# Patient Record
Sex: FEMALE | Race: BLACK OR AFRICAN AMERICAN | NOT HISPANIC OR LATINO | Employment: OTHER | URBAN - METROPOLITAN AREA
[De-identification: names, ages, dates, MRNs, and addresses within clinical notes are randomized per-mention and may not be internally consistent; named-entity substitution may affect disease eponyms.]

---

## 2023-03-27 ENCOUNTER — HOSPITAL ENCOUNTER (OUTPATIENT)
Dept: DATA CONVERSION | Facility: HOSPITAL | Age: 68
End: 2023-03-27
Attending: INTERNAL MEDICINE | Admitting: INTERNAL MEDICINE
Payer: MEDICARE

## 2023-03-27 DIAGNOSIS — K21.9 GASTRO-ESOPHAGEAL REFLUX DISEASE WITHOUT ESOPHAGITIS: ICD-10-CM

## 2023-03-27 DIAGNOSIS — F41.9 ANXIETY DISORDER, UNSPECIFIED: ICD-10-CM

## 2023-03-27 DIAGNOSIS — I35.0 NONRHEUMATIC AORTIC (VALVE) STENOSIS: ICD-10-CM

## 2023-03-27 DIAGNOSIS — K44.9 DIAPHRAGMATIC HERNIA WITHOUT OBSTRUCTION OR GANGRENE: ICD-10-CM

## 2023-03-27 DIAGNOSIS — E55.9 VITAMIN D DEFICIENCY, UNSPECIFIED: ICD-10-CM

## 2023-03-27 DIAGNOSIS — R06.00 DYSPNEA, UNSPECIFIED: ICD-10-CM

## 2023-03-27 DIAGNOSIS — I25.2 OLD MYOCARDIAL INFARCTION: ICD-10-CM

## 2023-03-27 DIAGNOSIS — E11.22 TYPE 2 DIABETES MELLITUS WITH DIABETIC CHRONIC KIDNEY DISEASE (MULTI): ICD-10-CM

## 2023-03-27 DIAGNOSIS — Z85.53 PERSONAL HISTORY OF MALIGNANT NEOPLASM OF RENAL PELVIS: ICD-10-CM

## 2023-03-27 DIAGNOSIS — E66.9 OBESITY, UNSPECIFIED: ICD-10-CM

## 2023-03-27 DIAGNOSIS — D50.9 IRON DEFICIENCY ANEMIA, UNSPECIFIED: ICD-10-CM

## 2023-03-27 DIAGNOSIS — G89.4 CHRONIC PAIN SYNDROME: ICD-10-CM

## 2023-03-27 DIAGNOSIS — Z86.718 PERSONAL HISTORY OF OTHER VENOUS THROMBOSIS AND EMBOLISM: ICD-10-CM

## 2023-03-27 DIAGNOSIS — G47.33 OBSTRUCTIVE SLEEP APNEA (ADULT) (PEDIATRIC): ICD-10-CM

## 2023-03-27 DIAGNOSIS — Z88.2 ALLERGY STATUS TO SULFONAMIDES: ICD-10-CM

## 2023-03-27 DIAGNOSIS — R32 UNSPECIFIED URINARY INCONTINENCE: ICD-10-CM

## 2023-03-27 DIAGNOSIS — E11.51 TYPE 2 DIABETES MELLITUS WITH DIABETIC PERIPHERAL ANGIOPATHY WITHOUT GANGRENE (MULTI): ICD-10-CM

## 2023-03-27 DIAGNOSIS — K31.7 POLYP OF STOMACH AND DUODENUM: ICD-10-CM

## 2023-03-27 DIAGNOSIS — N18.30 CHRONIC KIDNEY DISEASE, STAGE 3 UNSPECIFIED (MULTI): ICD-10-CM

## 2023-03-27 DIAGNOSIS — M06.9 RHEUMATOID ARTHRITIS, UNSPECIFIED (MULTI): ICD-10-CM

## 2023-03-27 DIAGNOSIS — J45.909 UNSPECIFIED ASTHMA, UNCOMPLICATED (HHS-HCC): ICD-10-CM

## 2023-03-27 DIAGNOSIS — D50.0 IRON DEFICIENCY ANEMIA SECONDARY TO BLOOD LOSS (CHRONIC): ICD-10-CM

## 2023-03-27 DIAGNOSIS — M10.9 GOUT, UNSPECIFIED: ICD-10-CM

## 2023-03-27 DIAGNOSIS — I12.9 HYPERTENSIVE CHRONIC KIDNEY DISEASE WITH STAGE 1 THROUGH STAGE 4 CHRONIC KIDNEY DISEASE, OR UNSPECIFIED CHRONIC KIDNEY DISEASE: ICD-10-CM

## 2023-03-27 DIAGNOSIS — E78.5 HYPERLIPIDEMIA, UNSPECIFIED: ICD-10-CM

## 2023-03-27 DIAGNOSIS — G43.909 MIGRAINE, UNSPECIFIED, NOT INTRACTABLE, WITHOUT STATUS MIGRAINOSUS: ICD-10-CM

## 2023-03-27 DIAGNOSIS — Z91.041 RADIOGRAPHIC DYE ALLERGY STATUS: ICD-10-CM

## 2023-03-27 DIAGNOSIS — Z87.891 PERSONAL HISTORY OF NICOTINE DEPENDENCE: ICD-10-CM

## 2023-03-27 DIAGNOSIS — R35.0 FREQUENCY OF MICTURITION: ICD-10-CM

## 2023-03-27 LAB — POCT GLUCOSE: 123 MG/DL (ref 74–99)

## 2023-04-05 LAB
COMPLETE PATHOLOGY REPORT: NORMAL
CONVERTED CLINICAL DIAGNOSIS-HISTORY: NORMAL
CONVERTED FINAL DIAGNOSIS: NORMAL
CONVERTED FINAL REPORT PDF LINK TO COPY AND PASTE: NORMAL
CONVERTED GROSS DESCRIPTION: NORMAL

## 2023-07-21 ENCOUNTER — OFFICE VISIT (OUTPATIENT)
Dept: PRIMARY CARE | Facility: CLINIC | Age: 68
End: 2023-07-21
Payer: MEDICARE

## 2023-07-21 ENCOUNTER — LAB (OUTPATIENT)
Dept: LAB | Facility: LAB | Age: 68
End: 2023-07-21
Payer: MEDICARE

## 2023-07-21 VITALS
HEART RATE: 84 BPM | HEIGHT: 59 IN | OXYGEN SATURATION: 98 % | WEIGHT: 196.8 LBS | BODY MASS INDEX: 39.68 KG/M2 | DIASTOLIC BLOOD PRESSURE: 103 MMHG | SYSTOLIC BLOOD PRESSURE: 177 MMHG

## 2023-07-21 DIAGNOSIS — R35.0 INCREASED FREQUENCY OF MICTURITION: ICD-10-CM

## 2023-07-21 DIAGNOSIS — E11.22 TYPE 2 DIABETES MELLITUS WITH STAGE 3A CHRONIC KIDNEY DISEASE, WITHOUT LONG-TERM CURRENT USE OF INSULIN (MULTI): ICD-10-CM

## 2023-07-21 DIAGNOSIS — N18.31 TYPE 2 DIABETES MELLITUS WITH STAGE 3A CHRONIC KIDNEY DISEASE, WITHOUT LONG-TERM CURRENT USE OF INSULIN (MULTI): ICD-10-CM

## 2023-07-21 DIAGNOSIS — Z12.31 VISIT FOR SCREENING MAMMOGRAM: ICD-10-CM

## 2023-07-21 DIAGNOSIS — N18.31 STAGE 3A CHRONIC KIDNEY DISEASE (MULTI): ICD-10-CM

## 2023-07-21 DIAGNOSIS — J30.2 SEASONAL ALLERGIES: ICD-10-CM

## 2023-07-21 DIAGNOSIS — D50.9 IRON DEFICIENCY ANEMIA, UNSPECIFIED IRON DEFICIENCY ANEMIA TYPE: ICD-10-CM

## 2023-07-21 DIAGNOSIS — Z00.00 MEDICARE ANNUAL WELLNESS VISIT, SUBSEQUENT: ICD-10-CM

## 2023-07-21 DIAGNOSIS — Z78.0 ASYMPTOMATIC MENOPAUSE: ICD-10-CM

## 2023-07-21 DIAGNOSIS — R20.0 RUE NUMBNESS: ICD-10-CM

## 2023-07-21 DIAGNOSIS — C64.9 RENAL CELL CARCINOMA, UNSPECIFIED LATERALITY (MULTI): Primary | ICD-10-CM

## 2023-07-21 PROBLEM — E78.2 MIXED HYPERLIPIDEMIA: Status: ACTIVE | Noted: 2023-07-21

## 2023-07-21 PROBLEM — N18.30 CHRONIC KIDNEY DISEASE (CKD), STAGE III (MODERATE) (MULTI): Status: ACTIVE | Noted: 2023-07-21

## 2023-07-21 PROBLEM — D64.9 ANEMIA: Status: ACTIVE | Noted: 2023-07-21

## 2023-07-21 PROBLEM — E11.9 DIABETES MELLITUS (MULTI): Status: ACTIVE | Noted: 2023-07-21

## 2023-07-21 PROBLEM — K21.9 ESOPHAGEAL REFLUX: Status: ACTIVE | Noted: 2023-07-21

## 2023-07-21 PROBLEM — M19.90 OSTEOARTHRITIS: Status: ACTIVE | Noted: 2023-07-21

## 2023-07-21 LAB
ALBUMIN (MG/L) IN URINE: <7 MG/L
ALBUMIN/CREATININE (UG/MG) IN URINE: NORMAL UG/MG CRT (ref 0–30)
ANION GAP IN SER/PLAS: 13 MMOL/L (ref 10–20)
APPEARANCE, URINE: CLEAR
BILIRUBIN, URINE: NEGATIVE
BLOOD, URINE: NEGATIVE
CALCIUM (MG/DL) IN SER/PLAS: 9.6 MG/DL (ref 8.6–10.6)
CARBON DIOXIDE, TOTAL (MMOL/L) IN SER/PLAS: 25 MMOL/L (ref 21–32)
CHLORIDE (MMOL/L) IN SER/PLAS: 108 MMOL/L (ref 98–107)
COLOR, URINE: YELLOW
CREATININE (MG/DL) IN SER/PLAS: 1.27 MG/DL (ref 0.5–1.05)
CREATININE (MG/DL) IN URINE: 109 MG/DL (ref 20–320)
ERYTHROCYTE DISTRIBUTION WIDTH (RATIO) BY AUTOMATED COUNT: 17.2 % (ref 11.5–14.5)
ERYTHROCYTE MEAN CORPUSCULAR HEMOGLOBIN CONCENTRATION (G/DL) BY AUTOMATED: 30.3 G/DL (ref 32–36)
ERYTHROCYTE MEAN CORPUSCULAR VOLUME (FL) BY AUTOMATED COUNT: 73 FL (ref 80–100)
ERYTHROCYTES (10*6/UL) IN BLOOD BY AUTOMATED COUNT: 4.17 X10E12/L (ref 4–5.2)
ESTIMATED AVERAGE GLUCOSE FOR HBA1C: 157 MG/DL
GFR FEMALE: 46 ML/MIN/1.73M2
GLUCOSE (MG/DL) IN SER/PLAS: 124 MG/DL (ref 74–99)
GLUCOSE, URINE: NEGATIVE MG/DL
HEMATOCRIT (%) IN BLOOD BY AUTOMATED COUNT: 30.4 % (ref 36–46)
HEMOGLOBIN (G/DL) IN BLOOD: 9.2 G/DL (ref 12–16)
HEMOGLOBIN A1C/HEMOGLOBIN TOTAL IN BLOOD: 7.1 %
IRON (UG/DL) IN SER/PLAS: 23 UG/DL (ref 35–150)
IRON BINDING CAPACITY (UG/DL) IN SER/PLAS: >473 UG/DL (ref 240–445)
IRON SATURATION (%) IN SER/PLAS: ABNORMAL % (ref 25–45)
KETONES, URINE: NEGATIVE MG/DL
LEUKOCYTE ESTERASE, URINE: NEGATIVE
LEUKOCYTES (10*3/UL) IN BLOOD BY AUTOMATED COUNT: 8.1 X10E9/L (ref 4.4–11.3)
NITRITE, URINE: NEGATIVE
NRBC (PER 100 WBCS) BY AUTOMATED COUNT: 0 /100 WBC (ref 0–0)
PH, URINE: 5 (ref 5–8)
PLATELETS (10*3/UL) IN BLOOD AUTOMATED COUNT: 418 X10E9/L (ref 150–450)
POTASSIUM (MMOL/L) IN SER/PLAS: 4.6 MMOL/L (ref 3.5–5.3)
PROTEIN, URINE: NEGATIVE MG/DL
SODIUM (MMOL/L) IN SER/PLAS: 141 MMOL/L (ref 136–145)
SPECIFIC GRAVITY, URINE: 1.01 (ref 1–1.03)
UREA NITROGEN (MG/DL) IN SER/PLAS: 14 MG/DL (ref 6–23)
UROBILINOGEN, URINE: <2 MG/DL (ref 0–1.9)

## 2023-07-21 PROCEDURE — 1125F AMNT PAIN NOTED PAIN PRSNT: CPT | Performed by: STUDENT IN AN ORGANIZED HEALTH CARE EDUCATION/TRAINING PROGRAM

## 2023-07-21 PROCEDURE — 83550 IRON BINDING TEST: CPT

## 2023-07-21 PROCEDURE — 1159F MED LIST DOCD IN RCRD: CPT | Performed by: STUDENT IN AN ORGANIZED HEALTH CARE EDUCATION/TRAINING PROGRAM

## 2023-07-21 PROCEDURE — 81003 URINALYSIS AUTO W/O SCOPE: CPT

## 2023-07-21 PROCEDURE — 3008F BODY MASS INDEX DOCD: CPT | Performed by: STUDENT IN AN ORGANIZED HEALTH CARE EDUCATION/TRAINING PROGRAM

## 2023-07-21 PROCEDURE — 36415 COLL VENOUS BLD VENIPUNCTURE: CPT

## 2023-07-21 PROCEDURE — 83540 ASSAY OF IRON: CPT

## 2023-07-21 PROCEDURE — 1036F TOBACCO NON-USER: CPT | Performed by: STUDENT IN AN ORGANIZED HEALTH CARE EDUCATION/TRAINING PROGRAM

## 2023-07-21 PROCEDURE — 85027 COMPLETE CBC AUTOMATED: CPT

## 2023-07-21 PROCEDURE — G0439 PPPS, SUBSEQ VISIT: HCPCS | Performed by: STUDENT IN AN ORGANIZED HEALTH CARE EDUCATION/TRAINING PROGRAM

## 2023-07-21 PROCEDURE — 99214 OFFICE O/P EST MOD 30 MIN: CPT | Performed by: STUDENT IN AN ORGANIZED HEALTH CARE EDUCATION/TRAINING PROGRAM

## 2023-07-21 PROCEDURE — 3077F SYST BP >= 140 MM HG: CPT | Performed by: STUDENT IN AN ORGANIZED HEALTH CARE EDUCATION/TRAINING PROGRAM

## 2023-07-21 PROCEDURE — 1160F RVW MEDS BY RX/DR IN RCRD: CPT | Performed by: STUDENT IN AN ORGANIZED HEALTH CARE EDUCATION/TRAINING PROGRAM

## 2023-07-21 PROCEDURE — 1170F FXNL STATUS ASSESSED: CPT | Performed by: STUDENT IN AN ORGANIZED HEALTH CARE EDUCATION/TRAINING PROGRAM

## 2023-07-21 PROCEDURE — 87086 URINE CULTURE/COLONY COUNT: CPT

## 2023-07-21 PROCEDURE — 3066F NEPHROPATHY DOC TX: CPT | Performed by: STUDENT IN AN ORGANIZED HEALTH CARE EDUCATION/TRAINING PROGRAM

## 2023-07-21 PROCEDURE — 83036 HEMOGLOBIN GLYCOSYLATED A1C: CPT

## 2023-07-21 PROCEDURE — 82570 ASSAY OF URINE CREATININE: CPT

## 2023-07-21 PROCEDURE — 80048 BASIC METABOLIC PNL TOTAL CA: CPT

## 2023-07-21 PROCEDURE — 3044F HG A1C LEVEL LT 7.0%: CPT | Performed by: STUDENT IN AN ORGANIZED HEALTH CARE EDUCATION/TRAINING PROGRAM

## 2023-07-21 PROCEDURE — 3080F DIAST BP >= 90 MM HG: CPT | Performed by: STUDENT IN AN ORGANIZED HEALTH CARE EDUCATION/TRAINING PROGRAM

## 2023-07-21 PROCEDURE — 82043 UR ALBUMIN QUANTITATIVE: CPT

## 2023-07-21 RX ORDER — FLUTICASONE PROPIONATE 50 MCG
2 SPRAY, SUSPENSION (ML) NASAL DAILY
Qty: 16 G | Refills: 3 | Status: SHIPPED | OUTPATIENT
Start: 2023-07-21

## 2023-07-21 RX ORDER — LEVOCETIRIZINE DIHYDROCHLORIDE 5 MG/1
1 TABLET, FILM COATED ORAL EVERY EVENING
COMMUNITY
Start: 2021-08-05 | End: 2023-07-21 | Stop reason: SDUPTHER

## 2023-07-21 RX ORDER — SEMAGLUTIDE 1.34 MG/ML
INJECTION, SOLUTION SUBCUTANEOUS
COMMUNITY
Start: 2021-11-16 | End: 2023-08-29 | Stop reason: SINTOL

## 2023-07-21 RX ORDER — FERROUS SULFATE 325(65) MG
1 TABLET ORAL DAILY
COMMUNITY
Start: 2023-02-08 | End: 2023-12-22 | Stop reason: SINTOL

## 2023-07-21 RX ORDER — NAPROXEN SODIUM 220 MG/1
81 TABLET, FILM COATED ORAL DAILY
COMMUNITY
Start: 2021-04-27 | End: 2024-02-16 | Stop reason: HOSPADM

## 2023-07-21 RX ORDER — BRIMONIDINE TARTRATE 1 MG/ML
SOLUTION/ DROPS OPHTHALMIC EVERY 12 HOURS
COMMUNITY
Start: 2020-02-03 | End: 2023-12-12 | Stop reason: ALTCHOICE

## 2023-07-21 RX ORDER — LEVALBUTEROL TARTRATE 45 UG/1
1 AEROSOL, METERED ORAL EVERY 4 HOURS PRN
COMMUNITY
Start: 2017-03-23 | End: 2024-01-26 | Stop reason: ENTERED-IN-ERROR

## 2023-07-21 RX ORDER — FLUTICASONE PROPIONATE 50 MCG
SPRAY, SUSPENSION (ML) NASAL
COMMUNITY
Start: 2019-06-07 | End: 2023-07-21 | Stop reason: SDUPTHER

## 2023-07-21 RX ORDER — METOPROLOL SUCCINATE 200 MG/1
200 TABLET, EXTENDED RELEASE ORAL DAILY
COMMUNITY
End: 2023-08-07 | Stop reason: SDUPTHER

## 2023-07-21 RX ORDER — BRIMONIDINE TARTRATE 2 MG/ML
SOLUTION/ DROPS OPHTHALMIC
COMMUNITY
End: 2023-12-12 | Stop reason: ALTCHOICE

## 2023-07-21 RX ORDER — AMLODIPINE BESYLATE 5 MG/1
TABLET ORAL
COMMUNITY
Start: 2021-07-13 | End: 2023-08-29 | Stop reason: SDUPTHER

## 2023-07-21 RX ORDER — OMEPRAZOLE 40 MG/1
1 CAPSULE, DELAYED RELEASE ORAL DAILY
COMMUNITY
Start: 2010-11-04 | End: 2023-08-11 | Stop reason: SDUPTHER

## 2023-07-21 RX ORDER — LEVOCETIRIZINE DIHYDROCHLORIDE 5 MG/1
5 TABLET, FILM COATED ORAL EVERY EVENING
Qty: 90 TABLET | Refills: 1 | Status: SHIPPED | OUTPATIENT
Start: 2023-07-21

## 2023-07-21 RX ORDER — ROSUVASTATIN CALCIUM 20 MG/1
1 TABLET, COATED ORAL DAILY
COMMUNITY
Start: 2021-04-23 | End: 2024-01-26 | Stop reason: ENTERED-IN-ERROR

## 2023-07-21 RX ORDER — INSULIN GLARGINE 100 [IU]/ML
INJECTION, SOLUTION SUBCUTANEOUS
COMMUNITY
Start: 2022-02-09 | End: 2023-08-29 | Stop reason: SDUPTHER

## 2023-07-21 RX ORDER — NITROGLYCERIN 0.4 MG/1
0.4 TABLET SUBLINGUAL EVERY 5 MIN PRN
COMMUNITY
Start: 2019-02-04 | End: 2024-01-26

## 2023-07-21 RX ORDER — FAMOTIDINE 20 MG/1
1 TABLET, FILM COATED ORAL NIGHTLY
COMMUNITY
Start: 2023-01-31

## 2023-07-21 ASSESSMENT — ACTIVITIES OF DAILY LIVING (ADL)
DRESSING: INDEPENDENT
BATHING: INDEPENDENT
TAKING_MEDICATION: INDEPENDENT
GROCERY_SHOPPING: INDEPENDENT
MANAGING_FINANCES: INDEPENDENT
DOING_HOUSEWORK: INDEPENDENT

## 2023-07-21 ASSESSMENT — PATIENT HEALTH QUESTIONNAIRE - PHQ9
2. FEELING DOWN, DEPRESSED OR HOPELESS: NOT AT ALL
1. LITTLE INTEREST OR PLEASURE IN DOING THINGS: NOT AT ALL
SUM OF ALL RESPONSES TO PHQ9 QUESTIONS 1 AND 2: 0

## 2023-07-21 NOTE — PROGRESS NOTES
Subjective   Reason for Visit: Vania Andrews is an 68 y.o. female here for a Medicare Wellness visit.     Past Medical, Surgical, and Family History reviewed and updated in chart.    Reviewed all medications by prescribing practitioner or clinical pharmacist (such as prescriptions, OTCs, herbal therapies and supplements) and documented in the medical record.    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 If left in the anatomical position , she does not experience the numbness     Patient Care Team:  Laura Mata MD as PCP - General  Laura Mata MD as PCP - [facility] Attributed Provider     Review of Systems    Constitutional: no chills, no fever and no night sweats.     Eyes: no blurred vision and no eyesight problems.     ENT: no hearing loss, no nasal congestion, no nasal discharge, no hoarseness and no sore throat.     Cardiovascular: no chest pain, no intermittent leg claudication, no lower extremity edema, no palpitations and no syncope.     Respiratory: no cough, no shortness of breath during exertion, no shortness of breath at rest and no wheezing.     Gastrointestinal: no abdominal pain, no blood in stools, no constipation, no diarrhea, no melena, no nausea, no rectal pain and no vomiting.     Genitourinary: no dysuria, no change in urinary frequency, no urinary hesitancy, no feelings of urinary urgency and no vaginal  "discharge.     Musculoskeletal: no arthralgias, no back pain and no myalgias.     Integumentary: no new skin lesions and no rashes.     Neurological: no difficulty walking, no headache, no limb weakness, no numbness and no tingling.     Psychiatric: no anxiety, no depression, no anhedonia and no substance use disorders.     Endocrine: no recent weight gain and no recent weight loss.     Hematologic/Lymphatic: no tendency for easy bruising and no swollen glands.          All other systems have been reviewed and are negative for complaint.    Objective   Vitals:  BP (!) 177/103   Pulse 84   Ht 1.499 m (4' 11\")   Wt 89.3 kg (196 lb 12.8 oz)   SpO2 98%   BMI 39.75 kg/m²       Physical Exam    Constitutional: Alert and in no acute distress. Well developed, well nourished.     Eyes: Normal external exam. Pupils were equal in size, round, reactive to light (PERRL) with normal accommodation and extraocular movements intact (EOMI).     Ears, Nose, Mouth, and Throat: External inspection of ears and nose: Normal.  Otoscopic examination: Normal.      Neck: No neck mass was observed. Supple.     Cardiovascular: Heart rate and rhythm were normal, normal S1 and S2, no gallops, no murmurs and no pericardial rub    Pulmonary: No respiratory distress. Clear bilateral breath sounds.     Abdomen: Soft nontender; no abdominal mass palpated. No organomegaly.     Musculoskeletal: No joint swelling seen, normal movements of all extremities. Range of motion: Normal.  Muscle strength/tone: Normal.          Neurologic: Deep tendon reflexes were 2+ and symmetric. Sensation: Normal.     Psychiatric: Judgment and insight: Intact. Mood and affect: Normal.      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 s/p L partial nephrectomy and L adrenalectomy (2007),          " Anemia    Relevant Orders    CBC    Iron and TIBC    Chronic kidney disease (CKD), stage III (moderate) (CMS/[facility])    Relevant Orders    Basic Metabolic Panel    Diabetes mellitus (CMS/[facility])    Relevant Orders    Hemoglobin A1C    Albumin , Urine Random    Referral to Podiatry     Other Visit Diagnoses       Medicare annual wellness visit, subsequent        Asymptomatic menopause        Relevant Orders    XR DEXA bone density    Visit for screening mammogram        Increased frequency of micturition        Relevant Orders    Urinalysis with Reflex Microscopic    Urine Culture    RUE numbness        Relevant Orders    Referral to Orthopaedic Surgery             68yo F with Hx of L RCC, papillary, 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   - HTN : Rpt elevated at as well, no med changes today   To RTO in 1m   - HPL   - CKD stage 3 : Rpt BMP ordered   Medications like Aleve ( Naproxen), advil ( Ibuprofen ) , meloxicam , aspirin etc., when used in excess on a regular basis can cause stomach bleed and kidney damage.   These medications are advised to be taken with food and sparingly.      - DM2 : est with endo . Pt continues to take Lantus , she is wary of Pioglitazone   - ASthma : stable   - H/O NSTEMI ; est with cardiology  - H/O anemia : rpt CBC  - RUE numbness : Ortho referfal   - Fu with ortho for hip issues        RTO in Dec or sooner if needed         **Patient Discussion/Summary     Risk Factors Identified During Visit: None.    Influenza: influenza vaccine was previously given.   Pneumovax 23/Prevnar 15: the patient declines the Ouwciqjct26/Prevnar 15 vaccination.   Prevnar 20: Prevnar 20 vaccine was previously given.   Shingles Vaccine: Shingles vaccine was recommended.   Breast cancer screening: screening ordered.   Cervical cancer screening: screening not indicated.   Osteoporosis screening: screening ordered.   Colorectal cancer screening: screening is current. Due 2024

## 2023-07-21 NOTE — ASSESSMENT & PLAN NOTE
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

## 2023-07-22 LAB
ESTIMATED AVERAGE GLUCOSE FOR HBA1C: 163 MG/DL
HEMOGLOBIN A1C/HEMOGLOBIN TOTAL IN BLOOD: 7.3 %

## 2023-07-23 LAB — URINE CULTURE: NORMAL

## 2023-07-24 ENCOUNTER — TELEPHONE (OUTPATIENT)
Dept: PRIMARY CARE | Facility: CLINIC | Age: 68
End: 2023-07-24
Payer: MEDICARE

## 2023-07-24 NOTE — TELEPHONE ENCOUNTER
PT called back for her results stated somehow her phone was not ringing and she missed the call please CB

## 2023-07-24 NOTE — TELEPHONE ENCOUNTER
----- Message from Laura Mata MD sent at 7/24/2023 12:56 PM EDT -----  Urine did not show infection   Diabetes  control is acceptable   Chronic kidney disease is stable ( no further decline in kidney function from previous levels ) .   Iron levels are low , if she can tolerate, advise her to take iron pills twice daily   Anemia improved from last time but needs to be higher , taking iron twice daily will help   Iron can cause constipation , advise lots of Fluids . Can use Miralax as needed for constipation . Take it with Orange juice .  Iron can also make stools black.  Iron is found in foods like salmon ,lean beef, turkey , egg yolks, black beans pumpkin seeds, spinach , broccoli etc.,

## 2023-08-07 DIAGNOSIS — I10 PRIMARY HYPERTENSION: Primary | ICD-10-CM

## 2023-08-08 RX ORDER — METOPROLOL SUCCINATE 200 MG/1
200 TABLET, EXTENDED RELEASE ORAL DAILY
Qty: 90 TABLET | Refills: 3 | Status: SHIPPED | OUTPATIENT
Start: 2023-08-08

## 2023-08-11 DIAGNOSIS — K21.9 GASTROESOPHAGEAL REFLUX DISEASE WITHOUT ESOPHAGITIS: Primary | ICD-10-CM

## 2023-08-11 RX ORDER — OMEPRAZOLE 40 MG/1
40 CAPSULE, DELAYED RELEASE ORAL DAILY
Qty: 30 CAPSULE | Refills: 0 | Status: SHIPPED | OUTPATIENT
Start: 2023-08-11 | End: 2023-11-02 | Stop reason: SDUPTHER

## 2023-08-12 NOTE — PROGRESS NOTES
Patient called answering service asking for omeprazole reiflls. [facility]

## 2023-08-29 ENCOUNTER — OFFICE VISIT (OUTPATIENT)
Dept: PRIMARY CARE | Facility: CLINIC | Age: 68
End: 2023-08-29
Payer: MEDICARE

## 2023-08-29 VITALS
HEART RATE: 86 BPM | OXYGEN SATURATION: 99 % | DIASTOLIC BLOOD PRESSURE: 93 MMHG | SYSTOLIC BLOOD PRESSURE: 154 MMHG | BODY MASS INDEX: 39.51 KG/M2 | WEIGHT: 196 LBS | HEIGHT: 59 IN

## 2023-08-29 DIAGNOSIS — N18.31 TYPE 2 DIABETES MELLITUS WITH STAGE 3A CHRONIC KIDNEY DISEASE, WITHOUT LONG-TERM CURRENT USE OF INSULIN (MULTI): ICD-10-CM

## 2023-08-29 DIAGNOSIS — I10 PRIMARY HYPERTENSION: Primary | ICD-10-CM

## 2023-08-29 DIAGNOSIS — E11.22 TYPE 2 DIABETES MELLITUS WITH STAGE 3A CHRONIC KIDNEY DISEASE, WITHOUT LONG-TERM CURRENT USE OF INSULIN (MULTI): ICD-10-CM

## 2023-08-29 DIAGNOSIS — R35.0 INCREASED FREQUENCY OF MICTURITION: ICD-10-CM

## 2023-08-29 PROBLEM — G89.29 CHRONIC PAIN OF RIGHT WRIST: Status: ACTIVE | Noted: 2023-08-29

## 2023-08-29 PROBLEM — R43.0 ANOSMIA: Status: ACTIVE | Noted: 2023-08-29

## 2023-08-29 PROBLEM — H52.203 ASTIGMATISM, BILATERAL: Status: ACTIVE | Noted: 2023-08-29

## 2023-08-29 PROBLEM — R30.9 PAIN WITH URINATION: Status: ACTIVE | Noted: 2023-08-29

## 2023-08-29 PROBLEM — H52.4 BILATERAL PRESBYOPIA: Status: ACTIVE | Noted: 2023-08-29

## 2023-08-29 PROBLEM — R00.0 TACHYCARDIA: Status: ACTIVE | Noted: 2023-08-29

## 2023-08-29 PROBLEM — M25.531 CHRONIC PAIN OF RIGHT WRIST: Status: ACTIVE | Noted: 2023-08-29

## 2023-08-29 PROBLEM — R41.3 MEMORY DYSFUNCTION: Status: ACTIVE | Noted: 2023-08-29

## 2023-08-29 PROBLEM — H92.03 OTALGIA OF BOTH EARS: Status: ACTIVE | Noted: 2023-08-29

## 2023-08-29 PROBLEM — R79.89 ELEVATED LFTS: Status: ACTIVE | Noted: 2023-08-29

## 2023-08-29 PROBLEM — S52.501A DISTAL RADIUS FRACTURE, RIGHT: Status: ACTIVE | Noted: 2023-08-29

## 2023-08-29 PROBLEM — I49.9 IRREGULAR HEART BEATS: Status: ACTIVE | Noted: 2023-08-29

## 2023-08-29 PROBLEM — R52 GENERALIZED PAIN: Status: ACTIVE | Noted: 2023-08-29

## 2023-08-29 PROBLEM — M06.9 RHEUMATOID ARTHRITIS INVOLVING BOTH HANDS (MULTI): Status: ACTIVE | Noted: 2023-08-29

## 2023-08-29 PROBLEM — H57.11 PAIN OF RIGHT EYE: Status: ACTIVE | Noted: 2023-08-29

## 2023-08-29 PROBLEM — N39.0 URINARY TRACT INFECTION: Status: ACTIVE | Noted: 2023-08-29

## 2023-08-29 PROBLEM — E66.01 MORBID OBESITY (MULTI): Status: ACTIVE | Noted: 2023-08-29

## 2023-08-29 PROBLEM — R59.9 ENLARGED LYMPH NODES: Status: ACTIVE | Noted: 2023-08-29

## 2023-08-29 PROBLEM — I82.409 DVT (DEEP VENOUS THROMBOSIS) (MULTI): Status: ACTIVE | Noted: 2023-08-29

## 2023-08-29 PROBLEM — S62.101A WRIST FRACTURE, RIGHT: Status: ACTIVE | Noted: 2023-08-29

## 2023-08-29 PROBLEM — Z90.5 STATUS POST NEPHRECTOMY: Status: ACTIVE | Noted: 2023-08-29

## 2023-08-29 PROBLEM — R94.31 ABNORMAL ELECTROCARDIOGRAM: Status: ACTIVE | Noted: 2023-08-29

## 2023-08-29 PROBLEM — K44.9 PARAESOPHAGEAL HIATAL HERNIA: Status: ACTIVE | Noted: 2023-08-29

## 2023-08-29 PROBLEM — G47.30 SLEEP APNEA: Status: ACTIVE | Noted: 2023-08-29

## 2023-08-29 PROBLEM — N28.1 BILATERAL RENAL CYSTS: Status: ACTIVE | Noted: 2023-08-29

## 2023-08-29 PROBLEM — J45.909 ASTHMA (HHS-HCC): Status: ACTIVE | Noted: 2023-08-29

## 2023-08-29 PROBLEM — R06.02 SHORTNESS OF BREATH ON EXERTION: Status: ACTIVE | Noted: 2023-08-29

## 2023-08-29 PROBLEM — I25.2 H/O NON-ST ELEVATION MYOCARDIAL INFARCTION (NSTEMI): Status: ACTIVE | Noted: 2023-08-29

## 2023-08-29 PROBLEM — H40.059 ELEVATED IOP: Status: ACTIVE | Noted: 2023-08-29

## 2023-08-29 PROBLEM — R42 DIZZINESS: Status: ACTIVE | Noted: 2023-08-29

## 2023-08-29 PROBLEM — M85.80 OSTEOPENIA: Status: ACTIVE | Noted: 2023-08-29

## 2023-08-29 PROBLEM — R00.2 PALPITATIONS: Status: ACTIVE | Noted: 2023-08-29

## 2023-08-29 PROBLEM — R74.01 TRANSAMINITIS: Status: ACTIVE | Noted: 2023-08-29

## 2023-08-29 PROBLEM — M10.9 GOUT, ARTHRITIS: Status: ACTIVE | Noted: 2023-08-29

## 2023-08-29 PROBLEM — T50.995A ALLERGY TO IVP DYE: Status: ACTIVE | Noted: 2023-08-29

## 2023-08-29 PROBLEM — H93.13 TINNITUS OF BOTH EARS: Status: ACTIVE | Noted: 2023-08-29

## 2023-08-29 PROBLEM — R53.83 FATIGUE: Status: ACTIVE | Noted: 2023-08-29

## 2023-08-29 PROBLEM — R92.8 ABNORMAL MAMMOGRAM: Status: ACTIVE | Noted: 2023-08-29

## 2023-08-29 PROBLEM — N39.0 RECURRENT UTI: Status: ACTIVE | Noted: 2023-08-29

## 2023-08-29 PROBLEM — D50.9 IRON DEFICIENCY ANEMIA, UNSPECIFIED: Status: ACTIVE | Noted: 2023-08-29

## 2023-08-29 PROBLEM — M76.62 ACHILLES TENDINITIS OF LEFT LOWER EXTREMITY: Status: ACTIVE | Noted: 2023-08-29

## 2023-08-29 PROBLEM — R09.82 POSTNASAL DRIP: Status: ACTIVE | Noted: 2023-08-29

## 2023-08-29 PROBLEM — R01.1 SYSTOLIC EJECTION MURMUR: Status: ACTIVE | Noted: 2023-08-29

## 2023-08-29 PROBLEM — R09.81 NASAL CONGESTION: Status: ACTIVE | Noted: 2023-08-29

## 2023-08-29 PROBLEM — K42.9 UMBILICAL HERNIA: Status: ACTIVE | Noted: 2023-08-29

## 2023-08-29 PROBLEM — H40.1133 PRIMARY OPEN ANGLE GLAUCOMA (POAG) OF BOTH EYES, SEVERE STAGE: Status: ACTIVE | Noted: 2023-08-29

## 2023-08-29 PROBLEM — K46.9 ABDOMINAL HERNIA: Status: ACTIVE | Noted: 2023-08-29

## 2023-08-29 PROBLEM — R35.89 POLYURIA: Status: ACTIVE | Noted: 2023-08-29

## 2023-08-29 PROBLEM — R07.9 CHEST PAIN: Status: ACTIVE | Noted: 2023-08-29

## 2023-08-29 PROBLEM — R51.9 HEADACHE: Status: ACTIVE | Noted: 2023-08-29

## 2023-08-29 PROBLEM — R11.2 NAUSEA, VOMITING, AND DIARRHEA: Status: ACTIVE | Noted: 2023-08-29

## 2023-08-29 PROBLEM — M25.551 RIGHT HIP PAIN: Status: ACTIVE | Noted: 2023-08-29

## 2023-08-29 PROBLEM — E55.9 VITAMIN D DEFICIENCY: Status: ACTIVE | Noted: 2023-08-29

## 2023-08-29 PROBLEM — R19.7 NAUSEA, VOMITING, AND DIARRHEA: Status: ACTIVE | Noted: 2023-08-29

## 2023-08-29 PROBLEM — D35.02 ADENOMA OF LEFT ADRENAL GLAND: Status: ACTIVE | Noted: 2023-08-29

## 2023-08-29 PROBLEM — N39.46 MIXED STRESS AND URGE URINARY INCONTINENCE: Status: ACTIVE | Noted: 2023-08-29

## 2023-08-29 PROBLEM — M65.30 ACQUIRED TRIGGER FINGER: Status: ACTIVE | Noted: 2023-08-29

## 2023-08-29 PROBLEM — G56.00 CARPAL TUNNEL SYNDROME: Status: ACTIVE | Noted: 2023-08-29

## 2023-08-29 PROBLEM — N64.4 BREAST PAIN, RIGHT: Status: ACTIVE | Noted: 2023-08-29

## 2023-08-29 PROBLEM — F43.21 GRIEF: Status: ACTIVE | Noted: 2023-08-29

## 2023-08-29 PROBLEM — I50.9 HEART FAILURE (MULTI): Status: ACTIVE | Noted: 2023-08-29

## 2023-08-29 PROBLEM — F41.9 ANXIETY: Status: ACTIVE | Noted: 2023-08-29

## 2023-08-29 PROBLEM — M79.89 LEG SWELLING: Status: ACTIVE | Noted: 2023-08-29

## 2023-08-29 PROBLEM — K59.00 CONSTIPATION: Status: ACTIVE | Noted: 2023-08-29

## 2023-08-29 PROBLEM — G43.009 COMMON MIGRAINE WITHOUT AURA: Status: ACTIVE | Noted: 2023-08-29

## 2023-08-29 PROBLEM — M25.569 KNEE PAIN: Status: ACTIVE | Noted: 2023-08-29

## 2023-08-29 LAB
APPEARANCE, URINE: CLEAR
BILIRUBIN, URINE: NEGATIVE
BLOOD, URINE: NEGATIVE
COLOR, URINE: YELLOW
GLUCOSE, URINE: NEGATIVE MG/DL
KETONES, URINE: NEGATIVE MG/DL
LEUKOCYTE ESTERASE, URINE: NEGATIVE
NITRITE, URINE: NEGATIVE
PH, URINE: 5 (ref 5–8)
PROTEIN, URINE: NEGATIVE MG/DL
SPECIFIC GRAVITY, URINE: 1.02 (ref 1–1.03)
UROBILINOGEN, URINE: <2 MG/DL (ref 0–1.9)

## 2023-08-29 PROCEDURE — 81003 URINALYSIS AUTO W/O SCOPE: CPT

## 2023-08-29 PROCEDURE — 3051F HG A1C>EQUAL 7.0%<8.0%: CPT | Performed by: STUDENT IN AN ORGANIZED HEALTH CARE EDUCATION/TRAINING PROGRAM

## 2023-08-29 PROCEDURE — 3008F BODY MASS INDEX DOCD: CPT | Performed by: STUDENT IN AN ORGANIZED HEALTH CARE EDUCATION/TRAINING PROGRAM

## 2023-08-29 PROCEDURE — 3066F NEPHROPATHY DOC TX: CPT | Performed by: STUDENT IN AN ORGANIZED HEALTH CARE EDUCATION/TRAINING PROGRAM

## 2023-08-29 PROCEDURE — 3077F SYST BP >= 140 MM HG: CPT | Performed by: STUDENT IN AN ORGANIZED HEALTH CARE EDUCATION/TRAINING PROGRAM

## 2023-08-29 PROCEDURE — 87086 URINE CULTURE/COLONY COUNT: CPT

## 2023-08-29 PROCEDURE — 1036F TOBACCO NON-USER: CPT | Performed by: STUDENT IN AN ORGANIZED HEALTH CARE EDUCATION/TRAINING PROGRAM

## 2023-08-29 PROCEDURE — 4010F ACE/ARB THERAPY RXD/TAKEN: CPT | Performed by: STUDENT IN AN ORGANIZED HEALTH CARE EDUCATION/TRAINING PROGRAM

## 2023-08-29 PROCEDURE — 99214 OFFICE O/P EST MOD 30 MIN: CPT | Performed by: STUDENT IN AN ORGANIZED HEALTH CARE EDUCATION/TRAINING PROGRAM

## 2023-08-29 PROCEDURE — 1125F AMNT PAIN NOTED PAIN PRSNT: CPT | Performed by: STUDENT IN AN ORGANIZED HEALTH CARE EDUCATION/TRAINING PROGRAM

## 2023-08-29 PROCEDURE — 3080F DIAST BP >= 90 MM HG: CPT | Performed by: STUDENT IN AN ORGANIZED HEALTH CARE EDUCATION/TRAINING PROGRAM

## 2023-08-29 PROCEDURE — 1159F MED LIST DOCD IN RCRD: CPT | Performed by: STUDENT IN AN ORGANIZED HEALTH CARE EDUCATION/TRAINING PROGRAM

## 2023-08-29 PROCEDURE — 1160F RVW MEDS BY RX/DR IN RCRD: CPT | Performed by: STUDENT IN AN ORGANIZED HEALTH CARE EDUCATION/TRAINING PROGRAM

## 2023-08-29 RX ORDER — DICYCLOMINE HYDROCHLORIDE 20 MG/1
20 TABLET ORAL
COMMUNITY
Start: 2019-07-31

## 2023-08-29 RX ORDER — BRIMONIDINE TARTRATE AND TIMOLOL MALEATE 2; 5 MG/ML; MG/ML
1 SOLUTION OPHTHALMIC DAILY
COMMUNITY
Start: 2023-06-21 | End: 2023-12-22 | Stop reason: SDUPTHER

## 2023-08-29 RX ORDER — AMLODIPINE BESYLATE 10 MG/1
10 TABLET ORAL DAILY
Qty: 90 TABLET | Refills: 1 | Status: SHIPPED | OUTPATIENT
Start: 2023-08-29 | End: 2024-02-18

## 2023-08-29 RX ORDER — DICLOFENAC EPOLAMINE 0.01 G/1
1 SYSTEM TOPICAL 2 TIMES DAILY
COMMUNITY
Start: 2020-09-28 | End: 2023-12-22 | Stop reason: SDUPTHER

## 2023-08-29 RX ORDER — SEMAGLUTIDE 1.34 MG/ML
0.5 INJECTION, SOLUTION SUBCUTANEOUS
COMMUNITY
Start: 2021-11-16 | End: 2023-10-31 | Stop reason: ALTCHOICE

## 2023-08-29 RX ORDER — INSULIN GLARGINE 100 [IU]/ML
8 INJECTION, SOLUTION SUBCUTANEOUS NIGHTLY
Qty: 3 ML | Refills: 3 | Status: SHIPPED | OUTPATIENT
Start: 2023-08-29 | End: 2023-12-11 | Stop reason: WASHOUT

## 2023-08-29 RX ORDER — MONTELUKAST SODIUM 10 MG/1
1 TABLET ORAL NIGHTLY
COMMUNITY
Start: 2016-12-09 | End: 2024-01-26 | Stop reason: ENTERED-IN-ERROR

## 2023-08-29 RX ORDER — BLOOD SUGAR DIAGNOSTIC
1 STRIP MISCELLANEOUS 2 TIMES DAILY
COMMUNITY
End: 2023-12-11 | Stop reason: SDUPTHER

## 2023-08-29 RX ORDER — PEN NEEDLE, DIABETIC 33 GX5/32"
NEEDLE, DISPOSABLE MISCELLANEOUS
COMMUNITY
End: 2023-12-11 | Stop reason: SDUPTHER

## 2023-08-29 RX ORDER — LANCETS
EACH MISCELLANEOUS
COMMUNITY
Start: 2023-08-08 | End: 2024-01-26 | Stop reason: ENTERED-IN-ERROR

## 2023-08-29 RX ORDER — LOSARTAN POTASSIUM 25 MG/1
25 TABLET ORAL DAILY
Qty: 90 TABLET | Refills: 1 | Status: SHIPPED | OUTPATIENT
Start: 2023-08-29 | End: 2024-02-18

## 2023-08-29 RX ORDER — LATANOPROST 50 UG/ML
1 SOLUTION/ DROPS OPHTHALMIC EVERY MORNING
COMMUNITY
End: 2023-12-22 | Stop reason: SDUPTHER

## 2023-08-29 RX ORDER — PIOGLITAZONEHYDROCHLORIDE 15 MG/1
0.5 TABLET ORAL DAILY
COMMUNITY
End: 2023-12-11 | Stop reason: ALTCHOICE

## 2023-08-29 RX ORDER — POLYETHYLENE GLYCOL 3350 17 G/17G
17 POWDER, FOR SOLUTION ORAL AS NEEDED
COMMUNITY
Start: 2018-04-01

## 2023-08-29 NOTE — PATIENT INSTRUCTIONS
For high BP , take Amlodipine 10mg  and start Losartan 25mg     For diabetes , resume Lantus 8 units since you did not tolerate Ozempic  and experienced side effects with other meds, insurance non coverage .     We are on a new system that is paperless.     Lab location for blood work :  1. Located across the office , just past the elevators .   [address]. [address].  If you are coming on a Saturday, go to [address] for the blood draw    Radiology : [address] for Xrays  You can also schedule non urgent imaging by calling .     For scheduling appts, call . You might be receiving call from central scheduling as well.    For scheduling colonoscopy, call .     For scheduling physical therapy, call [phone] ( [phone]).    For any other scheduling questions or locations, please ask the medical assistant or the .

## 2023-08-29 NOTE — PROGRESS NOTES
Subjective   Patient ID: Vania Andrews is a 68 y.o. female who presents for Hypertension (1 month follow-up BP. Urinary problems. ).        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 Lantus was dced when Ozempic was started   Can't get to  see Endo until Oct   She is not currently on any meds  for  blood sugar control      No LMP recorded.     Review of Systems    Constitutional : No feeling poorly / fevers/ chills / night sweats/ fatigue   Cardiovascular : No CP /Palpitations/ lower extremity edema / syncope   Respiratory : No Cough /ORTIZ/Dyspnea at rest   Gastrointestinal : No abd pain / N/V  No bloody stools/ melena / constipation  Endo :  As noted in HPI   CNS: No confusion / HA/ tingling/ numbness/ weakness of extremities  Psychiatric: No anxiety/ depression/ SI/HI    All other systems have been reviewed and are negative for complaint       Physical Exam    Constitutional : Vitals reviewed. Alert and in no distress  Cardiovascular : RRR, Normal S1, S2, No pericardial rub/ gallop, no peripheral edema   Pulmonary: No respiratory distress, CTAB   MSK : Normal gait and station , strength and tone     Neurologic : CNs 2-12 grossly intact , no obvious FNDs  Psych : A,Ox3, normal mood and affect      Assessment/Plan   Diagnoses and all orders for this visit:  Primary hypertension  -     losartan (Cozaar) 25 mg tablet;  Take 1 tablet (25 mg) by mouth once daily.  -     amLODIPine (Norvasc) 10 mg tablet; Take 1 tablet (10 mg) by mouth once daily.  Type 2 diabetes mellitus with stage 3a chronic kidney disease, without long-term current use of insulin (CMS/[facility])  -     insulin glargine (Lantus Solostar U-100 Insulin) 100 unit/mL (3 mL) pen; Inject 8 Units under the skin once daily at bedtime.  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 s/p L partial nephrectomy and L adrenalectomy (2007), CKD stage 3 d/t loss of nephron mass and HTN, asthma, HLD, T2DM (est with endo ), MARYLU not using CPAP, s/p hysterectomy and b/l oophorectomy, inferior NSTEMI (1/2019) complicated by chronic Fe deficiency anemia s/p iron infusions with resolution of anemia as of May 2021, partially obstructed Ross's hernia with sx ( Feb 2022 with repair in June 2022            DM2 : Resume Lantus 8 units   Nausea with Ozempic   A1c in July reviewed   Increased frequency of urination is likely from high Bgs due to not beng on meds for 1 month     HTN take Amlodipine 10mg  and start Losartan 25mg     RTO in Dec as previously advised

## 2023-08-30 LAB — URINE CULTURE: NORMAL

## 2023-09-01 ENCOUNTER — TELEPHONE (OUTPATIENT)
Dept: PRIMARY CARE | Facility: CLINIC | Age: 68
End: 2023-09-01

## 2023-09-01 DIAGNOSIS — E11.22 TYPE 2 DIABETES MELLITUS WITH STAGE 3A CHRONIC KIDNEY DISEASE, WITHOUT LONG-TERM CURRENT USE OF INSULIN (MULTI): Primary | ICD-10-CM

## 2023-09-01 DIAGNOSIS — N18.31 TYPE 2 DIABETES MELLITUS WITH STAGE 3A CHRONIC KIDNEY DISEASE, WITHOUT LONG-TERM CURRENT USE OF INSULIN (MULTI): Primary | ICD-10-CM

## 2023-09-01 RX ORDER — PEN NEEDLE, DIABETIC 30 GX3/16"
NEEDLE, DISPOSABLE MISCELLANEOUS
Qty: 100 EACH | Refills: 11 | Status: SHIPPED | OUTPATIENT
Start: 2023-09-01 | End: 2024-03-18 | Stop reason: SDUPTHER

## 2023-09-11 PROBLEM — E11.22 DIABETES MELLITUS WITH CHRONIC KIDNEY DISEASE (MULTI): Status: ACTIVE | Noted: 2023-09-11

## 2023-09-11 PROBLEM — E11.43: Status: ACTIVE | Noted: 2023-09-11

## 2023-10-24 ENCOUNTER — ANCILLARY PROCEDURE (OUTPATIENT)
Dept: RADIOLOGY | Facility: CLINIC | Age: 68
End: 2023-10-24
Payer: MEDICARE

## 2023-10-24 DIAGNOSIS — Z78.0 ASYMPTOMATIC MENOPAUSAL STATE: ICD-10-CM

## 2023-10-24 PROCEDURE — 77080 DXA BONE DENSITY AXIAL: CPT | Performed by: RADIOLOGY

## 2023-10-24 PROCEDURE — 77080 DXA BONE DENSITY AXIAL: CPT

## 2023-10-30 RX ORDER — MOMETASONE FUROATE 50 UG/1
SPRAY, METERED NASAL
COMMUNITY
End: 2023-10-31 | Stop reason: ALTCHOICE

## 2023-10-30 RX ORDER — MOMETASONE FUROATE AND FORMOTEROL FUMARATE DIHYDRATE 100; 5 UG/1; UG/1
AEROSOL RESPIRATORY (INHALATION)
COMMUNITY
End: 2023-10-31 | Stop reason: ALTCHOICE

## 2023-10-30 RX ORDER — METHOCARBAMOL 750 MG/1
TABLET, FILM COATED ORAL
COMMUNITY
End: 2023-12-12 | Stop reason: ALTCHOICE

## 2023-10-31 ENCOUNTER — OFFICE VISIT (OUTPATIENT)
Dept: CARDIOLOGY | Facility: HOSPITAL | Age: 68
End: 2023-10-31
Payer: MEDICARE

## 2023-10-31 ENCOUNTER — LAB (OUTPATIENT)
Dept: LAB | Facility: LAB | Age: 68
End: 2023-10-31
Payer: MEDICARE

## 2023-10-31 VITALS
DIASTOLIC BLOOD PRESSURE: 71 MMHG | HEART RATE: 73 BPM | RESPIRATION RATE: 18 BRPM | HEIGHT: 59 IN | SYSTOLIC BLOOD PRESSURE: 140 MMHG | BODY MASS INDEX: 40.6 KG/M2 | OXYGEN SATURATION: 96 % | WEIGHT: 201.4 LBS

## 2023-10-31 DIAGNOSIS — E78.2 MIXED HYPERLIPIDEMIA: ICD-10-CM

## 2023-10-31 DIAGNOSIS — M79.662 PAIN OF LEFT CALF: ICD-10-CM

## 2023-10-31 DIAGNOSIS — R06.02 SHORTNESS OF BREATH: ICD-10-CM

## 2023-10-31 DIAGNOSIS — M79.89 LEG SWELLING: ICD-10-CM

## 2023-10-31 DIAGNOSIS — R07.9 CHEST PAIN, UNSPECIFIED TYPE: ICD-10-CM

## 2023-10-31 DIAGNOSIS — R06.02 SHORTNESS OF BREATH ON EXERTION: ICD-10-CM

## 2023-10-31 DIAGNOSIS — E78.2 MIXED HYPERLIPIDEMIA: Primary | ICD-10-CM

## 2023-10-31 LAB
CHOLEST SERPL-MCNC: 171 MG/DL (ref 0–199)
CHOLESTEROL/HDL RATIO: 2.8
HDLC SERPL-MCNC: 61.2 MG/DL
LDLC SERPL CALC-MCNC: 96 MG/DL
NON HDL CHOLESTEROL: 110 MG/DL (ref 0–149)
TRIGL SERPL-MCNC: 71 MG/DL (ref 0–149)
VLDL: 14 MG/DL (ref 0–40)

## 2023-10-31 PROCEDURE — 3077F SYST BP >= 140 MM HG: CPT | Performed by: INTERNAL MEDICINE

## 2023-10-31 PROCEDURE — 99214 OFFICE O/P EST MOD 30 MIN: CPT | Performed by: INTERNAL MEDICINE

## 2023-10-31 PROCEDURE — 1125F AMNT PAIN NOTED PAIN PRSNT: CPT | Performed by: INTERNAL MEDICINE

## 2023-10-31 PROCEDURE — 3048F LDL-C <100 MG/DL: CPT | Performed by: INTERNAL MEDICINE

## 2023-10-31 PROCEDURE — 3008F BODY MASS INDEX DOCD: CPT | Performed by: INTERNAL MEDICINE

## 2023-10-31 PROCEDURE — 1159F MED LIST DOCD IN RCRD: CPT | Performed by: INTERNAL MEDICINE

## 2023-10-31 PROCEDURE — 4010F ACE/ARB THERAPY RXD/TAKEN: CPT | Performed by: INTERNAL MEDICINE

## 2023-10-31 PROCEDURE — 36415 COLL VENOUS BLD VENIPUNCTURE: CPT

## 2023-10-31 PROCEDURE — 3066F NEPHROPATHY DOC TX: CPT | Performed by: INTERNAL MEDICINE

## 2023-10-31 PROCEDURE — 1036F TOBACCO NON-USER: CPT | Performed by: INTERNAL MEDICINE

## 2023-10-31 PROCEDURE — 93005 ELECTROCARDIOGRAM TRACING: CPT | Performed by: INTERNAL MEDICINE

## 2023-10-31 PROCEDURE — 80061 LIPID PANEL: CPT

## 2023-10-31 PROCEDURE — 99214 OFFICE O/P EST MOD 30 MIN: CPT | Mod: 25 | Performed by: INTERNAL MEDICINE

## 2023-10-31 PROCEDURE — 3051F HG A1C>EQUAL 7.0%<8.0%: CPT | Performed by: INTERNAL MEDICINE

## 2023-10-31 PROCEDURE — 3078F DIAST BP <80 MM HG: CPT | Performed by: INTERNAL MEDICINE

## 2023-10-31 PROCEDURE — 1160F RVW MEDS BY RX/DR IN RCRD: CPT | Performed by: INTERNAL MEDICINE

## 2023-10-31 ASSESSMENT — COLUMBIA-SUICIDE SEVERITY RATING SCALE - C-SSRS
1. IN THE PAST MONTH, HAVE YOU WISHED YOU WERE DEAD OR WISHED YOU COULD GO TO SLEEP AND NOT WAKE UP?: NO
2. HAVE YOU ACTUALLY HAD ANY THOUGHTS OF KILLING YOURSELF?: NO
6. HAVE YOU EVER DONE ANYTHING, STARTED TO DO ANYTHING, OR PREPARED TO DO ANYTHING TO END YOUR LIFE?: NO

## 2023-10-31 ASSESSMENT — PATIENT HEALTH QUESTIONNAIRE - PHQ9
SUM OF ALL RESPONSES TO PHQ9 QUESTIONS 1 AND 2: 0
2. FEELING DOWN, DEPRESSED OR HOPELESS: NOT AT ALL
1. LITTLE INTEREST OR PLEASURE IN DOING THINGS: NOT AT ALL

## 2023-10-31 ASSESSMENT — PAIN SCALES - GENERAL: PAINLEVEL: 4

## 2023-10-31 NOTE — PATIENT INSTRUCTIONS
Your cardiovascular examination was satisfactory apart from leg swelling. Your EKG showed a normal heart rhythm.     Please have blood collected for lab work.     I have ordered an echocardiogram to assess your heart function. Please call [phone] to schedule this test.     I have ordered a nuclear stress test to assess your chest pain. Please call [phone] to schedule this test.    I have ordered an ultrasound to assess your leg pain. Please call [phone].     I will follow-up with you in clinic in 2-3 months.

## 2023-10-31 NOTE — PROGRESS NOTES
Subjective   Vania Andrews is a 68 y.o. female presenting for cardiology review on background of DVT, trigger finger, CKD III, DM2, HLD, GERD, gout, recurrent UTI, abdominal hernia, hiatal hernia. Previous patient of Dr Clarisa Perkins (last seen 3/2022).      Investigations:  CACS (2021) - 124.  EKG (10/2022) - NSR.  Nuclear stress test (2021) - attenuation artifact (fixed defect), no ischemia.  TTE () - LVEF 55-60%, distal apical hypokinesis.   CMR () - LVEF 70%, severe hypokinesis of the mid inferior wall with associated transmural enhancement consistent with a prior infarction. Dilated MPA 3.5 cm    Chief Complaint:  No chief complaint on file.    HPI  Feels increased shortness of breath and fatigue over the last 3-4 months.   Walking limited to 10-20 yards before stopping due to shortness of breath.   Also reports occasional chest and back pain 4-5x over the last 2 months.   No clear precipitants. No chest wall tenderness.  Reports worsening lower limb edema over the last few months.   No UTIs for the last year. Nocturnal polyuria. Drinks 6 glasses of water per day.   Also reports left calf pain, tender to touch.     CV risk:  Ex-smoker.   HTN - recently commenced on losartan due to BP elevation. Insurance would not cover a blood pressure machine.   DM2 - on Ozempic, HbA1c 7.1%.   Fam hx - father  heart attack 40s.   LDL 93 mg/dL,  mg/dL ()  Cr 1.27.     ROS  A 10-system review was performed and was unremarkable apart from what is presented in the HPI.    Objective   Physical Exam  Alert and orientated.   Appropriate responses, normal affect.  No respiratory distress at rest.   Skin warm and dry.   Normal radial pulse character and volume. Clinically SR.   Anicteric sclera, no conjunctival pallor.   No JVD or carotid bruits.  Heart sounds dual, no added heart sounds or audible murmurs.   Chest clear on auscultation.   Calves soft, non-tender.  No pedal edema. Left calf pain.    EKG -  NSR, small inferior q-waves.     Lab Review:   Lab Results   Component Value Date     07/21/2023    K 4.6 07/21/2023     (H) 07/21/2023    CO2 25 07/21/2023    BUN 14 07/21/2023    CREATININE 1.27 (H) 07/21/2023    GLUCOSE 124 (H) 07/21/2023    CALCIUM 9.6 07/21/2023     Lab Results   Component Value Date    WBC 8.1 07/21/2023    HGB 9.2 (L) 07/21/2023    HCT 30.4 (L) 07/21/2023    MCV 73 (L) 07/21/2023     07/21/2023     Lab Results   Component Value Date    CHOL 176 08/03/2022    TRIG 137 08/03/2022    HDL 55.5 08/03/2022       Assessment/Plan   In summary, Vania Andrews is a 68 y.o. female presenting for cardiology review on background of DVT, trigger finger, CKD III, DM2, HLD, GERD, gout, recurrent UTI, abdominal hernia, hiatal hernia. She reports increased fatigue and exertional dyspnea over the last 3-4 months in addition to 4-5 episodes of chest pain. Additionally she reports intermittent lower limb edema, which was apparent on examination today without definite JVD. Her EKG in clinic today showed normal sinus rhythm. In light of her symptoms, I have arranged for an echocardiogram and lab work including a BNP. I have also ordered a nuclear stress test to assess her chest pain. The cause of her lower limb edema and right calf pain are yet to be be elucidated and given her history of DVT I have ordered a venous duplex ultrasound. I will follow-up with Hanna after her investigations.

## 2023-11-02 ENCOUNTER — SPECIALTY PHARMACY (OUTPATIENT)
Dept: PHARMACY | Facility: CLINIC | Age: 68
End: 2023-11-02

## 2023-11-02 ENCOUNTER — PHARMACY VISIT (OUTPATIENT)
Dept: PHARMACY | Facility: CLINIC | Age: 68
End: 2023-11-02
Payer: MEDICARE

## 2023-11-02 DIAGNOSIS — K21.9 GASTROESOPHAGEAL REFLUX DISEASE WITHOUT ESOPHAGITIS: ICD-10-CM

## 2023-11-02 PROCEDURE — RXMED WILLOW AMBULATORY MEDICATION CHARGE

## 2023-11-03 RX ORDER — OMEPRAZOLE 40 MG/1
40 CAPSULE, DELAYED RELEASE ORAL DAILY
Qty: 30 CAPSULE | Refills: 1 | Status: SHIPPED | OUTPATIENT
Start: 2023-11-03 | End: 2023-12-22 | Stop reason: SDUPTHER

## 2023-11-06 ENCOUNTER — HOSPITAL ENCOUNTER (OUTPATIENT)
Dept: VASCULAR MEDICINE | Facility: HOSPITAL | Age: 68
Discharge: HOME | End: 2023-11-06
Payer: MEDICARE

## 2023-11-06 ENCOUNTER — HOSPITAL ENCOUNTER (OUTPATIENT)
Dept: CARDIOLOGY | Facility: HOSPITAL | Age: 68
Discharge: HOME | End: 2023-11-06
Payer: MEDICARE

## 2023-11-06 ENCOUNTER — HOSPITAL ENCOUNTER (OUTPATIENT)
Dept: RADIOLOGY | Facility: HOSPITAL | Age: 68
Discharge: HOME | End: 2023-11-06
Payer: MEDICARE

## 2023-11-06 DIAGNOSIS — R07.9 CHEST PAIN, UNSPECIFIED TYPE: ICD-10-CM

## 2023-11-06 DIAGNOSIS — R60.0 LOCALIZED EDEMA: ICD-10-CM

## 2023-11-06 DIAGNOSIS — M79.662 PAIN OF LEFT CALF: ICD-10-CM

## 2023-11-06 DIAGNOSIS — R06.02 SHORTNESS OF BREATH ON EXERTION: ICD-10-CM

## 2023-11-06 PROCEDURE — 3430000001 HC RX 343 DIAGNOSTIC RADIOPHARMACEUTICALS: Performed by: INTERNAL MEDICINE

## 2023-11-06 PROCEDURE — 93970 EXTREMITY STUDY: CPT | Performed by: STUDENT IN AN ORGANIZED HEALTH CARE EDUCATION/TRAINING PROGRAM

## 2023-11-06 PROCEDURE — A9502 TC99M TETROFOSMIN: HCPCS | Performed by: INTERNAL MEDICINE

## 2023-11-06 PROCEDURE — 93970 EXTREMITY STUDY: CPT

## 2023-11-06 PROCEDURE — 93017 CV STRESS TEST TRACING ONLY: CPT

## 2023-11-06 PROCEDURE — 93016 CV STRESS TEST SUPVJ ONLY: CPT | Performed by: INTERNAL MEDICINE

## 2023-11-06 PROCEDURE — 78452 HT MUSCLE IMAGE SPECT MULT: CPT | Performed by: NUCLEAR MEDICINE

## 2023-11-06 RX ADMIN — TETROFOSMIN 27.7 MILLICURIE: 0.23 INJECTION, POWDER, LYOPHILIZED, FOR SOLUTION INTRAVENOUS at 11:09

## 2023-11-06 RX ADMIN — TETROFOSMIN 10 MILLICURIE: 0.23 INJECTION, POWDER, LYOPHILIZED, FOR SOLUTION INTRAVENOUS at 09:45

## 2023-11-09 ENCOUNTER — HOSPITAL ENCOUNTER (OUTPATIENT)
Dept: CARDIOLOGY | Facility: HOSPITAL | Age: 68
Discharge: HOME | End: 2023-11-09
Payer: MEDICARE

## 2023-11-09 DIAGNOSIS — R07.9 CHEST PAIN, UNSPECIFIED TYPE: ICD-10-CM

## 2023-11-09 DIAGNOSIS — R06.02 SHORTNESS OF BREATH ON EXERTION: ICD-10-CM

## 2023-11-09 DIAGNOSIS — R07.89 OTHER CHEST PAIN: Primary | ICD-10-CM

## 2023-11-09 LAB
AORTIC VALVE PEAK VELOCITY: 1.34
AV PEAK GRADIENT: 7.1
AVA (PEAK VEL): 2.4
EJECTION FRACTION APICAL 4 CHAMBER: 55.5
EJECTION FRACTION: 59
LEFT ATRIUM VOLUME AREA LENGTH INDEX BSA: 37.4
LEFT VENTRICLE INTERNAL DIMENSION DIASTOLE: 4.18 (ref 3.5–6)
LEFT VENTRICULAR OUTFLOW TRACT DIAMETER: 2
MITRAL VALVE E/A RATIO: 0.71
RIGHT VENTRICLE FREE WALL PEAK S': 8
TRICUSPID ANNULAR PLANE SYSTOLIC EXCURSION: 1.7

## 2023-11-09 PROCEDURE — 93306 TTE W/DOPPLER COMPLETE: CPT

## 2023-11-09 PROCEDURE — 93306 TTE W/DOPPLER COMPLETE: CPT | Performed by: INTERNAL MEDICINE

## 2023-11-13 LAB
ATRIAL RATE: 73 BPM
P AXIS: 55 DEGREES
P OFFSET: 183 MS
P ONSET: 130 MS
PR INTERVAL: 182 MS
Q ONSET: 221 MS
QRS COUNT: 12 BEATS
QRS DURATION: 80 MS
QT INTERVAL: 390 MS
QTC CALCULATION(BAZETT): 429 MS
QTC FREDERICIA: 416 MS
R AXIS: 0 DEGREES
T AXIS: 26 DEGREES
T OFFSET: 416 MS
VENTRICULAR RATE: 73 BPM

## 2023-11-20 ENCOUNTER — ANCILLARY PROCEDURE (OUTPATIENT)
Dept: RADIOLOGY | Facility: CLINIC | Age: 68
End: 2023-11-20
Payer: MEDICARE

## 2023-11-20 DIAGNOSIS — M25.579 PAIN IN JOINT INVOLVING ANKLE AND FOOT, UNSPECIFIED LATERALITY: ICD-10-CM

## 2023-11-20 PROCEDURE — 73610 X-RAY EXAM OF ANKLE: CPT | Mod: LT,FY,FR

## 2023-11-20 PROCEDURE — 73610 X-RAY EXAM OF ANKLE: CPT | Mod: LEFT SIDE | Performed by: RADIOLOGY

## 2023-11-26 ENCOUNTER — APPOINTMENT (OUTPATIENT)
Dept: RADIOLOGY | Facility: HOSPITAL | Age: 68
End: 2023-11-26
Payer: MEDICARE

## 2023-11-26 ENCOUNTER — HOSPITAL ENCOUNTER (EMERGENCY)
Facility: HOSPITAL | Age: 68
Discharge: HOME | End: 2023-11-26
Attending: INTERNAL MEDICINE
Payer: MEDICARE

## 2023-11-26 VITALS
SYSTOLIC BLOOD PRESSURE: 172 MMHG | DIASTOLIC BLOOD PRESSURE: 90 MMHG | BODY MASS INDEX: 42.13 KG/M2 | TEMPERATURE: 97.9 F | HEART RATE: 74 BPM | WEIGHT: 209 LBS | RESPIRATION RATE: 18 BRPM | HEIGHT: 59 IN | OXYGEN SATURATION: 98 %

## 2023-11-26 DIAGNOSIS — I82.4Y9 DEEP VEIN THROMBOSIS (DVT) OF PROXIMAL LOWER EXTREMITY, UNSPECIFIED CHRONICITY, UNSPECIFIED LATERALITY (MULTI): Primary | ICD-10-CM

## 2023-11-26 LAB
ALBUMIN SERPL BCP-MCNC: 3.6 G/DL (ref 3.4–5)
ALP SERPL-CCNC: 144 U/L (ref 33–136)
ALT SERPL W P-5'-P-CCNC: 20 U/L (ref 7–45)
ANION GAP SERPL CALC-SCNC: 10 MMOL/L (ref 10–20)
APTT PPP: 23 SECONDS (ref 27–38)
AST SERPL W P-5'-P-CCNC: 16 U/L (ref 9–39)
BASOPHILS # BLD AUTO: 0.08 X10*3/UL (ref 0–0.1)
BASOPHILS NFR BLD AUTO: 0.7 %
BILIRUB DIRECT SERPL-MCNC: 0 MG/DL (ref 0–0.3)
BILIRUB SERPL-MCNC: 0.4 MG/DL (ref 0–1.2)
BUN SERPL-MCNC: 18 MG/DL (ref 6–23)
CALCIUM SERPL-MCNC: 8.9 MG/DL (ref 8.6–10.3)
CHLORIDE SERPL-SCNC: 105 MMOL/L (ref 98–107)
CO2 SERPL-SCNC: 27 MMOL/L (ref 21–32)
CREAT SERPL-MCNC: 1.13 MG/DL (ref 0.5–1.05)
CRP SERPL-MCNC: 1.03 MG/DL
EOSINOPHIL # BLD AUTO: 0.34 X10*3/UL (ref 0–0.7)
EOSINOPHIL NFR BLD AUTO: 2.8 %
ERYTHROCYTE [DISTWIDTH] IN BLOOD BY AUTOMATED COUNT: 18.5 % (ref 11.5–14.5)
ERYTHROCYTE [SEDIMENTATION RATE] IN BLOOD BY WESTERGREN METHOD: 37 MM/H (ref 0–30)
GFR SERPL CREATININE-BSD FRML MDRD: 53 ML/MIN/1.73M*2
GLUCOSE SERPL-MCNC: 85 MG/DL (ref 74–99)
HCT VFR BLD AUTO: 29.1 % (ref 36–46)
HGB BLD-MCNC: 8.2 G/DL (ref 12–16)
IMM GRANULOCYTES # BLD AUTO: 0.09 X10*3/UL (ref 0–0.7)
IMM GRANULOCYTES NFR BLD AUTO: 0.7 % (ref 0–0.9)
INR PPP: 1.1 (ref 0.9–1.1)
LYMPHOCYTES # BLD AUTO: 2.43 X10*3/UL (ref 1.2–4.8)
LYMPHOCYTES NFR BLD AUTO: 20 %
MCH RBC QN AUTO: 19.5 PG (ref 26–34)
MCHC RBC AUTO-ENTMCNC: 28.2 G/DL (ref 32–36)
MCV RBC AUTO: 69 FL (ref 80–100)
MONOCYTES # BLD AUTO: 0.78 X10*3/UL (ref 0.1–1)
MONOCYTES NFR BLD AUTO: 6.4 %
NEUTROPHILS # BLD AUTO: 8.4 X10*3/UL (ref 1.2–7.7)
NEUTROPHILS NFR BLD AUTO: 69.4 %
NRBC BLD-RTO: 0 /100 WBCS (ref 0–0)
PLATELET # BLD AUTO: 420 X10*3/UL (ref 150–450)
POTASSIUM SERPL-SCNC: 4.1 MMOL/L (ref 3.5–5.3)
PROT SERPL-MCNC: 6.7 G/DL (ref 6.4–8.2)
PROTHROMBIN TIME: 12.2 SECONDS (ref 9.8–12.8)
RBC # BLD AUTO: 4.2 X10*6/UL (ref 4–5.2)
SODIUM SERPL-SCNC: 138 MMOL/L (ref 136–145)
URATE SERPL-MCNC: 5.2 MG/DL (ref 2.3–6.7)
WBC # BLD AUTO: 12.1 X10*3/UL (ref 4.4–11.3)

## 2023-11-26 PROCEDURE — 85652 RBC SED RATE AUTOMATED: CPT | Performed by: INTERNAL MEDICINE

## 2023-11-26 PROCEDURE — 93971 EXTREMITY STUDY: CPT

## 2023-11-26 PROCEDURE — 99284 EMERGENCY DEPT VISIT MOD MDM: CPT | Performed by: INTERNAL MEDICINE

## 2023-11-26 PROCEDURE — 85025 COMPLETE CBC W/AUTO DIFF WBC: CPT | Performed by: INTERNAL MEDICINE

## 2023-11-26 PROCEDURE — 85730 THROMBOPLASTIN TIME PARTIAL: CPT | Performed by: INTERNAL MEDICINE

## 2023-11-26 PROCEDURE — 36415 COLL VENOUS BLD VENIPUNCTURE: CPT | Performed by: INTERNAL MEDICINE

## 2023-11-26 PROCEDURE — 84550 ASSAY OF BLOOD/URIC ACID: CPT | Performed by: INTERNAL MEDICINE

## 2023-11-26 PROCEDURE — 73610 X-RAY EXAM OF ANKLE: CPT | Mod: LEFT SIDE | Performed by: RADIOLOGY

## 2023-11-26 PROCEDURE — 93971 EXTREMITY STUDY: CPT | Performed by: RADIOLOGY

## 2023-11-26 PROCEDURE — 85610 PROTHROMBIN TIME: CPT | Performed by: INTERNAL MEDICINE

## 2023-11-26 PROCEDURE — 80076 HEPATIC FUNCTION PANEL: CPT | Performed by: INTERNAL MEDICINE

## 2023-11-26 PROCEDURE — 86140 C-REACTIVE PROTEIN: CPT | Performed by: INTERNAL MEDICINE

## 2023-11-26 PROCEDURE — 73610 X-RAY EXAM OF ANKLE: CPT | Mod: LT,FY

## 2023-11-26 PROCEDURE — 2500000001 HC RX 250 WO HCPCS SELF ADMINISTERED DRUGS (ALT 637 FOR MEDICARE OP): Performed by: INTERNAL MEDICINE

## 2023-11-26 RX ORDER — OXYCODONE AND ACETAMINOPHEN 5; 325 MG/1; MG/1
1 TABLET ORAL EVERY 6 HOURS PRN
Qty: 5 TABLET | Refills: 0 | Status: SHIPPED | OUTPATIENT
Start: 2023-11-26 | End: 2023-11-29

## 2023-11-26 RX ORDER — OXYCODONE AND ACETAMINOPHEN 5; 325 MG/1; MG/1
1 TABLET ORAL ONCE
Status: COMPLETED | OUTPATIENT
Start: 2023-11-26 | End: 2023-11-26

## 2023-11-26 RX ADMIN — OXYCODONE HYDROCHLORIDE AND ACETAMINOPHEN 1 TABLET: 5; 325 TABLET ORAL at 17:52

## 2023-11-26 RX ADMIN — APIXABAN 10 MG: 5 TABLET, FILM COATED ORAL at 19:24

## 2023-11-26 ASSESSMENT — PAIN SCALES - GENERAL
PAINLEVEL_OUTOF10: 10 - WORST POSSIBLE PAIN
PAINLEVEL_OUTOF10: 10 - WORST POSSIBLE PAIN

## 2023-11-26 ASSESSMENT — PAIN - FUNCTIONAL ASSESSMENT
PAIN_FUNCTIONAL_ASSESSMENT: 0-10
PAIN_FUNCTIONAL_ASSESSMENT: 0-10

## 2023-11-26 ASSESSMENT — COLUMBIA-SUICIDE SEVERITY RATING SCALE - C-SSRS
2. HAVE YOU ACTUALLY HAD ANY THOUGHTS OF KILLING YOURSELF?: NO
6. HAVE YOU EVER DONE ANYTHING, STARTED TO DO ANYTHING, OR PREPARED TO DO ANYTHING TO END YOUR LIFE?: NO
1. IN THE PAST MONTH, HAVE YOU WISHED YOU WERE DEAD OR WISHED YOU COULD GO TO SLEEP AND NOT WAKE UP?: NO

## 2023-11-26 NOTE — ED PROVIDER NOTES
HPI     CC: Leg Pain     HPI: Vania Andrews is a 68 y.o. female with a history of DVT, trigger finger, CKD III, DM2, HLD, GERD, gout, recurrent UTI, abdominal hernia, hiatal hernia presents with left lower extremity pain and swelling.  Symptoms have been longstanding but getting worse of late.  It starts at the left lateral medialis and radiates up the calf.  She went to [facility] urgent care on 11/20 and was diagnosed with Achilles tendinitis, prescribed a course of steroids which did not help.  She came back today to the urgent care and they referred her to the ED for an ultrasound to rule out a blood clot.  Of note, patient had a DVT ultrasound on 10/31 which showed chronic thrombus in the popliteal, peroneal, and PTV on the left.  It does not appear that anyone followed up on these findings.    ROS: 10-point review of systems was performed and is otherwise negative except as noted in HPI.    Limitations to history: N/A    Independent Historians: N/A    External Records Reviewed: Outpatient notes in EMR    Past Medical History: Noncontributory except per HPI     Past Surgical History: Noncontributory except per HPI     Family History: Reviewed and noncontributory     Social History:  Denies tobacco. Denies ETOH. Denies illicit drugs.    Social Determinants Affecting Care: N/A    Allergies   Allergen Reactions    Adhesive Itching    Amoxicillin Hives and Itching    Cephalexin Itching and Nausea Only    Iodinated Contrast Media Unknown    Iodine Unknown    Latex Other    Pioglitazone Swelling    Salicylates Other    Shellfish Derived Swelling    Sulfa (Sulfonamide Antibiotics) Unknown    Sulfamethoxazole-Trimethoprim Hives and Itching       Home Meds:   Current Outpatient Medications   Medication Instructions    Accu-Chek Guide test strips strip 1 strip, 2 times daily, se 1 TEST STRIP to TEST BLOOD SUGAR twice a day    Accu-Chek Softclix Lancets misc  use 1 LANCET to TEST BLOOD SUGAR twice a day    amLODIPine  "(NORVASC) 10 mg, oral, Daily    apixaban 5 mg (74 tabs) tablets,dose pack 10 mg po BID x 7 days, then 5 mg po bid after that    aspirin 81 mg chewable tablet oral    brimonidine (Alphagan P) 0.1 % ophthalmic solution ophthalmic (eye), Every 12 hours    brimonidine (AlphaGAN P) 0.2 % ophthalmic solution instill 1 drop into both eyes three times a day    brimonidine-timoloL (Combigan) 0.2-0.5 % ophthalmic solution 1 drop, Both Eyes, Daily    diclofenac epolamine 1.3 % patch 1 patch, transdermal, 2 times daily    diclofenac sodium 1 % kit  APPLY TO LOWER EXTREMITIES, 4 GM OF GEL TO AFFECTED AREA 4 TIMES DAILY. DO NOT APPLY MORE THAN 16 GM DAILY TO ANY ONE AFFECTED JOINT.    dicyclomine (BENTYL) 20 mg, oral, 3-4 times daily as needed.    famotidine (Pepcid) 20 mg tablet 1 tablet, oral, Nightly    ferrous sulfate 325 (65 Fe) MG tablet 1 tablet, oral, Daily    fluticasone (Flonase) 50 mcg/actuation nasal spray 2 sprays, Each Nostril, Daily    insulin glargine (LANTUS SOLOSTAR U-100 INSULIN) 8 Units, subcutaneous, Nightly    latanoprost (Xalatan) 0.005 % ophthalmic solution 1 drop, Both Eyes, Every morning    levalbuterol (Xopenex HFA) 45 mcg/actuation inhaler 1 puff, inhalation, Every 4 hours PRN    levocetirizine (XYZAL) 5 mg, oral, Every evening    losartan (COZAAR) 25 mg, oral, Daily    methocarbamol (Robaxin-750) 750 mg tablet     metoprolol succinate XL (TOPROL-XL) 200 mg, oral, Daily    montelukast (Singulair) 10 mg tablet 1 tablet, oral, Nightly    nitroglycerin (NITROSTAT) 0.4 mg, sublingual, Every 5 min PRN    omeprazole (PRILOSEC) 40 mg, oral, Daily    oxyCODONE-acetaminophen (Percocet) 5-325 mg tablet 1 tablet, oral, Every 6 hours PRN    pen needle, diabetic 31 gauge x 5/16\" needle Use to inject 1-4 times daily as directed.    pen needle, diabetic 33 gauge x 5/32\" needle miscellaneous, Use to inject insulin one a day     pioglitazone (Actos) 15 mg tablet 0.5 tablets, oral, Daily    polyethylene glycol " "(Glycolax, Miralax) 17 gram/dose powder 1 Capful, oral, Daily, IN 8 OUNCES OF WATER, JUICE, OR TEA AND DRINK    rosuvastatin (Crestor) 20 mg tablet 1 tablet, oral, Daily    semaglutide 0.25 mg or 0.5 mg (2 mg/3 mL) pen injector INJECT 0.25 MG UNDER THE SKIN ONCE WEEKLY FOR 4 WEEKS THEN INJECT 0.5 MG UNDER THE SKIN ONCE WEEKLY FOR 4 WEEKS        Physical Exam     ED Triage Vitals [11/26/23 1434]   Temp Heart Rate Resp BP   36.6 °C (97.9 °F) 76 18 161/87      SpO2 Temp Source Heart Rate Source Patient Position   99 % Temporal Monitor --      BP Location FiO2 (%)     -- --         Heart Rate:  [74-76]   Temp:  [36.6 °C (97.9 °F)]   Resp:  [18]   BP: (161-172)/(87-90)   Height:  [149.9 cm (4' 11\")]   Weight:  [94.8 kg (209 lb)]   SpO2:  [98 %-99 %]      Physical Exam  Vitals and nursing note reviewed.     CONSTITUTIONAL: Well appearing, well nourished, in no acute distress.   HENMT: Head atraumatic. Airway patent. Nasal mucosa clear. Mouth with normal mucosa, clear oropharynx. Uvula midline. Neck supple.    EYES: Clear bilaterally, pupils equally round and reactive to light.   CARDIOVASCULAR: Normal rate, regular rhythm.  Heart sounds S1, S2.  No murmurs, rubs or gallops. Normal pulses. Capillary refill < 2 sec.   RESPIRATORY: No increased work of breathing. Breath sounds clear and equal bilaterally.  GASTROINTESTINAL: Abdomen soft, non-distended, non-tender. No rebound, no guarding. Normal bowel sounds. No palpable masses.  GENITOURINARY:  No CVA tenderness.  MUSCULOSKELETAL: Left lower extremity diffusely swollen from the left ankle to the calf with tenderness in these areas.  No erythema or warmth.  Slightly reduced ankel ROM secondary to pain but able to bear weight. 2+ DP/PT pulses. No palpable cords.  NEUROLOGICAL: Alert and oriented, no asymmetry, moving all extremities equally.  SKIN: Warm, dry and intact. No rash or notable lesions.  PSYCHIATRIC: Normal mood and affect.  HEME/LYMPH: No adenopathy or " splenomegaly.    Diagnostic Results      ECG: ECGs read and interpreted by me. See ED Course, below, for interpretation.    Labs Reviewed   CBC WITH AUTO DIFFERENTIAL - Abnormal       Result Value    WBC 12.1 (*)     nRBC 0.0      RBC 4.20      Hemoglobin 8.2 (*)     Hematocrit 29.1 (*)     MCV 69 (*)     MCH 19.5 (*)     MCHC 28.2 (*)     RDW 18.5 (*)     Platelets 420      Neutrophils % 69.4      Immature Granulocytes %, Automated 0.7      Lymphocytes % 20.0      Monocytes % 6.4      Eosinophils % 2.8      Basophils % 0.7      Neutrophils Absolute 8.40 (*)     Immature Granulocytes Absolute, Automated 0.09      Lymphocytes Absolute 2.43      Monocytes Absolute 0.78      Eosinophils Absolute 0.34      Basophils Absolute 0.08     BASIC METABOLIC PANEL - Abnormal    Glucose 85      Sodium 138      Potassium 4.1      Chloride 105      Bicarbonate 27      Anion Gap 10      Urea Nitrogen 18      Creatinine 1.13 (*)     eGFR 53 (*)     Calcium 8.9     SEDIMENTATION RATE, AUTOMATED - Abnormal    Sedimentation Rate 37 (*)    C-REACTIVE PROTEIN - Abnormal    C-Reactive Protein 1.03 (*)    COAGULATION SCREEN - Abnormal    Protime 12.2      INR 1.1      aPTT 23 (*)     Narrative:     The APTT is no longer used for monitoring Unfractionated Heparin Therapy. For monitoring Heparin Therapy, use the Heparin Assay.   HEPATIC FUNCTION PANEL - Abnormal    Albumin 3.6      Bilirubin, Total 0.4      Bilirubin, Direct 0.0      Alkaline Phosphatase 144 (*)     ALT 20      AST 16      Total Protein 6.7     URIC ACID - Normal    Uric Acid 5.2           XR ankle left 3+ views   Final Result   No acute fracture or dislocation in the left ankle.        Diffuse osteopenia.        Polyarticular osteoarthritis.        Calcaneal plantar spur.        Diffuse soft tissue swelling.        A 0.9 cm radiopaque foreign body in the subcutaneous tissues of the   heel.             MACRO:   None        Signed by: Mateusz Luis 11/26/2023 6:11 PM    Dictation workstation:   [id number]      Lower extremity venous duplex left   Final Result   Chronic DVT involving the left popliteal and calf veins.        MACRO:   None        Signed by: Mateusz Luis 11/26/2023 6:10 PM   Dictation workstation:   [id number]                    Dewey Coma Scale Score: 15                  Procedure  Procedures    ED Course & MDM   Assessment/Plan:   Vania Andrews is a 68 y.o. female with a history of DVT, trigger finger, CKD III, DM2, HLD, GERD, gout, recurrent UTI, abdominal hernia, hiatal hernia presents with atraumatic left lower extremity pain and swelling.  She has an obvious joint effusion to the left ankle that is slightly tender to touch but is able to range it and bear weight pointing away from an acute septic arthritis.  If this were a gout flare I would expect some improvement with steroids. She had chronic clots in multiple veins of the left lower extremity on ultrasound performed almost a month ago and has not been started on anticoagulation, which could be the etiology of her symptoms. She does have remote history of GI bleed but states she has tolerated anticoagulation in the past.  Work-up was initiated with labs, DVT ultrasound, and ankle x-ray. See below for details of ED course and ultimate disposition.    Medications   oxyCODONE-acetaminophen (Percocet) 5-325 mg per tablet 1 tablet (1 tablet oral Given 11/26/23 1752)   apixaban (Eliquis) tablet 10 mg (10 mg oral Given 11/26/23 1924)        ED Course as of 11/26/23 2120   Sun Nov 26, 2023   1800 Labs notable for CBC with mild leukocytosis 12.1, stable anemia 8.2, normal platelets 420, BMP with stably elevated creatinine 1.13, LFTs with stably elevated alk phos 144, normal uric acid 5.2, mildly elevated CRP 1.03 and ESR 37, normal coags. [CG]   1817 XR heel IMPRESSION:  No acute fracture or dislocation in the left ankle.  Diffuse osteopenia.  Polyarticular osteoarthritis.  Calcaneal plantar spur.  Diffuse  soft tissue swelling.  A 0.9 cm radiopaque foreign body in the subcutaneous tissues of the  heel. [CG]   1817 DVT US IMPRESSION:  Chronic DVT involving the left popliteal and calf veins.   [CG]   2116 Patient was given Eliquis 10 mg and Percocet in the ED with improvement in pain.  Low suspicion septic arthritis as patient is able to bear weight, no obvious effusion on x-ray, and only mildly elevated inflammatory markers.  Suspect elevated WBC related to recent steroid course.  Suspect pain and swelling in her LLE is related to her chronic DVT.  She has tolerated Eliquis in the past and denies any signs of bleeding.  She does have a history of GI bleed and we discussed anticipatory guidance/strict return precautions at great length.  She has no signs/symptoms of PE on history or exam.  She is neither tachycardic nor hypoxic.  She is to follow-up with her primary care doctor this week for further management.  She was advised of her incidental finding on x-ray of a possible foreign body.  She states that her orthopedist told her something about this in the past, that it may be a piece of bone.  She has no skin changes or tenderness over the area of foreign body.  Patient was given the number for podiatry to call for this.  I do not suspect it is related to her presentation today.  She was given a short course of Percocet for pain.   [CG]      ED Course User Index  [CG] Aura Vizcaino MD         Diagnoses as of 11/26/23 2120   Deep vein thrombosis (DVT) of proximal lower extremity, unspecified chronicity, unspecified laterality (CMS/HCC)       Disposition:   DISCHARGE.  The patient was discharged with both verbal and written anticipatory guidance and strict return precautions. Discharge diagnosis, instructions and plan were discussed and understood. I emphasized the importance of following up with their primary care provider within 24-48 hours and with any referrals in the timeframe recommended. At the time of  discharge the patient was comfortable and was in no apparent distress. Patient is aware of diagnostic uncertainty and was notified though testing is negative here, there is a very small chance that pathology may be missed.  The patient understands these risks and the patient/family understood to call 911 or return immediately to the emergency department if the symptoms worsen or if they have any additional concerns.      FOLLOW UP  Primary care provider in 1-2 days.      ED Prescriptions       Medication Sig Dispense Start Date End Date Auth. Provider    apixaban 5 mg (74 tabs) tablets,dose pack 10 mg po BID x 7 days, then 5 mg po bid after that 1 tablet 11/26/2023 -- Aura Vizcaino MD    oxyCODONE-acetaminophen (Percocet) 5-325 mg tablet Take 1 tablet by mouth every 6 hours if needed for severe pain (7 - 10) for up to 3 days. 5 tablet 11/26/2023 11/29/2023 MD Aura Krishnamurthy MD  EM/IM/Peds    This note was dictated by speech recognition. Minor errors in transcription may be present.     Aura Vizcaino MD  11/26/23 2121

## 2023-11-26 NOTE — ED TRIAGE NOTES
Pt arrives to ED for left leg pain and swelling. Pt state pain for the last 2 weeks. Pt reports she had xrays done and was told it was either tendonitis or gout and was put on steroids. Pt reports no relieve from pain. Pt saw another provider today and was told by them to come to ED for US to rule out DVT.

## 2023-11-27 ENCOUNTER — PHARMACY VISIT (OUTPATIENT)
Dept: PHARMACY | Facility: CLINIC | Age: 68
End: 2023-11-27
Payer: COMMERCIAL

## 2023-11-27 ENCOUNTER — SPECIALTY PHARMACY (OUTPATIENT)
Dept: PHARMACY | Facility: CLINIC | Age: 68
End: 2023-11-27

## 2023-11-27 PROCEDURE — RXMED WILLOW AMBULATORY MEDICATION CHARGE

## 2023-11-27 NOTE — DISCHARGE INSTRUCTIONS
You were seen today for leg pain and swelling in the left leg.  You have a blood clot.  He also have a possible foreign body in the heel of your foot.  We started you on a blood thinner.  Please follow-up with podiatry for the foreign body.  Please follow-up with your primary care doctor this week for further guidance on your blood clot.  We gave you a prescription for pain medication.  Your evaluation was not concerning for an emergency at this time. Please see the attached information sheet for information about your condition, how to care for your condition at home, and reasons to return to the emergency department. Take any prescriptions written today as prescribed. You should call your primary care provider within 24 hours to tell them about today's visit, including any new medications or medication changes, as he or she may want to see you in the office for further evaluation. If you do not have a primary care provider, call  [phone] for an appointment. We offer in-person office visits as well as virtual options. Please do not hesitate to call  [phone] or return to the emergency department with any new or unresolved concerns or symptoms. Thank you for choosing [facility] for your care.

## 2023-11-28 ENCOUNTER — OFFICE VISIT (OUTPATIENT)
Dept: SPORTS MEDICINE | Facility: HOSPITAL | Age: 68
End: 2023-11-28
Payer: MEDICARE

## 2023-11-28 VITALS — BODY MASS INDEX: 42.13 KG/M2 | WEIGHT: 209 LBS | HEIGHT: 59 IN

## 2023-11-28 DIAGNOSIS — M65.28 CALCIFIC ACHILLES TENDONITIS: ICD-10-CM

## 2023-11-28 DIAGNOSIS — I82.90 BLOOD CLOT IN VEIN: Primary | ICD-10-CM

## 2023-11-28 DIAGNOSIS — M25.572 ACUTE LEFT ANKLE PAIN: ICD-10-CM

## 2023-11-28 PROCEDURE — 1036F TOBACCO NON-USER: CPT | Performed by: PEDIATRICS

## 2023-11-28 PROCEDURE — 99204 OFFICE O/P NEW MOD 45 MIN: CPT | Performed by: PEDIATRICS

## 2023-11-28 PROCEDURE — 3048F LDL-C <100 MG/DL: CPT | Performed by: PEDIATRICS

## 2023-11-28 PROCEDURE — 3066F NEPHROPATHY DOC TX: CPT | Performed by: PEDIATRICS

## 2023-11-28 PROCEDURE — 3051F HG A1C>EQUAL 7.0%<8.0%: CPT | Performed by: PEDIATRICS

## 2023-11-28 PROCEDURE — 1125F AMNT PAIN NOTED PAIN PRSNT: CPT | Performed by: PEDIATRICS

## 2023-11-28 PROCEDURE — 1159F MED LIST DOCD IN RCRD: CPT | Performed by: PEDIATRICS

## 2023-11-28 PROCEDURE — 99214 OFFICE O/P EST MOD 30 MIN: CPT | Performed by: PEDIATRICS

## 2023-11-28 PROCEDURE — 1160F RVW MEDS BY RX/DR IN RCRD: CPT | Performed by: PEDIATRICS

## 2023-11-28 PROCEDURE — 3008F BODY MASS INDEX DOCD: CPT | Performed by: PEDIATRICS

## 2023-11-28 PROCEDURE — 4010F ACE/ARB THERAPY RXD/TAKEN: CPT | Performed by: PEDIATRICS

## 2023-11-28 NOTE — PROGRESS NOTES
Chief Complaint   Patient presents with    Left Ankle - Pain     Also has blood clots      INJURY CLINIC PATIENT     Consulting physician: Laura Mata MD / Cardiologist: Vianney Marrufo     A report with my findings and recommendations will be sent to the primary and referring physician via written or electronic means when information is available    History of Present Illness:  Vania Andrews is a 68 y.o. female w/ hx of multiple medical problems including prior L leg DVT in 2020, DM2, CKD III, GERT, HLD who presented on 11/28/2023 with L ankle pain     11/20 woke up and had pain out of the blue - L ankle - went to , couldn't put weight on it. Did xrays and gave her a walking shoe.     11/26 went back to  and given medrol dose pack for L achilles and concerned for blood clot d/t prior US being done (hadn't been contacted w/ results). Was sent to the ER for a new doppler and clot identified and started on medication. No vascular follow up planned.    Today - still has ankle pain. Concerned b/c she had labs for gout and never got results for that either. Sees [facility], but no appt until Dec. Is wrapping ankle in ace wrap, pressure on achilles causes pain. Concern for FB in bottom of foot on xray at .   No change in baseline shortness of breath or chest pain - has cardiologist she sees and had big workup recently.       Past [facility] HX:  Specialty Problems          Orthopaedic Problems    Osteoarthritis        Achilles tendinitis of left lower extremity        Acquired trigger finger        Chronic pain of right wrist        Chronic pain of right wrist        Distal radius fracture, right        DVT (deep venous thrombosis) (CMS/HCC)        Gout, arthritis        Knee pain        Osteopenia        Paraesophageal hiatal hernia        Rheumatoid arthritis involving both hands (CMS/HCC)        Right hip pain        Wrist fracture, right            ROS  12 point ROS reviewed and is negative except for items  listed   Achilles / leg pain     Social Hx:  Home:  lives w/    Occupation: nurse     Medications:   Current Outpatient Medications on File Prior to Visit   Medication Sig Dispense Refill    Accu-Chek Guide test strips strip 1 strip 2 times a day. se 1 TEST STRIP to TEST BLOOD SUGAR twice a day      Accu-Chek Softclix Lancets misc use 1 LANCET to TEST BLOOD SUGAR twice a day      amLODIPine (Norvasc) 10 mg tablet Take 1 tablet (10 mg) by mouth once daily. 90 tablet 1    apixaban 5 mg (74 tabs) tablets,dose pack Take 2 tablets (10 mg) by mouth twice daily for 7 days, then 1 tablet (5 mg) by mouth twice daily after that 74 tablet 0    aspirin 81 mg chewable tablet Chew.      brimonidine (Alphagan P) 0.1 % ophthalmic solution Administer into affected eye(s) every 12 hours.      brimonidine (AlphaGAN P) 0.2 % ophthalmic solution instill 1 drop into both eyes three times a day      brimonidine-timoloL (Combigan) 0.2-0.5 % ophthalmic solution Administer 1 drop into both eyes once daily.      diclofenac epolamine 1.3 % patch Place 1 patch on the skin twice a day.      diclofenac sodium 1 % kit APPLY TO LOWER EXTREMITIES, 4 GM OF GEL TO AFFECTED AREA 4 TIMES DAILY. DO NOT APPLY MORE THAN 16 GM DAILY TO ANY ONE AFFECTED JOINT.      dicyclomine (Bentyl) 20 mg tablet Take 1 tablet (20 mg) by mouth. 3-4 times daily as needed.      famotidine (Pepcid) 20 mg tablet Take 1 tablet (20 mg) by mouth once daily at bedtime.      ferrous sulfate 325 (65 Fe) MG tablet Take 1 tablet (325 mg) by mouth once daily.      fluticasone (Flonase) 50 mcg/actuation nasal spray Administer 2 sprays into each nostril once daily. 16 g 3    insulin glargine (Lantus Solostar U-100 Insulin) 100 unit/mL (3 mL) pen Inject 8 Units under the skin once daily at bedtime. (Patient not taking: Reported on 10/31/2023) 3 mL 3    latanoprost (Xalatan) 0.005 % ophthalmic solution Administer 1 drop into both eyes once daily in the morning.      levalbuterol  "(Xopenex HFA) 45 mcg/actuation inhaler Inhale 1 puff every 4 hours if needed.      levocetirizine (Xyzal) 5 mg tablet Take 1 tablet (5 mg) by mouth once daily in the evening. (Patient not taking: Reported on 10/31/2023) 90 tablet 1    losartan (Cozaar) 25 mg tablet Take 1 tablet (25 mg) by mouth once daily. 90 tablet 1    methocarbamol (Robaxin-750) 750 mg tablet       metoprolol succinate XL (Toprol-XL) 200 mg 24 hr tablet Take 1 tablet (200 mg) by mouth once daily. 90 tablet 3    montelukast (Singulair) 10 mg tablet Take 1 tablet (10 mg) by mouth once daily at bedtime.      nitroglycerin (Nitrostat) 0.4 mg SL tablet Place 1 tablet (0.4 mg) under the tongue every 5 minutes if needed.      omeprazole (PriLOSEC) 40 mg DR capsule Take 1 capsule (40 mg) by mouth once daily. 30 capsule 1    oxyCODONE-acetaminophen (Percocet) 5-325 mg tablet Take 1 tablet by mouth every 6 hours if needed for severe pain (7 - 10) for up to 3 days. 5 tablet 0    pen needle, diabetic 31 gauge x 5/16\" needle Use to inject 1-4 times daily as directed. 100 each 11    pen needle, diabetic 33 gauge x 5/32\" needle Use to inject insulin one a day      pioglitazone (Actos) 15 mg tablet Take 0.5 tablets (7.5 mg) by mouth once daily.      polyethylene glycol (Glycolax, Miralax) 17 gram/dose powder Take 1 Capful by mouth once daily. IN 8 OUNCES OF WATER, JUICE, OR TEA AND DRINK      rosuvastatin (Crestor) 20 mg tablet Take 1 tablet (20 mg) by mouth once daily.      semaglutide 0.25 mg or 0.5 mg (2 mg/3 mL) pen injector INJECT 0.25 MG UNDER THE SKIN ONCE WEEKLY FOR 4 WEEKS THEN INJECT 0.5 MG UNDER THE SKIN ONCE WEEKLY FOR 4 WEEKS 3 mL 5    [DISCONTINUED] apixaban 5 mg (74 tabs) tablets,dose pack 10 mg po BID x 7 days, then 5 mg po bid after that 1 tablet 0     Current Facility-Administered Medications on File Prior to Visit   Medication Dose Route Frequency Provider Last Rate Last Admin    perflutren lipid microspheres (Definity) injection 0.5-10 mL of " "dilution  0.5-10 mL of dilution intravenous Once Vianney Marrufo MD             Allergies:    Allergies   Allergen Reactions    Adhesive Itching    Amoxicillin Hives and Itching    Cephalexin Itching and Nausea Only    Iodinated Contrast Media Unknown    Iodine Unknown    Latex Other    Pioglitazone Swelling    Salicylates Other    Shellfish Derived Swelling    Sulfa (Sulfonamide Antibiotics) Unknown    Sulfamethoxazole-Trimethoprim Hives and Itching        Physical Exam:    Visit Vitals  Ht 1.499 m (4' 11\")   Wt 94.8 kg (209 lb)   BMI 42.21 kg/m²   Smoking Status Former   BSA 1.99 m²        General appearance: Well-appearing well-nourished  Psych: Normal mood and affect    Neuro: Normal sensation to light touch throughout the involved extremities  Vascular: No extremity edema or discoloration.  Skin: negative.  Lymphatic: no regional lymphadenopathy present.  Eyes: no conjunctival injection.    LE exam:   Inspection:   Left ankle with mild soft tissue swelling lateral > medial and over anterior ankle joint, proximal dorsal foot   No erythema or bruising   No breakdown in skin   Sole of feet normal in appearance on L     Palpation:   +TTP L calf diffusely  +TTP L achilles to light tough  +TTP anterior L ankle joint line   +TTP L ATFL  Mild TTP over dorsal foot     ROM  Active flexion / extension of L ankle produces pain in L achilles and is limited d/t pain   Intact motion in all 4 directions    No WB attempted    Imaging/ Labwork:  Doppler US from 2020, 2023 reviewed. Has chronic thrombus in popliteal, peroneal and post tib vein currently.   Left ankle xrays from 2016 and 2 series from 2023 reviewed. Patient with calcification of achilles tendon, calcification (possible foreign body) in heel, and spur off calcaneus. No significant changes in imaging over time.     Imaging was personally interpreted and reviewed with the patient and/or family    Recent lab work was reviewed and uric acid was normal.  Patient was not " on acetaminophen at the time    Impression and Plan:  Vania Andrews is a 68 y.o. female w/ hx of multiple medical problems including prior L leg DVT in 2020, DM2, CKD III, GERT, HLD who presented on 11/28/2023 with L ankle pain.  Patient with recent diagnosis of recurrent left popliteal, peroneal, posterior tib DVT.  Started on Eliquis yesterday.  She has been seen twice in recent weeks for left ankle pain with diagnosis of Achilles tendinitis and was placed into a postop shoe.  She presented with ongoing left ankle pain.  She has pain that isolates to the anterior joint, and is very tender throughout the Achilles with increased pain with active dorsiflexion and plantarflexion of the left ankle consistent with a chronic calcific tendinitis.  Because of her DVT, immobilization would not be indicated.      We placed her in a long-leg compression stockinet and arranged for vascular surgery follow-up with Dr. Garcia in 2 weeks.    We also arranged for her to see Dr. Carlos in foot and ankle orthopedics to discuss the chronic calcific tendinitis which seems to have flared.  She can ambulate in the postop shoe if tolerated.  Ice can be used for pain relief.  We advised against NSAIDs since she is on Eliquis and has a history of GERD.     She has an appointment with her primary care doctor in December already scheduled.     We discussed reasons for return to the emergency room such as increasing shortness of breath or chest pain      ** Please excuse any errors in grammar or translation related to this dictation. Voice recognition software was utilized to prepare this document. **

## 2023-11-28 NOTE — LETTER
November 28, 2023     Laura Mata MD  [address]  [address]  [facility] [address]    Patient: Vania Andrews   YOB: 1955   Date of Visit: 11/28/2023       Dear Dr. Laura Mata MD:    Thank you for referring Vania Andrews to me for evaluation. Below are my notes for this consultation.  If you have questions, please do not hesitate to call me. I look forward to following your patient along with you.       Sincerely,     Anisha Vargas MD      CC: MD Remedios Little MD Shana N Miskovsky, MD  ______________________________________________________________________________________    Chief Complaint   Patient presents with   • Left Ankle - Pain     Also has blood clots      INJURY CLINIC PATIENT     Consulting physician: Laura Mata MD / Cardiologist: Vianney Marrufo     A report with my findings and recommendations will be sent to the primary and referring physician via written or electronic means when information is available    History of Present Illness:  Vania Andrews is a 68 y.o. female w/ hx of multiple medical problems including prior L leg DVT in 2020, DM2, CKD III, GERT, HLD who presented on 11/28/2023 with L ankle pain     11/20 woke up and had pain out of the blue - L ankle - went to , couldn't put weight on it. Did xrays and gave her a walking shoe.     11/26 went back to  and given medrol dose pack for L achilles and concerned for blood clot d/t prior US being done (hadn't been contacted w/ results). Was sent to the ER for a new doppler and clot identified and started on medication. No vascular follow up planned.    Today - still has ankle pain. Concerned b/c she had labs for gout and never got results for that either. Sees [facility], but no appt until Dec. Is wrapping ankle in ace wrap, pressure on achilles causes pain. Concern for FB in bottom of foot on xray at .   No change in baseline shortness of breath or chest pain - has  cardiologist she sees and had big workup recently.       Past [facility] HX:  Specialty Problems          Orthopaedic Problems    Osteoarthritis        Achilles tendinitis of left lower extremity        Acquired trigger finger        Chronic pain of right wrist        Chronic pain of right wrist        Distal radius fracture, right        DVT (deep venous thrombosis) (CMS/HCC)        Gout, arthritis        Knee pain        Osteopenia        Paraesophageal hiatal hernia        Rheumatoid arthritis involving both hands (CMS/HCC)        Right hip pain        Wrist fracture, right            ROS  12 point ROS reviewed and is negative except for items listed   Achilles / leg pain     Social Hx:  Home:  lives w/    Occupation: nurse     Medications:   Current Outpatient Medications on File Prior to Visit   Medication Sig Dispense Refill   • Accu-Chek Guide test strips strip 1 strip 2 times a day. se 1 TEST STRIP to TEST BLOOD SUGAR twice a day     • Accu-Chek Softclix Lancets misc use 1 LANCET to TEST BLOOD SUGAR twice a day     • amLODIPine (Norvasc) 10 mg tablet Take 1 tablet (10 mg) by mouth once daily. 90 tablet 1   • apixaban 5 mg (74 tabs) tablets,dose pack Take 2 tablets (10 mg) by mouth twice daily for 7 days, then 1 tablet (5 mg) by mouth twice daily after that 74 tablet 0   • aspirin 81 mg chewable tablet Chew.     • brimonidine (Alphagan P) 0.1 % ophthalmic solution Administer into affected eye(s) every 12 hours.     • brimonidine (AlphaGAN P) 0.2 % ophthalmic solution instill 1 drop into both eyes three times a day     • brimonidine-timoloL (Combigan) 0.2-0.5 % ophthalmic solution Administer 1 drop into both eyes once daily.     • diclofenac epolamine 1.3 % patch Place 1 patch on the skin twice a day.     • diclofenac sodium 1 % kit APPLY TO LOWER EXTREMITIES, 4 GM OF GEL TO AFFECTED AREA 4 TIMES DAILY. DO NOT APPLY MORE THAN 16 GM DAILY TO ANY ONE AFFECTED JOINT.     • dicyclomine (Bentyl) 20 mg tablet Take 1  "tablet (20 mg) by mouth. 3-4 times daily as needed.     • famotidine (Pepcid) 20 mg tablet Take 1 tablet (20 mg) by mouth once daily at bedtime.     • ferrous sulfate 325 (65 Fe) MG tablet Take 1 tablet (325 mg) by mouth once daily.     • fluticasone (Flonase) 50 mcg/actuation nasal spray Administer 2 sprays into each nostril once daily. 16 g 3   • insulin glargine (Lantus Solostar U-100 Insulin) 100 unit/mL (3 mL) pen Inject 8 Units under the skin once daily at bedtime. (Patient not taking: Reported on 10/31/2023) 3 mL 3   • latanoprost (Xalatan) 0.005 % ophthalmic solution Administer 1 drop into both eyes once daily in the morning.     • levalbuterol (Xopenex HFA) 45 mcg/actuation inhaler Inhale 1 puff every 4 hours if needed.     • levocetirizine (Xyzal) 5 mg tablet Take 1 tablet (5 mg) by mouth once daily in the evening. (Patient not taking: Reported on 10/31/2023) 90 tablet 1   • losartan (Cozaar) 25 mg tablet Take 1 tablet (25 mg) by mouth once daily. 90 tablet 1   • methocarbamol (Robaxin-750) 750 mg tablet      • metoprolol succinate XL (Toprol-XL) 200 mg 24 hr tablet Take 1 tablet (200 mg) by mouth once daily. 90 tablet 3   • montelukast (Singulair) 10 mg tablet Take 1 tablet (10 mg) by mouth once daily at bedtime.     • nitroglycerin (Nitrostat) 0.4 mg SL tablet Place 1 tablet (0.4 mg) under the tongue every 5 minutes if needed.     • omeprazole (PriLOSEC) 40 mg DR capsule Take 1 capsule (40 mg) by mouth once daily. 30 capsule 1   • oxyCODONE-acetaminophen (Percocet) 5-325 mg tablet Take 1 tablet by mouth every 6 hours if needed for severe pain (7 - 10) for up to 3 days. 5 tablet 0   • pen needle, diabetic 31 gauge x 5/16\" needle Use to inject 1-4 times daily as directed. 100 each 11   • pen needle, diabetic 33 gauge x 5/32\" needle Use to inject insulin one a day     • pioglitazone (Actos) 15 mg tablet Take 0.5 tablets (7.5 mg) by mouth once daily.     • polyethylene glycol (Glycolax, Miralax) 17 gram/dose " "powder Take 1 Capful by mouth once daily. IN 8 OUNCES OF WATER, JUICE, OR TEA AND DRINK     • rosuvastatin (Crestor) 20 mg tablet Take 1 tablet (20 mg) by mouth once daily.     • semaglutide 0.25 mg or 0.5 mg (2 mg/3 mL) pen injector INJECT 0.25 MG UNDER THE SKIN ONCE WEEKLY FOR 4 WEEKS THEN INJECT 0.5 MG UNDER THE SKIN ONCE WEEKLY FOR 4 WEEKS 3 mL 5   • [DISCONTINUED] apixaban 5 mg (74 tabs) tablets,dose pack 10 mg po BID x 7 days, then 5 mg po bid after that 1 tablet 0     Current Facility-Administered Medications on File Prior to Visit   Medication Dose Route Frequency Provider Last Rate Last Admin   • perflutren lipid microspheres (Definity) injection 0.5-10 mL of dilution  0.5-10 mL of dilution intravenous Once Vianney Marrufo MD             Allergies:    Allergies   Allergen Reactions   • Adhesive Itching   • Amoxicillin Hives and Itching   • Cephalexin Itching and Nausea Only   • Iodinated Contrast Media Unknown   • Iodine Unknown   • Latex Other   • Pioglitazone Swelling   • Salicylates Other   • Shellfish Derived Swelling   • Sulfa (Sulfonamide Antibiotics) Unknown   • Sulfamethoxazole-Trimethoprim Hives and Itching        Physical Exam:    Visit Vitals  Ht 1.499 m (4' 11\")   Wt 94.8 kg (209 lb)   BMI 42.21 kg/m²   Smoking Status Former   BSA 1.99 m²        General appearance: Well-appearing well-nourished  Psych: Normal mood and affect    Neuro: Normal sensation to light touch throughout the involved extremities  Vascular: No extremity edema or discoloration.  Skin: negative.  Lymphatic: no regional lymphadenopathy present.  Eyes: no conjunctival injection.    LE exam:   Inspection:   Left ankle with mild soft tissue swelling lateral > medial and over anterior ankle joint, proximal dorsal foot   No erythema or bruising   No breakdown in skin   Sole of feet normal in appearance on L     Palpation:   +TTP L calf diffusely  +TTP L achilles to light tough  +TTP anterior L ankle joint line   +TTP L ATFL  Mild TTP " over dorsal foot     ROM  Active flexion / extension of L ankle produces pain in L achilles and is limited d/t pain   Intact motion in all 4 directions    No WB attempted    Imaging/ Labwork:  Doppler US from 2020, 2023 reviewed. Has chronic thrombus in popliteal, peroneal and post tib vein currently.   Left ankle xrays from 2016 and 2 series from 2023 reviewed. Patient with calcification of achilles tendon, calcification (possible foreign body) in heel, and spur off calcaneus. No significant changes in imaging over time.     Imaging was personally interpreted and reviewed with the patient and/or family    Recent lab work was reviewed and uric acid was normal.  Patient was not on acetaminophen at the time    Impression and Plan:  Vania Andrews is a 68 y.o. female w/ hx of multiple medical problems including prior L leg DVT in 2020, DM2, CKD III, GERT, HLD who presented on 11/28/2023 with L ankle pain.  Patient with recent diagnosis of recurrent left popliteal, peroneal, posterior tib DVT.  Started on Eliquis yesterday.  She has been seen twice in recent weeks for left ankle pain with diagnosis of Achilles tendinitis and was placed into a postop shoe.  She presented with ongoing left ankle pain.  She has pain that isolates to the anterior joint, and is very tender throughout the Achilles with increased pain with active dorsiflexion and plantarflexion of the left ankle consistent with a chronic calcific tendinitis.  Because of her DVT, immobilization would not be indicated.      We placed her in a long-leg compression stockinet and arranged for vascular surgery follow-up with Dr. Garcia in 2 weeks.    We also arranged for her to see Dr. Carlos in foot and ankle orthopedics to discuss the chronic calcific tendinitis which seems to have flared.  She can ambulate in the postop shoe if tolerated.  Ice can be used for pain relief.  We advised against NSAIDs since she is on Eliquis and has a history of GERD.     She has  an appointment with her primary care doctor in December already scheduled.     We discussed reasons for return to the emergency room such as increasing shortness of breath or chest pain      ** Please excuse any errors in grammar or translation related to this dictation. Voice recognition software was utilized to prepare this document. **

## 2023-12-11 ENCOUNTER — OFFICE VISIT (OUTPATIENT)
Dept: ENDOCRINOLOGY | Facility: CLINIC | Age: 68
End: 2023-12-11
Payer: MEDICARE

## 2023-12-11 VITALS
DIASTOLIC BLOOD PRESSURE: 57 MMHG | HEIGHT: 59 IN | HEART RATE: 84 BPM | SYSTOLIC BLOOD PRESSURE: 116 MMHG | WEIGHT: 209 LBS | BODY MASS INDEX: 42.13 KG/M2

## 2023-12-11 DIAGNOSIS — N18.31 TYPE 2 DIABETES MELLITUS WITH STAGE 3A CHRONIC KIDNEY DISEASE, WITHOUT LONG-TERM CURRENT USE OF INSULIN (MULTI): ICD-10-CM

## 2023-12-11 DIAGNOSIS — E11.22 TYPE 2 DIABETES MELLITUS WITH STAGE 3A CHRONIC KIDNEY DISEASE, WITHOUT LONG-TERM CURRENT USE OF INSULIN (MULTI): ICD-10-CM

## 2023-12-11 DIAGNOSIS — E11.9 TYPE 2 DIABETES MELLITUS WITHOUT COMPLICATION, UNSPECIFIED WHETHER LONG TERM INSULIN USE (MULTI): ICD-10-CM

## 2023-12-11 LAB
POC FINGERSTICK BLOOD GLUCOSE: 140 MG/DL (ref 70–100)
POC HEMOGLOBIN A1C: 7.3 % (ref 4.2–6.5)

## 2023-12-11 PROCEDURE — 1036F TOBACCO NON-USER: CPT | Performed by: STUDENT IN AN ORGANIZED HEALTH CARE EDUCATION/TRAINING PROGRAM

## 2023-12-11 PROCEDURE — 1126F AMNT PAIN NOTED NONE PRSNT: CPT | Performed by: STUDENT IN AN ORGANIZED HEALTH CARE EDUCATION/TRAINING PROGRAM

## 2023-12-11 PROCEDURE — 1159F MED LIST DOCD IN RCRD: CPT | Performed by: STUDENT IN AN ORGANIZED HEALTH CARE EDUCATION/TRAINING PROGRAM

## 2023-12-11 PROCEDURE — 3074F SYST BP LT 130 MM HG: CPT | Performed by: STUDENT IN AN ORGANIZED HEALTH CARE EDUCATION/TRAINING PROGRAM

## 2023-12-11 PROCEDURE — 99215 OFFICE O/P EST HI 40 MIN: CPT | Performed by: STUDENT IN AN ORGANIZED HEALTH CARE EDUCATION/TRAINING PROGRAM

## 2023-12-11 PROCEDURE — 3078F DIAST BP <80 MM HG: CPT | Performed by: STUDENT IN AN ORGANIZED HEALTH CARE EDUCATION/TRAINING PROGRAM

## 2023-12-11 PROCEDURE — 3066F NEPHROPATHY DOC TX: CPT | Performed by: STUDENT IN AN ORGANIZED HEALTH CARE EDUCATION/TRAINING PROGRAM

## 2023-12-11 PROCEDURE — 1160F RVW MEDS BY RX/DR IN RCRD: CPT | Performed by: STUDENT IN AN ORGANIZED HEALTH CARE EDUCATION/TRAINING PROGRAM

## 2023-12-11 PROCEDURE — 4010F ACE/ARB THERAPY RXD/TAKEN: CPT | Performed by: STUDENT IN AN ORGANIZED HEALTH CARE EDUCATION/TRAINING PROGRAM

## 2023-12-11 PROCEDURE — 83036 HEMOGLOBIN GLYCOSYLATED A1C: CPT | Performed by: STUDENT IN AN ORGANIZED HEALTH CARE EDUCATION/TRAINING PROGRAM

## 2023-12-11 PROCEDURE — 82962 GLUCOSE BLOOD TEST: CPT | Performed by: STUDENT IN AN ORGANIZED HEALTH CARE EDUCATION/TRAINING PROGRAM

## 2023-12-11 PROCEDURE — 3008F BODY MASS INDEX DOCD: CPT | Performed by: STUDENT IN AN ORGANIZED HEALTH CARE EDUCATION/TRAINING PROGRAM

## 2023-12-11 PROCEDURE — 3051F HG A1C>EQUAL 7.0%<8.0%: CPT | Performed by: STUDENT IN AN ORGANIZED HEALTH CARE EDUCATION/TRAINING PROGRAM

## 2023-12-11 PROCEDURE — 3048F LDL-C <100 MG/DL: CPT | Performed by: STUDENT IN AN ORGANIZED HEALTH CARE EDUCATION/TRAINING PROGRAM

## 2023-12-11 RX ORDER — PEN NEEDLE, DIABETIC 33 GX5/32"
NEEDLE, DISPOSABLE MISCELLANEOUS
Qty: 100 EACH | Refills: 2 | Status: SHIPPED | OUTPATIENT
Start: 2023-12-11

## 2023-12-11 RX ORDER — BLOOD SUGAR DIAGNOSTIC
STRIP MISCELLANEOUS
Qty: 100 STRIP | Refills: 2 | Status: SHIPPED | OUTPATIENT
Start: 2023-12-11 | End: 2024-03-18 | Stop reason: SDUPTHER

## 2023-12-11 RX ORDER — BENZONATATE 200 MG/1
200 CAPSULE ORAL 3 TIMES DAILY PRN
COMMUNITY
Start: 2023-11-26 | End: 2024-02-23 | Stop reason: ALTCHOICE

## 2023-12-11 RX ORDER — INSULIN LISPRO 100 [IU]/ML
INJECTION, SOLUTION INTRAVENOUS; SUBCUTANEOUS
Qty: 15 ML | Refills: 2 | Status: SHIPPED | OUTPATIENT
Start: 2023-12-11 | End: 2024-03-18 | Stop reason: SDUPTHER

## 2023-12-11 ASSESSMENT — PAIN SCALES - GENERAL: PAINLEVEL: 0-NO PAIN

## 2023-12-11 NOTE — PROGRESS NOTES
"68 F PMH: HTN, HLD< NSTEMI, DVT, Obesity, CKD, RA     Following up for DM2, previously seen Gin Monet     Interval-having GI issues around the dose of ozempic, recently on prednisone for leg sweeling     Diabetes History     DM diagnosed > 10 years ago,     Complications Micro and Macro-CKD no albuminuria   A1c:   Lab Results   Component Value Date    HGBA1C 7.3 (A) 12/11/2023       Regimen   Basaglar -been off for months   Ozempic 0.5mg weekly -having GI upset so self discontinued   No SGLT2-due to recurrent UTI   Pioglitazone -dced due to leg swelling   Tradjenta-previously      SMBG   Fingerstick in the 140s     Hypoglycemia none known    Comorbidities and Screening  Eye Exam: sees ophtha, glaucoma and cataract  Foot exam: needs    Lipid  Lab Results   Component Value Date    CHOL 171 10/31/2023    CHOL 176 08/03/2022    CHOL 142 06/23/2021     Lab Results   Component Value Date    HDL 61.2 10/31/2023    HDL 55.5 08/03/2022    HDL 58.6 06/23/2021     Lab Results   Component Value Date    LDLCALC 96 10/31/2023     Lab Results   Component Value Date    TRIG 71 10/31/2023    TRIG 137 08/03/2022    TRIG 88 06/23/2021     No components found for: \"CHOLHDL\"      Statin- rosuvastatin 20mg   Cr and albuminuria- losartan    Lab Results   Component Value Date    CREATININE 1.13 (H) 11/26/2023    EGFR 53 (L) 11/26/2023      ACE/ARB- losartan 25mg     Past Medical History:   Diagnosis Date    Abdominal distension (gaseous) 07/31/2019    Abdominal bloating    Diverticulosis of intestine, part unspecified, without perforation or abscess without bleeding 06/09/2013    Diverticulosis    Elevation of levels of liver transaminase levels 11/13/2020    Transaminitis    Encounter for follow-up examination after completed treatment for conditions other than malignant neoplasm 01/26/2019    Hospital discharge follow-up    Long term (current) use of antibiotics 10/07/2016    Need for prophylactic antibiotic    Other amnesia " 10/28/2014    Memory loss    Other conditions influencing health status 02/06/2019    History of cough    Other specified health status 02/07/2019    Hepatitis C antibody test negative    Other specified soft tissue disorders 06/14/2017    Leg swelling    Pain in left toe(s) 05/15/2017    Pain of toe of left foot    Pain in right foot 10/12/2016    Pain of right heel    Pain in right shoulder 06/22/2016    Acute pain of right shoulder    Personal history of diseases of the blood and blood-forming organs and certain disorders involving the immune mechanism 04/09/2018    History of anemia    Personal history of other diseases of the respiratory system 04/02/2018    History of sinusitis    Personal history of other diseases of the respiratory system 03/19/2014    History of upper respiratory infection    Personal history of other malignant neoplasm of kidney 01/14/2021    Personal history of malignant neoplasm of kidney    Personal history of other medical treatment 08/08/2017    History of screening mammography    Personal history of other specified conditions 11/17/2014    History of abdominal pain    Personal history of other specified conditions 06/14/2017    History of urinary frequency    Personal history of other specified conditions 06/09/2021    History of dysuria    Personal history of other specified conditions 11/28/2014    History of breast lump    Unspecified abdominal hernia without obstruction or gangrene 04/21/2014    Hernia     Family History   Problem Relation Name Age of Onset    Aneurysm Mother      Hypertension Mother      Pancreatic cancer Mother      Diabetes Mother      Other (laryngeal Cancer) Mother      Heart disease Father      Pulmonary embolism Brother      Breast cancer Father's Sister      Breast cancer Maternal Cousin        Social History     Socioeconomic History    Marital status:      Spouse name: Not on file    Number of children: Not on file    Years of education: Not on  "file    Highest education level: Not on file   Occupational History    Not on file   Tobacco Use    Smoking status: Former     Types: Cigarettes    Smokeless tobacco: Never   Substance and Sexual Activity    Alcohol use: Yes     Comment: Occasionally    Drug use: Not Currently    Sexual activity: Not on file   Other Topics Concern    Not on file   Social History Narrative    Not on file     Social Determinants of Health     Financial Resource Strain: Not on file   Food Insecurity: Not on file   Transportation Needs: Not on file   Physical Activity: Not on file   Stress: Not on file   Social Connections: Not on file   Intimate Partner Violence: Not on file   Housing Stability: Not on file    Social: retired visiting nurse     ROS:  Leg swelling, leg pain   Negative except those noted in current and interim history    Physical Exam  Constitutional:       Comments: In a wheelchair, frail appearing   Cardiovascular:      Rate and Rhythm: Normal rate and regular rhythm.   Abdominal:      Comments: Abdominal obesity    Musculoskeletal:         General: Swelling present.      Comments: Bilateral lower extremity swelling   Skin:     General: Skin is warm.      Coloration: Skin is not jaundiced.   Neurological:      General: No focal deficit present.      Mental Status: She is oriented to person, place, and time.   Psychiatric:         Mood and Affect: Mood normal.         Behavior: Behavior normal.          labs and imaging reviewed, pertinent findings listed on HPI and Impression      Problem List Items Addressed This Visit       Diabetes mellitus (CMS/[facility])    Relevant Medications    pen needle, diabetic 33 gauge x 5/32\" needle    Accu-Chek Guide test strips strip    dulaglutide (Trulicity) 0.75 mg/0.5 mL pen injector    insulin lispro (HumaLOG KwikPen Insulin) 100 unit/mL injection    Other Relevant Orders    POCT glycosylated hemoglobin (Hb A1C) manually resulted (Completed)    POCT fingerstick glucose manually resulted " (Completed)     DM2 with vascular complications including CAD, CKD     IO A1c done today and was 7.3%    Switch from ozempic to trulicity, if still having abominal symptoms, then consider doing gastric emptying study, but will hold off on this since she is still having active issues regarding her shortness of breath and DVT/leg swelling   Insulin sliding scale as needed when on prednisone     Rechallenge with SGLT2 in the future due to CKD?    Follow up in about 3-4 months     Previous records reviewed

## 2023-12-12 ENCOUNTER — OFFICE VISIT (OUTPATIENT)
Dept: CARDIOLOGY | Facility: CLINIC | Age: 68
End: 2023-12-12
Payer: MEDICARE

## 2023-12-12 VITALS
HEART RATE: 82 BPM | WEIGHT: 209 LBS | HEIGHT: 59 IN | BODY MASS INDEX: 42.13 KG/M2 | DIASTOLIC BLOOD PRESSURE: 76 MMHG | SYSTOLIC BLOOD PRESSURE: 125 MMHG

## 2023-12-12 DIAGNOSIS — I82.512: Primary | ICD-10-CM

## 2023-12-12 PROCEDURE — 1036F TOBACCO NON-USER: CPT | Performed by: INTERNAL MEDICINE

## 2023-12-12 PROCEDURE — 4010F ACE/ARB THERAPY RXD/TAKEN: CPT | Performed by: INTERNAL MEDICINE

## 2023-12-12 PROCEDURE — 1125F AMNT PAIN NOTED PAIN PRSNT: CPT | Performed by: INTERNAL MEDICINE

## 2023-12-12 PROCEDURE — 3074F SYST BP LT 130 MM HG: CPT | Performed by: INTERNAL MEDICINE

## 2023-12-12 PROCEDURE — 1160F RVW MEDS BY RX/DR IN RCRD: CPT | Performed by: INTERNAL MEDICINE

## 2023-12-12 PROCEDURE — 3008F BODY MASS INDEX DOCD: CPT | Performed by: INTERNAL MEDICINE

## 2023-12-12 PROCEDURE — 3078F DIAST BP <80 MM HG: CPT | Performed by: INTERNAL MEDICINE

## 2023-12-12 PROCEDURE — 3051F HG A1C>EQUAL 7.0%<8.0%: CPT | Performed by: INTERNAL MEDICINE

## 2023-12-12 PROCEDURE — 99214 OFFICE O/P EST MOD 30 MIN: CPT | Performed by: INTERNAL MEDICINE

## 2023-12-12 PROCEDURE — 1159F MED LIST DOCD IN RCRD: CPT | Performed by: INTERNAL MEDICINE

## 2023-12-12 PROCEDURE — 3066F NEPHROPATHY DOC TX: CPT | Performed by: INTERNAL MEDICINE

## 2023-12-12 PROCEDURE — 3048F LDL-C <100 MG/DL: CPT | Performed by: INTERNAL MEDICINE

## 2023-12-12 ASSESSMENT — PAIN SCALES - GENERAL: PAINLEVEL: 8

## 2023-12-12 NOTE — PROGRESS NOTES
Chief complaint: ED follow-up for DVT    HPI:  Vania Andrews is a 68 y.o. female with PMH DVT 2020 (left femoral + popliteal + peroneal + gastrocnemius veins s/p Eliquis treatment), CKD stage 3, asthma, HLD, T2DM, GERD, MARYLU ,inferior NSTEMI (01/2019) presenting today for evaluation of DVT noted on Duplex US 10/31/2023 and 11/26/23. Patient initially presented to urgent care for LLE pain and swelling, was diagnosed with Achilles tendinitis vs gout. Was prescribed a course of steroids which did not help, so was referred to ED to r/o blood clot. 11/26/23 Duplex US showing no acute DVT. Did note chronic DVT of left popliteal, PT, and peroneal veins evident on prior Duplex US 10/31/23. Patient was started on Eliquis.     Saw sports medicine in the interim, was placed in long-leg compression stocking and post-op shoe, recommended ice for pain relief and arranged to see foot/ankle orthopedics for chronic calcific tendinitis.     Today patient reports continued LLE pain, describes it as sharp and shooting up from ankle to groin. Could not tolerate compression stocking or post-op shoe 2/2 pain. Also reports LLE edema largely contained to the ankle. Denies recent provoking factors such as surgery, immobilization, trauma, viral infections, or medical illnesses. Is up to date on breast, colon, and cervical cancer screening. Denies any FH of VTE. Does have a history of GI bleed requiring transfusion back in 2019 when she had an MI - reports negative EGD and colonoscopy at that time.      She does report an episode of burning chest pain yesterday which was quite concerning to her, lasted for a few minutes and eventually resolved with Tums. Patient otherwise denies f/c, palpitations, SOB, abd pain, n/v, diarrhea, melena/hematochezia, dysuria.     Medications:  Medication Documentation Review Audit       Reviewed by Amy Carvalho RN (Registered Nurse) on 12/12/23 at 0851      Medication Order Taking? Sig Documenting Provider  Last Dose Status     Discontinued 12/11/23 1327   Accu-Chek Guide test strips strip [id number] Yes se 1 TEST STRIP to TEST BLOOD SUGAR twice a day Monique Gross MD Taking Active   Accu-Chek Softclix Lancets misc [id number] Yes use 1 LANCET to TEST BLOOD SUGAR twice a day Emmy Santana MD Taking Active   amLODIPine (Norvasc) 10 mg tablet [id number] Yes Take 1 tablet (10 mg) by mouth once daily. Laura Mata MD Taking Active   apixaban 5 mg (74 tabs) tablets,dose pack [id number] Yes Take 2 tablets (10 mg) by mouth twice daily for 7 days, then 1 tablet (5 mg) by mouth twice daily after that Joseph Vallejo MD Taking Active   aspirin 81 mg chewable tablet [id number] No Chew. Emmy Santana MD Not Taking Active   benzonatate (Tessalon) 200 mg capsule [id number] Yes Take 1 capsule (200 mg) by mouth 3 times a day as needed. Emmy Santana MD Taking Active     Discontinued 12/12/23 0849     Discontinued 12/12/23 0849   brimonidine-timoloL (Combigan) 0.2-0.5 % ophthalmic solution [id number] Yes Administer 1 drop into both eyes once daily. Emmy Santana MD Taking Active   diclofenac epolamine 1.3 % patch [id number] Yes Place 1 patch on the skin twice a day. Emmy Santana MD Taking Active   diclofenac sodium 1 % kit [id number] Yes APPLY TO LOWER EXTREMITIES, 4 GM OF GEL TO AFFECTED AREA 4 TIMES DAILY. DO NOT APPLY MORE THAN 16 GM DAILY TO ANY ONE AFFECTED JOINT. Emmy Santana MD Taking Active   dicyclomine (Bentyl) 20 mg tablet [id number] Yes Take 1 tablet (20 mg) by mouth. 3-4 times daily as needed. Emmy Santana MD Taking Active   dulaglutide (Trulicity) 0.75 mg/0.5 mL pen injector [id number] Yes Inject 0.75 mg under the skin 1 (one) time per week. Monique Gross MD Taking Active   famotidine (Pepcid) 20 mg tablet [id number] Yes Take 1 tablet (20 mg) by mouth once daily at bedtime. Emmy Santana MD Taking Active   ferrous sulfate 325 (65 Fe) MG tablet [id number] Yes  "Take 1 tablet by mouth once daily. Emmy Santana MD Taking Active   fluticasone (Flonase) 50 mcg/actuation nasal spray [id number] Yes Administer 2 sprays into each nostril once daily. Laura Mata MD Taking Active    Patient not taking:   Discontinued 12/11/23 1304   insulin lispro (HumaLOG KwikPen Insulin) 100 unit/mL injection [id number] Yes Use with sliding scale 3 times a day up to 30 units daily Monique Gross MD Taking Active   latanoprost (Xalatan) 0.005 % ophthalmic solution [id number] Yes Administer 1 drop into both eyes once daily in the morning. Emmy Santana MD Taking Active   levalbuterol (Xopenex HFA) 45 mcg/actuation inhaler [id number] Yes Inhale 1 puff every 4 hours if needed. Emmy Santana MD Taking Active   levocetirizine (Xyzal) 5 mg tablet [id number] Yes Take 1 tablet (5 mg) by mouth once daily in the evening. Laura Mata MD Taking Active   losartan (Cozaar) 25 mg tablet [id number] Yes Take 1 tablet (25 mg) by mouth once daily. Laura Mata MD Taking Active      Discontinued 12/12/23 0850   metoprolol succinate XL (Toprol-XL) 200 mg 24 hr tablet [id number] Yes Take 1 tablet (200 mg) by mouth once daily. Laura Mata MD Taking Active   montelukast (Singulair) 10 mg tablet [id number] Yes Take 1 tablet (10 mg) by mouth once daily at bedtime. Emmy Santana MD Taking Active   nitroglycerin (Nitrostat) 0.4 mg SL tablet [id number] Yes Place 1 tablet (0.4 mg) under the tongue every 5 minutes if needed. Emmy Provider, MD Taking Active   omeprazole (PriLOSEC) 40 mg DR capsule [id number] Yes Take 1 capsule (40 mg) by mouth once daily. Laura Mata MD Taking Active   pen needle, diabetic 31 gauge x 5/16\" needle [id number] Yes Use to inject 1-4 times daily as directed. Laura Mata MD Taking Active     Discontinued 12/11/23 1327   pen needle, diabetic 33 gauge x 5/32\" needle [id number] Yes Use for sliding scale up to 3 times a " day Monique Gross MD Taking Active   perflutren lipid microspheres (Definity) injection 0.5-10 mL of dilution [id number]   Vianney Marrufo MD  Active     Discontinued 12/11/23 1304   polyethylene glycol (Glycolax, Miralax) 17 gram/dose powder [id number] Yes Take 1 Capful by mouth if needed. IN 8 OUNCES OF WATER, JUICE, OR TEA AND DRINK Historical Provider, MD Taking Active   rosuvastatin (Crestor) 20 mg tablet [id number] Yes Take 1 tablet (20 mg) by mouth once daily. Historical Provider, MD Taking Active     Discontinued 12/11/23 1328                    Allergies:  Allergies   Allergen Reactions    Adhesive Itching    Amoxicillin Hives and Itching    Cephalexin Itching and Nausea Only    Iodinated Contrast Media Unknown    Iodine Unknown    Latex Other    Pioglitazone Swelling    Salicylates Other    Shellfish Derived Swelling    Sulfa (Sulfonamide Antibiotics) Unknown    Sulfamethoxazole-Trimethoprim Hives and Itching       Past medical history:  Past Medical History:   Diagnosis Date    Abdominal distension (gaseous) 07/31/2019    Abdominal bloating    Diverticulosis of intestine, part unspecified, without perforation or abscess without bleeding 06/09/2013    Diverticulosis    Elevation of levels of liver transaminase levels 11/13/2020    Transaminitis    Encounter for follow-up examination after completed treatment for conditions other than malignant neoplasm 01/26/2019    Hospital discharge follow-up    Long term (current) use of antibiotics 10/07/2016    Need for prophylactic antibiotic    Other amnesia 10/28/2014    Memory loss    Other conditions influencing health status 02/06/2019    History of cough    Other specified health status 02/07/2019    Hepatitis C antibody test negative    Other specified soft tissue disorders 06/14/2017    Leg swelling    Pain in left toe(s) 05/15/2017    Pain of toe of left foot    Pain in right foot 10/12/2016    Pain of right heel    Pain in right shoulder 06/22/2016    Acute  pain of right shoulder    Personal history of diseases of the blood and blood-forming organs and certain disorders involving the immune mechanism 2018    History of anemia    Personal history of other diseases of the respiratory system 2018    History of sinusitis    Personal history of other diseases of the respiratory system 2014    History of upper respiratory infection    Personal history of other malignant neoplasm of kidney 2021    Personal history of malignant neoplasm of kidney    Personal history of other medical treatment 2017    History of screening mammography    Personal history of other specified conditions 2014    History of abdominal pain    Personal history of other specified conditions 2017    History of urinary frequency    Personal history of other specified conditions 2021    History of dysuria    Personal history of other specified conditions 2014    History of breast lump    Unspecified abdominal hernia without obstruction or gangrene 2014    Hernia       Surgical history:  Past Surgical History:   Procedure Laterality Date    BREAST BIOPSY  2016    Biopsy Breast Percutaneous Needle Core     SECTION, CLASSIC  2014     Section    CHOLECYSTECTOMY  2017    Cholecystectomy Laparoscopic    HAND SURGERY  2020    Hand Surgery                                                                                                                                                          HERNIA REPAIR  2014    Hernia Repair    MR HEAD ANGIO WO IV CONTRAST  2014    MR HEAD ANGIO WO IV CONTRAST 2014 [facility]CI[facility]    OTHER SURGICAL HISTORY  2021    Nephrectomy    TOTAL ABDOMINAL HYSTERECTOMY W/ BILATERAL SALPINGOOPHORECTOMY  2014    Total Abdominal Hysterectomy With Removal Of Both Ovaries       Family history:  Family History   Problem Relation Name Age of Onset    Aneurysm  Mother      Hypertension Mother      Pancreatic cancer Mother      Diabetes Mother      Other (laryngeal Cancer) Mother      Heart disease Father      Pulmonary embolism Brother      Breast cancer Father's Sister      Breast cancer Maternal Cousin         Social history:   reports that she has quit smoking. Her smoking use included cigarettes. She has never been exposed to tobacco smoke. She has never used smokeless tobacco. She reports current alcohol use. She reports that she does not currently use drugs.  Living situation: Lives at home with    Functional status: Independent  Tobacco: prior use, quit > 30 years ago  Alcohol: occasional  Drugs: Denies     12-point review of systems reviewed and negative except as mentioned in HPI.     Vitals:  Vitals:    12/12/23 0852   BP: 125/76   Pulse: 82       Physical exam:  General: awake, alert, conversant, in no acute distress, appears stated age  HEENT: NCAT, EOMI, no scleral icterus or conjunctivitis  Chest: ctab, normal respiratory effort, not on supplemental oxygen  Cardiac: regular rate and rhythm, normal s1, s2, no M/R/G, no JVD  Abdomen: soft, ND, NT, no involuntary guarding  : no CVA tenderness  EXT: LLE 1+ pitting edema to ankle, radial pulses 2+ symmetric  MSK: left ankle wrap and brace noted  Skin: no suspect lesions or rashes noted on visible skin  Neuro: AOx4, moving all limbs spontaneously, follows commands  Psych: coherent thought process, appropriate mood and affect    Labs:  Results for orders placed or performed in visit on 12/11/23 (from the past 24 hour(s))   POCT fingerstick glucose manually resulted   Result Value Ref Range    POC Fingerstick Blood Glucose 140 (A) 70 - 100 mg/dl   POCT glycosylated hemoglobin (Hb A1C) manually resulted   Result Value Ref Range    POC HEMOGLOBIN A1c 7.3 (A) 4.2 - 6.5 %       Imaging:  XR ankle left 3+ views    Result Date: 11/26/2023  Interpreted By:  Mateusz Luis, STUDY: XR ANKLE LEFT 3+ VIEWS; ;   11/26/2023 5:29 pm   INDICATION: Signs/Symptoms:L ankle pain.   COMPARISON: None.   ACCESSION NUMBER(S): [id number]   ORDERING CLINICIAN: MATTI WARE   FINDINGS: Please see the impression.       No acute fracture or dislocation in the left ankle.   Diffuse osteopenia.   Polyarticular osteoarthritis.   Calcaneal plantar spur.   Diffuse soft tissue swelling.   A 0.9 cm radiopaque foreign body in the subcutaneous tissues of the heel.     MACRO: None   Signed by: Mateusz Luis 11/26/2023 6:11 PM Dictation workstation:   [id number]    Lower extremity venous duplex left    Result Date: 11/26/2023  Interpreted By:  Mateusz Luis, STUDY: [facility] US LOWER EXTREMITY VENOUS DUPLEX LEFT  11/26/2023 3:54 pm   INDICATION: 67 y/o   F with  Signs/Symptoms:r/o DVT. LMP:  Unknown.   COMPARISON: 11/06/2023   ACCESSION NUMBER(S): [id number]   ORDERING CLINICIAN: MATTI WARE   TECHNIQUE: Routine ultrasound of the  left lower extremity was performed with duplex Doppler (color and spectral) evaluation.   Static images were obtained for remote interpretation.   FINDINGS: Chronic thrombosis in the left popliteal vein is again noted. All other visualized deep veins in the left lower extremity are patent with no thrombosis. These veins are compressible and demonstrate normal augmentation   CALF VEINS: The left posterior tibial and peroneal veins are suboptimally visualized and redemonstrate thrombosis.       Chronic DVT involving the left popliteal and calf veins.   MACRO: None   Signed by: Mateusz Luis 11/26/2023 6:10 PM Dictation workstation:   [id number]    XR ankle left 3+ views    Result Date: 11/20/2023  STUDY: Ankle Radiographs;  11/20/2023, 11:52AM. INDICATION: Left ankle pain. COMPARISON: None. ACCESSION NUMBER(S): [id number] ORDERING CLINICIAN: CYNDY BISHOP TECHNIQUE:  Three view(s) of the left ankle. FINDINGS:  There is no displaced fracture.  The alignment is anatomic.  No soft tissue abnormality is seen.   There is ossification of the distal aspect of the Achilles tendon. There is a density within soft tissues of the heel.    1. No evidence for acute injury. 2. There is ossification of the distal aspect of the Achilles tendon which may be due to enthesopathy. 3. There is a density within the soft tissues of the heel which may represent a foreign body. Signed by Gavin Haywood MD      Assessment and plan:  Vania Andrews is a 68 y.o. female with PMH DVT 2020 (left femoral + popliteal + peroneal + gastrocnemius veins s/p Eliquis treatment), CKD stage 3, asthma, HLD, T2DM, GERD, MARYLU ,inferior NSTEMI (01/2019) presenting today for evaluation of chronic DVT noted on Duplex US 10/31/2023 and 11/26/23. Will stop AC as there is no evidence of acute DVT and no need to anticoagulate for chronic thrombus. Findings on ankle XR and physical exam suggest LLE pain/swelling 2/2 chronic calcific tendinitis as opposed to vascular pathology. Appreciate foot/ankle orthopedic recommendations.     # Chronic LLE DVT  :: Present since 2020 in popliteal, PT, peroneal veins  :: Recent Duplex US x2 reviewed, no evidence of acute thrombus  :: Patient denies symptoms specifically associated with chronic thrombus (heaviness in limb, swelling better in the morning and worse throughout the day)   -Given no evidence of acute DVT, would recommend stopping Eliquis and resuming baby ASA. No need to anticoagulate for chronic thrombotic changes.   -Patient is high risk for bleed on Eliquis given history of GIB requiring transfusion  -Suspect LLE pain/swelling is 2/2 chronic calcific tendinitis, appreciate foot/ankle orthopedic recommendations     Return to vascular medicine clinic in 3 months.    Patient was seen and discussed with Dr. Garcia who agrees with the plan as outlined above.    Melissa Forrester MD  PGY-1 Internal Medicine

## 2023-12-12 NOTE — PATIENT INSTRUCTIONS
I want you to stop the Eliquis and resume your baby aspirin.    I want you to see Dr. Carlos as scheduled.    Please make an appointment with me in 3 months at [place].    Should you have questions, please do not hesitate to call my office at [phone].

## 2023-12-14 ENCOUNTER — OFFICE VISIT (OUTPATIENT)
Dept: OPHTHALMOLOGY | Facility: CLINIC | Age: 68
End: 2023-12-14
Payer: MEDICARE

## 2023-12-14 DIAGNOSIS — H25.013 CORTICAL AGE-RELATED CATARACT OF BOTH EYES: Primary | ICD-10-CM

## 2023-12-14 PROCEDURE — 92136 OPHTHALMIC BIOMETRY: CPT | Mod: BILATERAL PROCEDURE | Performed by: OPHTHALMOLOGY

## 2023-12-14 PROCEDURE — 99213 OFFICE O/P EST LOW 20 MIN: CPT | Performed by: OPHTHALMOLOGY

## 2023-12-14 ASSESSMENT — ENCOUNTER SYMPTOMS
GASTROINTESTINAL NEGATIVE: 0
ALLERGIC/IMMUNOLOGIC NEGATIVE: 0
EYES NEGATIVE: 1
CARDIOVASCULAR NEGATIVE: 0
NEUROLOGICAL NEGATIVE: 0
ENDOCRINE NEGATIVE: 0
PSYCHIATRIC NEGATIVE: 0
HEMATOLOGIC/LYMPHATIC NEGATIVE: 0
MUSCULOSKELETAL NEGATIVE: 0
CONSTITUTIONAL NEGATIVE: 0
RESPIRATORY NEGATIVE: 0

## 2023-12-14 ASSESSMENT — CONF VISUAL FIELD
OD_SUPERIOR_TEMPORAL_RESTRICTION: 0
OD_INFERIOR_NASAL_RESTRICTION: 0
OS_SUPERIOR_TEMPORAL_RESTRICTION: 0
OS_NORMAL: 1
OD_NORMAL: 1
OD_SUPERIOR_NASAL_RESTRICTION: 0
OS_SUPERIOR_NASAL_RESTRICTION: 0
OS_INFERIOR_NASAL_RESTRICTION: 0
OS_INFERIOR_TEMPORAL_RESTRICTION: 0
OD_INFERIOR_TEMPORAL_RESTRICTION: 0

## 2023-12-14 ASSESSMENT — PACHYMETRY
OD_CT(UM): 564
OS_CT(UM): 573

## 2023-12-14 ASSESSMENT — VISUAL ACUITY
OS_SC: 20/30
OD_PH_SC+: -2
METHOD: SNELLEN - LINEAR
OS_SC+: -1
OD_SC+: +2
OD_PH_SC: 20/2
OD_SC: 20/40

## 2023-12-14 ASSESSMENT — EXTERNAL EXAM - LEFT EYE: OS_EXAM: NORMAL

## 2023-12-14 ASSESSMENT — REFRACTION_MANIFEST
OD_AXIS: 100
OD_CYLINDER: -1.00
OS_CYLINDER: -1.25
OD_SPHERE: +0.50
OD_ADD: +2.50
OS_ADD: +2.50
OS_SPHERE: -0.50

## 2023-12-14 ASSESSMENT — SLIT LAMP EXAM - LIDS
COMMENTS: NORMAL
COMMENTS: NORMAL

## 2023-12-14 ASSESSMENT — TONOMETRY
IOP_METHOD: TONOPEN
OD_IOP_MMHG: 20
OS_IOP_MMHG: 15

## 2023-12-14 ASSESSMENT — CUP TO DISC RATIO
OD_RATIO: 0.75
OS_RATIO: 0.7

## 2023-12-14 ASSESSMENT — EXTERNAL EXAM - RIGHT EYE: OD_EXAM: NORMAL

## 2023-12-14 NOTE — PROGRESS NOTES
1-oag od>os start rhopressa ou hs continue combigan bid ou and latanoprost ou am    Iop check    To see optom for glasses has appt.      Iop check and stet up surgery 2-3 weeks

## 2023-12-15 ENCOUNTER — PHARMACY VISIT (OUTPATIENT)
Dept: PHARMACY | Facility: CLINIC | Age: 68
End: 2023-12-15
Payer: MEDICARE

## 2023-12-15 PROCEDURE — RXMED WILLOW AMBULATORY MEDICATION CHARGE

## 2023-12-18 ENCOUNTER — HOSPITAL ENCOUNTER (OUTPATIENT)
Dept: RADIOLOGY | Facility: HOSPITAL | Age: 68
Discharge: HOME | End: 2023-12-18
Payer: MEDICARE

## 2023-12-18 ENCOUNTER — PHARMACY VISIT (OUTPATIENT)
Dept: PHARMACY | Facility: CLINIC | Age: 68
End: 2023-12-18
Payer: MEDICARE

## 2023-12-18 ENCOUNTER — OFFICE VISIT (OUTPATIENT)
Dept: ORTHOPEDIC SURGERY | Facility: HOSPITAL | Age: 68
End: 2023-12-18
Payer: MEDICARE

## 2023-12-18 DIAGNOSIS — M76.822 POSTERIOR TIBIAL TENDON DYSFUNCTION, LEFT: ICD-10-CM

## 2023-12-18 DIAGNOSIS — M79.672 LEFT FOOT PAIN: ICD-10-CM

## 2023-12-18 DIAGNOSIS — Z12.31 ENCOUNTER FOR SCREENING MAMMOGRAM FOR MALIGNANT NEOPLASM OF BREAST: ICD-10-CM

## 2023-12-18 DIAGNOSIS — M76.821 POSTERIOR TIBIAL TENDON DYSFUNCTION, RIGHT: ICD-10-CM

## 2023-12-18 DIAGNOSIS — M67.874 ACHILLES TENDINOSIS OF LEFT ANKLE: ICD-10-CM

## 2023-12-18 DIAGNOSIS — M76.62 ACHILLES TENDINITIS OF LEFT LOWER EXTREMITY: Primary | ICD-10-CM

## 2023-12-18 DIAGNOSIS — E08.22 DIABETES MELLITUS DUE TO UNDERLYING CONDITION WITH CHRONIC KIDNEY DISEASE, WITHOUT LONG-TERM CURRENT USE OF INSULIN, UNSPECIFIED CKD STAGE (MULTI): ICD-10-CM

## 2023-12-18 PROCEDURE — 1125F AMNT PAIN NOTED PAIN PRSNT: CPT | Performed by: ORTHOPAEDIC SURGERY

## 2023-12-18 PROCEDURE — 77067 SCR MAMMO BI INCL CAD: CPT | Mod: BILATERAL PROCEDURE | Performed by: RADIOLOGY

## 2023-12-18 PROCEDURE — 1159F MED LIST DOCD IN RCRD: CPT | Performed by: ORTHOPAEDIC SURGERY

## 2023-12-18 PROCEDURE — 3051F HG A1C>EQUAL 7.0%<8.0%: CPT | Performed by: ORTHOPAEDIC SURGERY

## 2023-12-18 PROCEDURE — 3048F LDL-C <100 MG/DL: CPT | Performed by: ORTHOPAEDIC SURGERY

## 2023-12-18 PROCEDURE — 99214 OFFICE O/P EST MOD 30 MIN: CPT | Performed by: ORTHOPAEDIC SURGERY

## 2023-12-18 PROCEDURE — 77067 SCR MAMMO BI INCL CAD: CPT

## 2023-12-18 PROCEDURE — 3066F NEPHROPATHY DOC TX: CPT | Performed by: ORTHOPAEDIC SURGERY

## 2023-12-18 PROCEDURE — 4010F ACE/ARB THERAPY RXD/TAKEN: CPT | Performed by: ORTHOPAEDIC SURGERY

## 2023-12-18 PROCEDURE — L4361 PNEUMA/VAC WALK BOOT PRE OTS: HCPCS | Performed by: ORTHOPAEDIC SURGERY

## 2023-12-18 PROCEDURE — 73630 X-RAY EXAM OF FOOT: CPT | Mod: LT

## 2023-12-18 PROCEDURE — 77063 BREAST TOMOSYNTHESIS BI: CPT | Mod: BILATERAL PROCEDURE | Performed by: RADIOLOGY

## 2023-12-18 PROCEDURE — 99204 OFFICE O/P NEW MOD 45 MIN: CPT | Performed by: ORTHOPAEDIC SURGERY

## 2023-12-18 PROCEDURE — 73630 X-RAY EXAM OF FOOT: CPT | Mod: LEFT SIDE | Performed by: RADIOLOGY

## 2023-12-18 PROCEDURE — 1036F TOBACCO NON-USER: CPT | Performed by: ORTHOPAEDIC SURGERY

## 2023-12-18 PROCEDURE — 3008F BODY MASS INDEX DOCD: CPT | Performed by: ORTHOPAEDIC SURGERY

## 2023-12-18 PROCEDURE — 1160F RVW MEDS BY RX/DR IN RCRD: CPT | Performed by: ORTHOPAEDIC SURGERY

## 2023-12-18 NOTE — PROGRESS NOTES
"HISTORY OF PRESENT ILLNESS  This is a pleasant 68 y.o. year old female  who presents on 12/18/2023 at the request of Laura Mata MD for evaluation of {ANATOMY; ANKLE/FOOT/RIGHT/LEFT/BOTH:60254::\"***\"} pain that has been present over/since {1-6 weeks:20341::\"***\"}.    How the condition occurred or started: ***  Location of pain (patient points to): ***  Quality of pain: {SEVERITY:62122}  Modifying Factors: ***  Associated Signs and symptoms: ***  Previous Treatment: ***    PHYSICAL EXAMINATION  Constitutional Exam: patient's height and weight reviewed, well-kempt  Psychiatric Exam: alert and oriented x 3, appropriate mood and behavior  Eye Exam: MARKUS, EOMI  Pulmonary Exam: breathing non-labored, no apparent distress  Lymphatic exam: no appreciable lymphadenopathy in the lower extremities  Cardiovascular exam: DP pulses 2+ bilaterally, PT 2+ bilaterally, toes are pink with good capillary refill, no pitting edema  Skin exam: no open lesions, rashes, abrasions or ulcerations  Neurological exam: sensation to light touch intact in both lower extremities in peripheral and dermatomal distributions (except for any abnormalities noted in musculoskeletal exam)    Musculoskeletal exam: ***    DATA/RESULTS REVIEW: I personally reviewed the patient's x-ray images and reports of the {ANATOMY; ANKLE/FOOT/RIGHT/LEFT/BOTH:19166::\"***\"}.     ASSESSMENT: ***  PLAN: I discussed with the patient the differential diagnosis, complex *** nature of the condition(s) and available treatment option(s).  ***.  The patient's questions were answered in detail.      Note dictated with [employer] software, completed without full type editing to avoid delay.      Dear Referring Physician,  It was my pleasure to see your patient, in the office today.  Please refer to the detailed notes above for my findings and recommendations.  Thank you once again for your consultation request.  If you have any further questions or " "concerns, please feel free to contact me via email or at the phone number and address below.  Sincerely,    Jacquelin Carlos MD   of Orthopedic Surgery, [employer]  Dual Fellowship-trained in Orthopedic Sports Medicine (Knee and Shoulder), and Foot and Ankle Surgery  [facility] [place]   [employer] Subspecialty Certificate in Orthopedic Sports Medicine  Head Team Physician Case \"[place]\" and [facility] \"Storm\"  Team [employer] Physician [phone] [employer]ym[employer] Games  Team [place] Figure Skating Medical Practitioner, including International Event Coverage  Director, Foot and Ankle Division, Department of Orthopedics    [address], [place] [address]  [email or web address]  Office [phone]    "

## 2023-12-18 NOTE — PROGRESS NOTES
HISTORY OF PRESENT ILLNESS  This is a pleasant 68 y.o. year old female  with PMH CKD III, DM2, HLD, GERD, who presents on 12/18/2023 at the request of Laura Mata MD  for evaluation of the left lower leg and diagnosed by urgent care with calcific achillis tendonitis  pain that has been present over/since 3 weeks.  Woke up in the morning with significant left leg swelling and pain with ambulation.  How the condition occurred or started: without inciting event  Location of pain (patient points to): posterior ankle  Quality of pain:  Now 6/10 improving  Modifying Factors: walking makes it worse better with rest  Associated Signs and symptoms: pain on inner surface of left thigh. Reports previous toe tingling  Previous Treatment: compression stockings. Ace wrap    Of note, she was found to have chronic DVT of left popliteal, PT, and peroneal veins evident on prior Duplex US 10/31/23. On baby aspirin.    Prior to this her functional level was independent in ADLs ambulating without assistive device. She had an MI in 2019, ever since had limited ambulation 2/2 shortness of breath.    PHYSICAL EXAMINATION  Constitutional Exam: patient's height and weight reviewed, well-kempt  Psychiatric Exam: alert and oriented x 3, appropriate mood and behavior  Eye Exam: MARKUS, EOMI  Pulmonary Exam: breathing non-labored, no apparent distress  Lymphatic exam: no appreciable lymphadenopathy in the lower extremities  Cardiovascular exam: DP pulses 2+ bilaterally, PT 2+ bilaterally, toes are pink with good capillary refill, no pitting edema  Skin exam: no open lesions, rashes, abrasions or ulcerations  Neurological exam: sensation to light touch intact in both lower extremities in peripheral and dermatomal distributions (except for any abnormalities noted in musculoskeletal exam)    Musculoskeletal exam: left foot and ankle: tender over distal achilles on palpation, negative marcus, pes planovalgus with posterior tibial tendon  dysfunction mild, no bony tenderness to palpation of the foot and ankle, no charcot changes, mildly tender medial thigh  Swelling b/l Le R>L    DATA/RESULTS REVIEW: I personally reviewed the patient's x-ray images and reports of the  left foot and ankle region showing posterior exostosis calcaneus related to insertional achilles tendinopathy .     ASSESSMENT: posterior exostosis related to insertional achilles tendinopathy with tendinitis flare, hx of DM, hx of DVT, hx of bilateral lower extremity edema  PLAN: I discussed with the patient the differential diagnosis, complex degenerative and diabetes and weight related and hereditary related nature of the condition(s) and available treatment option(s).  She has chronic degeneration of the achilles tendon distally resulting in smooth,well corticated posterior exostosis that is classic for insertional achilles tendinopathy (not calcific tendinitis that is more acute placement of calcium).  Tendinitis flare present on top of chronic tendon degeneration (tendinopathy) causing posterior heel pain.  Other issues are related to her medical conditions and chronic lower leg edema.  Recommend boot WBAT x 3 weeks to let tendinitis resolve.  Recommend PT and PT rx with protocol given.  Stretching program given.  Discussed proper shoewear and diabetic foot care precautions.  Fortunately, no surgical intervention is needed for her condition.  FUV in 6 months or as needed.  The patient was given a prescription for custom made orthotic inserts and diabetic shoes, which are medically necessary due to the patient's posterior tibial tendon dysfunction, diabetes, and pre-ulcerative calluses.  The patient is ambulatory.  The patient's questions were answered in detail.      Note dictated with [employer] software, completed without full type editing to avoid delay.

## 2023-12-19 ENCOUNTER — PHARMACY VISIT (OUTPATIENT)
Dept: PHARMACY | Facility: CLINIC | Age: 68
End: 2023-12-19

## 2023-12-22 ENCOUNTER — OFFICE VISIT (OUTPATIENT)
Dept: PRIMARY CARE | Facility: CLINIC | Age: 68
End: 2023-12-22
Payer: MEDICARE

## 2023-12-22 VITALS
SYSTOLIC BLOOD PRESSURE: 120 MMHG | OXYGEN SATURATION: 98 % | HEIGHT: 59 IN | HEART RATE: 79 BPM | BODY MASS INDEX: 41 KG/M2 | DIASTOLIC BLOOD PRESSURE: 76 MMHG | WEIGHT: 203.4 LBS

## 2023-12-22 DIAGNOSIS — L29.9 GENERALIZED PRURITUS: ICD-10-CM

## 2023-12-22 DIAGNOSIS — I50.32 CHRONIC HEART FAILURE WITH PRESERVED EJECTION FRACTION (MULTI): Primary | ICD-10-CM

## 2023-12-22 DIAGNOSIS — K21.9 GASTROESOPHAGEAL REFLUX DISEASE WITHOUT ESOPHAGITIS: ICD-10-CM

## 2023-12-22 DIAGNOSIS — H40.1133 PRIMARY OPEN ANGLE GLAUCOMA (POAG) OF BOTH EYES, SEVERE STAGE: Primary | ICD-10-CM

## 2023-12-22 DIAGNOSIS — N18.31 TYPE 2 DIABETES MELLITUS WITH STAGE 3A CHRONIC KIDNEY DISEASE, WITHOUT LONG-TERM CURRENT USE OF INSULIN (MULTI): ICD-10-CM

## 2023-12-22 DIAGNOSIS — D50.9 IRON DEFICIENCY ANEMIA, UNSPECIFIED IRON DEFICIENCY ANEMIA TYPE: ICD-10-CM

## 2023-12-22 DIAGNOSIS — E11.22 TYPE 2 DIABETES MELLITUS WITH STAGE 3A CHRONIC KIDNEY DISEASE, WITHOUT LONG-TERM CURRENT USE OF INSULIN (MULTI): ICD-10-CM

## 2023-12-22 PROBLEM — I50.9 HEART FAILURE (MULTI): Status: RESOLVED | Noted: 2023-08-29 | Resolved: 2023-12-22

## 2023-12-22 PROCEDURE — 3008F BODY MASS INDEX DOCD: CPT | Performed by: STUDENT IN AN ORGANIZED HEALTH CARE EDUCATION/TRAINING PROGRAM

## 2023-12-22 PROCEDURE — 1160F RVW MEDS BY RX/DR IN RCRD: CPT | Performed by: STUDENT IN AN ORGANIZED HEALTH CARE EDUCATION/TRAINING PROGRAM

## 2023-12-22 PROCEDURE — 3078F DIAST BP <80 MM HG: CPT | Performed by: STUDENT IN AN ORGANIZED HEALTH CARE EDUCATION/TRAINING PROGRAM

## 2023-12-22 PROCEDURE — 1159F MED LIST DOCD IN RCRD: CPT | Performed by: STUDENT IN AN ORGANIZED HEALTH CARE EDUCATION/TRAINING PROGRAM

## 2023-12-22 PROCEDURE — 3048F LDL-C <100 MG/DL: CPT | Performed by: STUDENT IN AN ORGANIZED HEALTH CARE EDUCATION/TRAINING PROGRAM

## 2023-12-22 PROCEDURE — 4010F ACE/ARB THERAPY RXD/TAKEN: CPT | Performed by: STUDENT IN AN ORGANIZED HEALTH CARE EDUCATION/TRAINING PROGRAM

## 2023-12-22 PROCEDURE — 3051F HG A1C>EQUAL 7.0%<8.0%: CPT | Performed by: STUDENT IN AN ORGANIZED HEALTH CARE EDUCATION/TRAINING PROGRAM

## 2023-12-22 PROCEDURE — 1125F AMNT PAIN NOTED PAIN PRSNT: CPT | Performed by: STUDENT IN AN ORGANIZED HEALTH CARE EDUCATION/TRAINING PROGRAM

## 2023-12-22 PROCEDURE — 3074F SYST BP LT 130 MM HG: CPT | Performed by: STUDENT IN AN ORGANIZED HEALTH CARE EDUCATION/TRAINING PROGRAM

## 2023-12-22 PROCEDURE — 3066F NEPHROPATHY DOC TX: CPT | Performed by: STUDENT IN AN ORGANIZED HEALTH CARE EDUCATION/TRAINING PROGRAM

## 2023-12-22 PROCEDURE — 1036F TOBACCO NON-USER: CPT | Performed by: STUDENT IN AN ORGANIZED HEALTH CARE EDUCATION/TRAINING PROGRAM

## 2023-12-22 PROCEDURE — 99214 OFFICE O/P EST MOD 30 MIN: CPT | Performed by: STUDENT IN AN ORGANIZED HEALTH CARE EDUCATION/TRAINING PROGRAM

## 2023-12-22 RX ORDER — FERROUS GLUCONATE 325 MG
38 TABLET ORAL
Qty: 30 TABLET | Refills: 11 | Status: SHIPPED | OUTPATIENT
Start: 2023-12-22 | End: 2024-01-28 | Stop reason: HOSPADM

## 2023-12-22 RX ORDER — OMEPRAZOLE 40 MG/1
40 CAPSULE, DELAYED RELEASE ORAL DAILY
Qty: 90 CAPSULE | Refills: 3 | Status: ON HOLD | OUTPATIENT
Start: 2023-12-22 | End: 2024-01-28 | Stop reason: SDUPTHER

## 2023-12-22 RX ORDER — NETARSUDIL 0.2 MG/ML
1 SOLUTION/ DROPS OPHTHALMIC; TOPICAL EVERY EVENING
COMMUNITY
End: 2023-12-22 | Stop reason: SDUPTHER

## 2023-12-22 NOTE — PROGRESS NOTES
"Subjective   Patient ID: Vania Andrews is a 68 y.o. female who presents for Follow-up (5 month follow-up. ).        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           Visit Vitals  /76   Pulse 79   Ht 1.499 m (4' 11\")   Wt 92.3 kg (203 lb 6.4 oz)   LMP  (LMP Unknown)   SpO2 98%   BMI 41.08 kg/m²   OB Status Postmenopausal   Smoking Status Former   BSA 1.96 m²      No LMP recorded (lmp unknown). Patient is postmenopausal.     Review of Systems    Constitutional : No feeling poorly / fevers/ chills / night sweats/ fatigue   Cardiovascular : No CP /Palpitations/ lower extremity edema / syncope   Respiratory : No Cough /ORTIZ/Dyspnea at rest   Gastrointestinal : No abd pain / N/V  No bloody stools/ melena / constipation  Endo : No polyuria/polydipsia/ muscle weakness / sluggishness   CNS: No confusion / HA/ tingling/ numbness/ weakness of extremities  Psychiatric: No anxiety/ depression/ SI/HI    All other systems have been reviewed and are negative for complaint       Physical Exam    Constitutional : Vitals reviewed. Alert and in no distress  Cardiovascular : RRR, Normal S1, S2, No pericardial rub/ gallop, no peripheral edema   Pulmonary: No respiratory distress, CTAB   MSK : Normal gait and station , strength and tone   Skin: Normal skin color and pigmentation, normal skin turgor and no rash   Neurologic : CNs 2-12 grossly intact , no obvious FNDs  Psych : A,Ox3, normal mood and affect      Assessment/Plan   Diagnoses and all orders for this " visit:  Chronic heart failure with preserved ejection fraction (CMS/HCC)  Type 2 diabetes mellitus with stage 3a chronic kidney disease, without long-term current use of insulin (CMS/[facility])  -     Hemoglobin A1C; Future  Iron deficiency anemia, unspecified iron deficiency anemia type  -     ferrous gluconate (Fergon) 324 (38 Fe) mg tablet; Take 1 tablet (38 mg of iron) by mouth once daily with breakfast.    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    Pt desires to continue AC with eliquis   Anemia noted on labs : taking ferrous sulfate every day , once daily,  experiences constipation   Change to ferrous gluconate     Osteopenia  ( weaker bones ) noted on bone density scan .  Calcium + vitamin D + weight bearing exercise = strong bones .  1200 mg of Calcium per day ( This could be from diet as well as supplements , if you do not get enough from diet ) + 2000 U of Vit D3 everyday ( OTC ) is recommended .    There is loss of bone in women as a results of menopause. Some medical conditions can also results in loss of bone density sooner than expected . Smoking during one's life time , prolonged use of certain medications to treat other medical conditions also increases the risk of osteopenia.       Conditions addressed and mgmt as noted above.  Pertinent labs, images/ imaging reports , chart review was done .   Age appropriate labs / labs for mgmt of chronic medical conditions ordered, further mgmt pending the results.

## 2023-12-22 NOTE — PATIENT INSTRUCTIONS
Osteopenia  ( weaker bones ) noted on bone density scan .  Calcium + vitamin D + weight bearing exercise = strong bones .  1200 mg of Calcium per day ( This could be from diet as well as supplements , if you do not get enough from diet ) + 2000 U of Vit D3 everyday ( OTC ) is recommended .    We are on a new system that is paperless.     Lab location for blood work :  1. Located across the office , just past the elevators .   2. [address].  If you are coming on a Saturday, go to [address] for the blood draw    Radiology : [address] for Xrays  You can also schedule non urgent imaging by calling .     For scheduling appts, call . You might be receiving call from central scheduling as well.    For scheduling colonoscopy, call .     For scheduling physical therapy, call [phone] ( [phone]).    For any other scheduling questions or locations, please ask the medical assistant or the .

## 2023-12-26 RX ORDER — NETARSUDIL 0.2 MG/ML
1 SOLUTION/ DROPS OPHTHALMIC; TOPICAL EVERY EVENING
Qty: 2.5 ML | Refills: 6 | Status: SHIPPED | OUTPATIENT
Start: 2023-12-26 | End: 2024-03-28 | Stop reason: SDUPTHER

## 2023-12-26 RX ORDER — LATANOPROST 50 UG/ML
1 SOLUTION/ DROPS OPHTHALMIC EVERY MORNING
Qty: 2.5 ML | Refills: 6 | Status: SHIPPED | OUTPATIENT
Start: 2023-12-26 | End: 2024-03-28 | Stop reason: SDUPTHER

## 2023-12-26 RX ORDER — BRIMONIDINE TARTRATE AND TIMOLOL MALEATE 2; 5 MG/ML; MG/ML
1 SOLUTION OPHTHALMIC 2 TIMES DAILY
Qty: 10 ML | Refills: 6 | Status: SHIPPED | OUTPATIENT
Start: 2023-12-26 | End: 2024-03-21 | Stop reason: SDUPTHER

## 2024-01-11 ENCOUNTER — OFFICE VISIT (OUTPATIENT)
Dept: CARDIOLOGY | Facility: HOSPITAL | Age: 69
End: 2024-01-11
Payer: MEDICARE

## 2024-01-11 ENCOUNTER — LAB (OUTPATIENT)
Dept: LAB | Facility: LAB | Age: 69
End: 2024-01-11
Payer: MEDICARE

## 2024-01-11 VITALS
HEIGHT: 59 IN | OXYGEN SATURATION: 99 % | SYSTOLIC BLOOD PRESSURE: 127 MMHG | BODY MASS INDEX: 40.12 KG/M2 | WEIGHT: 199 LBS | DIASTOLIC BLOOD PRESSURE: 66 MMHG | HEART RATE: 85 BPM

## 2024-01-11 DIAGNOSIS — R07.9 CHEST PAIN, UNSPECIFIED TYPE: Primary | ICD-10-CM

## 2024-01-11 DIAGNOSIS — R06.02 SHORTNESS OF BREATH ON EXERTION: ICD-10-CM

## 2024-01-11 DIAGNOSIS — M79.89 LEG SWELLING: ICD-10-CM

## 2024-01-11 DIAGNOSIS — Z91.041 CONTRAST MEDIA ALLERGY: ICD-10-CM

## 2024-01-11 DIAGNOSIS — R06.02 SHORTNESS OF BREATH: ICD-10-CM

## 2024-01-11 DIAGNOSIS — R07.9 CHEST PAIN, UNSPECIFIED TYPE: ICD-10-CM

## 2024-01-11 LAB
ALBUMIN SERPL BCP-MCNC: 3.7 G/DL (ref 3.4–5)
ANION GAP SERPL CALC-SCNC: 14 MMOL/L (ref 10–20)
BNP SERPL-MCNC: 83 PG/ML (ref 0–99)
BUN SERPL-MCNC: 14 MG/DL (ref 6–23)
CALCIUM SERPL-MCNC: 9.2 MG/DL (ref 8.6–10.6)
CHLORIDE SERPL-SCNC: 110 MMOL/L (ref 98–107)
CO2 SERPL-SCNC: 23 MMOL/L (ref 21–32)
CREAT SERPL-MCNC: 1.23 MG/DL (ref 0.5–1.05)
EGFRCR SERPLBLD CKD-EPI 2021: 48 ML/MIN/1.73M*2
GLUCOSE SERPL-MCNC: 127 MG/DL (ref 74–99)
PHOSPHATE SERPL-MCNC: 3.2 MG/DL (ref 2.5–4.9)
POTASSIUM SERPL-SCNC: 4.8 MMOL/L (ref 3.5–5.3)
SODIUM SERPL-SCNC: 142 MMOL/L (ref 136–145)

## 2024-01-11 PROCEDURE — 1160F RVW MEDS BY RX/DR IN RCRD: CPT | Performed by: INTERNAL MEDICINE

## 2024-01-11 PROCEDURE — 3062F POS MACROALBUMINURIA REV: CPT | Performed by: INTERNAL MEDICINE

## 2024-01-11 PROCEDURE — 1159F MED LIST DOCD IN RCRD: CPT | Performed by: INTERNAL MEDICINE

## 2024-01-11 PROCEDURE — 3008F BODY MASS INDEX DOCD: CPT | Performed by: INTERNAL MEDICINE

## 2024-01-11 PROCEDURE — 1036F TOBACCO NON-USER: CPT | Performed by: INTERNAL MEDICINE

## 2024-01-11 PROCEDURE — 99215 OFFICE O/P EST HI 40 MIN: CPT | Performed by: INTERNAL MEDICINE

## 2024-01-11 PROCEDURE — 1125F AMNT PAIN NOTED PAIN PRSNT: CPT | Performed by: INTERNAL MEDICINE

## 2024-01-11 PROCEDURE — 3066F NEPHROPATHY DOC TX: CPT | Performed by: INTERNAL MEDICINE

## 2024-01-11 PROCEDURE — 3074F SYST BP LT 130 MM HG: CPT | Performed by: INTERNAL MEDICINE

## 2024-01-11 PROCEDURE — 3078F DIAST BP <80 MM HG: CPT | Performed by: INTERNAL MEDICINE

## 2024-01-11 PROCEDURE — 83880 ASSAY OF NATRIURETIC PEPTIDE: CPT

## 2024-01-11 PROCEDURE — 4010F ACE/ARB THERAPY RXD/TAKEN: CPT | Performed by: INTERNAL MEDICINE

## 2024-01-11 PROCEDURE — 36415 COLL VENOUS BLD VENIPUNCTURE: CPT

## 2024-01-11 PROCEDURE — 80069 RENAL FUNCTION PANEL: CPT

## 2024-01-11 RX ORDER — NITROGLYCERIN 0.4 MG/1
0.4 TABLET SUBLINGUAL ONCE
Status: SHIPPED | OUTPATIENT
Start: 2024-01-11

## 2024-01-11 RX ORDER — FUROSEMIDE 20 MG/1
20 TABLET ORAL DAILY PRN
Qty: 30 TABLET | Refills: 11 | Status: SHIPPED | OUTPATIENT
Start: 2024-01-11 | End: 2024-05-28 | Stop reason: WASHOUT

## 2024-01-11 ASSESSMENT — PAIN SCALES - GENERAL: PAINLEVEL: 4

## 2024-01-11 NOTE — PROGRESS NOTES
Subjective   Vania Andrews is a 68 y.o. female presenting for cardiology follow-up on background of DVT, trigger finger, CKD III, DM2, HLD, GERD, gout, recurrent UTI, abdominal hernia, hiatal hernia. Previous patient of Dr Clarisa Perkins (last seen 3/2022).      Investigations:  Lower extremity duplex (11/2023) - chronic DVT of left popliteal and calf veins.   Nuclear stress test (11/2023) - small perfusion defect of the apical inferolateral wall (prior infarct with some umm-infarct ischemia).   TTE (11/2023) - LVEF 55-60%, distal septal/apical hypokinesis.   CACS (12/2021) - 124.  EKG (10/2022) - NSR.  Nuclear stress test (1/2021) - attenuation artifact (fixed defect), no ischemia.  TTE (2020) - LVEF 55-60%, distal apical hypokinesis.   CMR (2019) - LVEF 70%, severe hypokinesis of the mid inferior wall with associated transmural enhancement consistent with a prior infarction. Dilated MPA 3.5 cm      HPI  Pain in the legs has improved.   Persistent shortness of breath with exertion.   Has had bilateral leg swelling - increased over the last month.   Sleeps on 3 pillows.  Still needs BNP.  Reports daily regular palpitations. Still taking metoprolol 200mg daily.   Cr 1.13 (11/26/2023).   Has had intermittent episodes of chest pain radiating to the arm/back. For anatomical coronary evaluation.  Per vascular, LLE DVT is chronic and treatment with ASA is fine.    ROS  A 10-system review was performed and was unremarkable apart from what is presented in the HPI.     Objective   Physical Exam  Alert and orientated.   Appropriate responses, normal affect.  No respiratory distress at rest.   Skin warm and dry.   Normal radial pulse character and volume. Clinically SR.   Anicteric sclera, no conjunctival pallor.   No JVD or carotid bruits.  Heart sounds dual, no added heart sounds or audible murmurs.   Chest clear on auscultation.   Calves soft, non-tender.  Bilateral pitting pedal edema to the mid shins.    Lab Review:   Lab  Results   Component Value Date     11/26/2023    K 4.1 11/26/2023     11/26/2023    CO2 27 11/26/2023    BUN 18 11/26/2023    CREATININE 1.13 (H) 11/26/2023    GLUCOSE 85 11/26/2023    CALCIUM 8.9 11/26/2023     Lab Results   Component Value Date    CKTOTAL 112 12/23/2020    TROPONINI <0.02 02/05/2022     Lab Results   Component Value Date    WBC 12.1 (H) 11/26/2023    HGB 8.2 (L) 11/26/2023    HCT 29.1 (L) 11/26/2023    MCV 69 (L) 11/26/2023     11/26/2023       Assessment/Plan   In summary, Vania Andrews is a 68 y.o. female presenting for cardiology follow-up on background of DVT, trigger finger, CKD III, DM2, HLD, GERD, gout, recurrent UTI, abdominal hernia, hiatal hernia. Her main issue has been progressive exertional dyspnea with occasional chest tightness in addition to some lower limb pain and swelling.  She was recently investigated with a  lower limb Doppler ultrasound that demonstrated chronic LLE DVT, which has been reviewed by the vascular team and does not require anticoagulation [aspirin satisfactory].  On examination today she had bilateral pitting pedal edema to the mid shins without definite JVD.  The chest was clear on auscultation and she was normotensive.  I have arranged for labs to further investigate her lower limb swelling including an RFP/BNP.  It would be reasonable to trial Lasix 20 mg for 5 days to ascertain if this helps with her shortness of breath or swelling. In light of her chest tightness with evidence of an inferolateral perfusion defect on nuclear stress testing I will arrange for a cardiac catheter (LHC/RHC) for definitive assessment.  She has a known iodine allergy and will require standard prep.  We will contact Mrs. Andrews to arrange this investigation.  In the interim she should continue her existing medications including aspirin and metoprolol with as needed nitroglycerin.  I have informed her that should she have any extended periods of chest discomfort  refractory to nitroglycerin that she should present to the ER for medical review.  I will follow-up with Mrs. Andrews after her investigations.

## 2024-01-11 NOTE — PATIENT INSTRUCTIONS
Your recent echocardiogram showed overall normal heart muscle contraction with weakening of a small region. Your stress test showed reduced blood flow to this area, which appears scarred.     Please have blood collected for lab work today.     Please start furosemide (lasix) 20mg daily for management of your leg swelling. Take this medication for 5 days and then on an as needed basis. Notify my office of any symptomatic changes.    Continue your other medications including aspirin 81mg daily and metoprolol 200mg daily.     We will call you to arrange a CT scan of your heart arteries.     I will follow-up with you in February after your tests.

## 2024-01-22 ENCOUNTER — OFFICE VISIT (OUTPATIENT)
Dept: OPHTHALMOLOGY | Facility: CLINIC | Age: 69
End: 2024-01-22
Payer: MEDICARE

## 2024-01-22 DIAGNOSIS — H52.4 ASTIGMATISM OF BOTH EYES WITH PRESBYOPIA: ICD-10-CM

## 2024-01-22 DIAGNOSIS — H52.203 ASTIGMATISM OF BOTH EYES WITH PRESBYOPIA: ICD-10-CM

## 2024-01-22 DIAGNOSIS — H25.013 CORTICAL AGE-RELATED CATARACT OF BOTH EYES: Primary | ICD-10-CM

## 2024-01-22 DIAGNOSIS — H40.1133 PRIMARY OPEN ANGLE GLAUCOMA (POAG) OF BOTH EYES, SEVERE STAGE: Primary | ICD-10-CM

## 2024-01-22 PROCEDURE — 92015 DETERMINE REFRACTIVE STATE: CPT | Performed by: OPTOMETRIST

## 2024-01-22 PROCEDURE — 92012 INTRM OPH EXAM EST PATIENT: CPT | Performed by: OPTOMETRIST

## 2024-01-22 ASSESSMENT — VISUAL ACUITY
OS_SC+: -2
OS_SC: 20/40
METHOD: SNELLEN - LINEAR
OD_SC: 20/40
OD_BAT_HIGH: 20/40-2
OD_SC+: +2

## 2024-01-22 ASSESSMENT — REFRACTION_MANIFEST
OD_SPHERE: +0.25
OD_ADD: +2.50
OS_ADD: +2.50
OS_CYLINDER: -0.75
OS_SPHERE: -0.50
OS_AXIS: 010
OD_AXIS: 085
OD_CYLINDER: -0.75

## 2024-01-22 ASSESSMENT — ENCOUNTER SYMPTOMS
RESPIRATORY NEGATIVE: 1
EYES NEGATIVE: 1

## 2024-01-22 ASSESSMENT — TONOMETRY
OS_IOP_MMHG: 18
IOP_METHOD: GOLDMANN APPLANATION
OD_IOP_MMHG: 22

## 2024-01-22 ASSESSMENT — SLIT LAMP EXAM - LIDS
COMMENTS: NORMAL
COMMENTS: NORMAL

## 2024-01-22 ASSESSMENT — PACHYMETRY
OD_CT(UM): 564
OS_CT(UM): 573

## 2024-01-22 ASSESSMENT — EXTERNAL EXAM - LEFT EYE: OS_EXAM: NORMAL

## 2024-01-22 ASSESSMENT — CUP TO DISC RATIO
OD_RATIO: 0.75
OS_RATIO: 0.7

## 2024-01-22 ASSESSMENT — EXTERNAL EXAM - RIGHT EYE: OD_EXAM: NORMAL

## 2024-01-22 NOTE — PROGRESS NOTES
Refraction provides BCVA 20/25 OD and OS and BAT VA 20/40-2 OD 20/40+1 OS.   Patient missed cataract pre-op testing visit and patient advised that while cataracts are present they are not ready for removal as BAT VA is not at 20/50. A spectacle prescription was dispensed to be used as needed. IOP 22/18. Follow up for glaucoma care with Dr. Leigh recommend within the next month.

## 2024-01-25 ENCOUNTER — APPOINTMENT (OUTPATIENT)
Dept: RADIOLOGY | Facility: HOSPITAL | Age: 69
DRG: 812 | End: 2024-01-25
Payer: MEDICARE

## 2024-01-25 ENCOUNTER — HOSPITAL ENCOUNTER (INPATIENT)
Facility: HOSPITAL | Age: 69
LOS: 3 days | Discharge: HOME | DRG: 812 | End: 2024-01-28
Attending: STUDENT IN AN ORGANIZED HEALTH CARE EDUCATION/TRAINING PROGRAM | Admitting: INTERNAL MEDICINE
Payer: MEDICARE

## 2024-01-25 ENCOUNTER — CLINICAL SUPPORT (OUTPATIENT)
Dept: EMERGENCY MEDICINE | Facility: HOSPITAL | Age: 69
DRG: 812 | End: 2024-01-25
Payer: MEDICARE

## 2024-01-25 ENCOUNTER — OFFICE VISIT (OUTPATIENT)
Dept: PULMONOLOGY | Facility: HOSPITAL | Age: 69
DRG: 812 | End: 2024-01-25
Payer: MEDICARE

## 2024-01-25 DIAGNOSIS — R05.3 CHRONIC COUGH: ICD-10-CM

## 2024-01-25 DIAGNOSIS — I25.10 CORONARY ARTERY CALCIFICATION: Primary | ICD-10-CM

## 2024-01-25 DIAGNOSIS — F17.201 TOBACCO DEPENDENCE IN REMISSION: ICD-10-CM

## 2024-01-25 DIAGNOSIS — D64.9 ANEMIA, UNSPECIFIED TYPE: ICD-10-CM

## 2024-01-25 DIAGNOSIS — D50.8 OTHER IRON DEFICIENCY ANEMIA: ICD-10-CM

## 2024-01-25 DIAGNOSIS — I25.84 CORONARY ARTERY CALCIFICATION: Primary | ICD-10-CM

## 2024-01-25 DIAGNOSIS — D64.9 SYMPTOMATIC ANEMIA: ICD-10-CM

## 2024-01-25 DIAGNOSIS — K21.9 GASTROESOPHAGEAL REFLUX DISEASE WITHOUT ESOPHAGITIS: ICD-10-CM

## 2024-01-25 DIAGNOSIS — R06.02 SHORTNESS OF BREATH ON EXERTION: Primary | ICD-10-CM

## 2024-01-25 DIAGNOSIS — J45.991 COUGH VARIANT ASTHMA (HHS-HCC): ICD-10-CM

## 2024-01-25 LAB
ABO GROUP (TYPE) IN BLOOD: NORMAL
ALBUMIN SERPL BCP-MCNC: 3.9 G/DL (ref 3.4–5)
ALP SERPL-CCNC: 139 U/L (ref 33–136)
ALT SERPL W P-5'-P-CCNC: 13 U/L (ref 7–45)
ANION GAP SERPL CALC-SCNC: 12 MMOL/L (ref 10–20)
ANTIBODY SCREEN: NORMAL
APTT PPP: 18 SECONDS (ref 27–38)
AST SERPL W P-5'-P-CCNC: 19 U/L (ref 9–39)
BASOPHILS # BLD MANUAL: 0 X10*3/UL (ref 0–0.1)
BASOPHILS # BLD MANUAL: 0.13 X10*3/UL (ref 0–0.1)
BASOPHILS NFR BLD MANUAL: 0 %
BASOPHILS NFR BLD MANUAL: 0.9 %
BILIRUB SERPL-MCNC: 0.6 MG/DL (ref 0–1.2)
BNP SERPL-MCNC: 96 PG/ML (ref 0–99)
BUN SERPL-MCNC: 13 MG/DL (ref 6–23)
CALCIUM SERPL-MCNC: 9.1 MG/DL (ref 8.6–10.6)
CARDIAC TROPONIN I PNL SERPL HS: 4 NG/L (ref 0–34)
CHLORIDE SERPL-SCNC: 108 MMOL/L (ref 98–107)
CO2 SERPL-SCNC: 24 MMOL/L (ref 21–32)
CREAT SERPL-MCNC: 1.41 MG/DL (ref 0.5–1.05)
EGFRCR SERPLBLD CKD-EPI 2021: 41 ML/MIN/1.73M*2
EOSINOPHIL # BLD MANUAL: 0 X10*3/UL (ref 0–0.7)
EOSINOPHIL # BLD MANUAL: 0.32 X10*3/UL (ref 0–0.7)
EOSINOPHIL NFR BLD MANUAL: 0 %
EOSINOPHIL NFR BLD MANUAL: 2.6 %
ERYTHROCYTE [DISTWIDTH] IN BLOOD BY AUTOMATED COUNT: 27 % (ref 11.5–14.5)
ERYTHROCYTE [DISTWIDTH] IN BLOOD BY AUTOMATED COUNT: 27.5 % (ref 11.5–14.5)
FIBRINOGEN PPP-MCNC: 423 MG/DL (ref 200–400)
FLUAV RNA RESP QL NAA+PROBE: NOT DETECTED
FLUBV RNA RESP QL NAA+PROBE: NOT DETECTED
GLUCOSE SERPL-MCNC: 137 MG/DL (ref 74–99)
HCT VFR BLD AUTO: 20 % (ref 36–46)
HCT VFR BLD AUTO: 21.8 % (ref 36–46)
HGB BLD-MCNC: 5.4 G/DL (ref 12–16)
HGB BLD-MCNC: 5.9 G/DL (ref 12–16)
HGB RETIC QN: 16 PG (ref 28–38)
HYPOCHROMIA BLD QL SMEAR: ABNORMAL
HYPOCHROMIA BLD QL SMEAR: ABNORMAL
IMM GRANULOCYTES # BLD AUTO: 0.07 X10*3/UL (ref 0–0.7)
IMM GRANULOCYTES # BLD AUTO: 0.08 X10*3/UL (ref 0–0.7)
IMM GRANULOCYTES NFR BLD AUTO: 0.5 % (ref 0–0.9)
IMM GRANULOCYTES NFR BLD AUTO: 0.6 % (ref 0–0.9)
IMMATURE RETIC FRACTION: 37.9 %
INR PPP: 1.2 (ref 0.9–1.1)
LYMPHOCYTES # BLD MANUAL: 1.71 X10*3/UL (ref 1.2–4.8)
LYMPHOCYTES # BLD MANUAL: 1.99 X10*3/UL (ref 1.2–4.8)
LYMPHOCYTES NFR BLD MANUAL: 13.9 %
LYMPHOCYTES NFR BLD MANUAL: 13.9 %
MCH RBC QN AUTO: 16.7 PG (ref 26–34)
MCH RBC QN AUTO: 17 PG (ref 26–34)
MCHC RBC AUTO-ENTMCNC: 27 G/DL (ref 32–36)
MCHC RBC AUTO-ENTMCNC: 27.1 G/DL (ref 32–36)
MCV RBC AUTO: 62 FL (ref 80–100)
MCV RBC AUTO: 63 FL (ref 80–100)
MONOCYTES # BLD MANUAL: 0.11 X10*3/UL (ref 0.1–1)
MONOCYTES # BLD MANUAL: 0.37 X10*3/UL (ref 0.1–1)
MONOCYTES NFR BLD MANUAL: 0.9 %
MONOCYTES NFR BLD MANUAL: 2.6 %
NEUTS SEG # BLD MANUAL: 10.05 X10*3/UL (ref 1.2–7)
NEUTS SEG # BLD MANUAL: 11.81 X10*3/UL (ref 1.2–7)
NEUTS SEG NFR BLD MANUAL: 81.7 %
NEUTS SEG NFR BLD MANUAL: 82.6 %
NRBC BLD MANUAL-RTO: 1.7 % (ref 0–0)
NRBC BLD-RTO: 0.6 /100 WBCS (ref 0–0)
NRBC BLD-RTO: 0.8 /100 WBCS (ref 0–0)
OVALOCYTES BLD QL SMEAR: ABNORMAL
OVALOCYTES BLD QL SMEAR: ABNORMAL
PLASMA CELLS # BLD MANUAL: 0.11 X10*3/UL
PLASMA CELLS NFR BLD MANUAL: 0.9 %
PLATELET # BLD AUTO: 291 X10*3/UL (ref 150–450)
PLATELET # BLD AUTO: 304 X10*3/UL (ref 150–450)
POTASSIUM SERPL-SCNC: 4.2 MMOL/L (ref 3.5–5.3)
PROT SERPL-MCNC: 7.1 G/DL (ref 6.4–8.2)
PROTHROMBIN TIME: 13.3 SECONDS (ref 9.8–12.8)
RBC # BLD AUTO: 3.23 X10*6/UL (ref 4–5.2)
RBC # BLD AUTO: 3.48 X10*6/UL (ref 4–5.2)
RBC MORPH BLD: ABNORMAL
RBC MORPH BLD: ABNORMAL
RETICS #: 0.14 X10*6/UL (ref 0.02–0.11)
RETICS/RBC NFR AUTO: 3.9 % (ref 0.5–2)
RH FACTOR (ANTIGEN D): NORMAL
RSV RNA RESP QL NAA+PROBE: NOT DETECTED
SARS-COV-2 RNA RESP QL NAA+PROBE: NOT DETECTED
SCHISTOCYTES BLD QL SMEAR: ABNORMAL
SODIUM SERPL-SCNC: 140 MMOL/L (ref 136–145)
TOTAL CELLS COUNTED BLD: 115
TOTAL CELLS COUNTED BLD: 115
WBC # BLD AUTO: 12.3 X10*3/UL (ref 4.4–11.3)
WBC # BLD AUTO: 14.3 X10*3/UL (ref 4.4–11.3)

## 2024-01-25 PROCEDURE — 2500000004 HC RX 250 GENERAL PHARMACY W/ HCPCS (ALT 636 FOR OP/ED)

## 2024-01-25 PROCEDURE — 1160F RVW MEDS BY RX/DR IN RCRD: CPT | Performed by: HOSPITALIST

## 2024-01-25 PROCEDURE — 4010F ACE/ARB THERAPY RXD/TAKEN: CPT | Performed by: HOSPITALIST

## 2024-01-25 PROCEDURE — 93005 ELECTROCARDIOGRAM TRACING: CPT

## 2024-01-25 PROCEDURE — 99285 EMERGENCY DEPT VISIT HI MDM: CPT | Performed by: STUDENT IN AN ORGANIZED HEALTH CARE EDUCATION/TRAINING PROGRAM

## 2024-01-25 PROCEDURE — 99212 OFFICE O/P EST SF 10 MIN: CPT | Mod: GC | Performed by: HOSPITALIST

## 2024-01-25 PROCEDURE — 2500000002 HC RX 250 W HCPCS SELF ADMINISTERED DRUGS (ALT 637 FOR MEDICARE OP, ALT 636 FOR OP/ED)

## 2024-01-25 PROCEDURE — 71046 X-RAY EXAM CHEST 2 VIEWS: CPT

## 2024-01-25 PROCEDURE — 93010 ELECTROCARDIOGRAM REPORT: CPT | Performed by: STUDENT IN AN ORGANIZED HEALTH CARE EDUCATION/TRAINING PROGRAM

## 2024-01-25 PROCEDURE — 36430 TRANSFUSION BLD/BLD COMPNT: CPT

## 2024-01-25 PROCEDURE — 85027 COMPLETE CBC AUTOMATED: CPT | Performed by: STUDENT IN AN ORGANIZED HEALTH CARE EDUCATION/TRAINING PROGRAM

## 2024-01-25 PROCEDURE — C9113 INJ PANTOPRAZOLE SODIUM, VIA: HCPCS

## 2024-01-25 PROCEDURE — 83880 ASSAY OF NATRIURETIC PEPTIDE: CPT

## 2024-01-25 PROCEDURE — 87634 RSV DNA/RNA AMP PROBE: CPT

## 2024-01-25 PROCEDURE — 85007 BL SMEAR W/DIFF WBC COUNT: CPT | Performed by: STUDENT IN AN ORGANIZED HEALTH CARE EDUCATION/TRAINING PROGRAM

## 2024-01-25 PROCEDURE — 1125F AMNT PAIN NOTED PAIN PRSNT: CPT | Performed by: HOSPITALIST

## 2024-01-25 PROCEDURE — 85045 AUTOMATED RETICULOCYTE COUNT: CPT

## 2024-01-25 PROCEDURE — 96374 THER/PROPH/DIAG INJ IV PUSH: CPT

## 2024-01-25 PROCEDURE — 2500000004 HC RX 250 GENERAL PHARMACY W/ HCPCS (ALT 636 FOR OP/ED): Performed by: STUDENT IN AN ORGANIZED HEALTH CARE EDUCATION/TRAINING PROGRAM

## 2024-01-25 PROCEDURE — 85027 COMPLETE CBC AUTOMATED: CPT | Performed by: EMERGENCY MEDICINE

## 2024-01-25 PROCEDURE — 3008F BODY MASS INDEX DOCD: CPT | Performed by: HOSPITALIST

## 2024-01-25 PROCEDURE — 36415 COLL VENOUS BLD VENIPUNCTURE: CPT | Performed by: EMERGENCY MEDICINE

## 2024-01-25 PROCEDURE — 84484 ASSAY OF TROPONIN QUANT: CPT | Performed by: STUDENT IN AN ORGANIZED HEALTH CARE EDUCATION/TRAINING PROGRAM

## 2024-01-25 PROCEDURE — 96375 TX/PRO/DX INJ NEW DRUG ADDON: CPT

## 2024-01-25 PROCEDURE — 85007 BL SMEAR W/DIFF WBC COUNT: CPT | Performed by: EMERGENCY MEDICINE

## 2024-01-25 PROCEDURE — 99202 OFFICE O/P NEW SF 15 MIN: CPT | Performed by: HOSPITALIST

## 2024-01-25 PROCEDURE — 85384 FIBRINOGEN ACTIVITY: CPT

## 2024-01-25 PROCEDURE — C9113 INJ PANTOPRAZOLE SODIUM, VIA: HCPCS | Performed by: STUDENT IN AN ORGANIZED HEALTH CARE EDUCATION/TRAINING PROGRAM

## 2024-01-25 PROCEDURE — 86901 BLOOD TYPING SEROLOGIC RH(D): CPT | Performed by: EMERGENCY MEDICINE

## 2024-01-25 PROCEDURE — 80053 COMPREHEN METABOLIC PANEL: CPT | Performed by: EMERGENCY MEDICINE

## 2024-01-25 PROCEDURE — P9016 RBC LEUKOCYTES REDUCED: HCPCS

## 2024-01-25 PROCEDURE — 2500000001 HC RX 250 WO HCPCS SELF ADMINISTERED DRUGS (ALT 637 FOR MEDICARE OP)

## 2024-01-25 PROCEDURE — 80053 COMPREHEN METABOLIC PANEL: CPT | Performed by: STUDENT IN AN ORGANIZED HEALTH CARE EDUCATION/TRAINING PROGRAM

## 2024-01-25 PROCEDURE — 1036F TOBACCO NON-USER: CPT | Performed by: HOSPITALIST

## 2024-01-25 PROCEDURE — 87636 SARSCOV2 & INF A&B AMP PRB: CPT | Performed by: EMERGENCY MEDICINE

## 2024-01-25 PROCEDURE — 86920 COMPATIBILITY TEST SPIN: CPT

## 2024-01-25 PROCEDURE — 1210000001 HC SEMI-PRIVATE ROOM DAILY

## 2024-01-25 PROCEDURE — 1159F MED LIST DOCD IN RCRD: CPT | Performed by: HOSPITALIST

## 2024-01-25 PROCEDURE — 84484 ASSAY OF TROPONIN QUANT: CPT | Performed by: EMERGENCY MEDICINE

## 2024-01-25 PROCEDURE — 85610 PROTHROMBIN TIME: CPT

## 2024-01-25 PROCEDURE — 3066F NEPHROPATHY DOC TX: CPT | Performed by: HOSPITALIST

## 2024-01-25 PROCEDURE — 83615 LACTATE (LD) (LDH) ENZYME: CPT

## 2024-01-25 RX ORDER — NAPROXEN SODIUM 220 MG/1
81 TABLET, FILM COATED ORAL ONCE
Status: DISCONTINUED | OUTPATIENT
Start: 2024-01-25 | End: 2024-01-25

## 2024-01-25 RX ORDER — PANTOPRAZOLE SODIUM 40 MG/10ML
80 INJECTION, POWDER, LYOPHILIZED, FOR SOLUTION INTRAVENOUS ONCE
Status: COMPLETED | OUTPATIENT
Start: 2024-01-25 | End: 2024-01-25

## 2024-01-25 RX ORDER — FUROSEMIDE 10 MG/ML
40 INJECTION INTRAMUSCULAR; INTRAVENOUS ONCE
Status: COMPLETED | OUTPATIENT
Start: 2024-01-25 | End: 2024-01-25

## 2024-01-25 RX ORDER — INSULIN LISPRO 100 [IU]/ML
0-5 INJECTION, SOLUTION INTRAVENOUS; SUBCUTANEOUS
Status: DISCONTINUED | OUTPATIENT
Start: 2024-01-26 | End: 2024-01-28 | Stop reason: HOSPADM

## 2024-01-25 RX ORDER — DEXTROSE 50 % IN WATER (D50W) INTRAVENOUS SYRINGE
25
Status: DISCONTINUED | OUTPATIENT
Start: 2024-01-25 | End: 2024-01-28 | Stop reason: HOSPADM

## 2024-01-25 RX ORDER — FLUTICASONE PROPIONATE 50 MCG
2 SPRAY, SUSPENSION (ML) NASAL DAILY
Status: DISCONTINUED | OUTPATIENT
Start: 2024-01-26 | End: 2024-01-28 | Stop reason: HOSPADM

## 2024-01-25 RX ORDER — IPRATROPIUM BROMIDE AND ALBUTEROL SULFATE 2.5; .5 MG/3ML; MG/3ML
3 SOLUTION RESPIRATORY (INHALATION)
Status: DISCONTINUED | OUTPATIENT
Start: 2024-01-25 | End: 2024-01-28 | Stop reason: HOSPADM

## 2024-01-25 RX ORDER — AMLODIPINE BESYLATE 10 MG/1
10 TABLET ORAL DAILY
Status: DISCONTINUED | OUTPATIENT
Start: 2024-01-26 | End: 2024-01-28 | Stop reason: HOSPADM

## 2024-01-25 RX ORDER — FUROSEMIDE 10 MG/ML
20 INJECTION INTRAMUSCULAR; INTRAVENOUS ONCE
Status: COMPLETED | OUTPATIENT
Start: 2024-01-25 | End: 2024-01-25

## 2024-01-25 RX ORDER — PANTOPRAZOLE SODIUM 40 MG/10ML
40 INJECTION, POWDER, LYOPHILIZED, FOR SOLUTION INTRAVENOUS 2 TIMES DAILY
Status: DISCONTINUED | OUTPATIENT
Start: 2024-01-25 | End: 2024-01-28

## 2024-01-25 RX ORDER — FERROUS GLUCONATE 325 MG
38 TABLET ORAL
Status: DISCONTINUED | OUTPATIENT
Start: 2024-01-26 | End: 2024-01-28 | Stop reason: HOSPADM

## 2024-01-25 RX ORDER — BENZONATATE 100 MG/1
200 CAPSULE ORAL 3 TIMES DAILY PRN
Status: DISCONTINUED | OUTPATIENT
Start: 2024-01-25 | End: 2024-01-26

## 2024-01-25 RX ORDER — LATANOPROST 50 UG/ML
1 SOLUTION/ DROPS OPHTHALMIC EVERY MORNING
Status: DISCONTINUED | OUTPATIENT
Start: 2024-01-26 | End: 2024-01-28 | Stop reason: HOSPADM

## 2024-01-25 RX ORDER — DEXTROSE MONOHYDRATE 100 MG/ML
200 INJECTION, SOLUTION INTRAVENOUS ONCE AS NEEDED
Status: DISCONTINUED | OUTPATIENT
Start: 2024-01-25 | End: 2024-01-28 | Stop reason: HOSPADM

## 2024-01-25 RX ORDER — ROSUVASTATIN CALCIUM 20 MG/1
20 TABLET, COATED ORAL DAILY
Status: DISCONTINUED | OUTPATIENT
Start: 2024-01-26 | End: 2024-01-26

## 2024-01-25 RX ORDER — METOPROLOL SUCCINATE 50 MG/1
200 TABLET, EXTENDED RELEASE ORAL DAILY
Status: DISCONTINUED | OUTPATIENT
Start: 2024-01-26 | End: 2024-01-28 | Stop reason: HOSPADM

## 2024-01-25 RX ORDER — BRIMONIDINE TARTRATE AND TIMOLOL MALEATE 2; 5 MG/ML; MG/ML
1 SOLUTION OPHTHALMIC 2 TIMES DAILY
Status: DISCONTINUED | OUTPATIENT
Start: 2024-01-25 | End: 2024-01-26

## 2024-01-25 RX ORDER — LOSARTAN POTASSIUM 25 MG/1
25 TABLET ORAL DAILY
Status: DISCONTINUED | OUTPATIENT
Start: 2024-01-26 | End: 2024-01-28 | Stop reason: HOSPADM

## 2024-01-25 RX ORDER — MONTELUKAST SODIUM 10 MG/1
10 TABLET ORAL NIGHTLY
Status: DISCONTINUED | OUTPATIENT
Start: 2024-01-25 | End: 2024-01-26

## 2024-01-25 RX ADMIN — FUROSEMIDE 40 MG: 10 INJECTION, SOLUTION INTRAVENOUS at 22:30

## 2024-01-25 RX ADMIN — BENZONATATE 200 MG: 100 CAPSULE ORAL at 22:31

## 2024-01-25 RX ADMIN — FUROSEMIDE 20 MG: 10 INJECTION, SOLUTION INTRAVENOUS at 16:00

## 2024-01-25 RX ADMIN — PANTOPRAZOLE SODIUM 40 MG: 40 INJECTION, POWDER, FOR SOLUTION INTRAVENOUS at 22:31

## 2024-01-25 RX ADMIN — IPRATROPIUM BROMIDE AND ALBUTEROL SULFATE 3 ML: .5; 3 SOLUTION RESPIRATORY (INHALATION) at 22:30

## 2024-01-25 RX ADMIN — PANTOPRAZOLE SODIUM 80 MG: 40 INJECTION, POWDER, FOR SOLUTION INTRAVENOUS at 16:35

## 2024-01-25 RX ADMIN — MONTELUKAST 10 MG: 10 TABLET, FILM COATED ORAL at 21:40

## 2024-01-25 NOTE — ED TRIAGE NOTES
Pt says cough and SOB for a few days, made new pt appt with pulmonologist today and was sent to ED to get a chest xray.

## 2024-01-25 NOTE — ED PROVIDER NOTES
HPI   Chief Complaint   Patient presents with    Cough    Shortness of Breath       68-year-old female presents to the emergency department for shortness of breath.  She has past medical history pertinent for diabetes iron deficiency anemia requiring iron transfusions GI bleed requiring transfusion as well as peptic ulcer disease and heart failure with reduced ejection fraction in the setting of acute MI at an undetermined time in the past.  Also pertinent for DVT of left lower extremity in November previously on Eliquis but no longer taking.  She presents to the emergency department short of breath from her pulmonology appointment where she was being seen for ongoing shortness of breath over the past several weeks.  Describes no chest pain but does have a cough.  He was briefly a smoker greater than 30 years ago when she was in school for 4 years has a 2-pack-year smoking history.  She describes no abdominal pain but does state that she has been having dark stool per rectum intermittently denies any bright red stool.  Denies any nausea vomit diarrhea.  Denies any infectious symptoms other than a cough.  Denies hemoptysis.  States bilateral lower extremity swelling without claudication.                          No data recorded                Patient History   Past Medical History:   Diagnosis Date    Abdominal distension (gaseous) 07/31/2019    Abdominal bloating    Cataract     Diabetes mellitus (CMS/HCC)     Diverticulosis of intestine, part unspecified, without perforation or abscess without bleeding 06/09/2013    Diverticulosis    Elevation of levels of liver transaminase levels 11/13/2020    Transaminitis    Encounter for follow-up examination after completed treatment for conditions other than malignant neoplasm 01/26/2019    Hospital discharge follow-up    Glaucoma     Long term (current) use of antibiotics 10/07/2016    Need for prophylactic antibiotic    Other amnesia 10/28/2014    Memory loss    Other  conditions influencing health status 2019    History of cough    Other specified health status 2019    Hepatitis C antibody test negative    Other specified soft tissue disorders 2017    Leg swelling    Pain in left toe(s) 05/15/2017    Pain of toe of left foot    Pain in right foot 10/12/2016    Pain of right heel    Pain in right shoulder 2016    Acute pain of right shoulder    Personal history of diseases of the blood and blood-forming organs and certain disorders involving the immune mechanism 2018    History of anemia    Personal history of other diseases of the respiratory system 2018    History of sinusitis    Personal history of other diseases of the respiratory system 2014    History of upper respiratory infection    Personal history of other malignant neoplasm of kidney 2021    Personal history of malignant neoplasm of kidney    Personal history of other medical treatment 2017    History of screening mammography    Personal history of other specified conditions 2014    History of abdominal pain    Personal history of other specified conditions 2017    History of urinary frequency    Personal history of other specified conditions 2021    History of dysuria    Personal history of other specified conditions 2014    History of breast lump    Unspecified abdominal hernia without obstruction or gangrene 2014    Hernia     Past Surgical History:   Procedure Laterality Date    BREAST BIOPSY  2016    Biopsy Breast Percutaneous Needle Core     SECTION, CLASSIC  2014     Section    CHOLECYSTECTOMY  2017    Cholecystectomy Laparoscopic    HAND SURGERY  2020    Hand Surgery                                                                                                                                                          HERNIA REPAIR  2014    Hernia Repair    MR HEAD ANGIO WO IV CONTRAST   11/17/2014    MR HEAD ANGIO WO IV CONTRAST 11/17/2014 [facility]    OTHER SURGICAL HISTORY  01/14/2021    Nephrectomy    TOTAL ABDOMINAL HYSTERECTOMY W/ BILATERAL SALPINGOOPHORECTOMY  04/21/2014    Total Abdominal Hysterectomy With Removal Of Both Ovaries     Family History   Problem Relation Name Age of Onset    Aneurysm Mother      Hypertension Mother      Pancreatic cancer Mother      Diabetes Mother      Other (laryngeal Cancer) Mother      Heart disease Father      Pulmonary embolism Brother      Breast cancer Father's Sister      Breast cancer Maternal Cousin       Social History     Tobacco Use    Smoking status: Former     Types: Cigarettes     Passive exposure: Never    Smokeless tobacco: Never   Substance Use Topics    Alcohol use: Yes     Comment: Occasionally    Drug use: Not Currently       Physical Exam   ED Triage Vitals [01/25/24 1118]   Temperature Heart Rate Respirations BP   37 °C (98.6 °F) 93 16 (!) 196/84      Pulse Ox Temp Source Heart Rate Source Patient Position   97 % Temporal -- --      BP Location FiO2 (%)     -- --       Physical Exam  Constitutional:       General: She is not in acute distress.     Appearance: She is ill-appearing.   HENT:      Head: Normocephalic and atraumatic.   Eyes:      Extraocular Movements: Extraocular movements intact.      Pupils: Pupils are equal, round, and reactive to light.   Cardiovascular:      Rate and Rhythm: Normal rate and regular rhythm.   Pulmonary:      Effort: Pulmonary effort is normal.      Comments: Rales at b/l bases  Chest:      Chest wall: No tenderness.   Abdominal:      General: Bowel sounds are normal.      Palpations: Abdomen is soft. There is no mass.      Tenderness: There is no abdominal tenderness. There is no guarding.   Genitourinary:     Rectum: Guaiac result negative.   Musculoskeletal:      Cervical back: Neck supple.      Right lower leg: Edema present.      Left lower leg: Edema present.   Skin:     General: Skin is  warm and dry.      Capillary Refill: Capillary refill takes less than 2 seconds.      Coloration: Skin is pale.   Neurological:      General: No focal deficit present.      Mental Status: She is alert and oriented to person, place, and time.      Motor: No weakness.   Psychiatric:         Mood and Affect: Mood normal.         Behavior: Behavior normal.         ED Course & MDM   ED Course as of 01/25/24 1807   Thu Jan 25, 2024   1452 Comprehensive Metabolic Panel(!) [GA]      ED Course User Index  [GA] Ajit Lowery MD         Diagnoses as of 01/25/24 1807   Symptomatic anemia       Medical Decision Making  Patient hemodynamically stable on room air presents to the emergency department for shortness of breath.  Found to have a hemoglobin of 5.8 of undetermined etiology although she does have a history of iron deficiency anemia requiring infusion of iron in the past as well as a GI bleed in the past requiring transfusion according to her.  She was consented for 2 units of blood but will need to be careful given her heart failure history and will concomitantly diurese her with 20 of Lasix and go slow on her transfusion.  Was also given 80 of Protonix given her peptic ulcer disease history.  Patient was admitted to the medicine service for further evaluation and management of her symptomatic anemia.  She did have an EKG which showed normal sinus rhythm normal ST and T wave segments no abnormalities concerning for acute ischemia or electrolyte abnormalities.        Procedure  Procedures    Attestation  Patient evaluated with [facility] resident. Vania Andrews is a 68 y.o. year old female patient with shortness of breath.  Patient was referred in from her pulmonology clinic due to shortness of breath after she was found to be anemic with a hemoglobin of 5.8.  Hemodynamically stable otherwise.  Reports that she has had a prior episode of this that was determined to be secondary to iron deficiency.  She did however also  state that she had a peptic ulcer 1 point, however did not have intervention necessary.  Was provided with Protonix 80 mg IV given her ulceration history.  Patient was consented for 2 units of blood, however given her CHF will additionally provide with a dose of diuretics she does appear to be clinically volume overloaded.  Will admit in the setting of symptomatic anemia as well as tube evaluate for any complications secondary to transfusion.    Patient seen and discussed with attending Dr. Olya Chambers MD  Emergency Medicine PGY-3       Ajit Lowery MD  Resident  01/25/24 5928

## 2024-01-26 LAB
ALBUMIN SERPL BCP-MCNC: 4.1 G/DL (ref 3.4–5)
ALP SERPL-CCNC: 148 U/L (ref 33–136)
ALT SERPL W P-5'-P-CCNC: 13 U/L (ref 7–45)
ANION GAP SERPL CALC-SCNC: 19 MMOL/L (ref 10–20)
APPEARANCE UR: CLEAR
APTT PPP: 23 SECONDS (ref 27–38)
AST SERPL W P-5'-P-CCNC: 24 U/L (ref 9–39)
ATRIAL RATE: 90 BPM
BASOPHILS # BLD AUTO: 0.05 X10*3/UL (ref 0–0.1)
BASOPHILS NFR BLD AUTO: 0.4 %
BILIRUB DIRECT SERPL-MCNC: 0.2 MG/DL (ref 0–0.3)
BILIRUB SERPL-MCNC: 1.5 MG/DL (ref 0–1.2)
BILIRUB UR STRIP.AUTO-MCNC: NEGATIVE MG/DL
BLOOD EXPIRATION DATE: NORMAL
BLOOD EXPIRATION DATE: NORMAL
BUN SERPL-MCNC: 14 MG/DL (ref 6–23)
CALCIUM SERPL-MCNC: 9.5 MG/DL (ref 8.6–10.6)
CHLORIDE SERPL-SCNC: 105 MMOL/L (ref 98–107)
CO2 SERPL-SCNC: 21 MMOL/L (ref 21–32)
COLOR UR: COLORLESS
CREAT SERPL-MCNC: 1.43 MG/DL (ref 0.5–1.05)
DISPENSE STATUS: NORMAL
DISPENSE STATUS: NORMAL
EGFRCR SERPLBLD CKD-EPI 2021: 40 ML/MIN/1.73M*2
EOSINOPHIL # BLD AUTO: 0.26 X10*3/UL (ref 0–0.7)
EOSINOPHIL NFR BLD AUTO: 2 %
ERYTHROCYTE [DISTWIDTH] IN BLOOD BY AUTOMATED COUNT: 30 % (ref 11.5–14.5)
FERRITIN SERPL-MCNC: 21 NG/ML (ref 8–150)
GLUCOSE BLD MANUAL STRIP-MCNC: 142 MG/DL (ref 74–99)
GLUCOSE BLD MANUAL STRIP-MCNC: 158 MG/DL (ref 74–99)
GLUCOSE BLD MANUAL STRIP-MCNC: 180 MG/DL (ref 74–99)
GLUCOSE BLD MANUAL STRIP-MCNC: 196 MG/DL (ref 74–99)
GLUCOSE SERPL-MCNC: 187 MG/DL (ref 74–99)
GLUCOSE UR STRIP.AUTO-MCNC: NEGATIVE MG/DL
HAPTOGLOB SERPL-MCNC: 196 MG/DL (ref 37–246)
HCT VFR BLD AUTO: 30.1 % (ref 36–46)
HGB BLD-MCNC: 9 G/DL (ref 12–16)
HOLD SPECIMEN: NORMAL
HYPOCHROMIA BLD QL SMEAR: NORMAL
IMM GRANULOCYTES # BLD AUTO: 0.07 X10*3/UL (ref 0–0.7)
IMM GRANULOCYTES NFR BLD AUTO: 0.6 % (ref 0–0.9)
INR PPP: 1.2 (ref 0.9–1.1)
IRON SATN MFR SERPL: ABNORMAL %
IRON SERPL-MCNC: 51 UG/DL (ref 35–150)
KETONES UR STRIP.AUTO-MCNC: NEGATIVE MG/DL
LDH SERPL L TO P-CCNC: 352 U/L (ref 84–246)
LEUKOCYTE ESTERASE UR QL STRIP.AUTO: NEGATIVE
LYMPHOCYTES # BLD AUTO: 1.64 X10*3/UL (ref 1.2–4.8)
LYMPHOCYTES NFR BLD AUTO: 12.9 %
MAGNESIUM SERPL-MCNC: 1.98 MG/DL (ref 1.6–2.4)
MCH RBC QN AUTO: 20.5 PG (ref 26–34)
MCHC RBC AUTO-ENTMCNC: 29.9 G/DL (ref 32–36)
MCV RBC AUTO: 69 FL (ref 80–100)
MONOCYTES # BLD AUTO: 0.71 X10*3/UL (ref 0.1–1)
MONOCYTES NFR BLD AUTO: 5.6 %
NEUTROPHILS # BLD AUTO: 9.96 X10*3/UL (ref 1.2–7.7)
NEUTROPHILS NFR BLD AUTO: 78.5 %
NITRITE UR QL STRIP.AUTO: NEGATIVE
NRBC BLD-RTO: 0.5 /100 WBCS (ref 0–0)
OVALOCYTES BLD QL SMEAR: NORMAL
P AXIS: 68 DEGREES
P OFFSET: 198 MS
P ONSET: 144 MS
PH UR STRIP.AUTO: 5 [PH]
PHOSPHATE SERPL-MCNC: 2.8 MG/DL (ref 2.5–4.9)
PLATELET # BLD AUTO: 253 X10*3/UL (ref 150–450)
POLYCHROMASIA BLD QL SMEAR: NORMAL
POTASSIUM SERPL-SCNC: 3.7 MMOL/L (ref 3.5–5.3)
PR INTERVAL: 164 MS
PROCALCITONIN SERPL-MCNC: 0.05 NG/ML
PRODUCT BLOOD TYPE: 7300
PRODUCT BLOOD TYPE: 7300
PRODUCT CODE: NORMAL
PRODUCT CODE: NORMAL
PROT SERPL-MCNC: 7.6 G/DL (ref 6.4–8.2)
PROT UR STRIP.AUTO-MCNC: NEGATIVE MG/DL
PROTHROMBIN TIME: 13.6 SECONDS (ref 9.8–12.8)
Q ONSET: 226 MS
QRS COUNT: 15 BEATS
QRS DURATION: 80 MS
QT INTERVAL: 354 MS
QTC CALCULATION(BAZETT): 433 MS
QTC FREDERICIA: 405 MS
R AXIS: -22 DEGREES
RBC # BLD AUTO: 4.38 X10*6/UL (ref 4–5.2)
RBC # UR STRIP.AUTO: NEGATIVE /UL
RBC MORPH BLD: NORMAL
SCHISTOCYTES BLD QL SMEAR: NORMAL
SODIUM SERPL-SCNC: 141 MMOL/L (ref 136–145)
SP GR UR STRIP.AUTO: 1
T AXIS: 46 DEGREES
T OFFSET: 403 MS
TIBC SERPL-MCNC: ABNORMAL UG/DL
UIBC SERPL-MCNC: >450 UG/DL (ref 110–370)
UNIT ABO: NORMAL
UNIT ABO: NORMAL
UNIT NUMBER: NORMAL
UNIT NUMBER: NORMAL
UNIT RH: NORMAL
UNIT RH: NORMAL
UNIT VOLUME: 350
UNIT VOLUME: 350
UROBILINOGEN UR STRIP.AUTO-MCNC: <2 MG/DL
VENTRICULAR RATE: 90 BPM
WBC # BLD AUTO: 12.7 X10*3/UL (ref 4.4–11.3)
XM INTEP: NORMAL
XM INTEP: NORMAL

## 2024-01-26 PROCEDURE — 82728 ASSAY OF FERRITIN: CPT

## 2024-01-26 PROCEDURE — 2500000004 HC RX 250 GENERAL PHARMACY W/ HCPCS (ALT 636 FOR OP/ED)

## 2024-01-26 PROCEDURE — 83735 ASSAY OF MAGNESIUM: CPT

## 2024-01-26 PROCEDURE — 85610 PROTHROMBIN TIME: CPT

## 2024-01-26 PROCEDURE — 82947 ASSAY GLUCOSE BLOOD QUANT: CPT

## 2024-01-26 PROCEDURE — 84145 PROCALCITONIN (PCT): CPT

## 2024-01-26 PROCEDURE — 99222 1ST HOSP IP/OBS MODERATE 55: CPT | Performed by: INTERNAL MEDICINE

## 2024-01-26 PROCEDURE — 83540 ASSAY OF IRON: CPT

## 2024-01-26 PROCEDURE — 85025 COMPLETE CBC W/AUTO DIFF WBC: CPT

## 2024-01-26 PROCEDURE — 86003 ALLG SPEC IGE CRUDE XTRC EA: CPT | Mod: WESLAB | Performed by: HOSPITALIST

## 2024-01-26 PROCEDURE — 84100 ASSAY OF PHOSPHORUS: CPT

## 2024-01-26 PROCEDURE — 2500000001 HC RX 250 WO HCPCS SELF ADMINISTERED DRUGS (ALT 637 FOR MEDICARE OP)

## 2024-01-26 PROCEDURE — 99223 1ST HOSP IP/OBS HIGH 75: CPT

## 2024-01-26 PROCEDURE — 80053 COMPREHEN METABOLIC PANEL: CPT

## 2024-01-26 PROCEDURE — 1210000001 HC SEMI-PRIVATE ROOM DAILY

## 2024-01-26 PROCEDURE — 81003 URINALYSIS AUTO W/O SCOPE: CPT

## 2024-01-26 PROCEDURE — 82248 BILIRUBIN DIRECT: CPT

## 2024-01-26 PROCEDURE — 36415 COLL VENOUS BLD VENIPUNCTURE: CPT

## 2024-01-26 PROCEDURE — 2500000002 HC RX 250 W HCPCS SELF ADMINISTERED DRUGS (ALT 637 FOR MEDICARE OP, ALT 636 FOR OP/ED)

## 2024-01-26 PROCEDURE — 83010 ASSAY OF HAPTOGLOBIN QUANT: CPT

## 2024-01-26 PROCEDURE — 94640 AIRWAY INHALATION TREATMENT: CPT

## 2024-01-26 PROCEDURE — C9113 INJ PANTOPRAZOLE SODIUM, VIA: HCPCS

## 2024-01-26 RX ORDER — DEXTROMETHORPHAN POLISTIREX 30 MG/5ML
30 SUSPENSION ORAL EVERY 12 HOURS SCHEDULED
Status: DISCONTINUED | OUTPATIENT
Start: 2024-01-26 | End: 2024-01-26

## 2024-01-26 RX ORDER — ROSUVASTATIN CALCIUM 20 MG/1
20 TABLET, COATED ORAL DAILY
COMMUNITY
End: 2024-01-26

## 2024-01-26 RX ORDER — ACETAMINOPHEN 500 MG
2000 TABLET ORAL DAILY
Status: ON HOLD | COMMUNITY
End: 2024-05-06 | Stop reason: WASHOUT

## 2024-01-26 RX ORDER — TIMOLOL MALEATE 5 MG/ML
1 SOLUTION/ DROPS OPHTHALMIC 2 TIMES DAILY
Status: DISCONTINUED | OUTPATIENT
Start: 2024-01-26 | End: 2024-01-28 | Stop reason: HOSPADM

## 2024-01-26 RX ORDER — GUAIFENESIN 100 MG/5ML
200 SOLUTION ORAL EVERY 4 HOURS PRN
Status: DISCONTINUED | OUTPATIENT
Start: 2024-01-26 | End: 2024-01-26

## 2024-01-26 RX ORDER — BRIMONIDINE TARTRATE 2 MG/ML
1 SOLUTION/ DROPS OPHTHALMIC 2 TIMES DAILY
Status: DISCONTINUED | OUTPATIENT
Start: 2024-01-26 | End: 2024-01-28 | Stop reason: HOSPADM

## 2024-01-26 RX ORDER — GUAIFENESIN/DEXTROMETHORPHAN 100-10MG/5
10 SYRUP ORAL EVERY 4 HOURS PRN
Status: DISCONTINUED | OUTPATIENT
Start: 2024-01-26 | End: 2024-01-28 | Stop reason: HOSPADM

## 2024-01-26 RX ADMIN — IPRATROPIUM BROMIDE AND ALBUTEROL SULFATE 3 ML: .5; 3 SOLUTION RESPIRATORY (INHALATION) at 14:31

## 2024-01-26 RX ADMIN — IPRATROPIUM BROMIDE AND ALBUTEROL SULFATE 3 ML: .5; 3 SOLUTION RESPIRATORY (INHALATION) at 00:17

## 2024-01-26 RX ADMIN — GUAIFENESIN AND DEXTROMETHORPHAN 10 ML: 100; 10 SYRUP ORAL at 22:12

## 2024-01-26 RX ADMIN — TIMOLOL MALEATE 1 DROP: 5 SOLUTION/ DROPS OPHTHALMIC at 22:12

## 2024-01-26 RX ADMIN — METOPROLOL SUCCINATE 200 MG: 50 TABLET, EXTENDED RELEASE ORAL at 09:54

## 2024-01-26 RX ADMIN — LOSARTAN POTASSIUM 25 MG: 25 TABLET, FILM COATED ORAL at 09:54

## 2024-01-26 RX ADMIN — PANTOPRAZOLE SODIUM 40 MG: 40 INJECTION, POWDER, FOR SOLUTION INTRAVENOUS at 21:04

## 2024-01-26 RX ADMIN — ROSUVASTATIN CALCIUM 20 MG: 20 TABLET, COATED ORAL at 09:54

## 2024-01-26 RX ADMIN — LATANOPROST 1 DROP: 50 SOLUTION/ DROPS OPHTHALMIC at 09:54

## 2024-01-26 RX ADMIN — BRIMONIDINE TARTRATE 1 DROP: 2 SOLUTION/ DROPS OPHTHALMIC at 22:12

## 2024-01-26 RX ADMIN — AMLODIPINE BESYLATE 10 MG: 10 TABLET ORAL at 09:54

## 2024-01-26 RX ADMIN — IPRATROPIUM BROMIDE AND ALBUTEROL SULFATE 3 ML: .5; 3 SOLUTION RESPIRATORY (INHALATION) at 21:05

## 2024-01-26 RX ADMIN — PANTOPRAZOLE SODIUM 40 MG: 40 INJECTION, POWDER, FOR SOLUTION INTRAVENOUS at 09:54

## 2024-01-26 SDOH — SOCIAL STABILITY: SOCIAL INSECURITY: HAS ANYONE EVER THREATENED TO HURT YOUR FAMILY OR YOUR PETS?: NO

## 2024-01-26 SDOH — SOCIAL STABILITY: SOCIAL INSECURITY: ARE THERE ANY APPARENT SIGNS OF INJURIES/BEHAVIORS THAT COULD BE RELATED TO ABUSE/NEGLECT?: NO

## 2024-01-26 SDOH — SOCIAL STABILITY: SOCIAL INSECURITY: WERE YOU ABLE TO COMPLETE ALL THE BEHAVIORAL HEALTH SCREENINGS?: YES

## 2024-01-26 SDOH — SOCIAL STABILITY: SOCIAL INSECURITY: DOES ANYONE TRY TO KEEP YOU FROM HAVING/CONTACTING OTHER FRIENDS OR DOING THINGS OUTSIDE YOUR HOME?: NO

## 2024-01-26 SDOH — SOCIAL STABILITY: SOCIAL INSECURITY: DO YOU FEEL ANYONE HAS EXPLOITED OR TAKEN ADVANTAGE OF YOU FINANCIALLY OR OF YOUR PERSONAL PROPERTY?: NO

## 2024-01-26 SDOH — SOCIAL STABILITY: SOCIAL INSECURITY: ARE YOU OR HAVE YOU BEEN THREATENED OR ABUSED PHYSICALLY, EMOTIONALLY, OR SEXUALLY BY ANYONE?: NO

## 2024-01-26 SDOH — SOCIAL STABILITY: SOCIAL INSECURITY: ABUSE: ADULT

## 2024-01-26 SDOH — SOCIAL STABILITY: SOCIAL INSECURITY: DO YOU FEEL UNSAFE GOING BACK TO THE PLACE WHERE YOU ARE LIVING?: NO

## 2024-01-26 SDOH — SOCIAL STABILITY: SOCIAL INSECURITY: HAVE YOU HAD THOUGHTS OF HARMING ANYONE ELSE?: NO

## 2024-01-26 ASSESSMENT — LIFESTYLE VARIABLES
SKIP TO QUESTIONS 9-10: 1
AUDIT-C TOTAL SCORE: 0
HOW MANY STANDARD DRINKS CONTAINING ALCOHOL DO YOU HAVE ON A TYPICAL DAY: PATIENT DOES NOT DRINK
AUDIT-C TOTAL SCORE: 0
HOW OFTEN DO YOU HAVE 6 OR MORE DRINKS ON ONE OCCASION: NEVER
HOW OFTEN DO YOU HAVE A DRINK CONTAINING ALCOHOL: NEVER

## 2024-01-26 ASSESSMENT — COGNITIVE AND FUNCTIONAL STATUS - GENERAL
CLIMB 3 TO 5 STEPS WITH RAILING: A LITTLE
MOBILITY SCORE: 22
HELP NEEDED FOR BATHING: A LITTLE
DAILY ACTIVITIY SCORE: 22
CLIMB 3 TO 5 STEPS WITH RAILING: A LITTLE
WALKING IN HOSPITAL ROOM: A LITTLE
HELP NEEDED FOR BATHING: A LITTLE
PATIENT BASELINE BEDBOUND: NO
DAILY ACTIVITIY SCORE: 22
MOBILITY SCORE: 22
WALKING IN HOSPITAL ROOM: A LITTLE
DRESSING REGULAR LOWER BODY CLOTHING: A LITTLE
DRESSING REGULAR LOWER BODY CLOTHING: A LITTLE

## 2024-01-26 ASSESSMENT — ACTIVITIES OF DAILY LIVING (ADL)
BATHING: INDEPENDENT
GROOMING: INDEPENDENT
ADEQUATE_TO_COMPLETE_ADL: YES
WALKS IN HOME: NEEDS ASSISTANCE
HEARING - RIGHT EAR: FUNCTIONAL
DRESSING YOURSELF: INDEPENDENT
LACK_OF_TRANSPORTATION: NO
JUDGMENT_ADEQUATE_SAFELY_COMPLETE_DAILY_ACTIVITIES: YES
FEEDING YOURSELF: INDEPENDENT
PATIENT'S MEMORY ADEQUATE TO SAFELY COMPLETE DAILY ACTIVITIES?: YES
TOILETING: INDEPENDENT
HEARING - LEFT EAR: FUNCTIONAL

## 2024-01-26 ASSESSMENT — PATIENT HEALTH QUESTIONNAIRE - PHQ9
1. LITTLE INTEREST OR PLEASURE IN DOING THINGS: NOT AT ALL
SUM OF ALL RESPONSES TO PHQ9 QUESTIONS 1 & 2: 0
2. FEELING DOWN, DEPRESSED OR HOPELESS: NOT AT ALL

## 2024-01-26 NOTE — H&P
"History Of Present Illness  Vania Andrews is a 68 year old female with past medical history significant for type 2 diabetes mellitus, asthma, stage IIIb CKD, CHF (currently with preserved ejection fraction), peptic ulcer disease, hypertension, hyperlipidemia, and kidney cancer with left partial nephrectomy/adrenalectomy (2007) presenting to the [facility] with one day of worsening shortness of breath.  Ms. Andrews notes that she was in her usual state of health until November, when she began to experience some shortness of breath and a productive cough.  Notably, she was seen in the ED in November and was started on Eliquis for a LLE PE; this was stopped in December.  Ms. Andrews's cough began to worsen three days ago, with Ms. Andrews noting an association with thick, clear mucus that can feel like it is \"strangulating\" her.  Ms. Nashs shortness of breath worsened significantly today, with her endorsing that she can currently only take four of five steps before becoming extremely short of breath.  Ms. Andrews saw her pulmonologist today due to her cough and shortness of breath and was noted to be \"barely able to walk to the \" before becoming short of breath.  She was referred to ED for further evaluation of her dyspnea.    While in the emergency room, Ms. Andrews was found to have severe microcytic anemia (initial CBC demonstrated 5.4 g/dL hemoglobin with 63 fL MCV).  Patient was type and screened and was identified with a B positive.  She received a transfusion of 2 units of packed red blood cells.  ED workup was also notable for negative COVID, flu, and RSV tests, a non-elevated BNP of 96 pg/mL, and coagulation screen with slightly elevated protime and INR and reduced aPTT.      Past Medical History    -Left lower extremity DVT (11/2023)  -Type 2 diabetes mellitus  -Asthma  -Stage IIIb chronic kidney disease  -Congestive heart failure with preserved ejection fraction  -Essential hypertension  -Peptic ulcer " disease  -Hyperlipidemia  -Kidney cancer   -Gout  -Arthritis  -Obesity  -Carpal tunnel    Surgical History    -Hernia repair  -Total abdominal hysterectomy with bilateral salpingoophorectomy  -Laparoscopic cholecystectomy   -Left partial nephrectomy/adrenalectomy (2007)     Social History    Ms. Andrews endorses that she smoked distantly- about a half pack a day for four years- but has since stopped.  She does not drink alcohol or use any illicit drugs.    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    Family History    Aneurysm, hypertension, pancreatic cancer, laryngeal cancer, diabetes in mother  Heart disease in father  Pulmonary embolism in brother  Breast cancer in paternal aunt and in maternal cousin    Allergies    Adhesive, Amoxicillin, Cephalexin, Gadolinium-containing contrast media, iodinated contrast media, iodine, latex, pioglitazone, rubella virus live vaccine, salicylates, shellfish, sulfa drugs, and sulfamethoxazole-trimethoprim    Review of Systems    In addition to that described in HPI, endorses:  -Long-standing cycle of constipation and diarrhea  -Orthopnea  -Worsening lower leg edema    -Denies all other symptoms, including melena, hematochezia, vomiting       Physical Exam    Constitutional:     General: In no acute distress except for during cough spell during exam.  Patient went from supine to sitting up during spell, with intense coughing resolving in about two minutes.     Appearance: Ill-appearing.  HENT:      Head: Normocephalic, atraumatic.     Neck: Neck supple, trachea midline.  Eyes:      Extraocular movements in tact.  Pupils equal, round, and reactive to light.  Cardiovascular:      Regular rate and rhythm.  No murmurs, rubs, or gallops noted.  Pulmonary:      Mild rales noted bilaterally at lower lobes.  Abdominal:       Soft, non-distended, non-tender to palpation.    Integumentary:      Skin was warm and dry.  Slight pallor noted.  Extremity:       2+, bilateral lower leg edema noted.  Neurological:      Patient was alert and oriented x 3.  Cranial nerves I-XII grossly intact.    Psychiatric:         Mood and affect both normal.        Last Recorded Vitals  Temperature - 36.6  Heart Rate - 79  Respiratory Rate - 16  Blood Pressure - 141/77  SPO2 - 100%    Current Home Medications, per primary care visit in 04/2023:    Accu-Chek Jaimie Plus In Vitro Strip -  #60 Strip, TEST 2 TIMES DAILY., 6 refills, Evaluate: 07-Sep-2022      Accu-Chek Jaimie Plus w/Device Kit -  #1 Kit, USE AS DIRECTED. Diagnosis Code E11.9 . Patient is testing twice a day., NO REFILLS, Evaluate: 09-Feb-2023      Accu-Chek Softclix Lancets -  #60 Each, TEST BLOOD GLUCOSE TWICE A DAY, 4 refills, Evaluate      Advocate Insulin Pen Needles 33G X 4 MM -  #100 Each, USE TO INJECT INSULIN ONCE A DAY, 3 refills, Evaluate: 11-Sep-2023      Alcohol Swabs Pad -  #3 120 Pad Box, Pad, USE AS DIRECTED., 3 refills, Evaluate      Alphagan P 0.1 % Ophthalmic Solution -  #1 15 ML Bottle, Solution, INSTILL 1 DROP IN BOTH EYES EVERY 12 HOURS DAILY., 3 refills, Evaluate      amLODIPine Besylate 5 MG Oral Tablet -  #180 Tablet, TAKE 1 TABLET  IN THE MORNING AND 1 TABLET IN THE EVENING, 3 refills, Evaluate: 15-Oct-2023      Aspirin 81 81 MG Oral Tablet Chewable -   Tablet, Tablet Chewable, USE AS DIRECTED.[Reported] , Evaluate      Blood Pressure Kit Device -  #1 Each(Device), Device, USE AS DIRECTED, NO REFILLS, Evaluate      Diclofenac Sodium 1 % External Gel -  #1 100 GM Tube, Gel, APPLY TO LOWER EXTREMITIES, 4 GM OF GEL TO AFFECTED AREA 4 TIMES DAILY.  DO NOT APPLY MORE THAN 16 GM DAILY TO ANY ONE AFFECTED JOINT., 8 refills, Evaluate: 20-Mar-2023      Dicyclomine HCl - 20 MG Oral Tablet -  #60 Tablet, Take 1 3-4 times daily as needed., 2 refills, Evaluate: 29-Oct-2019      Disability Placard -  #1 Each, Expires in 2 years from date signed., NO REFILLS, Evaluate      Famotidine 20 MG Oral Tablet -  #90 Tablet,  TAKE 1 TABLET BY MOUTH EVERYDAY AT BEDTIME, 1 refill, Evaluate: 30-Jul-2023      Flector 1.3 % PTCH (No longer available) -  #14 Patch, APPLY PATCH TO AFFECTED AREA TWICE DAILY., 2 refills, Evaluate      Fluticasone Propionate 50 MCG/ACT Nasal Suspension -  #1 16 GM Bottle, Suspension, SHAKE LIQUID AND USE 1 SPRAY IN EACH NOSTRIL AT BEDTIME, 5 refills, Evaluate: 09-Jan-2022      Iron 325 (65 Fe) MG Oral Tablet -  #30 Tablet, TAKE 1 TABLET DAILY WITH FOOD., 3 refills, Evaluate: 08-Jun-2023      Lancets -  #1 100 Unit Box 100, Any lancets covered by insurance. Use once daily and PRN., 11 refills, Evaluate: 23-May-2021      Lantus SoloStar 100 UNIT/ML Subcutaneous Solution Pen-injector -  #15 Milliliter, Solution Pen-injector, inject 8 units under the skin once every morning, NO REFILLS, Evaluate: 13-Dec-2022      Latanoprost 0.005 % Ophthalmic Solution -  #30 7.5 ML Bottle, Solution, INSTILL 1 DROP Once ou in am, 6 refills, Evaluate: 09-Mar-2023      Levocetirizine Dihydrochloride 5 MG Oral Tablet -  #30 Tablet, TAKE 1 TABLET BY MOUTH EVERY DAY IN THE EVENING, 2 refills, Evaluate      Metoprolol Succinate  MG Oral Tablet Extended Release 24 Hour -  #90 Tablet, Tablet Extended Release 24 Hour, TAKE 1 TABLET ONCE DAILY., 3 refills, Evaluate: 15-Oct-2023      Montelukast Sodium 10 MG Oral Tablet -  #90 Tablet, TAKE 1 TABLET AT BEDTIME., 3 refills, Evaluate: 20-Oct-2022      Nitroglycerin 0.4 MG Sublingual Tablet Sublingual -  #1 25 Tablet Bottle, Tablet Sublingual, PLACE 1 TABLET UNDER THE TONGUE EVERY 5 MINUTES FOR UP TO 3 DOSES AS NEEDED FOR CHEST PAIN.CALL 911 IF PAIN PERSISTS., 3 refills, Evaluate: 28-Jan-2023      Omeprazole 40 MG Oral Capsule Delayed Release -  #90 Capsule, Capsule Delayed Release, TAKE ONE CAPSULE BY MOUTH DAILY, 3 refills, Evaluate: 15-Jul-2023      Ozempic (1 MG/DOSE) 4 MG/3ML Subcutaneous Solution Pen-injector -  #3 3 ML Pen, Solution Pen-injector, INJECT 1 MG Weekly, 3 refills, Evaluate:  28-Oct-2023      Pill Splitter -  #1 Each, USE AS DIRECTED., NO REFILLS, Evaluate: 02-Nov-2023      Pioglitazone HCl - 15 MG Oral Tablet -  #45 Tablet, TAKE 1/2 TABLET once DAILY., 3 refills, Evaluate: 28-Oct-2023      Polyethylene Glycol 3350 17 GM/SCOOP Oral Powder -  #1 510 GM Bottle, Powder, MIX 1 CAPFUL (17GM) IN 8 OUNCES OF WATER, JUICE, OR TEA AND DRINK DAILY., 5 refills, Evaluate: 27-Jan-2020      Rosuvastatin Calcium 20 MG Oral Tablet -  #90 Tablet, TAKE 1 TABLET DAILY., 3 refills, Evaluate: 15-Oct-2023      Xopenex HFA 45 MCG/ACT Inhalation Aerosol -  #1 15 GM Inhaler, Aerosol, INHALE 1 PUFF EVERY 4 HOURS AS NEEDED., 11 refills, Evaluate: 20-Oct-2022         Results for orders placed or performed during the hospital encounter of 01/25/24 (from the past 24 hour(s))   CBC with Differential   Result Value Ref Range    WBC 12.3 (H) 4.4 - 11.3 x10*3/uL    nRBC 0.8 (H) 0.0 - 0.0 /100 WBCs    RBC 3.23 (L) 4.00 - 5.20 x10*6/uL    Hemoglobin 5.4 (LL) 12.0 - 16.0 g/dL    Hematocrit 20.0 (L) 36.0 - 46.0 %    MCV 62 (L) 80 - 100 fL    MCH 16.7 (L) 26.0 - 34.0 pg    MCHC 27.0 (L) 32.0 - 36.0 g/dL    RDW 27.0 (H) 11.5 - 14.5 %    Platelets 291 150 - 450 x10*3/uL    Immature Granulocytes %, Automated 0.6 0.0 - 0.9 %    Immature Granulocytes Absolute, Automated 0.08 0.00 - 0.70 x10*3/uL   Comprehensive Metabolic Panel   Result Value Ref Range    Glucose 137 (H) 74 - 99 mg/dL    Sodium 140 136 - 145 mmol/L    Potassium 4.2 3.5 - 5.3 mmol/L    Chloride 108 (H) 98 - 107 mmol/L    Bicarbonate 24 21 - 32 mmol/L    Anion Gap 12 10 - 20 mmol/L    Urea Nitrogen 13 6 - 23 mg/dL    Creatinine 1.41 (H) 0.50 - 1.05 mg/dL    eGFR 41 (L) >60 mL/min/1.73m*2    Calcium 9.1 8.6 - 10.6 mg/dL    Albumin 3.9 3.4 - 5.0 g/dL    Alkaline Phosphatase 139 (H) 33 - 136 U/L    Total Protein 7.1 6.4 - 8.2 g/dL    AST 19 9 - 39 U/L    Bilirubin, Total 0.6 0.0 - 1.2 mg/dL    ALT 13 7 - 45 U/L   Troponin I, High Sensitivity   Result Value Ref Range     Troponin I, High Sensitivity 4 0 - 34 ng/L   Manual Differential   Result Value Ref Range    Neutrophils %, Manual 81.7 40.0 - 80.0 %    Lymphocytes %, Manual 13.9 13.0 - 44.0 %    Monocytes %, Manual 0.9 2.0 - 10.0 %    Eosinophils %, Manual 2.6 0.0 - 6.0 %    Basophils %, Manual 0.0 0.0 - 2.0 %    Plasma Cells %, Manual 0.9 0.00 - 0.00 %    Seg Neutrophils Absolute, Manual 10.05 (H) 1.20 - 7.00 x10*3/uL    Lymphocytes Absolute, Manual 1.71 1.20 - 4.80 x10*3/uL    Monocytes Absolute, Manual 0.11 0.10 - 1.00 x10*3/uL    Eosinophils Absolute, Manual 0.32 0.00 - 0.70 x10*3/uL    Basophils Absolute, Manual 0.00 0.00 - 0.10 x10*3/uL    Plasma Cells Absolute, Manual 0.11 0.00 - 0.00 x10*3/uL    Total Cells Counted 115     RBC Morphology See Below     Hypochromia Mild     Ovalocytes Few    B-type natriuretic peptide   Result Value Ref Range    BNP 96 0 - 99 pg/mL   Sars-CoV-2 PCR, Symptomatic   Result Value Ref Range    Coronavirus 2019, PCR Not Detected Not Detected   Influenza A, and B PCR   Result Value Ref Range    Flu A Result Not Detected Not Detected    Flu B Result Not Detected Not Detected   CBC and Auto Differential   Result Value Ref Range    WBC 14.3 (H) 4.4 - 11.3 x10*3/uL    nRBC 0.6 (H) 0.0 - 0.0 /100 WBCs    RBC 3.48 (L) 4.00 - 5.20 x10*6/uL    Hemoglobin 5.9 (LL) 12.0 - 16.0 g/dL    Hematocrit 21.8 (L) 36.0 - 46.0 %    MCV 63 (L) 80 - 100 fL    MCH 17.0 (L) 26.0 - 34.0 pg    MCHC 27.1 (L) 32.0 - 36.0 g/dL    RDW 27.5 (H) 11.5 - 14.5 %    Platelets 304 150 - 450 x10*3/uL    Immature Granulocytes %, Automated 0.5 0.0 - 0.9 %    Immature Granulocytes Absolute, Automated 0.07 0.00 - 0.70 x10*3/uL   Type and Screen   Result Value Ref Range    ABO TYPE B     Rh TYPE POS     ANTIBODY SCREEN NEG    RSV PCR   Result Value Ref Range    RSV PCR Not Detected Not Detected   Manual Differential   Result Value Ref Range    Neutrophils %, Manual 82.6 40.0 - 80.0 %    Lymphocytes %, Manual 13.9 13.0 - 44.0 %    Monocytes  %, Manual 2.6 2.0 - 10.0 %    Eosinophils %, Manual 0.0 0.0 - 6.0 %    Basophils %, Manual 0.9 0.0 - 2.0 %    Seg Neutrophils Absolute, Manual 11.81 (H) 1.20 - 7.00 x10*3/uL    Lymphocytes Absolute, Manual 1.99 1.20 - 4.80 x10*3/uL    Monocytes Absolute, Manual 0.37 0.10 - 1.00 x10*3/uL    Eosinophils Absolute, Manual 0.00 0.00 - 0.70 x10*3/uL    Basophils Absolute, Manual 0.13 (H) 0.00 - 0.10 x10*3/uL    Total Cells Counted 115     Manual nRBC per 100 Cells 1.7 (H) 0.0 - 0.0 %    RBC Morphology See Below     Hypochromia Mild     RBC Fragments Few     Ovalocytes Few    Reticulocytes   Result Value Ref Range    Retic % 3.9 (H) 0.5 - 2.0 %    Retic Absolute 0.138 (H) 0.017 - 0.110 x10*6/uL    Reticulocyte Hemoglobin 16 (L) 28 - 38 pg    Immature Retic fraction 37.9 (H) <=16.0 %   Prepare RBC: 2 Units   Result Value Ref Range    PRODUCT CODE L7895L11     Unit Number [id number]     Unit ABO B     Unit RH POS     XM INTEP COMP     Dispense Status TR     Blood Expiration Date February 14, 2024 23:59 EST     PRODUCT BLOOD TYPE 7300     UNIT VOLUME 350     PRODUCT CODE M5087X73     Unit Number [id number]     Unit ABO B     Unit RH POS     XM INTEP COMP     Dispense Status IS     Blood Expiration Date February 14, 2024 23:59 EST     PRODUCT BLOOD TYPE 7300     UNIT VOLUME 350    Coagulation Screen   Result Value Ref Range    Protime 13.3 (H) 9.8 - 12.8 seconds    INR 1.2 (H) 0.9 - 1.1    aPTT 18 (L) 27 - 38 seconds     XR chest 2 views 01/25/2024    Narrative  Interpreted By:  Frank Kearney,  STUDY:  Chest dated  1/25/2024.    INDICATION:  Signs/Symptoms:sob    COMPARISON:  None.    ACCESSION NUMBER(S):  [id number]    ORDERING CLINICIAN:  GUILLERMINA SARMIENTO    TECHNIQUE:  One view of the chest.    FINDINGS:  There is fullness of the toni. Lungs are otherwise clear. No  pneumothorax or effusion is evident. The cardiomediastinal silhouette  is  enlarged but similar to the prior exam.There are findings of an  underlying  moderate to large hiatal hernia.Degenerative change is  seen of the spine and shoulders.    Impression  1. Prominence of the toni which may be due to prominence of the  central vascular tree. Knowledge of any symptoms volume overload may  be helpful. If there is concern for greater process, CT can be  considered.  2. Moderate to large hiatal hernia.    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   She presented with SOB.  Found to have acute on chronic microcytic anemia (Hb 5.4) concerning for acute bleeding. Plan to give 2 units of packed RBCs, PPI BID, and consult GI in am for scoping. Also with worsening cough and SOB concerning for acute on chronic HFpEF with plan to diurese    #Acute anemia  #History of VEDA   - Patient with chronic VEDA and previously requiring iron infusions but anemia has normalized since 2021 but then slowly down-trended  - Per chart, history of GI bleeding back in 2019 but pt does not recall  - Hb on 2/2023 was 8.6 (Was normal back in 2022 and 2021)  - Hb on 11/26/2023 was 8.2  - Hb on admission (1/25/2024) was 5.4   - MCV 62, low MCHC, and high RDW  - Hb electrophoresis back in 2/10/2023 was normal  - EGD on 3/2023: 6 cm hiatal hernia. Multiple fundic gland polyps  - Colonoscopy in 2019: One 2 mm polyp in the rectum, Non-bleeding moderate diverticulosis in sigmoid   - Patient on Eliquis, discontinued December  2023   Plan:  - Check CBC after second unit of Packed RBCs  - GI consult for repeat colonoscopy/EGD  - NPO at [facility]  - Continue Iron replacement (Would not check levels as pt getting blood)  - Hemolysis labs ordered    #Acute on chronic HFpEF  - Latest TTE on 11/9/2023: EF 55-60%, Distal septal and apical hypokinesis, and impaired diastolic filling  - Stress test on 11/6/2023: Normal  - 20 mg IV furosemide administered in ED.  - On Lasix as needed at home and Metoprolol succinate 200  - Patient with lower extremity edema, worsening SOB, and cough  Plan:  - Continue home Metoprolol  - 40 IV Lasix now  - Diurese as tolerated daily    #Cough  #Leukocytosis  #History of asthma  - CXR in ED -  Prominence of the toni, possibly due to prominence of the central vascular tree, and moderate to large hiatal hernia noted.  - WBC count 12.3-14.3 * 10^3/uL with 82% neutrophil in ED.  - Influenza A and B, RSV, and Sars-CoV-2 PCR results all negative.  - Patient on RA  Plan:  - DuoNebs  - Benzonatate  - Continue home Montelukast    #Chronic LLE DVT  - Present since 2020 in popliteal, PT, peroneal veins  - Recent Duplex US x2 with no evidence of acute thrombus  - Patient denies symptoms specifically associated with chronic (heaviness in limb, swelling better in the morning and worse throughout the day)   - Seen by Vascular medicine who recommended that the pt stop Eliquis and continue only ASA, but pt was reluctant and remained on Eliquis until December 2023  Plan:  Hold ASA and Eliquis    #Hyperlipidemia  C/w patient's home rosuvastatin, 20 mg daily    #Essential hypertension  C/w patient's home amlodipine,10 mg daily and Losartan 25 mg daily     #Type 2 DM  #CKD stage 3a  - SSI and hold home meds  - Cr at baseline, continue to monitor    Ho Tom, MS3

## 2024-01-26 NOTE — PROGRESS NOTES
"Vania Andrews is a 68 y.o. female on day 1 of admission presenting with Symptomatic anemia.    Subjective     Overnight events:  Pt was seen and examined at bedside. No acute overnight events. Endorses feeling better after pRBC infusions.    Vital signs:  Blood pressure 140/74, pulse 74, temperature 36.4 °C (97.5 °F), resp. rate 15, height 1.499 m (4' 11\"), weight 94.8 kg (209 lb), SpO2 96 %.    I/O last 3 completed shifts:  In: 700 (7.4 mL/kg) [Blood:700]  Out: - (0 mL/kg)   Weight: 94.8 kg     Physical Exam  Constitutional:       Appearance: Normal appearance.   HENT:      Mouth/Throat:      Pharynx: Oropharynx is clear.   Cardiovascular:      Rate and Rhythm: Normal rate and regular rhythm.      Heart sounds: Normal heart sounds.   Pulmonary:      Effort: Pulmonary effort is normal.      Breath sounds: Rales present.   Abdominal:      General: There is no distension.      Palpations: Abdomen is soft.   Musculoskeletal:         General: Normal range of motion.   Skin:     General: Skin is warm and dry.   Neurological:      General: No focal deficit present.      Mental Status: She is alert and oriented to person, place, and time.   Psychiatric:         Mood and Affect: Mood normal.         Relevant Results  Scheduled medications  amLODIPine, 10 mg, oral, Daily  brimonidine, 1 drop, Both Eyes, BID  ferrous gluconate, 38 mg of iron, oral, Daily with breakfast  fluticasone, 2 spray, Each Nostril, Daily  insulin lispro, 0-5 Units, subcutaneous, TID with meals  ipratropium-albuteroL, 3 mL, nebulization, q6h  latanoprost, 1 drop, Both Eyes, q AM  losartan, 25 mg, oral, Daily  metoprolol succinate XL, 200 mg, oral, Daily  montelukast, 10 mg, oral, Nightly  nitroglycerin, 0.4 mg, sublingual, Once  pantoprazole, 40 mg, intravenous, BID  perflutren lipid microspheres, 0.5-10 mL of dilution, intravenous, Once  rosuvastatin, 20 mg, oral, Daily  timolol, 1 drop, Both Eyes, BID      Continuous medications     PRN " medications  PRN medications: dextromethorphan-guaifenesin, dextrose 10 % in water (D10W), dextrose, glucagon    Labs:  Last CBC:  Lab Results   Component Value Date    WBC 12.7 (H) 01/26/2024    HGB 9.0 (L) 01/26/2024    HCT 30.1 (L) 01/26/2024    MCV 69 (L) 01/26/2024     01/26/2024       Last RFP:  Lab Results   Component Value Date    GLUCOSE 187 (H) 01/26/2024    CALCIUM 9.5 01/26/2024     01/26/2024    K 3.7 01/26/2024    CO2 21 01/26/2024     01/26/2024    BUN 14 01/26/2024    CREATININE 1.43 (H) 01/26/2024       Last LFTs:  Lab Results   Component Value Date    ALT 13 01/26/2024    AST 24 01/26/2024    GGT 33 12/23/2020    ALKPHOS 148 (H) 01/26/2024    BILITOT 1.5 (H) 01/26/2024       Last coags:  Lab Results   Component Value Date    INR 1.2 (H) 01/26/2024    APTT 23 (L) 01/26/2024       Micro/culture data:  No results found for the last 90 days.      Imaging:  ECG 12 lead  Normal sinus rhythm  Cannot rule out Anterior infarct , age undetermined  Abnormal ECG  When compared with ECG of 31-OCT-2023 08:36,  Borderline criteria for Inferior infarct are no longer Present  See ED provider note for full interpretation and clinical correlation  Confirmed by Dez Dalton ([id number]) on 1/26/2024 5:54:43 AM         Assessment/Plan   Vania Andrews is a 68 y.o. 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  Hx of GI bleed requiring transfusion in 2019, EGD 3/2023, colonoscopy 2019 and video capsule endoscope 2019 with no signs of  active bleed. Presented with 3 months of dyspnea and worsening cough.  Found to have acute on chronic microcytic anemia (Hb 5.4) concerning for acute bleeding but HDS and without complaints of hematochezia, melena, hematuria or vaginal bleeding. Dyspnea with no supplemental oxygen requirements and not fluid overloaded on exam. EF 55-60% 11/2023 and BNP not elevated.  S/p  2 units of pRBCs and 60 IV lasix in the ED. GI consulted but No indication for emergent scope. Symptomatic treatment of cough.     #Acute anemia  #History of VEDA   - Patient with chronic VEDA and previously requiring iron infusions but anemia has normalized since 2021 but then slowly down-trended  - Per chart, history of GI bleeding back in 2019 but pt does not recall  - Hb on 2/2023 was 8.6 (Was normal back in 2022 and 2021)  - Hb on admission (1/25/2024) was 5.4   - MCV 62, low MCHC, and high RDW  - Hb electrophoresis back in 2/10/2023 was normal  - EGD on 3/2023: 6 cm hiatal hernia. Multiple fundic gland polyps  - Colonoscopy in 2019: One 2 mm polyp in the rectum, Non-bleeding moderate diverticulosis in sigmoid   -video capsule endoscope 2019: no signs of active bleed.  - Patient Eliquis discontinued December 2023 per vascular note  -S/p  2 units of pRBCs    Plan:  - F/up GI consult for repeat colonoscopy/EGD  - Continue Iron replacement (Would not check levels as pt getting blood)    #Acute on chronic HFpEF  - Latest TTE on 11/9/2023: EF 55-60%, Distal septal and apical hypokinesis, and impaired diastolic filling  - Stress test on 11/6/2023: Normal  - 60 mg IV furosemide administered in ED.  - On Lasix as needed at home and Metoprolol succinate 200  - Patient with no pitting lower extremity edema    Plan:  - Continue home Metoprolol     #Cough  #Leukocytosis  #History of asthma  - CXR in ED -  Prominence of the toni, possibly due to prominence of the central vascular tree, and moderate to large hiatal hernia noted.  - WBC count 12.3-14.3 *  10^3/uL with 82% neutrophil in ED.  - Influenza A and B, RSV, and Sars-CoV-2 PCR results all negative.  - Patient on RA    Plan:  - DuoNebs  - Benzonatate  - Continue home Montelukast  -Cough suppressant      #Chronic LLE DVT  - Present since 2020 in popliteal, PT, peroneal veins  - Recent Duplex US x2 with no evidence of acute thrombus  - Patient denies symptoms specifically associated with chronic (heaviness in limb, swelling better in the morning and worse throughout the day)   - Seen by Vascular medicine who recommended that the pt stop Eliquis and continue only ASA, but pt was reluctant and remained on Eliquis until December 2023    Plan:  -Hold ASA I/s/o possible bleed     #Hyperlipidemia  C/w patient's home rosuvastatin, 20 mg daily     #Essential hypertension  C/w patient's home amlodipine,10 mg daily and Losartan 25 mg daily      #Type 2 DM  #CKD stage 3a  - SSI and hold home meds  - Cr at baseline, continue to monitor    Code status: Full Code  Emergency contact: Bryce Corea (Spouse)  [phone]     Principal Problem:    Symptomatic anemia         Glenna Angeles MD

## 2024-01-26 NOTE — HOSPITAL COURSE
Vania Andrews is a 68 y.o. 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  Hx of GI bleed requiring transfusion in 2019, EGD 3/2023, colonoscopy 2019 and video capsule endoscope 2019 with no signs of active bleed. Presented with 3 months of dyspnea and worsening cough.  Found to have acute on chronic microcytic anemia (Hb 5.4) concerning for acute bleeding but HDS and without complaints of hematochezia, melena, hematuria or vaginal bleeding. Dyspnea with no supplemental oxygen requirements and not fluid overloaded on exam. EF 55-60% 11/2023 and BNP not elevated.  Received 2 units of pRBCs and 60 IV lasix in the ED. GI consulted but No indication for emergent scope as patient has remained hemodynamically stable. Symptomatic treatment of cough. Of note, patient experiences dyspnea on exertion without hypoxia walking short distances. Outpatient cardiologist to arrange for LHC/RHC to evaluate cardiac causes of dyspnea. Patient received IV iron in patient and will need  PCP to arrange iron infusions outpatient. Oral Iron held on discharge until GI follow up. GI to arrange capsule endoscopy outpatient not earlier than 2/2/2024.

## 2024-01-26 NOTE — CONSULTS
[facility]   [facility]  INITIAL CONSULT NOTE     Source of Information: The source of the history was patient     Consult requested by: Service: Medicine     Reason for Consult: Hb drop     Admission Chief Complaint: SOB      SUBJECTIVE     HPI: Vania Andrews is a 68 y.o. female w/PMH of CKD Stage 3, kidney cancer s/p partial nephrectomy/adrenaletctomy 2007, T2DM, CHF presented with acute on chronic SOB. Pt was found to have Hb 5.4 (received 2U pRBC improved to 9.0) for which GI consulted.     Pt reports having SOB since the end of November and got worsen over the past three days.  Reports ocassionally seeing dark stool but was intermittent and not consistent. Pt could not tell how frequent or last episodes but states BM has been brown for the past week. Denies hematemesis or hematochezia. Of note pt started on eliquis for PE  in November which was discontinued in a month.     Pt has a history of VEDA. Hb was as low as 8 and ferritin 18 in 2020 which normalized in 2021. Noted to decrease to 11 in 8/2022 which further down trended to 8 in 2/2023.     EGD 1/2019 showed 9 cm hiatal hernia and gastritis, small gastric lipoma. Underwent capsule endoscopy done inpatient in 2019 and was negative (no official report available).   EGD 3/2023 with 6 cm hiatal hernia, multiple gastric fundic polyps. No celiac diease.   Colonoscopy 12/2011 with 5mm TA/internal hemorhoids, 1/2019 with 2mm polyp (pathology report non available).              ROS: Complete review of systems obtained, negative unless otherwise indicated above.     Allergies   Allergen Reactions    Adhesive Itching    Amoxicillin Hives and Itching    Cephalexin Itching and Nausea Only    Gadolinium-Containing Contrast Media Hives    Iodinated Contrast Media Unknown    Iodine Unknown    Latex Other    Pioglitazone Swelling    Rubella Virus Live Vaccine Unknown    Salicylates Other    Shellfish Derived Swelling     Sulfa (Sulfonamide Antibiotics) Unknown    Sulfamethoxazole-Trimethoprim Hives and Itching     Past Medical History:   Diagnosis Date    Abdominal distension (gaseous) 07/31/2019    Abdominal bloating    Cataract     Diabetes mellitus (CMS/HCC)     Diverticulosis of intestine, part unspecified, without perforation or abscess without bleeding 06/09/2013    Diverticulosis    Elevation of levels of liver transaminase levels 11/13/2020    Transaminitis    Encounter for follow-up examination after completed treatment for conditions other than malignant neoplasm 01/26/2019    Hospital discharge follow-up    Glaucoma     Long term (current) use of antibiotics 10/07/2016    Need for prophylactic antibiotic    Other amnesia 10/28/2014    Memory loss    Other conditions influencing health status 02/06/2019    History of cough    Other specified health status 02/07/2019    Hepatitis C antibody test negative    Other specified soft tissue disorders 06/14/2017    Leg swelling    Pain in left toe(s) 05/15/2017    Pain of toe of left foot    Pain in right foot 10/12/2016    Pain of right heel    Pain in right shoulder 06/22/2016    Acute pain of right shoulder    Personal history of diseases of the blood and blood-forming organs and certain disorders involving the immune mechanism 04/09/2018    History of anemia    Personal history of other diseases of the respiratory system 04/02/2018    History of sinusitis    Personal history of other diseases of the respiratory system 03/19/2014    History of upper respiratory infection    Personal history of other malignant neoplasm of kidney 01/14/2021    Personal history of malignant neoplasm of kidney    Personal history of other medical treatment 08/08/2017    History of screening mammography    Personal history of other specified conditions 11/17/2014    History of abdominal pain    Personal history of other specified conditions 06/14/2017    History of urinary frequency    Personal  history of other specified conditions 2021    History of dysuria    Personal history of other specified conditions 2014    History of breast lump    Unspecified abdominal hernia without obstruction or gangrene 2014    Hernia     Past Surgical History:   Procedure Laterality Date    BREAST BIOPSY  2016    Biopsy Breast Percutaneous Needle Core     SECTION, CLASSIC  2014     Section    CHOLECYSTECTOMY  2017    Cholecystectomy Laparoscopic    HAND SURGERY  2020    Hand Surgery                                                                                                                                                          HERNIA REPAIR  2014    Hernia Repair    MR HEAD ANGIO WO IV CONTRAST  2014    MR HEAD ANGIO WO IV CONTRAST 2014 [facility] ANCILLARY LEGAC[facility]    OTHER SURGICAL HISTORY  2021    Nephrectomy    TOTAL ABDOMINAL HYSTERECTOMY W/ BILATERAL SALPINGOOPHORECTOMY  2014    Total Abdominal Hysterectomy With Removal Of Both Ovaries     Family History   Problem Relation Name Age of Onset    Aneurysm Mother      Hypertension Mother      Pancreatic cancer Mother      Diabetes Mother      Other (laryngeal Cancer) Mother      Heart disease Father      Pulmonary embolism Brother      Breast cancer Father's Sister      Breast cancer Maternal Cousin       Social History     Social History Narrative    Not on file         Medications:  amLODIPine, 10 mg, oral, Daily  brimonidine, 1 drop, Both Eyes, BID  ferrous gluconate, 38 mg of iron, oral, Daily with breakfast  fluticasone, 2 spray, Each Nostril, Daily  insulin lispro, 0-5 Units, subcutaneous, TID with meals  ipratropium-albuteroL, 3 mL, nebulization, q6h  latanoprost, 1 drop, Both Eyes, q AM  losartan, 25 mg, oral, Daily  metoprolol succinate XL, 200 mg, oral, Daily  montelukast, 10 mg, oral, Nightly  nitroglycerin, 0.4 mg, sublingual, Once  pantoprazole, 40 mg, intravenous,  BID  perflutren lipid microspheres, 0.5-10 mL of dilution, intravenous, Once  rosuvastatin, 20 mg, oral, Daily  timolol, 1 drop, Both Eyes, BID      PRN medications: dextromethorphan-guaifenesin, dextrose 10 % in water (D10W), dextrose, glucagon      EXAM     Vital signs:      1/25/2024    11:00 PM 1/25/2024    11:15 PM 1/26/2024    12:00 AM 1/26/2024     1:00 AM 1/26/2024     4:15 AM 1/26/2024     6:51 AM 1/26/2024    11:03 AM   Vitals   Systolic 154 165 168 159 138 140 188   Diastolic 79 89 88 78 75 74 75   Heart Rate 84 82 84 82 75 74 87   Temp 37.6 °C (99.7 °F) 37.6 °C (99.7 °F) 36.7 °C (98.1 °F) 36.6 °C (97.9 °F) 36.5 °C (97.7 °F) 36.4 °C (97.5 °F)    Resp 17 18 18 17 16 15 15   Visit Report Report Report              Physical Exam   General: NAD, AA&O x 3  Eyes: EOMI, PERRLA  ENT: MMM  Heart: RRR  Lungs: No respiratory distress  Abdomen: Soft, non tender, non distended  Neuro: Appropriately responds to questions/commands       DATA                                                                            Labs     Results for orders placed or performed during the hospital encounter of 01/25/24 (from the past 24 hour(s))   CBC with Differential   Result Value Ref Range    WBC 12.3 (H) 4.4 - 11.3 x10*3/uL    nRBC 0.8 (H) 0.0 - 0.0 /100 WBCs    RBC 3.23 (L) 4.00 - 5.20 x10*6/uL    Hemoglobin 5.4 (LL) 12.0 - 16.0 g/dL    Hematocrit 20.0 (L) 36.0 - 46.0 %    MCV 62 (L) 80 - 100 fL    MCH 16.7 (L) 26.0 - 34.0 pg    MCHC 27.0 (L) 32.0 - 36.0 g/dL    RDW 27.0 (H) 11.5 - 14.5 %    Platelets 291 150 - 450 x10*3/uL    Immature Granulocytes %, Automated 0.6 0.0 - 0.9 %    Immature Granulocytes Absolute, Automated 0.08 0.00 - 0.70 x10*3/uL   Comprehensive Metabolic Panel   Result Value Ref Range    Glucose 137 (H) 74 - 99 mg/dL    Sodium 140 136 - 145 mmol/L    Potassium 4.2 3.5 - 5.3 mmol/L    Chloride 108 (H) 98 - 107 mmol/L    Bicarbonate 24 21 - 32 mmol/L    Anion Gap 12 10 - 20 mmol/L    Urea Nitrogen 13 6 - 23 mg/dL     Creatinine 1.41 (H) 0.50 - 1.05 mg/dL    eGFR 41 (L) >60 mL/min/1.73m*2    Calcium 9.1 8.6 - 10.6 mg/dL    Albumin 3.9 3.4 - 5.0 g/dL    Alkaline Phosphatase 139 (H) 33 - 136 U/L    Total Protein 7.1 6.4 - 8.2 g/dL    AST 19 9 - 39 U/L    Bilirubin, Total 0.6 0.0 - 1.2 mg/dL    ALT 13 7 - 45 U/L   Troponin I, High Sensitivity   Result Value Ref Range    Troponin I, High Sensitivity 4 0 - 34 ng/L   Manual Differential   Result Value Ref Range    Neutrophils %, Manual 81.7 40.0 - 80.0 %    Lymphocytes %, Manual 13.9 13.0 - 44.0 %    Monocytes %, Manual 0.9 2.0 - 10.0 %    Eosinophils %, Manual 2.6 0.0 - 6.0 %    Basophils %, Manual 0.0 0.0 - 2.0 %    Plasma Cells %, Manual 0.9 0.00 - 0.00 %    Seg Neutrophils Absolute, Manual 10.05 (H) 1.20 - 7.00 x10*3/uL    Lymphocytes Absolute, Manual 1.71 1.20 - 4.80 x10*3/uL    Monocytes Absolute, Manual 0.11 0.10 - 1.00 x10*3/uL    Eosinophils Absolute, Manual 0.32 0.00 - 0.70 x10*3/uL    Basophils Absolute, Manual 0.00 0.00 - 0.10 x10*3/uL    Plasma Cells Absolute, Manual 0.11 0.00 - 0.00 x10*3/uL    Total Cells Counted 115     RBC Morphology See Below     Hypochromia Mild     Ovalocytes Few    B-type natriuretic peptide   Result Value Ref Range    BNP 96 0 - 99 pg/mL   Lactate dehydrogenase   Result Value Ref Range     (H) 84 - 246 U/L   Sars-CoV-2 PCR, Symptomatic   Result Value Ref Range    Coronavirus 2019, PCR Not Detected Not Detected   Influenza A, and B PCR   Result Value Ref Range    Flu A Result Not Detected Not Detected    Flu B Result Not Detected Not Detected   CBC and Auto Differential   Result Value Ref Range    WBC 14.3 (H) 4.4 - 11.3 x10*3/uL    nRBC 0.6 (H) 0.0 - 0.0 /100 WBCs    RBC 3.48 (L) 4.00 - 5.20 x10*6/uL    Hemoglobin 5.9 (LL) 12.0 - 16.0 g/dL    Hematocrit 21.8 (L) 36.0 - 46.0 %    MCV 63 (L) 80 - 100 fL    MCH 17.0 (L) 26.0 - 34.0 pg    MCHC 27.1 (L) 32.0 - 36.0 g/dL    RDW 27.5 (H) 11.5 - 14.5 %    Platelets 304 150 - 450 x10*3/uL     Immature Granulocytes %, Automated 0.5 0.0 - 0.9 %    Immature Granulocytes Absolute, Automated 0.07 0.00 - 0.70 x10*3/uL   Type and Screen   Result Value Ref Range    ABO TYPE B     Rh TYPE POS     ANTIBODY SCREEN NEG    RSV PCR   Result Value Ref Range    RSV PCR Not Detected Not Detected   Manual Differential   Result Value Ref Range    Neutrophils %, Manual 82.6 40.0 - 80.0 %    Lymphocytes %, Manual 13.9 13.0 - 44.0 %    Monocytes %, Manual 2.6 2.0 - 10.0 %    Eosinophils %, Manual 0.0 0.0 - 6.0 %    Basophils %, Manual 0.9 0.0 - 2.0 %    Seg Neutrophils Absolute, Manual 11.81 (H) 1.20 - 7.00 x10*3/uL    Lymphocytes Absolute, Manual 1.99 1.20 - 4.80 x10*3/uL    Monocytes Absolute, Manual 0.37 0.10 - 1.00 x10*3/uL    Eosinophils Absolute, Manual 0.00 0.00 - 0.70 x10*3/uL    Basophils Absolute, Manual 0.13 (H) 0.00 - 0.10 x10*3/uL    Total Cells Counted 115     Manual nRBC per 100 Cells 1.7 (H) 0.0 - 0.0 %    RBC Morphology See Below     Hypochromia Mild     RBC Fragments Few     Ovalocytes Few    Reticulocytes   Result Value Ref Range    Retic % 3.9 (H) 0.5 - 2.0 %    Retic Absolute 0.138 (H) 0.017 - 0.110 x10*6/uL    Reticulocyte Hemoglobin 16 (L) 28 - 38 pg    Immature Retic fraction 37.9 (H) <=16.0 %   Prepare RBC: 2 Units   Result Value Ref Range    PRODUCT CODE J3833X32     Unit Number [id number]     Unit ABO B     Unit RH POS     XM INTEP COMP     Dispense Status TR     Blood Expiration Date February 14, 2024 23:59 EST     PRODUCT BLOOD TYPE 7300     UNIT VOLUME 350     PRODUCT CODE T3970G29     Unit Number [id number]     Unit ABO B     Unit RH POS     XM INTEP COMP     Dispense Status TR     Blood Expiration Date February 14, 2024 23:59 EST     PRODUCT BLOOD TYPE 7300     UNIT VOLUME 350    Coagulation Screen   Result Value Ref Range    Protime 13.3 (H) 9.8 - 12.8 seconds    INR 1.2 (H) 0.9 - 1.1    aPTT 18 (L) 27 - 38 seconds   ECG 12 lead   Result Value Ref Range    Ventricular Rate 90 BPM     Atrial Rate 90 BPM    IA Interval 164 ms    QRS Duration 80 ms    QT Interval 354 ms    QTC Calculation(Bazett) 433 ms    P Axis 68 degrees    R Axis -22 degrees    T Axis 46 degrees    QRS Count 15 beats    Q Onset 226 ms    P Onset 144 ms    P Offset 198 ms    T Offset 403 ms    QTC Fredericia 405 ms   Fibrinogen   Result Value Ref Range    Fibrinogen 423 (H) 200 - 400 mg/dL   Procalcitonin   Result Value Ref Range    Procalcitonin 0.05 <=0.07 ng/mL   Urinalysis with Reflex Microscopic   Result Value Ref Range    Color, Urine Colorless (N) Straw, Yellow    Appearance, Urine Clear Clear    Specific Gravity, Urine 1.005 1.005 - 1.035    pH, Urine 5.0 5.0, 5.5, 6.0, 6.5, 7.0, 7.5, 8.0    Protein, Urine NEGATIVE NEGATIVE mg/dL    Glucose, Urine NEGATIVE NEGATIVE mg/dL    Blood, Urine NEGATIVE NEGATIVE    Ketones, Urine NEGATIVE NEGATIVE mg/dL    Bilirubin, Urine NEGATIVE NEGATIVE    Urobilinogen, Urine <2.0 <2.0 mg/dL    Nitrite, Urine NEGATIVE NEGATIVE    Leukocyte Esterase, Urine NEGATIVE NEGATIVE   CBC and Auto Differential   Result Value Ref Range    WBC 12.7 (H) 4.4 - 11.3 x10*3/uL    nRBC 0.5 (H) 0.0 - 0.0 /100 WBCs    RBC 4.38 4.00 - 5.20 x10*6/uL    Hemoglobin 9.0 (L) 12.0 - 16.0 g/dL    Hematocrit 30.1 (L) 36.0 - 46.0 %    MCV 69 (L) 80 - 100 fL    MCH 20.5 (L) 26.0 - 34.0 pg    MCHC 29.9 (L) 32.0 - 36.0 g/dL    RDW 30.0 (H) 11.5 - 14.5 %    Platelets 253 150 - 450 x10*3/uL    Neutrophils % 78.5 40.0 - 80.0 %    Immature Granulocytes %, Automated 0.6 0.0 - 0.9 %    Lymphocytes % 12.9 13.0 - 44.0 %    Monocytes % 5.6 2.0 - 10.0 %    Eosinophils % 2.0 0.0 - 6.0 %    Basophils % 0.4 0.0 - 2.0 %    Neutrophils Absolute 9.96 (H) 1.20 - 7.70 x10*3/uL    Immature Granulocytes Absolute, Automated 0.07 0.00 - 0.70 x10*3/uL    Lymphocytes Absolute 1.64 1.20 - 4.80 x10*3/uL    Monocytes Absolute 0.71 0.10 - 1.00 x10*3/uL    Eosinophils Absolute 0.26 0.00 - 0.70 x10*3/uL    Basophils Absolute 0.05 0.00 - 0.10 x10*3/uL    Coagulation Screen   Result Value Ref Range    Protime 13.6 (H) 9.8 - 12.8 seconds    INR 1.2 (H) 0.9 - 1.1    aPTT 23 (L) 27 - 38 seconds   Hepatic Function Panel   Result Value Ref Range    Albumin 4.1 3.4 - 5.0 g/dL    Bilirubin, Total 1.5 (H) 0.0 - 1.2 mg/dL    Bilirubin, Direct 0.2 0.0 - 0.3 mg/dL    Alkaline Phosphatase 148 (H) 33 - 136 U/L    ALT 13 7 - 45 U/L    AST 24 9 - 39 U/L    Total Protein 7.6 6.4 - 8.2 g/dL   Magnesium   Result Value Ref Range    Magnesium 1.98 1.60 - 2.40 mg/dL   Phosphorus   Result Value Ref Range    Phosphorus 2.8 2.5 - 4.9 mg/dL   Basic Metabolic Panel   Result Value Ref Range    Glucose 187 (H) 74 - 99 mg/dL    Sodium 141 136 - 145 mmol/L    Potassium 3.7 3.5 - 5.3 mmol/L    Chloride 105 98 - 107 mmol/L    Bicarbonate 21 21 - 32 mmol/L    Anion Gap 19 10 - 20 mmol/L    Urea Nitrogen 14 6 - 23 mg/dL    Creatinine 1.43 (H) 0.50 - 1.05 mg/dL    eGFR 40 (L) >60 mL/min/1.73m*2    Calcium 9.5 8.6 - 10.6 mg/dL   Morphology   Result Value Ref Range    RBC Morphology See Below     Polychromasia Mild     Hypochromia Marked     RBC Fragments Few     Ovalocytes Few    Ferritin   Result Value Ref Range    Ferritin 21 8 - 150 ng/mL   Iron and TIBC   Result Value Ref Range    Iron 51 35 - 150 ug/dL    UIBC >450 (H) 110 - 370 ug/dL    TIBC      % Saturation     POCT GLUCOSE   Result Value Ref Range    POCT Glucose 142 (H) 74 - 99 mg/dL   Urine Gray Tube   Result Value Ref Range    Extra Tube Hold for add-ons.                                                                                 Imaging           ECG 12 lead    Result Date: 1/26/2024  Normal sinus rhythm Cannot rule out Anterior infarct , age undetermined Abnormal ECG When compared with ECG of 31-OCT-2023 08:36, Borderline criteria for Inferior infarct are no longer Present See ED provider note for full interpretation and clinical correlation Confirmed by Dez Dalton ([id number]) on 1/26/2024 5:54:43 AM    XR chest 2 views    Result  Date: 1/25/2024  Interpreted By:  Frank Kearney, STUDY: Chest dated  1/25/2024.   INDICATION: Signs/Symptoms:sob   COMPARISON: None.   ACCESSION NUMBER(S): [id number]   ORDERING CLINICIAN: GUILLERMINA SARMIENTO   TECHNIQUE: One view of the chest.   FINDINGS: There is fullness of the toni. Lungs are otherwise clear. No pneumothorax or effusion is evident. The cardiomediastinal silhouette is  enlarged but similar to the prior exam.There are findings of an underlying moderate to large hiatal hernia.Degenerative change is seen of the spine and shoulders.       1. Prominence of the toni which may be due to prominence of the central vascular tree. Knowledge of any symptoms volume overload may be helpful. If there is concern for greater process, CT can be considered. 2. Moderate to large hiatal hernia.   MACRO: None   Signed by: Frank Kearney 1/25/2024 12:58 PM Dictation workstation:   [id number]                                                                              GI Procedures         3/27/2023 EGD    Impression:            - 6 cm hiatal hernia.                         - Multiple fundic gland polyps. Biopsied.                         - A medium amount of food (residue) in the stomach.                         - The examination was otherwise normal. Biopsies were                          taken with a cold forceps from stomach and duodenum.    FINAL DIAGNOSIS   A.  SECOND PORTION DUODENUM COLD BIOPSY:   -- UNREMARKABLE DUODENAL MUCOSA, NO FEATURES OF CELIAC DISEASE.     B.  GASTRIC COLD BIOPSY:   -- OXYNTIC GASTRIC MUCOSA WITHOUT ABNORMALITY, HELICOBACTER PYLORI STAIN NOT   INDICATED.     C. GASTRIC POLYP COLD BIOPSY:   -- FUNDIC GLAND POLYPS.     1/14/2019 Colonoscopy  Impression:            - The examined portion of the ileum was normal.                         - One 2 mm polyp in the rectum, removed with a cold                          biopsy forceps. Resected and retrieved.                         - Non-bleeding  moderate diverticulosis in the sigmoid                          colon.                         - The colon was otherwise unremarkable. No                          ulcerations, AVMs, colitis or other obvious bleeding                          lesions.                         - The distal rectum and anal verge are normal on                          retroflexion view.    12/28/2011  Impression:            - The examined portion of the ileum was normal.                         - The entire examined colon is normal.                         - Diverticulosis sigmoid colon and descending colon.                         - One 5 mm polyp in the rectum (benign appearing).                          Resected and retrieved.                         - Non-bleeding internal hemorrhoids.                         - The examination was otherwise normal.      ASSESSMENT / PLAN                  Assessment and Recommendations:     Vania Andrews is a 68 y.o. female w/PMH of CKD Stage 3, kidney cancer s/p partial nephrectomy/adrenaletctomy 2007, T2DM, CHF presented with acute on chronic SOB. Pt was found to have Hb 5.4 (received 2U pRBC improved to 9.0) for which GI consulted.     #Hb drop anemia   #Hx VEDA   ::Reports dark stool occasionally  ::Hb 5.5-->2U-->9.0  :: Hb was as low as 8 and ferritin 18 in 2020 which normalized in 2021. Noted to decrease to 11 in 8/2022 which further down trended to 8 in 2/2023.   ::Not on AC at the moment (Was on eliquis 11/2023~12/2023)  ::EGD 1/2019 showed 9 cm hiatal hernia and gastritis, small gastric lipoma. Underwent capsule endoscopy done inpatient in 2019 and was negative (no official report available). EGD 3/2023 with 6 cm hiatal hernia, multiple gastric fundic polyps. No celiac diease.   ::Colonoscopy 12/2011 with 5mm TA/internal hemorhoids, 1/2019 with 2mm polyp (pathology report non available).    -Pt with Hb dropping anemia and had appropriate to response and no overt signs of bleeding or  hemodynamical instability therefore inpatient scopes are not warranted.   -Per chart review, pt's Hb start to down trend from 8/2022. EGD 3/2023 for VEDA was unremarkable. Pt is UTD to colon cancer screening, due 2029.      Plan  -No indications for inpatient EGD/Colonoscopy  -Capsule endoscopy earliest on 2/2/2024 giving PO iron tablet intake which will give false positive result for bleeding.  PO iron should be on hold.  -Can start IV iron infusion while admitted    NICOLE per primary team.   ------------------------------------------------------------------------  Tatiana Diaz MD   Gastroenterology Fellow    After 5PM and on Weekends, please page on-call fellow.    Case discussed with service attending Dr. Herndon  Final recommendations pending attending attestation.

## 2024-01-26 NOTE — PROGRESS NOTES
Pharmacy Medication History Review    Vania Andrews is a 68 y.o. female who is being seen in the Emergency Department for Symptomatic anemia. Pharmacy reviewed the patient's [id number] medications and allergies for accuracy.    The list below reflects the updated PTA list. Comments regarding how patient may be taking medications differently can be found in the Admit Orders Activity  Prior to Admission Medications   Prescriptions  Informant Patient Reported? Taking?   amLODIPine (Norvasc) 10 mg tablet  Self Yes Yes   Sig: Take 1 tablet (10 mg) by mouth once daily.   aspirin 81 mg chewable tablet  Self Yes Yes   Sig: Chew 1 tablet (81 mg) by mouth once daily.   benzonatate (Tessalon) 200 mg capsule  Self Yes Yes   Sig: Take 1 capsule (200 mg) by mouth 3 times a day as needed.   brimonidine-timoloL (Combigan) 0.2-0.5 % ophthalmic solution  Self Yes Yes   Sig: Administer 1 drop into both eyes 2 times a day.   cholecalciferol (D3-2000) 50 mcg (2,000 unit) capsule  Self Yes Yes   Sig: Take 1 capsule (50 mcg) by mouth once daily.   diclofenac sodium 1 % kit  Self Yes Yes   Sig: Apply 1 Application topically if needed. APPLY TO LOWER EXTREMITIES,   4 GM OF GEL TO AFFECTED AREA 4 TIMES DAILY.   DO NOT APPLY MORE THAN 16 GM DAILY TO ANY ONE AFFECTED JOINT.   dicyclomine (Bentyl) 20 mg tablet  Self Yes Yes   Sig: Take 1 tablet (20 mg) by mouth. 3-4 times daily as needed.   dulaglutide (Trulicity) 0.75 mg/0.5 mL pen injector  Self Yes Yes   Sig: Inject 0.75 mg (1 pen) under the skin 1 (one) time per week. (Fridays)   famotidine (Pepcid) 20 mg tablet  Self Yes Yes   Sig: Take 1 tablet (20 mg) by mouth once daily at bedtime.   ferrous gluconate (Fergon) 324 (38 Fe) mg tablet  Self Yes Yes   Sig: Take 1 tablet (38 mg of iron) by mouth once daily with breakfast.   fluticasone (Flonase) 50 mcg/actuation nasal spray  Self Yes Yes   Sig: Administer 2 sprays into each nostril once daily if needed   furosemide (Lasix) 20 mg  tablet  Self Yes Yes   Sig: Take 1 tablet (20 mg) by mouth once daily as needed for leg swelling   insulin lispro (HumaLOG KwikPen Insulin) 100 unit/mL injection  Self Yes Yes   Sig: Use with sliding scale 3 times a day, up to 30 units daily   **Patient states she takes only when taking prednisone   latanoprost (Xalatan) 0.005 % ophthalmic solution  Self Yes Yes   Sig: Administer 1 drop into both eyes once daily in the morning.   rosuvastatin (Crestor) 20 mg tablet                                                                  Self                    Yes                 No  Sig: Take 1 tablet (20 mg) by mouth daily                                                      losartan (Cozaar) 25 mg tablet  Self Yes Yes   Sig: Take 1 tablet (25 mg) by mouth once daily.   metoprolol succinate XL (Toprol-XL) 200 mg 24 hr tablet  Self Yes Yes   Sig: Take 1 tablet (200 mg) by mouth once daily.   netarsudiL (Rhopressa) 0.02 % drops opthalmic solution  Self Yes Yes   Sig: Administer 1 drop into both eyes once daily in the evening.   nitroglycerin (Nitrostat) 0.4 mg SL tablet  Self Yes Yes   Sig: Place 1 tablet (0.4 mg) under the tongue every 5 minutes if needed.   omeprazole (PriLOSEC) 40 mg DR capsule  Self Yes Yes   Sig: Take 1 capsule (40 mg) by mouth once daily.   polyethylene glycol (Glycolax, Miralax) 17 gram/dose powder  Self Yes Yes   Sig: Take 17 g by mouth if needed. IN 8 OUNCES OF WATER, JUICE, OR TEA       Facility-Administered Medications Last Administration Doses Remaining   nitroglycerin (Nitrostat) SL tablet 0.4 mg None recorded 1   perflutren lipid microspheres (Definity) injection 0.5-10 mL of dilution None recorded 1           The list below reflects the updated allergy list. Please review each documented allergy for additional clarification and justification.  Allergies  Reviewed by Balaji Bauer CPhT on 1/26/2024        Severity Reactions Comments    Adhesive Not Specified Itching     Amoxicillin Not  Specified Hives, Itching     Cephalexin Not Specified Itching, Nausea Only     Gadolinium-containing Contrast Media Not Specified Hives     Iodinated Contrast Media Not Specified Unknown     Iodine Not Specified Unknown     Latex Not Specified Other     Pioglitazone Not Specified Swelling     Rubella Virus Live Vaccine Not Specified Unknown     Salicylates Not Specified Other     Shellfish Derived Not Specified Swelling     Sulfa (sulfonamide Antibiotics) Not Specified Unknown     Sulfamethoxazole-trimethoprim Not Specified Hives, Itching             Patient accepts M2B at discharge. Pharmacy has been updated to [facility].    Sources used to complete the med history include:  Patient interviewed about medications taking  [facility] fill history reviewed  [facility] medication list reviewed  1/22/24  Office Visit medication list    Below are additional concerns with the patient's PTA list.  None    ---------------------------------  Balaji Bauer CPhT  Transitions of Care Technician  Medication reconciliation complete  Please reach out via [employer] Secure Chat for questions,   or if no response call [employer] or [employer]INTOIT.  [facility]

## 2024-01-26 NOTE — DISCHARGE INSTRUCTIONS
Dear Mrs. Andrews,    I trust this message finds you well. Here's a summary of your recent hospital stay and the plan for your ongoing care:    Summary of Your Hospital Stay: You were admitted due to three months of shortness of breath and a worsening cough. Tests revealed acute on chronic microcytic anemia (Hb 5.4), suggesting possible bleeding. While your history includes a GI bleed requiring transfusion in 2019, the current presentation did not show signs of active bleeding. You received two units of red blood cells and Lasix in the ED for symptomatic relief.    Medical Recommendations:  Ongoing Symptomatic Treatment: The focus during your hospital stay was on managing symptoms, especially your blood counts and your cough. You are not currently requiring supplemental oxygen, and your heart function (EF 55-60%) appears stable.    Follow-Up Plan:  Outpatient GI Workup: It is essential to follow up with Gastroenterology (GI) for an outpatient investigation into the possible source of your anemia. This step   will help further evaluate any underlying causes and guide appropriate management.    You will also be called about a test called Capsule Endoscopy that is recommended to be done no earlier than 2/2/2024.    You will receive a phone call to schedule an appointment with GI specialist. If you do not receive a call in 1-2 weeks, please call the scheduling line [phone]    Your PCP will also be notified about setting up IV iron infusions for you on the outpatient settings.    Please stop taking your iron pills until you get capsule endoscopy test done.    Your health is our top priority. If you have any concerns or questions, please do not hesitate to reach out. We wish you a smooth recovery, and we look forward to supporting you in your ongoing care.    Warm regards,    Medical Team

## 2024-01-27 DIAGNOSIS — D50.0 IRON DEFICIENCY ANEMIA DUE TO CHRONIC BLOOD LOSS: Primary | ICD-10-CM

## 2024-01-27 LAB
ALBUMIN SERPL BCP-MCNC: 3.7 G/DL (ref 3.4–5)
ANION GAP SERPL CALC-SCNC: 14 MMOL/L (ref 10–20)
BUN SERPL-MCNC: 16 MG/DL (ref 6–23)
CALCIUM SERPL-MCNC: 9.2 MG/DL (ref 8.6–10.6)
CHLORIDE SERPL-SCNC: 108 MMOL/L (ref 98–107)
CO2 SERPL-SCNC: 24 MMOL/L (ref 21–32)
CREAT SERPL-MCNC: 1.45 MG/DL (ref 0.5–1.05)
EGFRCR SERPLBLD CKD-EPI 2021: 40 ML/MIN/1.73M*2
ERYTHROCYTE [DISTWIDTH] IN BLOOD BY AUTOMATED COUNT: 30.2 % (ref 11.5–14.5)
GLUCOSE BLD MANUAL STRIP-MCNC: 161 MG/DL (ref 74–99)
GLUCOSE BLD MANUAL STRIP-MCNC: 196 MG/DL (ref 74–99)
GLUCOSE BLD MANUAL STRIP-MCNC: 198 MG/DL (ref 74–99)
GLUCOSE BLD MANUAL STRIP-MCNC: 234 MG/DL (ref 74–99)
GLUCOSE SERPL-MCNC: 161 MG/DL (ref 74–99)
HCT VFR BLD AUTO: 30.4 % (ref 36–46)
HGB BLD-MCNC: 8.5 G/DL (ref 12–16)
MCH RBC QN AUTO: 19.8 PG (ref 26–34)
MCHC RBC AUTO-ENTMCNC: 28 G/DL (ref 32–36)
MCV RBC AUTO: 71 FL (ref 80–100)
NRBC BLD-RTO: 0 /100 WBCS (ref 0–0)
PHOSPHATE SERPL-MCNC: 2.7 MG/DL (ref 2.5–4.9)
PLATELET # BLD AUTO: 257 X10*3/UL (ref 150–450)
POTASSIUM SERPL-SCNC: 3.8 MMOL/L (ref 3.5–5.3)
RBC # BLD AUTO: 4.29 X10*6/UL (ref 4–5.2)
SODIUM SERPL-SCNC: 142 MMOL/L (ref 136–145)
WBC # BLD AUTO: 13 X10*3/UL (ref 4.4–11.3)

## 2024-01-27 PROCEDURE — 94640 AIRWAY INHALATION TREATMENT: CPT

## 2024-01-27 PROCEDURE — 99233 SBSQ HOSP IP/OBS HIGH 50: CPT

## 2024-01-27 PROCEDURE — 2500000004 HC RX 250 GENERAL PHARMACY W/ HCPCS (ALT 636 FOR OP/ED)

## 2024-01-27 PROCEDURE — 1210000001 HC SEMI-PRIVATE ROOM DAILY

## 2024-01-27 PROCEDURE — 2500000002 HC RX 250 W HCPCS SELF ADMINISTERED DRUGS (ALT 637 FOR MEDICARE OP, ALT 636 FOR OP/ED)

## 2024-01-27 PROCEDURE — 36415 COLL VENOUS BLD VENIPUNCTURE: CPT

## 2024-01-27 PROCEDURE — 85027 COMPLETE CBC AUTOMATED: CPT

## 2024-01-27 PROCEDURE — 80069 RENAL FUNCTION PANEL: CPT

## 2024-01-27 PROCEDURE — 82947 ASSAY GLUCOSE BLOOD QUANT: CPT

## 2024-01-27 PROCEDURE — C9113 INJ PANTOPRAZOLE SODIUM, VIA: HCPCS

## 2024-01-27 PROCEDURE — 2500000001 HC RX 250 WO HCPCS SELF ADMINISTERED DRUGS (ALT 637 FOR MEDICARE OP)

## 2024-01-27 RX ADMIN — IRON SUCROSE 300 MG: 20 INJECTION, SOLUTION INTRAVENOUS at 15:07

## 2024-01-27 RX ADMIN — IPRATROPIUM BROMIDE AND ALBUTEROL SULFATE 3 ML: .5; 3 SOLUTION RESPIRATORY (INHALATION) at 12:23

## 2024-01-27 RX ADMIN — GUAIFENESIN AND DEXTROMETHORPHAN 10 ML: 100; 10 SYRUP ORAL at 16:22

## 2024-01-27 RX ADMIN — PANTOPRAZOLE SODIUM 40 MG: 40 INJECTION, POWDER, FOR SOLUTION INTRAVENOUS at 08:35

## 2024-01-27 RX ADMIN — GUAIFENESIN AND DEXTROMETHORPHAN 10 ML: 100; 10 SYRUP ORAL at 20:40

## 2024-01-27 RX ADMIN — GUAIFENESIN AND DEXTROMETHORPHAN 10 ML: 100; 10 SYRUP ORAL at 12:23

## 2024-01-27 RX ADMIN — BRIMONIDINE TARTRATE 1 DROP: 2 SOLUTION/ DROPS OPHTHALMIC at 08:35

## 2024-01-27 RX ADMIN — LATANOPROST 1 DROP: 50 SOLUTION/ DROPS OPHTHALMIC at 10:00

## 2024-01-27 RX ADMIN — BRIMONIDINE TARTRATE 1 DROP: 2 SOLUTION/ DROPS OPHTHALMIC at 20:40

## 2024-01-27 RX ADMIN — TIMOLOL MALEATE 1 DROP: 5 SOLUTION/ DROPS OPHTHALMIC at 08:35

## 2024-01-27 RX ADMIN — IPRATROPIUM BROMIDE AND ALBUTEROL SULFATE 3 ML: .5; 3 SOLUTION RESPIRATORY (INHALATION) at 08:00

## 2024-01-27 RX ADMIN — IPRATROPIUM BROMIDE AND ALBUTEROL SULFATE 3 ML: .5; 3 SOLUTION RESPIRATORY (INHALATION) at 20:51

## 2024-01-27 RX ADMIN — GUAIFENESIN AND DEXTROMETHORPHAN 10 ML: 100; 10 SYRUP ORAL at 08:35

## 2024-01-27 RX ADMIN — METOPROLOL SUCCINATE 200 MG: 50 TABLET, EXTENDED RELEASE ORAL at 08:34

## 2024-01-27 RX ADMIN — TIMOLOL MALEATE 1 DROP: 5 SOLUTION/ DROPS OPHTHALMIC at 20:41

## 2024-01-27 RX ADMIN — PANTOPRAZOLE SODIUM 40 MG: 40 INJECTION, POWDER, FOR SOLUTION INTRAVENOUS at 20:40

## 2024-01-27 RX ADMIN — AMLODIPINE BESYLATE 10 MG: 10 TABLET ORAL at 08:34

## 2024-01-27 ASSESSMENT — PAIN - FUNCTIONAL ASSESSMENT: PAIN_FUNCTIONAL_ASSESSMENT: 0-10

## 2024-01-27 ASSESSMENT — PAIN SCALES - GENERAL
PAINLEVEL_OUTOF10: 3
PAINLEVEL_OUTOF10: 0 - NO PAIN

## 2024-01-27 NOTE — PROGRESS NOTES
"Vania Andrews is a 68 y.o. female on day 2 of admission presenting with Symptomatic anemia.    Subjective     Overnight events:  Pt was seen and examined at bedside. No acute overnight events.Endorses feeling better after pRBC infusions.    Vital signs:  Blood pressure 121/73, pulse 82, temperature 36.5 °C (97.7 °F), resp. rate 18, height 1.499 m (4' 11.02\"), weight 94.8 kg (209 lb), SpO2 96 %.    I/O last 3 completed shifts:  In: 1180 (12.4 mL/kg) [P.O.:480; Blood:700]  Out: - (0 mL/kg)   Weight: 94.8 kg     Physical Exam  Constitutional:       Appearance: Normal appearance.   HENT:      Mouth/Throat:      Pharynx: Oropharynx is clear.   Cardiovascular:      Rate and Rhythm: Normal rate and regular rhythm.      Heart sounds: Normal heart sounds.   Pulmonary:      Effort: Pulmonary effort is normal.      Breath sounds: Rales present.   Abdominal:      General: There is no distension.      Palpations: Abdomen is soft.   Musculoskeletal:         General: Normal range of motion.   Skin:     General: Skin is warm and dry.   Neurological:      General: No focal deficit present.      Mental Status: She is alert and oriented to person, place, and time.   Psychiatric:         Mood and Affect: Mood normal.         Relevant Results  Scheduled medications  amLODIPine, 10 mg, oral, Daily  brimonidine, 1 drop, Both Eyes, BID  [Held by provider] ferrous gluconate, 38 mg of iron, oral, Daily with breakfast  fluticasone, 2 spray, Each Nostril, Daily  insulin lispro, 0-5 Units, subcutaneous, TID with meals  ipratropium-albuteroL, 3 mL, nebulization, q6h  iron sucrose, 300 mg, intravenous, Once  latanoprost, 1 drop, Both Eyes, q AM  losartan, 25 mg, oral, Daily  metoprolol succinate XL, 200 mg, oral, Daily  pantoprazole, 40 mg, intravenous, BID  timolol, 1 drop, Both Eyes, BID      Continuous medications     PRN medications  PRN medications: dextromethorphan-guaifenesin, dextrose 10 % in water (D10W), dextrose, " glucagon    Labs:  Last CBC:  Lab Results   Component Value Date    WBC 13.0 (H) 01/27/2024    HGB 8.5 (L) 01/27/2024    HCT 30.4 (L) 01/27/2024    MCV 71 (L) 01/27/2024     01/27/2024       Last RFP:  Lab Results   Component Value Date    GLUCOSE 161 (H) 01/27/2024    CALCIUM 9.2 01/27/2024     01/27/2024    K 3.8 01/27/2024    CO2 24 01/27/2024     (H) 01/27/2024    BUN 16 01/27/2024    CREATININE 1.45 (H) 01/27/2024       Last LFTs:  Lab Results   Component Value Date    ALT 13 01/26/2024    AST 24 01/26/2024    GGT 33 12/23/2020    ALKPHOS 148 (H) 01/26/2024    BILITOT 1.5 (H) 01/26/2024       Last coags:  Lab Results   Component Value Date    INR 1.2 (H) 01/26/2024    APTT 23 (L) 01/26/2024       Micro/culture data:  No results found for the last 90 days.      Imaging:  ECG 12 lead  Normal sinus rhythm  Cannot rule out Anterior infarct , age undetermined  Abnormal ECG  When compared with ECG of 31-OCT-2023 08:36,  Borderline criteria for Inferior infarct are no longer Present  See ED provider note for full interpretation and clinical correlation  Confirmed by Dez Dalton ([id number]) on 1/26/2024 5:54:43 AM         Assessment/Plan   Vania Andrews is a 68 y.o. 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  Hx of GI bleed requiring transfusion in 2019, EGD 3/2023, colonoscopy 2019 and video capsule endoscope 2019 with no signs of active bleed. Presented with 3 months of dyspnea and worsening cough.  Found to have acute on  chronic microcytic anemia (Hb 5.4) concerning for acute bleeding but HDS and without complaints of hematochezia, melena, hematuria or vaginal bleeding. Dyspnea with no supplemental oxygen requirements and not fluid overloaded on exam. EF 55-60% 11/2023 and BNP not elevated.  S/p  2 units of pRBCs and 60 IV lasix in the ED. GI consulted but No indication for emergent scope. Symptomatic treatment of cough.    Updates 1/27:  -No acute events ovn  -Hemoglobin stable  -No EGD/Coloscnopy indicated per GI  -GI to arrange capsule endoscopy outpatient not earlier than 2/2/2024  -IV iron today  -PCP to arrange iron infusions outpatient  -Likely needs rehab outpatient due to deconditioning   -However, saturating well on room air and when walking  -GI follow up outpatient set up by GI  -Held oral iron on discharge     #Acute anemia  #History of VEDA   - Patient with chronic VEDA and previously requiring iron infusions but anemia has normalized since 2021 but then slowly down-trended  - Per chart, history of GI bleeding back in 2019 but pt does not recall  - Hb on 2/2023 was 8.6 (Was normal back in 2022 and 2021)  - Hb on admission (1/25/2024) was 5.4   - MCV 62, low MCHC, and high RDW  - Hb electrophoresis back in 2/10/2023 was normal  - EGD on 3/2023: 6 cm hiatal hernia. Multiple fundic gland polyps  - Colonoscopy in 2019: One 2 mm polyp in the rectum, Non-bleeding moderate diverticulosis in sigmoid   -video capsule endoscope 2019: no signs of active bleed.  - Patient Eliquis discontinued December 2023 per vascular note  -S/p  2 units of pRBCs    Plan:  - F/up GI consult for repeat colonoscopy/EGD  - Continue Iron replacement (Would not check levels as pt getting blood)    #Acute on chronic HFpEF  - Latest TTE on 11/9/2023: EF 55-60%, Distal septal and apical hypokinesis, and impaired diastolic filling  - Stress test on 11/6/2023: Normal  - 60 mg IV furosemide administered in ED.  - On Lasix as needed at home and Metoprolol  succinate 200  - Patient with no pitting lower extremity edema    Plan:  - Continue home Metoprolol     #Cough  #Leukocytosis  #History of asthma  - CXR in ED -  Prominence of the toni, possibly due to prominence of the central vascular tree, and moderate to large hiatal hernia noted.  - WBC count 12.3-14.3 * 10^3/uL with 82% neutrophil in ED.  - Influenza A and B, RSV, and Sars-CoV-2 PCR results all negative.  - Patient on RA    Plan:  - DuoNebs  - Benzonatate  - Continue home Montelukast  -Cough suppressant      #Chronic LLE DVT  - Present since 2020 in popliteal, PT, peroneal veins  - Recent Duplex US x2 with no evidence of acute thrombus  - Patient denies symptoms specifically associated with chronic (heaviness in limb, swelling better in the morning and worse throughout the day)   - Seen by Vascular medicine who recommended that the pt stop Eliquis and continue only ASA, but pt was reluctant and remained on Eliquis until December 2023    Plan:  -Hold ASA I/s/o possible bleed     #Hyperlipidemia  C/w patient's home rosuvastatin, 20 mg daily     #Essential hypertension  C/w patient's home amlodipine,10 mg daily and Losartan 25 mg daily      #Type 2 DM  #CKD stage 3a  - SSI and hold home meds  - Cr at baseline, continue to monitor    Code status: Full Code  Emergency contact: Bryce Corea (Spouse)  [phone]     Principal Problem:    Symptomatic anemia         Audie Perez MD

## 2024-01-27 NOTE — PROGRESS NOTES
Outpatient capsule endoscopy to evaluate VEDA with negative EGD 2023 and up to dated colon cancer screening. Pt is on iron tablet (last dose 1/25) therefore earliest appointment will be 2/2/2024. Orders are placed.

## 2024-01-28 ENCOUNTER — PHARMACY VISIT (OUTPATIENT)
Dept: PHARMACY | Facility: CLINIC | Age: 69
End: 2024-01-28
Payer: MEDICARE

## 2024-01-28 VITALS
RESPIRATION RATE: 16 BRPM | WEIGHT: 209 LBS | SYSTOLIC BLOOD PRESSURE: 161 MMHG | DIASTOLIC BLOOD PRESSURE: 93 MMHG | BODY MASS INDEX: 42.13 KG/M2 | HEIGHT: 59 IN | OXYGEN SATURATION: 97 % | HEART RATE: 70 BPM | TEMPERATURE: 97.2 F

## 2024-01-28 LAB
GLUCOSE BLD MANUAL STRIP-MCNC: 132 MG/DL (ref 74–99)
GLUCOSE BLD MANUAL STRIP-MCNC: 200 MG/DL (ref 74–99)

## 2024-01-28 PROCEDURE — 82947 ASSAY GLUCOSE BLOOD QUANT: CPT

## 2024-01-28 PROCEDURE — 2500000002 HC RX 250 W HCPCS SELF ADMINISTERED DRUGS (ALT 637 FOR MEDICARE OP, ALT 636 FOR OP/ED)

## 2024-01-28 PROCEDURE — 2500000001 HC RX 250 WO HCPCS SELF ADMINISTERED DRUGS (ALT 637 FOR MEDICARE OP)

## 2024-01-28 PROCEDURE — 94640 AIRWAY INHALATION TREATMENT: CPT

## 2024-01-28 PROCEDURE — 2500000004 HC RX 250 GENERAL PHARMACY W/ HCPCS (ALT 636 FOR OP/ED)

## 2024-01-28 PROCEDURE — 99239 HOSP IP/OBS DSCHRG MGMT >30: CPT | Performed by: INTERNAL MEDICINE

## 2024-01-28 PROCEDURE — RXMED WILLOW AMBULATORY MEDICATION CHARGE

## 2024-01-28 RX ORDER — ROSUVASTATIN CALCIUM 20 MG/1
20 TABLET, COATED ORAL DAILY
Qty: 30 TABLET | Refills: 2 | Status: SHIPPED | OUTPATIENT
Start: 2024-01-28

## 2024-01-28 RX ORDER — OMEPRAZOLE 40 MG/1
40 CAPSULE, DELAYED RELEASE ORAL
Qty: 30 CAPSULE | Refills: 2 | Status: SHIPPED | OUTPATIENT
Start: 2024-01-28 | End: 2024-01-28 | Stop reason: SDUPTHER

## 2024-01-28 RX ORDER — OMEPRAZOLE 40 MG/1
40 CAPSULE, DELAYED RELEASE ORAL
Qty: 60 CAPSULE | Refills: 1 | Status: SHIPPED | OUTPATIENT
Start: 2024-01-28 | End: 2024-03-28

## 2024-01-28 RX ORDER — PANTOPRAZOLE SODIUM 40 MG/1
40 TABLET, DELAYED RELEASE ORAL
Status: DISCONTINUED | OUTPATIENT
Start: 2024-01-28 | End: 2024-01-28 | Stop reason: HOSPADM

## 2024-01-28 RX ADMIN — GUAIFENESIN AND DEXTROMETHORPHAN 10 ML: 100; 10 SYRUP ORAL at 09:21

## 2024-01-28 RX ADMIN — BENZOCAINE AND MENTHOL 1 LOZENGE: 15; 3.6 LOZENGE ORAL at 09:00

## 2024-01-28 RX ADMIN — PANTOPRAZOLE SODIUM 40 MG: 40 TABLET, DELAYED RELEASE ORAL at 08:51

## 2024-01-28 RX ADMIN — IPRATROPIUM BROMIDE AND ALBUTEROL SULFATE 3 ML: .5; 3 SOLUTION RESPIRATORY (INHALATION) at 08:21

## 2024-01-28 RX ADMIN — IPRATROPIUM BROMIDE AND ALBUTEROL SULFATE 3 ML: .5; 3 SOLUTION RESPIRATORY (INHALATION) at 02:04

## 2024-01-28 RX ADMIN — BRIMONIDINE TARTRATE 1 DROP: 2 SOLUTION/ DROPS OPHTHALMIC at 08:49

## 2024-01-28 RX ADMIN — TIMOLOL MALEATE 1 DROP: 5 SOLUTION/ DROPS OPHTHALMIC at 08:49

## 2024-01-28 RX ADMIN — METOPROLOL SUCCINATE 200 MG: 50 TABLET, EXTENDED RELEASE ORAL at 08:49

## 2024-01-28 RX ADMIN — AMLODIPINE BESYLATE 10 MG: 10 TABLET ORAL at 08:49

## 2024-01-28 ASSESSMENT — COGNITIVE AND FUNCTIONAL STATUS - GENERAL
MOBILITY SCORE: 24
MOBILITY SCORE: 24
DAILY ACTIVITIY SCORE: 24
DAILY ACTIVITIY SCORE: 24

## 2024-01-28 ASSESSMENT — PAIN SCALES - GENERAL: PAINLEVEL_OUTOF10: 0 - NO PAIN

## 2024-01-28 ASSESSMENT — PAIN - FUNCTIONAL ASSESSMENT: PAIN_FUNCTIONAL_ASSESSMENT: 0-10

## 2024-01-28 NOTE — PROGRESS NOTES
Back/arms  -itching started in November  -no belly or legs  -hasn't tried anything  -tried benadryl gel - stopped for a short period of time and then would re-occur  -hasn't started any new medications  -uses tide laundry  -no change in lotions or soaps  -uses dove soap  -feels bumps in the areas on back that itch  -sometimes itches and hurting  -mildly affects sleep        Subjective   HPI: Vania Andrews is a 68 y.o. female is a new patient here for evaluation and treatment of generalized pruritus.     Back/arms  -itching started in November  -no belly or legs  -hasn't tried anything  -tried benadryl gel - stopped for a short period of time and then would re-occur  -hasn't started any new medications  -uses tide laundry  -no change in lotions or soaps  -uses dove soap  -feels bumps in the areas on back that itch  -sometimes itches and hurting  -mildly affects sleep  ROS: No other skin or systemic complaints other than what is documented elsewhere in the note.    ALLERGIES: Adhesive, Amoxicillin, Cephalexin, Gadolinium-containing contrast media, Iodinated contrast media, Iodine, Latex, Pioglitazone, Rubella virus live vaccine, Salicylates, Shellfish derived, Sulfa (sulfonamide antibiotics), and Sulfamethoxazole-trimethoprim    SOCIAL:  reports that she has quit smoking. Her smoking use included cigarettes. She has never been exposed to tobacco smoke. She has never used smokeless tobacco. She reports current alcohol use. She reports that she does not currently use drugs.    Specialty Problems    None      Objective   Excoriations present on upper back. No acute skin findings found.    Significant xerosis of the back and arms        Assessment/Plan   1. Generalized pruritus    Since the patient has been experiencing pruritus for so long I recommend IM Kenalog injection as well as augmented betamethasone to be used during intense pruritic times.      Related Medications  betamethasone, augmented, (Diprolene AF) 0.05 %  cream  Apply topically once daily. Apply a thin layer to arms and back once daily. DO NOT USE ON FACE OR GROIN.    triamcinolone acetonide (Kenalog-40) injection 60 mg      2. Xerosis cutis    I recommend using CeraVe a cream or Aveeno eczema therapy ad jessee. the body should be lotion within 3 minutes of exiting shower to damp skin.         FOLLOW UP: 4 to 6 weeks    The patient was encouraged to contact me with any further questions or concerns.  Dawna Sandoval PA-C  1/29/2024

## 2024-01-28 NOTE — DISCHARGE SUMMARY
Discharge Diagnosis  Acute on Chronic microcytic anemia, Symptomatic  Dyspnea on exertion with chronic cough     Issues Requiring Follow-Up  -LHC/RHC per outpatient cardiologist   -GI for outpatient workup of possible bleed with capsule endoscopy outpatient not earlier than 2/2/2024.  -PCP to arrange IV iron infusions outpatient    Test Results Pending At Discharge  Pending Labs       No current pending labs.            Hospital Course  Vania Andrews is a 68 y.o. 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  Hx of GI bleed requiring transfusion in 2019, EGD 3/2023, colonoscopy 2019 and video capsule endoscope 2019 with no signs of active bleed. Presented with 3 months of dyspnea and worsening cough.  Found to have acute on chronic microcytic anemia (Hb 5.4) concerning for acute bleeding but HDS and without complaints of hematochezia, melena, hematuria or vaginal bleeding. Dyspnea with no supplemental oxygen requirements and not fluid overloaded on exam. EF 55-60% 11/2023 and BNP not elevated.  Received 2 units of pRBCs and 60 IV lasix in the ED. GI consulted but No indication for emergent scope as patient has remained hemodynamically stable. Symptomatic treatment of cough. Of note, patient experiences dyspnea on exertion without hypoxia walking short distances. Outpatient cardiologist to arrange for LHC/RHC to evaluate cardiac causes of dyspnea. Patient received IV iron in patient and will need  PCP to arrange iron infusions outpatient. Oral  Iron held on discharge until GI follow up. GI to arrange capsule endoscopy outpatient not earlier than 2/2/2024.      Pertinent Physical Exam At Time of Discharge  Physical Exam  Constitutional:       Appearance: Normal appearance.   HENT:      Mouth/Throat:      Pharynx: Oropharynx is clear.   Cardiovascular:      Rate and Rhythm: Normal rate and regular rhythm.      Heart sounds: Normal heart sounds.   Pulmonary:      Effort: Pulmonary effort is normal.      Breath sounds: Rales present.   Abdominal:      General: There is no distension.      Palpations: Abdomen is soft.   Musculoskeletal:         General: Normal range of motion.   Skin:     General: Skin is warm and dry.   Neurological:      General: No focal deficit present.      Mental Status: She is alert and oriented to person, place, and time.   Psychiatric:         Mood and Affect: Mood normal.     Home Medications     Medication List      CHANGE how you take these medications     insulin lispro 100 unit/mL injection; Commonly known as: HumaLOG KwikPen   Insulin; Use with sliding scale 3 times a day, up to 30 units daily; What   changed: additional instructions   omeprazole 40 mg DR capsule; Commonly known as: PriLOSEC; Take 1 capsule   (40 mg) by mouth 2 times a day before meals.; What changed: when to take   this     CONTINUE taking these medications     Accu-Chek Guide test strips strip; Generic drug: blood sugar diagnostic;   Use 1 test strip to test blood glucose twice daily.   amLODIPine 10 mg tablet; Commonly known as: Norvasc; Take 1 tablet (10   mg) by mouth once daily.   aspirin 81 mg chewable tablet   benzonatate 200 mg capsule; Commonly known as: Tessalon   brimonidine-timoloL 0.2-0.5 % ophthalmic solution; Commonly known as:   Combigan; Administer 1 drop into both eyes 2 times a day.   D3-2000 50 mcg (2,000 unit) capsule; Generic drug: cholecalciferol   diclofenac sodium 1 % kit   dicyclomine 20 mg tablet; Commonly known as: Bentyl   famotidine  "20 mg tablet; Commonly known as: Pepcid   fluticasone 50 mcg/actuation nasal spray; Commonly known as: Flonase;   Administer 2 sprays into each nostril once daily.   furosemide 20 mg tablet; Commonly known as: Lasix; Take 1 tablet (20 mg)   by mouth once daily as needed (As needed for leg swelling).   latanoprost 0.005 % ophthalmic solution; Commonly known as: Xalatan;   Administer 1 drop into both eyes once daily in the morning.   levocetirizine 5 mg tablet; Commonly known as: Xyzal; Take 1 tablet (5   mg) by mouth once daily in the evening.   losartan 25 mg tablet; Commonly known as: Cozaar; Take 1 tablet (25 mg)   by mouth once daily.   metoprolol succinate  mg 24 hr tablet; Commonly known as:   Toprol-XL; Take 1 tablet (200 mg) by mouth once daily.   * pen needle, diabetic 31 gauge x 5/16\" needle; Use to inject 1-4 times   daily as directed.   * Advocate Pen Needle 33 gauge x 5/32\" needle; Generic drug: pen needle,   diabetic; Use for sliding scale up to 3 times a day   polyethylene glycol 17 gram/dose powder; Commonly known as: Glycolax,   Miralax   Rhopressa 0.02 % drops opthalmic solution; Generic drug: netarsudiL;   Administer 1 drop into both eyes once daily in the evening.   rosuvastatin 20 mg tablet; Commonly known as: Crestor; Take 1 tablet (20   mg) by mouth once daily.   Trulicity 0.75 mg/0.5 mL pen injector; Generic drug: dulaglutide; Inject   0.75 mg (1 pen) under the skin 1 (one) time per week.  * This list has 2 medication(s) that are the same as other medications   prescribed for you. Read the directions carefully, and ask your doctor or   other care provider to review them with you.     STOP taking these medications     ferrous gluconate 324 (38 Fe) mg tablet; Commonly known as: Fergon   nitroglycerin 0.4 mg SL tablet; Commonly known as: Nitrostat       Outpatient Follow-Up  Future Appointments   Date Time Provider Department Center   1/29/2024  2:30 PM Dawna Sandoval PA-C [id number] " [facility]   2/8/2024 10:30 AM [facility] CAIC CT 1 CMCCAICCT [facility] Rad [facility]   2/13/2024 11:40 AM Vianney Marrufo MD [id number] [facility]   3/14/2024  9:30 AM Remedios Garcia MD [id number] [facility]   3/18/2024 12:00 PM Monique Gross MD [id number] [facility]   4/23/2024 10:00 AM Laura Mata MD [id number] [facility]       Glenna Angeles MD    Attending:  Patient doing well today  Denies any new complaints  Breathing is comfortable.  Still with intermittent dry cough.  Denies any melena, hematochezia, hematuria or vaginal bleeding.    PE:  No acute distress  No elevated JVP  Lungs - clear  CV - S1, S2 - nl  Ext -trace shin edema bilaterally.    Medical Decision Making:  Patient tolerated her IV iron infusion yesterday very well.  Overall she is feeling better today.  Has more energy and breathing is comfortable.  Patient is medically stable for discharge.  Outpatient workup for her dyspnea will continue.  She has appointments scheduled with cardiology and gastroenterology has requested an appointment for her for a capsule endoscopy the first week of February.  Pulmonary was going to reschedule her appointment that had gotten canceled on the day she presented to the ED.      35 minutes were spent in discharge management that included evaluation of the patient during rounds, review of labs, radiology, discharge medications and follow-up advice.

## 2024-01-29 ENCOUNTER — PATIENT OUTREACH (OUTPATIENT)
Dept: PRIMARY CARE | Facility: CLINIC | Age: 69
End: 2024-01-29

## 2024-01-29 ENCOUNTER — OFFICE VISIT (OUTPATIENT)
Dept: DERMATOLOGY | Facility: CLINIC | Age: 69
End: 2024-01-29
Payer: MEDICARE

## 2024-01-29 DIAGNOSIS — L85.3 XEROSIS CUTIS: ICD-10-CM

## 2024-01-29 DIAGNOSIS — L29.9 GENERALIZED PRURITUS: Primary | ICD-10-CM

## 2024-01-29 PROCEDURE — 1160F RVW MEDS BY RX/DR IN RCRD: CPT

## 2024-01-29 PROCEDURE — 4010F ACE/ARB THERAPY RXD/TAKEN: CPT

## 2024-01-29 PROCEDURE — 96372 THER/PROPH/DIAG INJ SC/IM: CPT

## 2024-01-29 PROCEDURE — 1111F DSCHRG MED/CURRENT MED MERGE: CPT

## 2024-01-29 PROCEDURE — 3008F BODY MASS INDEX DOCD: CPT

## 2024-01-29 PROCEDURE — 1125F AMNT PAIN NOTED PAIN PRSNT: CPT

## 2024-01-29 PROCEDURE — 99203 OFFICE O/P NEW LOW 30 MIN: CPT

## 2024-01-29 PROCEDURE — 3066F NEPHROPATHY DOC TX: CPT

## 2024-01-29 PROCEDURE — 1036F TOBACCO NON-USER: CPT

## 2024-01-29 PROCEDURE — 1159F MED LIST DOCD IN RCRD: CPT

## 2024-01-29 RX ORDER — BETAMETHASONE DIPROPIONATE 0.5 MG/G
CREAM TOPICAL DAILY
Qty: 50 G | Refills: 0 | Status: SHIPPED | OUTPATIENT
Start: 2024-01-29 | End: 2024-02-16 | Stop reason: HOSPADM

## 2024-01-29 RX ORDER — TRIAMCINOLONE ACETONIDE 40 MG/ML
60 INJECTION, SUSPENSION INTRA-ARTICULAR; INTRAMUSCULAR ONCE
Status: COMPLETED | OUTPATIENT
Start: 2024-01-29 | End: 2024-01-29

## 2024-01-29 RX ADMIN — TRIAMCINOLONE ACETONIDE 60 MG: 40 INJECTION, SUSPENSION INTRA-ARTICULAR; INTRAMUSCULAR at 16:32

## 2024-01-29 NOTE — PROGRESS NOTES
Discharge Facility:[facility]  Discharge Diagnosis:Acute on Chronic microcytic anemia, Symptomatic  Dyspnea on exertion with chronic cough    Admission Date:1/26/24  Discharge Date: 1/28/24    PCP Appointment Date:Tasked to office  Specialist Appointment Date: Pulmonology  Hospital Encounter and Summary: Linked  See discharge assessment below for further details  Engagement  Call Start Time: 1354 (1/29/2024  1:37 PM)    Medications  Medications reviewed with patient/caregiver?: Yes (1/29/2024  1:37 PM)  Is the patient having any side effects they believe may be caused by any medication additions or changes?: No (1/29/2024  1:37 PM)  Does the patient have all medications ordered at discharge?: Yes (1/29/2024  1:37 PM)  Prescription Comments: see med list, patient did have questions about furosemide should this be discontinued, nursing will follow up (1/29/2024  1:37 PM)  Is the patient taking all medications as directed (includes completed medication regime)?: Yes (1/29/2024  1:37 PM)  Medication Comments: see med list (1/29/2024  1:37 PM)    Appointments  Does the patient have a primary care provider?: Yes (1/29/2024  1:37 PM)  Care Management Interventions: Advised patient to make appointment (1/29/2024  1:37 PM)  Has the patient kept scheduled appointments due by today?: Yes (1/29/2024  1:37 PM)  Care Management Interventions: Advised to schedule with specialist (Pulmonology) (1/29/2024  1:37 PM)    Patient Teaching  Does the patient have access to their discharge instructions?: Yes (1/29/2024  1:37 PM)  Care Management Interventions: Reviewed instructions with patient (1/29/2024  1:37 PM)  What is the patient's perception of their health status since discharge?: Improving (1/29/2024  1:37 PM)  Is the patient/caregiver able to teach back the hierarchy of who to call/visit for symptoms/problems? PCP, Specialist, Home Health nurse, Urgent Care, ED, 911: Yes (1/29/2024  1:37 PM)    Issues Requiring  Follow-Up  -LHC/RHC per outpatient cardiologist   -GI for outpatient workup of possible bleed with capsule endoscopy outpatient not earlier than 2/2/2024.  -PCP to arrange IV iron infusions outpatient  Carolynn Yates LPN

## 2024-02-01 ENCOUNTER — OFFICE VISIT (OUTPATIENT)
Dept: PULMONOLOGY | Facility: HOSPITAL | Age: 69
End: 2024-02-01
Payer: MEDICARE

## 2024-02-01 VITALS
OXYGEN SATURATION: 98 % | SYSTOLIC BLOOD PRESSURE: 135 MMHG | HEART RATE: 98 BPM | HEIGHT: 59 IN | BODY MASS INDEX: 40.12 KG/M2 | TEMPERATURE: 97.3 F | RESPIRATION RATE: 18 BRPM | DIASTOLIC BLOOD PRESSURE: 81 MMHG | WEIGHT: 199 LBS

## 2024-02-01 DIAGNOSIS — D50.8 OTHER IRON DEFICIENCY ANEMIA: ICD-10-CM

## 2024-02-01 DIAGNOSIS — J45.991 COUGH VARIANT ASTHMA (HHS-HCC): ICD-10-CM

## 2024-02-01 DIAGNOSIS — F17.201 TOBACCO DEPENDENCE IN REMISSION: ICD-10-CM

## 2024-02-01 DIAGNOSIS — G47.30 SLEEP APNEA, UNSPECIFIED TYPE: ICD-10-CM

## 2024-02-01 DIAGNOSIS — R05.3 CHRONIC COUGH: Primary | ICD-10-CM

## 2024-02-01 PROCEDURE — 3066F NEPHROPATHY DOC TX: CPT | Performed by: HOSPITALIST

## 2024-02-01 PROCEDURE — 99214 OFFICE O/P EST MOD 30 MIN: CPT | Mod: GC | Performed by: HOSPITALIST

## 2024-02-01 PROCEDURE — 99214 OFFICE O/P EST MOD 30 MIN: CPT | Performed by: HOSPITALIST

## 2024-02-01 PROCEDURE — 1111F DSCHRG MED/CURRENT MED MERGE: CPT | Performed by: HOSPITALIST

## 2024-02-01 PROCEDURE — 1159F MED LIST DOCD IN RCRD: CPT | Performed by: HOSPITALIST

## 2024-02-01 PROCEDURE — 1036F TOBACCO NON-USER: CPT | Performed by: HOSPITALIST

## 2024-02-01 PROCEDURE — 4010F ACE/ARB THERAPY RXD/TAKEN: CPT | Performed by: HOSPITALIST

## 2024-02-01 PROCEDURE — 3079F DIAST BP 80-89 MM HG: CPT | Performed by: HOSPITALIST

## 2024-02-01 PROCEDURE — 3075F SYST BP GE 130 - 139MM HG: CPT | Performed by: HOSPITALIST

## 2024-02-01 PROCEDURE — 1125F AMNT PAIN NOTED PAIN PRSNT: CPT | Performed by: HOSPITALIST

## 2024-02-01 PROCEDURE — 1160F RVW MEDS BY RX/DR IN RCRD: CPT | Performed by: HOSPITALIST

## 2024-02-01 PROCEDURE — 3008F BODY MASS INDEX DOCD: CPT | Performed by: HOSPITALIST

## 2024-02-01 RX ORDER — TIOTROPIUM BROMIDE INHALATION SPRAY 3.12 UG/1
2 SPRAY, METERED RESPIRATORY (INHALATION) DAILY
Qty: 1 EACH | Refills: 3 | Status: SHIPPED | OUTPATIENT
Start: 2024-02-01

## 2024-02-01 RX ORDER — ALBUTEROL SULFATE 90 UG/1
2 AEROSOL, METERED RESPIRATORY (INHALATION) EVERY 4 HOURS PRN
Qty: 18 G | Refills: 5 | Status: SHIPPED | OUTPATIENT
Start: 2024-02-01

## 2024-02-01 ASSESSMENT — PAIN SCALES - GENERAL: PAINLEVEL: 7

## 2024-02-01 ASSESSMENT — ENCOUNTER SYMPTOMS
CHILLS: 0
DEPRESSION: 0
OCCASIONAL FEELINGS OF UNSTEADINESS: 0
LOSS OF SENSATION IN FEET: 0
COUGH: 1
WHEEZING: 0
FEVER: 0
SHORTNESS OF BREATH: 1

## 2024-02-01 ASSESSMENT — COLUMBIA-SUICIDE SEVERITY RATING SCALE - C-SSRS
2. HAVE YOU ACTUALLY HAD ANY THOUGHTS OF KILLING YOURSELF?: NO
1. IN THE PAST MONTH, HAVE YOU WISHED YOU WERE DEAD OR WISHED YOU COULD GO TO SLEEP AND NOT WAKE UP?: NO
6. HAVE YOU EVER DONE ANYTHING, STARTED TO DO ANYTHING, OR PREPARED TO DO ANYTHING TO END YOUR LIFE?: NO

## 2024-02-01 NOTE — ASSESSMENT & PLAN NOTE
Previous PSG with moderate-severe MARYLU, not on PAP, will refer back to sleep medicine for evaluation.

## 2024-02-01 NOTE — ASSESSMENT & PLAN NOTE
Multiple possible etiologies: undiagnosed COPD, obstructive CAD, volume overload. Her first priority is LHC/RHC with cardiology. After this, will get full PFTs, 6MWT. Will trial daily LAMA and RANDI PRN. Checking resp allergen IgE profile.

## 2024-02-01 NOTE — PROGRESS NOTES
Department of Medicine I Division of Pulmonary, Critical Care, and Sleep Medicine   [id number] [place] [address]  Phone: [phone]  Fax: [phone]    History of Present Illness   Background:  Vania Andrews is a 68 y.o. female presenting with dyspnea and cough.    Today's (2/1/2024) HPI:   Patient presents for evaluation of cough and dyspnea.  Patient reports worsening cough with clear sputum production and dyspnea on exertion for the last 2 to 3 months.  She has had multiple other medical issues recently including iron deficiency anemia due to GI blood loss (hospitalized last week requiring blood transfusion) as well as concern for obstructive CAD (abnormal NMST) being followed by cardiology.  She denies any fever/chills, chest pain.  She has tried bronchodilators during her hospitalization and does not feel like they were significantly improving her symptoms.    Of note, patient also has history of large hiatal hernia, is on PPI chronically.    Review of Systems  Review of Systems   Constitutional:  Negative for chills and fever.   Respiratory:  Positive for cough and shortness of breath. Negative for wheezing.    Cardiovascular:  Positive for leg swelling. Negative for chest pain.   All other systems reviewed and are negative.        Past Medical History   She has a past medical history of Abdominal distension (gaseous) (07/31/2019), Cataract, Diabetes mellitus (CMS/HCC), Diverticulosis of intestine, part unspecified, without perforation or abscess without bleeding (06/09/2013), Elevation of levels of liver transaminase levels (11/13/2020), Encounter for follow-up examination after completed treatment for conditions other than malignant neoplasm (01/26/2019), Glaucoma, Long term (current) use of antibiotics (10/07/2016), Other amnesia (10/28/2014), Other conditions influencing health status (02/06/2019), Other specified health status (02/07/2019), Other specified soft tissue  disorders (06/14/2017), Pain in left toe(s) (05/15/2017), Pain in right foot (10/12/2016), Pain in right shoulder (06/22/2016), Personal history of diseases of the blood and blood-forming organs and certain disorders involving the immune mechanism (04/09/2018), Personal history of other diseases of the respiratory system (04/02/2018), Personal history of other diseases of the respiratory system (03/19/2014), Personal history of other malignant neoplasm of kidney (01/14/2021), Personal history of other medical treatment (08/08/2017), Personal history of other specified conditions (11/17/2014), Personal history of other specified conditions (06/14/2017), Personal history of other specified conditions (06/09/2021), Personal history of other specified conditions (11/28/2014), and Unspecified abdominal hernia without obstruction or gangrene (04/21/2014).    Immunizations     Immunization History   Administered Date(s) Administered    Moderna SARS-CoV-2 Vaccination 02/24/2022, 03/24/2022    Pneumococcal conjugate vaccine, 13-valent (PREVNAR 13) 07/13/2021    Tdap vaccine, age 7 year and older (BOOSTRIX, ADACEL) 09/09/2014       Medications and Allergies     Current Outpatient Medications   Medication Instructions    Accu-Chek Guide test strips strip Use 1 test strip to test blood glucose twice daily.    albuterol 90 mcg/actuation inhaler 2 puffs, inhalation, Every 4 hours PRN    amLODIPine (NORVASC) 10 mg, oral, Daily    aspirin 81 mg, oral, Daily    benzonatate (TESSALON) 200 mg, oral, 3 times daily PRN    betamethasone, augmented, (Diprolene AF) 0.05 % cream Topical, Daily, Apply a thin layer to arms and back once daily. DO NOT USE ON FACE OR GROIN.    brimonidine-timoloL (Combigan) 0.2-0.5 % ophthalmic solution 1 drop, Both Eyes, 2 times daily    cholecalciferol (D3-2000) 2,000 Units, oral, Daily    diclofenac sodium 1 % kit 1 Application, Topical, As needed,  APPLY TO LOWER EXTREMITIES, 4 GM OF GEL TO AFFECTED AREA 4  "TIMES DAILY. DO NOT APPLY MORE THAN 16 GM DAILY TO ANY ONE AFFECTED JOINT.    dicyclomine (BENTYL) 20 mg, oral, 3-4 times daily as needed.    dulaglutide (Trulicity) 0.75 mg/0.5 mL pen injector Inject 0.75 mg (1 pen) under the skin 1 (one) time per week.    famotidine (Pepcid) 20 mg tablet 1 tablet, oral, Nightly    fluticasone (Flonase) 50 mcg/actuation nasal spray 2 sprays, Each Nostril, Daily    furosemide (LASIX) 20 mg, oral, Daily PRN    insulin lispro (HumaLOG KwikPen Insulin) 100 unit/mL injection Use with sliding scale 3 times a day, up to 30 units daily    latanoprost (Xalatan) 0.005 % ophthalmic solution 1 drop, Both Eyes, Every morning    levocetirizine (XYZAL) 5 mg, oral, Every evening    losartan (COZAAR) 25 mg, oral, Daily    metoprolol succinate XL (TOPROL-XL) 200 mg, oral, Daily    netarsudiL (Rhopressa) 0.02 % drops opthalmic solution 1 drop, Both Eyes, Every evening    omeprazole (PRILOSEC) 40 mg, oral, 2 times daily before meals    pen needle, diabetic 31 gauge x 5/16\" needle Use to inject 1-4 times daily as directed.    pen needle, diabetic 33 gauge x 5/32\" needle Use for sliding scale up to 3 times a day    polyethylene glycol (GLYCOLAX, MIRALAX) 17 g, oral, As needed, IN 8 OUNCES OF WATER, JUICE, OR TEA AND DRINK    rosuvastatin (CRESTOR) 20 mg, oral, Daily    tiotropium (Spiriva Respimat) 2.5 mcg/actuation inhaler 2 puffs, inhalation, Daily        Allergies:  Adhesive, Amoxicillin, Cephalexin, Gadolinium-containing contrast media, Iodinated contrast media, Iodine, Latex, Pioglitazone, Rubella virus live vaccine, Salicylates, Shellfish derived, Sulfa (sulfonamide antibiotics), and Sulfamethoxazole-trimethoprim    Social History   She reports that she has quit smoking. Her smoking use included cigarettes. She has never been exposed to tobacco smoke. She has never used smokeless tobacco. She reports current alcohol use. She reports that she does not currently use drugs.    Smoking History: former " "20 pack-year history, quit 25-30 years ago  Exposure/Job History: unremarkable    Family History     Family History   Problem Relation Name Age of Onset    Aneurysm Mother      Hypertension Mother      Pancreatic cancer Mother      Diabetes Mother      Other (laryngeal Cancer) Mother      Heart disease Father      Pulmonary embolism Brother      Breast cancer Father's Sister      Breast cancer Maternal Cousin           Surgical History   She has a past surgical history that includes Breast biopsy (2016); Cholecystectomy (2017); Hernia repair (2014); Other surgical history (2021);  section, classic (2014); Total abdominal hysterectomy w/ bilateral salpingoophorectomy (2014); Hand surgery (2020); and MR angio head wo IV contrast (2014).    Physical Exam   /81   Pulse 98   Temp 36.3 °C (97.3 °F)   Resp 18   Ht 1.499 m (4' 11\")   Wt 90.3 kg (199 lb)   LMP  (LMP Unknown)   SpO2 98%   BMI 40.19 kg/m²      Physical Exam  Vitals and nursing note reviewed.   Constitutional:       General: She is not in acute distress.     Appearance: She is obese. She is not ill-appearing.   HENT:      Head: Normocephalic and atraumatic.      Mouth/Throat:      Mouth: Mucous membranes are moist.      Pharynx: Oropharynx is clear.   Eyes:      Extraocular Movements: Extraocular movements intact.      Conjunctiva/sclera: Conjunctivae normal.      Pupils: Pupils are equal, round, and reactive to light.   Cardiovascular:      Rate and Rhythm: Normal rate and regular rhythm.      Heart sounds: Normal heart sounds.   Pulmonary:      Effort: Pulmonary effort is normal. No respiratory distress.      Breath sounds: Normal breath sounds.   Abdominal:      General: Abdomen is flat. Bowel sounds are normal.      Palpations: Abdomen is soft.   Musculoskeletal:         General: Normal range of motion.      Cervical back: Normal range of motion and neck supple.      Right lower leg: " "Edema present.      Left lower leg: Edema present.   Skin:     General: Skin is warm and dry.      Findings: No rash.   Neurological:      General: No focal deficit present.      Mental Status: She is alert. Mental status is at baseline.   Psychiatric:         Behavior: Behavior normal.         Thought Content: Thought content normal.         Results   Pulmonary Function Tests:  Failed to redirect to the Timeline version of the [facility] SmartLink.    No results found for: \"FEV1\", \"[id number]\"    Chest Radiograph:  XR chest 2 views 01/25/2024    Narrative  Interpreted By:  Frank Kearney,  STUDY:  Chest dated  1/25/2024.    INDICATION:  Signs/Symptoms:sob    COMPARISON:  None.    ACCESSION NUMBER(S):  [id number]    ORDERING CLINICIAN:  GUILLERMINA SARMIENTO    TECHNIQUE:  One view of the chest.    FINDINGS:  There is fullness of the toni. Lungs are otherwise clear. No  pneumothorax or effusion is evident. The cardiomediastinal silhouette  is  enlarged but similar to the prior exam.There are findings of an  underlying moderate to large hiatal hernia.Degenerative change is  seen of the spine and shoulders.    Impression  1. Prominence of the toni which may be due to prominence of the  central vascular tree. Knowledge of any symptoms volume overload may  be helpful. If there is concern for greater process, CT can be  considered.  2. Moderate to large hiatal hernia.    MACRO:  None    Signed by: Frank Kearney 1/25/2024 12:58 PM  Dictation workstation:   [id number]      Chest CT Scan:  No results found for this or any previous visit from the past 365 days.       ECHO:  No results found for this or any previous visit from the past 365 days.       Labs:  Lab Results   Component Value Date    WBC 13.0 (H) 01/27/2024    HGB 8.5 (L) 01/27/2024    HCT 30.4 (L) 01/27/2024    MCV 71 (L) 01/27/2024     01/27/2024       Lab Results   Component Value Date    CREATININE 1.45 (H) 01/27/2024    BUN 16 01/27/2024     01/27/2024    K " "3.8 01/27/2024     (H) 01/27/2024    CO2 24 01/27/2024        Lab Results   Component Value Date    ERIKA NEGATIVE 12/23/2020    SEDRATE 37 (H) 11/26/2023        IgE: No results found for: \"IGE\"   Respiratory Allergy Panel:  AEC:   Eosinophils Absolute (x10*3/uL)   Date Value   01/26/2024 0.26     Eosinophils Absolute, Manual (x10*3/uL)   Date Value   01/25/2024 0.00     Eosinophils %, Manual (%)   Date Value   01/25/2024 0.0     Eosinophils % (%)   Date Value   01/26/2024 2.0       Cultures:  No results found for: \"AFBCX\"   No results found for: \"RESPCULTCYFI\"    No results found for the last 90 days.       Assessment and Plan     Problem List Items Addressed This Visit          Hematology and Neoplasia    Iron deficiency anemia, unspecified       Pulmonary and Pneumonias    Chronic cough - Primary    Current Assessment & Plan     Multiple possible etiologies: undiagnosed COPD, obstructive CAD, volume overload. Her first priority is LHC/RHC with cardiology. After this, will get full PFTs, 6MWT. Will trial daily LAMA and RANDI PRN. Checking resp allergen IgE profile.         Relevant Medications    tiotropium (Spiriva Respimat) 2.5 mcg/actuation inhaler    albuterol 90 mcg/actuation inhaler    Other Relevant Orders    Complete Pulmonary Function Test Pre/Post Bronchodialator (Spirometry Pre/Post/DLCO/Lung Volumes)    Pulmonary Stress Test (6 Min. Walk)    Respiratory Allergy Profile IgE       Sleep    Sleep apnea    Current Assessment & Plan     Previous PSG with moderate-severe MARYLU, not on PAP, will refer back to sleep medicine for evaluation.         Relevant Orders    Referral to Adult Sleep Medicine       Tobacco    Tobacco dependence in remission    Relevant Medications    tiotropium (Spiriva Respimat) 2.5 mcg/actuation inhaler    albuterol 90 mcg/actuation inhaler    Other Relevant Orders    Complete Pulmonary Function Test Pre/Post Bronchodialator (Spirometry Pre/Post/DLCO/Lung Volumes)    Pulmonary Stress " Test (6 Min. Walk)    Respiratory Allergy Profile IgE     Other Visit Diagnoses       Cough variant asthma        Relevant Orders    Respiratory Allergy Profile IgE             Follow-up:  1 month    Josesito Magana MD  Division of Pulmonary, Critical Care and Sleep Medicine  [facility]

## 2024-02-01 NOTE — PATIENT INSTRUCTIONS
Department of Medicine  Division of Pulmonary, Critical Care, and Sleep Medicine  [address] [facility]    It was a pleasure seeing you in clinic today. We hope all of your questions were answered.    We discussed the followin.) Your cough and breathing issues may be due to multiple causes. We will try to sort out which is which.  2.) AFTER your cardiology tests (heart catheterization), we can schedule pulmonary function tests (breathing tests) to see if you have something like COPD.  3.) We can try inhalers in the meantime to see if they help your symptoms.    Tests that need to be scheduled:  1.) Pulmonary function tests (breathing tests) AFTER your heart tests    Medications/inhalers prescribed:  1.) Spiriva (daily inhaler)  2.) Albuterol (as-needed inhaler)    For questions about medications, inhalers or if you are having a change in symptoms, please call the nurse, Jossy Graff RN, at [phone]. Jossy has a secure voice mail account if you want to leave a message.    If you have questions or concerns about what we discussed in clinic, please call the Pulmonary Department at [phone].     Call [phone] to schedule a breathing or a walking test, call [phone] to schedule EKG's, Echocardiograms and Cardiopulmonary Stress Tests. Call [phone] to schedule Radiology tests such as Nuclear Medicine Stress Tests, CT Scans, and MRI's. For clinic appointments, please call [phone].     Josesito Magana MD  Division of Pulmonary, Critical Care and Sleep Medicine  [facility]

## 2024-02-02 LAB
A ALTERNATA IGE QN: <0.1 KU/L
A FUMIGATUS IGE QN: <0.1 KU/L
BERMUDA GRASS IGE QN: <0.1 KU/L
BOXELDER IGE QN: <0.1 KU/L
C HERBARUM IGE QN: <0.1 KU/L
CALIF WALNUT POLN IGE QN: <0.1 KU/L
CAT DANDER IGE QN: <0.1 KU/L
CMN PIGWEED IGE QN: <0.1 KU/L
COMMON RAGWEED IGE QN: <0.1 KU/L
COTTONWOOD IGE QN: <0.1 KU/L
D FARINAE IGE QN: <0.1 KU/L
D PTERONYSS IGE QN: <0.1 KU/L
DOG DANDER IGE QN: <0.1 KU/L
ENGL PLANTAIN IGE QN: <0.1 KU/L
GOOSEFOOT IGE QN: <0.1 KU/L
JOHNSON GRASS IGE QN: <0.1 KU/L
KENT BLUE GRASS IGE QN: <0.1 KU/L
LONDON PLANE IGE QN: <0.1 KU/L
MT JUNIPER IGE QN: <0.1 KU/L
P NOTATUM IGE QN: <0.1 KU/L
PECAN/HICK TREE IGE QN: <0.1 KU/L
ROACH IGE QN: <0.1 KU/L
SALTWORT IGE QN: <0.1 KU/L
SHEEP SORREL IGE QN: <0.1 KU/L
SILVER BIRCH IGE QN: <0.1 KU/L
TIMOTHY IGE QN: <0.1 KU/L
TOTAL IGE SMQN RAST: 18.5 KU/L
WHITE ASH IGE QN: <0.1 KU/L
WHITE ELM IGE QN: <0.1 KU/L
WHITE MULBERRY IGE QN: <0.1 KU/L
WHITE OAK IGE QN: <0.1 KU/L

## 2024-02-02 NOTE — DOCUMENTATION CLARIFICATION NOTE
"    PATIENT:               LYNDSAY CASTANEDA  ACCT #:                  [id number]  MRN:                       [id number]  :                       1955  ADMIT DATE:       2024 2:31 PM  DISCH DATE:        2024 12:56 PM  RESPONDING PROVIDER #:        [id number]          PROVIDER RESPONSE TEXT:    CKD 3a    CDI QUERY TEXT:    [facility]_Conflicting Documentation        Instruction:    Based on your assessment of the patient and the clinical information, please provide the requested documentation by clicking on the appropriate radio button and enter any additional information if prompted.    Question: Based on your medical judgment, can you please clarify which of these conditions is the most clinically supported    When answering this query, please exercise your independent professional judgment. The fact that a question is being asked, does not imply that any particular answer is desired or expected.    The patient's clinical indicators include:  Clinical Information: There is conflicting documentation in the medical record which requires clarification.    -The diagnosis of \"Stage IIIb chronic kidney disease\" was documented under Past Medical History on  in the H&P per Dr. Bazan    -The diagnosis of \"CKD stage 3a\" was documented under Assessment/Plan on  in the H&P by Dr. Bazan, and in subsequent IM PNs.    Clinical Indicators:  -  Creatinine/GFR: 1.41/41  -  Creatinine/GFR: 1.43/40  -  Creatinine/GFR: 1.45/40  - From previous encounters: 23 Creatinine/GFR: 1.13/53  24 Creatinine/GFR: 1.23/48  - 23 Primary Care Office Visit documents, \"Type 2 diabetes mellitus with stage 3a chronic kidney disease, without long-term use of insulin\".    Treatment:  - Monitoring of chemistry    Risk Factors:  - DM II  - CHF  - s/p partial nephrectomy   Options provided:  -- CKD 3a  -- CKD 3b  -- Other - I will add my own diagnosis  -- Refer to Clinical Documentation Reviewer    Query " created by: Laxmi Glez on 2/2/2024 6:28 AM      Electronically signed by:  MILADIS AYALA MD 2/2/2024 6:45 AM

## 2024-02-05 ENCOUNTER — APPOINTMENT (OUTPATIENT)
Dept: RADIOLOGY | Facility: HOSPITAL | Age: 69
DRG: 176 | End: 2024-02-05
Payer: MEDICARE

## 2024-02-05 ENCOUNTER — LAB (OUTPATIENT)
Dept: LAB | Facility: HOSPITAL | Age: 69
DRG: 176 | End: 2024-02-05
Payer: MEDICARE

## 2024-02-05 ENCOUNTER — OFFICE VISIT (OUTPATIENT)
Dept: HEMATOLOGY/ONCOLOGY | Facility: HOSPITAL | Age: 69
DRG: 176 | End: 2024-02-05
Payer: MEDICARE

## 2024-02-05 ENCOUNTER — HOSPITAL ENCOUNTER (INPATIENT)
Facility: HOSPITAL | Age: 69
LOS: 10 days | Discharge: HOME | DRG: 176 | End: 2024-02-16
Attending: EMERGENCY MEDICINE | Admitting: INTERNAL MEDICINE
Payer: MEDICARE

## 2024-02-05 ENCOUNTER — CLINICAL SUPPORT (OUTPATIENT)
Dept: EMERGENCY MEDICINE | Facility: HOSPITAL | Age: 69
DRG: 176 | End: 2024-02-05
Payer: MEDICARE

## 2024-02-05 VITALS
RESPIRATION RATE: 20 BRPM | HEART RATE: 80 BPM | TEMPERATURE: 97.2 F | DIASTOLIC BLOOD PRESSURE: 84 MMHG | OXYGEN SATURATION: 100 % | SYSTOLIC BLOOD PRESSURE: 148 MMHG

## 2024-02-05 DIAGNOSIS — M79.89 RIGHT LEG SWELLING: ICD-10-CM

## 2024-02-05 DIAGNOSIS — K92.1 MELENA: ICD-10-CM

## 2024-02-05 DIAGNOSIS — D64.9 ANEMIA, UNSPECIFIED TYPE: ICD-10-CM

## 2024-02-05 DIAGNOSIS — I82.562 CHRONIC DEEP VEIN THROMBOSIS (DVT) OF CALF MUSCLE VEIN OF LEFT LOWER EXTREMITY (MULTI): ICD-10-CM

## 2024-02-05 DIAGNOSIS — D50.0 IRON DEFICIENCY ANEMIA DUE TO CHRONIC BLOOD LOSS: ICD-10-CM

## 2024-02-05 DIAGNOSIS — D50.0 IRON DEFICIENCY ANEMIA DUE TO CHRONIC BLOOD LOSS: Primary | ICD-10-CM

## 2024-02-05 DIAGNOSIS — D64.9 SYMPTOMATIC ANEMIA: ICD-10-CM

## 2024-02-05 DIAGNOSIS — I26.92 ACUTE SADDLE PULMONARY EMBOLISM WITHOUT ACUTE COR PULMONALE (MULTI): Primary | ICD-10-CM

## 2024-02-05 LAB
ABO GROUP (TYPE) IN BLOOD: NORMAL
ALBUMIN SERPL BCP-MCNC: 3.7 G/DL (ref 3.4–5)
ALBUMIN SERPL BCP-MCNC: 4 G/DL (ref 3.4–5)
ALP SERPL-CCNC: 140 U/L (ref 33–136)
ALP SERPL-CCNC: 157 U/L (ref 33–136)
ALT SERPL W P-5'-P-CCNC: 26 U/L (ref 7–45)
ALT SERPL W P-5'-P-CCNC: 29 U/L (ref 7–45)
ANION GAP BLDV CALCULATED.4IONS-SCNC: 8 MMOL/L (ref 10–25)
ANION GAP SERPL CALC-SCNC: 13 MMOL/L (ref 10–20)
ANION GAP SERPL CALC-SCNC: 17 MMOL/L (ref 10–20)
ANTIBODY SCREEN: NORMAL
APTT PPP: 30 SECONDS (ref 27–38)
AST SERPL W P-5'-P-CCNC: 21 U/L (ref 9–39)
AST SERPL W P-5'-P-CCNC: 22 U/L (ref 9–39)
ATRIAL RATE: 81 BPM
BASE EXCESS BLDV CALC-SCNC: 0.9 MMOL/L (ref -2–3)
BASOPHILS # BLD AUTO: 0.08 X10*3/UL (ref 0–0.1)
BASOPHILS NFR BLD AUTO: 0.8 %
BILIRUB SERPL-MCNC: 0.4 MG/DL (ref 0–1.2)
BILIRUB SERPL-MCNC: 0.5 MG/DL (ref 0–1.2)
BNP SERPL-MCNC: 44 PG/ML (ref 0–99)
BODY TEMPERATURE: 37 DEGREES CELSIUS
BUN SERPL-MCNC: 10 MG/DL (ref 6–23)
BUN SERPL-MCNC: 15 MG/DL (ref 6–23)
BURR CELLS BLD QL SMEAR: NORMAL
CA-I BLDV-SCNC: 1.25 MMOL/L (ref 1.1–1.33)
CALCIUM SERPL-MCNC: 9.6 MG/DL (ref 8.6–10.6)
CALCIUM SERPL-MCNC: 9.9 MG/DL (ref 8.6–10.3)
CARDIAC TROPONIN I PNL SERPL HS: 3 NG/L (ref 0–34)
CHLORIDE BLDV-SCNC: 109 MMOL/L (ref 98–107)
CHLORIDE SERPL-SCNC: 104 MMOL/L (ref 98–107)
CHLORIDE SERPL-SCNC: 106 MMOL/L (ref 98–107)
CO2 SERPL-SCNC: 22 MMOL/L (ref 21–32)
CO2 SERPL-SCNC: 27 MMOL/L (ref 21–32)
CREAT SERPL-MCNC: 1.04 MG/DL (ref 0.5–1.05)
CREAT SERPL-MCNC: 1.36 MG/DL (ref 0.5–1.05)
D DIMER PPP FEU-MCNC: ABNORMAL NG/ML FEU
DACRYOCYTES BLD QL SMEAR: NORMAL
EGFRCR SERPLBLD CKD-EPI 2021: 43 ML/MIN/1.73M*2
EGFRCR SERPLBLD CKD-EPI 2021: 59 ML/MIN/1.73M*2
EOSINOPHIL # BLD AUTO: 0.2 X10*3/UL (ref 0–0.7)
EOSINOPHIL NFR BLD AUTO: 1.9 %
ERYTHROCYTE [DISTWIDTH] IN BLOOD BY AUTOMATED COUNT: 30.3 % (ref 11.5–14.5)
ERYTHROCYTE [DISTWIDTH] IN BLOOD BY AUTOMATED COUNT: ABNORMAL %
FOLATE SERPL-MCNC: 10 NG/ML
GIANT PLATELETS BLD QL SMEAR: NORMAL
GLUCOSE BLDV-MCNC: 210 MG/DL (ref 74–99)
GLUCOSE SERPL-MCNC: 113 MG/DL (ref 74–99)
GLUCOSE SERPL-MCNC: 183 MG/DL (ref 74–99)
HCO3 BLDV-SCNC: 25.2 MMOL/L (ref 22–26)
HCT VFR BLD AUTO: 32.4 % (ref 36–46)
HCT VFR BLD AUTO: 37.4 % (ref 36–46)
HCT VFR BLD EST: 31 % (ref 36–46)
HGB BLD-MCNC: 10 G/DL (ref 12–16)
HGB BLD-MCNC: 10.6 G/DL (ref 12–16)
HGB BLDV-MCNC: 10.4 G/DL (ref 12–16)
HGB RETIC QN: 27 PG (ref 28–38)
IMM GRANULOCYTES # BLD AUTO: 0.04 X10*3/UL (ref 0–0.7)
IMM GRANULOCYTES NFR BLD AUTO: 0.4 % (ref 0–0.9)
IMMATURE RETIC FRACTION: 19.5 %
INR PPP: 1.1 (ref 0.9–1.1)
LACTATE BLDV-SCNC: 1.5 MMOL/L (ref 0.4–2)
LYMPHOCYTES # BLD AUTO: 1.48 X10*3/UL (ref 1.2–4.8)
LYMPHOCYTES NFR BLD AUTO: 13.9 %
MCH RBC QN AUTO: 21.1 PG (ref 26–34)
MCH RBC QN AUTO: 21.2 PG (ref 26–34)
MCHC RBC AUTO-ENTMCNC: 28.3 G/DL (ref 32–36)
MCHC RBC AUTO-ENTMCNC: 30.9 G/DL (ref 32–36)
MCV RBC AUTO: 69 FL (ref 80–100)
MCV RBC AUTO: 74 FL (ref 80–100)
MONOCYTES # BLD AUTO: 0.47 X10*3/UL (ref 0.1–1)
MONOCYTES NFR BLD AUTO: 4.4 %
NEUTROPHILS # BLD AUTO: 8.39 X10*3/UL (ref 1.2–7.7)
NEUTROPHILS NFR BLD AUTO: 78.6 %
NRBC BLD-RTO: 0 /100 WBCS (ref 0–0)
NRBC BLD-RTO: 0 /100 WBCS (ref 0–0)
OVALOCYTES BLD QL SMEAR: NORMAL
OXYHGB MFR BLDV: 87.1 % (ref 45–75)
P AXIS: 70 DEGREES
P OFFSET: 192 MS
P ONSET: 140 MS
PCO2 BLDV: 38 MM HG (ref 41–51)
PH BLDV: 7.43 PH (ref 7.33–7.43)
PLATELET # BLD AUTO: 373 X10*3/UL (ref 150–450)
PLATELET # BLD AUTO: 473 X10*3/UL (ref 150–450)
PO2 BLDV: 59 MM HG (ref 35–45)
POLYCHROMASIA BLD QL SMEAR: NORMAL
POTASSIUM BLDV-SCNC: 4.2 MMOL/L (ref 3.5–5.3)
POTASSIUM SERPL-SCNC: 4.3 MMOL/L (ref 3.5–5.3)
POTASSIUM SERPL-SCNC: 4.6 MMOL/L (ref 3.5–5.3)
PR INTERVAL: 164 MS
PROT SERPL-MCNC: 6.8 G/DL (ref 6.4–8.2)
PROT SERPL-MCNC: 7.6 G/DL (ref 6.4–8.2)
PROT SERPL-MCNC: 7.8 G/DL (ref 6.4–8.2)
PROTHROMBIN TIME: 12.4 SECONDS (ref 9.8–12.8)
Q ONSET: 222 MS
QRS COUNT: 13 BEATS
QRS DURATION: 78 MS
QT INTERVAL: 368 MS
QTC CALCULATION(BAZETT): 427 MS
QTC FREDERICIA: 406 MS
R AXIS: 41 DEGREES
RBC # BLD AUTO: 4.71 X10*6/UL (ref 4–5.2)
RBC # BLD AUTO: 5.03 X10*6/UL (ref 4–5.2)
RBC MORPH BLD: NORMAL
RETICS #: 0.07 X10*6/UL (ref 0.02–0.11)
RETICS/RBC NFR AUTO: 1.3 % (ref 0.5–2)
RH FACTOR (ANTIGEN D): NORMAL
SAO2 % BLDV: 89 % (ref 45–75)
SCHISTOCYTES BLD QL SMEAR: NORMAL
SODIUM BLDV-SCNC: 138 MMOL/L (ref 136–145)
SODIUM SERPL-SCNC: 140 MMOL/L (ref 136–145)
SODIUM SERPL-SCNC: 140 MMOL/L (ref 136–145)
T AXIS: 50 DEGREES
T OFFSET: 406 MS
VENTRICULAR RATE: 81 BPM
WBC # BLD AUTO: 10.7 X10*3/UL (ref 4.4–11.3)
WBC # BLD AUTO: 12.8 X10*3/UL (ref 4.4–11.3)

## 2024-02-05 PROCEDURE — 36415 COLL VENOUS BLD VENIPUNCTURE: CPT

## 2024-02-05 PROCEDURE — 1125F AMNT PAIN NOTED PAIN PRSNT: CPT | Performed by: STUDENT IN AN ORGANIZED HEALTH CARE EDUCATION/TRAINING PROGRAM

## 2024-02-05 PROCEDURE — 80053 COMPREHEN METABOLIC PANEL: CPT | Mod: MUE | Performed by: STUDENT IN AN ORGANIZED HEALTH CARE EDUCATION/TRAINING PROGRAM

## 2024-02-05 PROCEDURE — 93971 EXTREMITY STUDY: CPT

## 2024-02-05 PROCEDURE — 1159F MED LIST DOCD IN RCRD: CPT | Performed by: STUDENT IN AN ORGANIZED HEALTH CARE EDUCATION/TRAINING PROGRAM

## 2024-02-05 PROCEDURE — 85025 COMPLETE CBC W/AUTO DIFF WBC: CPT

## 2024-02-05 PROCEDURE — 84484 ASSAY OF TROPONIN QUANT: CPT | Performed by: EMERGENCY MEDICINE

## 2024-02-05 PROCEDURE — 85379 FIBRIN DEGRADATION QUANT: CPT | Performed by: STUDENT IN AN ORGANIZED HEALTH CARE EDUCATION/TRAINING PROGRAM

## 2024-02-05 PROCEDURE — 96375 TX/PRO/DX INJ NEW DRUG ADDON: CPT

## 2024-02-05 PROCEDURE — 93010 ELECTROCARDIOGRAM REPORT: CPT | Performed by: EMERGENCY MEDICINE

## 2024-02-05 PROCEDURE — 36415 COLL VENOUS BLD VENIPUNCTURE: CPT | Performed by: EMERGENCY MEDICINE

## 2024-02-05 PROCEDURE — 71046 X-RAY EXAM CHEST 2 VIEWS: CPT

## 2024-02-05 PROCEDURE — 3077F SYST BP >= 140 MM HG: CPT | Performed by: STUDENT IN AN ORGANIZED HEALTH CARE EDUCATION/TRAINING PROGRAM

## 2024-02-05 PROCEDURE — 96374 THER/PROPH/DIAG INJ IV PUSH: CPT

## 2024-02-05 PROCEDURE — 99215 OFFICE O/P EST HI 40 MIN: CPT | Performed by: STUDENT IN AN ORGANIZED HEALTH CARE EDUCATION/TRAINING PROGRAM

## 2024-02-05 PROCEDURE — 83880 ASSAY OF NATRIURETIC PEPTIDE: CPT | Performed by: STUDENT IN AN ORGANIZED HEALTH CARE EDUCATION/TRAINING PROGRAM

## 2024-02-05 PROCEDURE — 1160F RVW MEDS BY RX/DR IN RCRD: CPT | Performed by: STUDENT IN AN ORGANIZED HEALTH CARE EDUCATION/TRAINING PROGRAM

## 2024-02-05 PROCEDURE — 3008F BODY MASS INDEX DOCD: CPT | Performed by: STUDENT IN AN ORGANIZED HEALTH CARE EDUCATION/TRAINING PROGRAM

## 2024-02-05 PROCEDURE — 86320 SERUM IMMUNOELECTROPHORESIS: CPT | Performed by: STUDENT IN AN ORGANIZED HEALTH CARE EDUCATION/TRAINING PROGRAM

## 2024-02-05 PROCEDURE — 84132 ASSAY OF SERUM POTASSIUM: CPT

## 2024-02-05 PROCEDURE — 86334 IMMUNOFIX E-PHORESIS SERUM: CPT | Performed by: STUDENT IN AN ORGANIZED HEALTH CARE EDUCATION/TRAINING PROGRAM

## 2024-02-05 PROCEDURE — 82746 ASSAY OF FOLIC ACID SERUM: CPT | Performed by: STUDENT IN AN ORGANIZED HEALTH CARE EDUCATION/TRAINING PROGRAM

## 2024-02-05 PROCEDURE — 93971 EXTREMITY STUDY: CPT | Performed by: RADIOLOGY

## 2024-02-05 PROCEDURE — 85027 COMPLETE CBC AUTOMATED: CPT | Performed by: STUDENT IN AN ORGANIZED HEALTH CARE EDUCATION/TRAINING PROGRAM

## 2024-02-05 PROCEDURE — 83521 IG LIGHT CHAINS FREE EACH: CPT | Performed by: STUDENT IN AN ORGANIZED HEALTH CARE EDUCATION/TRAINING PROGRAM

## 2024-02-05 PROCEDURE — 1036F TOBACCO NON-USER: CPT | Performed by: STUDENT IN AN ORGANIZED HEALTH CARE EDUCATION/TRAINING PROGRAM

## 2024-02-05 PROCEDURE — 93005 ELECTROCARDIOGRAM TRACING: CPT

## 2024-02-05 PROCEDURE — 85045 AUTOMATED RETICULOCYTE COUNT: CPT

## 2024-02-05 PROCEDURE — 3079F DIAST BP 80-89 MM HG: CPT | Performed by: STUDENT IN AN ORGANIZED HEALTH CARE EDUCATION/TRAINING PROGRAM

## 2024-02-05 PROCEDURE — 1111F DSCHRG MED/CURRENT MED MERGE: CPT | Performed by: STUDENT IN AN ORGANIZED HEALTH CARE EDUCATION/TRAINING PROGRAM

## 2024-02-05 PROCEDURE — 84132 ASSAY OF SERUM POTASSIUM: CPT | Performed by: EMERGENCY MEDICINE

## 2024-02-05 PROCEDURE — 71046 X-RAY EXAM CHEST 2 VIEWS: CPT | Performed by: RADIOLOGY

## 2024-02-05 PROCEDURE — 99215 OFFICE O/P EST HI 40 MIN: CPT | Mod: 25 | Performed by: STUDENT IN AN ORGANIZED HEALTH CARE EDUCATION/TRAINING PROGRAM

## 2024-02-05 PROCEDURE — 99285 EMERGENCY DEPT VISIT HI MDM: CPT | Mod: 25 | Performed by: EMERGENCY MEDICINE

## 2024-02-05 PROCEDURE — 4010F ACE/ARB THERAPY RXD/TAKEN: CPT | Performed by: STUDENT IN AN ORGANIZED HEALTH CARE EDUCATION/TRAINING PROGRAM

## 2024-02-05 PROCEDURE — 85610 PROTHROMBIN TIME: CPT | Performed by: EMERGENCY MEDICINE

## 2024-02-05 PROCEDURE — 80053 COMPREHEN METABOLIC PANEL: CPT

## 2024-02-05 PROCEDURE — 2500000004 HC RX 250 GENERAL PHARMACY W/ HCPCS (ALT 636 FOR OP/ED): Performed by: STUDENT IN AN ORGANIZED HEALTH CARE EDUCATION/TRAINING PROGRAM

## 2024-02-05 PROCEDURE — 86901 BLOOD TYPING SEROLOGIC RH(D): CPT | Performed by: STUDENT IN AN ORGANIZED HEALTH CARE EDUCATION/TRAINING PROGRAM

## 2024-02-05 PROCEDURE — 99285 EMERGENCY DEPT VISIT HI MDM: CPT | Performed by: EMERGENCY MEDICINE

## 2024-02-05 PROCEDURE — 85730 THROMBOPLASTIN TIME PARTIAL: CPT | Performed by: EMERGENCY MEDICINE

## 2024-02-05 PROCEDURE — 84165 PROTEIN E-PHORESIS SERUM: CPT | Performed by: STUDENT IN AN ORGANIZED HEALTH CARE EDUCATION/TRAINING PROGRAM

## 2024-02-05 RX ORDER — FERROUS SULFATE 325(65) MG
325 TABLET ORAL
Qty: 90 TABLET | Refills: 1 | Status: SHIPPED | OUTPATIENT
Start: 2024-02-05 | End: 2024-03-14 | Stop reason: WASHOUT

## 2024-02-05 RX ORDER — DIPHENHYDRAMINE HYDROCHLORIDE 50 MG/ML
50 INJECTION INTRAMUSCULAR; INTRAVENOUS ONCE
Status: COMPLETED | OUTPATIENT
Start: 2024-02-05 | End: 2024-02-06

## 2024-02-05 RX ADMIN — HYDROCORTISONE SODIUM SUCCINATE 200 MG: 250 INJECTION, POWDER, FOR SOLUTION INTRAMUSCULAR; INTRAVENOUS at 20:46

## 2024-02-05 ASSESSMENT — COLUMBIA-SUICIDE SEVERITY RATING SCALE - C-SSRS
1. IN THE PAST MONTH, HAVE YOU WISHED YOU WERE DEAD OR WISHED YOU COULD GO TO SLEEP AND NOT WAKE UP?: NO
6. HAVE YOU EVER DONE ANYTHING, STARTED TO DO ANYTHING, OR PREPARED TO DO ANYTHING TO END YOUR LIFE?: NO
2. HAVE YOU ACTUALLY HAD ANY THOUGHTS OF KILLING YOURSELF?: NO

## 2024-02-05 ASSESSMENT — PAIN SCALES - GENERAL: PAINLEVEL: 5

## 2024-02-05 NOTE — PROGRESS NOTES
Patient ID: Vania Andrews is a 68 y.o. female.  Referring Physician: No referring provider defined for this encounter.  Primary Care Provider: Laura Mata MD  Visit Type: Initial Visit  Diagnosis: Anemia       Subjective    HPI  68 y.o. 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   Has previously seen  at  back in 2021 for similar concerns.     Recently admitted at  for VEDA with Hb ~5, ferritin 21, TIBC >450, hypoproliferative retic, relatively normal coags, admission notes allude to GI bleeding, but pt does not account for this. Folate B12 not checked. Hapto WNL, LDH mildly elevated, bili indirectly elevated also during admission. Was treated with 1 dose of iron sucrose during admission 300mg x1 and transfusions.   Had GIB in 2019, needing transfusions. Were not able to identify where the bleed was from. Was seen at [facility] for VEDA source of bleeding unclear, imaging noted below. Received (IV femuroxytol) on 3/23 and 3/30/2021. Since then at recent admission has had IV iron sucrose.   After DVT was given apixaban for 1yr, but asked to stop after 3 months by her cardiologist  for presumably a provoked VTE event with concern for GI bleeding.   Currently on ASA 81mg daily. Not on PO iron, has tried it in the past but has issues with constipation despite colace.   Age appropriate cancer screening: last colo in '19, last mammo oct '23 has axillary adenopathy   FMH: mother had throat and pancreatic  cancer   Social history: former smoker, quit >20yrs, social drinker, no RDA. No CT/RT, previously worked at [employer] as a nurse  but has stopped working since her right wrist fracture in 2020     Review of Systems - Oncology  10 point review of systems negative except as stated in HPI    Objective   Past Surgical History:   Procedure Laterality Date    BREAST BIOPSY  2016    Biopsy Breast Percutaneous Needle Core     SECTION, CLASSIC  2014     Section    CHOLECYSTECTOMY  2017    Cholecystectomy Laparoscopic    HAND SURGERY  2020    Hand Surgery                                                                                                                                                          HERNIA REPAIR  2014    Hernia Repair    MR HEAD ANGIO WO IV CONTRAST  2014    MR HEAD ANGIO WO IV CONTRAST 2014 [facility]    OTHER SURGICAL HISTORY  2021    Nephrectomy    TOTAL ABDOMINAL HYSTERECTOMY W/ BILATERAL SALPINGOOPHORECTOMY  2014    Total Abdominal Hysterectomy With Removal Of Both Ovaries     Family History   Problem Relation Name Age of Onset    Aneurysm Mother      Hypertension Mother      Pancreatic cancer Mother      Diabetes Mother      Other (laryngeal Cancer) Mother      Heart disease Father      Pulmonary embolism Brother      Breast cancer Father's Sister      Breast cancer Maternal Cousin       Oncology History    No history exists.       Physical Exam  Vitals reviewed.   Constitutional:       General: She is not in acute distress.     Appearance: She is obese. She is not toxic-appearing.   HENT:      Head: Normocephalic and atraumatic.   Pulmonary:      Effort: Pulmonary effort is normal.   Abdominal:      General: There is distension.      Palpations: Abdomen is soft.      Tenderness: There is abdominal tenderness.      Comments: No palpable splenomegaly, abdominal tenderness diffuse    Musculoskeletal:       "Comments: Significant RLE swelling up to mid thigh, LLE not significant swollen.    Neurological:      General: No focal deficit present.      Mental Status: She is oriented to person, place, and time.   Psychiatric:         Mood and Affect: Mood normal.       BSA: There is no height or weight on file to calculate BSA.  /84   Pulse 80   Temp 36.2 °C (97.2 °F) (Core)   Resp 20   LMP  (LMP Unknown)   SpO2 100%     Labs:  Lab Results   Component Value Date    WBC 13.0 (H) 01/27/2024    NEUTROABS 9.96 (H) 01/26/2024    IGABSOL 0.07 01/26/2024    LYMPHSABS 1.64 01/26/2024    MONOSABS 0.71 01/26/2024    EOSABS 0.26 01/26/2024    BASOSABS 0.05 01/26/2024    RBC 4.29 01/27/2024    MCV 71 (L) 01/27/2024    MCHC 28.0 (L) 01/27/2024    HGB 8.5 (L) 01/27/2024    HCT 30.4 (L) 01/27/2024     01/27/2024     Lab Results   Component Value Date    RETICCTPCT 3.9 (H) 01/25/2024      Lab Results   Component Value Date    CREATININE 1.45 (H) 01/27/2024    BUN 16 01/27/2024    EGFR 40 (L) 01/27/2024     01/27/2024    K 3.8 01/27/2024     (H) 01/27/2024    CO2 24 01/27/2024      Lab Results   Component Value Date    ALT 13 01/26/2024    AST 24 01/26/2024    GGT 33 12/23/2020    ALKPHOS 148 (H) 01/26/2024    BILITOT 1.5 (H) 01/26/2024      Lab Results   Component Value Date    TSH 2.41 02/10/2023     Lab Results   Component Value Date    TSH 2.41 02/10/2023     Lab Results   Component Value Date    IRON 51 01/26/2024    TIBC  01/26/2024      Comment:      One or more of the analytes used in this calculation is outside of the analytical measurement range.      FERRITIN 21 01/26/2024      Lab Results   Component Value Date    [id number] 641 02/10/2023      Lab Results   Component Value Date    FOLATE 12.2 03/01/2021     Lab Results   Component Value Date    ERIKA NEGATIVE 12/23/2020    SEDRATE 37 (H) 11/26/2023      Lab Results   Component Value Date    CRP 1.03 (H) 11/26/2023      No results found for: \"MAIKEL\"  Lab " "Results   Component Value Date     (H) 01/25/2024     Lab Results   Component Value Date    HAPTOGLOBIN 196 01/26/2024     Lab Results   Component Value Date    SPEP NORMAL 03/01/2021     No results found for: \"IGG\", \"IGM\", \"IGA\"  No components found for: \"PT\"  aPTT   Date/Time Value Ref Range Status   01/26/2024 01:29 AM 23 (L) 27 - 38 seconds Final   01/25/2024 03:59 PM 18 (L) 27 - 38 seconds Final   11/26/2023 04:18 PM 23 (L) 27 - 38 seconds Final       Assessment/Plan    68 y.o. 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   Has previously seen  at  back in 2021 for similar concerns.      # Anemia: iron deficiency, has chronic microcytosis, but has been normal as recently as 2022. Inpatient work up did not include myeloma labs, nor folate/B12, hemolysis labs were sent hapto was WNL.   Plan:  - folate, B12 and myeloma labs added, she also has CKD which could contribute to the anemia.   - her Hb today is much improved, >10.0, will therefore advise pt to start PO iron script sent to [facility].   - follow up next: 1 month   - labs prior to follow up: CBC/diff, retic, iron panel including ferritin     # RLE swelling: apparently since before recent admission, reports no one commented on the swelling which was assumed to be part of bilateral edema (but the RLE is significantly larger on exam, tender as welll). Concern for DVT expressed to pt, advised ER but pt preferred not to go there, therefore proceeded with " work up and she was given a vascular study apt the following morning. She reported feeling more out of breath this weekend, despite her Hb significantly improved. D-dimer was sent is >13k. Making VTE likely.   Plan:  - called and spoke to pt advising DVT scan and CTA. She is heading [place] [facility]. Spoke with ER attending as well.   - follow up next: pending work up     # LLE DVT: has evidence of chronic clot in the LLE as imaged as recently as Nov '23. But remains off therapy as advised by her cardiologist. Presumably 2/2 GI bleeding risk. Remains on ASA 81mg     Garland Jenkins MD

## 2024-02-05 NOTE — ED TRIAGE NOTES
Pt went to hematology follow up appt today, recently admitted for IV iron, says she has swelling in R leg, was told to come to ED with concern for DVT r/t elevated D dimer, hematology suggests CTA and DVT r/o

## 2024-02-06 ENCOUNTER — APPOINTMENT (OUTPATIENT)
Dept: VASCULAR MEDICINE | Facility: HOSPITAL | Age: 69
End: 2024-02-06
Payer: MEDICARE

## 2024-02-06 ENCOUNTER — TELEPHONE (OUTPATIENT)
Dept: ADMISSION | Facility: HOSPITAL | Age: 69
End: 2024-02-06
Payer: MEDICARE

## 2024-02-06 ENCOUNTER — APPOINTMENT (OUTPATIENT)
Dept: RADIOLOGY | Facility: HOSPITAL | Age: 69
DRG: 176 | End: 2024-02-06
Payer: MEDICARE

## 2024-02-06 DIAGNOSIS — E11.22 TYPE 2 DIABETES MELLITUS WITH STAGE 3A CHRONIC KIDNEY DISEASE, WITHOUT LONG-TERM CURRENT USE OF INSULIN (MULTI): Primary | ICD-10-CM

## 2024-02-06 DIAGNOSIS — N18.31 TYPE 2 DIABETES MELLITUS WITH STAGE 3A CHRONIC KIDNEY DISEASE, WITHOUT LONG-TERM CURRENT USE OF INSULIN (MULTI): Primary | ICD-10-CM

## 2024-02-06 PROBLEM — I26.92 ACUTE SADDLE PULMONARY EMBOLISM WITHOUT ACUTE COR PULMONALE (MULTI): Status: ACTIVE | Noted: 2024-02-06

## 2024-02-06 LAB
ALBUMIN SERPL BCP-MCNC: 3.7 G/DL (ref 3.4–5)
ANION GAP SERPL CALC-SCNC: 14 MMOL/L (ref 10–20)
APTT PPP: 32 SECONDS (ref 27–38)
BASOPHILS # BLD AUTO: 0.03 X10*3/UL (ref 0–0.1)
BASOPHILS NFR BLD AUTO: 0.2 %
BUN SERPL-MCNC: 16 MG/DL (ref 6–23)
BURR CELLS BLD QL SMEAR: NORMAL
CALCIUM SERPL-MCNC: 9.5 MG/DL (ref 8.6–10.6)
CHLORIDE SERPL-SCNC: 106 MMOL/L (ref 98–107)
CO2 SERPL-SCNC: 23 MMOL/L (ref 21–32)
CREAT SERPL-MCNC: 1.26 MG/DL (ref 0.5–1.05)
EGFRCR SERPLBLD CKD-EPI 2021: 47 ML/MIN/1.73M*2
EOSINOPHIL # BLD AUTO: 0 X10*3/UL (ref 0–0.7)
EOSINOPHIL NFR BLD AUTO: 0 %
ERYTHROCYTE [DISTWIDTH] IN BLOOD BY AUTOMATED COUNT: ABNORMAL %
GLUCOSE BLD MANUAL STRIP-MCNC: 225 MG/DL (ref 74–99)
GLUCOSE BLD MANUAL STRIP-MCNC: 311 MG/DL (ref 74–99)
GLUCOSE SERPL-MCNC: 275 MG/DL (ref 74–99)
HCT VFR BLD AUTO: 34.3 % (ref 36–46)
HGB BLD-MCNC: 9.9 G/DL (ref 12–16)
HYPOCHROMIA BLD QL SMEAR: NORMAL
IMM GRANULOCYTES # BLD AUTO: 0.07 X10*3/UL (ref 0–0.7)
IMM GRANULOCYTES NFR BLD AUTO: 0.6 % (ref 0–0.9)
KAPPA LC SERPL-MCNC: 4.77 MG/DL (ref 0.33–1.94)
KAPPA LC/LAMBDA SER: 1.64 {RATIO} (ref 0.26–1.65)
LAMBDA LC SERPL-MCNC: 2.91 MG/DL (ref 0.57–2.63)
LYMPHOCYTES # BLD AUTO: 0.59 X10*3/UL (ref 1.2–4.8)
LYMPHOCYTES NFR BLD AUTO: 4.8 %
MAGNESIUM SERPL-MCNC: 2.19 MG/DL (ref 1.6–2.4)
MCH RBC QN AUTO: 21.6 PG (ref 26–34)
MCHC RBC AUTO-ENTMCNC: 28.9 G/DL (ref 32–36)
MCV RBC AUTO: 75 FL (ref 80–100)
MONOCYTES # BLD AUTO: 0.05 X10*3/UL (ref 0.1–1)
MONOCYTES NFR BLD AUTO: 0.4 %
NEUTROPHILS # BLD AUTO: 11.5 X10*3/UL (ref 1.2–7.7)
NEUTROPHILS NFR BLD AUTO: 94 %
NRBC BLD-RTO: 0 /100 WBCS (ref 0–0)
OVALOCYTES BLD QL SMEAR: NORMAL
PHOSPHATE SERPL-MCNC: 3.1 MG/DL (ref 2.5–4.9)
PLATELET # BLD AUTO: 458 X10*3/UL (ref 150–450)
POLYCHROMASIA BLD QL SMEAR: NORMAL
POTASSIUM SERPL-SCNC: 4.9 MMOL/L (ref 3.5–5.3)
RBC # BLD AUTO: 4.59 X10*6/UL (ref 4–5.2)
RBC MORPH BLD: NORMAL
SODIUM SERPL-SCNC: 138 MMOL/L (ref 136–145)
UFH PPP CHRO-ACNC: 0.1 IU/ML
UFH PPP CHRO-ACNC: 0.6 IU/ML
UFH PPP CHRO-ACNC: 1 IU/ML
UFH PPP CHRO-ACNC: 1.7 IU/ML
WBC # BLD AUTO: 12.2 X10*3/UL (ref 4.4–11.3)

## 2024-02-06 PROCEDURE — 2500000004 HC RX 250 GENERAL PHARMACY W/ HCPCS (ALT 636 FOR OP/ED)

## 2024-02-06 PROCEDURE — 2500000001 HC RX 250 WO HCPCS SELF ADMINISTERED DRUGS (ALT 637 FOR MEDICARE OP): Performed by: NURSE PRACTITIONER

## 2024-02-06 PROCEDURE — 85520 HEPARIN ASSAY: CPT | Mod: 91

## 2024-02-06 PROCEDURE — 71275 CT ANGIOGRAPHY CHEST: CPT | Performed by: RADIOLOGY

## 2024-02-06 PROCEDURE — 2500000004 HC RX 250 GENERAL PHARMACY W/ HCPCS (ALT 636 FOR OP/ED): Performed by: STUDENT IN AN ORGANIZED HEALTH CARE EDUCATION/TRAINING PROGRAM

## 2024-02-06 PROCEDURE — 36415 COLL VENOUS BLD VENIPUNCTURE: CPT

## 2024-02-06 PROCEDURE — 2500000001 HC RX 250 WO HCPCS SELF ADMINISTERED DRUGS (ALT 637 FOR MEDICARE OP): Performed by: PSYCHOLOGIST

## 2024-02-06 PROCEDURE — 83735 ASSAY OF MAGNESIUM: CPT | Performed by: NURSE PRACTITIONER

## 2024-02-06 PROCEDURE — 96375 TX/PRO/DX INJ NEW DRUG ADDON: CPT

## 2024-02-06 PROCEDURE — 71275 CT ANGIOGRAPHY CHEST: CPT

## 2024-02-06 PROCEDURE — 82947 ASSAY GLUCOSE BLOOD QUANT: CPT

## 2024-02-06 PROCEDURE — 36415 COLL VENOUS BLD VENIPUNCTURE: CPT | Performed by: NURSE PRACTITIONER

## 2024-02-06 PROCEDURE — 2500000004 HC RX 250 GENERAL PHARMACY W/ HCPCS (ALT 636 FOR OP/ED): Performed by: NURSE PRACTITIONER

## 2024-02-06 PROCEDURE — 96374 THER/PROPH/DIAG INJ IV PUSH: CPT

## 2024-02-06 PROCEDURE — 85025 COMPLETE CBC W/AUTO DIFF WBC: CPT | Performed by: NURSE PRACTITIONER

## 2024-02-06 PROCEDURE — 85730 THROMBOPLASTIN TIME PARTIAL: CPT

## 2024-02-06 PROCEDURE — 2060000001 HC INTERMEDIATE ICU ROOM DAILY

## 2024-02-06 PROCEDURE — 2500000002 HC RX 250 W HCPCS SELF ADMINISTERED DRUGS (ALT 637 FOR MEDICARE OP, ALT 636 FOR OP/ED): Performed by: NURSE PRACTITIONER

## 2024-02-06 PROCEDURE — 80069 RENAL FUNCTION PANEL: CPT | Performed by: NURSE PRACTITIONER

## 2024-02-06 PROCEDURE — 2550000001 HC RX 255 CONTRASTS: Performed by: EMERGENCY MEDICINE

## 2024-02-06 RX ORDER — BETAMETHASONE DIPROPIONATE 0.5 MG/G
1 CREAM TOPICAL DAILY
Status: DISCONTINUED | OUTPATIENT
Start: 2024-02-06 | End: 2024-02-17 | Stop reason: HOSPADM

## 2024-02-06 RX ORDER — ACETAMINOPHEN 325 MG/1
650 TABLET ORAL EVERY 4 HOURS PRN
Status: DISCONTINUED | OUTPATIENT
Start: 2024-02-06 | End: 2024-02-17 | Stop reason: HOSPADM

## 2024-02-06 RX ORDER — INSULIN LISPRO 100 [IU]/ML
0-5 INJECTION, SOLUTION INTRAVENOUS; SUBCUTANEOUS
Status: DISCONTINUED | OUTPATIENT
Start: 2024-02-06 | End: 2024-02-11

## 2024-02-06 RX ORDER — PANTOPRAZOLE SODIUM 40 MG/1
40 TABLET, DELAYED RELEASE ORAL 2 TIMES DAILY
Status: DISCONTINUED | OUTPATIENT
Start: 2024-02-07 | End: 2024-02-09

## 2024-02-06 RX ORDER — FAMOTIDINE 20 MG/1
20 TABLET, FILM COATED ORAL NIGHTLY
Status: DISCONTINUED | OUTPATIENT
Start: 2024-02-06 | End: 2024-02-17 | Stop reason: HOSPADM

## 2024-02-06 RX ORDER — HEPARIN SODIUM 5000 [USP'U]/ML
3000-6000 INJECTION, SOLUTION INTRAVENOUS; SUBCUTANEOUS EVERY 4 HOURS PRN
Status: DISCONTINUED | OUTPATIENT
Start: 2024-02-06 | End: 2024-02-16

## 2024-02-06 RX ORDER — DEXTROSE MONOHYDRATE 100 MG/ML
0.3 INJECTION, SOLUTION INTRAVENOUS ONCE AS NEEDED
Status: DISCONTINUED | OUTPATIENT
Start: 2024-02-06 | End: 2024-02-17 | Stop reason: HOSPADM

## 2024-02-06 RX ORDER — PANTOPRAZOLE SODIUM 40 MG/1
40 TABLET, DELAYED RELEASE ORAL
Status: DISCONTINUED | OUTPATIENT
Start: 2024-02-06 | End: 2024-02-06

## 2024-02-06 RX ORDER — HEPARIN SODIUM 5000 [USP'U]/ML
80 INJECTION, SOLUTION INTRAVENOUS; SUBCUTANEOUS ONCE
Status: COMPLETED | OUTPATIENT
Start: 2024-02-06 | End: 2024-02-06

## 2024-02-06 RX ORDER — DEXTROSE 50 % IN WATER (D50W) INTRAVENOUS SYRINGE
25
Status: DISCONTINUED | OUTPATIENT
Start: 2024-02-06 | End: 2024-02-17 | Stop reason: HOSPADM

## 2024-02-06 RX ORDER — LOSARTAN POTASSIUM 25 MG/1
25 TABLET ORAL DAILY
Status: DISCONTINUED | OUTPATIENT
Start: 2024-02-06 | End: 2024-02-08

## 2024-02-06 RX ORDER — BENZONATATE 100 MG/1
200 CAPSULE ORAL 3 TIMES DAILY PRN
Status: DISCONTINUED | OUTPATIENT
Start: 2024-02-06 | End: 2024-02-17 | Stop reason: HOSPADM

## 2024-02-06 RX ORDER — ROSUVASTATIN CALCIUM 20 MG/1
20 TABLET, COATED ORAL DAILY
Status: DISCONTINUED | OUTPATIENT
Start: 2024-02-06 | End: 2024-02-08

## 2024-02-06 RX ORDER — METOPROLOL SUCCINATE 50 MG/1
200 TABLET, EXTENDED RELEASE ORAL DAILY
Status: DISCONTINUED | OUTPATIENT
Start: 2024-02-06 | End: 2024-02-17 | Stop reason: HOSPADM

## 2024-02-06 RX ORDER — POLYETHYLENE GLYCOL 3350 17 G/17G
17 POWDER, FOR SOLUTION ORAL DAILY PRN
Status: DISCONTINUED | OUTPATIENT
Start: 2024-02-06 | End: 2024-02-07

## 2024-02-06 RX ORDER — BRIMONIDINE TARTRATE AND TIMOLOL MALEATE 2; 5 MG/ML; MG/ML
1 SOLUTION OPHTHALMIC 2 TIMES DAILY
Status: DISCONTINUED | OUTPATIENT
Start: 2024-02-06 | End: 2024-02-06

## 2024-02-06 RX ORDER — TIMOLOL MALEATE 5 MG/ML
1 SOLUTION/ DROPS OPHTHALMIC 2 TIMES DAILY
Status: DISCONTINUED | OUTPATIENT
Start: 2024-02-06 | End: 2024-02-17 | Stop reason: HOSPADM

## 2024-02-06 RX ORDER — HEPARIN SODIUM 10000 [USP'U]/100ML
0-4500 INJECTION, SOLUTION INTRAVENOUS CONTINUOUS
Status: DISCONTINUED | OUTPATIENT
Start: 2024-02-06 | End: 2024-02-16

## 2024-02-06 RX ORDER — ALBUTEROL SULFATE 90 UG/1
2 AEROSOL, METERED RESPIRATORY (INHALATION) EVERY 4 HOURS PRN
Status: DISCONTINUED | OUTPATIENT
Start: 2024-02-06 | End: 2024-02-17 | Stop reason: HOSPADM

## 2024-02-06 RX ORDER — LATANOPROST 50 UG/ML
1 SOLUTION/ DROPS OPHTHALMIC EVERY MORNING
Status: DISCONTINUED | OUTPATIENT
Start: 2024-02-06 | End: 2024-02-17 | Stop reason: HOSPADM

## 2024-02-06 RX ORDER — AMLODIPINE BESYLATE 10 MG/1
10 TABLET ORAL DAILY
Status: DISCONTINUED | OUTPATIENT
Start: 2024-02-06 | End: 2024-02-17 | Stop reason: HOSPADM

## 2024-02-06 RX ORDER — LORATADINE 10 MG/1
10 TABLET ORAL DAILY
Status: DISCONTINUED | OUTPATIENT
Start: 2024-02-06 | End: 2024-02-17 | Stop reason: HOSPADM

## 2024-02-06 RX ORDER — BRIMONIDINE TARTRATE 2 MG/ML
1 SOLUTION/ DROPS OPHTHALMIC 2 TIMES DAILY
Status: DISCONTINUED | OUTPATIENT
Start: 2024-02-06 | End: 2024-02-17 | Stop reason: HOSPADM

## 2024-02-06 RX ORDER — DICYCLOMINE HYDROCHLORIDE 10 MG/1
20 CAPSULE ORAL 3 TIMES DAILY
Status: DISCONTINUED | OUTPATIENT
Start: 2024-02-06 | End: 2024-02-17 | Stop reason: HOSPADM

## 2024-02-06 RX ADMIN — HEPARIN SODIUM 1600 UNITS/HR: 10000 INJECTION, SOLUTION INTRAVENOUS at 03:30

## 2024-02-06 RX ADMIN — BETAMETHASONE DIPROPIONATE 1 APPLICATION: 0.5 CREAM TOPICAL at 23:23

## 2024-02-06 RX ADMIN — INSULIN LISPRO 4 UNITS: 100 INJECTION, SOLUTION INTRAVENOUS; SUBCUTANEOUS at 18:45

## 2024-02-06 RX ADMIN — ROSUVASTATIN CALCIUM 20 MG: 20 TABLET, FILM COATED ORAL at 08:58

## 2024-02-06 RX ADMIN — ACETAMINOPHEN 650 MG: 325 TABLET ORAL at 13:42

## 2024-02-06 RX ADMIN — AMLODIPINE BESYLATE 10 MG: 10 TABLET ORAL at 08:45

## 2024-02-06 RX ADMIN — DIPHENHYDRAMINE HYDROCHLORIDE 50 MG: 50 INJECTION INTRAMUSCULAR; INTRAVENOUS at 00:39

## 2024-02-06 RX ADMIN — INSULIN LISPRO 2 UNITS: 100 INJECTION, SOLUTION INTRAVENOUS; SUBCUTANEOUS at 10:37

## 2024-02-06 RX ADMIN — DICYCLOMINE HYDROCHLORIDE 20 MG: 10 CAPSULE ORAL at 08:43

## 2024-02-06 RX ADMIN — METOPROLOL SUCCINATE 200 MG: 100 TABLET, EXTENDED RELEASE ORAL at 08:43

## 2024-02-06 RX ADMIN — BRIMONIDINE TARTRATE 1 DROP: 2 SOLUTION/ DROPS OPHTHALMIC at 20:42

## 2024-02-06 RX ADMIN — BRIMONIDINE TARTRATE 1 DROP: 2 SOLUTION/ DROPS OPHTHALMIC at 10:34

## 2024-02-06 RX ADMIN — TIMOLOL MALEATE 1 DROP: 5 SOLUTION/ DROPS OPHTHALMIC at 20:43

## 2024-02-06 RX ADMIN — HYDROCORTISONE SODIUM SUCCINATE 200 MG: 250 INJECTION, POWDER, FOR SOLUTION INTRAMUSCULAR; INTRAVENOUS at 00:39

## 2024-02-06 RX ADMIN — LOSARTAN POTASSIUM 25 MG: 25 TABLET, FILM COATED ORAL at 08:50

## 2024-02-06 RX ADMIN — IOHEXOL 64 ML: 350 INJECTION, SOLUTION INTRAVENOUS at 01:52

## 2024-02-06 RX ADMIN — TIMOLOL MALEATE 1 DROP: 5 SOLUTION/ DROPS OPHTHALMIC at 10:34

## 2024-02-06 RX ADMIN — FAMOTIDINE 20 MG: 20 TABLET, FILM COATED ORAL at 23:04

## 2024-02-06 RX ADMIN — LORATADINE 10 MG: 10 TABLET ORAL at 08:45

## 2024-02-06 RX ADMIN — BETAMETHASONE DIPROPIONATE 1 APPLICATION: 0.5 CREAM TOPICAL at 08:58

## 2024-02-06 RX ADMIN — ACETAMINOPHEN 650 MG: 325 TABLET ORAL at 23:04

## 2024-02-06 RX ADMIN — HEPARIN SODIUM 7250 UNITS: 5000 INJECTION INTRAVENOUS; SUBCUTANEOUS at 03:30

## 2024-02-06 SDOH — SOCIAL STABILITY: SOCIAL INSECURITY: ABUSE: ADULT

## 2024-02-06 SDOH — SOCIAL STABILITY: SOCIAL INSECURITY: ARE YOU OR HAVE YOU BEEN THREATENED OR ABUSED PHYSICALLY, EMOTIONALLY, OR SEXUALLY BY ANYONE?: NO

## 2024-02-06 SDOH — SOCIAL STABILITY: SOCIAL INSECURITY: DO YOU FEEL ANYONE HAS EXPLOITED OR TAKEN ADVANTAGE OF YOU FINANCIALLY OR OF YOUR PERSONAL PROPERTY?: NO

## 2024-02-06 SDOH — SOCIAL STABILITY: SOCIAL INSECURITY: HAS ANYONE EVER THREATENED TO HURT YOUR FAMILY OR YOUR PETS?: NO

## 2024-02-06 SDOH — SOCIAL STABILITY: SOCIAL INSECURITY: DO YOU FEEL UNSAFE GOING BACK TO THE PLACE WHERE YOU ARE LIVING?: NO

## 2024-02-06 SDOH — SOCIAL STABILITY: SOCIAL INSECURITY: DOES ANYONE TRY TO KEEP YOU FROM HAVING/CONTACTING OTHER FRIENDS OR DOING THINGS OUTSIDE YOUR HOME?: NO

## 2024-02-06 SDOH — SOCIAL STABILITY: SOCIAL INSECURITY: WERE YOU ABLE TO COMPLETE ALL THE BEHAVIORAL HEALTH SCREENINGS?: YES

## 2024-02-06 SDOH — SOCIAL STABILITY: SOCIAL INSECURITY: ARE THERE ANY APPARENT SIGNS OF INJURIES/BEHAVIORS THAT COULD BE RELATED TO ABUSE/NEGLECT?: NO

## 2024-02-06 ASSESSMENT — ENCOUNTER SYMPTOMS
ALLERGIC/IMMUNOLOGIC NEGATIVE: 1
ARTHRALGIAS: 1
GASTROINTESTINAL NEGATIVE: 1
PSYCHIATRIC NEGATIVE: 1
ACTIVITY CHANGE: 1
HEMATOLOGIC/LYMPHATIC NEGATIVE: 1
WEAKNESS: 1
SHORTNESS OF BREATH: 1
EYES NEGATIVE: 1
ENDOCRINE NEGATIVE: 1

## 2024-02-06 ASSESSMENT — PAIN SCALES - GENERAL
PAINLEVEL_OUTOF10: 0 - NO PAIN
PAINLEVEL_OUTOF10: 4
PAINLEVEL_OUTOF10: 0 - NO PAIN
PAINLEVEL_OUTOF10: 3

## 2024-02-06 ASSESSMENT — PAIN - FUNCTIONAL ASSESSMENT
PAIN_FUNCTIONAL_ASSESSMENT: 0-10

## 2024-02-06 ASSESSMENT — ACTIVITIES OF DAILY LIVING (ADL)
WALKS IN HOME: INDEPENDENT
TOILETING: INDEPENDENT
GROOMING: NEEDS ASSISTANCE
HEARING - RIGHT EAR: FUNCTIONAL
HEARING - LEFT EAR: FUNCTIONAL
JUDGMENT_ADEQUATE_SAFELY_COMPLETE_DAILY_ACTIVITIES: NO
FEEDING YOURSELF: INDEPENDENT
PATIENT'S MEMORY ADEQUATE TO SAFELY COMPLETE DAILY ACTIVITIES?: NO
BATHING: NEEDS ASSISTANCE
ADEQUATE_TO_COMPLETE_ADL: NO
DRESSING YOURSELF: NEEDS ASSISTANCE

## 2024-02-06 ASSESSMENT — COGNITIVE AND FUNCTIONAL STATUS - GENERAL
MOVING FROM LYING ON BACK TO SITTING ON SIDE OF FLAT BED WITH BEDRAILS: A LITTLE
MOVING TO AND FROM BED TO CHAIR: A LITTLE
PERSONAL GROOMING: A LITTLE
CLIMB 3 TO 5 STEPS WITH RAILING: A LITTLE
DRESSING REGULAR LOWER BODY CLOTHING: A LITTLE
DRESSING REGULAR LOWER BODY CLOTHING: A LITTLE
CLIMB 3 TO 5 STEPS WITH RAILING: A LITTLE
MOVING TO AND FROM BED TO CHAIR: A LITTLE
MOBILITY SCORE: 18
HELP NEEDED FOR BATHING: A LITTLE
DRESSING REGULAR UPPER BODY CLOTHING: A LITTLE
DAILY ACTIVITIY SCORE: 18
STANDING UP FROM CHAIR USING ARMS: A LITTLE
MOVING FROM LYING ON BACK TO SITTING ON SIDE OF FLAT BED WITH BEDRAILS: A LITTLE
WALKING IN HOSPITAL ROOM: A LITTLE
HELP NEEDED FOR BATHING: A LITTLE
PATIENT BASELINE BEDBOUND: NO
EATING MEALS: A LITTLE
EATING MEALS: A LITTLE
STANDING UP FROM CHAIR USING ARMS: A LITTLE
DAILY ACTIVITIY SCORE: 18
MOBILITY SCORE: 18
CLIMB 3 TO 5 STEPS WITH RAILING: A LITTLE
DRESSING REGULAR UPPER BODY CLOTHING: A LITTLE
WALKING IN HOSPITAL ROOM: A LITTLE
TOILETING: A LITTLE
TOILETING: A LITTLE
PERSONAL GROOMING: A LITTLE
TURNING FROM BACK TO SIDE WHILE IN FLAT BAD: A LITTLE
TURNING FROM BACK TO SIDE WHILE IN FLAT BAD: A LITTLE
MOBILITY SCORE: 23

## 2024-02-06 ASSESSMENT — PAIN DESCRIPTION - DESCRIPTORS: DESCRIPTORS: ACHING

## 2024-02-06 NOTE — ED PROVIDER NOTES
HPI:  68-year-old female with a past medical history of DVT previously on anticoagulation, no longer on Eliquis, anemia, CKD, HLD, asthma, diabetes, CAD, MI, left-sided nephrectomy in the setting of concern for adenoma, presenting to the emergency department for elevated D-dimer outpatient.  Patient states that she was recently admitted to the hospital in the setting of acute on chronic anemia, dyspnea with chronic cough.  Recommended to follow-up outpatient with GI, cardiology and PCP.  Had outpatient lab workup showing elevated D-dimer, PCP recommended to come to the ER for further workup for PE and right lower extremity blood clot as she has been having swelling and pain of the right lower extremity.,    Limitations to History: no limitation     External Records Reviewed: I reviewed recent and relevant outside records including: outpt charts     ------------------------------------------------------------------------------------------------------------------------------------------    Physical Exam:    ED Triage Vitals    Temperature Heart Rate Respirations BP   36.6 °C (97.9 °F) 89 16 149/79      Pulse Ox Temp Source Heart Rate Source Patient Position   98 % Temporal -- --      BP Location FiO2 (%)     -- --         Gen: Alert, NAD.  Sitting comfortably in bed, no acute distress.  Vital signs stable.  Head/Neck: NCAT, neck w/ FROM  Eyes: EOMI, PERRL, anicteric sclerae, noninjected conjunctivae  Nose: Nares patent w/o rhinorrhea  Mouth:  MMM, no OP lesions noted  Heart: RRR, well perfused  Lungs: CTA b/l no RRW, no increased work of breathing  Abdomen: soft, NT, ND, no rebound guarding or rigidity  Extremities: Warm, well perfused. Compartments soft, patient does have minimal right lower extremity tenderness, edema compared to left.  Distal pulses intact.  No overlying skin changes.  Neurologic: Alert, symmetrical facies, phonates clearly, moves all extremities equally, responsive to touch  Skin: warm, dry    Psychological: calm, no SI/HI    ------------------------------------------------------------------------------------------------------------------------------------------    Medical Decision Making  68-year-old female the past medical history of DVT no longer on anticoagulation, anemia, CKD, hyperlipidemia, asthma, diabetes, CAD, prior MI, nephrectomy status post adenoma, presenting to the emergency department with concern for outpatient elevated D-dimer.  Does endorse that she has been feeling increasingly short of breath since this weekend.  Denies any chest pain.  Endorses right lower extremity edema and pain.  States that she does have a chronic left lower extremity clot, not currently on anticoagulation, states she is unsure why however believes it may have to do with a prior GI bleed in November of this past year.  Denies any black or bloody stool recently, hematemesis or hemoptysis.  On arrival, patient endorses mild shortness of breath, vital signs are stable, patient is nontoxic.  Labs reviewed, does show elevated dimer.  Right lower extremity duplex ultrasound ordered.  She does have a allergic reaction to IV contrast, has been able to tolerate contrast after prep with Benadryl and steroids in the past.  Will plan on 4-hour accelerated prep, and CTA.  Right lower extremity does show chronic DVT, CT PE is still pending upon signout to incoming team.      ED Course as of 02/05/24 2328 Mon Feb 05, 2024 2114 EKG, interpreted by me, shows normal sinus rhythm at 81 bpm, normal axis, no ST elevations or depressions, intervals are within normal limits. [SB]      ED Course User Index  [SB] Aidan Whitney DO        Procedures       Clinical Impression: DVT, sob     Dispo: pending upon signout to incoming team     Discussed with ED Attending, Dr. Ranjana Whitney DO   Emergency Medicine, PGY3     Aidan Whitney DO  Resident  02/05/24 2328  ----------------------------    This patient was seen by the  resident physician. I have seen and examined the patient, agree with the workup, evaluation, management and diagnosis. The care plan has been discussed and I concur.    My assessment reveals the following:    HPI: Patient is a 67 y/o female presenting as referral by her hematologist Dr. Spears for RLE duplex and CT chest PE study due to elevated D-dimer performed as outpatient this AM. Mild SOB, but no CP. No N/V/abd pain. No F/C. Has chronic LLE DVT, not on blood thinners due to a GIB.     PE:  Vital signs reviewed in nursing triage note, EMR flowsheets, and at patient's bedside  GEN: Patient is awake, alert, calm, cooperative, and in mild painful distress.  HEAD: Normocephalic and atraumatic.  EYES: Anicteric sclera.  MOUTH: Mucous membranes moist.  CV: Regular rate and rhythm. (+) s1/s2. No murmurs/rubs/gallops.  PULM: CTAB. No wheezes, rales, or crackles.  GI: Soft, non-tender, non-distended without rebound or guarding.  EXT: No deformities noted. Full range of motion at all major joints. Mild RLE swelling and pain on right calf.   NEURO: Moves all extremities. No gross focal deficits. Sensation grossly intact throughout.  SKIN: Warm, dry. No erythema or ecchymosis.    Labs Reviewed   COMPREHENSIVE METABOLIC PANEL - Abnormal       Result Value    Glucose 183 (*)     Sodium 140      Potassium 4.6      Chloride 106      Bicarbonate 22      Anion Gap 17      Urea Nitrogen 15      Creatinine 1.36 (*)     eGFR 43 (*)     Calcium 9.6      Albumin 3.7      Alkaline Phosphatase 140 (*)     Total Protein 6.8      AST 22      Bilirubin, Total 0.4      ALT 26     CBC - Abnormal    WBC 12.8 (*)     nRBC 0.0      RBC 4.71      Hemoglobin 10.0 (*)     Hematocrit 32.4 (*)     MCV 69 (*)     MCH 21.2 (*)     MCHC 30.9 (*)     RDW 30.3 (*)     Platelets 373     BLOOD GAS VENOUS FULL PANEL UNSOLICITED - Abnormal    POCT pH, Venous 7.43      POCT pCO2, Venous 38 (*)     POCT pO2, Venous 59 (*)     POCT SO2, Venous 89 (*)     POCT  Oxy Hemoglobin, Venous 87.1 (*)     POCT Hematocrit Calculated, Venous 31.0 (*)     POCT Sodium, Venous 138      POCT Potassium, Venous 4.2      POCT Chloride, Venous 109 (*)     POCT Ionized Calicum, Venous 1.25      POCT Glucose, Venous 210 (*)     POCT Lactate, Venous 1.5      POCT Base Excess, Venous 0.9      POCT HCO3 Calculated, Venous 25.2      POCT Hemoglobin, Venous 10.4 (*)     POCT Anion Gap, Venous 8.0 (*)     Patient Temperature 37.0     PROTIME-INR - Normal    Protime 12.4      INR 1.1     APTT - Normal    aPTT 30      Narrative:     The APTT is no longer used for monitoring Unfractionated Heparin Therapy. For monitoring Heparin Therapy, use the Heparin Assay.   TROPONIN I, HIGH SENSITIVITY - Normal    Troponin I, High Sensitivity 3      Narrative:     Less than 99th percentile of normal range cutoff-  Female and children under 18 years old <35 ng/L; Male <54 ng/L: Negative  Repeat testing should be performed if clinically indicated.     Female and children under 18 years old  ng/L; Male  ng/L:  Consistent with possible cardiac damage and possible increased clinical   risk. Serial measurements may help to assess extent of myocardial damage.     >120 ng/L: Consistent with cardiac damage, increased clinical risk and  myocardial infarction. Serial measurements may help assess extent of   myocardial damage.      NOTE: Children less than 1 year old may have higher baseline troponin   levels and results should be interpreted in conjunction with the overall   clinical context.    NOTE: Troponin I testing is performed using a different   testing methodology at [facility] than at other   [facility]. Direct result comparisons should only   be made within the same method.     B-TYPE NATRIURETIC PEPTIDE - Normal    BNP 44      Narrative:        <100 pg/mL - Heart failure unlikely  100-299 pg/mL - Intermediate probability of acute heart                  failure exacerbation.  Correlate with clinical                  context and patient history.    >=300 pg/mL - Heart Failure likely. Correlate with clinical                  context and patient history.     Biotin interference may cause falsely decreased results. Patients taking a Biotin dose of up to 5 mg/day should refrain from taking Biotin for 24 hours before sample  collection. Providers may contact their local laboratory for further information.   TYPE AND SCREEN    ABO TYPE B      Rh TYPE POS      ANTIBODY SCREEN NEG       Lower extremity venous duplex right   Final Result   There is occlusive thrombus in the right distal femoral and popliteal   veins.        Poor visualization of the calf veins.        I personally reviewed the images/study and I agree with radiology   resident Dr. Minerva Camargo's findings as stated. This study was   interpreted at [place], [place].        MACRO:   None        Signed by: Elle Ceballos 2/5/2024 8:27 PM   Dictation workstation:   [id number]      XR chest 2 views   Final Result   Redemonstration of large hiatal hernia with left basilar airspace   opacity likely relate to compressive atelectasis. Superimposed   infection not excluded. Correlate clinically.        MACRO:   None        Signed by: Elle Ceballos 2/5/2024 7:15 PM   Dictation workstation:   [id number]      CT angio chest for pulmonary embolism    (Results Pending)       Medical Decision Making:  - Monitor  - IV  - Labs  - RLE duplex  - Pretreatment for CT chest PE study    Differential Diagnoses Considered: ACS, PE, DVT    Chronic Medical Conditions Significantly Affecting Care: Chronic anemia, LLE DVT, Not on blood thinners    Discussion of Management with Other Providers:   I discussed the patient/results with: Dr. Spears from heme/onc prior to ED arrival.     MD Torres Moffett MD  02/05/24 [id number]

## 2024-02-06 NOTE — PROGRESS NOTES
"MEDICAL STEPDOWN UNIT DAILY PROGRESS NOTE    Vania Andrews is a 68 y.o. female on day 0 of admission presenting with Acute saddle pulmonary embolism without acute cor pulmonale (CMS/HCC).    Subjective   Admitted overnight with saddle PE, acute RLE DVT  Denies pain currently, but has had R chest pain with movement prior to admission     Objective     Constitutional: pt in NAD, alert and cooperative  Eyes: PERRL, EOMI, no icterus   ENMT: mucous membranes moist, no apparent injury, no lesions seen  Head/Neck: Neck supple, no apparent injury  Respiratory/Thorax: Lungs CTA bilaterally, non-labored breathing, no cough, on RA  Cardiovascular: Regular, rate and rhythm, no murmurs, normal S1 and S2  Gastrointestinal: Nondistended, soft, non-tender, BS present x 4  Musculoskeletal: ROM intact, no joint swelling, normal strength  Extremities: normal extremities, no edema, contusions or wounds  Neurological: alert and oriented x 3, speech clear, follows commands appropriately, cr. n. II-XII intact, sensation grossly intact, motor 5/5 throughout  Skin: Warm and dry, no lesions, no rashes    Vital Signs  Blood pressure 129/77, pulse 83, temperature 36.1 °C (97 °F), temperature source Temporal, resp. rate 15, height 1.5 m (4' 11.06\"), weight 90 kg (198 lb 6.6 oz), SpO2 99 %.  Oxygen Therapy  SpO2: 99 %  Medical Gas Therapy: None (Room air)          Intake/Output last 3 Shifts:  No intake/output data recorded.    Scheduled medications  amLODIPine, 10 mg, oral, Daily  betamethasone (augmented), 1 Application, Topical, Daily  brimonidine, 1 drop, Both Eyes, BID  dicyclomine, 20 mg, oral, TID  famotidine, 20 mg, oral, Nightly  insulin lispro, 0-5 Units, subcutaneous, TID with meals  latanoprost, 1 drop, Both Eyes, q AM  loratadine, 10 mg, oral, Daily  losartan, 25 mg, oral, Daily  metoprolol succinate XL, 200 mg, oral, Daily  pantoprazole, 40 mg, oral, Daily before breakfast  rosuvastatin, 20 mg, oral, Daily  timolol, 1 drop, Both " Eyes, BID      Continuous medications  heparin, 0-4,500 Units/hr, Last Rate: 1,300 Units/hr (02/06/24 1623)      PRN medications  PRN medications: acetaminophen, albuterol, benzonatate, dextrose 10 % in water (D10W), dextrose, glucagon, heparin, polyethylene glycol    Lines and Tubes:  Peripheral IV 02/06/24 20 G Left;Upper;Anterior Arm (Active)   Placement Date/Time: 02/06/24 0031   Size (Gauge): 20 G  Orientation: Left;Upper;Anterior  Location: Arm  Site Prep: Chlorhexidine    Number of days: 0       Peripheral IV 02/06/24 20 G Left;Anterior Forearm (Active)   Placement Date/Time: 02/06/24 0500   Size (Gauge): 20 G  Orientation: Left;Anterior  Location: Forearm   Number of days: 0       External Urinary Catheter Female (Active)   Placement Date/Time: 02/06/24 0500   External Catheter Type: Female   Number of days: 0         Relevant Results  Results from last 7 days   Lab Units 02/06/24  0525 02/05/24  2242 02/05/24  1234   WBC AUTO x10*3/uL 12.2* 12.8* 10.7   HEMOGLOBIN g/dL 9.9* 10.0* 10.6*   HEMATOCRIT % 34.3* 32.4* 37.4   PLATELETS AUTO x10*3/uL 458* 373 473*     Results from last 7 days   Lab Units 02/06/24  0525 02/05/24  1855 02/05/24  1234   SODIUM mmol/L 138 140 140   POTASSIUM mmol/L 4.9 4.6 4.3   CHLORIDE mmol/L 106 106 104   CO2 mmol/L 23 22 27   BUN mg/dL 16 15 10   CREATININE mg/dL 1.26* 1.36* 1.04   CALCIUM mg/dL 9.5 9.6 9.9   PROTEIN TOTAL g/dL  --  6.8 7.6  7.8   BILIRUBIN TOTAL mg/dL  --  0.4 0.5   ALK PHOS U/L  --  140* 157*   ALT U/L  --  26 29   AST U/L  --  22 21   GLUCOSE mg/dL 275* 183* 113*     Results from last 7 days   Lab Units 02/05/24  1234   TROPHS ng/L 3              CT angio chest for pulmonary embolism 02/06/2024    Narrative  Interpreted By:  Elle Ceballos,  STUDY:  CT ANGIO CHEST FOR PULMONARY EMBOLISM;  2/6/2024 2:00 am    INDICATION:  Signs/Symptoms:elevated d-dimer, sob.    COMPARISON:  None.    ACCESSION NUMBER(S):  [id number]    ORDERING CLINICIAN:  SHERRY  BERRY    TECHNIQUE:  Helical data acquisition of the chest was obtained after intravenous  administration of  64 mL of Omnipaque 350. Images were reformatted in  axial, coronal, and sagittal planes. Axial and coronal MIPS were  reconstructed and reviewed.    FINDINGS:  HEART AND VESSELS:  There is a saddle type filling defect which extends to bilateral  upper, right lower lobar and segmental branches consistent with acute  pulmonary embolus. Calculated RV to LV ratio of 1.0.    Main pulmonary artery is dilated up to 3.5 cm which can be seen with  pulmonary arterial hypertension.    The thoracic aorta is of normal course and caliber  without vascular  calcifications.    Mild coronary artery calcifications are seen.The study is not  optimized for evaluation of coronary arteries.    The cardiac chambers are not enlarged.    No evidence of pericardial effusion.    MEDIASTINUM AND JENNIFER, LOWER NECK AND AXILLA:  Slight heterogeneous thyroid gland.    No evidence of thoracic lymphadenopathy by CT criteria.    Large hiatal hernia with significant portion of the stomach  intrathoracic in location.    LUNGS AND AIRWAYS:  The trachea and central airways are patent. No endobronchial lesion.    Bibasilar atelectasis. No consolidation, effusion or pneumothorax.    UPPER ABDOMEN:  Status post cholecystectomy. Status post left adrenalectomy and left  partial nephrectomy. Partial visualization of a 3.8 cm hypodense  lesion in the left upper quadrant likely arising from the left  kidney. This measures higher than simple fluid attenuation,  incompletely imaged. Subcentimeter hypodense focus in the right  hepatic lobe is too small to characterize but stable..    CHEST WALL AND OSSEOUS STRUCTURES:  Multilevel degenerative changes are present.    Impression  1. There is an acute saddle type pulmonary embolus extending to  bilateral upper and right lower lobar and segmental branches .  Calculated RV to LV ratio of 1.0.  2. Main pulmonary  artery is dilated up to 3.5 cm which can be seen  with pulmonary arterial hypertension.  3. Large hiatal hernia with significant portion of the stomach  intrathoracic in location. There is adjacent compressive atelectasis.  4. 3.8 cm hypodense lesion in the left upper quadrant is incompletely  imaged, likely arising from the left kidney. This measures higher  than simple fluid attenuation. This can be further evaluated with  nonemergent renal ultrasound.  5. Additional findings as described above.         Assessment/Plan   Principal Problem:    Acute saddle pulmonary embolism without acute cor pulmonale (CMS/HCC)  Active Problems:    Renal cell carcinoma, unspecified laterality (CMS/HCC)    Anemia    Chronic kidney disease (CKD), stage III (moderate) (CMS/HCC)    Diabetes mellitus (CMS/HCC)    Esophageal reflux    Mixed hyperlipidemia    Abdominal hernia    Vania Andrews is a 68 y.o. 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  She had recent admission for acute on chronic anemia, dyspnea with chronic cough.   Admitted with acute saddle PE and RLE DVT.  HDS on RA    Neuro: hx arthritis, A&Ox3  -PT/OT eval     Optho: hx Glaucoma  -restart home eye drops     CV: hx HTN, HLD, MI 2019  -restart home meds: Amlodipine, Losartan and Metoprolol  -restart home Crestor  - Limited Echo to assess RV  -tele monitor     Pulm: Acute saddle PE, hx Asthma, MARYLU not on CPAP  -stable on RA  -continue Heparin gtt  -in the event of decompensation will administer half dose fibrinolytic if  no contraindications as per PERC team  -on home Spiriva and albuterol MDI  -restart home Claritin    FEN/GI: hx GERD  - restart home PPI      Renal: hx RCC, s/p partial nephrectomy, hx CKD (baseline ~1.2)  -creat 1.36 on admission   -daily RFP  -caution with nephrotoxins and contrast dye     Heme:  acute PE, RLE acute DVT, distal fem & popliteal.  hx VEDA.  Previous hx of LLE DVT, stopped Eliquis in Dec per her outpt Vasc Med team  -continue Heparin gtt  -H/H 10/32.4 on admission   - per Dr Spears, hold on IV iron infusions.  If Hgb <9, can consider PO iron supp & bowel regimen.  - Dr Spears will also follow her outpt for her acute PE/DVT in addition to her anemia  - anticipate transitioning to DOAC    Endo: DMT2  -on home Trulicity (weekly)  -start SSI and accu checks  -A1C     ID: no acute issues  - 1/25: flu A/B, Covid and RSV neg.  - previous Covid hx (2023) per patient    Prophy:  -Heparin infusion   -PPI     Lines: PIV.  May need Midline vs PICC if continuing on IV heparin as IV access questionable.    Code Status: FULL CODE (discussed on admission)     Dispo: will continue Step down care         MOR Forde-CNP  Pulmonary/Critical Care/Internal Medicine  I spent 45 minutes in the professional and overall care of this patient.

## 2024-02-06 NOTE — PROGRESS NOTES
Patient reports a prior history of hives in response to CT contrast, has tolerated CT contrast after receiving cocktail of steroids and Benadryl.  Will plan for accelerated prep and fluid rehydration prior to CT scan as there is high clinical concern for PE in the setting of shortness of breath, known DVTs not on anticoagulation.  Discussed with the patient to is agreeable to plan.    Discussed with attending, Dr. Vivian Whitney,    Emergency Medicine, PGY3

## 2024-02-06 NOTE — PROGRESS NOTES
Occupational Therapy                 Therapy Communication Note    Patient Name: Vania Andrews  MRN: [id number]  Today's Date: 2/6/2024     Discipline: Occupational Therapy    Missed Visit Reason: Missed Visit Reason:  (Orders received, chart reviewed. Per EMR, pt currnetly on bedrest. Will f/u with pt for OT Eval as medically appropriate.)    Missed Time: Attempt 08:45      02/06/24 at 8:46 AM - Yuliya Olivas OT

## 2024-02-06 NOTE — PROGRESS NOTES
Physical Therapy    Therapy Communication Note    Patient Name: Vaina Andrews  MRN: [id number]  Today's Date: 2/6/2024     Discipline: Physical Therapy      Missed Visit: Yes  Missed Visit Reason:  (Per EMR, pt is on bedrest; will follow-up when medically appropriate)      02/06/24 at 8:04 AM   Kylee Lopez, PT

## 2024-02-06 NOTE — PROGRESS NOTES
Vania Andrews is a 68 y.o. female on day 0 of admission presenting with Acute saddle pulmonary embolism without acute cor pulmonale (CMS/HCC).    TCC Note  2/6/24    Plan per Medical/Surgical Team: Acute saddle PE, DVT (r) distal femoral and popliteal veins. Patient is on heparin gtt.  Status: Inpatient  Payor Source: Medicare  Discharge disposition: TBD pending PT/OT recs  Expected date of discharge: TBD  Barriers: Medical  Preferred home care agency:  Will continue to follow patient for any discharge needs.  Katie Velázquez RN, TCC

## 2024-02-06 NOTE — PROGRESS NOTES
Emergency Medicine Transition of Care Note.    I received Vania Andrews in signout from Dr. Whitney.  Please see the previous ED provider note for all HPI, PE and MDM up to the time of signout. This is in addition to the primary record.    In brief Vania Andrews is an 68 y.o. female presenting for   Chief Complaint   Patient presents with    concern for DVT     At the time of signout we were awaiting: CT angio chest to rule out pulmonary embolism.    ED Course as of 02/10/24 0304   Mon Feb 05, 2024   2114 EKG, interpreted by me, shows normal sinus rhythm at 81 bpm, normal axis, no ST elevations or depressions, intervals are within normal limits. [SB]      ED Course User Index  [SB] Aidan Whitney DO         Diagnoses as of 02/10/24 0304   Acute saddle pulmonary embolism without acute cor pulmonale (CMS/HCC)       Medical Decision Making  CT imaging of her chest was positive for saddle pulmonary embolism.  However, no signs of right heart strain on CT imaging, with an LV to RV ratio of approximately 1:1.  A bedside POCUS was reviewed by attending physician and was also negative for right heart strain.  Patient remains on room air, no tachycardia or hypotension.  She does not appear to be in acute distress.  We discussed this with the PERT team.  We reviewed the patient's prior charts regarding her history of GI bleed.  She did have a GI bleed in 2019, with a suspected GI bleed in 2023.  Given that this most recent episode was not confirmed, PERT team recommended that we start the patient on a heparin drip including the heparin bolus.  She is not a thrombectomy candidate at this time.  However, she will be admitted to the MICU stepdown unit.  Per recommendations per team, if she were to decompensate, they recommend a half dose of pulmonary embolism tPA dose.  Patient is agreeable with the plan to start heparin as well as admission.  Patient is admitted to the MICU service in the stepdown unit.    Colt Zamora DO, PGY  3  Emergency Medicine Resident        Final diagnoses:   None           Procedure  Procedures    Colt Zamora DO

## 2024-02-06 NOTE — HOSPITAL COURSE
Vania Andrews is a 68 y.o. 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  She had recent admission (1/24- 1/28/24) for acute on chronic microcytic anemia c/f GIB, dyspnea with chronic cough.  Plan was to follow up with GI o/p for capsule study as c/f GIB Was at Heme outpt visit on 2/5 and noted to have RLE swelling, advised to go to ED for evaluation.     She presented to  ED for elevated D-dimer (13,543).  Imaging significant for acute saddle PE extended to bilateral upper and right lower lobar segmental branches.  LE vascular US significant for occlusive thrombus in the right distal femoral and popliteal veins. PERC team was consulted in the ED.  Bedside Pocus showed normal RV size and function.  She was started on Heparin infusion and admitted to Step down    2/7 Patient remains HDS on RA and heparin gtt.  Transfer orders placed for RNF with tele.  Ongoing abdominal cramping overnight into 2/8.  Given aggress bowel regimen at patients request.  Started on Simethicone Q6 hrs with slight improvement in symptoms. KUB ordered    2/8 large dark tarry stool w/o balbina blood.  H/H remains stable.  Plan to consult GI in morning.  Scheduled for Capsule study on 2/16, want to see if they can perform this while inpatient.     2/9 GI consulted.  Patient taken for EGD around 1530 in the afternoon.  Remains HDS on heparin gtt without further episodes of dark stool.     EGD Findings: Tortuous esophagus  Regular Z-line 29 cm from the incisors  Large 11 cm hiatal hernia  Multiple sessile  polyps measuring smaller than 5 mm in the hiatal hernia; no bleeding was identified. Appearances consistent with fundic gland polyps as noted on recent EGD.  Linear erosion in the body of the stomach; no bleeding was identified;  Scattered patchy erythema seen in body of stomach. No evidence of bleeding  The duodenal bulb and 2nd part of the duodenum appeared normal.    Recommendations:

## 2024-02-06 NOTE — H&P
History Of Present Illness  Vania Andrews is a 68 y.o. 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  She had recent admission for acute on chronic anemia, dyspnea with chronic cough. Recommended to follow-up outpatient with GI, cardiology and PCP    She presented to  ED for elevated D-dimer (13,543).  Imaging significant for acute saddle PE extended to bilateral upper and right lower lobar segmental branches.  LE vascular US significant for occlusive thrombus in the right distal femoral and popliteal veins. [facility]C team was consulted in the ED.  Bedside Pocus showed normal RV size and function.  She was started on Heparin infusion and admitted to Step down with plan to consult vascular medicine in am.     Past Medical History  Past Medical History:   Diagnosis Date    Abdominal distension (gaseous) 07/31/2019    Abdominal bloating    Cancer (CMS/HCC)     Cataract     Coronary artery disease     Diabetes mellitus (CMS/HCC)     Diverticulosis of intestine, part unspecified, without perforation or abscess without bleeding 06/09/2013    Diverticulosis    Elevation of levels of liver transaminase levels 11/13/2020    Transaminitis    Encounter for follow-up examination after completed treatment for conditions other than malignant neoplasm 01/26/2019    Hospital discharge follow-up    Glaucoma     Hypertension     Long term (current) use of antibiotics 10/07/2016    Need for prophylactic antibiotic    Other amnesia 10/28/2014    Memory loss    Other conditions influencing  health status 2019    History of cough    Other specified health status 2019    Hepatitis C antibody test negative    Other specified soft tissue disorders 2017    Leg swelling    Pain in left toe(s) 05/15/2017    Pain of toe of left foot    Pain in right foot 10/12/2016    Pain of right heel    Pain in right shoulder 2016    Acute pain of right shoulder    Personal history of diseases of the blood and blood-forming organs and certain disorders involving the immune mechanism 2018    History of anemia    Personal history of other diseases of the respiratory system 2018    History of sinusitis    Personal history of other diseases of the respiratory system 2014    History of upper respiratory infection    Personal history of other malignant neoplasm of kidney 2021    Personal history of malignant neoplasm of kidney    Personal history of other medical treatment 2017    History of screening mammography    Personal history of other specified conditions 2014    History of abdominal pain    Personal history of other specified conditions 2017    History of urinary frequency    Personal history of other specified conditions 2021    History of dysuria    Personal history of other specified conditions 2014    History of breast lump    Unspecified abdominal hernia without obstruction or gangrene 2014    Hernia       Surgical History  Past Surgical History:   Procedure Laterality Date    BREAST BIOPSY  2016    Biopsy Breast Percutaneous Needle Core     SECTION, CLASSIC  2014     Section    CHOLECYSTECTOMY  2017    Cholecystectomy Laparoscopic    HAND SURGERY  2020    Hand Surgery                                                                                                                                                          HERNIA REPAIR  2014    Hernia Repair    MR HEAD ANGIO WO IV CONTRAST   11/17/2014    MR HEAD ANGIO WO IV CONTRAST 11/17/2014 [facility]    OTHER SURGICAL HISTORY  01/14/2021    Nephrectomy    TOTAL ABDOMINAL HYSTERECTOMY W/ BILATERAL SALPINGOOPHORECTOMY  04/21/2014    Total Abdominal Hysterectomy With Removal Of Both Ovaries        Social History  She reports that she has quit smoking. Her smoking use included cigarettes. She has never been exposed to tobacco smoke. She has never used smokeless tobacco. She reports current alcohol use. She reports that she does not currently use drugs.    Family History  Family History   Problem Relation Name Age of Onset    Aneurysm Mother      Hypertension Mother      Pancreatic cancer Mother      Diabetes Mother      Other (laryngeal Cancer) Mother      Heart disease Father      Pulmonary embolism Brother      Breast cancer Father's Sister      Breast cancer Maternal Cousin          Allergies  Adhesive, Amoxicillin, Cephalexin, Gadolinium-containing contrast media, Iodine, Latex, Pioglitazone, Rubella virus live vaccine, Salicylates, Shellfish derived, Sulfa (sulfonamide antibiotics), Sulfamethoxazole-trimethoprim, and Iodinated contrast media    Review of Systems   Constitutional:  Positive for activity change.   HENT: Negative.     Eyes: Negative.    Respiratory:  Positive for shortness of breath.    Gastrointestinal: Negative.    Endocrine: Negative.    Genitourinary: Negative.    Musculoskeletal:  Positive for arthralgias.   Skin: Negative.    Allergic/Immunologic: Negative.    Neurological:  Positive for weakness.   Hematological: Negative.    Psychiatric/Behavioral: Negative.          Physical Exam  Constitutional:       General: She is not in acute distress.     Appearance: She is obese.   HENT:      Head: Normocephalic.   Eyes:      Pupils: Pupils are equal, round, and reactive to light.   Cardiovascular:      Rate and Rhythm: Normal rate and regular rhythm.   Pulmonary:      Effort: No respiratory distress.      Breath sounds:  Normal breath sounds.   Abdominal:      General: There is no distension.      Palpations: Abdomen is soft.      Tenderness: There is no abdominal tenderness.      Hernia: A hernia is present.   Musculoskeletal:      Cervical back: Neck supple.      Right lower leg: Edema present.      Left lower leg: Edema present.   Neurological:      General: No focal deficit present.      Mental Status: She is alert and oriented to person, place, and time.   Psychiatric:         Mood and Affect: Mood normal.         Last Recorded Vitals  Blood pressure 159/87, pulse 84, temperature 36.6 °C (97.9 °F), temperature source Temporal, resp. rate 19, SpO2 98 %.    Relevant Results  Results for orders placed or performed during the hospital encounter of 02/05/24 (from the past 24 hour(s))   APTT   Result Value Ref Range    aPTT 30 27 - 38 seconds   Troponin I, High Sensitivity   Result Value Ref Range    Troponin I, High Sensitivity 3 0 - 34 ng/L   Blood Gas Venous Full Panel Unsolicited   Result Value Ref Range    POCT pH, Venous 7.43 7.33 - 7.43 pH    POCT pCO2, Venous 38 (L) 41 - 51 mm Hg    POCT pO2, Venous 59 (H) 35 - 45 mm Hg    POCT SO2, Venous 89 (H) 45 - 75 %    POCT Oxy Hemoglobin, Venous 87.1 (H) 45.0 - 75.0 %    POCT Hematocrit Calculated, Venous 31.0 (L) 36.0 - 46.0 %    POCT Sodium, Venous 138 136 - 145 mmol/L    POCT Potassium, Venous 4.2 3.5 - 5.3 mmol/L    POCT Chloride, Venous 109 (H) 98 - 107 mmol/L    POCT Ionized Calicum, Venous 1.25 1.10 - 1.33 mmol/L    POCT Glucose, Venous 210 (H) 74 - 99 mg/dL    POCT Lactate, Venous 1.5 0.4 - 2.0 mmol/L    POCT Base Excess, Venous 0.9 -2.0 - 3.0 mmol/L    POCT HCO3 Calculated, Venous 25.2 22.0 - 26.0 mmol/L    POCT Hemoglobin, Venous 10.4 (L) 12.0 - 16.0 g/dL    POCT Anion Gap, Venous 8.0 (L) 10.0 - 25.0 mmol/L    Patient Temperature 37.0 degrees Celsius   Comprehensive Metabolic Panel   Result Value Ref Range    Glucose 183 (H) 74 - 99 mg/dL    Sodium 140 136 - 145 mmol/L     Potassium 4.6 3.5 - 5.3 mmol/L    Chloride 106 98 - 107 mmol/L    Bicarbonate 22 21 - 32 mmol/L    Anion Gap 17 10 - 20 mmol/L    Urea Nitrogen 15 6 - 23 mg/dL    Creatinine 1.36 (H) 0.50 - 1.05 mg/dL    eGFR 43 (L) >60 mL/min/1.73m*2    Calcium 9.6 8.6 - 10.6 mg/dL    Albumin 3.7 3.4 - 5.0 g/dL    Alkaline Phosphatase 140 (H) 33 - 136 U/L    Total Protein 6.8 6.4 - 8.2 g/dL    AST 22 9 - 39 U/L    Bilirubin, Total 0.4 0.0 - 1.2 mg/dL    ALT 26 7 - 45 U/L   Protime-INR   Result Value Ref Range    Protime 12.4 9.8 - 12.8 seconds    INR 1.1 0.9 - 1.1   Type and Screen   Result Value Ref Range    ABO TYPE B     Rh TYPE POS     ANTIBODY SCREEN NEG    B-type natriuretic peptide   Result Value Ref Range    BNP 44 0 - 99 pg/mL   ECG 12 lead   Result Value Ref Range    Ventricular Rate 81 BPM    Atrial Rate 81 BPM    NE Interval 164 ms    QRS Duration 78 ms    QT Interval 368 ms    QTC Calculation(Bazett) 427 ms    P Axis 70 degrees    R Axis 41 degrees    T Axis 50 degrees    QRS Count 13 beats    Q Onset 222 ms    P Onset 140 ms    P Offset 192 ms    T Offset 406 ms    QTC Fredericia 406 ms   CBC   Result Value Ref Range    WBC 12.8 (H) 4.4 - 11.3 x10*3/uL    nRBC 0.0 0.0 - 0.0 /100 WBCs    RBC 4.71 4.00 - 5.20 x10*6/uL    Hemoglobin 10.0 (L) 12.0 - 16.0 g/dL    Hematocrit 32.4 (L) 36.0 - 46.0 %    MCV 69 (L) 80 - 100 fL    MCH 21.2 (L) 26.0 - 34.0 pg    MCHC 30.9 (L) 32.0 - 36.0 g/dL    RDW 30.3 (H) 11.5 - 14.5 %    Platelets 373 150 - 450 x10*3/uL   aPTT - baseline   Result Value Ref Range    aPTT 32 27 - 38 seconds      Imaging:    CT angio chest for pulmonary embolism  Result Date: 2/6/2024     1. There is an acute saddle type pulmonary embolus extending to bilateral upper and right lower lobar and segmental branches . Calculated RV to LV ratio of 1.0.   2. Main pulmonary artery is dilated up to 3.5 cm which can be seen with pulmonary arterial hypertension.   3. Large hiatal hernia with significant portion of the  stomach intrathoracic in location. There is adjacent compressive atelectasis.   4. 3.8 cm hypodense lesion in the left upper quadrant is incompletely imaged, likely arising from the left kidney. This measures higher than simple fluid attenuation. This can be further evaluated with nonemergent renal ultrasound.   5. Additional findings as described above.        Lower extremity venous duplex right  Result Date: 2/5/2024  There is occlusive thrombus in the right distal femoral and popliteal veins.   Poor visualization of the calf veins.       XR chest 2 views  Result Date: 2/5/2024  Redemonstration of large hiatal hernia with left basilar airspace opacity likely relate to compressive atelectasis. Superimposed infection not excluded. Correlate clinically.        Assessment/Plan   Principal Problem:    Acute saddle pulmonary embolism without acute cor pulmonale (CMS/HCC)  Active Problems:    Renal cell carcinoma, unspecified laterality (CMS/HCC)    Anemia    Chronic kidney disease (CKD), stage III (moderate) (CMS/HCC)    Diabetes mellitus (CMS/HCC)    Esophageal reflux    Mixed hyperlipidemia    Abdominal hernia    Neuro: hx arthritis, A&Ox3  -PT/OT eval    Optho: hx Glaucoma  -restart home eye drops    CV: hx HTN, HLD, MI 2019  -restart home meds: Amlodipine, Losartan and Metoprolol  -restart home Crestor  -Bedside Pocus showed normal RV size and function.    -consider echo  -tele monitor    Pulm: Acute saddle PE, hx Asthma, MARYLU not on CPAP  -stable on RA, goal Pox >92%  -continue Heparin gtt  -bedrest per [facility]C team for now  -in the event of decompensation will administer half dose fibrinolytic if no contraindications as per PERC team  -on home Spiriva and albuterol MDI  -restart home Claritin  -will start nebs PRN    FEN/GI: hx GERD  - restart home PPI     Renal: hx RCC, s/p partial nephrectomy, hx CKD  -creat 1.36 on admission   -daily RFP  -caution with nephrotoxins and contrast dye    Heme:  acute PE, hx  VEDA  -continue Heparin gtt  -H/H 10/32.4 on admission   -monitor for s/sx bleeding    Endo: DMT2  -on home Trulicity   -start SSI and accu checks  -A1C     Prophy:  -Heparin infusion   -PPI    Code Status: FULL CODE (discussed on admission)    Dispo: will continue Step down care     D/w Dr Rosenda NGUYEN spent >90 minutes in the professional and overall care of this patient.      April Balbuena, APRSHERRILL-CNP

## 2024-02-06 NOTE — SIGNIFICANT EVENT
PULMONARY EMBOLISM RESPONSE TEAM (PERT) NOTE    This note represents a summary of a virtual evaluation by the pulmonary embolism response team requested.  Suggestions and impression are summarized from the initial virtual encounter and discussion with the referring medical team. These suggestions supplement but should not be a substitute for bedside evaluation and management by the attending of record.     PERT Members on the Call:  Critical Care Attending  PERT Interventional Cardiology Attending  Vascular Medicine Attending  CICU Attending  Critical Care Fellow  CICU Fellow  Cardiology Interventional Fellow    Brief Clinical Summary:  Vania Andrews is a 68 y.o. female presenting with PE.    Vital Signs  /88   Pulse 93   Temp 36.6 °C (97.9 °F) (Temporal)   Resp 18   LMP  (LMP Unknown)   SpO2 99%  on RA    Laboratory  Recent Labs     02/05/24  1855 02/05/24  1234 01/25/24  1154 01/11/24  1030   TROPHS  --  3 4  --    BNP 44  --  96 83         PESI Score Lemuel HANNAH Et al. Arch Intern Med. 2010;[id number].           PESI Score:  97, consistent with JLPESIRISK: Class III, or Intermediate risk (3.2-7.1% 30-day mortality risk) PE.    CT Scan reviewed:  acute saddle type pulmonary embolus extending to  bilateral upper and right lower lobar and segmental branches   RV/LV ratio is 1 by CT scan    TTE reviewed (if available):  RV size and function: normal by bedside PoCUS    Venous duplex (if available):  There is occlusive thrombus in the right distal femoral and popliteal  veins.    Poor visualization of the calf veins.    Contraindications to anticoagulation: recent GI bleeding   Contraindications to thrombolytics: recent GI bleeding     Initial Impressions:  ERS/ESC Pulmonary Embolism Risk Category: JLPECLASS: Intermediate-low risk.      Initial Suggestions/Plans:  Admit to SDU unit at [facility] 6th floor under .  Initial therapy suggested: Heparin drip.  Additional recommendations: bed rest for  now.  Consults suggested: Vascular medicine consult in AM.  Additional suggestions for re-evaluation/reconference or retesting: with any decompensation. In case of decompensation, we recommend half dose fibrinolytic if no contra indications     To re conference the PERT team please call the  Transfer Center at [phone].

## 2024-02-06 NOTE — ED NOTES
Patient transported to inpatient unit via transport. Patient AxOx4 at this time and denies needs.      Kim Miles RN  02/06/24 0423

## 2024-02-06 NOTE — PROGRESS NOTES
Pharmacy Medication History Review    Vania Andrews is a 68 y.o. female admitted for Acute saddle pulmonary embolism without acute cor pulmonale (CMS/[facility]). Pharmacy reviewed the patient's [id number] medications and allergies for accuracy.    The list below reflects the updated PTA list. Comments regarding how patient may be taking medications differently can be found in the Admit Orders Activity  Prior to Admission Medications   Prescriptions Last Dose Informant   Accu-Chek Guide test strips strip  Self, Other   Sig: Use 1 test strip to test blood glucose twice daily.   albuterol 90 mcg/actuation inhaler Unknown Other, Self   Sig: Inhale 2 puffs every 4 hours if needed for wheezing or shortness of breath (You may also use this 10-15 minutes prior to exertional activity as needed).   amLODIPine (Norvasc) 10 mg tablet Past Week Self, Other   Sig: Take 1 tablet (10 mg) by mouth once daily.   aspirin 81 mg chewable tablet Past Week Self, Other   Sig: Chew 1 tablet (81 mg) once daily.   benzonatate (Tessalon) 200 mg capsule Unknown Self, Other   Sig: Take 1 capsule (200 mg) by mouth 3 times a day as needed.   betamethasone, augmented, (Diprolene AF) 0.05 % cream  Other, Self   Sig: Apply topically once daily. Apply a thin layer to arms and back once daily. DO NOT USE ON FACE OR GROIN.   Patient not taking: Reported on 2/5/2024   brimonidine-timoloL (Combigan) 0.2-0.5 % ophthalmic solution Past Week Self, Other   Sig: Administer 1 drop into both eyes 2 times a day.   cholecalciferol (D3-2000) 50 mcg (2,000 unit) capsule Past Week Self, Other   Sig: Take 1 capsule (50 mcg) by mouth once daily.   diclofenac sodium 1 % kit Unknown Self, Other   Sig: Apply 1 Application topically if needed.  APPLY TO LOWER EXTREMITIES, 4 GM OF GEL TO AFFECTED AREA 4 TIMES DAILY. DO NOT APPLY MORE THAN 16 GM DAILY TO ANY ONE AFFECTED JOINT.   dicyclomine (Bentyl) 20 mg tablet Unknown Self, Other   Sig: Take 1 tablet (20 mg) by mouth.  "3-4 times daily as needed.   dulaglutide (Trulicity) 0.75 mg/0.5 mL pen injector  Self, Other   Sig: Inject 0.75 mg (1 pen) under the skin 1 (one) time per week.   famotidine (Pepcid) 20 mg tablet Past Week Self, Other   Sig: Take 1 tablet (20 mg) by mouth once daily at bedtime.   ferrous sulfate, 325 mg ferrous sulfate, tablet Past Week Other, Self   Sig: Take 1 tablet by mouth once daily with breakfast.   fluticasone (Flonase) 50 mcg/actuation nasal spray Past Week Self, Other   Sig: Administer 2 sprays into each nostril once daily.   furosemide (Lasix) 20 mg tablet Unknown Self, Other   Sig: Take 1 tablet (20 mg) by mouth once daily as needed (As needed for leg swelling).   insulin lispro (HumaLOG KwikPen Insulin) 100 unit/mL injection  Self, Other   Sig: Use with sliding scale 3 times a day, up to 30 units daily   Patient taking differently: Use with sliding scale 3 times a day, up to 30 units daily. Patient states she takes only when taking prednisone   latanoprost (Xalatan) 0.005 % ophthalmic solution Unknown Self, Other   Sig: Administer 1 drop into both eyes once daily in the morning.   levocetirizine (Xyzal) 5 mg tablet Past Week Self, Other   Sig: Take 1 tablet (5 mg) by mouth once daily in the evening.   losartan (Cozaar) 25 mg tablet Past Week Self, Other   Sig: Take 1 tablet (25 mg) by mouth once daily.   metoprolol succinate XL (Toprol-XL) 200 mg 24 hr tablet Past Week Self, Other   Sig: Take 1 tablet (200 mg) by mouth once daily.   netarsudiL (Rhopressa) 0.02 % drops opthalmic solution Past Week Self, Other   Sig: Administer 1 drop into both eyes once daily in the evening.   omeprazole (PriLOSEC) 40 mg DR capsule Past Week Other, Self   Sig: Take 1 capsule (40 mg) by mouth 2 times a day before meals.   pen needle, diabetic 31 gauge x 5/16\" needle  Self, Other   Sig: Use to inject 1-4 times daily as directed.   pen needle, diabetic 33 gauge x 5/32\" needle  Self, Other   Sig: Use for sliding scale up to " 3 times a day   polyethylene glycol (Glycolax, Miralax) 17 gram/dose powder Unknown Self, Other   Sig: Take 17 g by mouth if needed. IN 8 OUNCES OF WATER, JUICE, OR TEA AND DRINK   rosuvastatin (Crestor) 20 mg tablet Past Week Other, Self   Sig: Take 1 tablet (20 mg) by mouth once daily.   tiotropium (Spiriva Respimat) 2.5 mcg/actuation inhaler Unknown Other, Self   Sig: Inhale 2 puffs once daily.      Facility-Administered Medications Last Administration Doses Remaining   nitroglycerin (Nitrostat) SL tablet 0.4 mg None recorded 1           The list below reflects the updated allergy list. Please review each documented allergy for additional clarification and justification.  Allergies  Reviewed by Rose Marie Lemus RPh on 2/6/2024        Severity Reactions Comments    Adhesive Not Specified Itching     Amoxicillin Not Specified Hives, Itching     Cephalexin Not Specified Itching, Nausea Only     Gadolinium-containing Contrast Media Not Specified Hives     Iodine Not Specified Unknown     Latex Not Specified Other     Pioglitazone Not Specified Swelling     Rubella Virus Live Vaccine Not Specified Unknown     Salicylates Not Specified Other     Shellfish Derived Not Specified Swelling     Sulfa (sulfonamide Antibiotics) Not Specified Unknown     Sulfamethoxazole-trimethoprim Not Specified Hives, Itching     Iodinated Contrast Media Low Itching, Rash             Patient accepts M2B at discharge. Pharmacy has been updated to [facility].    Sources used to complete the med history include   - out patient fill history, OARRS, and patient interview  - Patient provided list of current home medications    Below are additional concerns with the patient's PTA list.  - Patient reported she does not know how to use her Spiriva inhaler (has not stared taking inhaler yet)  - Patient reported last taking medication on Sunday (2/4/2024)    Rose Marie Lemus PharmD  PGY-1 Pharmacy Resident   Meds Ambulatory and  Retail Services  Please reach out via [employer] Secure Chat for questions, or if no response call [employer] or [employer] “MedRec”

## 2024-02-06 NOTE — TELEPHONE ENCOUNTER
Pt called to update that she went to ED as recommended yesterday and is admitted to [facility] with PE and RLE DVT.  She was very appreciative of Dr Jenkins's advice to come in.  She asked about whether she would receive an IV iron infusion while inpatient as she got first dose during most recent hospitalization last week.  Pt advised to inquire with inpatient team.

## 2024-02-07 ENCOUNTER — APPOINTMENT (OUTPATIENT)
Dept: CARDIOLOGY | Facility: HOSPITAL | Age: 69
DRG: 176 | End: 2024-02-07
Payer: MEDICARE

## 2024-02-07 LAB
ALBUMIN SERPL BCP-MCNC: 3.2 G/DL (ref 3.4–5)
ANION GAP SERPL CALC-SCNC: 14 MMOL/L (ref 10–20)
BUN SERPL-MCNC: 37 MG/DL (ref 6–23)
CALCIUM SERPL-MCNC: 9.3 MG/DL (ref 8.6–10.6)
CHLORIDE SERPL-SCNC: 105 MMOL/L (ref 98–107)
CO2 SERPL-SCNC: 24 MMOL/L (ref 21–32)
CREAT SERPL-MCNC: 1.37 MG/DL (ref 0.5–1.05)
EGFRCR SERPLBLD CKD-EPI 2021: 42 ML/MIN/1.73M*2
EJECTION FRACTION APICAL 4 CHAMBER: 72.6
EJECTION FRACTION: 71 %
ERYTHROCYTE [DISTWIDTH] IN BLOOD BY AUTOMATED COUNT: ABNORMAL %
GLUCOSE BLD MANUAL STRIP-MCNC: 152 MG/DL (ref 74–99)
GLUCOSE BLD MANUAL STRIP-MCNC: 179 MG/DL (ref 74–99)
GLUCOSE BLD MANUAL STRIP-MCNC: 182 MG/DL (ref 74–99)
GLUCOSE BLD MANUAL STRIP-MCNC: 201 MG/DL (ref 74–99)
GLUCOSE SERPL-MCNC: 212 MG/DL (ref 74–99)
HCT VFR BLD AUTO: 30.2 % (ref 36–46)
HGB BLD-MCNC: 8.3 G/DL (ref 12–16)
IRON SATN MFR SERPL: 21 % (ref 25–45)
IRON SERPL-MCNC: 74 UG/DL (ref 35–150)
LEFT ATRIUM VOLUME AREA LENGTH INDEX BSA: 28.1 ML/M2
MAGNESIUM SERPL-MCNC: 2.1 MG/DL (ref 1.6–2.4)
MCH RBC QN AUTO: 20.9 PG (ref 26–34)
MCHC RBC AUTO-ENTMCNC: 27.5 G/DL (ref 32–36)
MCV RBC AUTO: 76 FL (ref 80–100)
NRBC BLD-RTO: 0 /100 WBCS (ref 0–0)
PHOSPHATE SERPL-MCNC: 3.6 MG/DL (ref 2.5–4.9)
PLATELET # BLD AUTO: 439 X10*3/UL (ref 150–450)
POTASSIUM SERPL-SCNC: 4.5 MMOL/L (ref 3.5–5.3)
RBC # BLD AUTO: 3.97 X10*6/UL (ref 4–5.2)
SODIUM SERPL-SCNC: 138 MMOL/L (ref 136–145)
TIBC SERPL-MCNC: 352 UG/DL (ref 240–445)
UFH PPP CHRO-ACNC: 0.8 IU/ML
UFH PPP CHRO-ACNC: 0.8 IU/ML
UFH PPP CHRO-ACNC: 1 IU/ML
UFH PPP CHRO-ACNC: <0.1 IU/ML
UIBC SERPL-MCNC: 278 UG/DL (ref 110–370)
WBC # BLD AUTO: 16.9 X10*3/UL (ref 4.4–11.3)

## 2024-02-07 PROCEDURE — 83540 ASSAY OF IRON: CPT | Performed by: NURSE PRACTITIONER

## 2024-02-07 PROCEDURE — 2500000001 HC RX 250 WO HCPCS SELF ADMINISTERED DRUGS (ALT 637 FOR MEDICARE OP)

## 2024-02-07 PROCEDURE — 93325 DOPPLER ECHO COLOR FLOW MAPG: CPT | Performed by: INTERNAL MEDICINE

## 2024-02-07 PROCEDURE — 2500000004 HC RX 250 GENERAL PHARMACY W/ HCPCS (ALT 636 FOR OP/ED)

## 2024-02-07 PROCEDURE — 93325 DOPPLER ECHO COLOR FLOW MAPG: CPT

## 2024-02-07 PROCEDURE — 2500000004 HC RX 250 GENERAL PHARMACY W/ HCPCS (ALT 636 FOR OP/ED): Performed by: NURSE PRACTITIONER

## 2024-02-07 PROCEDURE — 85520 HEPARIN ASSAY: CPT | Performed by: INTERNAL MEDICINE

## 2024-02-07 PROCEDURE — 94640 AIRWAY INHALATION TREATMENT: CPT

## 2024-02-07 PROCEDURE — 1200000002 HC GENERAL ROOM WITH TELEMETRY DAILY

## 2024-02-07 PROCEDURE — 93321 DOPPLER ECHO F-UP/LMTD STD: CPT | Performed by: INTERNAL MEDICINE

## 2024-02-07 PROCEDURE — 85520 HEPARIN ASSAY: CPT

## 2024-02-07 PROCEDURE — 83735 ASSAY OF MAGNESIUM: CPT | Performed by: NURSE PRACTITIONER

## 2024-02-07 PROCEDURE — 85520 HEPARIN ASSAY: CPT | Mod: MUE

## 2024-02-07 PROCEDURE — 36415 COLL VENOUS BLD VENIPUNCTURE: CPT | Performed by: NURSE PRACTITIONER

## 2024-02-07 PROCEDURE — 93308 TTE F-UP OR LMTD: CPT | Performed by: INTERNAL MEDICINE

## 2024-02-07 PROCEDURE — 2500000001 HC RX 250 WO HCPCS SELF ADMINISTERED DRUGS (ALT 637 FOR MEDICARE OP): Performed by: NURSE PRACTITIONER

## 2024-02-07 PROCEDURE — 82947 ASSAY GLUCOSE BLOOD QUANT: CPT

## 2024-02-07 PROCEDURE — 36415 COLL VENOUS BLD VENIPUNCTURE: CPT | Performed by: INTERNAL MEDICINE

## 2024-02-07 PROCEDURE — 2500000002 HC RX 250 W HCPCS SELF ADMINISTERED DRUGS (ALT 637 FOR MEDICARE OP, ALT 636 FOR OP/ED): Mod: MUE | Performed by: NURSE PRACTITIONER

## 2024-02-07 PROCEDURE — 80069 RENAL FUNCTION PANEL: CPT | Performed by: NURSE PRACTITIONER

## 2024-02-07 PROCEDURE — 85027 COMPLETE CBC AUTOMATED: CPT | Performed by: NURSE PRACTITIONER

## 2024-02-07 RX ORDER — POLYETHYLENE GLYCOL 3350 17 G/17G
17 POWDER, FOR SOLUTION ORAL DAILY
Status: DISCONTINUED | OUTPATIENT
Start: 2024-02-08 | End: 2024-02-17 | Stop reason: HOSPADM

## 2024-02-07 RX ORDER — ONDANSETRON HYDROCHLORIDE 2 MG/ML
4 INJECTION, SOLUTION INTRAVENOUS EVERY 6 HOURS PRN
Status: DISCONTINUED | OUTPATIENT
Start: 2024-02-07 | End: 2024-02-17 | Stop reason: HOSPADM

## 2024-02-07 RX ORDER — POLYETHYLENE GLYCOL 3350 17 G/17G
17 POWDER, FOR SOLUTION ORAL DAILY
Status: DISCONTINUED | OUTPATIENT
Start: 2024-02-07 | End: 2024-02-07

## 2024-02-07 RX ORDER — BISACODYL 10 MG/1
10 SUPPOSITORY RECTAL ONCE
Status: COMPLETED | OUTPATIENT
Start: 2024-02-07 | End: 2024-02-07

## 2024-02-07 RX ORDER — DEXTROMETHORPHAN/PSEUDOEPHED 2.5-7.5/.8
80 DROPS ORAL EVERY 6 HOURS
Status: DISPENSED | OUTPATIENT
Start: 2024-02-08 | End: 2024-02-13

## 2024-02-07 RX ORDER — POLYETHYLENE GLYCOL 3350 17 G/17G
17 POWDER, FOR SOLUTION ORAL ONCE
Status: COMPLETED | OUTPATIENT
Start: 2024-02-07 | End: 2024-02-07

## 2024-02-07 RX ADMIN — FAMOTIDINE 20 MG: 20 TABLET, FILM COATED ORAL at 20:18

## 2024-02-07 RX ADMIN — LOSARTAN POTASSIUM 25 MG: 25 TABLET, FILM COATED ORAL at 09:00

## 2024-02-07 RX ADMIN — METOPROLOL SUCCINATE 200 MG: 100 TABLET, EXTENDED RELEASE ORAL at 09:00

## 2024-02-07 RX ADMIN — POLYETHYLENE GLYCOL 3350 17 G: 17 POWDER, FOR SOLUTION ORAL at 18:02

## 2024-02-07 RX ADMIN — INSULIN LISPRO 1 UNITS: 100 INJECTION, SOLUTION INTRAVENOUS; SUBCUTANEOUS at 12:13

## 2024-02-07 RX ADMIN — TIMOLOL MALEATE 1 DROP: 5 SOLUTION/ DROPS OPHTHALMIC at 20:18

## 2024-02-07 RX ADMIN — BISACODYL 10 MG: 10 SUPPOSITORY RECTAL at 17:00

## 2024-02-07 RX ADMIN — TIMOLOL MALEATE 1 DROP: 5 SOLUTION/ DROPS OPHTHALMIC at 09:01

## 2024-02-07 RX ADMIN — INSULIN LISPRO 2 UNITS: 100 INJECTION, SOLUTION INTRAVENOUS; SUBCUTANEOUS at 18:03

## 2024-02-07 RX ADMIN — TIOTROPIUM BROMIDE INHALATION SPRAY 2 PUFF: 3.12 SPRAY, METERED RESPIRATORY (INHALATION) at 09:25

## 2024-02-07 RX ADMIN — DICYCLOMINE HYDROCHLORIDE 20 MG: 10 CAPSULE ORAL at 20:18

## 2024-02-07 RX ADMIN — PERFLUTREN 2 ML OF DILUTION: 6.52 INJECTION, SUSPENSION INTRAVENOUS at 09:13

## 2024-02-07 RX ADMIN — PANTOPRAZOLE SODIUM 40 MG: 40 TABLET, DELAYED RELEASE ORAL at 20:18

## 2024-02-07 RX ADMIN — ONDANSETRON 4 MG: 2 INJECTION INTRAMUSCULAR; INTRAVENOUS at 20:18

## 2024-02-07 RX ADMIN — DICYCLOMINE HYDROCHLORIDE 20 MG: 10 CAPSULE ORAL at 09:00

## 2024-02-07 RX ADMIN — ONDANSETRON 4 MG: 2 INJECTION INTRAMUSCULAR; INTRAVENOUS at 03:56

## 2024-02-07 RX ADMIN — LATANOPROST 1 DROP: 50 SOLUTION/ DROPS OPHTHALMIC at 09:01

## 2024-02-07 RX ADMIN — ACETAMINOPHEN 650 MG: 325 TABLET ORAL at 12:13

## 2024-02-07 RX ADMIN — AMLODIPINE BESYLATE 10 MG: 10 TABLET ORAL at 09:00

## 2024-02-07 RX ADMIN — BRIMONIDINE TARTRATE 1 DROP: 2 SOLUTION/ DROPS OPHTHALMIC at 20:18

## 2024-02-07 RX ADMIN — INSULIN LISPRO 1 UNITS: 100 INJECTION, SOLUTION INTRAVENOUS; SUBCUTANEOUS at 09:00

## 2024-02-07 RX ADMIN — PANTOPRAZOLE SODIUM 40 MG: 40 TABLET, DELAYED RELEASE ORAL at 09:00

## 2024-02-07 RX ADMIN — LORATADINE 10 MG: 10 TABLET ORAL at 09:00

## 2024-02-07 RX ADMIN — ROSUVASTATIN CALCIUM 20 MG: 20 TABLET, FILM COATED ORAL at 09:00

## 2024-02-07 RX ADMIN — BRIMONIDINE TARTRATE 1 DROP: 2 SOLUTION/ DROPS OPHTHALMIC at 09:01

## 2024-02-07 RX ADMIN — DICYCLOMINE HYDROCHLORIDE 20 MG: 10 CAPSULE ORAL at 15:15

## 2024-02-07 SDOH — SOCIAL STABILITY: SOCIAL INSECURITY: ARE YOU OR HAVE YOU BEEN THREATENED OR ABUSED PHYSICALLY, EMOTIONALLY, OR SEXUALLY BY ANYONE?: NO

## 2024-02-07 SDOH — SOCIAL STABILITY: SOCIAL INSECURITY: DO YOU FEEL UNSAFE GOING BACK TO THE PLACE WHERE YOU ARE LIVING?: NO

## 2024-02-07 SDOH — SOCIAL STABILITY: SOCIAL INSECURITY: DOES ANYONE TRY TO KEEP YOU FROM HAVING/CONTACTING OTHER FRIENDS OR DOING THINGS OUTSIDE YOUR HOME?: NO

## 2024-02-07 SDOH — SOCIAL STABILITY: SOCIAL INSECURITY: HAS ANYONE EVER THREATENED TO HURT YOUR FAMILY OR YOUR PETS?: NO

## 2024-02-07 SDOH — SOCIAL STABILITY: SOCIAL INSECURITY: WERE YOU ABLE TO COMPLETE ALL THE BEHAVIORAL HEALTH SCREENINGS?: YES

## 2024-02-07 SDOH — SOCIAL STABILITY: SOCIAL INSECURITY: HAVE YOU HAD THOUGHTS OF HARMING ANYONE ELSE?: NO

## 2024-02-07 SDOH — SOCIAL STABILITY: SOCIAL INSECURITY: DO YOU FEEL ANYONE HAS EXPLOITED OR TAKEN ADVANTAGE OF YOU FINANCIALLY OR OF YOUR PERSONAL PROPERTY?: NO

## 2024-02-07 SDOH — SOCIAL STABILITY: SOCIAL INSECURITY: ABUSE: ADULT

## 2024-02-07 SDOH — SOCIAL STABILITY: SOCIAL INSECURITY: ARE THERE ANY APPARENT SIGNS OF INJURIES/BEHAVIORS THAT COULD BE RELATED TO ABUSE/NEGLECT?: NO

## 2024-02-07 ASSESSMENT — ACTIVITIES OF DAILY LIVING (ADL): LACK_OF_TRANSPORTATION: NO

## 2024-02-07 ASSESSMENT — COGNITIVE AND FUNCTIONAL STATUS - GENERAL
WALKING IN HOSPITAL ROOM: A LITTLE
TURNING FROM BACK TO SIDE WHILE IN FLAT BAD: A LITTLE
CLIMB 3 TO 5 STEPS WITH RAILING: A LITTLE
MOVING TO AND FROM BED TO CHAIR: A LITTLE
TOILETING: A LITTLE
MOBILITY SCORE: 18
DAILY ACTIVITIY SCORE: 18
DRESSING REGULAR LOWER BODY CLOTHING: A LITTLE
MOVING FROM LYING ON BACK TO SITTING ON SIDE OF FLAT BED WITH BEDRAILS: A LITTLE
EATING MEALS: A LITTLE
PERSONAL GROOMING: A LITTLE
STANDING UP FROM CHAIR USING ARMS: A LITTLE
DRESSING REGULAR UPPER BODY CLOTHING: A LITTLE
HELP NEEDED FOR BATHING: A LITTLE

## 2024-02-07 ASSESSMENT — PAIN SCALES - GENERAL
PAINLEVEL_OUTOF10: 0 - NO PAIN
PAINLEVEL_OUTOF10: 3

## 2024-02-07 ASSESSMENT — LIFESTYLE VARIABLES
HOW MANY STANDARD DRINKS CONTAINING ALCOHOL DO YOU HAVE ON A TYPICAL DAY: PATIENT DOES NOT DRINK
HOW OFTEN DO YOU HAVE A DRINK CONTAINING ALCOHOL: NEVER
AUDIT-C TOTAL SCORE: 0
HOW OFTEN DO YOU HAVE 6 OR MORE DRINKS ON ONE OCCASION: NEVER
AUDIT-C TOTAL SCORE: 0
SKIP TO QUESTIONS 9-10: 1

## 2024-02-07 ASSESSMENT — PAIN DESCRIPTION - LOCATION: LOCATION: HEAD

## 2024-02-07 ASSESSMENT — PATIENT HEALTH QUESTIONNAIRE - PHQ9
SUM OF ALL RESPONSES TO PHQ9 QUESTIONS 1 & 2: 0
1. LITTLE INTEREST OR PLEASURE IN DOING THINGS: NOT AT ALL
2. FEELING DOWN, DEPRESSED OR HOPELESS: NOT AT ALL

## 2024-02-07 ASSESSMENT — PAIN - FUNCTIONAL ASSESSMENT
PAIN_FUNCTIONAL_ASSESSMENT: 0-10

## 2024-02-07 NOTE — CARE PLAN
The patient's goals for the shift include  more information about her diagnosis and plan of care.    The clinical goals for the shift include successful decoagulation therapy.        Problem: Pain  Goal: My pain/discomfort is manageable  Outcome: Progressing     Problem: Safety  Goal: Patient will be injury free during hospitalization  Outcome: Progressing  Goal: I will remain free of falls  Outcome: Progressing     Problem: Daily Care  Goal: Daily care needs are met  Outcome: Progressing     Problem: Psychosocial Needs  Goal: Demonstrates ability to cope with hospitalization/illness  Outcome: Progressing  Goal: Collaborate with me, my family, and caregiver to identify my specific goals  Outcome: Progressing     Problem: Discharge Barriers  Goal: My discharge needs are met  Outcome: Progressing     Problem: Skin  Goal: Decreased wound size/increased tissue granulation at next dressing change  Outcome: Progressing  Flowsheets (Taken 2/7/2024 0034)  Decreased wound size/increased tissue granulation at next dressing change:   Promote sleep for wound healing   Utilize specialty bed per algorithm   Protective dressings over bony prominences  Goal: Participates in plan/prevention/treatment measures  Outcome: Progressing  Flowsheets (Taken 2/7/2024 0034)  Participates in plan/prevention/treatment measures:   Discuss with provider PT/OT consult   Increase activity/out of bed for meals   Elevate heels  Goal: Prevent/manage excess moisture  2/7/2024 0035 by Laxmi Thomas RN  Flowsheets (Taken 2/7/2024 0035)  Prevent/manage excess moisture:   Cleanse incontinence/protect with barrier cream   Moisturize dry skin   Use wicking fabric (obtain order)   Follow provider orders for dressing changes   Monitor for/manage infection if present  2/7/2024 0034 by Laxmi Thomas RN  Outcome: Progressing  Goal: Prevent/minimize sheer/friction injuries  2/7/2024 0035 by Laxmi Thomas RN  Flowsheets (Taken 2/7/2024 0035)  Prevent/minimize  sheer/friction injuries:   Complete micro-shifts as needed if patient unable. Adjust patient position to relieve pressure points, not a full turn   HOB 30 degrees or less   Increase activity/out of bed for meals   Turn/reposition every 2 hours/use positioning/transfer devices   Use pull sheet   Utilize specialty bed per algorithm  2/7/2024 0034 by Laxmi Thomas RN  Outcome: Progressing  Goal: Promote/optimize nutrition  2/7/2024 0035 by Laxmi Thomas RN  Flowsheets (Taken 2/7/2024 0035)  Promote/optimize nutrition:   Assist with feeding   Discuss with provider if NPO > 2 days   Offer water/supplements/favorite foods   Consume > 50% meals/supplements   Monitor/record intake including meals   Reassess MST if dietician not consulted  2/7/2024 0034 by Laxmi Thomas RN  Outcome: Progressing  Goal: Promote skin healing  2/7/2024 0035 by Laxmi Thomas RN  Flowsheets (Taken 2/7/2024 0035)  Promote skin healing:   Assess skin/pad under line(s)/device(s)   Ensure correct size (line/device) and apply per  instructions   Protective dressings over bony prominences   Rotate device position/do not position patient on device   Turn/reposition every 2 hours/use positioning/transfer devices  2/7/2024 0034 by Laxmi Thomas RN  Outcome: Progressing     Problem: Fall/Injury  Goal: Not fall by end of shift  Outcome: Progressing  Goal: Be free from injury by end of the shift  Outcome: Progressing  Goal: Verbalize understanding of personal risk factors for fall in the hospital  Outcome: Progressing  Goal: Verbalize understanding of risk factor reduction measures to prevent injury from fall in the home  Outcome: Progressing  Goal: Use assistive devices by end of the shift  Outcome: Progressing  Goal: Pace activities to prevent fatigue by end of the shift  Outcome: Progressing

## 2024-02-07 NOTE — PROGRESS NOTES
Physical Therapy    Therapy Communication Note    Patient Name: Vania Andrews  MRN: [id number]  Today's Date: 2/7/2024     Discipline: Physical Therapy      Missed Visit: Yes  Missed Visit Reason: Patient placed on medical hold (pt with PE/DVT and not therapeutic on heparin; will follow-up medically when appropriate)      02/07/24 at 11:18 AM   Kylee Lopez, PT

## 2024-02-07 NOTE — PROGRESS NOTES
Occupational Therapy                 Therapy Communication Note    Patient Name: Vania Andrews  MRN: [id number]  Today's Date: 2/7/2024     Discipline: Occupational Therapy    Missed Visit Reason: Missed Visit Reason: Patient placed on medical hold (Pt noted to have DVT/PE, not yet therapeutic on heparin, will defer OT at this time and will f/u with pt as medically appropraite.)    Missed Time: Attempt 11:20      02/07/24 at 12:52 PM - Yuliya Olivas OT

## 2024-02-07 NOTE — PROGRESS NOTES
"Vania Andrews is a 68 y.o. female on day 1 of admission presenting with Acute saddle pulmonary embolism without acute cor pulmonale (CMS/HCC).    Subjective     No acute events overnight.      Chief Complaint: no concerns or questions expressed on exam this morning    ROS: Endorses intermittent non productive cough which she states has been ongoing over the past weeks.  Denies SOB, CP, palpitations, abdominal pain, N/V, fevers, and chills.     Objective   Physical Exam:  Constitutional: pt in NAD, alert and cooperative  Eyes: PERRL, EOMI, no icterus   ENMT: mucous membranes moist  Head/Neck: Neck supple, no apparent injury  Respiratory/Thorax: Lungs CTA bilaterally, non-labored breathing, no cough, on RA  Cardiovascular: Regular, rate and rhythm, no murmurs, normal S1 and S2  Gastrointestinal: Nondistended, soft, non-tender, BS present x 4  : External catheter in place draining clear yellow urine  Musculoskeletal: ROM intact, no joint swelling, normal strength  Extremities: normal extremities, no edema, contusions or wounds  Neurological: alert and oriented x 3, speech clear, follows commands appropriately, without focal deficits, sensation grossly intact  Skin: Warm and dry    Vital Signs  Blood pressure 105/69, pulse 73, temperature 35.5 °C (95.9 °F), temperature source Temporal, resp. rate 18, height 1.5 m (4' 11.06\"), weight 90 kg (198 lb 6.6 oz), SpO2 97 %.  Oxygen Therapy  SpO2: 97 %  Medical Gas Therapy: None (Room air)          Intake/Output last 3 Shifts:  I/O last 3 completed shifts:  In: 464.3 (5.2 mL/kg) [P.O.:360; I.V.:104.3 (1.2 mL/kg)]  Out: - (0 mL/kg)   Weight: 90 kg     Lines and Tubes:  Peripheral IV 02/06/24 20 G Left;Upper;Anterior Arm (Active)   Placement Date/Time: 02/06/24 0031   Size (Gauge): 20 G  Orientation: Left;Upper;Anterior  Location: Arm  Site Prep: Chlorhexidine    Number of days: 1       Peripheral IV 02/07/24 20 G Left;Dorsal Forearm (Active)   Placement Date/Time: 02/07/24 " 0330   Hand Hygiene Completed: Yes  Size (Gauge): 20 G  Orientation: Left;Dorsal  Location: Forearm  Site Prep: Chlorhexidine   Local Anesthetic: None  Technique: Ultrasound guidance  Placed by: MICU RN  Insertion attempt...   Number of days: 0       External Urinary Catheter Female (Active)   Placement Date/Time: 02/06/24 0500   External Catheter Type: Female   Number of days: 1         Relevant Results  Scheduled medications  amLODIPine, 10 mg, oral, Daily  betamethasone (augmented), 1 Application, Topical, Daily  brimonidine, 1 drop, Both Eyes, BID  dicyclomine, 20 mg, oral, TID  famotidine, 20 mg, oral, Nightly  insulin lispro, 0-5 Units, subcutaneous, TID with meals  latanoprost, 1 drop, Both Eyes, q AM  loratadine, 10 mg, oral, Daily  losartan, 25 mg, oral, Daily  metoprolol succinate XL, 200 mg, oral, Daily  pantoprazole, 40 mg, oral, BID  rosuvastatin, 20 mg, oral, Daily  timolol, 1 drop, Both Eyes, BID  tiotropium, 2 Inhalation, inhalation, Daily      Continuous medications  heparin, 0-4,500 Units/hr, Last Rate: 1,100 Units/hr (02/07/24 0532)      PRN medications  PRN medications: acetaminophen, albuterol, benzonatate, dextrose 10 % in water (D10W), dextrose, glucagon, heparin, ondansetron, polyethylene glycol    Results for orders placed or performed during the hospital encounter of 02/05/24 (from the past 24 hour(s))   Heparin Assay, [facility]   Result Value Ref Range    Heparin Unfractionated 1.7 (HH) See Comment Below for Therapeutic Ranges IU/mL   POCT GLUCOSE   Result Value Ref Range    POCT Glucose 225 (H) 74 - 99 mg/dL   Heparin Assay, [facility]   Result Value Ref Range    Heparin Unfractionated 1.0 See Comment Below for Therapeutic Ranges IU/mL   Heparin Assay, [facility]   Result Value Ref Range    Heparin Unfractionated 0.1 See Comment Below for Therapeutic Ranges IU/mL   POCT GLUCOSE   Result Value Ref Range    POCT Glucose 311 (H) 74 - 99 mg/dL   Heparin Assay, [facility]   Result Value Ref Range    Heparin  Unfractionated 0.6 See Comment Below for Therapeutic Ranges IU/mL   Heparin Assay, [facility]   Result Value Ref Range    Heparin Unfractionated 0.8 See Comment Below for Therapeutic Ranges IU/mL   CBC   Result Value Ref Range    WBC 16.9 (H) 4.4 - 11.3 x10*3/uL    nRBC 0.0 0.0 - 0.0 /100 WBCs    RBC 3.97 (L) 4.00 - 5.20 x10*6/uL    Hemoglobin 8.3 (L) 12.0 - 16.0 g/dL    Hematocrit 30.2 (L) 36.0 - 46.0 %    MCV 76 (L) 80 - 100 fL    MCH 20.9 (L) 26.0 - 34.0 pg    MCHC 27.5 (L) 32.0 - 36.0 g/dL    RDW      Platelets 439 150 - 450 x10*3/uL   Renal Function Panel   Result Value Ref Range    Glucose 212 (H) 74 - 99 mg/dL    Sodium 138 136 - 145 mmol/L    Potassium 4.5 3.5 - 5.3 mmol/L    Chloride 105 98 - 107 mmol/L    Bicarbonate 24 21 - 32 mmol/L    Anion Gap 14 10 - 20 mmol/L    Urea Nitrogen 37 (H) 6 - 23 mg/dL    Creatinine 1.37 (H) 0.50 - 1.05 mg/dL    eGFR 42 (L) >60 mL/min/1.73m*2    Calcium 9.3 8.6 - 10.6 mg/dL    Phosphorus 3.6 2.5 - 4.9 mg/dL    Albumin 3.2 (L) 3.4 - 5.0 g/dL   Magnesium   Result Value Ref Range    Magnesium 2.10 1.60 - 2.40 mg/dL   Iron and TIBC   Result Value Ref Range    Iron 74 35 - 150 ug/dL    UIBC 278 110 - 370 ug/dL    TIBC 352 240 - 445 ug/dL    % Saturation 21 (L) 25 - 45 %     CT angio chest for pulmonary embolism    Result Date: 2/6/2024  Interpreted By:  Elel Ceballos, STUDY: CT ANGIO CHEST FOR PULMONARY EMBOLISM;  2/6/2024 2:00 am   INDICATION: Signs/Symptoms:elevated d-dimer, sob.   COMPARISON: None.   ACCESSION NUMBER(S): [id number]   ORDERING CLINICIAN: SHERRY DIANA   TECHNIQUE: Helical data acquisition of the chest was obtained after intravenous administration of  64 mL of Omnipaque 350. Images were reformatted in axial, coronal, and sagittal planes. Axial and coronal MIPS were reconstructed and reviewed.   FINDINGS: HEART AND VESSELS: There is a saddle type filling defect which extends to bilateral upper, right lower lobar and segmental branches consistent with acute pulmonary  embolus. Calculated RV to LV ratio of 1.0.   Main pulmonary artery is dilated up to 3.5 cm which can be seen with pulmonary arterial hypertension.   The thoracic aorta is of normal course and caliber  without vascular calcifications.   Mild coronary artery calcifications are seen.The study is not optimized for evaluation of coronary arteries.   The cardiac chambers are not enlarged.   No evidence of pericardial effusion.   MEDIASTINUM AND JENNIFER, LOWER NECK AND AXILLA: Slight heterogeneous thyroid gland.   No evidence of thoracic lymphadenopathy by CT criteria.   Large hiatal hernia with significant portion of the stomach intrathoracic in location.   LUNGS AND AIRWAYS: The trachea and central airways are patent. No endobronchial lesion.   Bibasilar atelectasis. No consolidation, effusion or pneumothorax.   UPPER ABDOMEN: Status post cholecystectomy. Status post left adrenalectomy and left partial nephrectomy. Partial visualization of a 3.8 cm hypodense lesion in the left upper quadrant likely arising from the left kidney. This measures higher than simple fluid attenuation, incompletely imaged. Subcentimeter hypodense focus in the right hepatic lobe is too small to characterize but stable..   CHEST WALL AND OSSEOUS STRUCTURES: Multilevel degenerative changes are present.       1. There is an acute saddle type pulmonary embolus extending to bilateral upper and right lower lobar and segmental branches . Calculated RV to LV ratio of 1.0. 2. Main pulmonary artery is dilated up to 3.5 cm which can be seen with pulmonary arterial hypertension. 3. Large hiatal hernia with significant portion of the stomach intrathoracic in location. There is adjacent compressive atelectasis. 4. 3.8 cm hypodense lesion in the left upper quadrant is incompletely imaged, likely arising from the left kidney. This measures higher than simple fluid attenuation. This can be further evaluated with nonemergent renal ultrasound. 5. Additional findings  as described above.     MACRO: Elle Ceballos discussed the significance and urgency of this critical finding by telephone with  Dr. Zamora On 2/6/2024 at 2:22 am. (**-RCF-**) Findings:  See findings.   Signed by: Elle Ceballos 2/6/2024 2:26 AM Dictation workstation:   [id number]    ECG 12 lead    Result Date: 2/5/2024  Normal sinus rhythm Possible Anterior infarct (cited on or before 31-OCT-2023) Abnormal ECG When compared with ECG of 25-JAN-2024 11:27, Questionable change in QRS axis See ED provider note for full interpretation and clinical correlation Confirmed by Nusrat Lord ([id number]) on 2/5/2024 9:26:47 PM    Lower extremity venous duplex right    Result Date: 2/5/2024  Interpreted By:  Elle Ceballos and Bamfo Rose STUDY: [facility] US LOWER EXTREMITY VENOUS DUPLEX RIGHT; 2/5/2024 8:01 pm   INDICATION: Signs/Symptoms:RLE edema, elevated dimer.   ACCESSION NUMBER(S): [id number]   ORDERING CLINICIAN: SHERRY DIANA   TECHNIQUE: Vascular ultrasound of the right lower extremity was performed. Real time compression views as well as Gray scale, color Doppler and spectral Doppler waveform analysis was performed.  This examination was interpreted at [facility].   FINDINGS: Evaluation of the visualized portions of the right common femoral vein, proximal, mid, and distal femoral vein, and popliteal vein were performed.  Evaluation of the visualized portions of the posterior tibial and peroneal veins were also performed.  In addition, evaluation of the contralateral common femoral vein was performed.   Limitations: None   The right distal femoral and popliteal vein are noncompressible with occlusive echogenic thrombus. No blood flow on Doppler evaluation.   Suboptimal visualization of the calf veins. The remaining evaluated veins demonstrate normal compressibility. There is intact venous flow demonstrating normal respiratory variability and normal augmentation of flow with calf compression.   No evidence of thrombus is seen within the contralateral common femoral vein.       There is occlusive thrombus in the right distal femoral and popliteal veins.   Poor visualization of the calf veins.   I personally reviewed the images/study and I agree with radiology resident Dr. Minerva Camargo's findings as stated. This study was interpreted at [place], [place].   MACRO: None   Signed by: Elle Ceballos 2/5/2024 8:27 PM Dictation workstation:   [id number]    XR chest 2 views    Result Date: 2/5/2024  Interpreted By:  Elle Ceballos, STUDY: XR CHEST 2 VIEWS;  2/5/2024 7:07 pm   INDICATION: Signs/Symptoms:sob.   COMPARISON: Chest x-ray 01/25/2024   ACCESSION NUMBER(S): [id number]   ORDERING CLINICIAN: SHERRY DIANA   FINDINGS:     CARDIOMEDIASTINAL SILHOUETTE: Cardiomediastinal silhouette is stable in size and configuration.   LUNGS: Retrocardiac airspace opacities redemonstrated likely related to large hiatal hernia with adjacent compressive atelectasis. Superimposed infection not excluded. Right lung is clear. No effusion or pneumothorax.   ABDOMEN: Large hiatal hernia. Postsurgical changes with multiple clips noted in the upper abdomen.   BONES: Multilevel degenerative changes of the spine.       Redemonstration of large hiatal hernia with left basilar airspace opacity likely relate to compressive atelectasis. Superimposed infection not excluded. Correlate clinically.   MACRO: None   Signed by: Elle Ceballos 2/5/2024 7:15 PM Dictation workstation:   [id number]     CT angio chest for pulmonary embolism 02/06/2024    Narrative  Interpreted By:  Elle Ceballos,  STUDY:  CT ANGIO CHEST FOR PULMONARY EMBOLISM;  2/6/2024 2:00 am    INDICATION:  Signs/Symptoms:elevated d-dimer, sob.    COMPARISON:  None.    ACCESSION NUMBER(S):  [id number]    ORDERING CLINICIAN:  SHERRY DIANA    TECHNIQUE:  Helical data acquisition of the chest was obtained after  intravenous  administration of  64 mL of Omnipaque 350. Images were reformatted in  axial, coronal, and sagittal planes. Axial and coronal MIPS were  reconstructed and reviewed.    FINDINGS:  HEART AND VESSELS:  There is a saddle type filling defect which extends to bilateral  upper, right lower lobar and segmental branches consistent with acute  pulmonary embolus. Calculated RV to LV ratio of 1.0.    Main pulmonary artery is dilated up to 3.5 cm which can be seen with  pulmonary arterial hypertension.    The thoracic aorta is of normal course and caliber  without vascular  calcifications.    Mild coronary artery calcifications are seen.The study is not  optimized for evaluation of coronary arteries.    The cardiac chambers are not enlarged.    No evidence of pericardial effusion.    MEDIASTINUM AND JENNIFER, LOWER NECK AND AXILLA:  Slight heterogeneous thyroid gland.    No evidence of thoracic lymphadenopathy by CT criteria.    Large hiatal hernia with significant portion of the stomach  intrathoracic in location.    LUNGS AND AIRWAYS:  The trachea and central airways are patent. No endobronchial lesion.    Bibasilar atelectasis. No consolidation, effusion or pneumothorax.    UPPER ABDOMEN:  Status post cholecystectomy. Status post left adrenalectomy and left  partial nephrectomy. Partial visualization of a 3.8 cm hypodense  lesion in the left upper quadrant likely arising from the left  kidney. This measures higher than simple fluid attenuation,  incompletely imaged. Subcentimeter hypodense focus in the right  hepatic lobe is too small to characterize but stable..    CHEST WALL AND OSSEOUS STRUCTURES:  Multilevel degenerative changes are present.    Impression  1. There is an acute saddle type pulmonary embolus extending to  bilateral upper and right lower lobar and segmental branches .  Calculated RV to LV ratio of 1.0.  2. Main pulmonary artery is dilated up to 3.5 cm which can be seen  with pulmonary arterial  hypertension.  3. Large hiatal hernia with significant portion of the stomach  intrathoracic in location. There is adjacent compressive atelectasis.  4. 3.8 cm hypodense lesion in the left upper quadrant is incompletely  imaged, likely arising from the left kidney. This measures higher  than simple fluid attenuation. This can be further evaluated with  nonemergent renal ultrasound.  5. Additional findings as described above.      MACRO:  Elle Ceballos discussed the significance and urgency of this critical  finding by telephone with  Dr. Zamora On 2/6/2024 at 2:22 am.  (**-RCF-**) Findings:  See findings.    Signed by: Elle Ceballos 2/6/2024 2:26 AM  Dictation workstation:   [id number]      Assessment/Plan   Principal Problem:    Acute saddle pulmonary embolism without acute cor pulmonale (CMS/HCC)  Active Problems:    Renal cell carcinoma, unspecified laterality (CMS/HCC)    Anemia    Chronic kidney disease (CKD), stage III (moderate) (CMS/HCC)    Diabetes mellitus (CMS/HCC)    Esophageal reflux    Mixed hyperlipidemia    Abdominal hernia    Vania Andrews is a 68 y.o. 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  She had recent admission for acute on chronic anemia, dyspnea with chronic cough.   Admitted with acute saddle PE and RLE DVT.  HDS on RA     Neuro:   #Hx arthritis  - A&Ox3  - Acetaminophen 650mg tab q4 hrs PRN mild pain  - PT/OT eval     Optho:   #Hx Glaucoma  - Continue home eye drops     CV:   #Hx HTN, HLD, MI 2019  - Remains HDS   - Continue home:  Amlodipine, Losartan, Metoprolol, and Rosuvastatin   - 02/07 Limited TTE: Poorly visualized anatomical structures due to suboptimal image quality. Left ventricular systolic function is normal with a 60% estimated ejection fraction. The right ventricle is normal in size. There is normal right ventricular global systolic function.   - Continue to monitor on tele     Pulm:   #Hx Asthma, MARYLU not on CPAP  #Acute saddle PE  - Remains stable on RA  - Continue Heparin gtt  - Continue home Spiriva, albuterol MDI, and Claritin   - Maintain SpO2 >92%    FEN/GI:   #Hx GERD  - Continue home PPI   - Diet: Regular Diet   - Bowel Reg: PRN Miralax daily   - Daily RFP/Mag     Renal:   #Hx RCC, s/p partial nephrectomy and CKD (baseline ~1.2)  - sCr 1.37 today, 1.36 on admission   - Strict I's & O's and daily weight  - Caution with nephrotoxins and contrast dye     Heme:    #Hx VEDA and LLE DVT, stopped Eliquis in Dec per her outpt Vasc Med team  #Acute PE  #RLE acute DVT, distal fem & popliteal.  - Continue Heparin gtt  - Hbg 8.3 this morning, down from 9.9, no s/s of bleeding  - Continue to monitor for s/s of bleeding   - 2/07 Serum Iron 74/TIBC 352  - 2/05 Duplex RLE: Occlusive thrombus in the right distal femoral and popliteal  veins.  - Follows with vascular medicine OP, Dr Spears.            > Hold on IV iron infusions.  If Hgb <9, can consider PO iron supp & bowel regimen.           > Dr Spears will also follow her outpt for her acute PE/DVT in addition to her anemia  - Anticipate transitioning to DOAC  - Daily CBC     Endo:   #Hx DMII  - Holding home Trulicity (weekly)  - Continue SSI TID with meals and accu checks  - HgbA1C 7.1 July of last year     ID: no acute issues  - Remains Afebrile, however with increased leukocytosis this morning.  WBC 16.9 from 12.2  - Continue to monitor off Atx   - 1/25: flu A/B, Covid and RSV neg.  - Previous Covid hx (2023) per patient     PPx:  DVT: Heparin infusion   GI: PPI     Access: PIV  xx     Code Status: FULL CODE   NOK: Bryce Corea (Spouse) [phone]     Dispo: Patient stable on heparin gtt and RA.  Appropriate for transfer to [facility] with tele    Pt discussed with Dr. Dugan, seen and examined. All labs, VS and previous plan of care reviewed.      Jasper Giraldo, APRN-CNP

## 2024-02-08 ENCOUNTER — APPOINTMENT (OUTPATIENT)
Dept: RADIOLOGY | Facility: HOSPITAL | Age: 69
DRG: 176 | End: 2024-02-08
Payer: MEDICARE

## 2024-02-08 ENCOUNTER — HOSPITAL ENCOUNTER (OUTPATIENT)
Dept: RADIOLOGY | Facility: HOSPITAL | Age: 69
Discharge: HOME | DRG: 176 | End: 2024-02-08
Payer: MEDICARE

## 2024-02-08 LAB
ABO GROUP (TYPE) IN BLOOD: NORMAL
ALBUMIN SERPL BCP-MCNC: 3.3 G/DL (ref 3.4–5)
ALBUMIN: 3.6 G/DL (ref 3.4–5)
ALPHA 1 GLOBULIN: 0.5 G/DL (ref 0.2–0.6)
ALPHA 2 GLOBULIN: 0.9 G/DL (ref 0.4–1.1)
ANION GAP SERPL CALC-SCNC: 11 MMOL/L (ref 10–20)
ANTIBODY SCREEN: NORMAL
APTT PPP: 35 SECONDS (ref 27–38)
APTT PPP: 69 SECONDS (ref 27–38)
BASOPHILS # BLD AUTO: 0.06 X10*3/UL (ref 0–0.1)
BASOPHILS NFR BLD AUTO: 0.4 %
BETA GLOBULIN: 1.4 G/DL (ref 0.5–1.2)
BUN SERPL-MCNC: 42 MG/DL (ref 6–23)
CALCIUM SERPL-MCNC: 9.4 MG/DL (ref 8.6–10.6)
CHLORIDE SERPL-SCNC: 108 MMOL/L (ref 98–107)
CO2 SERPL-SCNC: 27 MMOL/L (ref 21–32)
CREAT SERPL-MCNC: 1.47 MG/DL (ref 0.5–1.05)
DACRYOCYTES BLD QL SMEAR: NORMAL
EGFRCR SERPLBLD CKD-EPI 2021: 39 ML/MIN/1.73M*2
EOSINOPHIL # BLD AUTO: 0.38 X10*3/UL (ref 0–0.7)
EOSINOPHIL NFR BLD AUTO: 2.8 %
ERYTHROCYTE [DISTWIDTH] IN BLOOD BY AUTOMATED COUNT: 30.8 % (ref 11.5–14.5)
ERYTHROCYTE [DISTWIDTH] IN BLOOD BY AUTOMATED COUNT: 30.8 % (ref 11.5–14.5)
ERYTHROCYTE [DISTWIDTH] IN BLOOD BY AUTOMATED COUNT: 31.2 % (ref 11.5–14.5)
GAMMA GLOBULIN: 1.3 G/DL (ref 0.5–1.4)
GLUCOSE BLD MANUAL STRIP-MCNC: 141 MG/DL (ref 74–99)
GLUCOSE BLD MANUAL STRIP-MCNC: 141 MG/DL (ref 74–99)
GLUCOSE BLD MANUAL STRIP-MCNC: 178 MG/DL (ref 74–99)
GLUCOSE SERPL-MCNC: 218 MG/DL (ref 74–99)
HCT VFR BLD AUTO: 27.5 % (ref 36–46)
HCT VFR BLD AUTO: 29.2 % (ref 36–46)
HCT VFR BLD AUTO: 33.8 % (ref 36–46)
HGB BLD-MCNC: 7.8 G/DL (ref 12–16)
HGB BLD-MCNC: 8.2 G/DL (ref 12–16)
HGB BLD-MCNC: 9.2 G/DL (ref 12–16)
HYPOCHROMIA BLD QL SMEAR: NORMAL
IMM GRANULOCYTES # BLD AUTO: 0.07 X10*3/UL (ref 0–0.7)
IMM GRANULOCYTES NFR BLD AUTO: 0.5 % (ref 0–0.9)
IMMUNOFIXATION COMMENT: ABNORMAL
INR PPP: 1 (ref 0.9–1.1)
INR PPP: 1.2 (ref 0.9–1.1)
LYMPHOCYTES # BLD AUTO: 2.24 X10*3/UL (ref 1.2–4.8)
LYMPHOCYTES NFR BLD AUTO: 16.7 %
MAGNESIUM SERPL-MCNC: 2.25 MG/DL (ref 1.6–2.4)
MCH RBC QN AUTO: 21.1 PG (ref 26–34)
MCH RBC QN AUTO: 21.4 PG (ref 26–34)
MCH RBC QN AUTO: 21.8 PG (ref 26–34)
MCHC RBC AUTO-ENTMCNC: 27.2 G/DL (ref 32–36)
MCHC RBC AUTO-ENTMCNC: 28.1 G/DL (ref 32–36)
MCHC RBC AUTO-ENTMCNC: 28.4 G/DL (ref 32–36)
MCV RBC AUTO: 75 FL (ref 80–100)
MCV RBC AUTO: 78 FL (ref 80–100)
MCV RBC AUTO: 79 FL (ref 80–100)
MONOCYTES # BLD AUTO: 0.86 X10*3/UL (ref 0.1–1)
MONOCYTES NFR BLD AUTO: 6.4 %
NEUTROPHILS # BLD AUTO: 9.78 X10*3/UL (ref 1.2–7.7)
NEUTROPHILS NFR BLD AUTO: 73.2 %
NRBC BLD-RTO: 0 /100 WBCS (ref 0–0)
PATH REVIEW - SERUM IMMUNOFIXATION: ABNORMAL
PATH REVIEW-SERUM PROTEIN ELECTROPHORESIS: ABNORMAL
PHOSPHATE SERPL-MCNC: 3.9 MG/DL (ref 2.5–4.9)
PLATELET # BLD AUTO: 365 X10*3/UL (ref 150–450)
PLATELET # BLD AUTO: 381 X10*3/UL (ref 150–450)
PLATELET # BLD AUTO: 441 X10*3/UL (ref 150–450)
POLYCHROMASIA BLD QL SMEAR: NORMAL
POTASSIUM SERPL-SCNC: 4.8 MMOL/L (ref 3.5–5.3)
PROTEIN ELECTROPHORESIS COMMENT: ABNORMAL
PROTHROMBIN TIME: 11.5 SECONDS (ref 9.8–12.8)
PROTHROMBIN TIME: 13.2 SECONDS (ref 9.8–12.8)
RBC # BLD AUTO: 3.69 X10*6/UL (ref 4–5.2)
RBC # BLD AUTO: 3.77 X10*6/UL (ref 4–5.2)
RBC # BLD AUTO: 4.3 X10*6/UL (ref 4–5.2)
RBC MORPH BLD: NORMAL
RH FACTOR (ANTIGEN D): NORMAL
SCHISTOCYTES BLD QL SMEAR: NORMAL
SODIUM SERPL-SCNC: 141 MMOL/L (ref 136–145)
UFH PPP CHRO-ACNC: 0.2 IU/ML
UFH PPP CHRO-ACNC: 0.3 IU/ML
UFH PPP CHRO-ACNC: 0.9 IU/ML
WBC # BLD AUTO: 13 X10*3/UL (ref 4.4–11.3)
WBC # BLD AUTO: 13.3 X10*3/UL (ref 4.4–11.3)
WBC # BLD AUTO: 13.4 X10*3/UL (ref 4.4–11.3)

## 2024-02-08 PROCEDURE — 97165 OT EVAL LOW COMPLEX 30 MIN: CPT | Mod: GO

## 2024-02-08 PROCEDURE — 2500000001 HC RX 250 WO HCPCS SELF ADMINISTERED DRUGS (ALT 637 FOR MEDICARE OP): Performed by: NURSE PRACTITIONER

## 2024-02-08 PROCEDURE — 82947 ASSAY GLUCOSE BLOOD QUANT: CPT

## 2024-02-08 PROCEDURE — 2500000004 HC RX 250 GENERAL PHARMACY W/ HCPCS (ALT 636 FOR OP/ED)

## 2024-02-08 PROCEDURE — 74018 RADEX ABDOMEN 1 VIEW: CPT | Performed by: RADIOLOGY

## 2024-02-08 PROCEDURE — 83735 ASSAY OF MAGNESIUM: CPT | Performed by: NURSE PRACTITIONER

## 2024-02-08 PROCEDURE — C1751 CATH, INF, PER/CENT/MIDLINE: HCPCS

## 2024-02-08 PROCEDURE — 85610 PROTHROMBIN TIME: CPT

## 2024-02-08 PROCEDURE — 74018 RADEX ABDOMEN 1 VIEW: CPT

## 2024-02-08 PROCEDURE — 2500000005 HC RX 250 GENERAL PHARMACY W/O HCPCS

## 2024-02-08 PROCEDURE — 1200000002 HC GENERAL ROOM WITH TELEMETRY DAILY

## 2024-02-08 PROCEDURE — 36415 COLL VENOUS BLD VENIPUNCTURE: CPT

## 2024-02-08 PROCEDURE — 82270 OCCULT BLOOD FECES: CPT

## 2024-02-08 PROCEDURE — 2500000004 HC RX 250 GENERAL PHARMACY W/ HCPCS (ALT 636 FOR OP/ED): Performed by: NURSE PRACTITIONER

## 2024-02-08 PROCEDURE — 86901 BLOOD TYPING SEROLOGIC RH(D): CPT

## 2024-02-08 PROCEDURE — 36410 VNPNXR 3YR/> PHY/QHP DX/THER: CPT

## 2024-02-08 PROCEDURE — 80069 RENAL FUNCTION PANEL: CPT | Performed by: NURSE PRACTITIONER

## 2024-02-08 PROCEDURE — 2500000001 HC RX 250 WO HCPCS SELF ADMINISTERED DRUGS (ALT 637 FOR MEDICARE OP): Performed by: PSYCHOLOGIST

## 2024-02-08 PROCEDURE — 05HB33Z INSERTION OF INFUSION DEVICE INTO RIGHT BASILIC VEIN, PERCUTANEOUS APPROACH: ICD-10-PCS | Performed by: INTERNAL MEDICINE

## 2024-02-08 PROCEDURE — 85730 THROMBOPLASTIN TIME PARTIAL: CPT

## 2024-02-08 PROCEDURE — 2500000001 HC RX 250 WO HCPCS SELF ADMINISTERED DRUGS (ALT 637 FOR MEDICARE OP)

## 2024-02-08 PROCEDURE — 85027 COMPLETE CBC AUTOMATED: CPT | Performed by: NURSE PRACTITIONER

## 2024-02-08 PROCEDURE — 2780000003 HC OR 278 NO HCPCS

## 2024-02-08 PROCEDURE — 85025 COMPLETE CBC W/AUTO DIFF WBC: CPT

## 2024-02-08 PROCEDURE — 2500000001 HC RX 250 WO HCPCS SELF ADMINISTERED DRUGS (ALT 637 FOR MEDICARE OP): Performed by: INTERNAL MEDICINE

## 2024-02-08 PROCEDURE — 97161 PT EVAL LOW COMPLEX 20 MIN: CPT | Mod: GP

## 2024-02-08 PROCEDURE — 97530 THERAPEUTIC ACTIVITIES: CPT | Mod: GP

## 2024-02-08 PROCEDURE — 85027 COMPLETE CBC AUTOMATED: CPT

## 2024-02-08 PROCEDURE — 85520 HEPARIN ASSAY: CPT

## 2024-02-08 RX ORDER — LOSARTAN POTASSIUM 25 MG/1
25 TABLET ORAL NIGHTLY
Status: DISCONTINUED | OUTPATIENT
Start: 2024-02-08 | End: 2024-02-17 | Stop reason: HOSPADM

## 2024-02-08 RX ORDER — LIDOCAINE HYDROCHLORIDE 10 MG/ML
5 INJECTION INFILTRATION; PERINEURAL ONCE
Status: COMPLETED | OUTPATIENT
Start: 2024-02-08 | End: 2024-02-08

## 2024-02-08 RX ORDER — ROSUVASTATIN CALCIUM 20 MG/1
20 TABLET, COATED ORAL NIGHTLY
Status: DISCONTINUED | OUTPATIENT
Start: 2024-02-08 | End: 2024-02-17 | Stop reason: HOSPADM

## 2024-02-08 RX ADMIN — LIDOCAINE HYDROCHLORIDE ANHYDROUS 50 MG: 10 INJECTION, SOLUTION INFILTRATION at 18:11

## 2024-02-08 RX ADMIN — BRIMONIDINE TARTRATE 1 DROP: 2 SOLUTION/ DROPS OPHTHALMIC at 15:35

## 2024-02-08 RX ADMIN — SIMETHICONE 80 MG: 20 SUSPENSION/ DROPS ORAL at 21:05

## 2024-02-08 RX ADMIN — SODIUM CHLORIDE, POTASSIUM CHLORIDE, SODIUM LACTATE AND CALCIUM CHLORIDE 250 ML: 600; 310; 30; 20 INJECTION, SOLUTION INTRAVENOUS at 15:30

## 2024-02-08 RX ADMIN — METOPROLOL SUCCINATE 200 MG: 100 TABLET, EXTENDED RELEASE ORAL at 10:07

## 2024-02-08 RX ADMIN — BETAMETHASONE DIPROPIONATE 1 APPLICATION: 0.5 CREAM TOPICAL at 10:23

## 2024-02-08 RX ADMIN — ROSUVASTATIN CALCIUM 20 MG: 20 TABLET, FILM COATED ORAL at 21:00

## 2024-02-08 RX ADMIN — HEPARIN SODIUM 3000 UNITS: 5000 INJECTION INTRAVENOUS; SUBCUTANEOUS at 11:48

## 2024-02-08 RX ADMIN — PANTOPRAZOLE SODIUM 40 MG: 40 TABLET, DELAYED RELEASE ORAL at 10:07

## 2024-02-08 RX ADMIN — SIMETHICONE 80 MG: 20 SUSPENSION/ DROPS ORAL at 15:31

## 2024-02-08 RX ADMIN — TIMOLOL MALEATE 1 DROP: 5 SOLUTION/ DROPS OPHTHALMIC at 15:35

## 2024-02-08 RX ADMIN — INSULIN LISPRO 1 UNITS: 100 INJECTION, SOLUTION INTRAVENOUS; SUBCUTANEOUS at 18:35

## 2024-02-08 RX ADMIN — LOSARTAN POTASSIUM 25 MG: 25 TABLET, FILM COATED ORAL at 20:59

## 2024-02-08 RX ADMIN — DICYCLOMINE HYDROCHLORIDE 20 MG: 10 CAPSULE ORAL at 10:07

## 2024-02-08 RX ADMIN — BRIMONIDINE TARTRATE 1 DROP: 2 SOLUTION/ DROPS OPHTHALMIC at 20:59

## 2024-02-08 RX ADMIN — FAMOTIDINE 20 MG: 20 TABLET, FILM COATED ORAL at 20:59

## 2024-02-08 RX ADMIN — PANTOPRAZOLE SODIUM 40 MG: 40 TABLET, DELAYED RELEASE ORAL at 20:59

## 2024-02-08 RX ADMIN — SIMETHICONE 80 MG: 20 SUSPENSION/ DROPS ORAL at 02:50

## 2024-02-08 RX ADMIN — TIMOLOL MALEATE 1 DROP: 5 SOLUTION/ DROPS OPHTHALMIC at 20:59

## 2024-02-08 RX ADMIN — SIMETHICONE 80 MG: 20 SUSPENSION/ DROPS ORAL at 10:23

## 2024-02-08 RX ADMIN — HEPARIN SODIUM 900 UNITS/HR: 10000 INJECTION, SOLUTION INTRAVENOUS at 11:49

## 2024-02-08 RX ADMIN — AMLODIPINE BESYLATE 10 MG: 10 TABLET ORAL at 10:07

## 2024-02-08 RX ADMIN — DICYCLOMINE HYDROCHLORIDE 20 MG: 10 CAPSULE ORAL at 15:30

## 2024-02-08 RX ADMIN — DICYCLOMINE HYDROCHLORIDE 20 MG: 10 CAPSULE ORAL at 20:59

## 2024-02-08 RX ADMIN — TIOTROPIUM BROMIDE INHALATION SPRAY 2 PUFF: 3.12 SPRAY, METERED RESPIRATORY (INHALATION) at 10:26

## 2024-02-08 RX ADMIN — LORATADINE 10 MG: 10 TABLET ORAL at 10:07

## 2024-02-08 ASSESSMENT — COGNITIVE AND FUNCTIONAL STATUS - GENERAL
TURNING FROM BACK TO SIDE WHILE IN FLAT BAD: A LITTLE
STANDING UP FROM CHAIR USING ARMS: A LITTLE
PERSONAL GROOMING: A LITTLE
HELP NEEDED FOR BATHING: A LOT
MOVING TO AND FROM BED TO CHAIR: A LITTLE
CLIMB 3 TO 5 STEPS WITH RAILING: TOTAL
MOBILITY SCORE: 16
DRESSING REGULAR UPPER BODY CLOTHING: A LOT
WALKING IN HOSPITAL ROOM: A LITTLE
MOVING FROM LYING ON BACK TO SITTING ON SIDE OF FLAT BED WITH BEDRAILS: A LITTLE
DAILY ACTIVITIY SCORE: 14
EATING MEALS: A LITTLE
DRESSING REGULAR LOWER BODY CLOTHING: A LOT
TOILETING: A LOT

## 2024-02-08 ASSESSMENT — ACTIVITIES OF DAILY LIVING (ADL)
ADL_ASSISTANCE: NEEDS ASSISTANCE
BATHING_ASSISTANCE: MAXIMAL
ADL_ASSISTANCE: NEEDS ASSISTANCE

## 2024-02-08 ASSESSMENT — PAIN - FUNCTIONAL ASSESSMENT
PAIN_FUNCTIONAL_ASSESSMENT: 0-10

## 2024-02-08 ASSESSMENT — PAIN SCALES - GENERAL
PAINLEVEL_OUTOF10: 0 - NO PAIN
PAINLEVEL_OUTOF10: 4
PAINLEVEL_OUTOF10: 5 - MODERATE PAIN
PAINLEVEL_OUTOF10: 0 - NO PAIN
PAINLEVEL_OUTOF10: 0 - NO PAIN

## 2024-02-08 NOTE — PROGRESS NOTES
Physical Therapy    Physical Therapy Evaluation    Patient Name: Vania Andrews  MRN: [id number]  Today's Date: 2/8/2024   Time Calculation  Start Time: 1054  Stop Time: 1136  Time Calculation (min): 42 min    Assessment/Plan   PT Assessment  PT Assessment Results: Decreased strength, Decreased endurance, Impaired balance, Decreased mobility  Rehab Prognosis: Good  End of Session Communication: Bedside nurse  End of Session Patient Position: Up in chair, Alarm off, not on at start of session  IP OR SWING BED PT PLAN  Inpatient or Swing Bed: Inpatient  PT Plan  Treatment/Interventions: Bed mobility, Transfer training, Gait training, Stair training, Balance training, Strengthening, Endurance training, Therapeutic exercise  PT Plan: Skilled PT  PT Frequency: 3 times per week  PT Discharge Recommendations: Moderate intensity level of continued care  PT Recommended Transfer Status: Assist x1  PT - OK to Discharge: Yes      Subjective   General Visit Information:  Reason for Referral: acute saddle PE and RLE DVT  Past Medical History Relevant to Rehab: HTN, HLD, DMT2, CAD, MI (2019),  DVT (2020) previously on AC, no longer on Eliquis,  CKD stage 3 d/t loss of nephron mass, L RCC, papillary, 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  Prior to Session Communication: Bedside nurse  Patient Position Received: Up in chair, Alarm off, not on at start of session   Home Living:  Home Living  Type of Home: House  Lives With: Spouse  Home Adaptive Equipment: Walker rolling or standard  Home Layout: Two level  Home Access: Stairs to enter with rails  Entrance Stairs-Number of Steps: 5  Prior Level of Function:  Prior Function Per Pt/Caregiver Report  ADL  Assistance: Needs assistance (reports needing assist recently from spouse)  Homemaking Assistance: Needs assistance  Ambulatory Assistance: Independent  Precautions:  Precautions  Medical Precautions: Fall precautions  Vital Signs:  Vital Signs  Heart Rate: 61  SpO2: 100 %  BP: 138/88  Patient Position: Sitting    Objective     Pain:  Pain Assessment  Pain Assessment: 0-10  Pain Score: 5 - Moderate pain  Pain Location:  (bilateral ankles (reports history of Achilles tendonitis))  Cognition:  Cognition  Overall Cognitive Status: Within Functional Limits      Extremity/Trunk Assessments:  Strength:     RLE   RLE : Exceptions to WFL  AROM RLE (degrees)  RLE AROM Comment: WFL  Strength RLE  RLE Overall Strength: Greater than or equal to 3/5 as evidenced by functional mobility  LLE   LLE : Exceptions to WFL  AROM LLE (degrees)  LLE AROM Comment: WFL  Strength LLE  LLE Overall Strength: Greater than or equal to 3/5 as evidenced by functional mobility    General Assessments:     Sensation  Light Touch:  (numbness/tingling bilateral hands (chronic per pt due to carpal tunnel))        Static Standing Balance  Static Standing-Level of Assistance: Contact guard  Static Standing-Comment/Number of Minutes: with walker  Dynamic Standing Balance  Dynamic Standing-Comments: CGA/min assist with walker    Functional Assessments:  Bed Mobility  Bed Mobility: No (pt up in chair pre/post session)  Transfers  Transfer: Yes  Transfer 1  Technique 1: Sit to stand, Stand to sit  Transfer Device 1: Walker  Transfer Level of Assistance 1: Minimum assistance  Trials/Comments 1: stood 2x, verbal cues for hand placement  Ambulation/Gait Training  Ambulation/Gait Training Performed: Yes  Ambulation/Gait Training 1  Device 1: Rolling walker  Assistance 1: Contact guard  Quality of Gait 1: Decreased step length, Diminished heel strike (decreased alan, verbal cues for  upright posture and safe use of walker)  Comments/Distance (ft) 1: 75 feet  (increased time to perform (~10 minutes))       Outcome Measures:  [facility] Basic Mobility  Turning from your back to your side while in a flat bed without using bedrails: A little  Moving from lying on your back to sitting on the side of a flat bed without using bedrails: A little  Moving to and from bed to chair (including a wheelchair): A little  Standing up from a chair using your arms (e.g. wheelchair or bedside chair): A little  To walk in hospital room: A little  Climbing 3-5 steps with railing: Total  Basic Mobility - Total Score: 16       Encounter Problems       Encounter Problems (Active)       Balance       No LOB with transfers/ambulation        Start:  02/08/24    Expected End:  02/29/24               Mobility       STG - Patient will ambulate 75 fee with walker Modified independent        Start:  02/08/24    Expected End:  02/29/24            STG - Patient will ascend and descend a flight of stairs with rail with SBA       Start:  02/08/24    Expected End:  02/29/24               Transfers       STG - Patient will perform bed mobility independent       Start:  02/08/24    Expected End:  02/29/24            STG - Patient will transfer sit to and from stand with walker Modified independent        Start:  02/08/24    Expected End:  02/29/24                   Education Documentation  Precautions, taught by Kylee Lopez, PT at 2/8/2024 12:25 PM.  Learner: Patient  Readiness: Acceptance  Method: Explanation  Response: Verbalizes Understanding    Body Mechanics, taught by Kylee Lopez, PT at 2/8/2024 12:25 PM.  Learner: Patient  Readiness: Acceptance  Method: Explanation  Response: Verbalizes Understanding    Mobility Training, taught by Kylee Lopez PT at 2/8/2024 12:25 PM.  Learner: Patient  Readiness: Acceptance  Method: Explanation  Response: Verbalizes Understanding    Education Comments  No comments found.          02/08/24 at 12:26 PM   Kylee Lopez, PT

## 2024-02-08 NOTE — PROGRESS NOTES
"Vania Andrews is a 68 y.o. female on day 2 of admission presenting with Acute saddle pulmonary embolism without acute cor pulmonale (CMS/HCC).    Subjective   Overnight patient with ongoing abdominal cramping.  Given miralax and suppository at patient's request yesterday evening with mild improvement in abdominal cramping.  Overnight patient started on Simethicone q6hrs with provided mild relief.  Due to ongoing discomfort KUB ordered this morning.      Large dark stool reported this morning w/o signs of balbina blood.  Of note patient with hx of GI in 2019 and recent presentation to ED on 1/25/24 with anemia and c/f GI bleed.  Seen by GI at that time.  Plan at discharge was for outpatient Capsule study which is scheduled for 2/16      Objective   Physical Exam:  Constitutional: pt in NAD, alert and cooperative  Eyes: PERRL, EOMI, no icterus   ENMT: mucous membranes moist  Head/Neck: Neck supple, no apparent injury  Respiratory/Thorax: Lungs CTA bilaterally, non-labored breathing, no cough, on RA  Cardiovascular: Regular, rate and rhythm, no murmurs, normal S1 and S2  Gastrointestinal: Nondistended, soft, non-tender, BS present x 4  : External catheter in place draining clear yellow urine  Musculoskeletal: ROM intact, no joint swelling, normal strength  Extremities: normal extremities, no edema, contusions or wounds  Neurological: alert and oriented x 3, speech clear, follows commands appropriately, without focal deficits, sensation grossly intact  Skin: Warm and dry    Vital Signs  Blood pressure 101/76, pulse 73, temperature 36.5 °C (97.7 °F), resp. rate 20, height 1.5 m (4' 11.06\"), weight 90 kg (198 lb 6.6 oz), SpO2 100 %.  Oxygen Therapy  SpO2: 100 %  Medical Gas Therapy: None (Room air)          Intake/Output last 3 Shifts:  I/O last 3 completed shifts:  In: 633.8 (7 mL/kg) [P.O.:360; I.V.:273.8 (3 mL/kg)]  Out: 650 (7.2 mL/kg) [Urine:650 (0.2 mL/kg/hr)]  Weight: 90 kg     Lines and Tubes:  Peripheral IV " 02/07/24 20 G Left;Dorsal Forearm (Active)   Placement Date/Time: 02/07/24 0330   Hand Hygiene Completed: Yes  Size (Gauge): 20 G  Orientation: Left;Dorsal  Location: Forearm  Site Prep: Chlorhexidine   Local Anesthetic: None  Technique: Ultrasound guidance  Placed by: MICU RN  Insertion attempt...   Number of days: 1       Peripheral IV 02/07/24 22 G Distal;Left;Posterior Forearm (Active)   Placement Date/Time: 02/07/24 2243   Hand Hygiene Completed: Yes  Size (Gauge): (c) 22 G  Orientation: Distal;Left;Posterior  Location: Forearm  Site Prep: Chlorhexidine ;Usual sterile procedure followed  Local Anesthetic: None  Technique: Ultrasound ...   Number of days: 0       External Urinary Catheter Female (Active)   Placement Date/Time: 02/06/24 0500   External Catheter Type: Female   Number of days: 2         Relevant Results  Scheduled medications  amLODIPine, 10 mg, oral, Daily  betamethasone (augmented), 1 Application, Topical, Daily  brimonidine, 1 drop, Both Eyes, BID  dicyclomine, 20 mg, oral, TID  famotidine, 20 mg, oral, Nightly  insulin lispro, 0-5 Units, subcutaneous, TID with meals  latanoprost, 1 drop, Both Eyes, q AM  loratadine, 10 mg, oral, Daily  losartan, 25 mg, oral, Daily  metoprolol succinate XL, 200 mg, oral, Daily  pantoprazole, 40 mg, oral, BID  polyethylene glycol, 17 g, oral, Daily  rosuvastatin, 20 mg, oral, Daily  simethicone, 80 mg, oral, q6h  timolol, 1 drop, Both Eyes, BID  tiotropium, 2 Inhalation, inhalation, Daily      Continuous medications  heparin, 0-4,500 Units/hr, Last Rate: 700 Units/hr (02/07/24 1606)      PRN medications  PRN medications: acetaminophen, albuterol, benzonatate, dextrose 10 % in water (D10W), dextrose, glucagon, heparin, ondansetron    Results for orders placed or performed during the hospital encounter of 02/05/24 (from the past 24 hour(s))   POCT GLUCOSE   Result Value Ref Range    POCT Glucose 182 (H) 74 - 99 mg/dL   Transthoracic Echo (TTE) Limited   Result  Value Ref Range    LV biplane EF 71 %    LA vol index A/L 28.1 ml/m2    LV A4C EF 72.6    Heparin Assay   Result Value Ref Range    Heparin Unfractionated 1.0 See Comment Below for Therapeutic Ranges IU/mL   POCT GLUCOSE   Result Value Ref Range    POCT Glucose 152 (H) 74 - 99 mg/dL   Heparin Assay, UFH   Result Value Ref Range    Heparin Unfractionated 0.8 See Comment Below for Therapeutic Ranges IU/mL   POCT GLUCOSE   Result Value Ref Range    POCT Glucose 201 (H) 74 - 99 mg/dL   POCT GLUCOSE   Result Value Ref Range    POCT Glucose 179 (H) 74 - 99 mg/dL   Heparin Assay, UFH   Result Value Ref Range    Heparin Unfractionated <0.1 See Comment Below for Therapeutic Ranges IU/mL   CBC   Result Value Ref Range    WBC 13.0 (H) 4.4 - 11.3 x10*3/uL    nRBC 0.0 0.0 - 0.0 /100 WBCs    RBC 3.77 (L) 4.00 - 5.20 x10*6/uL    Hemoglobin 8.2 (L) 12.0 - 16.0 g/dL    Hematocrit 29.2 (L) 36.0 - 46.0 %    MCV 78 (L) 80 - 100 fL    MCH 21.8 (L) 26.0 - 34.0 pg    MCHC 28.1 (L) 32.0 - 36.0 g/dL    RDW 30.8 (H) 11.5 - 14.5 %    Platelets 381 150 - 450 x10*3/uL   Renal Function Panel   Result Value Ref Range    Glucose 218 (H) 74 - 99 mg/dL    Sodium 141 136 - 145 mmol/L    Potassium 4.8 3.5 - 5.3 mmol/L    Chloride 108 (H) 98 - 107 mmol/L    Bicarbonate 27 21 - 32 mmol/L    Anion Gap 11 10 - 20 mmol/L    Urea Nitrogen 42 (H) 6 - 23 mg/dL    Creatinine 1.47 (H) 0.50 - 1.05 mg/dL    eGFR 39 (L) >60 mL/min/1.73m*2    Calcium 9.4 8.6 - 10.6 mg/dL    Phosphorus 3.9 2.5 - 4.9 mg/dL    Albumin 3.3 (L) 3.4 - 5.0 g/dL   Magnesium   Result Value Ref Range    Magnesium 2.25 1.60 - 2.40 mg/dL   Heparin Assay, UFH   Result Value Ref Range    Heparin Unfractionated 0.3 See Comment Below for Therapeutic Ranges IU/mL     Transthoracic Echo (TTE) Limited    Result Date: 2/7/2024   [facility], [place], [address] [place] [address]                Tel [phone] and Fax [phone] TRANSTHORACIC ECHOCARDIOGRAM REPORT  Patient Name:      LYNDSAY Choi Physician:  [id number] Say Holt MD Study Date:       2/7/2024            Ordering Provider:  [id number] MATTI ORELLANA MRN/PID:          [id number]            Fellow: Accession#:       [id number]        Nurse:              Laxmi Grande RN Date of           1955 / 68      Sonographer:        Tyson Huffman RDCS Birth/Age:        years Gender:           F                   Additional Staff: Height:           149.86 cm           Admit Date:         2/5/2024 Weight:           89.81 kg            Admission Status:   Inpatient - Routine BSA:              1.84 m2             Encounter#:         [id number]                                       Department          [place] [address]                                       Location: Blood Pressure: 129 /80 mmHg Study Type:    TRANSTHORACIC ECHO (TTE) LIMITED Diagnosis/ICD: Saddle embolus of pulmonary artery without acute cor                pulmonale-I26.92 Indication:    saddle PE CPT Code:      Echo Limited-30487; Doppler Limited-71657; Color Doppler-96649  Study Detail: The following Echo studies were performed: 2D, M-Mode, Doppler and               color flow. Technically challenging study due to poor acoustic               windows and body habitus. Definity used as a contrast agent for               endocardial border definition. Total contrast used for this               procedure was 2.0 mL via IV push.  PHYSICIAN INTERPRETATION: Left Ventricle: The left ventricular systolic function is normal, with an estimated ejection fraction of 60%. There are no regional wall motion abnormalities. The left ventricular cavity size is normal. Abnormal (paradoxical) septal motion, consistent with an intraventricular conduction delay. Left ventricular diastolic filling was not assessed. Left Atrium: The left atrium is normal in size. Right Ventricle: The right ventricle is normal in size. There is normal  right ventricular global systolic function. Right Atrium: The right atrium is normal in size. Aortic Valve: The aortic valve was not well visualized. There is trivial aortic valve regurgitation. Mitral Valve: The mitral valve is normal in structure. There is trace mitral valve regurgitation. Tricuspid Valve: The tricuspid valve is structurally normal. There is trace tricuspid regurgitation. Pulmonic Valve: The pulmonic valve is not well visualized. There is trace pulmonic valve regurgitation. Pericardium: There is a trivial pericardial effusion. Aorta: The aortic root is normal. Systemic Veins: The inferior vena cava appears to be of normal size.  CONCLUSIONS:  1. Poorly visualized anatomical structures due to suboptimal image quality.  2. Left ventricular systolic function is normal with a 60% estimated ejection fraction. QUANTITATIVE DATA SUMMARY: LA VOLUME:                               Normal Ranges: LA Vol A4C:        63.0 ml    (22+/-6mL/m2) LA Vol A2C:        41.8 ml LA Vol BP:         51.7 ml LA Vol Index A4C:  34.3ml/m2 LA Vol Index A2C:  22.7 ml/m2 LA Vol Index BP:   28.1 ml/m2 LA Area A4C:       19.8 cm2 LA Area A2C:       16.0 cm2 LA Major Axis A4C: 5.3 cm LA Major Axis A2C: 5.2 cm LA Volume Index:   27.6 ml/m2 RA VOLUME BY A/L METHOD:                      Normal Ranges: RA Area A4C: 9.4 cm2 AORTA MEASUREMENTS:                      Normal Ranges: Ao Sinus, d: 3.20 cm (2.1-3.5cm) LV SYSTOLIC FUNCTION BY 2D PLANIMETRY (MOD):                     Normal Ranges: EF-A4C View: 72.6 % (>=55%) EF-A2C View: 71.1 % EF-Biplane:  70.7 %  RIGHT VENTRICLE: RV Basal 3.28 cm RV Mid   2.10 cm RV Major 7.1 cm TRICUSPID VALVE/RVSP:                   Normal Ranges: IVC Diam: 1.30 cm  [id number] Say Holt MD Electronically signed on 2/7/2024 at 10:12:31 AM  ** Final **      CT angio chest for pulmonary embolism 02/06/2024    Narrative  Interpreted By:  Elle Ceballos,  STUDY:  CT ANGIO CHEST FOR PULMONARY EMBOLISM;  2/6/2024  2:00 am    INDICATION:  Signs/Symptoms:elevated d-dimer, sob.    COMPARISON:  None.    ACCESSION NUMBER(S):  [id number]    ORDERING CLINICIAN:  SHERRY DIANA    TECHNIQUE:  Helical data acquisition of the chest was obtained after intravenous  administration of  64 mL of Omnipaque 350. Images were reformatted in  axial, coronal, and sagittal planes. Axial and coronal MIPS were  reconstructed and reviewed.    FINDINGS:  HEART AND VESSELS:  There is a saddle type filling defect which extends to bilateral  upper, right lower lobar and segmental branches consistent with acute  pulmonary embolus. Calculated RV to LV ratio of 1.0.    Main pulmonary artery is dilated up to 3.5 cm which can be seen with  pulmonary arterial hypertension.    The thoracic aorta is of normal course and caliber  without vascular  calcifications.    Mild coronary artery calcifications are seen.The study is not  optimized for evaluation of coronary arteries.    The cardiac chambers are not enlarged.    No evidence of pericardial effusion.    MEDIASTINUM AND JENNIFER, LOWER NECK AND AXILLA:  Slight heterogeneous thyroid gland.    No evidence of thoracic lymphadenopathy by CT criteria.    Large hiatal hernia with significant portion of the stomach  intrathoracic in location.    LUNGS AND AIRWAYS:  The trachea and central airways are patent. No endobronchial lesion.    Bibasilar atelectasis. No consolidation, effusion or pneumothorax.    UPPER ABDOMEN:  Status post cholecystectomy. Status post left adrenalectomy and left  partial nephrectomy. Partial visualization of a 3.8 cm hypodense  lesion in the left upper quadrant likely arising from the left  kidney. This measures higher than simple fluid attenuation,  incompletely imaged. Subcentimeter hypodense focus in the right  hepatic lobe is too small to characterize but stable..    CHEST WALL AND OSSEOUS STRUCTURES:  Multilevel degenerative changes are present.    Impression  1. There is an acute saddle  type pulmonary embolus extending to  bilateral upper and right lower lobar and segmental branches .  Calculated RV to LV ratio of 1.0.  2. Main pulmonary artery is dilated up to 3.5 cm which can be seen  with pulmonary arterial hypertension.  3. Large hiatal hernia with significant portion of the stomach  intrathoracic in location. There is adjacent compressive atelectasis.  4. 3.8 cm hypodense lesion in the left upper quadrant is incompletely  imaged, likely arising from the left kidney. This measures higher  than simple fluid attenuation. This can be further evaluated with  nonemergent renal ultrasound.  5. Additional findings as described above.      MACRO:  Elle Ceballos discussed the significance and urgency of this critical  finding by telephone with  Dr. Zamora On 2/6/2024 at 2:22 am.  (**-RCF-**) Findings:  See findings.    Signed by: Elle Ceballos 2/6/2024 2:26 AM  Dictation workstation:   [id number]      Assessment/Plan   Principal Problem:    Acute saddle pulmonary embolism without acute cor pulmonale (CMS/HCC)  Active Problems:    Renal cell carcinoma, unspecified laterality (CMS/HCC)    Anemia    Chronic kidney disease (CKD), stage III (moderate) (CMS/HCC)    Diabetes mellitus (CMS/HCC)    Esophageal reflux    Mixed hyperlipidemia    Abdominal hernia    Vania Andrews is a 68 y.o. 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  She had recent admission for acute on chronic anemia, dyspnea with chronic cough.   Admitted with acute saddle PE and RLE DVT.  HDS  on RA     Update 2/8  - Large dark stool reported this morning w/o signs of balbina blood.  H/H remains stable  - Plan to consult GI tomorrow morning, patient scheduled for capsule study 2/16, will see if they want to start while inpatient  - Hold on transitioning to DOAC d/t concern for GIB    Neuro:   #Hx arthritis  - A&Ox3  - Acetaminophen 650mg tab q4 hrs PRN mild pain  - PT/OT eval     Optho:   #Hx Glaucoma  - Continue home eye drops     CV:   #Hx HTN, HLD, MI 2019  - Remains HDS   - Continue home: Amlodipine, Losartan, Metoprolol, and Rosuvastatin   - 02/07 Limited TTE: Poorly visualized anatomical structures due to suboptimal image quality. Left ventricular systolic function is normal with a 60% estimated ejection fraction. The right ventricle is normal in size. There is normal right ventricular global systolic function.   - Continue to monitor on tele     Pulm:   #Hx Asthma, MARYLU not on CPAP  #Acute saddle PE  - Remains stable on RA  - Continue Heparin gtt  - Continue home Spiriva, albuterol MDI, and Claritin   - Maintain SpO2 >92%    FEN/GI:   #Hx GERD and GI bleed 2019  - Continue home PPI daily and Bentyl TID  - Diet: Regular Diet   - Bowel Reg: Miralax daily   - Start Simethicone Q6 hours for ongoing abdominal cramping  - KUB ordered   - Large dark stool reported this morning w/o signs of balbina blood.  Of note patient with hx of GI in 2019 and recent presentation to ED on 1/25/24 with anemia and c/f GI bleed.  Seen by GI at that time, not scoped as patient was HDS and hgb improved with 2 units PRBC.  Plan at discharge was for outpatient Capsule study which is scheduled for 2/16.   - Consult GI in AM to see if capsule study can be conducted while in inpatient   - Up to date on Colon CA screening, next due 2029  - Daily RFP/Mag     Renal:   #Hx RCC, s/p partial nephrectomy and CKD (baseline ~1.2)  - sCr 1.47 today, 1.36 on admission   - Strict I's & O's and daily weight  - Caution with nephrotoxins and  contrast dye     Heme:    #Hx VEDA and LLE DVT, stopped Eliquis in Dec per her outpt Vasc Med team  #Acute PE  #RLE acute DVT, distal fem & popliteal.  - Continue Heparin gtt  - Hbg 8.2 this morning, from 8.3, no s/s of bleeding  - Continue to monitor for s/s of bleeding   - 2/07 Serum Iron 74/TIBC 352  - 2/05 Duplex RLE: Occlusive thrombus in the right distal femoral and popliteal  veins.  - Follows with vascular medicine OP, Dr Spears.            > Hold on IV iron infusions.  If Hgb <9, can consider PO iron supp & bowel regimen.           > Dr Spears will also follow her outpt for her acute PE/DVT in addition to her anemia  - Anticipate transitioning to DOAC  - Daily CBC     Endo:   #Hx DMII  - Holding home Trulicity (weekly)  - Continue SSI TID with meals and accu checks  - HgbA1C 7.1 July of last year     ID: no acute issues  - Remains Afebrile, with improving leukocytosis WBC 13 from 16.9   - Continue to monitor off Atx   - 1/25: flu A/B, Covid and RSV neg.  - Previous Covid hx (2023) per patient     PPx:  DVT: Heparin infusion   GI: PPI     Access: PIV xx     Code Status: FULL CODE   NOK: Bryce Corea (Spouse) [phone]     Dispo: Patient stable on heparin gtt and RA.  Appropriate for transfer to [facility] with tele     Pt discussed with Dr. Dugan, seen and examined. All labs, VS and previous plan of care reviewed.       Jasper Giraldo, APRN-CNP

## 2024-02-08 NOTE — PROGRESS NOTES
"Vania Andrwes is a 68 y.o. female on day 2 of admission presenting with Acute saddle pulmonary embolism without acute cor pulmonale (CMS/HCC).    Subjective   Overnight patient with ongoing abdominal cramping.  Given miralax and suppository at patient's request yesterday evening with mild improvement in abdominal cramping.  Overnight patient started on Simethicone q6hrs with provided mild relief.  Due to ongoing discomfort KUB ordered this morning.      Large dark stool reported this morning w/o signs of balbina blood.  Of note patient with hx of GI in 2019 and recent presentation to ED on 1/25/24 with anemia and c/f GI bleed.  Seen by GI at that time.  Plan at discharge was for outpatient Capsule study which is scheduled for 2/16    Upon transfer:  Patient doing well upon transfer to general medicine team. Had Midline placed this evening given access issues. Procedure successful. Explained to patient plan of care moving forwards. No new complaints this evening.    Objective   Physical Exam:  Constitutional: pt in NAD, alert and cooperative  Eyes: PERRL, EOMI, no icterus   ENMT: mucous membranes moist  Head/Neck: Neck supple, no apparent injury  Respiratory/Thorax: Lungs CTA bilaterally, non-labored breathing, no cough, on RA  Cardiovascular: Regular, rate and rhythm, no murmurs, normal S1 and S2  Gastrointestinal: Nondistended, soft, non-tender, BS present x 4  : External catheter in place draining clear yellow urine  Musculoskeletal: ROM intact, no joint swelling, normal strength  Extremities: normal extremities, no edema, contusions or wounds  Neurological: alert and oriented x 3, speech clear, follows commands appropriately, without focal deficits, sensation grossly intact  Skin: Warm and dry    Vital Signs  Blood pressure 124/81, pulse 63, temperature 36.1 °C (97 °F), resp. rate 18, height 1.5 m (4' 11.06\"), weight 90 kg (198 lb 6.6 oz), SpO2 96 %.  Oxygen Therapy  SpO2: 96 %  Medical Gas Therapy: None (Room " air)          Intake/Output last 3 Shifts:  I/O last 3 completed shifts:  In: 369.5 (4.1 mL/kg) [P.O.:200; I.V.:169.5 (1.9 mL/kg)]  Out: 650 (7.2 mL/kg) [Urine:650 (0.2 mL/kg/hr)]  Weight: 90 kg     Lines and Tubes:  Peripheral IV 02/07/24 20 G Left;Dorsal Forearm (Active)   Placement Date/Time: 02/07/24 0330   Hand Hygiene Completed: Yes  Size (Gauge): 20 G  Orientation: Left;Dorsal  Location: Forearm  Site Prep: Chlorhexidine   Local Anesthetic: None  Technique: Ultrasound guidance  Placed by: MICU RN  Insertion attempt...   Number of days: 1       Peripheral IV 02/07/24 22 G Distal;Left;Posterior Forearm (Active)   Placement Date/Time: 02/07/24 2243   Hand Hygiene Completed: Yes  Size (Gauge): (c) 22 G  Orientation: Distal;Left;Posterior  Location: Forearm  Site Prep: Chlorhexidine ;Usual sterile procedure followed  Local Anesthetic: None  Technique: Ultrasound ...   Number of days: 0       External Urinary Catheter Female (Active)   Placement Date/Time: 02/06/24 0500   External Catheter Type: Female   Number of days: 2         Relevant Results  Scheduled medications  amLODIPine, 10 mg, oral, Daily  betamethasone (augmented), 1 Application, Topical, Daily  brimonidine, 1 drop, Both Eyes, BID  dicyclomine, 20 mg, oral, TID  famotidine, 20 mg, oral, Nightly  insulin lispro, 0-5 Units, subcutaneous, TID with meals  latanoprost, 1 drop, Both Eyes, q AM  loratadine, 10 mg, oral, Daily  losartan, 25 mg, oral, Nightly  metoprolol succinate XL, 200 mg, oral, Daily  pantoprazole, 40 mg, oral, BID  polyethylene glycol, 17 g, oral, Daily  rosuvastatin, 20 mg, oral, Nightly  simethicone, 80 mg, oral, q6h  timolol, 1 drop, Both Eyes, BID  tiotropium, 2 Inhalation, inhalation, Daily      Continuous medications  heparin, 0-4,500 Units/hr, Last Rate: 700 Units/hr (02/08/24 1835)      PRN medications  PRN medications: acetaminophen, albuterol, benzonatate, dextrose 10 % in water (D10W), dextrose, glucagon, heparin,  ondansetron    Results for orders placed or performed during the hospital encounter of 02/05/24 (from the past 24 hour(s))   POCT GLUCOSE   Result Value Ref Range    POCT Glucose 179 (H) 74 - 99 mg/dL   Heparin Assay, UFH   Result Value Ref Range    Heparin Unfractionated <0.1 See Comment Below for Therapeutic Ranges IU/mL   CBC   Result Value Ref Range    WBC 13.0 (H) 4.4 - 11.3 x10*3/uL    nRBC 0.0 0.0 - 0.0 /100 WBCs    RBC 3.77 (L) 4.00 - 5.20 x10*6/uL    Hemoglobin 8.2 (L) 12.0 - 16.0 g/dL    Hematocrit 29.2 (L) 36.0 - 46.0 %    MCV 78 (L) 80 - 100 fL    MCH 21.8 (L) 26.0 - 34.0 pg    MCHC 28.1 (L) 32.0 - 36.0 g/dL    RDW 30.8 (H) 11.5 - 14.5 %    Platelets 381 150 - 450 x10*3/uL   Renal Function Panel   Result Value Ref Range    Glucose 218 (H) 74 - 99 mg/dL    Sodium 141 136 - 145 mmol/L    Potassium 4.8 3.5 - 5.3 mmol/L    Chloride 108 (H) 98 - 107 mmol/L    Bicarbonate 27 21 - 32 mmol/L    Anion Gap 11 10 - 20 mmol/L    Urea Nitrogen 42 (H) 6 - 23 mg/dL    Creatinine 1.47 (H) 0.50 - 1.05 mg/dL    eGFR 39 (L) >60 mL/min/1.73m*2    Calcium 9.4 8.6 - 10.6 mg/dL    Phosphorus 3.9 2.5 - 4.9 mg/dL    Albumin 3.3 (L) 3.4 - 5.0 g/dL   Magnesium   Result Value Ref Range    Magnesium 2.25 1.60 - 2.40 mg/dL   Heparin Assay, UFH   Result Value Ref Range    Heparin Unfractionated 0.3 See Comment Below for Therapeutic Ranges IU/mL   POCT GLUCOSE   Result Value Ref Range    POCT Glucose 141 (H) 74 - 99 mg/dL   Heparin Assay, UFH   Result Value Ref Range    Heparin Unfractionated 0.2 See Comment Below for Therapeutic Ranges IU/mL   Protime-INR   Result Value Ref Range    Protime 11.5 9.8 - 12.8 seconds    INR 1.0 0.9 - 1.1   aPTT - baseline   Result Value Ref Range    aPTT 35 27 - 38 seconds   Type and screen   Result Value Ref Range    ABO TYPE B     Rh TYPE POS     ANTIBODY SCREEN NEG    CBC   Result Value Ref Range    WBC 13.3 (H) 4.4 - 11.3 x10*3/uL    nRBC 0.0 0.0 - 0.0 /100 WBCs    RBC 4.30 4.00 - 5.20 x10*6/uL     Hemoglobin 9.2 (L) 12.0 - 16.0 g/dL    Hematocrit 33.8 (L) 36.0 - 46.0 %    MCV 79 (L) 80 - 100 fL    MCH 21.4 (L) 26.0 - 34.0 pg    MCHC 27.2 (L) 32.0 - 36.0 g/dL    RDW 31.2 (H) 11.5 - 14.5 %    Platelets 441 150 - 450 x10*3/uL   POCT GLUCOSE   Result Value Ref Range    POCT Glucose 141 (H) 74 - 99 mg/dL   Heparin Assay, UFH   Result Value Ref Range    Heparin Unfractionated 0.9 See Comment Below for Therapeutic Ranges IU/mL   POCT GLUCOSE   Result Value Ref Range    POCT Glucose 178 (H) 74 - 99 mg/dL     Transthoracic Echo (TTE) Limited    Result Date: 2/7/2024   [facility], [address] [place] [address]                Tel [phone] and Fax [phone] TRANSTHORACIC ECHOCARDIOGRAM REPORT  Patient Name:     LYNDSAY Choi Physician:  [id number] Say Holt MD Study Date:       2/7/2024            Ordering Provider:  [id number] MATTI ORELLANA MRN/PID:          [id number]            Fellow: Accession#:       [id number]        Nurse:              Laxmi Grande RN Date of           1955 / 68      Sonographer:        Tyson Huffman [facility] Birth/Age:        years Gender:           F                   Additional Staff: Height:           149.86 cm           Admit Date:         2/5/2024 Weight:           89.81 kg            Admission Status:   Inpatient - Routine BSA:              1.84 m2             Encounter#:         [id number]                                       [place] [address]                                       Location: Blood Pressure: 129 /80 mmHg Study Type:    TRANSTHORACIC ECHO (TTE) LIMITED Diagnosis/ICD: Saddle embolus of pulmonary artery without acute cor                pulmonale-I26.92 Indication:    saddle PE CPT Code:      Echo Limited-83791; Doppler Limited-65632; Color Doppler-66027  Study Detail: The following Echo studies were performed: 2D, M-Mode, Doppler and               color flow.  Technically challenging study due to poor acoustic               windows and body habitus. Definity used as a contrast agent for               endocardial border definition. Total contrast used for this               procedure was 2.0 mL via IV push.  PHYSICIAN INTERPRETATION: Left Ventricle: The left ventricular systolic function is normal, with an estimated ejection fraction of 60%. There are no regional wall motion abnormalities. The left ventricular cavity size is normal. Abnormal (paradoxical) septal motion, consistent with an intraventricular conduction delay. Left ventricular diastolic filling was not assessed. Left Atrium: The left atrium is normal in size. Right Ventricle: The right ventricle is normal in size. There is normal right ventricular global systolic function. Right Atrium: The right atrium is normal in size. Aortic Valve: The aortic valve was not well visualized. There is trivial aortic valve regurgitation. Mitral Valve: The mitral valve is normal in structure. There is trace mitral valve regurgitation. Tricuspid Valve: The tricuspid valve is structurally normal. There is trace tricuspid regurgitation. Pulmonic Valve: The pulmonic valve is not well visualized. There is trace pulmonic valve regurgitation. Pericardium: There is a trivial pericardial effusion. Aorta: The aortic root is normal. Systemic Veins: The inferior vena cava appears to be of normal size.  CONCLUSIONS:  1. Poorly visualized anatomical structures due to suboptimal image quality.  2. Left ventricular systolic function is normal with a 60% estimated ejection fraction. QUANTITATIVE DATA SUMMARY: LA VOLUME:                               Normal Ranges: LA Vol A4C:        63.0 ml    (22+/-6mL/m2) LA Vol A2C:        41.8 ml LA Vol BP:         51.7 ml LA Vol Index A4C:  34.3ml/m2 LA Vol Index A2C:  22.7 ml/m2 LA Vol Index BP:   28.1 ml/m2 LA Area A4C:       19.8 cm2 LA Area A2C:       16.0 cm2 LA Major Axis A4C: 5.3 cm LA Major Axis  A2C: 5.2 cm LA Volume Index:   27.6 ml/m2 RA VOLUME BY A/L METHOD:                      Normal Ranges: RA Area A4C: 9.4 cm2 AORTA MEASUREMENTS:                      Normal Ranges: Ao Sinus, d: 3.20 cm (2.1-3.5cm) LV SYSTOLIC FUNCTION BY 2D PLANIMETRY (MOD):                     Normal Ranges: EF-A4C View: 72.6 % (>=55%) EF-A2C View: 71.1 % EF-Biplane:  70.7 %  RIGHT VENTRICLE: RV Basal 3.28 cm RV Mid   2.10 cm RV Major 7.1 cm TRICUSPID VALVE/RVSP:                   Normal Ranges: IVC Diam: 1.30 cm  [id number] Say Holt MD Electronically signed on 2/7/2024 at 10:12:31 AM  ** Final **      CT angio chest for pulmonary embolism 02/06/2024    Narrative  Interpreted By:  Elle Ceballos,  STUDY:  CT ANGIO CHEST FOR PULMONARY EMBOLISM;  2/6/2024 2:00 am    INDICATION:  Signs/Symptoms:elevated d-dimer, sob.    COMPARISON:  None.    ACCESSION NUMBER(S):  [id number]    ORDERING CLINICIAN:  SHERRY DIANA    TECHNIQUE:  Helical data acquisition of the chest was obtained after intravenous  administration of  64 mL of Omnipaque 350. Images were reformatted in  axial, coronal, and sagittal planes. Axial and coronal MIPS were  reconstructed and reviewed.    FINDINGS:  HEART AND VESSELS:  There is a saddle type filling defect which extends to bilateral  upper, right lower lobar and segmental branches consistent with acute  pulmonary embolus. Calculated RV to LV ratio of 1.0.    Main pulmonary artery is dilated up to 3.5 cm which can be seen with  pulmonary arterial hypertension.    The thoracic aorta is of normal course and caliber  without vascular  calcifications.    Mild coronary artery calcifications are seen.The study is not  optimized for evaluation of coronary arteries.    The cardiac chambers are not enlarged.    No evidence of pericardial effusion.    MEDIASTINUM AND JENNIFER, LOWER NECK AND AXILLA:  Slight heterogeneous thyroid gland.    No evidence of thoracic lymphadenopathy by CT criteria.    Large hiatal hernia with  significant portion of the stomach  intrathoracic in location.    LUNGS AND AIRWAYS:  The trachea and central airways are patent. No endobronchial lesion.    Bibasilar atelectasis. No consolidation, effusion or pneumothorax.    UPPER ABDOMEN:  Status post cholecystectomy. Status post left adrenalectomy and left  partial nephrectomy. Partial visualization of a 3.8 cm hypodense  lesion in the left upper quadrant likely arising from the left  kidney. This measures higher than simple fluid attenuation,  incompletely imaged. Subcentimeter hypodense focus in the right  hepatic lobe is too small to characterize but stable..    CHEST WALL AND OSSEOUS STRUCTURES:  Multilevel degenerative changes are present.    Impression  1. There is an acute saddle type pulmonary embolus extending to  bilateral upper and right lower lobar and segmental branches .  Calculated RV to LV ratio of 1.0.  2. Main pulmonary artery is dilated up to 3.5 cm which can be seen  with pulmonary arterial hypertension.  3. Large hiatal hernia with significant portion of the stomach  intrathoracic in location. There is adjacent compressive atelectasis.  4. 3.8 cm hypodense lesion in the left upper quadrant is incompletely  imaged, likely arising from the left kidney. This measures higher  than simple fluid attenuation. This can be further evaluated with  nonemergent renal ultrasound.  5. Additional findings as described above.      MACRO:  Elle Ceballos discussed the significance and urgency of this critical  finding by telephone with  Dr. Zamora On 2/6/2024 at 2:22 am.  (**-RCF-**) Findings:  See findings.    Signed by: Elle Ceballos 2/6/2024 2:26 AM  Dictation workstation:   [id number]      Assessment/Plan   Principal Problem:    Acute saddle pulmonary embolism without acute cor pulmonale (CMS/HCC)  Active Problems:    Renal cell carcinoma, unspecified laterality (CMS/HCC)    Anemia    Chronic kidney disease (CKD), stage III (moderate) (CMS/HCC)     Diabetes mellitus (CMS/HCC)    Esophageal reflux    Mixed hyperlipidemia    Abdominal hernia    Vania Andrews is a 68 y.o. 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  She had recent admission for acute on chronic anemia, dyspnea with chronic cough.   Admitted with acute saddle PE and RLE DVT.  HDS on RA     Update 2/8/24  - Large dark stool reported this morning w/o signs of balbina blood.  H/H remains stable  - Will obtain repeat evening CBC and coags, T&S done this AM  - Plan to consult GI tomorrow morning, patient scheduled for capsule study 2/16, will see if they want to start while inpatient. Will make NPO at midnight  - Hold on transitioning to DOAC d/t concern for GIB    #Hx Asthma, MARYLU not on CPAP  #Acute saddle PE  - Remains stable on RA  - Continue Heparin gtt  - Continue home Spiriva, albuterol MDI, and Claritin   - Maintain SpO2 >92%  - Plan to transition to DOAC before discharge pending plan from GI    #Hx GERD and GI bleed 2019  - Continue home PPI daily and Bentyl TID  - Diet: Regular Diet   - Bowel Reg: Miralax daily   - Start Simethicone Q6 hours for ongoing abdominal cramping  - KUB ordered   - Large dark stool reported this morning w/o signs of balbina blood.  Of note patient with hx of GI in 2019 and recent presentation to ED on 1/25/24 with anemia and c/f GI bleed.  Seen by GI at that time, not scoped as patient was HDS and hgb improved with 2 units PRBC.  Plan at discharge was for outpatient Capsule study which is scheduled for 2/16.   - Consult  GI in AM to see if capsule study can be conducted while in inpatient   - Up to date on Colon CA screening, next due 2029  - Daily RFP/Mag    #Hx arthritis  - Acetaminophen 650mg tab q4 hrs PRN mild pain  - PT/OT eval     #Hx Glaucoma  - Continue home eye drops     #Hx HTN, HLD, MI 2019  - Remains HDS   - Continue home: Amlodipine, Losartan, Metoprolol, and Rosuvastatin   - 02/07 Limited TTE: Poorly visualized anatomical structures due to suboptimal image quality. Left ventricular systolic function is normal with a 60% estimated ejection fraction. The right ventricle is normal in size. There is normal right ventricular global systolic function.   - Continue to monitor on tele     #Hx RCC, s/p partial nephrectomy and CKD (baseline ~1.2)  - sCr 1.47 today, 1.36 on admission   - Strict I's & O's and daily weight  - Caution with nephrotoxins and contrast dye      #Hx VEDA and LLE DVT, stopped Eliquis in Dec per her outpt Vasc Med team  #Acute PE  #RLE acute DVT, distal fem & popliteal.  - Continue Heparin gtt  - Hbg 8.2 this morning, from 8.3, no s/s of bleeding  - Continue to monitor for s/s of bleeding   - 2/07 Serum Iron 74/TIBC 352  - 2/05 Duplex RLE: Occlusive thrombus in the right distal femoral and popliteal  veins.  - Follows with vascular medicine OP, Dr Spears.            > Hold on IV iron infusions.  If Hgb <9, can consider PO iron supp & bowel regimen.           > Dr Spears will also follow her outpt for her acute PE/DVT in addition to her anemia  - Anticipate transitioning to DOAC  - Daily CBC     #Hx DMII  - Holding home Trulicity (weekly)  - Continue SSI TID with meals and accu checks  - HgbA1C 7.1 July of last year       DVT: Heparin infusion   GI: PPI  Access: PIV and Midline  Code Status: FULL CODE   NOK: Bryce Corea (Spouse) [phone]     Dispo: Patient stable on heparin gtt and RA.  App[facility]ate [facility] transfer to [facility] with tele     Pt to be staffed with Dr. Longo in AM.       Tatum Landaverde MD

## 2024-02-08 NOTE — PROGRESS NOTES
02/08/24 1100   Discharge Planning   Living Arrangements Spouse/significant other   Support Systems Spouse/significant other;Children;Family members;Friends/neighbors   Type of Residence Private residence   Home or Post Acute Services In home services   Patient expects to be discharged to:   (Home)   Housing Stability   In the last 12 months, was there a time when you were not able to pay the mortgage or rent on time? N     SW met with pt to complete assessment.  Pt is alert and oriented x3.  She was living with her spouse prior to admission.  Pt lives in a 2 story home.  Pt reports she was getting weaker at home, needing to take breaks when going up and down the stairs.  She feels safe at home.  Pt denies any recent falls.  Pt's spouse is emergency contact/primary support.  Spouse is independent in ADLs & IADLs.  Pt's PCP is Dr. Mata.  Pt wishes to find a new PCP.  Pt uses [facility] on [address] for pharmacy needs.  Pt states she gets her diabetic medication [facility]  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      STACY Meyers

## 2024-02-08 NOTE — PROGRESS NOTES
Occupational Therapy    Evaluation    Patient Name: Vania Andrews  MRN: [id number]  Today's Date: 2/8/2024  Time Calculation  Start Time: 1238  Stop Time: 1253  Time Calculation (min): 15 min        Assessment:  OT Assessment: Pt is 68 year old female admitted for acute saddle PE and RLE DVT. Pt requiring MIN A for mobility and MIN-MAX A for ADLs. Pt would benefit from skilled OT services during hospital stay to address needs.  Prognosis: Good  Evaluation/Treatment Tolerance: Patient tolerated treatment well  End of Session Communication: Bedside nurse  End of Session Patient Position: Up in chair, Alarm off, not on at start of session  OT Assessment Results: Decreased ADL status, Decreased upper extremity strength, Decreased safe judgment during ADL, Decreased endurance, Decreased functional mobility  Prognosis: Good  Evaluation/Treatment Tolerance: Patient tolerated treatment well  Plan:  Treatment Interventions: ADL retraining, Functional transfer training, UE strengthening/ROM, Endurance training, Compensatory technique education  OT Frequency: 3 times per week  OT Discharge Recommendations: Moderate intensity level of continued care  Equipment Recommended upon Discharge:  (shower chair)  OT Recommended Transfer Status: Assist of 1  Treatment Interventions: ADL retraining, Functional transfer training, UE strengthening/ROM, Endurance training, Compensatory technique education    Subjective   Current Problem:  1. Acute saddle pulmonary embolism without acute cor pulmonale (CMS/HCC)  Transthoracic Echo (TTE) Limited    Transthoracic Echo (TTE) Limited        General:  General  Reason for Referral: acute saddle PE and RLE DVT  Referred By: Sree  Past Medical History Relevant to Rehab: HTN, HLD, DMT2, CAD, MI (2019),  DVT (2020) previously on AC, no longer on Eliquis,  CKD stage 3 d/t loss of nephron mass, L RCC, papillary, type 1 and Lt adrenal cortical adenoma s/p L partial nephrectomy and L adrenalectomy  (2007), asthma, MARYLU (not using CPAP), s/p hysterectomy and b/l oophorectomy, chronic VEDA s/p iron infusions with resolution of anemia as of May 2021, partially obstructed Ross's hernia with sx ( Feb 2022 with plan for surgical correction).  Family/Caregiver Present: Yes  Caregiver Feedback: Spouse at bedside during session  Patient Position Received: Up in chair, Alarm off, not on at start of session  General Comment: Pt seated in chair upon arrival. Cleared for work with therapy. Pt pleasant and cooperative with therapy.  Precautions:  Medical Precautions: Fall precautions  Vital Signs:  Heart Rate: 67  SpO2: 99 %  BP: (!) 126/95  Patient Position: Sitting  Pain:  Pain Assessment  Pain Assessment: 0-10  Pain Score: 4  Pain Location: Foot  Pain Orientation: Right, Left    Objective   Cognition:  Overall Cognitive Status: Within Functional Limits  Orientation Level: Oriented X4  Cognition Comments: Mild forgetfulness           Home Living:  Type of Home: House  Lives With: Spouse  Home Adaptive Equipment: Walker rolling or standard  Home Layout: Two level, Bed/bath upstairs  Home Access: Stairs to enter with rails  Entrance Stairs-Rails: Both  Entrance Stairs-Number of Steps: 5  Bathroom Shower/Tub: Tub/shower unit  Bathroom Toilet: Standard  Bathroom Equipment: None  Home Living Comments: Spends a majority of time on 2nd level due to low endurance and decreased strength over last few months and unable to do steps well  Prior Function:  Level of [place]: Needs assistance with ADLs, Needs assistance with homemaking  ADL Assistance: Needs assistance (IND 3 months ago, since weakness over last 2-3 months requiring increased assistance for ADLs)  Homemaking Assistance: Needs assistance  Ambulatory Assistance: Independent (No device)  Hand Dominance: Right  Prior Function Comments: (-) falls  IADL History:  IADL Comments: (+) drive  ADL:  Eating Assistance: Stand by (Anticipated)  Grooming Assistance: Stand by  (Anticipated)  Bathing Assistance: Maximal (Anticipated)  UE Dressing Assistance: Moderate (Anticipated)  LE Dressing Assistance: Maximal (Anticipated)  Toileting Assistance with Device: Moderate (Anticipated)  Activity Tolerance:  Endurance: Tolerates less than 10 min exercise, no significant change in vital signs  Bed Mobility/Transfers: Bed Mobility  Bed Mobility: No (Seated in chair at beginning and end of session)    Transfers  Transfer: Yes  Transfer 1  Transfer From 1: Sit to, Stand to  Transfer to 1: Sit, Stand  Technique 1: Sit to stand, Stand to sit  Transfer Level of Assistance 1: Minimum assistance  Trials/Comments 1: Cuing for hand placement    Sitting Balance:     Standing Balance:  Static Standing Balance  Static Standing-Balance Support: Bilateral upper extremity supported  Static Standing-Level of Assistance: Minimum assistance   Modalities:     Vision:Vision - Basic Assessment  Current Vision: Wears glasses only for reading  Visual History: Glaucoma, Cataracts (RAUL eyes)  Sensation:  Light Touch: No apparent deficits  Strength:  Strength Comments: BUE grossly 3/5; R shoulder flexion ~ 90 degrees, L shoulder flexion ~ 70 degrees  Perception:     Coordination:      Hand Function:  Gross Grasp: Functional  Coordination: Functional    Outcome Measures:[facility] Daily Activity  Putting on and taking off regular lower body clothing: A lot  Bathing (including washing, rinsing, drying): A lot  Putting on and taking off regular upper body clothing: A lot  Toileting, which includes using toilet, bedpan or urinal: A lot  Taking care of personal grooming such as brushing teeth: A little  Eating Meals: A little  Daily Activity - Total Score: 14         and OT Adult Other Outcome Measures  4AT: 4AT-    Education Documentation  ADL Training, taught by Felisha Kim OT at 2/8/2024  2:01 PM.  Learner: Patient  Readiness: Acceptance  Method: Explanation  Response: Verbalizes Understanding    Education Comments  No  comments found.        OP EDUCATION:       Goals:  Encounter Problems       Encounter Problems (Active)       ADLs       Patient will perform UB and LB bathing with set-up and stand by assist level of assistance. (Progressing)       Start:  02/08/24    Expected End:  02/22/24            Patient with complete lower body dressing with set-up and stand by assist level of assistance  with PRN adaptive equipment (Progressing)       Start:  02/08/24    Expected End:  02/22/24            Patient will complete daily grooming tasks  with modified independent level of assistance and PRN adaptive equipment. (Progressing)       Start:  02/08/24    Expected End:  02/22/24            Patient will complete toileting including hygiene clothing management/hygiene with modified independent level of assistance. (Progressing)       Start:  02/08/24    Expected End:  02/22/24                 TRANSFERS       Patient will complete functional transfers with least restrictive device with modified independent level of assistance. (Progressing)       Start:  02/08/24    Expected End:  02/22/24

## 2024-02-08 NOTE — PROCEDURES
Vascular Access Team Procedure Note     Visit Date: 2/8/2024      Patient Name: Vania Andrews         MRN: [id number]             Procedure:midline placement            Midline 02/08/24 Single lumen Right Basilic vein (Active)   02/08/24 1806 Basilic vein   Earliest Known Present: 02/08/24   Placed by External Staff?:    Hand Hygiene Completed: Yes   Catheter Time Out Checklist Completed: Yes   Size (Fr): 3   Lumen Type: Single lumen   Description (optional): Time out done with Ronnie Jones RN   Catheter to Vein Ratio Less Than 50%: Yes   Total Length (cm): 12 cm   External Length (cm): 0 cm   Orientation: Right   Site Prep: Chlorhexidine ;Usual sterile procedure followed   Local Anesthetic: Injectable   Indication: Parenteral medications   Insertion Team Members In The Room: Nurse   Initial Extremity Circumference (cm): 41 cm   Placed by: Sunshine Harrison RN   Insertion attempts: 2   Patient Tolerance: Fair   Comfort Measures: Subcutaneous anesthetic   Procedure Location: Bedside   Safety Measures: Patient specific safety measures addressed with RN   Estimated Blood Loss (mL): 1 mL   Vessel Fully Compressible Proximally and Distally to Insertion Site: Yes   Brisk Blood Return Obtained and Line Draws Easily: Yes   Catheter Tip Location: Peripheral/midline (right axilla)   Line Confirmation: Non-pulsatile blood flow;Ultrasound   Lot #: [id number]   : bard   Expiration Date: 11/30/24   Securement Method: Stat lock   Number of Sutures Placed: 0   Post Procedure Checklist: Handoff with RN;Bed at lowest level and wheels locked;Line discharge information at bedside;Obtain all new IV tubing prior to use   Earliest Known Removed:    Removal Reason :    Removal Catheter Length (cm):    Tip Intact:    Site Prep Agent has Completely Dried Before Insertion:    All 5 Sterile Barriers Used (Gloves, Gown, Cap, Mask, Large Sterile Drape):    Placement Verification:    Catheter Tip Cultured:    Securement Method:    Site  Assessment Clean;Dry;Intact 02/08/24 1820   Proximal Lumen Status Flushed 02/08/24 1820   Dressing Type Antimicrobial patch;Transparent 02/08/24 1820   Dressing Status Clean;Dry;Occlusive 02/08/24 1820                 Cassie Harrison RN  2/8/2024  6:20 PM

## 2024-02-09 ENCOUNTER — APPOINTMENT (OUTPATIENT)
Dept: GASTROENTEROLOGY | Facility: HOSPITAL | Age: 69
DRG: 176 | End: 2024-02-09
Payer: MEDICARE

## 2024-02-09 LAB
ALBUMIN SERPL BCP-MCNC: 3.1 G/DL (ref 3.4–5)
ANION GAP SERPL CALC-SCNC: 10 MMOL/L (ref 10–20)
BASOPHILS # BLD MANUAL: 0 X10*3/UL (ref 0–0.1)
BASOPHILS NFR BLD MANUAL: 0 %
BUN SERPL-MCNC: 24 MG/DL (ref 6–23)
CALCIUM SERPL-MCNC: 9.1 MG/DL (ref 8.6–10.6)
CHLORIDE SERPL-SCNC: 110 MMOL/L (ref 98–107)
CO2 SERPL-SCNC: 25 MMOL/L (ref 21–32)
CREAT SERPL-MCNC: 1.41 MG/DL (ref 0.5–1.05)
EGFRCR SERPLBLD CKD-EPI 2021: 41 ML/MIN/1.73M*2
EOSINOPHIL # BLD MANUAL: 0.1 X10*3/UL (ref 0–0.7)
EOSINOPHIL NFR BLD MANUAL: 0.9 %
ERYTHROCYTE [DISTWIDTH] IN BLOOD BY AUTOMATED COUNT: 31.2 % (ref 11.5–14.5)
ERYTHROCYTE [DISTWIDTH] IN BLOOD BY AUTOMATED COUNT: 31.3 % (ref 11.5–14.5)
ERYTHROCYTE [DISTWIDTH] IN BLOOD BY AUTOMATED COUNT: ABNORMAL %
GLUCOSE BLD MANUAL STRIP-MCNC: 147 MG/DL (ref 74–99)
GLUCOSE BLD MANUAL STRIP-MCNC: 164 MG/DL (ref 74–99)
GLUCOSE BLD MANUAL STRIP-MCNC: 256 MG/DL (ref 74–99)
GLUCOSE BLD MANUAL STRIP-MCNC: 91 MG/DL (ref 74–99)
GLUCOSE SERPL-MCNC: 270 MG/DL (ref 74–99)
HCT VFR BLD AUTO: 25.8 % (ref 36–46)
HCT VFR BLD AUTO: 26.5 % (ref 36–46)
HCT VFR BLD AUTO: 28.3 % (ref 36–46)
HEMOCCULT SP1 STL QL: POSITIVE
HGB BLD-MCNC: 7.2 G/DL (ref 12–16)
HGB BLD-MCNC: 7.5 G/DL (ref 12–16)
HGB BLD-MCNC: 8.1 G/DL (ref 12–16)
HYPOCHROMIA BLD QL SMEAR: ABNORMAL
IMM GRANULOCYTES # BLD AUTO: 0.04 X10*3/UL (ref 0–0.7)
IMM GRANULOCYTES NFR BLD AUTO: 0.4 % (ref 0–0.9)
LYMPHOCYTES # BLD MANUAL: 0.82 X10*3/UL (ref 1.2–4.8)
LYMPHOCYTES NFR BLD MANUAL: 7.7 %
MAGNESIUM SERPL-MCNC: 2.16 MG/DL (ref 1.6–2.4)
MCH RBC QN AUTO: 21.5 PG (ref 26–34)
MCH RBC QN AUTO: 21.7 PG (ref 26–34)
MCH RBC QN AUTO: 22.3 PG (ref 26–34)
MCHC RBC AUTO-ENTMCNC: 27.9 G/DL (ref 32–36)
MCHC RBC AUTO-ENTMCNC: 28.3 G/DL (ref 32–36)
MCHC RBC AUTO-ENTMCNC: 28.6 G/DL (ref 32–36)
MCV RBC AUTO: 77 FL (ref 80–100)
MCV RBC AUTO: 77 FL (ref 80–100)
MCV RBC AUTO: 78 FL (ref 80–100)
MONOCYTES # BLD MANUAL: 0.28 X10*3/UL (ref 0.1–1)
MONOCYTES NFR BLD MANUAL: 2.6 %
NEUTS SEG # BLD MANUAL: 9.41 X10*3/UL (ref 1.2–7)
NEUTS SEG NFR BLD MANUAL: 87.9 %
NRBC BLD-RTO: 0 /100 WBCS (ref 0–0)
PHOSPHATE SERPL-MCNC: 3.3 MG/DL (ref 2.5–4.9)
PLATELET # BLD AUTO: 321 X10*3/UL (ref 150–450)
PLATELET # BLD AUTO: 324 X10*3/UL (ref 150–450)
PLATELET # BLD AUTO: 331 X10*3/UL (ref 150–450)
POTASSIUM SERPL-SCNC: 4.7 MMOL/L (ref 3.5–5.3)
PROMYELOCYTES # BLD MANUAL: 0.1 X10*3/UL
PROMYELOCYTES NFR BLD MANUAL: 0.9 %
RBC # BLD AUTO: 3.35 X10*6/UL (ref 4–5.2)
RBC # BLD AUTO: 3.45 X10*6/UL (ref 4–5.2)
RBC # BLD AUTO: 3.64 X10*6/UL (ref 4–5.2)
RBC MORPH BLD: ABNORMAL
SODIUM SERPL-SCNC: 140 MMOL/L (ref 136–145)
TOTAL CELLS COUNTED BLD: 116
UFH PPP CHRO-ACNC: 0.5 IU/ML
UFH PPP CHRO-ACNC: 0.5 IU/ML
WBC # BLD AUTO: 10.3 X10*3/UL (ref 4.4–11.3)
WBC # BLD AUTO: 10.7 X10*3/UL (ref 4.4–11.3)
WBC # BLD AUTO: 11.3 X10*3/UL (ref 4.4–11.3)

## 2024-02-09 PROCEDURE — 85027 COMPLETE CBC AUTOMATED: CPT

## 2024-02-09 PROCEDURE — 1100000001 HC PRIVATE ROOM DAILY

## 2024-02-09 PROCEDURE — 85520 HEPARIN ASSAY: CPT

## 2024-02-09 PROCEDURE — 82947 ASSAY GLUCOSE BLOOD QUANT: CPT

## 2024-02-09 PROCEDURE — 85007 BL SMEAR W/DIFF WBC COUNT: CPT | Performed by: NURSE PRACTITIONER

## 2024-02-09 PROCEDURE — 36415 COLL VENOUS BLD VENIPUNCTURE: CPT

## 2024-02-09 PROCEDURE — 80069 RENAL FUNCTION PANEL: CPT | Performed by: NURSE PRACTITIONER

## 2024-02-09 PROCEDURE — 2500000001 HC RX 250 WO HCPCS SELF ADMINISTERED DRUGS (ALT 637 FOR MEDICARE OP): Performed by: NURSE PRACTITIONER

## 2024-02-09 PROCEDURE — 2500000004 HC RX 250 GENERAL PHARMACY W/ HCPCS (ALT 636 FOR OP/ED): Performed by: NURSE PRACTITIONER

## 2024-02-09 PROCEDURE — 2500000004 HC RX 250 GENERAL PHARMACY W/ HCPCS (ALT 636 FOR OP/ED): Performed by: INTERNAL MEDICINE

## 2024-02-09 PROCEDURE — 85027 COMPLETE CBC AUTOMATED: CPT | Performed by: NURSE PRACTITIONER

## 2024-02-09 PROCEDURE — 7100000009 HC PHASE TWO TIME - INITIAL BASE CHARGE: Performed by: INTERNAL MEDICINE

## 2024-02-09 PROCEDURE — 83735 ASSAY OF MAGNESIUM: CPT | Performed by: NURSE PRACTITIONER

## 2024-02-09 PROCEDURE — C9113 INJ PANTOPRAZOLE SODIUM, VIA: HCPCS

## 2024-02-09 PROCEDURE — 7100000010 HC PHASE TWO TIME - EACH INCREMENTAL 1 MINUTE: Performed by: INTERNAL MEDICINE

## 2024-02-09 PROCEDURE — 0DJ08ZZ INSPECTION OF UPPER INTESTINAL TRACT, VIA NATURAL OR ARTIFICIAL OPENING ENDOSCOPIC: ICD-10-PCS | Performed by: INTERNAL MEDICINE

## 2024-02-09 PROCEDURE — 2500000004 HC RX 250 GENERAL PHARMACY W/ HCPCS (ALT 636 FOR OP/ED)

## 2024-02-09 PROCEDURE — 43235 EGD DIAGNOSTIC BRUSH WASH: CPT | Performed by: INTERNAL MEDICINE

## 2024-02-09 PROCEDURE — 2500000002 HC RX 250 W HCPCS SELF ADMINISTERED DRUGS (ALT 637 FOR MEDICARE OP, ALT 636 FOR OP/ED): Mod: MUE

## 2024-02-09 PROCEDURE — 94640 AIRWAY INHALATION TREATMENT: CPT

## 2024-02-09 PROCEDURE — 99254 IP/OBS CNSLTJ NEW/EST MOD 60: CPT | Performed by: INTERNAL MEDICINE

## 2024-02-09 RX ORDER — PANTOPRAZOLE SODIUM 40 MG/10ML
40 INJECTION, POWDER, LYOPHILIZED, FOR SOLUTION INTRAVENOUS 2 TIMES DAILY
Status: DISCONTINUED | OUTPATIENT
Start: 2024-02-09 | End: 2024-02-11

## 2024-02-09 RX ORDER — MIDAZOLAM HYDROCHLORIDE 1 MG/ML
INJECTION, SOLUTION INTRAMUSCULAR; INTRAVENOUS AS NEEDED
Status: COMPLETED | OUTPATIENT
Start: 2024-02-09 | End: 2024-02-09

## 2024-02-09 RX ORDER — FENTANYL CITRATE 50 UG/ML
INJECTION, SOLUTION INTRAMUSCULAR; INTRAVENOUS AS NEEDED
Status: COMPLETED | OUTPATIENT
Start: 2024-02-09 | End: 2024-02-09

## 2024-02-09 RX ADMIN — SIMETHICONE 80 MG: 20 SUSPENSION/ DROPS ORAL at 01:50

## 2024-02-09 RX ADMIN — MIDAZOLAM 2 MG: 1 INJECTION INTRAMUSCULAR; INTRAVENOUS at 16:26

## 2024-02-09 RX ADMIN — FENTANYL CITRATE 50 MCG: 50 INJECTION, SOLUTION INTRAMUSCULAR; INTRAVENOUS at 16:26

## 2024-02-09 RX ADMIN — MIDAZOLAM 1 MG: 1 INJECTION INTRAMUSCULAR; INTRAVENOUS at 16:28

## 2024-02-09 RX ADMIN — ROSUVASTATIN CALCIUM 20 MG: 20 TABLET, FILM COATED ORAL at 22:38

## 2024-02-09 RX ADMIN — TIOTROPIUM BROMIDE INHALATION SPRAY 2 PUFF: 3.12 SPRAY, METERED RESPIRATORY (INHALATION) at 08:00

## 2024-02-09 RX ADMIN — LATANOPROST 1 DROP: 50 SOLUTION/ DROPS OPHTHALMIC at 08:25

## 2024-02-09 RX ADMIN — INSULIN LISPRO 1 UNITS: 100 INJECTION, SOLUTION INTRAVENOUS; SUBCUTANEOUS at 08:45

## 2024-02-09 RX ADMIN — FENTANYL CITRATE 25 MCG: 50 INJECTION, SOLUTION INTRAMUSCULAR; INTRAVENOUS at 16:28

## 2024-02-09 RX ADMIN — DICYCLOMINE HYDROCHLORIDE 20 MG: 10 CAPSULE ORAL at 19:37

## 2024-02-09 RX ADMIN — TIMOLOL MALEATE 1 DROP: 5 SOLUTION/ DROPS OPHTHALMIC at 21:06

## 2024-02-09 RX ADMIN — PANTOPRAZOLE SODIUM 40 MG: 40 INJECTION, POWDER, FOR SOLUTION INTRAVENOUS at 21:05

## 2024-02-09 RX ADMIN — BETAMETHASONE DIPROPIONATE 1 APPLICATION: 0.5 CREAM TOPICAL at 08:51

## 2024-02-09 RX ADMIN — BENZONATATE 200 MG: 100 CAPSULE ORAL at 19:36

## 2024-02-09 RX ADMIN — BRIMONIDINE TARTRATE 1 DROP: 2 SOLUTION/ DROPS OPHTHALMIC at 08:25

## 2024-02-09 RX ADMIN — DICYCLOMINE HYDROCHLORIDE 20 MG: 10 CAPSULE ORAL at 08:24

## 2024-02-09 RX ADMIN — BRIMONIDINE TARTRATE 1 DROP: 2 SOLUTION/ DROPS OPHTHALMIC at 21:06

## 2024-02-09 RX ADMIN — METOPROLOL SUCCINATE 200 MG: 100 TABLET, EXTENDED RELEASE ORAL at 08:25

## 2024-02-09 RX ADMIN — POLYETHYLENE GLYCOL 3350 17 G: 17 POWDER, FOR SOLUTION ORAL at 08:25

## 2024-02-09 RX ADMIN — TIMOLOL MALEATE 1 DROP: 5 SOLUTION/ DROPS OPHTHALMIC at 08:25

## 2024-02-09 RX ADMIN — PANTOPRAZOLE SODIUM 40 MG: 40 INJECTION, POWDER, FOR SOLUTION INTRAVENOUS at 08:24

## 2024-02-09 RX ADMIN — LORATADINE 10 MG: 10 TABLET ORAL at 08:24

## 2024-02-09 RX ADMIN — FAMOTIDINE 20 MG: 20 TABLET, FILM COATED ORAL at 21:04

## 2024-02-09 RX ADMIN — SIMETHICONE 80 MG: 20 SUSPENSION/ DROPS ORAL at 08:25

## 2024-02-09 RX ADMIN — SIMETHICONE 80 MG: 20 SUSPENSION/ DROPS ORAL at 21:02

## 2024-02-09 ASSESSMENT — PAIN - FUNCTIONAL ASSESSMENT
PAIN_FUNCTIONAL_ASSESSMENT: 0-10

## 2024-02-09 ASSESSMENT — COLUMBIA-SUICIDE SEVERITY RATING SCALE - C-SSRS
6. HAVE YOU EVER DONE ANYTHING, STARTED TO DO ANYTHING, OR PREPARED TO DO ANYTHING TO END YOUR LIFE?: NO
1. IN THE PAST MONTH, HAVE YOU WISHED YOU WERE DEAD OR WISHED YOU COULD GO TO SLEEP AND NOT WAKE UP?: NO
2. HAVE YOU ACTUALLY HAD ANY THOUGHTS OF KILLING YOURSELF?: NO

## 2024-02-09 ASSESSMENT — PAIN SCALES - GENERAL
PAINLEVEL_OUTOF10: 0 - NO PAIN
PAINLEVEL_OUTOF10: 3
PAINLEVEL_OUTOF10: 0 - NO PAIN
PAINLEVEL_OUTOF10: 4
PAINLEVEL_OUTOF10: 3
PAINLEVEL_OUTOF10: 0 - NO PAIN
PAINLEVEL_OUTOF10: 0 - NO PAIN

## 2024-02-09 ASSESSMENT — PAIN DESCRIPTION - DESCRIPTORS: DESCRIPTORS: CRAMPING

## 2024-02-09 NOTE — PRE-SEDATION DOCUMENTATION
Patient: Vania Andrews  MRN: [id number]    Pre-sedation Evaluation:  Sedation necessary for: Analgesia  Requesting service: Pulm SDU    History of Present Illness:      Vania Andrews is a 68 y.o. 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 She is getting evaluated with EGD for suspected dark stools and acute on chronic anemia/     Past Medical History:   Diagnosis Date    Abdominal distension (gaseous) 07/31/2019    Abdominal bloating    Cancer (CMS/HCC)     Cataract     Coronary artery disease     Diabetes mellitus (CMS/HCC)     Diverticulosis of intestine, part unspecified, without perforation or abscess without bleeding 06/09/2013    Diverticulosis    Elevation of levels of liver transaminase levels 11/13/2020    Transaminitis    Encounter for follow-up examination after completed treatment for conditions other than malignant neoplasm 01/26/2019    Hospital discharge follow-up    Glaucoma     Hypertension     Long term (current) use of antibiotics 10/07/2016    Need for prophylactic antibiotic    Other amnesia 10/28/2014    Memory loss    Other conditions influencing health status 02/06/2019    History of cough    Other specified health status 02/07/2019    Hepatitis C antibody test negative    Other specified soft tissue disorders 06/14/2017    Leg swelling    Pain in left toe(s) 05/15/2017    Pain of toe of left foot    Pain in right foot 10/12/2016    Pain of right heel    Pain in right shoulder 06/22/2016    Acute pain of  right shoulder    Personal history of diseases of the blood and blood-forming organs and certain disorders involving the immune mechanism 04/09/2018    History of anemia    Personal history of other diseases of the respiratory system 04/02/2018    History of sinusitis    Personal history of other diseases of the respiratory system 03/19/2014    History of upper respiratory infection    Personal history of other malignant neoplasm of kidney 01/14/2021    Personal history of malignant neoplasm of kidney    Personal history of other medical treatment 08/08/2017    History of screening mammography    Personal history of other specified conditions 11/17/2014    History of abdominal pain    Personal history of other specified conditions 06/14/2017    History of urinary frequency    Personal history of other specified conditions 06/09/2021    History of dysuria    Personal history of other specified conditions 11/28/2014    History of breast lump    Unspecified abdominal hernia without obstruction or gangrene 04/21/2014    Hernia       Principle problems:  Patient Active Problem List    Diagnosis Date Noted    Acute saddle pulmonary embolism without acute cor pulmonale (CMS/[facility]) 02/06/2024    Chronic cough 02/01/2024    Tobacco dependence in remission 02/01/2024    Symptomatic anemia 01/25/2024    Cortical age-related cataract of both eyes 01/22/2024    Diabetes mellitus with chronic kidney disease (CMS/HCC) 09/11/2023    Diabetes mellitus with gastroparesis (CMS/[facility]) 09/11/2023    Constipation 08/29/2023    Abdominal hernia 08/29/2023    Abnormal electrocardiogram 08/29/2023    Abnormal mammogram 08/29/2023    Achilles tendinitis of left lower extremity 08/29/2023    Acquired trigger finger 08/29/2023    Adenoma of left adrenal gland 08/29/2023    Allergy to IVP dye 08/29/2023    Anosmia 08/29/2023    Anxiety 08/29/2023    Asthma 08/29/2023    Sleep apnea 08/29/2023    Astigmatism of both eyes with presbyopia 08/29/2023     Bilateral presbyopia 08/29/2023    Bilateral renal cysts 08/29/2023    Breast pain, right 08/29/2023    Carpal tunnel syndrome 08/29/2023    Chest pain 08/29/2023    Chronic pain of right wrist 08/29/2023    Chronic pain of right wrist 08/29/2023    Common migraine without aura 08/29/2023    Distal radius fracture, right 08/29/2023    Dizziness 08/29/2023    DVT (deep venous thrombosis) (CMS/[facility]) 08/29/2023    Elevated IOP 08/29/2023    Elevated LFTs 08/29/2023    Enlarged lymph nodes 08/29/2023    Fatigue 08/29/2023    Generalized pain 08/29/2023    Gout, arthritis 08/29/2023    Grief 08/29/2023    H/O non-ST elevation myocardial infarction (NSTEMI) 08/29/2023    Headache 08/29/2023    Pain of right eye 08/29/2023    Hypertension 08/29/2023    Increased frequency of urination 08/29/2023    Iron deficiency anemia, unspecified 08/29/2023    Irregular heart beats 08/29/2023    Knee pain 08/29/2023    Leg swelling 08/29/2023    Memory dysfunction 08/29/2023    Morbid obesity (CMS/[facility]) 08/29/2023    Mixed stress and urge urinary incontinence 08/29/2023    Nasal congestion 08/29/2023    Nausea, vomiting, and diarrhea 08/29/2023    Osteopenia 08/29/2023    Otalgia of both ears 08/29/2023    Pain with urination 08/29/2023    Palpitations 08/29/2023    Paraesophageal hiatal hernia 08/29/2023    Polyuria 08/29/2023    Postnasal drip 08/29/2023    Primary open angle glaucoma (POAG) of both eyes, severe stage 08/29/2023    Recurrent UTI 08/29/2023    Rheumatoid arthritis involving both hands (CMS/[facility]) 08/29/2023    Right hip pain 08/29/2023    Shortness of breath on exertion 08/29/2023    Status post nephrectomy 08/29/2023    Systolic ejection murmur 08/29/2023    Tachycardia 08/29/2023    Tinnitus of both ears 08/29/2023    Transaminitis 08/29/2023    Umbilical hernia 08/29/2023    Urinary tract infection 08/29/2023    Vitamin D deficiency 08/29/2023    Wrist fracture, right 08/29/2023    Renal cell carcinoma, unspecified  laterality (CMS/[facility]) 07/21/2023    Anemia 07/21/2023    Chronic kidney disease (CKD), stage III (moderate) (CMS/HCC) 07/21/2023    Diabetes mellitus (CMS/HCC) 07/21/2023    Esophageal reflux 07/21/2023    Mixed hyperlipidemia 07/21/2023    Osteoarthritis 07/21/2023     Allergies:  Allergies   Allergen Reactions    Adhesive Itching    Amoxicillin Hives and Itching    Bee Sting Kit Swelling    Cephalexin Itching and Nausea Only    Gadolinium-Containing Contrast Media Hives    Iodine Unknown    Latex Other    Pioglitazone Swelling    Rubella Virus Live Vaccine Unknown    Salicylates Other    Shellfish Derived Swelling    Sulfa (Sulfonamide Antibiotics) Unknown    Sulfamethoxazole-Trimethoprim Hives and Itching    Iodinated Contrast Media Itching and Rash     PTA/Current Medications:  Facility-Administered Medications Prior to Admission   Medication Dose Route Frequency Provider Last Rate Last Admin    nitroglycerin (Nitrostat) SL tablet 0.4 mg  0.4 mg sublingual Once Vianney Marrufo MD         Medications Prior to Admission   Medication Sig Dispense Refill Last Dose    Accu-Chek Guide test strips strip Use 1 test strip to test blood glucose twice daily. 100 strip 2     albuterol 90 mcg/actuation inhaler Inhale 2 puffs every 4 hours if needed for wheezing or shortness of breath (You may also use this 10-15 minutes prior to exertional activity as needed). 18 g 5 Unknown    amLODIPine (Norvasc) 10 mg tablet Take 1 tablet (10 mg) by mouth once daily. 90 tablet 1 Past Week    aspirin 81 mg chewable tablet Chew 1 tablet (81 mg) once daily.   Past Week    benzonatate (Tessalon) 200 mg capsule Take 1 capsule (200 mg) by mouth 3 times a day as needed.   Unknown    betamethasone, augmented, (Diprolene AF) 0.05 % cream Apply topically once daily. Apply a thin layer to arms and back once daily. DO NOT USE ON FACE OR GROIN. (Patient not taking: Reported on 2/5/2024) 50 g 0     brimonidine-timoloL (Combigan) 0.2-0.5 % ophthalmic  solution Administer 1 drop into both eyes 2 times a day. 10 mL 6 Past Week    cholecalciferol (D3-2000) 50 mcg (2,000 unit) capsule Take 1 capsule (50 mcg) by mouth once daily.   Past Week    diclofenac sodium 1 % kit Apply 1 Application topically if needed.  APPLY TO LOWER EXTREMITIES, 4 GM OF GEL TO AFFECTED AREA 4 TIMES DAILY. DO NOT APPLY MORE THAN 16 GM DAILY TO ANY ONE AFFECTED JOINT.   Unknown    dicyclomine (Bentyl) 20 mg tablet Take 1 tablet (20 mg) by mouth. 3-4 times daily as needed.   Unknown    dulaglutide (Trulicity) 0.75 mg/0.5 mL pen injector Inject 0.75 mg (1 pen) under the skin 1 (one) time per week. 12 each 3     famotidine (Pepcid) 20 mg tablet Take 1 tablet (20 mg) by mouth once daily at bedtime.   Past Week    ferrous sulfate, 325 mg ferrous sulfate, tablet Take 1 tablet by mouth once daily with breakfast. 90 tablet 1 Past Week    fluticasone (Flonase) 50 mcg/actuation nasal spray Administer 2 sprays into each nostril once daily. 16 g 3 Past Week    furosemide (Lasix) 20 mg tablet Take 1 tablet (20 mg) by mouth once daily as needed (As needed for leg swelling). 30 tablet 11 Unknown    insulin lispro (HumaLOG KwikPen Insulin) 100 unit/mL injection Use with sliding scale 3 times a day, up to 30 units daily (Patient taking differently: Use with sliding scale 3 times a day, up to 30 units daily. Patient states she takes only when taking prednisone) 15 mL 2     latanoprost (Xalatan) 0.005 % ophthalmic solution Administer 1 drop into both eyes once daily in the morning. 2.5 mL 6 Unknown    levocetirizine (Xyzal) 5 mg tablet Take 1 tablet (5 mg) by mouth once daily in the evening. 90 tablet 1 Past Week    losartan (Cozaar) 25 mg tablet Take 1 tablet (25 mg) by mouth once daily. 90 tablet 1 Past Week    metoprolol succinate XL (Toprol-XL) 200 mg 24 hr tablet Take 1 tablet (200 mg) by mouth once daily. 90 tablet 3 Past Week    netarsudiL (Rhopressa) 0.02 % drops opthalmic solution Administer 1 drop into  "both eyes once daily in the evening. 2.5 mL 6 Past Week    omeprazole (PriLOSEC) 40 mg DR capsule Take 1 capsule (40 mg) by mouth 2 times a day before meals. 60 capsule 1 Past Week    pen needle, diabetic 31 gauge x 5/16\" needle Use to inject 1-4 times daily as directed. 100 each 11     pen needle, diabetic 33 gauge x 5/32\" needle Use for sliding scale up to 3 times a day 100 each 2     polyethylene glycol (Glycolax, Miralax) 17 gram/dose powder Take 17 g by mouth if needed. IN 8 OUNCES OF WATER, JUICE, OR TEA AND DRINK   Unknown    rosuvastatin (Crestor) 20 mg tablet Take 1 tablet (20 mg) by mouth once daily. 30 tablet 2 Past Week    tiotropium (Spiriva Respimat) 2.5 mcg/actuation inhaler Inhale 2 puffs once daily. 1 each 3 Unknown     Current Facility-Administered Medications   Medication Dose Route Frequency Provider Last Rate Last Admin    acetaminophen (Tylenol) tablet 650 mg  650 mg oral q4h PRN Aura Garcia APRN-CNP   650 mg at 02/07/24 1213    albuterol 90 mcg/actuation inhaler 2 puff  2 puff inhalation q4h PRN April Tjram, APRN-CNP        [Held by provider] amLODIPine (Norvasc) tablet 10 mg  10 mg oral Daily April Bartram, APRN-CNP   10 mg at 02/08/24 1007    benzonatate (Tessalon) capsule 200 mg  200 mg oral TID PRN April Bartram, APRN-CNP        betamethasone (augmented) (Diprolene AF) 0.05 % cream 1 Application  1 Application Topical Daily April Bartram, APRN-CNP   1 Application at 02/09/24 0851    brimonidine (AlphaGAN) 0.2 % ophthalmic solution 1 drop  1 drop Both Eyes BID Taylor Hall, PharmD   1 drop at 02/09/24 0825    dextrose 10 % in water (D10W) infusion  0.3 g/kg/hr intravenous Once PRN April Bartram, APRN-CNP        dextrose 50 % injection 25 g  25 g intravenous q15 min PRN April Bartram, APRN-CNP        dicyclomine (Bentyl) capsule 20 mg  20 mg oral TID April Bartram, APRN-CNP   20 mg at 02/09/24 0824    famotidine (Pepcid) tablet 20 mg  20 mg oral Nightly JESSICA Durham  "  20 mg at 02/08/24 2059    glucagon (Glucagen) injection 1 mg  1 mg intramuscular q15 min PRN April Spencerram, APRN-CNP        heparin (porcine) injection 3,000-6,000 Units  3,000-6,000 Units intravenous q4h PRN Colt Sera, DO   3,000 Units at 02/08/24 1148    heparin 25,000 Units in dextrose 5% 250 mL (100 Units/mL) infusion (premix)  0-4,500 Units/hr intravenous Continuous Colt Sera, DO 7 mL/hr at 02/09/24 0600 700 Units/hr at 02/09/24 0600    insulin lispro (HumaLOG) injection 0-5 Units  0-5 Units subcutaneous TID with meals April Balbuena, APRN-CNP   1 Units at 02/09/24 0845    latanoprost (Xalatan) 0.005 % ophthalmic solution 1 drop  1 drop Both Eyes q AM April Balbuena, APRN-CNP   1 drop at 02/09/24 0825    loratadine (Claritin) tablet 10 mg  10 mg oral Daily April Balbuena, APRN-CNP   10 mg at 02/09/24 0824    [Held by provider] losartan (Cozaar) tablet 25 mg  25 mg oral Nightly MOR Lou-CNP   25 mg at 02/08/24 2059    metoprolol succinate XL (Toprol-XL) 24 hr tablet 200 mg  200 mg oral Daily April Tjram, APRN-CNP   200 mg at 02/09/24 0825    ondansetron (Zofran) injection 4 mg  4 mg intravenous q6h PRN April Balbuena, APRN-CNP   4 mg at 02/07/24 2018    pantoprazole (ProtoNix) injection 40 mg  40 mg intravenous BID Jasper Giraldo APRN-CNP   40 mg at 02/09/24 0824    polyethylene glycol (Glycolax, Miralax) packet 17 g  17 g oral Daily MOR Lou-CNP   17 g at 02/09/24 0825    rosuvastatin (Crestor) tablet 20 mg  20 mg oral Nightly Jasper Giraldo APRN-CNP   20 mg at 02/08/24 2100    simethicone (Mylicon) drops 80 mg  80 mg oral q6h Maribel Mejia MD   80 mg at 02/09/24 0825    timolol (Timoptic) 0.5 % ophthalmic solution 1 drop  1 drop Both Eyes BID Taylor Hall PharmD   1 drop at 02/09/24 0825    tiotropium (Spiriva Respimat) 2.5 mcg/actuation inhaler 2 puff  2 Inhalation inhalation Daily JESSICA Durham   2 puff at 02/09/24 0800     Past Surgical History:    has a past surgical history that includes Breast biopsy (2016); Cholecystectomy (2017); Hernia repair (2014); Other surgical history (2021);  section, classic (2014); Total abdominal hysterectomy w/ bilateral salpingoophorectomy (2014); Hand surgery (2020); and MR angio head wo IV contrast (2014).    Recent sedation/surgery (24 hours) No    Review of Systems:  Please check all that apply: Cardiac Disease, Obstructive Sleep Apnea, and Obesity    Pregnancy test completed prior to procedure on any menstruating female: none        NPO guidelines met: Yes    Physical Exam    Airway  Mallampati: III     Cardiovascular   Rhythm: regular  Rate: normal     Dental    Pulmonary   Breath sounds clear to auscultation         Plan        EGD today

## 2024-02-09 NOTE — CARE PLAN
Problem: Safety  Goal: Patient will be injury free during hospitalization  Outcome: Progressing  Goal: I will remain free of falls  Outcome: Progressing     Problem: Discharge Barriers  Goal: My discharge needs are met  Outcome: Progressing     Problem: Skin  Goal: Decreased wound size/increased tissue granulation at next dressing change  Outcome: Progressing  Goal: Participates in plan/prevention/treatment measures  Outcome: Progressing  Goal: Prevent/manage excess moisture  Outcome: Progressing  Goal: Prevent/minimize sheer/friction injuries  Outcome: Progressing  Goal: Promote/optimize nutrition  Outcome: Progressing  Goal: Promote skin healing  Outcome: Progressing   The patient's goals for the shift include Be able to get out of bed    The clinical goals for the shift include Patient will remain safe and hemidynamically stable until EOS    Over the shift, the patient did not make progress toward the following goals. Barriers to progression include requiring heparin gtt.

## 2024-02-09 NOTE — NURSING NOTE
Pt was directly transferred to [place] [id number] after EGD without notice. Pt belongings were still in [place] [id number]. I transferred the patient's belongs to her once I found out she was transferred. The patient had two bags of clothes to including one coat, phone , and stack of credit cards. The patient's items were directly given to her.

## 2024-02-09 NOTE — H&P
Interval H and P     History Of Present Illness  Vania Andrews is a 68 y.o. 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 She is getting evaluated with EGD for suspected dark stools and acute on chronic anemia/      Past Medical History  Past Medical History:   Diagnosis Date    Abdominal distension (gaseous) 07/31/2019    Abdominal bloating    Cancer (CMS/HCC)     Cataract     Coronary artery disease     Diabetes mellitus (CMS/HCC)     Diverticulosis of intestine, part unspecified, without perforation or abscess without bleeding 06/09/2013    Diverticulosis    Elevation of levels of liver transaminase levels 11/13/2020    Transaminitis    Encounter for follow-up examination after completed treatment for conditions other than malignant neoplasm 01/26/2019    Hospital discharge follow-up    Glaucoma     Hypertension     Long term (current) use of antibiotics 10/07/2016    Need for prophylactic antibiotic    Other amnesia 10/28/2014    Memory loss    Other conditions influencing health status 02/06/2019    History of cough    Other specified health status 02/07/2019    Hepatitis C antibody test negative    Other specified soft tissue disorders 06/14/2017    Leg swelling    Pain in left toe(s) 05/15/2017    Pain of toe of left foot    Pain in right foot 10/12/2016    Pain of right heel    Pain in right shoulder 06/22/2016    Acute pain of right shoulder    Personal history of diseases of the blood and blood-forming organs and certain  disorders involving the immune mechanism 2018    History of anemia    Personal history of other diseases of the respiratory system 2018    History of sinusitis    Personal history of other diseases of the respiratory system 2014    History of upper respiratory infection    Personal history of other malignant neoplasm of kidney 2021    Personal history of malignant neoplasm of kidney    Personal history of other medical treatment 2017    History of screening mammography    Personal history of other specified conditions 2014    History of abdominal pain    Personal history of other specified conditions 2017    History of urinary frequency    Personal history of other specified conditions 2021    History of dysuria    Personal history of other specified conditions 2014    History of breast lump    Unspecified abdominal hernia without obstruction or gangrene 2014    Hernia       Surgical History  Past Surgical History:   Procedure Laterality Date    BREAST BIOPSY  2016    Biopsy Breast Percutaneous Needle Core     SECTION, CLASSIC  2014     Section    CHOLECYSTECTOMY  2017    Cholecystectomy Laparoscopic    HAND SURGERY  2020    Hand Surgery                                                                                                                                                          HERNIA REPAIR  2014    Hernia Repair    MR HEAD ANGIO WO IV CONTRAST  2014    MR HEAD ANGIO WO IV CONTRAST 2014 [facility] [place] [facility]    OTHER SURGICAL HISTORY  2021    Nephrectomy    TOTAL ABDOMINAL HYSTERECTOMY W/ BILATERAL SALPINGOOPHORECTOMY  2014    Total Abdominal Hysterectomy With Removal Of Both Ovaries        Social History  She reports that she has quit smoking. Her smoking use included cigarettes. She has never been exposed to tobacco smoke. She has never used smokeless tobacco. She reports  "current alcohol use. She reports that she does not currently use drugs.    Family History  Family History   Problem Relation Name Age of Onset    Aneurysm Mother      Hypertension Mother      Pancreatic cancer Mother      Diabetes Mother      Other (laryngeal Cancer) Mother      Heart disease Father      Pulmonary embolism Brother      Breast cancer Father's Sister      Breast cancer Maternal Cousin          Allergies  Adhesive, Amoxicillin, Bee sting kit, Cephalexin, Gadolinium-containing contrast media, Iodine, Latex, Pioglitazone, Rubella virus live vaccine, Salicylates, Shellfish derived, Sulfa (sulfonamide antibiotics), Sulfamethoxazole-trimethoprim, and Iodinated contrast media    Review of Systems   All other systems reviewed and are negative.       Physical Exam  Vitals and nursing note reviewed.   Constitutional:       Appearance: Normal appearance.   HENT:      Head: Normocephalic and atraumatic.      Mouth/Throat:      Mouth: Mucous membranes are moist.   Cardiovascular:      Rate and Rhythm: Normal rate and regular rhythm.   Pulmonary:      Effort: Pulmonary effort is normal.      Breath sounds: Normal breath sounds.   Abdominal:      General: Bowel sounds are normal.      Palpations: Abdomen is soft.   Musculoskeletal:      Cervical back: Neck supple.   Skin:     General: Skin is warm.      Capillary Refill: Capillary refill takes less than 2 seconds.   Neurological:      Mental Status: She is alert and oriented to person, place, and time.          Last Recorded Vitals  Blood pressure 114/70, pulse 62, temperature 36.4 °C (97.5 °F), temperature source Temporal, resp. rate 12, height 1.499 m (4' 11\"), weight 86.2 kg (190 lb), SpO2 97 %.       Assessment/Plan   Principal Problem:    Acute saddle pulmonary embolism without acute cor pulmonale (CMS/HCC)  Active Problems:    Renal cell carcinoma, unspecified laterality (CMS/HCC)    Anemia    Chronic kidney disease (CKD), stage III (moderate) (CMS/HCC)    " Diabetes mellitus (CMS/HCC)    Esophageal reflux    Mixed hyperlipidemia    Abdominal hernia      Vania Andrews is a 68 y.o. female w/ dark stools and acute on chronic VEDA, currently admitted for acute saddle PE and RLE DVT, on heparin. She is getting evaluated with EGD       Tammie Boyce MD

## 2024-02-09 NOTE — PROGRESS NOTES
"Vania Andrews is a 68 y.o. female on day 3 of admission presenting with Acute saddle pulmonary embolism without acute cor pulmonale (CMS/HCC).    Subjective   No acute events overnight.  HDS on RA and heparin gtt. No further episodes of dark tarry stool since yesterday morning    ROS: Endorses abdominal cramping, improved from yesterday.  Denies fever, chills, SOB, cough, CP, palpations and n/v      Objective   Physical Exam:  Constitutional: pt in NAD, alert and cooperative  Eyes: PERRL, EOMI, no icterus   ENMT: mucous membranes moist  Head/Neck: Neck supple, no apparent injury  Respiratory/Thorax: Lungs CTA bilaterally, non-labored breathing, no cough, on RA  Cardiovascular: Regular, rate and rhythm, no murmurs, normal S1 and S2  Gastrointestinal: Nondistended, soft, non-tender, BS present x 4  : External catheter in place draining clear yellow urine  Musculoskeletal: ROM intact, no joint swelling, normal strength  Extremities: normal extremities, no edema, contusions or wounds  Neurological: alert and oriented x 3, speech clear, follows commands appropriately, without focal deficits, sensation grossly intact  Skin: Warm and dry  Vital Signs  Blood pressure 125/68, pulse 80, temperature 36.3 °C (97.3 °F), resp. rate 17, height 1.5 m (4' 11.06\"), weight 90 kg (198 lb 6.6 oz), SpO2 99 %.  Oxygen Therapy  SpO2: 99 %  Medical Gas Therapy: None (Room air)          Intake/Output last 3 Shifts:  I/O last 3 completed shifts:  In: 496.2 (5.5 mL/kg) [P.O.:200; I.V.:296.2 (3.3 mL/kg)]  Out: 450 (5 mL/kg) [Urine:450 (0.1 mL/kg/hr)]  Weight: 90 kg     Lines and Tubes:  Peripheral IV 02/07/24 22 G Distal;Left;Posterior Forearm (Active)   Placement Date/Time: 02/07/24 7263   Hand Hygiene Completed: Yes  Size (Gauge): (c) 22 G  Orientation: Distal;Left;Posterior  Location: Forearm  Site Prep: Chlorhexidine ;Usual sterile procedure followed  Local Anesthetic: None  Technique: Ultrasound ...   Number of days: 1       Midline " 02/08/24 Single lumen Right Basilic vein (Active)   Placement Date/Time: 02/08/24 1806   Earliest Known Present: 02/08/24  Hand Hygiene Completed: Yes  Catheter Time Out Checklist Completed: Yes  Size (Fr): 3  Lumen Type: Single lumen  Description (optional): Time out done with Ronnie Jones RN  Catheter...   Number of days: 0       External Urinary Catheter Female (Active)   Placement Date/Time: 02/06/24 0500   External Catheter Type: Female   Number of days: 3         Relevant Results  Scheduled medications  [Held by provider] amLODIPine, 10 mg, oral, Daily  betamethasone (augmented), 1 Application, Topical, Daily  brimonidine, 1 drop, Both Eyes, BID  dicyclomine, 20 mg, oral, TID  famotidine, 20 mg, oral, Nightly  insulin lispro, 0-5 Units, subcutaneous, TID with meals  latanoprost, 1 drop, Both Eyes, q AM  loratadine, 10 mg, oral, Daily  [Held by provider] losartan, 25 mg, oral, Nightly  metoprolol succinate XL, 200 mg, oral, Daily  pantoprazole, 40 mg, intravenous, BID  polyethylene glycol, 17 g, oral, Daily  rosuvastatin, 20 mg, oral, Nightly  simethicone, 80 mg, oral, q6h  timolol, 1 drop, Both Eyes, BID  tiotropium, 2 Inhalation, inhalation, Daily      Continuous medications  heparin, 0-4,500 Units/hr, Last Rate: 700 Units/hr (02/09/24 0600)      PRN medications  PRN medications: acetaminophen, albuterol, benzonatate, dextrose 10 % in water (D10W), dextrose, glucagon, heparin, ondansetron    Results for orders placed or performed during the hospital encounter of 02/05/24 (from the past 24 hour(s))   POCT GLUCOSE   Result Value Ref Range    POCT Glucose 141 (H) 74 - 99 mg/dL   Heparin Assay, UFH   Result Value Ref Range    Heparin Unfractionated 0.2 See Comment Below for Therapeutic Ranges IU/mL   Protime-INR   Result Value Ref Range    Protime 11.5 9.8 - 12.8 seconds    INR 1.0 0.9 - 1.1   aPTT - baseline   Result Value Ref Range    aPTT 35 27 - 38 seconds   Type and screen   Result Value Ref Range    ABO TYPE  B     Rh TYPE POS     ANTIBODY SCREEN NEG    CBC   Result Value Ref Range    WBC 13.3 (H) 4.4 - 11.3 x10*3/uL    nRBC 0.0 0.0 - 0.0 /100 WBCs    RBC 4.30 4.00 - 5.20 x10*6/uL    Hemoglobin 9.2 (L) 12.0 - 16.0 g/dL    Hematocrit 33.8 (L) 36.0 - 46.0 %    MCV 79 (L) 80 - 100 fL    MCH 21.4 (L) 26.0 - 34.0 pg    MCHC 27.2 (L) 32.0 - 36.0 g/dL    RDW 31.2 (H) 11.5 - 14.5 %    Platelets 441 150 - 450 x10*3/uL   POCT GLUCOSE   Result Value Ref Range    POCT Glucose 141 (H) 74 - 99 mg/dL   Heparin Assay, UFH   Result Value Ref Range    Heparin Unfractionated 0.9 See Comment Below for Therapeutic Ranges IU/mL   POCT GLUCOSE   Result Value Ref Range    POCT Glucose 178 (H) 74 - 99 mg/dL   CBC and Auto Differential   Result Value Ref Range    WBC 13.4 (H) 4.4 - 11.3 x10*3/uL    nRBC 0.0 0.0 - 0.0 /100 WBCs    RBC 3.69 (L) 4.00 - 5.20 x10*6/uL    Hemoglobin 7.8 (L) 12.0 - 16.0 g/dL    Hematocrit 27.5 (L) 36.0 - 46.0 %    MCV 75 (L) 80 - 100 fL    MCH 21.1 (L) 26.0 - 34.0 pg    MCHC 28.4 (L) 32.0 - 36.0 g/dL    RDW 30.8 (H) 11.5 - 14.5 %    Platelets 365 150 - 450 x10*3/uL    Neutrophils % 73.2 40.0 - 80.0 %    Immature Granulocytes %, Automated 0.5 0.0 - 0.9 %    Lymphocytes % 16.7 13.0 - 44.0 %    Monocytes % 6.4 2.0 - 10.0 %    Eosinophils % 2.8 0.0 - 6.0 %    Basophils % 0.4 0.0 - 2.0 %    Neutrophils Absolute 9.78 (H) 1.20 - 7.70 x10*3/uL    Immature Granulocytes Absolute, Automated 0.07 0.00 - 0.70 x10*3/uL    Lymphocytes Absolute 2.24 1.20 - 4.80 x10*3/uL    Monocytes Absolute 0.86 0.10 - 1.00 x10*3/uL    Eosinophils Absolute 0.38 0.00 - 0.70 x10*3/uL    Basophils Absolute 0.06 0.00 - 0.10 x10*3/uL   Coagulation Screen   Result Value Ref Range    Protime 13.2 (H) 9.8 - 12.8 seconds    INR 1.2 (H) 0.9 - 1.1    aPTT 69 (H) 27 - 38 seconds   Morphology   Result Value Ref Range    RBC Morphology See Below     Polychromasia Mild     Hypochromia Mild     RBC Fragments Few     Teardrop Cells Few    Heparin Assay, UFH   Result  Value Ref Range    Heparin Unfractionated 0.5 See Comment Below for Therapeutic Ranges IU/mL   Renal Function Panel   Result Value Ref Range    Glucose 270 (H) 74 - 99 mg/dL    Sodium 140 136 - 145 mmol/L    Potassium 4.7 3.5 - 5.3 mmol/L    Chloride 110 (H) 98 - 107 mmol/L    Bicarbonate 25 21 - 32 mmol/L    Anion Gap 10 10 - 20 mmol/L    Urea Nitrogen 24 (H) 6 - 23 mg/dL    Creatinine 1.41 (H) 0.50 - 1.05 mg/dL    eGFR 41 (L) >60 mL/min/1.73m*2    Calcium 9.1 8.6 - 10.6 mg/dL    Phosphorus 3.3 2.5 - 4.9 mg/dL    Albumin 3.1 (L) 3.4 - 5.0 g/dL   Magnesium   Result Value Ref Range    Magnesium 2.16 1.60 - 2.40 mg/dL   Heparin Assay, UFH   Result Value Ref Range    Heparin Unfractionated 0.5 See Comment Below for Therapeutic Ranges IU/mL   CBC and Auto Differential   Result Value Ref Range    WBC 10.7 4.4 - 11.3 x10*3/uL    nRBC 0.0 0.0 - 0.0 /100 WBCs    RBC 3.35 (L) 4.00 - 5.20 x10*6/uL    Hemoglobin 7.2 (L) 12.0 - 16.0 g/dL    Hematocrit 25.8 (L) 36.0 - 46.0 %    MCV 77 (L) 80 - 100 fL    MCH 21.5 (L) 26.0 - 34.0 pg    MCHC 27.9 (L) 32.0 - 36.0 g/dL    RDW      Platelets 321 150 - 450 x10*3/uL    Immature Granulocytes %, Automated 0.4 0.0 - 0.9 %    Immature Granulocytes Absolute, Automated 0.04 0.00 - 0.70 x10*3/uL   Manual Differential   Result Value Ref Range    Neutrophils %, Manual 87.9 40.0 - 80.0 %    Lymphocytes %, Manual 7.7 13.0 - 44.0 %    Monocytes %, Manual 2.6 2.0 - 10.0 %    Eosinophils %, Manual 0.9 0.0 - 6.0 %    Basophils %, Manual 0.0 0.0 - 2.0 %    Promyelocytes %, Manual 0.9 0.0 - 0.0 %    Seg Neutrophils Absolute, Manual 9.41 (H) 1.20 - 7.00 x10*3/uL    Lymphocytes Absolute, Manual 0.82 (L) 1.20 - 4.80 x10*3/uL    Monocytes Absolute, Manual 0.28 0.10 - 1.00 x10*3/uL    Eosinophils Absolute, Manual 0.10 0.00 - 0.70 x10*3/uL    Basophils Absolute, Manual 0.00 0.00 - 0.10 x10*3/uL    Promyelocytes Absolute, Manual 0.10 0.00 - 0.00 x10*3/uL    Total Cells Counted 116     RBC Morphology See Below      Hypochromia Mild      Bedside Midline Imaging    Result Date: 2/8/2024  These images are not reportable by radiology and will not be interpreted by  Radiologists.    Transthoracic Echo (TTE) Limited    Result Date: 2/7/2024   [facility], [address] [place] [address]                Tel [phone] and Fax [phone] TRANSTHORACIC ECHOCARDIOGRAM REPORT  Patient Name:     LYNDSAY CASTANEDA      Reading Physician:  [id number] Say Holt MD Study Date:       2/7/2024            Ordering Provider:  [id number] MATTI ORELLANA MRN/PID:          [id number]            Fellow: Accession#:       [id number]        Nurse:              Laxmi Grande RN Date of           1955 / 68      Sonographer:        Tyson Huffman RDCS Birth/Age:        years Gender:           F                   Additional Staff: Height:           149.86 cm           Admit Date:         2/5/2024 Weight:           89.81 kg            Admission Status:   Inpatient - Routine BSA:              1.84 m2             Encounter#:         [id number]                                       Department          [place] [address]                                       Location: Blood Pressure: 129 /80 mmHg Study Type:    TRANSTHORACIC ECHO (TTE) LIMITED Diagnosis/ICD: Saddle embolus of pulmonary artery without acute cor                pulmonale-I26.92 Indication:    saddle PE CPT Code:      Echo Limited-60077; Doppler Limited-88394; Color Doppler-04618  Study Detail: The following Echo studies were performed: 2D, M-Mode, Doppler and               color flow. Technically challenging study due to poor acoustic               windows and body habitus. Definity used as a contrast agent for               endocardial border definition. Total contrast used for this               procedure was 2.0 mL via IV push.  PHYSICIAN INTERPRETATION: Left Ventricle: The left ventricular systolic function is  normal, with an estimated ejection fraction of 60%. There are no regional wall motion abnormalities. The left ventricular cavity size is normal. Abnormal (paradoxical) septal motion, consistent with an intraventricular conduction delay. Left ventricular diastolic filling was not assessed. Left Atrium: The left atrium is normal in size. Right Ventricle: The right ventricle is normal in size. There is normal right ventricular global systolic function. Right Atrium: The right atrium is normal in size. Aortic Valve: The aortic valve was not well visualized. There is trivial aortic valve regurgitation. Mitral Valve: The mitral valve is normal in structure. There is trace mitral valve regurgitation. Tricuspid Valve: The tricuspid valve is structurally normal. There is trace tricuspid regurgitation. Pulmonic Valve: The pulmonic valve is not well visualized. There is trace pulmonic valve regurgitation. Pericardium: There is a trivial pericardial effusion. Aorta: The aortic root is normal. Systemic Veins: The inferior vena cava appears to be of normal size.  CONCLUSIONS:  1. Poorly visualized anatomical structures due to suboptimal image quality.  2. Left ventricular systolic function is normal with a 60% estimated ejection fraction. QUANTITATIVE DATA SUMMARY: LA VOLUME:                               Normal Ranges: LA Vol A4C:        63.0 ml    (22+/-6mL/m2) LA Vol A2C:        41.8 ml LA Vol BP:         51.7 ml LA Vol Index A4C:  34.3ml/m2 LA Vol Index A2C:  22.7 ml/m2 LA Vol Index BP:   28.1 ml/m2 LA Area A4C:       19.8 cm2 LA Area A2C:       16.0 cm2 LA Major Axis A4C: 5.3 cm LA Major Axis A2C: 5.2 cm LA Volume Index:   27.6 ml/m2 RA VOLUME BY A/L METHOD:                      Normal Ranges: RA Area A4C: 9.4 cm2 AORTA MEASUREMENTS:                      Normal Ranges: Ao Sinus, d: 3.20 cm (2.1-3.5cm) LV SYSTOLIC FUNCTION BY 2D PLANIMETRY (MOD):                     Normal Ranges: EF-A4C View: 72.6 % (>=55%) EF-A2C View: 71.1  % EF-Biplane:  70.7 %  RIGHT VENTRICLE: RV Basal 3.28 cm RV Mid   2.10 cm RV Major 7.1 cm TRICUSPID VALVE/RVSP:                   Normal Ranges: IVC Diam: 1.30 cm  [id number] Say Holt MD Electronically signed on 2/7/2024 at 10:12:31 AM  ** Final **      CT angio chest for pulmonary embolism 02/06/2024    Narrative  Interpreted By:  Elle Ceballos,  STUDY:  CT ANGIO CHEST FOR PULMONARY EMBOLISM;  2/6/2024 2:00 am    INDICATION:  Signs/Symptoms:elevated d-dimer, sob.    COMPARISON:  None.    ACCESSION NUMBER(S):  [id number]    ORDERING CLINICIAN:  SHERRY IDANA    TECHNIQUE:  Helical data acquisition of the chest was obtained after intravenous  administration of  64 mL of Omnipaque 350. Images were reformatted in  axial, coronal, and sagittal planes. Axial and coronal MIPS were  reconstructed and reviewed.    FINDINGS:  HEART AND VESSELS:  There is a saddle type filling defect which extends to bilateral  upper, right lower lobar and segmental branches consistent with acute  pulmonary embolus. Calculated RV to LV ratio of 1.0.    Main pulmonary artery is dilated up to 3.5 cm which can be seen with  pulmonary arterial hypertension.    The thoracic aorta is of normal course and caliber  without vascular  calcifications.    Mild coronary artery calcifications are seen.The study is not  optimized for evaluation of coronary arteries.    The cardiac chambers are not enlarged.    No evidence of pericardial effusion.    MEDIASTINUM AND JENNIFER, LOWER NECK AND AXILLA:  Slight heterogeneous thyroid gland.    No evidence of thoracic lymphadenopathy by CT criteria.    Large hiatal hernia with significant portion of the stomach  intrathoracic in location.    LUNGS AND AIRWAYS:  The trachea and central airways are patent. No endobronchial lesion.    Bibasilar atelectasis. No consolidation, effusion or pneumothorax.    UPPER ABDOMEN:  Status post cholecystectomy. Status post left adrenalectomy and left  partial nephrectomy. Partial  visualization of a 3.8 cm hypodense  lesion in the left upper quadrant likely arising from the left  kidney. This measures higher than simple fluid attenuation,  incompletely imaged. Subcentimeter hypodense focus in the right  hepatic lobe is too small to characterize but stable..    CHEST WALL AND OSSEOUS STRUCTURES:  Multilevel degenerative changes are present.    Impression  1. There is an acute saddle type pulmonary embolus extending to  bilateral upper and right lower lobar and segmental branches .  Calculated RV to LV ratio of 1.0.  2. Main pulmonary artery is dilated up to 3.5 cm which can be seen  with pulmonary arterial hypertension.  3. Large hiatal hernia with significant portion of the stomach  intrathoracic in location. There is adjacent compressive atelectasis.  4. 3.8 cm hypodense lesion in the left upper quadrant is incompletely  imaged, likely arising from the left kidney. This measures higher  than simple fluid attenuation. This can be further evaluated with  nonemergent renal ultrasound.  5. Additional findings as described above.      MACRO:  Elle Ceballos discussed the significance and urgency of this critical  finding by telephone with  Dr. Zamora On 2/6/2024 at 2:22 am.  (**-RCF-**) Findings:  See findings.    Signed by: Elle Ceballos 2/6/2024 2:26 AM  Dictation workstation:   [id number]      Assessment/Plan   Principal Problem:    Acute saddle pulmonary embolism without acute cor pulmonale (CMS/HCC)  Active Problems:    Renal cell carcinoma, unspecified laterality (CMS/HCC)    Anemia    Chronic kidney disease (CKD), stage III (moderate) (CMS/HCC)    Diabetes mellitus (CMS/HCC)    Esophageal reflux    Mixed hyperlipidemia    Abdominal hernia    Vania Andrews is a 68 y.o. 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  She had recent admission for acute on chronic anemia, dyspnea with chronic cough.   Admitted with acute saddle PE and RLE DVT.  HDS on RA     Update 2/9  - Large dark stool 2/8 w/o signs of balbina blood.  No further dark stools noted. H/H continues to downtrend  - GI consulted this morning d/t c/f GIB patient scheduled for capsule study 2/16,  - Hold on transitioning to DOAC d/t concern for GIB  - Q6 CBC     Neuro:   #Hx arthritis  - A&Ox3  - Acetaminophen 650mg tab q4 hrs PRN mild pain  - PT/OT eval     Optho:   #Hx Glaucoma  - Continue home eye drops     CV:   #Hx HTN, HLD, MI 2019  - Remains HDS   - Continue home: Amlodipine, Losartan, Metoprolol, and Rosuvastatin   - 02/07 Limited TTE: Poorly visualized anatomical structures due to suboptimal image quality. Left ventricular systolic function is normal with a 60% estimated ejection fraction. The right ventricle is normal in size. There is normal right ventricular global systolic function.   - Continue to monitor on tele     Pulm:   #Hx Asthma, MARYLU not on CPAP  #Acute saddle PE  - Remains stable on RA  - Continue Heparin gtt  - Continue home Spiriva, albuterol MDI, and Claritin   - Maintain SpO2 >92%    FEN/GI:   #Hx GERD and GI bleed 2019  - Continue PPI daily and Bentyl TID  - Diet: Regular Diet --> NPO pending GI recommendations   - Bowel Reg: Miralax daily   - Start Simethicone Q6 hours for ongoing abdominal cramping  - Large dark stool reported yesterday w/o signs of balbina blood.  Of note patient with hx of GI in 2019 and recent presentation to ED on 1/25/24 with anemia and c/f GI bleed.  Seen by GI at that time, not scoped as patient was HDS and hgb improved with 2 units PRBC.  Plan at  discharge was for outpatient Capsule study which is scheduled for 2/16.   - GI consulted, recs appreciated  - Up to date on Colon CA screening, next due 2029  - Daily RFP/Mag     Renal:   #Hx RCC, s/p partial nephrectomy and CKD (baseline ~1.2)  - sCr 1.41 today, 1.36 on admission   - Strict I's & O's and daily weight  - Caution with nephrotoxins and contrast dye     Heme:    #Hx VEDA and LLE DVT, stopped Eliquis in Dec per her outpt Vasc Med team  #Acute PE  #RLE acute DVT, distal fem & popliteal.  - Continue Heparin gtt  - Hbg 7.2 this morning, from 8.2  - Continue to monitor for s/s of bleeding   - 2/07 Serum Iron 74/TIBC 352  - 2/05 Duplex RLE: Occlusive thrombus in the right distal femoral and popliteal  veins.  - Follows with vascular medicine OP, Dr Spears.            > Hold on IV iron infusions.  If Hgb <9, can consider PO iron supp & bowel regimen.           > Dr Spears will also follow her outpt for her acute PE/DVT in addition to her anemia  - Anticipate transitioning to DOAC prior to discharge  - Maintain active T&S  - Q6 CBC     Endo:   #Hx DMII  - Holding home Trulicity (weekly)  - Continue SSI TID with meals and accu checks  - HgbA1C 7.1 July of last year     ID: no acute issues  - Remains Afebrile, with improving leukocytosis WBC 13 from 16.9   - Continue to monitor off Atx   - 1/25: flu A/B, Covid and RSV neg.  - Previous Covid hx (2023) per patient     PPx:  DVT: Heparin infusion   GI: PPI BID     Access: PIV xx, midline (2/8)     Code Status: FULL CODE   NOK: Bryce Corea (Spouse) [phone]     Dispo: Patient stable on heparin gtt and RA.  Appropriate for transfer to [facility] with tele.  Patient refusing transfer unless to private room, bed assignment aware.      Pt discussed with Dr. Dugan, seen and examined. All labs, VS and previous plan of care reviewed.    Jasper Giraldo, APRN-CNP

## 2024-02-09 NOTE — CARE PLAN
Problem: Pain  Goal: My pain/discomfort is manageable  Outcome: Progressing     Problem: Safety  Goal: Patient will be injury free during hospitalization  Outcome: Progressing  Goal: I will remain free of falls  Outcome: Progressing     Problem: Daily Care  Goal: Daily care needs are met  Outcome: Progressing     Problem: Psychosocial Needs  Goal: Demonstrates ability to cope with hospitalization/illness  Outcome: Progressing  Goal: Collaborate with me, my family, and caregiver to identify my specific goals  Outcome: Progressing     Problem: Discharge Barriers  Goal: My discharge needs are met  Outcome: Progressing     Problem: Skin  Goal: Decreased wound size/increased tissue granulation at next dressing change  Outcome: Progressing  Flowsheets (Taken 2/9/2024 0228)  Decreased wound size/increased tissue granulation at next dressing change:   Promote sleep for wound healing   Protective dressings over bony prominences   Utilize specialty bed per algorithm  Goal: Participates in plan/prevention/treatment measures  2/9/2024 0228 by Mirta Ulrich RN  Flowsheets (Taken 2/9/2024 0228)  Participates in plan/prevention/treatment measures:   Discuss with provider PT/OT consult   Elevate heels   Increase activity/out of bed for meals  2/9/2024 0228 by Mirta Ulrich RN  Outcome: Progressing  Flowsheets (Taken 2/9/2024 0228)  Participates in plan/prevention/treatment measures:   Discuss with provider PT/OT consult   Elevate heels   Increase activity/out of bed for meals  Goal: Prevent/manage excess moisture  2/9/2024 0228 by Mirta Ulrich RN  Flowsheets (Taken 2/9/2024 0228)  Prevent/manage excess moisture:   Cleanse incontinence/protect with barrier cream   Monitor for/manage infection if present   Follow provider orders for dressing changes   Use wicking fabric (obtain order)   Moisturize dry skin  2/9/2024 0228 by Mirta Ulrich RN  Outcome: Progressing  Flowsheets (Taken 2/9/2024 0228)  Prevent/manage excess  moisture:   Cleanse incontinence/protect with barrier cream   Monitor for/manage infection if present   Follow provider orders for dressing changes   Use wicking fabric (obtain order)   Moisturize dry skin  Goal: Prevent/minimize sheer/friction injuries  2/9/2024 0228 by Mirta Ulrich RN  Flowsheets (Taken 2/9/2024 0228)  Prevent/minimize sheer/friction injuries:   Complete micro-shifts as needed if patient unable. Adjust patient position to relieve pressure points, not a full turn   Increase activity/out of bed for meals   Use pull sheet   HOB 30 degrees or less   Turn/reposition every 2 hours/use positioning/transfer devices   Utilize specialty bed per algorithm  2/9/2024 0228 by Mirta Ulrich RN  Outcome: Progressing  Flowsheets (Taken 2/9/2024 0228)  Prevent/minimize sheer/friction injuries:   Complete micro-shifts as needed if patient unable. Adjust patient position to relieve pressure points, not a full turn   Increase activity/out of bed for meals   Use pull sheet   HOB 30 degrees or less   Turn/reposition every 2 hours/use positioning/transfer devices   Utilize specialty bed per algorithm  Goal: Promote/optimize nutrition  2/9/2024 0228 by Mirta Ulrich RN  Flowsheets (Taken 2/9/2024 0228)  Promote/optimize nutrition:   Assist with feeding   Monitor/record intake including meals   Consume > 50% meals/supplements   Offer water/supplements/favorite foods   Discuss with provider if NPO > 2 days   Reassess MST if dietician not consulted  2/9/2024 0228 by Mirta Ulrich RN  Outcome: Progressing  Flowsheets (Taken 2/9/2024 0228)  Promote/optimize nutrition:   Assist with feeding   Monitor/record intake including meals   Consume > 50% meals/supplements   Offer water/supplements/favorite foods   Discuss with provider if NPO > 2 days   Reassess MST if dietician not consulted  Goal: Promote skin healing  2/9/2024 0228 by Mirta Ulrich RN  Flowsheets (Taken 2/9/2024 0228)  Promote skin healing:   Assess  skin/pad under line(s)/device(s)   Protective dressings over bony prominences   Turn/reposition every 2 hours/use positioning/transfer devices   Ensure correct size (line/device) and apply per  instructions   Rotate device position/do not position patient on device  2/9/2024 0228 by Mirta Ulrich RN  Outcome: Progressing  Flowsheets (Taken 2/9/2024 0228)  Promote skin healing:   Assess skin/pad under line(s)/device(s)   Protective dressings over bony prominences   Turn/reposition every 2 hours/use positioning/transfer devices   Ensure correct size (line/device) and apply per  instructions   Rotate device position/do not position patient on device     Problem: Fall/Injury  Goal: Not fall by end of shift  Outcome: Progressing  Goal: Be free from injury by end of the shift  Outcome: Progressing  Goal: Verbalize understanding of personal risk factors for fall in the hospital  Outcome: Progressing  Goal: Verbalize understanding of risk factor reduction measures to prevent injury from fall in the home  Outcome: Progressing  Goal: Use assistive devices by end of the shift  Outcome: Progressing  Goal: Pace activities to prevent fatigue by end of the shift  Outcome: Progressing       The patient's goals for the shift include Be able to get out of bed, suction self and get on a better sleep regimen.     The clinical goals for the shift include Pt will remain hemodynamically stable throughout shift.

## 2024-02-09 NOTE — CONSULTS
"    [facility]   [facility]  INITIAL CONSULT NOTE     Source of Information: The source of the history was the patient and previous notes    Consult requested by: Service: internal medicine    Reason for Consult:     Patient with hx of GIB.  Admitted with Acute Saddle PE on heparin gtt, large dark stool yesterday with downtrending H/H.       Admission Chief Complaint: Acute saddle PE      SUBJECTIVE     HPI: Vania Andrews is a 68 y.o. 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 GI is consulted for dark stools and down-trending H/H.    Last month (~1/26) the patient was previously admitted after presenting for SOB and found to have a Hgb of 5.4. At that time she responded well to two units of blood (Hgb up to 9) and did not have any obvious signs of bleeding or instability therefore a capsule endoscopy was planned for 2/16. Currently, she presented for swelling in her right leg during a hematology follow up appointment and was found to have a saddle PE. She was started on heparin.    Yesterday, she reports cramping that felt like \"labor pains\" together with nausea and the patient was given miralax, a suppository and started on Simethicone that mildly improved her symptoms. She then had a large, dark watery stool without balbina blood. The patient reports that, although she has had dark stools in the past, this one was different to those. Notably she also takes iron pills (last taken Monday, " according to the patient) and feels that this was different to the changes that iron pills typically make to her stool. She denies any other signs or sources of bleeding including hematuria, hemoptysis, hematemesis or bright blood in her stool. She still endorses mild, diffuse abdominal pain.     EGD 1/2019 showed 9 cm hiatal hernia and gastritis, small gastric lipoma. Underwent capsule endoscopy done inpatient in 2019 and was negative (no official report available).   EGD 3/2023 with 6 cm hiatal hernia, multiple gastric fundic polyps. No celiac diease.   Colonoscopy 12/2011 with 5mm TA/internal hemorhoids, 1/2019 with 2mm polyp (pathology report non available).        ROS: Complete review of systems obtained, negative unless otherwise indicated above.     Allergies   Allergen Reactions    Adhesive Itching    Amoxicillin Hives and Itching    Cephalexin Itching and Nausea Only    Gadolinium-Containing Contrast Media Hives    Iodine Unknown    Latex Other    Pioglitazone Swelling    Rubella Virus Live Vaccine Unknown    Salicylates Other    Shellfish Derived Swelling    Sulfa (Sulfonamide Antibiotics) Unknown    Sulfamethoxazole-Trimethoprim Hives and Itching    Iodinated Contrast Media Itching and Rash     Past Medical History:   Diagnosis Date    Abdominal distension (gaseous) 07/31/2019    Abdominal bloating    Cancer (CMS/HCC)     Cataract     Coronary artery disease     Diabetes mellitus (CMS/HCC)     Diverticulosis of intestine, part unspecified, without perforation or abscess without bleeding 06/09/2013    Diverticulosis    Elevation of levels of liver transaminase levels 11/13/2020    Transaminitis    Encounter for follow-up examination after completed treatment for conditions other than malignant neoplasm 01/26/2019    Hospital discharge follow-up    Glaucoma     Hypertension     Long term (current) use of antibiotics 10/07/2016    Need for prophylactic antibiotic    Other amnesia 10/28/2014    Memory loss     Other conditions influencing health status 2019    History of cough    Other specified health status 2019    Hepatitis C antibody test negative    Other specified soft tissue disorders 2017    Leg swelling    Pain in left toe(s) 05/15/2017    Pain of toe of left foot    Pain in right foot 10/12/2016    Pain of right heel    Pain in right shoulder 2016    Acute pain of right shoulder    Personal history of diseases of the blood and blood-forming organs and certain disorders involving the immune mechanism 2018    History of anemia    Personal history of other diseases of the respiratory system 2018    History of sinusitis    Personal history of other diseases of the respiratory system 2014    History of upper respiratory infection    Personal history of other malignant neoplasm of kidney 2021    Personal history of malignant neoplasm of kidney    Personal history of other medical treatment 2017    History of screening mammography    Personal history of other specified conditions 2014    History of abdominal pain    Personal history of other specified conditions 2017    History of urinary frequency    Personal history of other specified conditions 2021    History of dysuria    Personal history of other specified conditions 2014    History of breast lump    Unspecified abdominal hernia without obstruction or gangrene 2014    Hernia     Past Surgical History:   Procedure Laterality Date    BREAST BIOPSY  2016    Biopsy Breast Percutaneous Needle Core     SECTION, CLASSIC  2014     Section    CHOLECYSTECTOMY  2017    Cholecystectomy Laparoscopic    HAND SURGERY  2020    Hand Surgery                                                                                                                                                          HERNIA REPAIR  2014    Hernia Repair    MR HEAD ANGIO WO IV  CONTRAST  11/17/2014    MR HEAD ANGIO WO IV CONTRAST 11/17/2014 [facility] [place] ANCILLARY LEGACY    OTHER SURGICAL HISTORY  01/14/2021    Nephrectomy    TOTAL ABDOMINAL HYSTERECTOMY W/ BILATERAL SALPINGOOPHORECTOMY  04/21/2014    Total Abdominal Hysterectomy With Removal Of Both Ovaries     Family History   Problem Relation Name Age of Onset    Aneurysm Mother      Hypertension Mother      Pancreatic cancer Mother      Diabetes Mother      Other (laryngeal Cancer) Mother      Heart disease Father      Pulmonary embolism Brother      Breast cancer Father's Sister      Breast cancer Maternal Cousin       Social History     Social History Narrative    Not on file     Medications:  Scheduled medications  [Held by provider] amLODIPine, 10 mg, oral, Daily  betamethasone (augmented), 1 Application, Topical, Daily  brimonidine, 1 drop, Both Eyes, BID  dicyclomine, 20 mg, oral, TID  famotidine, 20 mg, oral, Nightly  insulin lispro, 0-5 Units, subcutaneous, TID with meals  latanoprost, 1 drop, Both Eyes, q AM  loratadine, 10 mg, oral, Daily  [Held by provider] losartan, 25 mg, oral, Nightly  metoprolol succinate XL, 200 mg, oral, Daily  pantoprazole, 40 mg, intravenous, BID  polyethylene glycol, 17 g, oral, Daily  rosuvastatin, 20 mg, oral, Nightly  simethicone, 80 mg, oral, q6h  timolol, 1 drop, Both Eyes, BID  tiotropium, 2 Inhalation, inhalation, Daily      Continuous medications  heparin, 0-4,500 Units/hr, Last Rate: 700 Units/hr (02/09/24 0600)      PRN medications  PRN medications: acetaminophen, albuterol, benzonatate, dextrose 10 % in water (D10W), dextrose, glucagon, heparin, ondansetron       EXAM     Vital signs:  [unfilled]    Physical Exam  Constitutional:       General: She is not in acute distress.     Appearance: She is obese. She is not ill-appearing or diaphoretic.   HENT:      Head: Normocephalic and atraumatic.      Nose: Nose normal.   Eyes:      Extraocular Movements: Extraocular movements intact.       Conjunctiva/sclera: Conjunctivae normal.   Cardiovascular:      Rate and Rhythm: Normal rate and regular rhythm.   Pulmonary:      Effort: Pulmonary effort is normal.      Breath sounds: Normal breath sounds.   Abdominal:      General: Bowel sounds are normal. There is no distension.      Palpations: Abdomen is soft. There is no mass.      Tenderness: There is generalized abdominal tenderness.   Skin:     General: Skin is warm and dry.   Neurological:      General: No focal deficit present.      Mental Status: She is alert and oriented to person, place, and time.   Psychiatric:         Mood and Affect: Mood normal.         Behavior: Behavior normal.         Thought Content: Thought content normal.         Judgment: Judgment normal.         DATA                                                                            Labs     Lab Results   Component Value Date    WBC 10.7 02/09/2024    WBC 13.4 (H) 02/08/2024    WBC 13.3 (H) 02/08/2024    HGB 7.2 (L) 02/09/2024    HGB 7.8 (L) 02/08/2024    HGB 9.2 (L) 02/08/2024    MCV 77 (L) 02/09/2024    MCV 75 (L) 02/08/2024    MCV 79 (L) 02/08/2024     02/09/2024     02/08/2024     02/08/2024       Lab Results   Component Value Date    GLUCOSE 270 (H) 02/09/2024    CALCIUM 9.1 02/09/2024     02/09/2024    K 4.7 02/09/2024    CO2 25 02/09/2024     (H) 02/09/2024    BUN 24 (H) 02/09/2024    CREATININE 1.41 (H) 02/09/2024       Lab Results   Component Value Date    ALT 26 02/05/2024    ALT 29 02/05/2024    ALT 13 01/26/2024    AST 22 02/05/2024    AST 21 02/05/2024    AST 24 01/26/2024    GGT 33 12/23/2020    ALKPHOS 140 (H) 02/05/2024    ALKPHOS 157 (H) 02/05/2024    ALKPHOS 148 (H) 01/26/2024    BILITOT 0.4 02/05/2024    BILITOT 0.5 02/05/2024    BILITOT 1.5 (H) 01/26/2024                                                                                  Imaging           === 06/11/21 ===    US RENAL COMPLETE    - Impression -  Postsurgical changes  from partial left-sided nephrectomy with no  normal renal parenchymal tissue seen. There is interval enlargement  of previously seen cystic lesion presumably arising from the left  kidney remnant, measuring up to 3.5 x 4.3 x 2.8 cm on the current  exam, previously measuring 1.8 x 2.4 x 1.8 cm. Given the appearance  of septation and interval enlargement since comparison study  01/13/2021, renal mass protocol MRI is advised for further assessment.    Simple appearing right-sided renal cysts.  === 02/05/24 ===    CT ANGIO CHEST FOR PULMONARY EMBOLISM    - Impression -  1. There is an acute saddle type pulmonary embolus extending to  bilateral upper and right lower lobar and segmental branches .  Calculated RV to LV ratio of 1.0.  2. Main pulmonary artery is dilated up to 3.5 cm which can be seen  with pulmonary arterial hypertension.  3. Large hiatal hernia with significant portion of the stomach  intrathoracic in location. There is adjacent compressive atelectasis.  4. 3.8 cm hypodense lesion in the left upper quadrant is incompletely  imaged, likely arising from the left kidney. This measures higher  than simple fluid attenuation. This can be further evaluated with  nonemergent renal ultrasound.  5. Additional findings as described above.      MACRO:  Elle Ceballos discussed the significance and urgency of this critical  finding by telephone with  Dr. Zamora On 2/6/2024 at 2:22 am.  (**-RCF-**) Findings:  See findings.    Signed by: Elle Ceballos 2/6/2024 2:26 AM  Dictation workstation:   [id number]                                                                           GI Procedures   EGD  Order# [id number]  Reading physician: Adolph Bower MD Ordering physician: Alysha Garg MD Study date: 06/16/2023     Result Text    Result Text     Findings:       The Z-line was regular and was found 30 cm from the incisors.       The examined esophagus was normal.       A 6 cm hiatal hernia was present with both a  hiatal and smaller        paraesophageal component. Mucosa in the hernia sac was normal.       Multiple small sessile fundic gland polyps with no bleeding and no        stigmata of recent bleeding were found in the gastric body. Biopsies        were taken with a cold forceps for histology. The pathology specimen was        placed into Bottle Number C. Estimated blood loss was minimal.       A medium amount of food (residue) was found in the entire examined        stomach.       The exam of the stomach was otherwise normal. Biopsies were taken with a        cold forceps in the stomach for Helicobacter pylori testing. The        pathology specimen was placed into Bottle Number B. Estimated blood loss        was minimal.       The duodenal bulb and second portion of the duodenum were normal.        Biopsies for histology were taken with a cold forceps for evaluation of        celiac disease. The pathology specimen was placed into Bottle Number 1.        Estimated blood loss was minimal.  Moderate Sedation:       An independent trained observer was present and continuously monitored        the patient.       See anesthesia note for MAC sedation.  Impression:            - 6 cm hiatal hernia.                         - Multiple fundic gland polyps. Biopsied.                         - A medium amount of food (residue) in the stomach.                         - The examination was otherwise normal. Biopsies were                          taken with a cold forceps from stomach and duodenum.  Estimated Blood Loss:       Estimated blood loss was minimal.  Recommendation:        - Discharge patient to home (with escort).                         - Resume previous diet today.                         - Continue present medications.                         - The patient is not currently taking anticoagulant or                          antiplatelet agents.                         - Await pathology results.                         -  Follow up in GI clinic with Dr. Su Garg.       Colonoscopy  Order# [id number]  Reading physician: Demetria Godwin MD Ordering physician: Jose Luis Delatorre MD PhD Study date: 12/19/2022     Result Text    Result Text     Findings:       The terminal ileum appeared normal.       A 2 mm polyp was found in the rectum. The polyp was sessile. The polyp        was removed with a cold biopsy forceps. Resection and retrieval were        complete.       Many nonbleeding small-mouthed diverticula were found in the sigmoid        colon.       The exam was otherwise normal throughout the examined colon.       There is no endoscopic evidence of bleeding, inflammation, mass,        stricture, ulcerations, colitis or angiodysplasia in the entire colon.       The retroflexed view of the distal rectum and anal verge was normal and        showed no anal or rectal abnormalities.       The perianal and digital rectal examinations were normal.  Moderate Sedation:       Moderate (conscious) sedation was administered by the endoscopy nurse        and supervised by the endoscopist. The following parameters were        monitored: oxygen saturation, heart rate, blood pressure, and response        to care. Total physician intraservice time was 22 minutes.  Impression:            - The examined portion of the ileum was normal.                         - One 2 mm polyp in the rectum, removed with a cold                          biopsy forceps. Resected and retrieved.                         - Non-bleeding moderate diverticulosis in the sigmoid                          colon.                         - The colon was otherwise unremarkable. No                          ulcerations, AVMs, colitis or other obvious bleeding                          lesions.                         - The distal rectum and anal verge are normal on                          retroflexion view.     EGD  Order# [id number]  Reading physician: Frank Zafar MD Ordering  physician: Frank Dodge MD Study date: 12/19/2022     Result Text    Result Text     Findings:       The examined esophagus was normal.       The Z-line was regular and was found 33 cm from the incisors.       A 9 cm hiatal hernia was present. No Burke's lesions were seen.       Patchy mildly erythematous mucosa without bleeding was found in the        prepyloric region of the stomach.       A few diminutive sessile fundic gland polyps with no stigmata of recent        bleeding were found in the gastric body.       There were two small lipomas in the gastric antrum.       The duodenal bulb, second portion of the duodenum and third portion of        the duodenum were normal.       Using the endoscope, the video capsule enteroscope was advanced into the        second portion of the duodenum.  Moderate Sedation:       Moderate (conscious) sedation was administered by the endoscopy nurse        and supervised by the endoscopist. The following parameters were        monitored: oxygen saturation, heart rate, blood pressure, respiratory        rate, EKG, adequacy of pulmonary ventilation, and response to care.        Total physician intraservice time was 29 minutes.  Impression:            - Nine cm hiatal hernia. No Burke's lesions were                          seen.                         - Mildly erythematous mucosa in the prepyloric region                          of the stomach.                         - A few diminutive fundic gland polyps in the gastric                          body.                         - Two small gastric lipoma in the antrum.                         - Successful completion of the Video Capsule                          Enteroscope placement.                         - No specimens collected.       ASSESSMENT / PLAN                  Assessment and Recommendations:     Vania Andrews is a 68 y.o. 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 GI is consulted for dark stools and down-trending H/H.    # Dark Stool  # Anemia  :: Developed abdominal pain, nausea dark stools during current admission for PE and on heparin  :: Hbg 9.2 (2/8 am) -> 7.8 (2/8 pm) -> 7.2 (2/9 am) -> 7.5 (2/9 pm)  :: BUN 16 (2/6) -> 42 (2/8) -> 24 (2/9)  :: Recent past admission for SOB and anemia requiring blood transfusion. Not scoped at that time with plan for capsule endoscopy  :: EGD 1/2019 showed 9 cm hiatal hernia and gastritis, small gastric lipoma. Underwent capsule endoscopy done inpatient in 2019 and was negative (no official report available).  EGD 3/2023 with 6 cm hiatal hernia, multiple gastric fundic polyps. No celiac diease. Colonoscopy 12/2011 with 5mm TA/internal hemorhoids, 1/2019 with 2mm polyp (pathology report non available).   - New dark stools following anticoagulation with drop in Hgb and rise in BUN may be due to upper GI bleed, however BUN and Hgb stabilizing now.    Recommendations:  - EGD today revealed a small erosion and inflammatory folds with no evidence of bleeding.  Continue to monitor H/H and sign/symptoms of bleeding.   - Capsule endoscopy planned outpatient from previous admission. If Hgb continues to drop can do capsule endoscopy sooner.    NICOLE per primary team.   ------------------------------------------------------------------------  Jef Davis  MS4    After 5PM and on Weekends, please page on-call fellow.    To be discussed with service attending Dr. Bower  Final recommendations pending attending  attestation.

## 2024-02-10 LAB
ALBUMIN SERPL BCP-MCNC: 3 G/DL (ref 3.4–5)
ANION GAP SERPL CALC-SCNC: 11 MMOL/L (ref 10–20)
APTT PPP: 82 SECONDS (ref 27–38)
BUN SERPL-MCNC: 17 MG/DL (ref 6–23)
CALCIUM SERPL-MCNC: 9.3 MG/DL (ref 8.6–10.6)
CHLORIDE SERPL-SCNC: 110 MMOL/L (ref 98–107)
CO2 SERPL-SCNC: 25 MMOL/L (ref 21–32)
CREAT SERPL-MCNC: 1.46 MG/DL (ref 0.5–1.05)
EGFRCR SERPLBLD CKD-EPI 2021: 39 ML/MIN/1.73M*2
ERYTHROCYTE [DISTWIDTH] IN BLOOD BY AUTOMATED COUNT: ABNORMAL %
GLUCOSE BLD MANUAL STRIP-MCNC: 175 MG/DL (ref 74–99)
GLUCOSE BLD MANUAL STRIP-MCNC: 177 MG/DL (ref 74–99)
GLUCOSE BLD MANUAL STRIP-MCNC: 189 MG/DL (ref 74–99)
GLUCOSE BLD MANUAL STRIP-MCNC: 194 MG/DL (ref 74–99)
GLUCOSE BLD MANUAL STRIP-MCNC: 213 MG/DL (ref 74–99)
GLUCOSE SERPL-MCNC: 179 MG/DL (ref 74–99)
HCT VFR BLD AUTO: 27 % (ref 36–46)
HGB BLD-MCNC: 7.8 G/DL (ref 12–16)
MAGNESIUM SERPL-MCNC: 2.09 MG/DL (ref 1.6–2.4)
MCH RBC QN AUTO: 22.5 PG (ref 26–34)
MCHC RBC AUTO-ENTMCNC: 28.9 G/DL (ref 32–36)
MCV RBC AUTO: 78 FL (ref 80–100)
NRBC BLD-RTO: 0 /100 WBCS (ref 0–0)
PHOSPHATE SERPL-MCNC: 3.1 MG/DL (ref 2.5–4.9)
PLATELET # BLD AUTO: 337 X10*3/UL (ref 150–450)
POTASSIUM SERPL-SCNC: 4.3 MMOL/L (ref 3.5–5.3)
RBC # BLD AUTO: 3.46 X10*6/UL (ref 4–5.2)
SODIUM SERPL-SCNC: 142 MMOL/L (ref 136–145)
UFH PPP CHRO-ACNC: 0.4 IU/ML
WBC # BLD AUTO: 9.8 X10*3/UL (ref 4.4–11.3)

## 2024-02-10 PROCEDURE — 94640 AIRWAY INHALATION TREATMENT: CPT

## 2024-02-10 PROCEDURE — 99233 SBSQ HOSP IP/OBS HIGH 50: CPT | Performed by: INTERNAL MEDICINE

## 2024-02-10 PROCEDURE — 80069 RENAL FUNCTION PANEL: CPT | Performed by: NURSE PRACTITIONER

## 2024-02-10 PROCEDURE — 2500000002 HC RX 250 W HCPCS SELF ADMINISTERED DRUGS (ALT 637 FOR MEDICARE OP, ALT 636 FOR OP/ED): Mod: MUE | Performed by: NURSE PRACTITIONER

## 2024-02-10 PROCEDURE — 85730 THROMBOPLASTIN TIME PARTIAL: CPT

## 2024-02-10 PROCEDURE — 85027 COMPLETE CBC AUTOMATED: CPT

## 2024-02-10 PROCEDURE — 99232 SBSQ HOSP IP/OBS MODERATE 35: CPT | Performed by: PATHOLOGY

## 2024-02-10 PROCEDURE — 2500000002 HC RX 250 W HCPCS SELF ADMINISTERED DRUGS (ALT 637 FOR MEDICARE OP, ALT 636 FOR OP/ED)

## 2024-02-10 PROCEDURE — 2500000004 HC RX 250 GENERAL PHARMACY W/ HCPCS (ALT 636 FOR OP/ED): Performed by: NURSE PRACTITIONER

## 2024-02-10 PROCEDURE — 83735 ASSAY OF MAGNESIUM: CPT | Performed by: NURSE PRACTITIONER

## 2024-02-10 PROCEDURE — 2500000004 HC RX 250 GENERAL PHARMACY W/ HCPCS (ALT 636 FOR OP/ED)

## 2024-02-10 PROCEDURE — 2500000001 HC RX 250 WO HCPCS SELF ADMINISTERED DRUGS (ALT 637 FOR MEDICARE OP): Performed by: NURSE PRACTITIONER

## 2024-02-10 PROCEDURE — 1200000002 HC GENERAL ROOM WITH TELEMETRY DAILY

## 2024-02-10 PROCEDURE — C9113 INJ PANTOPRAZOLE SODIUM, VIA: HCPCS

## 2024-02-10 PROCEDURE — 85520 HEPARIN ASSAY: CPT

## 2024-02-10 PROCEDURE — 82947 ASSAY GLUCOSE BLOOD QUANT: CPT

## 2024-02-10 RX ADMIN — SIMETHICONE 80 MG: 20 SUSPENSION/ DROPS ORAL at 15:40

## 2024-02-10 RX ADMIN — SIMETHICONE 80 MG: 20 SUSPENSION/ DROPS ORAL at 01:54

## 2024-02-10 RX ADMIN — INSULIN LISPRO 1 UNITS: 100 INJECTION, SOLUTION INTRAVENOUS; SUBCUTANEOUS at 18:49

## 2024-02-10 RX ADMIN — FAMOTIDINE 20 MG: 20 TABLET, FILM COATED ORAL at 20:59

## 2024-02-10 RX ADMIN — TIMOLOL MALEATE 1 DROP: 5 SOLUTION/ DROPS OPHTHALMIC at 09:59

## 2024-02-10 RX ADMIN — PANTOPRAZOLE SODIUM 40 MG: 40 INJECTION, POWDER, FOR SOLUTION INTRAVENOUS at 10:00

## 2024-02-10 RX ADMIN — INSULIN LISPRO 1 UNITS: 100 INJECTION, SOLUTION INTRAVENOUS; SUBCUTANEOUS at 13:30

## 2024-02-10 RX ADMIN — SIMETHICONE 80 MG: 20 SUSPENSION/ DROPS ORAL at 20:57

## 2024-02-10 RX ADMIN — TIOTROPIUM BROMIDE INHALATION SPRAY 2 PUFF: 3.12 SPRAY, METERED RESPIRATORY (INHALATION) at 08:53

## 2024-02-10 RX ADMIN — METOPROLOL SUCCINATE 200 MG: 100 TABLET, EXTENDED RELEASE ORAL at 10:00

## 2024-02-10 RX ADMIN — DICYCLOMINE HYDROCHLORIDE 20 MG: 10 CAPSULE ORAL at 20:59

## 2024-02-10 RX ADMIN — ACETAMINOPHEN 650 MG: 325 TABLET ORAL at 05:55

## 2024-02-10 RX ADMIN — PANTOPRAZOLE SODIUM 40 MG: 40 INJECTION, POWDER, FOR SOLUTION INTRAVENOUS at 20:59

## 2024-02-10 RX ADMIN — POLYETHYLENE GLYCOL 3350 17 G: 17 POWDER, FOR SOLUTION ORAL at 10:00

## 2024-02-10 RX ADMIN — DICYCLOMINE HYDROCHLORIDE 20 MG: 10 CAPSULE ORAL at 15:40

## 2024-02-10 RX ADMIN — BRIMONIDINE TARTRATE 1 DROP: 2 SOLUTION/ DROPS OPHTHALMIC at 09:59

## 2024-02-10 RX ADMIN — SIMETHICONE 80 MG: 20 SUSPENSION/ DROPS ORAL at 09:59

## 2024-02-10 RX ADMIN — BRIMONIDINE TARTRATE 1 DROP: 2 SOLUTION/ DROPS OPHTHALMIC at 20:59

## 2024-02-10 RX ADMIN — INSULIN LISPRO 1 UNITS: 100 INJECTION, SOLUTION INTRAVENOUS; SUBCUTANEOUS at 10:00

## 2024-02-10 RX ADMIN — TIMOLOL MALEATE 1 DROP: 5 SOLUTION/ DROPS OPHTHALMIC at 20:59

## 2024-02-10 RX ADMIN — ROSUVASTATIN CALCIUM 20 MG: 20 TABLET, FILM COATED ORAL at 22:38

## 2024-02-10 RX ADMIN — DICYCLOMINE HYDROCHLORIDE 20 MG: 10 CAPSULE ORAL at 10:00

## 2024-02-10 RX ADMIN — BETAMETHASONE DIPROPIONATE 1 APPLICATION: 0.5 CREAM TOPICAL at 09:59

## 2024-02-10 RX ADMIN — LORATADINE 10 MG: 10 TABLET ORAL at 10:00

## 2024-02-10 ASSESSMENT — COGNITIVE AND FUNCTIONAL STATUS - GENERAL
MOBILITY SCORE: 18
HELP NEEDED FOR BATHING: A LITTLE
MOVING FROM LYING ON BACK TO SITTING ON SIDE OF FLAT BED WITH BEDRAILS: A LITTLE
CLIMB 3 TO 5 STEPS WITH RAILING: A LITTLE
WALKING IN HOSPITAL ROOM: A LITTLE
DRESSING REGULAR LOWER BODY CLOTHING: A LITTLE
TURNING FROM BACK TO SIDE WHILE IN FLAT BAD: A LITTLE
TOILETING: A LITTLE
EATING MEALS: A LITTLE
STANDING UP FROM CHAIR USING ARMS: A LITTLE
MOVING TO AND FROM BED TO CHAIR: A LITTLE
PERSONAL GROOMING: A LITTLE
DAILY ACTIVITIY SCORE: 18
DRESSING REGULAR UPPER BODY CLOTHING: A LITTLE

## 2024-02-10 ASSESSMENT — PAIN - FUNCTIONAL ASSESSMENT
PAIN_FUNCTIONAL_ASSESSMENT: 0-10
PAIN_FUNCTIONAL_ASSESSMENT: 0-10

## 2024-02-10 ASSESSMENT — PAIN SCALES - GENERAL
PAINLEVEL_OUTOF10: 5 - MODERATE PAIN
PAINLEVEL_OUTOF10: 8

## 2024-02-10 NOTE — PROGRESS NOTES
"Vania Andrews is a 69 y.o. female on day 4 of admission presenting with Acute saddle pulmonary embolism without acute cor pulmonale (CMS/HCC).    Subjective   Patient sitting up at bedside.  No chest pain or dyspnea currently.  Previously patient stated her significant symptom was dyspnea with exertion.  No current dizziness.      Objective     General: Sitting up in a chair without distress.  Cooperative.  Skin: No rashes ulcerations.  HEENT: Sclera is white.  Mucous membranes moist.  Neck: Supple.  No JVD.  Cardiac: Regular rate and rhythm, S1/S2 normal.  Lungs: Clear to auscultation bilaterally, no wheezing or crackles, no accessory muscle use at rest.  Abdomen: Soft, nontender, moderately obese abdomen, BS +  Extremities: No cyanosis.  Obesity versus edema, difficult to tell.  Neurologic: Alert and oriented x3.  No focal deficits.  Psychiatric: Appropriate mood and behavior.  Currently no agitation.    Last Recorded Vitals  Blood pressure 100/68, pulse 69, temperature 36.6 °C (97.9 °F), resp. rate 17, height 1.499 m (4' 11\"), weight 86.2 kg (190 lb), SpO2 99 %.     Intake/Output last 3 Shifts:  I/O last 3 completed shifts:  In: 296.2 (3.4 mL/kg) [I.V.:296.2 (3.4 mL/kg)]  Out: 450 (5.2 mL/kg) [Urine:450 (0.1 mL/kg/hr)]  Weight: 86.2 kg     Relevant Results  [Held by provider] amLODIPine, 10 mg, oral, Daily  betamethasone (augmented), 1 Application, Topical, Daily  brimonidine, 1 drop, Both Eyes, BID  dicyclomine, 20 mg, oral, TID  famotidine, 20 mg, oral, Nightly  insulin lispro, 0-5 Units, subcutaneous, TID with meals  latanoprost, 1 drop, Both Eyes, q AM  loratadine, 10 mg, oral, Daily  [Held by provider] losartan, 25 mg, oral, Nightly  metoprolol succinate XL, 200 mg, oral, Daily  pantoprazole, 40 mg, intravenous, BID  polyethylene glycol, 17 g, oral, Daily  rosuvastatin, 20 mg, oral, Nightly  simethicone, 80 mg, oral, q6h  timolol, 1 drop, Both Eyes, BID  tiotropium, 2 Inhalation, inhalation, Daily     "   heparin, 0-4,500 Units/hr, Last Rate: 700 Units/hr (02/09/24 0600)       PRN medications: acetaminophen, albuterol, benzonatate, dextrose 10 % in water (D10W), dextrose, glucagon, heparin, ondansetron     Results for orders placed or performed during the hospital encounter of 02/05/24 (from the past 24 hour(s))   POCT GLUCOSE   Result Value Ref Range    POCT Glucose 91 74 - 99 mg/dL   CBC   Result Value Ref Range    WBC 11.3 4.4 - 11.3 x10*3/uL    nRBC 0.0 0.0 - 0.0 /100 WBCs    RBC 3.64 (L) 4.00 - 5.20 x10*6/uL    Hemoglobin 8.1 (L) 12.0 - 16.0 g/dL    Hematocrit 28.3 (L) 36.0 - 46.0 %    MCV 78 (L) 80 - 100 fL    MCH 22.3 (L) 26.0 - 34.0 pg    MCHC 28.6 (L) 32.0 - 36.0 g/dL    RDW 31.2 (H) 11.5 - 14.5 %    Platelets 324 150 - 450 x10*3/uL   POCT GLUCOSE   Result Value Ref Range    POCT Glucose 256 (H) 74 - 99 mg/dL   CBC   Result Value Ref Range    WBC 9.8 4.4 - 11.3 x10*3/uL    nRBC 0.0 0.0 - 0.0 /100 WBCs    RBC 3.46 (L) 4.00 - 5.20 x10*6/uL    Hemoglobin 7.8 (L) 12.0 - 16.0 g/dL    Hematocrit 27.0 (L) 36.0 - 46.0 %    MCV 78 (L) 80 - 100 fL    MCH 22.5 (L) 26.0 - 34.0 pg    MCHC 28.9 (L) 32.0 - 36.0 g/dL    RDW      Platelets 337 150 - 450 x10*3/uL   Renal Function Panel   Result Value Ref Range    Glucose 179 (H) 74 - 99 mg/dL    Sodium 142 136 - 145 mmol/L    Potassium 4.3 3.5 - 5.3 mmol/L    Chloride 110 (H) 98 - 107 mmol/L    Bicarbonate 25 21 - 32 mmol/L    Anion Gap 11 10 - 20 mmol/L    Urea Nitrogen 17 6 - 23 mg/dL    Creatinine 1.46 (H) 0.50 - 1.05 mg/dL    eGFR 39 (L) >60 mL/min/1.73m*2    Calcium 9.3 8.6 - 10.6 mg/dL    Phosphorus 3.1 2.5 - 4.9 mg/dL    Albumin 3.0 (L) 3.4 - 5.0 g/dL   Magnesium   Result Value Ref Range    Magnesium 2.09 1.60 - 2.40 mg/dL   POCT GLUCOSE   Result Value Ref Range    POCT Glucose 189 (H) 74 - 99 mg/dL   Heparin Assay, UFH   Result Value Ref Range    Heparin Unfractionated 0.4 See Comment Below for Therapeutic Ranges IU/mL   aPTT - baseline   Result Value Ref Range     aPTT 82 (H) 27 - 38 seconds   POCT GLUCOSE   Result Value Ref Range    POCT Glucose 213 (H) 74 - 99 mg/dL      EGD    Result Date: 2/9/2024  Table formatting from the original result was not included. Findings Tortuous esophagus Regular Z-line 29 cm from the incisors Large 11 cm hiatal hernia Multiple sessile polyps measuring smaller than 5 mm in the hiatal hernia; no bleeding was identified. Appearances consistent with fundic gland polyps as noted on recent EGD. Linear erosion in the body of the stomach; no bleeding was identified; Scattered patchy erythema seen in body of stomach. No evidence of bleeding The duodenal bulb and 2nd part of the duodenum appeared normal. Recommendation  Follow up with primary care physician, Dr. Laura Mata.  Follow up with primary outpatient gastroenterologist, Dr. Alysha Garg. Follow up with Inpatient Medicine Service, Dr. Kasandra Dugan. Follow up with Inpatient GI Consult Service, Evelia Bower and Tammie Boyce.  Indication Symptomatic anemia Staff Staff Role Renzo Bender MD Proceduralist Medications fentaNYL PF (Sublimaze) injection 75 mcg midazolam (Versed) injection 3 mg (Totals for administrations occurring from 1602 to 1634 on 02/09/24) Preprocedure A history and physical has been performed, and patient medication allergies have been reviewed. The patient's tolerance of previous anesthesia has been reviewed. The risks and benefits of the procedure and the sedation options and risks were discussed with the patient. All questions were answered and informed consent obtained. Details of the Procedure The patient underwent moderate sedation, which was administered by the procedural nurse. The patient's blood pressure, ECG, ETCO2, heart rate, level of consciousness, oxygen and respirations were monitored throughout the procedure. The scope was introduced through the mouth and advanced to the second part of the duodenum. Retroflexion was  performed in the cardia. The patient experienced no blood loss. The procedure was not difficult. The patient tolerated the procedure well. There were no apparent adverse events. Events Procedure Events Event Event Time ENDO SCOPE IN TIME 2/9/2024  4:28 PM ENDO SCOPE OUT TIME 2/9/2024  4:33 PM Specimens No specimens collected Procedure Location [place] [id number] [phone] Referring Provider Kasandra Dugan MD PhD [address] [place] [address] Department [place] [address] Procedure Provider MD Tammie Benitez MD     Transthoracic Echo (TTE) Limited    Result Date: 2/7/2024   [facility], [address] [place] [address]                Tel [phone] and Fax [phone] TRANSTHORACIC ECHOCARDIOGRAM REPORT  Patient Name:     LYNDSAY CASTANEDA      Reading Physician:  [id number] Say Holt MD Study Date:       2/7/2024            Ordering Provider:  [id number] MATTI ORELLANA MRN/PID:          [id number]            Fellow: Accession#:       [id number]        Nurse:              Laxmi Grande RN Date of           1955 / 68      Sonographer:        Tyson Huffman RUST Birth/Age:        years Gender:           F                   Additional Staff: Height:           149.86 cm           Admit Date:         2/5/2024 Weight:           89.81 kg            Admission Status:   Inpatient - Routine BSA:              1.84 m2             Encounter#:         [id number]                                       Department          [place] [address]                                       Location: Blood Pressure: 129 /80 mmHg Study Type:    TRANSTHORACIC ECHO (TTE) LIMITED Diagnosis/ICD: Saddle embolus of pulmonary artery without acute cor                pulmonale-I26.92 Indication:    saddle PE CPT Code:      Echo Limited-31779; Doppler Limited-16717; Color  Doppler-95769  Study Detail: The following Echo studies were performed: 2D, M-Mode, Doppler and               color flow. Technically challenging study due to poor acoustic               windows and body habitus. Definity used as a contrast agent for               endocardial border definition. Total contrast used for this               procedure was 2.0 mL via IV push.  PHYSICIAN INTERPRETATION: Left Ventricle: The left ventricular systolic function is normal, with an estimated ejection fraction of 60%. There are no regional wall motion abnormalities. The left ventricular cavity size is normal. Abnormal (paradoxical) septal motion, consistent with an intraventricular conduction delay. Left ventricular diastolic filling was not assessed. Left Atrium: The left atrium is normal in size. Right Ventricle: The right ventricle is normal in size. There is normal right ventricular global systolic function. Right Atrium: The right atrium is normal in size. Aortic Valve: The aortic valve was not well visualized. There is trivial aortic valve regurgitation. Mitral Valve: The mitral valve is normal in structure. There is trace mitral valve regurgitation. Tricuspid Valve: The tricuspid valve is structurally normal. There is trace tricuspid regurgitation. Pulmonic Valve: The pulmonic valve is not well visualized. There is trace pulmonic valve regurgitation. Pericardium: There is a trivial pericardial effusion. Aorta: The aortic root is normal. Systemic Veins: The inferior vena cava appears to be of normal size.  CONCLUSIONS:  1. Poorly visualized anatomical structures due to suboptimal image quality.  2. Left ventricular systolic function is normal with a 60% estimated ejection fraction. QUANTITATIVE DATA SUMMARY: LA VOLUME:                               Normal Ranges: LA Vol A4C:        63.0 ml    (22+/-6mL/m2) LA Vol A2C:        41.8 ml LA Vol BP:         51.7 ml LA Vol Index A4C:  34.3ml/m2 LA Vol Index A2C:  22.7 ml/m2 LA Vol  Index BP:   28.1 ml/m2 LA Area A4C:       19.8 cm2 LA Area A2C:       16.0 cm2 LA Major Axis A4C: 5.3 cm LA Major Axis A2C: 5.2 cm LA Volume Index:   27.6 ml/m2 RA VOLUME BY A/L METHOD:                      Normal Ranges: RA Area A4C: 9.4 cm2 AORTA MEASUREMENTS:                      Normal Ranges: Ao Sinus, d: 3.20 cm (2.1-3.5cm) LV SYSTOLIC FUNCTION BY 2D PLANIMETRY (MOD):                     Normal Ranges: EF-A4C View: 72.6 % (>=55%) EF-A2C View: 71.1 % EF-Biplane:  70.7 %  RIGHT VENTRICLE: RV Basal 3.28 cm RV Mid   2.10 cm RV Major 7.1 cm TRICUSPID VALVE/RVSP:                   Normal Ranges: IVC Diam: 1.30 cm  [id number] Say Holt MD Electronically signed on 2/7/2024 at 10:12:31 AM  ** Final **     CT angio chest for pulmonary embolism    Result Date: 2/6/2024  Interpreted By:  Elle Ceballos, STUDY: CT ANGIO CHEST FOR PULMONARY EMBOLISM;  2/6/2024 2:00 am   INDICATION: Signs/Symptoms:elevated d-dimer, sob.   COMPARISON: None.   ACCESSION NUMBER(S): [id number]   ORDERING CLINICIAN: SHERRY DIANA   TECHNIQUE: Helical data acquisition of the chest was obtained after intravenous administration of  64 mL of Omnipaque 350. Images were reformatted in axial, coronal, and sagittal planes. Axial and coronal MIPS were reconstructed and reviewed.   FINDINGS: HEART AND VESSELS: There is a saddle type filling defect which extends to bilateral upper, right lower lobar and segmental branches consistent with acute pulmonary embolus. Calculated RV to LV ratio of 1.0.   Main pulmonary artery is dilated up to 3.5 cm which can be seen with pulmonary arterial hypertension.   The thoracic aorta is of normal course and caliber  without vascular calcifications.   Mild coronary artery calcifications are seen.The study is not optimized for evaluation of coronary arteries.   The cardiac chambers are not enlarged.   No evidence of pericardial effusion.   MEDIASTINUM AND JENNIFER, LOWER NECK AND AXILLA: Slight heterogeneous thyroid gland.   No  evidence of thoracic lymphadenopathy by CT criteria.   Large hiatal hernia with significant portion of the stomach intrathoracic in location.   LUNGS AND AIRWAYS: The trachea and central airways are patent. No endobronchial lesion.   Bibasilar atelectasis. No consolidation, effusion or pneumothorax.   UPPER ABDOMEN: Status post cholecystectomy. Status post left adrenalectomy and left partial nephrectomy. Partial visualization of a 3.8 cm hypodense lesion in the left upper quadrant likely arising from the left kidney. This measures higher than simple fluid attenuation, incompletely imaged. Subcentimeter hypodense focus in the right hepatic lobe is too small to characterize but stable..   CHEST WALL AND OSSEOUS STRUCTURES: Multilevel degenerative changes are present.       1. There is an acute saddle type pulmonary embolus extending to bilateral upper and right lower lobar and segmental branches . Calculated RV to LV ratio of 1.0. 2. Main pulmonary artery is dilated up to 3.5 cm which can be seen with pulmonary arterial hypertension. 3. Large hiatal hernia with significant portion of the stomach intrathoracic in location. There is adjacent compressive atelectasis. 4. 3.8 cm hypodense lesion in the left upper quadrant is incompletely imaged, likely arising from the left kidney. This measures higher than simple fluid attenuation. This can be further evaluated with nonemergent renal ultrasound. 5. Additional findings as described above.     MACRO: Elle Ceballos discussed the significance and urgency of this critical finding by telephone with  Dr. Zamora On 2/6/2024 at 2:22 am. (**-RCF-**) Findings:  See findings.   Signed by: Elle Ceballos 2/6/2024 2:26 AM Dictation workstation:   [id number]    Lower extremity venous duplex right    Result Date: 2/5/2024  Interpreted By:  Elle Ceballos,  and Mary Jo Palma STUDY: VASC US LOWER EXTREMITY VENOUS DUPLEX RIGHT; 2/5/2024 8:01 pm   INDICATION: Signs/Symptoms:RLE edema, elevated  dimer.   ACCESSION NUMBER(S): [id number]   ORDERING CLINICIAN: SHERRY DIANA   TECHNIQUE: Vascular ultrasound of the right lower extremity was performed. Real time compression views as well as Gray scale, color Doppler and spectral Doppler waveform analysis was performed.  This examination was interpreted at [facility].   FINDINGS: Evaluation of the visualized portions of the right common femoral vein, proximal, mid, and distal femoral vein, and popliteal vein were performed.  Evaluation of the visualized portions of the posterior tibial and peroneal veins were also performed.  In addition, evaluation of the contralateral common femoral vein was performed.   Limitations: None   The right distal femoral and popliteal vein are noncompressible with occlusive echogenic thrombus. No blood flow on Doppler evaluation.   Suboptimal visualization of the calf veins. The remaining evaluated veins demonstrate normal compressibility. There is intact venous flow demonstrating normal respiratory variability and normal augmentation of flow with calf compression.  No evidence of thrombus is seen within the contralateral common femoral vein.       There is occlusive thrombus in the right distal femoral and popliteal veins.   Poor visualization of the calf veins.   I personally reviewed the images/study and I agree with radiology resident Dr. Minerva Camargo's findings as stated. This study was interpreted at [place], [place].   MACRO: None   Signed by: Elle Ceballos 2/5/2024 8:27 PM Dictation workstation:   [id number]       Hospital Course:  Vania Andrews is a 68 y.o. 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  She had recent admission for acute on chronic anemia, dyspnea with chronic cough.   Patient was sent in from her hematologist office after she was evaluated for dyspnea and fatigue.  Her hematologist sent her to the ED with concern for potential PE.  Patient was not have acute saddle PE and new acute right lower extremity DVT.  Patient placed on IV heparin drip and admitted to stepdown unit.    Assessment/Plan     Acute saddle PE  Acute right lower extremity DVT  History of chronic left lower extremity thrombosis  -Patient reports that the thrombosis she had an [place] is her second episode.  She report previous episode of DVT in her lower extremities prior to the diagnosis in November.  -Reviewing ultrasound report in November patient was diagnosed with chronic DVT of the left lower extremity.  -Patient reports that she was on Eliquis in November for about 2 weeks, went to see vascular medicine as she had been recommended and was told due to history of GI bleed may be too risky and recommended to be on baby aspirin.  Patient reports she has been on baby aspirin since then prior to presenting to the hospital.  -Continue IV heparin drip and monitor for any signs of bleeding.  Clinically in the last 24 hours no further signs of bleeding.  -Hemoglobin stable at 7.8.  -Discussed with patient whether she wants to talk with vascular medicine about thrombosis issues or hematology which she also sees for her anemia issues.  Patient informed that both groups can manage thrombosis.  At this time patient states she wants me to consult hematology to address the issue.  She wishes to follow with Dr. Spears.  -Discussed with patient the plan will be to monitor on IV heparin drip and make sure she has no bleeding.  Once  we know that things are stable and no active bleeding will need to transition her to oral anticoagulation.  Informed the patient that debate will be between DOAC versus warfarin.  Even if patient is placed on Eliquis she can still have reversal with Andexxa.  When we have multiple options for reversal.  Pros and cons for either use, discussed with hematology team today.  Dr. Spears will be on service on Monday, will plan to consult hematology then as per discussion with the hematology team today.    GI bleed  History of GERD  -Status post EGD on 2/9.  No active bleeding found.  -Continue PPI twice a day.  -Monitor hemoglobin.    History of iron deficiency anemia  -Follows with Dr. Spears.  Patient states she also wishes to talk to her hematologist about thrombosis.  -Monitor hemoglobin.    Hypertension  Hyperlipidemia  Coronary artery disease with history of MI 2019  -Continue home: Holding Amlodipine and losartan due to bleeding and blood pressure low normal, continue metoprolol and Rosuvastatin.  -Systolic blood pressure currently 100s to 130s.  -02/07 Limited TTE: Poorly visualized anatomical structures due to suboptimal image quality. Left ventricular systolic function is normal with a 60% estimated ejection fraction. The right ventricle is normal in size. There is normal right ventricular global systolic function.   -Continue to monitor on tele.    Diabetes mellitus type 2  -Holding home Trulicity (weekly)  -Continue SSI TID with meals and accu checks  -HgbA1C 7.1 July of last year  -Currently fasting and postprandial glucose not ideal, but not significantly high.  Continue to monitor.      History of glaucoma  -Continue home eyedrops.  -Hospital does not have Rhopressa, patient advised to bring in her own medications.    History of osteoarthritis  -Avoid NSAIDs.  Tylenol as needed.    DVT prophylaxis  -SCDs.    Disposition: Continue monitoring hemoglobin and for signs of active bleeding on IV heparin drip.   Will discuss with hematology on Monday about DOAC versus warfarin.    Code Status: FULL CODE   NOK: Bryce Corea (Spouse) [phone]            Pedro Alvarado MD

## 2024-02-10 NOTE — NURSING NOTE
This RN attempted to obtain heparin assay. Unsuccessful x1. Pt refused another peripheral lab draw. Primary RN notified.

## 2024-02-10 NOTE — CARE PLAN
Problem: Skin  Goal: Participates in plan/prevention/treatment measures  Flowsheets (Taken 2/10/2024 0817)  Participates in plan/prevention/treatment measures: Increase activity/out of bed for meals     Problem: Skin  Goal: Prevent/manage excess moisture  Flowsheets (Taken 2/10/2024 0817)  Prevent/manage excess moisture: Moisturize dry skin     Problem: Safety  Goal: Patient will be injury free during hospitalization  Outcome: Progressing     Problem: Safety  Goal: I will remain free of falls  Outcome: Progressing   The patient's goals for the shift include Be able to get out of bed    The clinical goals for the shift include Patient will sit in a chair by the end of the shift    Patient alert and oriented x 4. Heparin assay therapeutic, heparin gtt running at 700 units/hr. Patient got up and out of bed to ambulate to bathroom and sat in a chair throughout the day. Insulin coverage provided based off of SS. VSS, Call light within reach. Sharmin Mariscal RN

## 2024-02-10 NOTE — CARE PLAN
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 Pt remained safe and free from injury this shift. Heparin drip for PE & DVT therapeutic. Nessa CBC pending.

## 2024-02-11 LAB
ERYTHROCYTE [DISTWIDTH] IN BLOOD BY AUTOMATED COUNT: 31.5 % (ref 11.5–14.5)
GLUCOSE BLD MANUAL STRIP-MCNC: 139 MG/DL (ref 74–99)
GLUCOSE BLD MANUAL STRIP-MCNC: 157 MG/DL (ref 74–99)
GLUCOSE BLD MANUAL STRIP-MCNC: 177 MG/DL (ref 74–99)
GLUCOSE BLD MANUAL STRIP-MCNC: 267 MG/DL (ref 74–99)
HCT VFR BLD AUTO: 30.4 % (ref 36–46)
HGB BLD-MCNC: 8.6 G/DL (ref 12–16)
MCH RBC QN AUTO: 22.3 PG (ref 26–34)
MCHC RBC AUTO-ENTMCNC: 28.3 G/DL (ref 32–36)
MCV RBC AUTO: 79 FL (ref 80–100)
NRBC BLD-RTO: 0 /100 WBCS (ref 0–0)
PLATELET # BLD AUTO: 340 X10*3/UL (ref 150–450)
RBC # BLD AUTO: 3.85 X10*6/UL (ref 4–5.2)
UFH PPP CHRO-ACNC: 0.3 IU/ML
WBC # BLD AUTO: 15.3 X10*3/UL (ref 4.4–11.3)

## 2024-02-11 PROCEDURE — 2500000002 HC RX 250 W HCPCS SELF ADMINISTERED DRUGS (ALT 637 FOR MEDICARE OP, ALT 636 FOR OP/ED): Mod: MUE | Performed by: INTERNAL MEDICINE

## 2024-02-11 PROCEDURE — 99233 SBSQ HOSP IP/OBS HIGH 50: CPT | Performed by: INTERNAL MEDICINE

## 2024-02-11 PROCEDURE — 2500000004 HC RX 250 GENERAL PHARMACY W/ HCPCS (ALT 636 FOR OP/ED)

## 2024-02-11 PROCEDURE — C9113 INJ PANTOPRAZOLE SODIUM, VIA: HCPCS

## 2024-02-11 PROCEDURE — 2500000001 HC RX 250 WO HCPCS SELF ADMINISTERED DRUGS (ALT 637 FOR MEDICARE OP): Performed by: INTERNAL MEDICINE

## 2024-02-11 PROCEDURE — 2500000002 HC RX 250 W HCPCS SELF ADMINISTERED DRUGS (ALT 637 FOR MEDICARE OP, ALT 636 FOR OP/ED): Performed by: NURSE PRACTITIONER

## 2024-02-11 PROCEDURE — 82947 ASSAY GLUCOSE BLOOD QUANT: CPT

## 2024-02-11 PROCEDURE — 85027 COMPLETE CBC AUTOMATED: CPT

## 2024-02-11 PROCEDURE — 1200000002 HC GENERAL ROOM WITH TELEMETRY DAILY

## 2024-02-11 PROCEDURE — 2500000002 HC RX 250 W HCPCS SELF ADMINISTERED DRUGS (ALT 637 FOR MEDICARE OP, ALT 636 FOR OP/ED)

## 2024-02-11 PROCEDURE — 85520 HEPARIN ASSAY: CPT

## 2024-02-11 PROCEDURE — 2500000004 HC RX 250 GENERAL PHARMACY W/ HCPCS (ALT 636 FOR OP/ED): Performed by: NURSE PRACTITIONER

## 2024-02-11 PROCEDURE — 94640 AIRWAY INHALATION TREATMENT: CPT

## 2024-02-11 PROCEDURE — 2500000001 HC RX 250 WO HCPCS SELF ADMINISTERED DRUGS (ALT 637 FOR MEDICARE OP): Performed by: NURSE PRACTITIONER

## 2024-02-11 RX ORDER — PANTOPRAZOLE SODIUM 40 MG/1
40 TABLET, DELAYED RELEASE ORAL
Status: DISCONTINUED | OUTPATIENT
Start: 2024-02-11 | End: 2024-02-17 | Stop reason: HOSPADM

## 2024-02-11 RX ORDER — INSULIN LISPRO 100 [IU]/ML
0-10 INJECTION, SOLUTION INTRAVENOUS; SUBCUTANEOUS
Status: DISCONTINUED | OUTPATIENT
Start: 2024-02-11 | End: 2024-02-17 | Stop reason: HOSPADM

## 2024-02-11 RX ADMIN — INSULIN LISPRO 1 UNITS: 100 INJECTION, SOLUTION INTRAVENOUS; SUBCUTANEOUS at 17:28

## 2024-02-11 RX ADMIN — LORATADINE 10 MG: 10 TABLET ORAL at 09:12

## 2024-02-11 RX ADMIN — METOPROLOL SUCCINATE 200 MG: 100 TABLET, EXTENDED RELEASE ORAL at 09:12

## 2024-02-11 RX ADMIN — BENZONATATE 200 MG: 100 CAPSULE ORAL at 09:12

## 2024-02-11 RX ADMIN — BRIMONIDINE TARTRATE 1 DROP: 2 SOLUTION/ DROPS OPHTHALMIC at 09:13

## 2024-02-11 RX ADMIN — HEPARIN SODIUM 700 UNITS/HR: 10000 INJECTION, SOLUTION INTRAVENOUS at 03:49

## 2024-02-11 RX ADMIN — TIMOLOL MALEATE 1 DROP: 5 SOLUTION/ DROPS OPHTHALMIC at 20:09

## 2024-02-11 RX ADMIN — DICYCLOMINE HYDROCHLORIDE 20 MG: 10 CAPSULE ORAL at 20:09

## 2024-02-11 RX ADMIN — INSULIN LISPRO 1 UNITS: 100 INJECTION, SOLUTION INTRAVENOUS; SUBCUTANEOUS at 09:13

## 2024-02-11 RX ADMIN — PANTOPRAZOLE SODIUM 40 MG: 40 TABLET, DELAYED RELEASE ORAL at 17:28

## 2024-02-11 RX ADMIN — ACETAMINOPHEN 650 MG: 325 TABLET ORAL at 18:35

## 2024-02-11 RX ADMIN — POLYETHYLENE GLYCOL 3350 17 G: 17 POWDER, FOR SOLUTION ORAL at 09:13

## 2024-02-11 RX ADMIN — FAMOTIDINE 20 MG: 20 TABLET, FILM COATED ORAL at 20:09

## 2024-02-11 RX ADMIN — TIOTROPIUM BROMIDE INHALATION SPRAY 2 PUFF: 3.12 SPRAY, METERED RESPIRATORY (INHALATION) at 09:09

## 2024-02-11 RX ADMIN — BRIMONIDINE TARTRATE 1 DROP: 2 SOLUTION/ DROPS OPHTHALMIC at 20:09

## 2024-02-11 RX ADMIN — ROSUVASTATIN CALCIUM 20 MG: 20 TABLET, FILM COATED ORAL at 20:11

## 2024-02-11 RX ADMIN — LATANOPROST 1 DROP: 50 SOLUTION/ DROPS OPHTHALMIC at 12:54

## 2024-02-11 RX ADMIN — SIMETHICONE 80 MG: 20 SUSPENSION/ DROPS ORAL at 02:41

## 2024-02-11 RX ADMIN — DICYCLOMINE HYDROCHLORIDE 20 MG: 10 CAPSULE ORAL at 14:46

## 2024-02-11 RX ADMIN — TIMOLOL MALEATE 1 DROP: 5 SOLUTION/ DROPS OPHTHALMIC at 09:13

## 2024-02-11 RX ADMIN — PANTOPRAZOLE SODIUM 40 MG: 40 INJECTION, POWDER, FOR SOLUTION INTRAVENOUS at 09:12

## 2024-02-11 RX ADMIN — SIMETHICONE 80 MG: 20 SUSPENSION/ DROPS ORAL at 14:46

## 2024-02-11 RX ADMIN — SIMETHICONE 80 MG: 20 SUSPENSION/ DROPS ORAL at 20:08

## 2024-02-11 RX ADMIN — BENZOCAINE AND MENTHOL 1 LOZENGE: 15; 3.6 LOZENGE ORAL at 14:46

## 2024-02-11 RX ADMIN — INSULIN LISPRO 3 UNITS: 100 INJECTION, SOLUTION INTRAVENOUS; SUBCUTANEOUS at 12:54

## 2024-02-11 RX ADMIN — ACETAMINOPHEN 650 MG: 325 TABLET ORAL at 11:17

## 2024-02-11 RX ADMIN — BENZOCAINE AND MENTHOL 1 LOZENGE: 15; 3.6 LOZENGE ORAL at 18:35

## 2024-02-11 RX ADMIN — DICYCLOMINE HYDROCHLORIDE 20 MG: 10 CAPSULE ORAL at 09:12

## 2024-02-11 ASSESSMENT — COGNITIVE AND FUNCTIONAL STATUS - GENERAL
MOVING TO AND FROM BED TO CHAIR: A LITTLE
PERSONAL GROOMING: A LITTLE
TURNING FROM BACK TO SIDE WHILE IN FLAT BAD: A LITTLE
STANDING UP FROM CHAIR USING ARMS: A LITTLE
TOILETING: A LITTLE
MOBILITY SCORE: 18
WALKING IN HOSPITAL ROOM: A LITTLE
DRESSING REGULAR LOWER BODY CLOTHING: A LITTLE
CLIMB 3 TO 5 STEPS WITH RAILING: A LITTLE
MOVING TO AND FROM BED TO CHAIR: A LITTLE
EATING MEALS: A LITTLE
CLIMB 3 TO 5 STEPS WITH RAILING: A LITTLE
MOVING FROM LYING ON BACK TO SITTING ON SIDE OF FLAT BED WITH BEDRAILS: A LITTLE
DAILY ACTIVITIY SCORE: 18
PERSONAL GROOMING: A LITTLE
DAILY ACTIVITIY SCORE: 18
TOILETING: A LITTLE
MOBILITY SCORE: 18
TURNING FROM BACK TO SIDE WHILE IN FLAT BAD: A LITTLE
STANDING UP FROM CHAIR USING ARMS: A LITTLE
DRESSING REGULAR UPPER BODY CLOTHING: A LITTLE
MOVING FROM LYING ON BACK TO SITTING ON SIDE OF FLAT BED WITH BEDRAILS: A LITTLE
HELP NEEDED FOR BATHING: A LITTLE
DRESSING REGULAR LOWER BODY CLOTHING: A LITTLE
EATING MEALS: A LITTLE
DRESSING REGULAR UPPER BODY CLOTHING: A LITTLE
HELP NEEDED FOR BATHING: A LITTLE
WALKING IN HOSPITAL ROOM: A LITTLE

## 2024-02-11 ASSESSMENT — PAIN SCALES - GENERAL
PAINLEVEL_OUTOF10: 0 - NO PAIN
PAINLEVEL_OUTOF10: 0 - NO PAIN
PAINLEVEL_OUTOF10: 2
PAINLEVEL_OUTOF10: 3

## 2024-02-11 ASSESSMENT — PAIN - FUNCTIONAL ASSESSMENT
PAIN_FUNCTIONAL_ASSESSMENT: 0-10

## 2024-02-11 ASSESSMENT — PAIN DESCRIPTION - LOCATION: LOCATION: HEAD

## 2024-02-11 NOTE — PROGRESS NOTES
"Vania Andrews is a 69 y.o. female on day 5 of admission presenting with Acute saddle pulmonary embolism without acute cor pulmonale (CMS/HCC).    Subjective   Patient sitting up at bedside. Sore throat. Denies any melena or hematochezia. No hematuria.      Objective     General: Sitting up in a chair without distress.  Cooperative.  Skin: No rashes ulcerations.  HEENT: Sclera is white.  Mucous membranes moist.  Neck: Supple.  No JVD.  Cardiac: Regular rate and rhythm, S1/S2 normal.  Lungs: Clear to auscultation bilaterally, no wheezing or crackles, no accessory muscle use at rest.  Abdomen: Soft, nontender, moderately obese abdomen, BS +  Extremities: No cyanosis.  Obesity versus edema, difficult to tell.  Neurologic: Alert and oriented x3.  No focal deficits.  Psychiatric: Appropriate mood and behavior.  Currently no agitation.     Last Recorded Vitals  Blood pressure 150/78, pulse 72, temperature 36.3 °C (97.3 °F), resp. rate 18, height 1.499 m (4' 11\"), weight 86.2 kg (190 lb), SpO2 98 %.     Intake/Output last 3 Shifts:  No intake/output data recorded.    Relevant Results  [Held by provider] amLODIPine, 10 mg, oral, Daily  betamethasone (augmented), 1 Application, Topical, Daily  brimonidine, 1 drop, Both Eyes, BID  dicyclomine, 20 mg, oral, TID  famotidine, 20 mg, oral, Nightly  insulin lispro, 0-5 Units, subcutaneous, TID with meals  latanoprost, 1 drop, Both Eyes, q AM  loratadine, 10 mg, oral, Daily  [Held by provider] losartan, 25 mg, oral, Nightly  metoprolol succinate XL, 200 mg, oral, Daily  pantoprazole, 40 mg, intravenous, BID  polyethylene glycol, 17 g, oral, Daily  rosuvastatin, 20 mg, oral, Nightly  simethicone, 80 mg, oral, q6h  timolol, 1 drop, Both Eyes, BID  tiotropium, 2 Inhalation, inhalation, Daily       heparin, 0-4,500 Units/hr, Last Rate: 700 Units/hr (02/11/24 0349)       PRN medications: acetaminophen, albuterol, benzocaine-menthol, benzonatate, dextrose 10 % in water (D10W), dextrose, " glucagon, heparin, ondansetron     Results for orders placed or performed during the hospital encounter of 02/05/24 (from the past 24 hour(s))   POCT GLUCOSE   Result Value Ref Range    POCT Glucose 177 (H) 74 - 99 mg/dL   POCT GLUCOSE   Result Value Ref Range    POCT Glucose 175 (H) 74 - 99 mg/dL   POCT GLUCOSE   Result Value Ref Range    POCT Glucose 194 (H) 74 - 99 mg/dL   CBC   Result Value Ref Range    WBC 15.3 (H) 4.4 - 11.3 x10*3/uL    nRBC 0.0 0.0 - 0.0 /100 WBCs    RBC 3.85 (L) 4.00 - 5.20 x10*6/uL    Hemoglobin 8.6 (L) 12.0 - 16.0 g/dL    Hematocrit 30.4 (L) 36.0 - 46.0 %    MCV 79 (L) 80 - 100 fL    MCH 22.3 (L) 26.0 - 34.0 pg    MCHC 28.3 (L) 32.0 - 36.0 g/dL    RDW 31.5 (H) 11.5 - 14.5 %    Platelets 340 150 - 450 x10*3/uL   POCT GLUCOSE   Result Value Ref Range    POCT Glucose 157 (H) 74 - 99 mg/dL   Heparin Assay, UFH   Result Value Ref Range    Heparin Unfractionated 0.3 See Comment Below for Therapeutic Ranges IU/mL   POCT GLUCOSE   Result Value Ref Range    POCT Glucose 267 (H) 74 - 99 mg/dL        Hospital Course:  Vania Andrews is a 68 y.o. 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  She had recent admission for acute on chronic anemia, dyspnea with chronic cough.   Patient was sent in from her hematologist office after she was evaluated for dyspnea and fatigue.  Her hematologist sent her to the ED with concern for potential PE.  Patient was not have acute saddle PE and new acute right lower extremity DVT.  Patient placed on IV heparin drip and  admitted to stepdown unit. Patient had melanotic stool and GI consulted. EGD done 2/9 without any active bleeding.     Assessment/Plan     Acute saddle PE  Acute right lower extremity DVT  History of chronic left lower extremity thrombosis  -Patient reports that the thrombosis she had an [place] is her second episode.  She report previous episode of DVT in her lower extremities prior to the diagnosis in November.   -Reviewing ultrasound report in November patient was diagnosed with chronic DVT of the left lower extremity.  -Patient reports that she was on Eliquis in November for about 2 weeks, went to see vascular medicine as she had been recommended and was told due to history of GI bleed may be too risky and recommended to be on baby aspirin.  Patient reports she has been on baby aspirin since then prior to presenting to the hospital.  -Continue IV heparin drip and monitor for any signs of bleeding.  Clinically in the last 48 hours no further signs of bleeding.  -Hemoglobin increased to 8.6 today.  -Discussed with patient whether she wants to talk with vascular medicine about thrombosis issues or hematology which she also sees for her anemia issues.  Patient informed that both groups can manage thrombosis.  At this time patient states she wants me to consult hematology to address the issue.  She wishes to follow with Dr. Spears.   -Discussed with patient the plan will be to monitor on IV heparin drip and make sure she has no bleeding.  Once we know that things are stable and no active bleeding will need to transition her to oral anticoagulation.  Informed the patient that debate will be between DOAC versus warfarin.  Even if patient is placed on Eliquis she can still have reversal with Andexxa.  Pros and cons for either use, discussed with hematology team today.  Dr. Spears will be on service on Monday, will plan to consult hematology then as per discussion with the hematology team on 2/12.    GI bleed  History  of GERD  -Status post EGD on 2/9.  No active bleeding found. GI recommends outpatient capsule endoscopy.  -Change PPI twice a day to oral.  -Monitor hemoglobin.    History of iron deficiency anemia  -Follows with Dr. Spears.  Patient states she also wishes to talk to her hematologist about thrombosis.  -Monitor hemoglobin.    Hypertension  Hyperlipidemia  Coronary artery disease with history of MI 2019  -Continue home: Holding Amlodipine and losartan due to bleeding and blood pressure low normal, continue metoprolol and Rosuvastatin.  -Systolic blood pressure currently 150s this morning. Will monitor to see if remains elevated.  -02/07 Limited TTE: Poorly visualized anatomical structures due to suboptimal image quality. Left ventricular systolic function is normal with a 60% estimated ejection fraction. The right ventricle is normal in size. There is normal right ventricular global systolic function.     Diabetes mellitus type 2  -Holding home Trulicity (weekly)  -Increase SSI to #2 scale TID with meals  -HgbA1C 7.1 July of last year  -Currently fasting and postprandial glucose not controlled.      History of glaucoma  -Continue home eyedrops.  -Hospital does not have Rhopressa, patient advised to bring in her own medications.    History of osteoarthritis  -Avoid NSAIDs.  Tylenol as needed.    DVT prophylaxis  -SCDs.    Disposition: Continue monitoring hemoglobin and for signs of active bleeding on IV heparin drip.  Will discuss with hematology on Monday about DOAC versus warfarin.    Code Status: FULL CODE   NOK: Bryce Corea (Spouse) [phone]            Pedro Alvarado MD

## 2024-02-11 NOTE — CARE PLAN
Problem: Safety  Goal: Patient will be injury free during hospitalization  Outcome: Progressing     Problem: Safety  Goal: I will remain free of falls  Outcome: Progressing     Problem: Skin  Goal: Promote skin healing  Outcome: Progressing  Flowsheets (Taken 2/11/2024 1102)  Promote skin healing: Turn/reposition every 2 hours/use positioning/transfer devices   The patient's goals for the shift include Be able to get out of bed    The clinical goals for the shift include Patient will have no paincomplains during this shift

## 2024-02-12 LAB
ERYTHROCYTE [DISTWIDTH] IN BLOOD BY AUTOMATED COUNT: 31.2 % (ref 11.5–14.5)
GLUCOSE BLD MANUAL STRIP-MCNC: 139 MG/DL (ref 74–99)
GLUCOSE BLD MANUAL STRIP-MCNC: 174 MG/DL (ref 74–99)
GLUCOSE BLD MANUAL STRIP-MCNC: 196 MG/DL (ref 74–99)
HCT VFR BLD AUTO: 29.9 % (ref 36–46)
HGB BLD-MCNC: 8.6 G/DL (ref 12–16)
MCH RBC QN AUTO: 22.5 PG (ref 26–34)
MCHC RBC AUTO-ENTMCNC: 28.8 G/DL (ref 32–36)
MCV RBC AUTO: 78 FL (ref 80–100)
NRBC BLD-RTO: 0 /100 WBCS (ref 0–0)
PLATELET # BLD AUTO: 334 X10*3/UL (ref 150–450)
RBC # BLD AUTO: 3.83 X10*6/UL (ref 4–5.2)
UFH PPP CHRO-ACNC: 0.3 IU/ML
WBC # BLD AUTO: 14.1 X10*3/UL (ref 4.4–11.3)

## 2024-02-12 PROCEDURE — 2500000002 HC RX 250 W HCPCS SELF ADMINISTERED DRUGS (ALT 637 FOR MEDICARE OP, ALT 636 FOR OP/ED): Performed by: NURSE PRACTITIONER

## 2024-02-12 PROCEDURE — 99233 SBSQ HOSP IP/OBS HIGH 50: CPT | Performed by: INTERNAL MEDICINE

## 2024-02-12 PROCEDURE — 99223 1ST HOSP IP/OBS HIGH 75: CPT | Performed by: STUDENT IN AN ORGANIZED HEALTH CARE EDUCATION/TRAINING PROGRAM

## 2024-02-12 PROCEDURE — 2500000002 HC RX 250 W HCPCS SELF ADMINISTERED DRUGS (ALT 637 FOR MEDICARE OP, ALT 636 FOR OP/ED): Mod: MUE | Performed by: INTERNAL MEDICINE

## 2024-02-12 PROCEDURE — 94640 AIRWAY INHALATION TREATMENT: CPT

## 2024-02-12 PROCEDURE — 2500000001 HC RX 250 WO HCPCS SELF ADMINISTERED DRUGS (ALT 637 FOR MEDICARE OP): Performed by: NURSE PRACTITIONER

## 2024-02-12 PROCEDURE — 1200000002 HC GENERAL ROOM WITH TELEMETRY DAILY

## 2024-02-12 PROCEDURE — 97110 THERAPEUTIC EXERCISES: CPT | Mod: GP,CQ

## 2024-02-12 PROCEDURE — 97116 GAIT TRAINING THERAPY: CPT | Mod: GP,CQ

## 2024-02-12 PROCEDURE — 2500000002 HC RX 250 W HCPCS SELF ADMINISTERED DRUGS (ALT 637 FOR MEDICARE OP, ALT 636 FOR OP/ED)

## 2024-02-12 PROCEDURE — 85027 COMPLETE CBC AUTOMATED: CPT

## 2024-02-12 PROCEDURE — 82947 ASSAY GLUCOSE BLOOD QUANT: CPT

## 2024-02-12 PROCEDURE — 2500000004 HC RX 250 GENERAL PHARMACY W/ HCPCS (ALT 636 FOR OP/ED): Performed by: NURSE PRACTITIONER

## 2024-02-12 PROCEDURE — 2500000004 HC RX 250 GENERAL PHARMACY W/ HCPCS (ALT 636 FOR OP/ED)

## 2024-02-12 PROCEDURE — 85520 HEPARIN ASSAY: CPT

## 2024-02-12 PROCEDURE — 2500000001 HC RX 250 WO HCPCS SELF ADMINISTERED DRUGS (ALT 637 FOR MEDICARE OP): Performed by: INTERNAL MEDICINE

## 2024-02-12 RX ORDER — POLYETHYLENE GLYCOL 3350 17 G/17G
17 POWDER, FOR SOLUTION ORAL DAILY PRN
Status: DISCONTINUED | OUTPATIENT
Start: 2024-02-12 | End: 2024-02-17 | Stop reason: HOSPADM

## 2024-02-12 RX ADMIN — TIMOLOL MALEATE 1 DROP: 5 SOLUTION/ DROPS OPHTHALMIC at 21:00

## 2024-02-12 RX ADMIN — TIOTROPIUM BROMIDE INHALATION SPRAY 2 PUFF: 3.12 SPRAY, METERED RESPIRATORY (INHALATION) at 14:16

## 2024-02-12 RX ADMIN — LORATADINE 10 MG: 10 TABLET ORAL at 09:27

## 2024-02-12 RX ADMIN — HEPARIN SODIUM 700 UNITS/HR: 10000 INJECTION, SOLUTION INTRAVENOUS at 10:41

## 2024-02-12 RX ADMIN — BRIMONIDINE TARTRATE 1 DROP: 2 SOLUTION/ DROPS OPHTHALMIC at 09:28

## 2024-02-12 RX ADMIN — ACETAMINOPHEN 650 MG: 325 TABLET ORAL at 20:49

## 2024-02-12 RX ADMIN — BENZOCAINE AND MENTHOL 1 LOZENGE: 15; 3.6 LOZENGE ORAL at 18:37

## 2024-02-12 RX ADMIN — POLYETHYLENE GLYCOL 3350 17 G: 17 POWDER, FOR SOLUTION ORAL at 09:27

## 2024-02-12 RX ADMIN — TIMOLOL MALEATE 1 DROP: 5 SOLUTION/ DROPS OPHTHALMIC at 09:28

## 2024-02-12 RX ADMIN — METOPROLOL SUCCINATE 200 MG: 100 TABLET, EXTENDED RELEASE ORAL at 09:27

## 2024-02-12 RX ADMIN — ROSUVASTATIN CALCIUM 20 MG: 20 TABLET, FILM COATED ORAL at 20:49

## 2024-02-12 RX ADMIN — FAMOTIDINE 20 MG: 20 TABLET, FILM COATED ORAL at 20:49

## 2024-02-12 RX ADMIN — SIMETHICONE 80 MG: 20 SUSPENSION/ DROPS ORAL at 09:28

## 2024-02-12 RX ADMIN — DICYCLOMINE HYDROCHLORIDE 20 MG: 10 CAPSULE ORAL at 20:49

## 2024-02-12 RX ADMIN — BRIMONIDINE TARTRATE 1 DROP: 2 SOLUTION/ DROPS OPHTHALMIC at 21:00

## 2024-02-12 RX ADMIN — SIMETHICONE 80 MG: 20 SUSPENSION/ DROPS ORAL at 02:14

## 2024-02-12 RX ADMIN — DICYCLOMINE HYDROCHLORIDE 20 MG: 10 CAPSULE ORAL at 09:27

## 2024-02-12 RX ADMIN — BETAMETHASONE DIPROPIONATE 1 APPLICATION: 0.5 CREAM TOPICAL at 09:28

## 2024-02-12 RX ADMIN — PANTOPRAZOLE SODIUM 40 MG: 40 TABLET, DELAYED RELEASE ORAL at 06:36

## 2024-02-12 RX ADMIN — DICYCLOMINE HYDROCHLORIDE 20 MG: 10 CAPSULE ORAL at 15:32

## 2024-02-12 RX ADMIN — PANTOPRAZOLE SODIUM 40 MG: 40 TABLET, DELAYED RELEASE ORAL at 15:32

## 2024-02-12 RX ADMIN — BENZONATATE 200 MG: 100 CAPSULE ORAL at 18:37

## 2024-02-12 RX ADMIN — ALBUTEROL SULFATE 2 PUFF: 90 AEROSOL, METERED RESPIRATORY (INHALATION) at 09:28

## 2024-02-12 RX ADMIN — INSULIN LISPRO 2 UNITS: 100 INJECTION, SOLUTION INTRAVENOUS; SUBCUTANEOUS at 15:33

## 2024-02-12 RX ADMIN — LATANOPROST 1 DROP: 50 SOLUTION/ DROPS OPHTHALMIC at 12:00

## 2024-02-12 ASSESSMENT — COGNITIVE AND FUNCTIONAL STATUS - GENERAL
WALKING IN HOSPITAL ROOM: A LITTLE
MOVING FROM LYING ON BACK TO SITTING ON SIDE OF FLAT BED WITH BEDRAILS: A LITTLE
TURNING FROM BACK TO SIDE WHILE IN FLAT BAD: A LOT
WALKING IN HOSPITAL ROOM: A LITTLE
PERSONAL GROOMING: A LITTLE
DAILY ACTIVITIY SCORE: 18
DRESSING REGULAR UPPER BODY CLOTHING: A LITTLE
MOVING FROM LYING ON BACK TO SITTING ON SIDE OF FLAT BED WITH BEDRAILS: A LITTLE
TURNING FROM BACK TO SIDE WHILE IN FLAT BAD: A LITTLE
HELP NEEDED FOR BATHING: A LITTLE
MOVING TO AND FROM BED TO CHAIR: A LITTLE
CLIMB 3 TO 5 STEPS WITH RAILING: A LITTLE
STANDING UP FROM CHAIR USING ARMS: A LITTLE
STANDING UP FROM CHAIR USING ARMS: A LITTLE
MOBILITY SCORE: 18
MOVING TO AND FROM BED TO CHAIR: A LITTLE
EATING MEALS: A LITTLE
TOILETING: A LITTLE
CLIMB 3 TO 5 STEPS WITH RAILING: TOTAL
DRESSING REGULAR LOWER BODY CLOTHING: A LITTLE
MOBILITY SCORE: 15

## 2024-02-12 ASSESSMENT — PAIN - FUNCTIONAL ASSESSMENT
PAIN_FUNCTIONAL_ASSESSMENT: 0-10

## 2024-02-12 ASSESSMENT — PAIN SCALES - GENERAL
PAINLEVEL_OUTOF10: 0 - NO PAIN

## 2024-02-12 ASSESSMENT — ACTIVITIES OF DAILY LIVING (ADL): LACK_OF_TRANSPORTATION: NO

## 2024-02-12 NOTE — NURSING NOTE
END OF SHIFT NOTE     Patient remained safe and free from falls during the shift.  Patient has been pleasant and cooperative.  The patients has not complained of pain.  Patient is missing a few credit/debit cards and cash that she kept in her hospital gown pocket.  Patient Advocate went down to  PD and the Laundry dept. The patient belongings if recovered will be taken to  PD either Wednesday or Friday.   Staff member will have to go down and check if items arrived.  PD will not call up to the unit if they receive them.  Patient refused HS insulin. MD notified.  Call light within reach.  No other events occurred throughout shift.   Nurse to nurse report was given to RN.  RN will continue to monitor.     Manohar Chauhan,

## 2024-02-12 NOTE — PROGRESS NOTES
Recreation Therapy Note    Therapy Session  Visit Type: New visit  Session Start Time: 1700  Session End Time: 1740  Intervention Delivery: In-person  Conflict of Service: None  Number of family members present: 1  Family Present for Session: Spouse/Significant Other  Family Participation: Supportive  Number of staff members present: 1    Pre-assessment  Unable to Assess Reason: Outcomes not applicable  Mood/Affect: Appropriate, Confused, Calm  Verbalized Emotional State:  (none verbalized)    Treatment  Areas of Focus: Coping, Self-expression, Normalization, Socialization, Stress reduction  Co-Treatment:  (none)  Interruption: No  Patient Fell Asleep at End of Session: No    Post-assessment  Unable to Assess Reason: Outcomes not applicable  Mood/Affect: Appropriate, Calm, Confused  Verbalized Emotional State:  (none verbalized)  Total Session Time (min): 40 minutes    Narrative  Assessment Detail: Patient was laying in bed upon approach and expressed interest in engaging in a recreational therapy session.  Plan: To encourage the exploration of safe and effective positive leisure coping skills to manage situational stressors.  Intervention: Patient chose to work on a sand art project.  Evaluation: Patient was pleasant, social and polite.  At the beginning of the session asked if we had added all these items (pointing to her surroundings) since she fell asleep.  Throughout the session occasionally looked around and stated that she didn't quite know where she was when she woke up but knew that her  had been in the room and was gone.   returned towards the end of the session.  Follow-up: Will continue to encourage positive leisure coping skills exploration.  Patient Comments: See above.      Vania Andrews is a 68 y.o. female w/PMH of HTN, HLD, DMT2, CAD, MI (2019),  DVT (2020) previously on AC, no longer on Eliquis,  CKD stage 3 d/t loss of nephron mass, L RCC, papillary, type 1 and Lt adrenal cortical  adenoma s/p L partial nephrectomy and L adrenalectomy (2007), asthma, MARYLU (not using CPAP), s/p hysterectomy and b/l oophorectomy, chronic VEDA s/p iron infusions with resolution of anemia as of May 2021, partially obstructed Ross's hernia with sx ( Feb 2022 with plan for surgical correction) admitted for acute saddle PE and RLE DVT, on heparin. She is getting evaluated with EGD for suspected dark stools and acute on chronic anemia/

## 2024-02-12 NOTE — PROGRESS NOTES
"Vania Andrews is a 69 y.o. female on day 6 of admission presenting with Acute saddle pulmonary embolism without acute cor pulmonale (CMS/HCC).    Subjective   Patient sitting up at bedside. Denies any melena or hematochezia. No hematuria. No vomiting.      Objective     General: Sitting up in a chair without distress.  Cooperative. Moderately obese.  Skin: No rashes or ulcerations.  HEENT: Sclera is white.  Mucous membranes moist.  Neck: Supple.  No JVD.  Cardiac: Regular rate and rhythm, S1/S2 normal.  Lungs: Clear to auscultation bilaterally, no wheezing or crackles, no accessory muscle use at rest.  Abdomen: Soft, nontender, moderately obese abdomen, BS +  Extremities: No cyanosis.  Obesity versus edema, difficult to tell.  Neurologic: Alert and oriented x3.  No focal deficits.  Psychiatric: Appropriate mood and behavior.  Currently no agitation.     Last Recorded Vitals  Blood pressure 138/77, pulse 72, temperature 36.6 °C (97.9 °F), resp. rate 18, height 1.499 m (4' 11\"), weight 86.2 kg (190 lb), SpO2 98 %. On room air.    Intake/Output last 3 Shifts:  No intake/output data recorded.    Relevant Results  [Held by provider] amLODIPine, 10 mg, oral, Daily  betamethasone (augmented), 1 Application, Topical, Daily  brimonidine, 1 drop, Both Eyes, BID  dicyclomine, 20 mg, oral, TID  famotidine, 20 mg, oral, Nightly  insulin lispro, 0-10 Units, subcutaneous, TID with meals  latanoprost, 1 drop, Both Eyes, q AM  loratadine, 10 mg, oral, Daily  [Held by provider] losartan, 25 mg, oral, Nightly  metoprolol succinate XL, 200 mg, oral, Daily  pantoprazole, 40 mg, oral, BID AC  polyethylene glycol, 17 g, oral, Daily  rosuvastatin, 20 mg, oral, Nightly  simethicone, 80 mg, oral, q6h  timolol, 1 drop, Both Eyes, BID  tiotropium, 2 Inhalation, inhalation, Daily       heparin, 0-4,500 Units/hr, Last Rate: 700 Units/hr (02/11/24 0349)       PRN medications: acetaminophen, albuterol, benzocaine-menthol, benzonatate, dextrose 10 " % in water (D10W), dextrose, glucagon, heparin, ondansetron     Results for orders placed or performed during the hospital encounter of 02/05/24 (from the past 24 hour(s))   POCT GLUCOSE   Result Value Ref Range    POCT Glucose 267 (H) 74 - 99 mg/dL   POCT GLUCOSE   Result Value Ref Range    POCT Glucose 177 (H) 74 - 99 mg/dL   POCT GLUCOSE   Result Value Ref Range    POCT Glucose 139 (H) 74 - 99 mg/dL   CBC   Result Value Ref Range    WBC 14.1 (H) 4.4 - 11.3 x10*3/uL    nRBC 0.0 0.0 - 0.0 /100 WBCs    RBC 3.83 (L) 4.00 - 5.20 x10*6/uL    Hemoglobin 8.6 (L) 12.0 - 16.0 g/dL    Hematocrit 29.9 (L) 36.0 - 46.0 %    MCV 78 (L) 80 - 100 fL    MCH 22.5 (L) 26.0 - 34.0 pg    MCHC 28.8 (L) 32.0 - 36.0 g/dL    RDW 31.2 (H) 11.5 - 14.5 %    Platelets 334 150 - 450 x10*3/uL   POCT GLUCOSE   Result Value Ref Range    POCT Glucose 139 (H) 74 - 99 mg/dL        Hospital Course:  Vania Andrews is a 68 y.o. 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  She had recent admission for acute on chronic anemia, dyspnea with chronic cough.   Patient was sent in from her hematologist office after she was evaluated for dyspnea and fatigue.  Her hematologist sent her to the ED with concern for potential PE.  Patient was not have acute saddle PE and new acute right lower extremity DVT.  Patient placed on IV heparin drip and admitted to stepdown unit. Patient had melanotic stool and GI consulted. EGD done 2/9 without any active bleeding. Discussed with patient who she wants to manage her  anticoagulation and she has requested her Hematologist.    Assessment/Plan     Acute saddle PE  Acute right lower extremity DVT  History of chronic left lower extremity thrombosis  -Patient reports that the thrombosis she had [facility] is her second episode.  She report previous episode of DVT in her lower extremities prior to the diagnosis in November.   -Reviewing ultrasound report in November patient was diagnosed with chronic DVT of the left lower extremity.  -Patient reports that she was on Eliquis in November for about 2 weeks, went to see vascular medicine as she had been recommended and was told due to history of GI bleed may be too risky and recommended to be on baby aspirin.  Patient reports she has been on baby aspirin since then prior to presenting to the hospital.  -Continue IV heparin drip and monitor for any signs of bleeding.  Clinically in the last 72 hours no further signs of bleeding.  -Hemoglobin stable at 8.6 today.  -Discussed with patient whether she wants to talk with vascular medicine about thrombosis issues or hematology which she also sees for her anemia issues.  Patient informed that both groups can manage thrombosis.  At this time patient states she wants to consult hematology to address the issue.  She wishes to follow with Dr. Spears.   -Discussed with patient the plan will be to monitor on IV heparin drip and make sure she has no bleeding.  Once we know that things are stable and no active bleeding will need to transition her to oral anticoagulation.  Informed the patient that debate will be between DOAC versus warfarin.  Even if patient is placed on Eliquis she can still have reversal with Andexxa.  Pros and cons for either use, discussed with hematology team today.  Dr. Spears will be on service on Monday, consult placed. Await further Hematology recs.    GI bleed  History of GERD  -Status post EGD on 2/9.  No active bleeding found. GI recommends outpatient capsule  endoscopy.  -Continue PPI twice a day.  -Monitor hemoglobin.    History of iron deficiency anemia  -Follows with Dr. Spears.  Patient states she also wishes to talk to her hematologist about thrombosis.  -Monitor hemoglobin.  -Hgb stable at 8.6.    Hypertension  Hyperlipidemia  Coronary artery disease with history of MI 2019  -Continue Holding Amlodipine and losartan due to bleeding and blood pressure low normal, continue metoprolol and Rosuvastatin.  -Systolic blood pressure currently 130s - 140's this morning. Continue ot monitor.  -02/07 Limited TTE: Poorly visualized anatomical structures due to suboptimal image quality. Left ventricular systolic function is normal with a 60% estimated ejection fraction. The right ventricle is normal in size. There is normal right ventricular global systolic function.     Diabetes mellitus type 2  -Holding home Trulicity (weekly)  -Continue SSI to #2 scale TID with meals  -HgbA1C 7.1 July of last year  -Currently fasting and postprandial glucose not controlled.      History of glaucoma  -Continue home eyedrops.  -Hospital does not have Rhopressa, patient advised to bring in her own medications.    History of osteoarthritis  -Avoid NSAIDs.  Tylenol as needed.    DVT prophylaxis  -SCDs.    Disposition: Continue monitoring hemoglobin and for signs of active bleeding on IV heparin drip.  Will discuss with hematology  about DOAC versus warfarin.    Code Status: FULL CODE   NOK: Bryce Corea (Spouse) [phone]            Pedro Alvarado MD

## 2024-02-12 NOTE — PROGRESS NOTES
02/12/24 1404   Discharge Planning   Living Arrangements Spouse/significant other   Support Systems Spouse/significant other   Assistance Needed No   Type of Residence Private residence   Number of Stairs to Enter Residence 5   Number of Stairs Within Residence 11   Do you have animals or pets at home? No   Who is requesting discharge planning? 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 No  (Spouse will pick patient up when medically ready.)   Financial Resource Strain   How hard is it for you to pay for the very basics like food, housing, medical care, and heating? Not hard   Housing Stability   In the last 12 months, was there a time when you were not able to pay the mortgage or rent on time? N   In the last 12 months, how many places have you lived? 1   In the last 12 months, was there a time when you did not have a steady place to sleep or slept in a shelter (including now)? N   Transportation Needs   In the past 12 months, has lack of transportation kept you from medical appointments or from getting medications? no   In the past 12 months, has lack of transportation kept you from meetings, work, or from getting things needed for daily living? No   Patient Choice   Patient / Family choosing to utilize agency / facility established prior to hospitalization No     Assessment Note:  Met with patient and Introduced myself as care coordinator and member of the Care Transitions team for discharge planning. Pt feels safe at home.   Transportation: Patients  will transport her home when medically ready  Pharmacy: [facility] on [address]  DME: Uses walker at home  Previous home care: No  Falls: No  PCP: Laura Mata    Address, phone number and emergency contact Information verified. All  questions and concerns answered. Will continue to follow  for discharge needs.    Transitional Care Coordination Progress Note:  Patient discussed during interdisciplinary rounds.   Team members present: MD and TCC  Plan per Medical/Surgical team: Deciding DOAC VS Warfarin  Payor: Medicare  Discharge disposition: Home, Refusing SNF or Home Care.  Potential Barriers: None  ADOD: 02/13/24

## 2024-02-12 NOTE — CONSULTS
Reason For Consult  Decision about anticoagulation     History Of Present Illness  68 y.o. female with a PMHx significant for inferior NSTEMI in 2019, LLE DVT in 11/2020 (off eliquis due to hx of GIB), L RCC (papillary, type 1) and L adrenal cortical adenoma s/p L partial nephrectomy and L adrenalectomy (2007), chronic iron deficiency anemia s/p iron infusions with resolution of anemia from about May 2021-8/2022, and CKD stage 3 d/t loss of nephron mass who presented to the ED on 2/5/2024 from outpatient heme appt with dyspnea on exertion, RLE swelling/tenderness, and elevated D dimer >13k. She was found to have an acute saddle PE (no RV dysfunction on POCUS) and occlusive thrombi in right femoral and popliteal veins.     Of note, patient was started on Eliquis 5mg BID for LLE DVT in 11/2020. Eliquis was prescribed for 1yr, but she was asked to stop after 3 months by her cardiologist Dr. Hansen for presumably a provoked VTE event and history of GIB. She has evidence of a chronic LLE DVT as imaged as recently as 11/2023. She took Eliquis for about 2 weeks after a recent admission but her vascular medicine doctor recommended stopping it. She remains off AC but remains on ASA 81mg.    In the ED on 2/6/24, she was started on a heparin gtt. Beginning around 2/8/24, she developed abdominal pain, nausea, and dark stools w/o signs of balbina blood. Her hgb trends were as follows: Hbg 9.2 (2/8 am) -> 7.8 (2/8 pm) -> 7.2 (2/9 am) -> 7.5 (2/9 pm) and BUN trends were: 16 (2/6) -> 42 (2/8) -> 24 (2/9). EGD on 2/9/2024 showed some gastric erosions and erythema without any signs of active bleeding. Per GI notes, there was concern for upper GIB; however, BUN and Hgb stabilized at 17 and 8.1 and GI recommended continuing heparin and pursuing outpatient capsule study. Patient has been without any signs of bleeding in the last 72 hours and hgb is stable at 8.6.     Of note, she has a history of GIB (dark, tarry stools) in 1/2019  requiring transfusions but with unknown source. EGD at the time showed a hiatal hernia, gastritis, and small gastric lipoma; capsule study was negative. There was also concern for GIB in 3/2021 when she went to [facility] for VEDA. Again, source was unclear. She received IV femuroxytol on 3/23 and 3/30/2021. She also was recently admitted on 1/25/24 with VEDA and concern for GIB. At that time, labs were pertinent for Hb ~5, ferritin 21, TIBC >450, hypoproliferative retic, relatively normal coags, hapto WNL, LDH mildly elevated (352), and bili indirectly elevated (1.3). Folate/B12 and myeloma labs were not checked. She was treated with iron sucrose 300mg x1 and 2U PRBC. GI saw her at the time but did not scope her as she was HDS and her hgb improved with PRBC. Discharge plan was to have an outpatient capsule study on 2/16. She took a few pills of PO iron after this admission? but has issues with constipation despite colace.     Primary team is planning to transition her to oral AC once stable. Patient wishes to discuss warfarin vs DOAC with Dr. Jenkins. She thinks she would prefer Eliquis due to less frequent bloodwork.     Today, she endorses new sharp pain in her R calf. She says she had this kind of pain when she had clots in the past. She also endorses generalized weakness, fatigue, bifrontal HA, and nausea/abdominal pain (although improved compared to prior days). She denies dyspnea at rest but says she has not been up to walk yet. She also denies fevers, chills, vision changes, rashes, skin lesions, easy bruising or bleeding, emesis, melena, hematochezia, hematuria, falls, syncope, or incoordination. She denies a prior history of heavy menstrual bleeding.      Past Medical History  See HPI; also notable for HTN, asthma, HLD, T2DM, MARYLU not using CPAP, s/p hysterectomy and b/l oophorectomy, and partially obstructed Ross's hernia    Surgical History  She has a past surgical history that includes Breast biopsy  (2016); Cholecystectomy (2017); Hernia repair (2014); Other surgical history (2021);  section, classic (2014); Total abdominal hysterectomy w/ bilateral salpingoophorectomy (2014); Hand surgery (2020); and MR angio head wo IV contrast (2014).     Social History  She reports that she has quit smoking. Her smoking use included cigarettes. She has never been exposed to tobacco smoke. She has never used smokeless tobacco. She reports current alcohol use. She reports that she does not currently use drugs.    Family History  Family History   Problem Relation Name Age of Onset    Aneurysm Mother      Hypertension Mother      Pancreatic cancer Mother      Diabetes Mother      Other (laryngeal Cancer) Mother      Heart disease Father      Pulmonary embolism Brother      Breast cancer Father's Sister      Breast cancer Maternal Cousin          Allergies  Adhesive, Amoxicillin, Bee sting kit, Cephalexin, Gadolinium-containing contrast media, Iodine, Latex, Pioglitazone, Rubella virus live vaccine, Salicylates, Shellfish derived, Sulfa (sulfonamide antibiotics), Sulfamethoxazole-trimethoprim, and Iodinated contrast media    Review of Systems  See HPI     Physical Exam  General: Sitting up in a chair without distress.  Cooperative. Moderately obese.  Skin: bruising around IV sites   HEENT: Sclera is white.  Mucous membranes moist.  Neck: Supple.  No JVD.  Cardiac: Regular rate and rhythm  Lungs: Clear to auscultation bilaterally, no accessory muscle use at rest.  Abdomen: Soft, non-tender  Extremities: BLE swelling; no erythema or warmth   Neurologic: Alert and oriented x3.  No focal deficits.  Psychiatric: Appropriate mood and behavior.  Currently no agitation.      Last Recorded Vitals  Heart Rate:  [68-78]   Temp:  [36.5 °C (97.7 °F)-36.9 °C (98.4 °F)]   Resp:  [16-20]   BP: (123-145)/(56-79)   SpO2:  [97 %-99 %]      Relevant Results    Results for orders placed or  performed during the hospital encounter of 02/05/24 (from the past 24 hour(s))   POCT GLUCOSE   Result Value Ref Range    POCT Glucose 267 (H) 74 - 99 mg/dL   POCT GLUCOSE   Result Value Ref Range    POCT Glucose 177 (H) 74 - 99 mg/dL   POCT GLUCOSE   Result Value Ref Range    POCT Glucose 139 (H) 74 - 99 mg/dL   CBC   Result Value Ref Range    WBC 14.1 (H) 4.4 - 11.3 x10*3/uL    nRBC 0.0 0.0 - 0.0 /100 WBCs    RBC 3.83 (L) 4.00 - 5.20 x10*6/uL    Hemoglobin 8.6 (L) 12.0 - 16.0 g/dL    Hematocrit 29.9 (L) 36.0 - 46.0 %    MCV 78 (L) 80 - 100 fL    MCH 22.5 (L) 26.0 - 34.0 pg    MCHC 28.8 (L) 32.0 - 36.0 g/dL    RDW 31.2 (H) 11.5 - 14.5 %    Platelets 334 150 - 450 x10*3/uL   POCT GLUCOSE   Result Value Ref Range    POCT Glucose 139 (H) 74 - 99 mg/dL      Smear 2/8/24: mild hypochromia, mild polychromasia, few RBC fragments, few teardrop cells  Smear 2/5/24: mild polychromasia, many ovalocytes, few teardrop cells, few sue cells, few giant platelets   MCV: ranging 69-79 since 2/5/24  MCH: ranging 21-22 since 2/5/24  MCHC: about 28 since 2/5/24  RDW: ranging 30-31 since 2/5/24  Total bili: 0.4 (2/5/24)  aPTT: 82 (2/10/24)  INR 1.2 (2/8/24)  PT 13.2 (2/8/24)     Latest Reference Range & Units 02/05/24 12:34   Protein Electrophoresis Comment  Increased level of beta globulins.   Albumin 3.4 - 5.0 g/dL 3.6   Alpha 1 Globulin 0.2 - 0.6 g/dL 0.5   Alpha 2 Globulin 0.4 - 1.1 g/dL 0.9   Gamma 0.5 - 1.4 g/dL 1.3   Beta Globulin 0.5 - 1.2 g/dL 1.4 (H)   Ig Kappa Free Light Chain 0.33 - 1.94 mg/dL 4.77 (H)   Ig Lambda Free Light Chain 0.57 - 2.63 mg/dL 2.91 (H)   Kappa/Lambda Ratio 0.26 - 1.65  1.64   Immunofixation Comment  No monoclonal protein detected by immunofixation.   Path Review - Serum Immunofixation  Reviewed and approved by BERNY BONNER on 2/8/24 at 8:19 PM.      Path Review - Serum Protein Electrophoresis   Reviewed and approved by BERNY BONNER on 2/8/24 at 8:18 PM.            Latest Reference Range & Units  02/05/24 12:34   Immature Retic fraction <=16.0 % 19.5 (H)   Retic Absolute 0.017 - 0.110 x10*6/uL 0.067   Retic % 0.5 - 2.0 % 1.3   Reticulocyte Hemoglobin 28 - 38 pg 27 (L)     Retic Index: 0.62 (hyporolif)  Iron: 74 (2/7/24)  TIBC: 352 (2/7/24)  Ferritin 21 (1/26/24)  Folate 10 (2/5/24)  B12 641 (2/10/2023)     Latest Reference Range & Units 01/15/21 11:55   DRVVT Screen RATIO 2.07   DRVVT Confirmation RATIO 2.69   Beta-2 Glyco 1 IgG 0.0 - 20.0 U/mL <1.4   Beta 2 Glyco 1 IgM 0.0 - 20.0 U/mL 0.6   Beta-2 Glyco 1 IgA 0.0 - 20.0 U/mL 0.8   Anticardiolipin IgG 0.0 - 20.0 GPL U/mL <1.6   Anticardiolipin IgM 0.0 - 20.0 MPL U/mL 0.4   Anticardiolipin IgA 0.0 - 20.0 APL U/mL 0.8   SCT Test Ratio <=1.16 RATIO 1.02   DRVVT Test Ratio <=1.20 RATIO 0.77   SCT Confirmation RATIO 1.24   SCT Screen RATIO 1.26   Lupus Anticoagulant Interpretation  SEE BELOW     [Held by provider] amLODIPine, 10 mg, oral, Daily  betamethasone (augmented), 1 Application, Topical, Daily  brimonidine, 1 drop, Both Eyes, BID  dicyclomine, 20 mg, oral, TID  famotidine, 20 mg, oral, Nightly  insulin lispro, 0-10 Units, subcutaneous, TID with meals  latanoprost, 1 drop, Both Eyes, q AM  loratadine, 10 mg, oral, Daily  [Held by provider] losartan, 25 mg, oral, Nightly  metoprolol succinate XL, 200 mg, oral, Daily  pantoprazole, 40 mg, oral, BID AC  polyethylene glycol, 17 g, oral, Daily  rosuvastatin, 20 mg, oral, Nightly  simethicone, 80 mg, oral, q6h  timolol, 1 drop, Both Eyes, BID  tiotropium, 2 Inhalation, inhalation, Daily       Assessment/Plan   68 y.o. female with chronic LLE DVT (off eliquis due to hx of GIB), L RCC (papillary, type 1) and L adrenal cortical adenoma s/p L partial nephrectomy and L adrenalectomy (2007), chronic iron deficiency anemia s/p iron infusions with resolution of anemia from about May 2021-8/2022, and CKD stage 3 d/t loss of nephron mass who presented to the ED on 2/5/2024 with an acute saddle PE and RLE DVT, currently  being treated with heparin gtt. Hospital course c/b melena and downtrending hgb but with no signs of active bleeding on EGD. Patient has been HDS and without further signs of bleeding. Patient will require anticoagulation for her saddle PE and DVTs. Eliquis would be the ideal choice given it is safe with her degree of renal impairment, is easily reversible, and is easier to take compared to coumadin. It may also have a lower risk of GIB compared to Xarelto. At this point, the source of bleeding remains unclear but it most likely from AVMs due to CKD. It would be beneficial to determine the source of bleeding while inpatient to avoid having to stop anticoagulation before a procedure in the outpatient setting and to address the source of bleeding (if possible) while inpatient before transitioning to oral anticoagulation. The risk of clot growth or recurrence is highest in the first 3-6 months and therefore it may be dangerous to hold anticoagulation in the outpatient setting to further workup the GIB. The patient's low MCV, high RDW, low retix index, hypochromasia, and ovalocytes are c/w VEDA and she would benefit from iron supplementation. Normal ferritin, normal TIBC, and normal iron may reflect a component of anemia of chronic disease from CKD and/or recent iron supplementation. Elevated kappa and rosalba light chains without abnormal FLC ratio can be seen in chronic inflammatory states such as CKD.     Recommendations:  -Please give 300 mg iron sucrose for VEDA  -We will touch base with GI tomorrow to discuss further workup of GIB while inpatient  -Likely transition to Eliquis when stable and after discussion with GI regarding further inpatient workup of bleeding     LOGAN NAYLOR, M4, Hematology Consults  Patient seen and dicussed with Dr. Esteves

## 2024-02-12 NOTE — PROGRESS NOTES
"Physical Therapy    Physical Therapy Treatment    Patient Name: Vania Andrews  MRN: [id number]  Today's Date: 2/12/2024  Time Calculation  Start Time: 1420  Stop Time: 1456  Time Calculation (min): 36 min       Assessment/Plan   PT Assessment  End of Session Communication: Bedside nurse  End of Session Patient Position: Up in chair, Alarm off, not on at start of session     PT Plan  Treatment/Interventions: Bed mobility, Transfer training, Gait training, Stair training, Balance training, Strengthening, Endurance training, Therapeutic exercise  PT Plan: Skilled PT  PT Frequency: 3 times per week  PT Discharge Recommendations: Moderate intensity level of continued care  PT Recommended Transfer Status: Assist x1  PT - OK to Discharge: Yes      General Visit Information:   PT  Visit  PT Received On: 02/12/24  General  Reason for Referral: acute saddle PE and RLE DVT  Past Medical History Relevant to Rehab: HTN, HLD, DMT2, CAD, MI (2019),  DVT (2020) previously on AC, no longer on Eliquis,  CKD stage 3 d/t loss of nephron mass, L RCC, papillary, 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  Family/Caregiver Present: No  Prior to Session Communication: Bedside nurse  Patient Position Received: Up in chair, Alarm off, not on at start of session  General Comment: Pt. seated in bedside chair on phone upon arrival. Pt. states \"I'm going home, i'm not going anywhere else\" Pt. became teary due to recent struggles.    Subjective   Precautions:  Precautions  Medical Precautions: Fall precautions  Vital Signs:       Objective   Pain:  Pain Assessment  Pain Assessment: 0-10  Pain Interventions: Medication (See MAR)  Cognition:     Postural " "Control:  Static Sitting Balance  Static Sitting-Balance Support: Feet supported, Bilateral upper extremity supported  Static Sitting-Comment/Number of Minutes: SBA  Static Standing Balance  Static Standing-Level of Assistance: Minimum assistance  Extremity/Trunk Assessments:                      Activity Tolerance:     Treatments:  Therapeutic Exercise  Therapeutic Exercise Performed: Yes  Therapeutic Exercise Activity 1: Seated bilat le ex  MARCHES, LAQ'S, ABD X 15 REPS.    Bed Mobility  Bed Mobility: No    Ambulation/Gait Training  Ambulation/Gait Training Performed: Yes  Ambulation/Gait Training 1  Surface 1:  (Pt. amb. 16x1 and 15x1' using a wh. walker and min assist. Pt. requires instruction/cues and then reminders for walker position. Pt. allows walker to get too far ahead with feet lagging behind. Pt. stopping at times to lean forearms onto walker to rest.)  Comments/Distance (ft) 1: Pt. reports increased discomfort - \"achillies\" and bottom of feet.  Transfers  Transfer: Yes  Transfer 1  Transfer From 1:  (Sit to stand min- cues for hand placement)  Technique 1: Stand to sit  Transfer Level of Assistance 1: Minimum assistance  Trials/Comments 1: cues for hand placement and assist to lower slowly.    Outcome Measures:  [facility] Basic Mobility  Turning from your back to your side while in a flat bed without using bedrails: A little  Moving from lying on your back to sitting on the side of a flat bed without using bedrails: A lot  Moving to and from bed to chair (including a wheelchair): A little  Standing up from a chair using your arms (e.g. wheelchair or bedside chair): A little  To walk in hospital room: A little  Climbing 3-5 steps with railing: Total  Basic Mobility - Total Score: 15    Education Documentation  Precautions, taught by Destiny Herrera PTA at 2/12/2024  3:13 PM.  Learner: Patient  Readiness: Acceptance  Method: Explanation, Demonstration  Response: Needs Reinforcement    Body Mechanics, taught " by Destiny Herrera PTA at 2/12/2024  3:13 PM.  Learner: Patient  Readiness: Acceptance  Method: Explanation, Demonstration  Response: Needs Reinforcement    Mobility Training, taught by Destiny Herrera PTA at 2/12/2024  3:13 PM.  Learner: Patient  Readiness: Acceptance  Method: Explanation, Demonstration  Response: Needs Reinforcement    Education Comments  No comments found.        OP EDUCATION:       Encounter Problems       Encounter Problems (Active)       Balance       No LOB with transfers/ambulation  (Progressing)       Start:  02/08/24    Expected End:  02/29/24               Mobility       STG - Patient will ambulate 75 fee with walker Modified independent  (Progressing)       Start:  02/08/24    Expected End:  02/29/24            STG - Patient will ascend and descend a flight of stairs with rail with SBA (Progressing)       Start:  02/08/24    Expected End:  02/29/24               Transfers       STG - Patient will perform bed mobility independent (Progressing)       Start:  02/08/24    Expected End:  02/29/24            STG - Patient will transfer sit to and from stand with walker Modified independent  (Progressing)       Start:  02/08/24    Expected End:  02/29/24

## 2024-02-13 ENCOUNTER — APPOINTMENT (OUTPATIENT)
Dept: CARDIOLOGY | Facility: HOSPITAL | Age: 69
End: 2024-02-13
Payer: MEDICARE

## 2024-02-13 LAB
GLUCOSE BLD MANUAL STRIP-MCNC: 143 MG/DL (ref 74–99)
GLUCOSE BLD MANUAL STRIP-MCNC: 151 MG/DL (ref 74–99)
GLUCOSE BLD MANUAL STRIP-MCNC: 198 MG/DL (ref 74–99)
UFH PPP CHRO-ACNC: 0.3 IU/ML

## 2024-02-13 PROCEDURE — 2500000001 HC RX 250 WO HCPCS SELF ADMINISTERED DRUGS (ALT 637 FOR MEDICARE OP): Performed by: NURSE PRACTITIONER

## 2024-02-13 PROCEDURE — 94640 AIRWAY INHALATION TREATMENT: CPT

## 2024-02-13 PROCEDURE — 99233 SBSQ HOSP IP/OBS HIGH 50: CPT | Performed by: INTERNAL MEDICINE

## 2024-02-13 PROCEDURE — 82947 ASSAY GLUCOSE BLOOD QUANT: CPT

## 2024-02-13 PROCEDURE — 2500000004 HC RX 250 GENERAL PHARMACY W/ HCPCS (ALT 636 FOR OP/ED): Performed by: INTERNAL MEDICINE

## 2024-02-13 PROCEDURE — 2500000004 HC RX 250 GENERAL PHARMACY W/ HCPCS (ALT 636 FOR OP/ED): Performed by: NURSE PRACTITIONER

## 2024-02-13 PROCEDURE — 1200000002 HC GENERAL ROOM WITH TELEMETRY DAILY

## 2024-02-13 PROCEDURE — 85520 HEPARIN ASSAY: CPT

## 2024-02-13 PROCEDURE — 99233 SBSQ HOSP IP/OBS HIGH 50: CPT | Performed by: STUDENT IN AN ORGANIZED HEALTH CARE EDUCATION/TRAINING PROGRAM

## 2024-02-13 PROCEDURE — 2500000002 HC RX 250 W HCPCS SELF ADMINISTERED DRUGS (ALT 637 FOR MEDICARE OP, ALT 636 FOR OP/ED)

## 2024-02-13 PROCEDURE — 2500000001 HC RX 250 WO HCPCS SELF ADMINISTERED DRUGS (ALT 637 FOR MEDICARE OP)

## 2024-02-13 PROCEDURE — 2500000002 HC RX 250 W HCPCS SELF ADMINISTERED DRUGS (ALT 637 FOR MEDICARE OP, ALT 636 FOR OP/ED): Performed by: INTERNAL MEDICINE

## 2024-02-13 RX ADMIN — DICYCLOMINE HYDROCHLORIDE 20 MG: 10 CAPSULE ORAL at 09:58

## 2024-02-13 RX ADMIN — LOSARTAN POTASSIUM 25 MG: 25 TABLET, FILM COATED ORAL at 20:30

## 2024-02-13 RX ADMIN — ACETAMINOPHEN 650 MG: 325 TABLET ORAL at 20:30

## 2024-02-13 RX ADMIN — BETAMETHASONE DIPROPIONATE 1 APPLICATION: 0.5 CREAM TOPICAL at 09:58

## 2024-02-13 RX ADMIN — FAMOTIDINE 20 MG: 20 TABLET, FILM COATED ORAL at 20:30

## 2024-02-13 RX ADMIN — BRIMONIDINE TARTRATE 1 DROP: 2 SOLUTION/ DROPS OPHTHALMIC at 20:31

## 2024-02-13 RX ADMIN — DICYCLOMINE HYDROCHLORIDE 20 MG: 10 CAPSULE ORAL at 20:30

## 2024-02-13 RX ADMIN — BRIMONIDINE TARTRATE 1 DROP: 2 SOLUTION/ DROPS OPHTHALMIC at 09:58

## 2024-02-13 RX ADMIN — TIMOLOL MALEATE 1 DROP: 5 SOLUTION/ DROPS OPHTHALMIC at 09:58

## 2024-02-13 RX ADMIN — BENZONATATE 200 MG: 100 CAPSULE ORAL at 20:30

## 2024-02-13 RX ADMIN — PANTOPRAZOLE SODIUM 40 MG: 40 TABLET, DELAYED RELEASE ORAL at 07:00

## 2024-02-13 RX ADMIN — TIMOLOL MALEATE 1 DROP: 5 SOLUTION/ DROPS OPHTHALMIC at 20:31

## 2024-02-13 RX ADMIN — TIOTROPIUM BROMIDE INHALATION SPRAY 2 PUFF: 3.12 SPRAY, METERED RESPIRATORY (INHALATION) at 09:15

## 2024-02-13 RX ADMIN — LORATADINE 10 MG: 10 TABLET ORAL at 09:58

## 2024-02-13 RX ADMIN — IRON SUCROSE 300 MG: 20 INJECTION, SOLUTION INTRAVENOUS at 09:54

## 2024-02-13 RX ADMIN — DICYCLOMINE HYDROCHLORIDE 20 MG: 10 CAPSULE ORAL at 14:48

## 2024-02-13 RX ADMIN — METOPROLOL SUCCINATE 200 MG: 100 TABLET, EXTENDED RELEASE ORAL at 09:58

## 2024-02-13 RX ADMIN — ROSUVASTATIN CALCIUM 20 MG: 20 TABLET, FILM COATED ORAL at 20:30

## 2024-02-13 RX ADMIN — LATANOPROST 1 DROP: 50 SOLUTION/ DROPS OPHTHALMIC at 09:58

## 2024-02-13 ASSESSMENT — COGNITIVE AND FUNCTIONAL STATUS - GENERAL
CLIMB 3 TO 5 STEPS WITH RAILING: TOTAL
STANDING UP FROM CHAIR USING ARMS: A LITTLE
TURNING FROM BACK TO SIDE WHILE IN FLAT BAD: A LOT
MOBILITY SCORE: 15
DRESSING REGULAR LOWER BODY CLOTHING: A LITTLE
WALKING IN HOSPITAL ROOM: A LITTLE
EATING MEALS: A LITTLE
DRESSING REGULAR UPPER BODY CLOTHING: A LITTLE
DAILY ACTIVITIY SCORE: 18
TOILETING: A LITTLE
MOVING TO AND FROM BED TO CHAIR: A LITTLE
PERSONAL GROOMING: A LITTLE
HELP NEEDED FOR BATHING: A LITTLE
MOVING FROM LYING ON BACK TO SITTING ON SIDE OF FLAT BED WITH BEDRAILS: A LITTLE

## 2024-02-13 ASSESSMENT — PAIN - FUNCTIONAL ASSESSMENT
PAIN_FUNCTIONAL_ASSESSMENT: 0-10

## 2024-02-13 ASSESSMENT — PAIN SCALES - GENERAL
PAINLEVEL_OUTOF10: 0 - NO PAIN

## 2024-02-13 NOTE — PROGRESS NOTES
Vania Andrews is a 69 y.o. female on day 7 of admission presenting with Acute saddle pulmonary embolism without acute cor pulmonale (CMS/HCC).    Transitional Care Coordination Progress Note:  Patient discussed during interdisciplinary rounds.   Team members present: MD and TCC  Plan per Medical/Surgical team: Consulting with Hematology today.  Payor: Medicare  Discharge disposition: Home no needs  Potential Barriers: None  ADOD: 02/14/24    JARET HUGO

## 2024-02-13 NOTE — NURSING NOTE
END OF SHIFT NOTE     Patient remained safe and free from falls during the shift.  Patient has been pleasant and cooperative.  The patients has not complained of pain.  A one time dose of IV iron sucrose 300mg was administered today.  Call light within reach.  No other events occurred throughout shift.   Nurse to nurse report was given to RN.  RN will continue to monitor.    Manohar Barraza LPN

## 2024-02-13 NOTE — PROGRESS NOTES
Vania Andrews is a 69 y.o. female on day 7 of admission presenting with Acute saddle pulmonary embolism without acute cor pulmonale (CMS/HCC).    Subjective   Feels well this morning. Endorses generalized weakness, fatigue, and a persistent cough. She denies dyspnea, fevers, chills, vision changes, easy bruising or bleeding, N/V, melena, hematochezia, hematuria.    Will receive IV iron sucrose today. She's wondering about needing to go on PO iron after discharge.        Objective     Physical Exam  General: Sitting up in bed without distress.  Cooperative. Moderately obese.  Skin: bruising around IV sites   HEENT: Sclera is white.  Mucous membranes moist.  Neck: Supple.  No JVD.  Cardiac: Regular rate and rhythm  Lungs: Clear to auscultation bilaterally, no accessory muscle use at rest.  Abdomen: Soft, non-tender  Extremities: BLE swelling; no erythema or warmth   Neurologic: Alert and oriented x3.  No focal deficits.  Psychiatric: Appropriate mood and behavior.  Currently no agitation.     Last Recorded Vitals  Heart Rate:  [71-78]   Temp:  [36.4 °C (97.5 °F)-36.7 °C (98.1 °F)]   Resp:  [16-19]   BP: (123-138)/(60-77)   SpO2:  [96 %-98 %]      Intake/Output last 3 Shifts:    Intake/Output Summary (Last 24 hours) at 2/13/2024 0810  Last data filed at 2/12/2024 1600  Gross per 24 hour   Intake 480 ml   Output --   Net 480 ml        Relevant Results  Results for orders placed or performed during the hospital encounter of 02/05/24 (from the past 24 hour(s))   POCT GLUCOSE   Result Value Ref Range    POCT Glucose 139 (H) 74 - 99 mg/dL   POCT GLUCOSE   Result Value Ref Range    POCT Glucose 196 (H) 74 - 99 mg/dL   Heparin Assay, [facility]   Result Value Ref Range    Heparin Unfractionated 0.3 See Comment Below for Therapeutic Ranges IU/mL   POCT GLUCOSE   Result Value Ref Range    POCT Glucose 174 (H) 74 - 99 mg/dL   Heparin Assay, [facility]FH   Result Value Ref Range    Heparin Unfractionated 0.3 See Comment Below for Therapeutic  Ranges IU/mL         [Held by provider] amLODIPine, 10 mg, oral, Daily  betamethasone (augmented), 1 Application, Topical, Daily  brimonidine, 1 drop, Both Eyes, BID  dicyclomine, 20 mg, oral, TID  famotidine, 20 mg, oral, Nightly  insulin lispro, 0-10 Units, subcutaneous, TID with meals  iron sucrose, 300 mg, intravenous, Once  latanoprost, 1 drop, Both Eyes, q AM  loratadine, 10 mg, oral, Daily  [Held by provider] losartan, 25 mg, oral, Nightly  metoprolol succinate XL, 200 mg, oral, Daily  pantoprazole, 40 mg, oral, BID AC  polyethylene glycol, 17 g, oral, Daily  rosuvastatin, 20 mg, oral, Nightly  timolol, 1 drop, Both Eyes, BID  tiotropium, 2 Inhalation, inhalation, Daily         Assessment/Plan   68 y.o. female with chronic LLE DVT (off eliquis due to hx of GIB), L RCC (papillary, type 1) and L adrenal cortical adenoma s/p L partial nephrectomy and L adrenalectomy (2007), chronic iron deficiency anemia s/p iron infusions with resolution of anemia from about May 2021-8/2022, and CKD stage 3 d/t loss of nephron mass who presented to the ED on 2/5/2024 with an acute saddle PE and RLE DVT, currently being treated with heparin gtt. Hospital course c/b melena and downtrending hgb but with no signs of active bleeding on EGD. Patient has been HDS and without further signs of bleeding. Patient will require anticoagulation for her saddle PE and DVTs. Eliquis would be the ideal choice given it is safe with her degree of renal impairment, is easily reversible, and is easier to take compared to coumadin. It may also have a lower risk of GIB compared to Xarelto. At this point, the source of bleeding remains unclear but it most likely from AVMs due to CKD. It would be beneficial to determine the source of bleeding while inpatient to avoid having to stop anticoagulation before a procedure in the outpatient setting and to address the source of bleeding (if possible) while inpatient before transitioning to oral anticoagulation.  The risk of clot growth or recurrence is highest in the first 3-6 months and therefore it may be dangerous to hold anticoagulation in the outpatient setting to further workup the GIB. The patient's low MCV, high RDW, low retix index, hypochromasia, and ovalocytes are c/w VEDA and she would benefit from iron supplementation. Normal ferritin, normal TIBC, and normal iron may reflect a component of anemia of chronic disease from CKD and/or recent iron supplementation. Elevated kappa and rosalba light chains without abnormal FLC ratio can be seen in chronic inflammatory states such as CKD.      Recommendations:  -GI will talk with patient tomorrow and coordinate inpatient colonoscopy to further workup source of GIB  -Transition to [facility] after colonoscopy      LOGAN NAYLOR, M4, Hematology Consults  Patient seen and discussed with Dr. Esteves

## 2024-02-13 NOTE — PROGRESS NOTES
"Vania Andrews is a 69 y.o. female on day 7 of admission presenting with Acute saddle pulmonary embolism without acute cor pulmonale (CMS/HCC).    Subjective   Patient sitting up at bedside. Denies any melena or hematochezia. No hematuria. No vomiting.  Discussed with bedside nursing and there has been no signs of bleeding.      Objective     General: Sitting up in a chair without distress.  Cooperative. Moderately obese.  Skin: No rashes or ulcerations.  HEENT: Sclera is white.  Mucous membranes moist.  Neck: Supple.  No JVD.  Cardiac: Regular rate and rhythm, S1/S2 normal.  Lungs: Clear to auscultation bilaterally, no wheezing or crackles, no accessory muscle use at rest.  Abdomen: Soft, nontender, moderately obese abdomen, BS +  Extremities: No cyanosis.  Obesity versus edema, difficult to tell.  Neurologic: Alert and oriented x3.  No focal deficits.  Psychiatric: Appropriate mood and behavior.  Currently no agitation.     Last Recorded Vitals  Blood pressure 142/83, pulse 80, temperature 36.9 °C (98.4 °F), resp. rate 17, height 1.499 m (4' 11\"), weight 86.2 kg (190 lb), SpO2 99 %. On room air.    Intake/Output last 3 Shifts:  I/O last 3 completed shifts:  In: 480 (5.6 mL/kg) [P.O.:480]  Out: - (0 mL/kg)   Weight: 86.2 kg     Relevant Results  [Held by provider] amLODIPine, 10 mg, oral, Daily  betamethasone (augmented), 1 Application, Topical, Daily  brimonidine, 1 drop, Both Eyes, BID  dicyclomine, 20 mg, oral, TID  famotidine, 20 mg, oral, Nightly  insulin lispro, 0-10 Units, subcutaneous, TID with meals  latanoprost, 1 drop, Both Eyes, q AM  loratadine, 10 mg, oral, Daily  [Held by provider] losartan, 25 mg, oral, Nightly  metoprolol succinate XL, 200 mg, oral, Daily  pantoprazole, 40 mg, oral, BID AC  polyethylene glycol, 17 g, oral, Daily  rosuvastatin, 20 mg, oral, Nightly  timolol, 1 drop, Both Eyes, BID  tiotropium, 2 Inhalation, inhalation, Daily       heparin, 0-4,500 Units/hr, Last Rate: 700 Units/hr " (02/12/24 1041)       PRN medications: acetaminophen, albuterol, benzocaine-menthol, benzonatate, dextrose 10 % in water (D10W), dextrose, glucagon, heparin, ondansetron, polyethylene glycol     Results for orders placed or performed during the hospital encounter of 02/05/24 (from the past 24 hour(s))   POCT GLUCOSE   Result Value Ref Range    POCT Glucose 174 (H) 74 - 99 mg/dL   Heparin Assay, UFH   Result Value Ref Range    Heparin Unfractionated 0.3 See Comment Below for Therapeutic Ranges IU/mL   POCT GLUCOSE   Result Value Ref Range    POCT Glucose 151 (H) 74 - 99 mg/dL   POCT GLUCOSE   Result Value Ref Range    POCT Glucose 143 (H) 74 - 99 mg/dL        Hospital Course:  Vania Andrews is a 68 y.o. 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  She had recent admission for acute on chronic anemia, dyspnea with chronic cough.   Patient was sent in from her hematologist office after she was evaluated for dyspnea and fatigue.  Her hematologist sent her to the ED with concern for potential PE.  Patient was not have acute saddle PE and new acute right lower extremity DVT.  Patient placed on IV heparin drip and admitted to stepdown unit. Patient had melanotic stool and GI consulted. EGD done 2/9 without any active bleeding. Discussed with patient who she wants to manage her anticoagulation and she has requested her Hematologist.    Assessment/Plan     Acute saddle PE  Acute right lower extremity DVT  History of chronic left lower extremity thrombosis  -Patient reports  that the thrombosis she had at [facility] is her second episode.  She report previous episode of DVT in her lower extremities prior to the diagnosis in November.   -Reviewing ultrasound report in November patient was diagnosed with chronic DVT of the left lower extremity.  -Patient reports that she was on Eliquis in November for about 2 weeks, went to see vascular medicine as she had been recommended and was told due to history of GI bleed may be too risky and recommended to be on baby aspirin.  Patient reports she has been on baby aspirin since then prior to presenting to the hospital.  -Continue IV heparin drip and monitor for any signs of bleeding.  Clinically in the last 72 hours no further signs of bleeding.  -Hemoglobin stable at 8.6 today.  -Discussed with patient whether she wants to talk with vascular medicine about thrombosis issues or hematology which she also sees for her anemia issues.  Patient informed that both groups can manage thrombosis.  At this time patient states she wants to consult hematology to address the issue.  She wishes to follow with Dr. Spears.   -Discussed with patient the plan will be to monitor on IV heparin drip and make sure she has no bleeding.  Once we know that things are stable and no active bleeding will need to transition her to oral anticoagulation.  Informed the patient that debate will be between DOAC versus warfarin.  Even if patient is placed on Eliquis she can still have reversal with Andexxa.  Pros and cons for either use, hematology was consulted and they are recommending Eliquis.  Hematology note indicated they would like to talk to GI to see if further evaluation needs to be done inpatient.  Will hold off on transition and further planning until hear more input from hematology.    GI bleed  History of GERD  -Status post EGD on 2/9.  No active bleeding found. GI recommended outpatient capsule endoscopy.  -Continue PPI twice a day.  -Monitor hemoglobin.    History of  iron deficiency anemia  -Follows with Dr. Spears.  Patient states she also wishes to talk to her hematologist about thrombosis.  -Hematology recommends IV iron sucrose 300 mg x 1.  -Last Hgb stable at 8.6.  -Check CBC tomorrow morning.    Hypertension  Hyperlipidemia  Coronary artery disease with history of MI 2019  -Have been holding Amlodipine and losartan due to bleeding and blood pressure low normal. Continued metoprolol and Rosuvastatin.  -Systolic blood pressure currently 140s - 150's.  -Will plan to restart losartan 25 mg daily.    Diabetes mellitus type 2  -Holding home Trulicity (weekly)  -Continue SSI to #2 scale TID with meals  -HgbA1C 7.1 July of last year  -Currently fasting and postprandial glucose not controlled.      History of glaucoma  -Continue home eyedrops.  -Hospital does not have Rhopressa, patient advised to bring in her own medications.    History of osteoarthritis  -Avoid NSAIDs.  Tylenol as needed.    DVT prophylaxis  -SCDs.    Disposition: Await further input from hematology about whether after their discussion with GI if there would be any further inpatient evaluation.  If not we will plan to discharge on oral Eliquis.    Code Status: FULL CODE   NOK: Bryce Corea (Spouse) [phone]            Pedro Alvarado MD

## 2024-02-14 ENCOUNTER — APPOINTMENT (OUTPATIENT)
Dept: CARDIOLOGY | Facility: HOSPITAL | Age: 69
DRG: 176 | End: 2024-02-14
Payer: MEDICARE

## 2024-02-14 LAB
ANION GAP SERPL CALC-SCNC: 12 MMOL/L (ref 10–20)
ATRIAL RATE: 62 BPM
BUN SERPL-MCNC: 17 MG/DL (ref 6–23)
CALCIUM SERPL-MCNC: 10.2 MG/DL (ref 8.6–10.6)
CHLORIDE SERPL-SCNC: 104 MMOL/L (ref 98–107)
CO2 SERPL-SCNC: 27 MMOL/L (ref 21–32)
CREAT SERPL-MCNC: 1.43 MG/DL (ref 0.5–1.05)
EGFRCR SERPLBLD CKD-EPI 2021: 40 ML/MIN/1.73M*2
ERYTHROCYTE [DISTWIDTH] IN BLOOD BY AUTOMATED COUNT: 31 % (ref 11.5–14.5)
GLUCOSE BLD MANUAL STRIP-MCNC: 129 MG/DL (ref 74–99)
GLUCOSE BLD MANUAL STRIP-MCNC: 148 MG/DL (ref 74–99)
GLUCOSE BLD MANUAL STRIP-MCNC: 190 MG/DL (ref 74–99)
GLUCOSE SERPL-MCNC: 193 MG/DL (ref 74–99)
HCT VFR BLD AUTO: 32.8 % (ref 36–46)
HGB BLD-MCNC: 9.3 G/DL (ref 12–16)
MAGNESIUM SERPL-MCNC: 2.07 MG/DL (ref 1.6–2.4)
MCH RBC QN AUTO: 22.7 PG (ref 26–34)
MCHC RBC AUTO-ENTMCNC: 28.4 G/DL (ref 32–36)
MCV RBC AUTO: 80 FL (ref 80–100)
NRBC BLD-RTO: 0.2 /100 WBCS (ref 0–0)
P AXIS: 55 DEGREES
P OFFSET: 174 MS
P ONSET: 120 MS
PLATELET # BLD AUTO: 371 X10*3/UL (ref 150–450)
POTASSIUM SERPL-SCNC: 4.2 MMOL/L (ref 3.5–5.3)
PR INTERVAL: 184 MS
Q ONSET: 212 MS
QRS COUNT: 11 BEATS
QRS DURATION: 80 MS
QT INTERVAL: 398 MS
QTC CALCULATION(BAZETT): 403 MS
QTC FREDERICIA: 402 MS
R AXIS: -8 DEGREES
RBC # BLD AUTO: 4.1 X10*6/UL (ref 4–5.2)
SODIUM SERPL-SCNC: 139 MMOL/L (ref 136–145)
T AXIS: 29 DEGREES
T OFFSET: 411 MS
UFH PPP CHRO-ACNC: 0.3 IU/ML
VENTRICULAR RATE: 62 BPM
WBC # BLD AUTO: 12.3 X10*3/UL (ref 4.4–11.3)

## 2024-02-14 PROCEDURE — 83735 ASSAY OF MAGNESIUM: CPT | Performed by: INTERNAL MEDICINE

## 2024-02-14 PROCEDURE — 2500000001 HC RX 250 WO HCPCS SELF ADMINISTERED DRUGS (ALT 637 FOR MEDICARE OP): Performed by: INTERNAL MEDICINE

## 2024-02-14 PROCEDURE — 93010 ELECTROCARDIOGRAM REPORT: CPT | Performed by: INTERNAL MEDICINE

## 2024-02-14 PROCEDURE — 2500000004 HC RX 250 GENERAL PHARMACY W/ HCPCS (ALT 636 FOR OP/ED)

## 2024-02-14 PROCEDURE — 99232 SBSQ HOSP IP/OBS MODERATE 35: CPT | Performed by: INTERNAL MEDICINE

## 2024-02-14 PROCEDURE — 1200000002 HC GENERAL ROOM WITH TELEMETRY DAILY

## 2024-02-14 PROCEDURE — 82947 ASSAY GLUCOSE BLOOD QUANT: CPT

## 2024-02-14 PROCEDURE — 2500000001 HC RX 250 WO HCPCS SELF ADMINISTERED DRUGS (ALT 637 FOR MEDICARE OP): Performed by: NURSE PRACTITIONER

## 2024-02-14 PROCEDURE — 2500000002 HC RX 250 W HCPCS SELF ADMINISTERED DRUGS (ALT 637 FOR MEDICARE OP, ALT 636 FOR OP/ED)

## 2024-02-14 PROCEDURE — 36415 COLL VENOUS BLD VENIPUNCTURE: CPT | Performed by: INTERNAL MEDICINE

## 2024-02-14 PROCEDURE — 2500000002 HC RX 250 W HCPCS SELF ADMINISTERED DRUGS (ALT 637 FOR MEDICARE OP, ALT 636 FOR OP/ED): Performed by: INTERNAL MEDICINE

## 2024-02-14 PROCEDURE — 82435 ASSAY OF BLOOD CHLORIDE: CPT | Performed by: INTERNAL MEDICINE

## 2024-02-14 PROCEDURE — 2500000001 HC RX 250 WO HCPCS SELF ADMINISTERED DRUGS (ALT 637 FOR MEDICARE OP)

## 2024-02-14 PROCEDURE — 85520 HEPARIN ASSAY: CPT

## 2024-02-14 PROCEDURE — 85027 COMPLETE CBC AUTOMATED: CPT | Performed by: INTERNAL MEDICINE

## 2024-02-14 PROCEDURE — 94640 AIRWAY INHALATION TREATMENT: CPT

## 2024-02-14 PROCEDURE — 93005 ELECTROCARDIOGRAM TRACING: CPT

## 2024-02-14 PROCEDURE — 2500000004 HC RX 250 GENERAL PHARMACY W/ HCPCS (ALT 636 FOR OP/ED): Performed by: NURSE PRACTITIONER

## 2024-02-14 RX ORDER — ONDANSETRON HYDROCHLORIDE 2 MG/ML
4 INJECTION, SOLUTION INTRAVENOUS EVERY 4 HOURS PRN
Status: DISCONTINUED | OUTPATIENT
Start: 2024-02-14 | End: 2024-02-17 | Stop reason: HOSPADM

## 2024-02-14 RX ORDER — POLYETHYLENE GLYCOL 3350, SODIUM CHLORIDE, SODIUM BICARBONATE, POTASSIUM CHLORIDE 420; 11.2; 5.72; 1.48 G/4L; G/4L; G/4L; G/4L
2000 POWDER, FOR SOLUTION ORAL ONCE AS NEEDED
Status: DISCONTINUED | OUTPATIENT
Start: 2024-02-14 | End: 2024-02-17 | Stop reason: HOSPADM

## 2024-02-14 RX ORDER — POLYETHYLENE GLYCOL 3350, SODIUM CHLORIDE, SODIUM BICARBONATE, POTASSIUM CHLORIDE 420; 11.2; 5.72; 1.48 G/4L; G/4L; G/4L; G/4L
4000 POWDER, FOR SOLUTION ORAL ONCE
Status: COMPLETED | OUTPATIENT
Start: 2024-02-14 | End: 2024-02-14

## 2024-02-14 RX ORDER — POLYETHYLENE GLYCOL 3350 17 G/17G
238 POWDER, FOR SOLUTION ORAL ONCE AS NEEDED
Status: DISCONTINUED | OUTPATIENT
Start: 2024-02-14 | End: 2024-02-17 | Stop reason: HOSPADM

## 2024-02-14 RX ADMIN — ONDANSETRON 4 MG: 2 INJECTION INTRAMUSCULAR; INTRAVENOUS at 05:03

## 2024-02-14 RX ADMIN — DICYCLOMINE HYDROCHLORIDE 20 MG: 10 CAPSULE ORAL at 08:29

## 2024-02-14 RX ADMIN — HEPARIN SODIUM 700 UNITS/HR: 10000 INJECTION, SOLUTION INTRAVENOUS at 00:01

## 2024-02-14 RX ADMIN — BRIMONIDINE TARTRATE 1 DROP: 2 SOLUTION/ DROPS OPHTHALMIC at 08:30

## 2024-02-14 RX ADMIN — DICYCLOMINE HYDROCHLORIDE 20 MG: 10 CAPSULE ORAL at 15:41

## 2024-02-14 RX ADMIN — TIMOLOL MALEATE 1 DROP: 5 SOLUTION/ DROPS OPHTHALMIC at 21:43

## 2024-02-14 RX ADMIN — METOPROLOL SUCCINATE 200 MG: 100 TABLET, EXTENDED RELEASE ORAL at 08:29

## 2024-02-14 RX ADMIN — POLYETHYLENE GLYCOL 3350, SODIUM SULFATE ANHYDROUS, SODIUM BICARBONATE, SODIUM CHLORIDE, POTASSIUM CHLORIDE 4000 ML: 236; 22.74; 6.74; 5.86; 2.97 POWDER, FOR SOLUTION ORAL at 16:59

## 2024-02-14 RX ADMIN — LOSARTAN POTASSIUM 25 MG: 25 TABLET, FILM COATED ORAL at 21:43

## 2024-02-14 RX ADMIN — LORATADINE 10 MG: 10 TABLET ORAL at 08:37

## 2024-02-14 RX ADMIN — PANTOPRAZOLE SODIUM 40 MG: 40 TABLET, DELAYED RELEASE ORAL at 08:29

## 2024-02-14 RX ADMIN — BRIMONIDINE TARTRATE 1 DROP: 2 SOLUTION/ DROPS OPHTHALMIC at 21:42

## 2024-02-14 RX ADMIN — ROSUVASTATIN CALCIUM 20 MG: 20 TABLET, FILM COATED ORAL at 21:43

## 2024-02-14 RX ADMIN — PANTOPRAZOLE SODIUM 40 MG: 40 TABLET, DELAYED RELEASE ORAL at 15:41

## 2024-02-14 RX ADMIN — TIMOLOL MALEATE 1 DROP: 5 SOLUTION/ DROPS OPHTHALMIC at 08:31

## 2024-02-14 RX ADMIN — DICYCLOMINE HYDROCHLORIDE 20 MG: 10 CAPSULE ORAL at 21:43

## 2024-02-14 RX ADMIN — TIOTROPIUM BROMIDE INHALATION SPRAY 2 PUFF: 3.12 SPRAY, METERED RESPIRATORY (INHALATION) at 09:55

## 2024-02-14 RX ADMIN — FAMOTIDINE 20 MG: 20 TABLET, FILM COATED ORAL at 21:43

## 2024-02-14 RX ADMIN — LATANOPROST 1 DROP: 50 SOLUTION/ DROPS OPHTHALMIC at 08:35

## 2024-02-14 ASSESSMENT — PAIN SCALES - GENERAL
PAINLEVEL_OUTOF10: 0 - NO PAIN
PAINLEVEL_OUTOF10: 4
PAINLEVEL_OUTOF10: 0 - NO PAIN

## 2024-02-14 ASSESSMENT — COGNITIVE AND FUNCTIONAL STATUS - GENERAL
CLIMB 3 TO 5 STEPS WITH RAILING: A LOT
WALKING IN HOSPITAL ROOM: A LITTLE
TOILETING: A LITTLE
DRESSING REGULAR LOWER BODY CLOTHING: A LITTLE
DAILY ACTIVITIY SCORE: 20
HELP NEEDED FOR BATHING: A LITTLE
MOVING TO AND FROM BED TO CHAIR: A LITTLE
MOVING FROM LYING ON BACK TO SITTING ON SIDE OF FLAT BED WITH BEDRAILS: A LITTLE
MOVING TO AND FROM BED TO CHAIR: A LITTLE
MOBILITY SCORE: 17
HELP NEEDED FOR BATHING: A LITTLE
DRESSING REGULAR UPPER BODY CLOTHING: A LITTLE
TURNING FROM BACK TO SIDE WHILE IN FLAT BAD: A LOT
WALKING IN HOSPITAL ROOM: A LITTLE
TOILETING: A LITTLE
STANDING UP FROM CHAIR USING ARMS: A LITTLE
DAILY ACTIVITIY SCORE: 18
HELP NEEDED FOR BATHING: A LITTLE
STANDING UP FROM CHAIR USING ARMS: A LITTLE
DRESSING REGULAR LOWER BODY CLOTHING: A LITTLE
DRESSING REGULAR LOWER BODY CLOTHING: A LITTLE
DRESSING REGULAR UPPER BODY CLOTHING: A LITTLE
MOVING TO AND FROM BED TO CHAIR: A LITTLE
CLIMB 3 TO 5 STEPS WITH RAILING: TOTAL
TURNING FROM BACK TO SIDE WHILE IN FLAT BAD: A LITTLE
DRESSING REGULAR UPPER BODY CLOTHING: A LITTLE
TOILETING: A LITTLE
EATING MEALS: A LITTLE
STANDING UP FROM CHAIR USING ARMS: A LITTLE
MOBILITY SCORE: 17
WALKING IN HOSPITAL ROOM: A LITTLE
TURNING FROM BACK TO SIDE WHILE IN FLAT BAD: A LITTLE
DAILY ACTIVITIY SCORE: 20
MOVING FROM LYING ON BACK TO SITTING ON SIDE OF FLAT BED WITH BEDRAILS: A LITTLE
PERSONAL GROOMING: A LITTLE
MOVING FROM LYING ON BACK TO SITTING ON SIDE OF FLAT BED WITH BEDRAILS: A LITTLE
MOBILITY SCORE: 15
CLIMB 3 TO 5 STEPS WITH RAILING: A LOT

## 2024-02-14 ASSESSMENT — PAIN DESCRIPTION - DESCRIPTORS: DESCRIPTORS: CRAMPING

## 2024-02-14 ASSESSMENT — PAIN - FUNCTIONAL ASSESSMENT
PAIN_FUNCTIONAL_ASSESSMENT: 0-10

## 2024-02-14 NOTE — PROGRESS NOTES
"Vania Andrews is a 69 y.o. female on day 8 of admission presenting with Acute saddle pulmonary embolism without acute cor pulmonale (CMS/HCC).    Subjective   Patient sitting up at bedside. Denies any melena or hematochezia. No hematuria. No vomiting.  Discussed with bedside nursing and there has been no signs of bleeding.      Objective     General: Sitting up in a chair without distress.  Cooperative. Moderately obese.  Skin: No rashes or ulcerations.  HEENT: Sclera is white.  Mucous membranes moist.  Neck: Supple.  No JVD.  Cardiac: Regular rate and rhythm, S1/S2 normal.  Lungs: Clear to auscultation bilaterally, no wheezing or crackles, no accessory muscle use at rest.  Abdomen: Soft, nontender, moderately obese abdomen, BS +  Extremities: No cyanosis.  Obesity versus edema, difficult to tell.  Neurologic: Alert and oriented x3.  No focal deficits.  Psychiatric: Appropriate mood and behavior.  Currently no agitation.     Last Recorded Vitals  Blood pressure 113/63, pulse 61, temperature 36.6 °C (97.9 °F), resp. rate 18, height 1.499 m (4' 11\"), weight 86.2 kg (190 lb), SpO2 98 %. On room air.    Intake/Output last 3 Shifts:  I/O last 3 completed shifts:  In: 600 (7 mL/kg) [P.O.:600]  Out: 2 (0 mL/kg) [Stool:2]  Weight: 86.2 kg     Relevant Results  [Held by provider] amLODIPine, 10 mg, oral, Daily  betamethasone (augmented), 1 Application, Topical, Daily  brimonidine, 1 drop, Both Eyes, BID  dicyclomine, 20 mg, oral, TID  famotidine, 20 mg, oral, Nightly  insulin lispro, 0-10 Units, subcutaneous, TID with meals  latanoprost, 1 drop, Both Eyes, q AM  loratadine, 10 mg, oral, Daily  losartan, 25 mg, oral, Nightly  metoprolol succinate XL, 200 mg, oral, Daily  pantoprazole, 40 mg, oral, BID AC  polyethylene glycol, 17 g, oral, Daily  rosuvastatin, 20 mg, oral, Nightly  timolol, 1 drop, Both Eyes, BID  tiotropium, 2 Inhalation, inhalation, Daily       heparin, 0-4,500 Units/hr, Last Rate: 700 Units/hr (02/14/24 " 0001)       PRN medications: acetaminophen, albuterol, benzocaine-menthol, benzonatate, dextrose 10 % in water (D10W), dextrose, glucagon, heparin, ondansetron, polyethylene glycol     Results for orders placed or performed during the hospital encounter of 02/05/24 (from the past 24 hour(s))   POCT GLUCOSE   Result Value Ref Range    POCT Glucose 198 (H) 74 - 99 mg/dL   POCT GLUCOSE   Result Value Ref Range    POCT Glucose 148 (H) 74 - 99 mg/dL   CBC   Result Value Ref Range    WBC 12.3 (H) 4.4 - 11.3 x10*3/uL    nRBC 0.2 (H) 0.0 - 0.0 /100 WBCs    RBC 4.10 4.00 - 5.20 x10*6/uL    Hemoglobin 9.3 (L) 12.0 - 16.0 g/dL    Hematocrit 32.8 (L) 36.0 - 46.0 %    MCV 80 80 - 100 fL    MCH 22.7 (L) 26.0 - 34.0 pg    MCHC 28.4 (L) 32.0 - 36.0 g/dL    RDW 31.0 (H) 11.5 - 14.5 %    Platelets 371 150 - 450 x10*3/uL   Heparin Assay, UFH   Result Value Ref Range    Heparin Unfractionated 0.3 See Comment Below for Therapeutic Ranges IU/mL   POCT GLUCOSE   Result Value Ref Range    POCT Glucose 129 (H) 74 - 99 mg/dL        Hospital Course:  Vania Andrews is a 68 y.o. 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  She had recent admission for acute on chronic anemia, dyspnea with chronic cough.   Patient was sent in from her hematologist office after she was evaluated for dyspnea and fatigue.  Her hematologist sent her to the ED with concern for potential PE.  Patient was not have acute saddle PE and new acute right lower extremity DVT.  Patient placed on IV heparin drip and admitted  to stepdown unit. Patient had melanotic stool and GI consulted. EGD done 2/9 without any active bleeding. Discussed with patient who she wants to manage her anticoagulation and she has requested her Hematologist.    Assessment/Plan     Acute saddle PE  Acute right lower extremity DVT  History of chronic left lower extremity thrombosis  -Patient reports that the thrombosis she had at [facility] is her second episode.  She report previous episode of DVT in her lower extremities prior to the diagnosis in November.   -Reviewing ultrasound report in November patient was diagnosed with chronic DVT of the left lower extremity.  -Patient reports that she was on Eliquis in November for about 2 weeks, went to see vascular medicine as she had been recommended and was told due to history of GI bleed may be too risky and recommended to be on baby aspirin.  Patient reports she has been on baby aspirin since then prior to presenting to the hospital.  -Continue IV heparin drip and monitor for any signs of bleeding.    -Hemoglobin stable at 9.3 today.  -Hematology following appreciate recommendations.     GI bleed  History of GERD  -Status post EGD on 2/9.  No active bleeding found. GI recommended outpatient capsule endoscopy. Possible colonoscopy inpatient? Will reach out to GI  -Continue PPI twice a day.  -Monitor hemoglobin.    History of iron deficiency anemia  -Follows with Dr. Spears.  Patient states she also wishes to talk to her hematologist about thrombosis.  -Hematology recommends IV iron sucrose 300 mg x 1.  -Last Hgb stable at 9.3      Hypertension  Hyperlipidemia  Coronary artery disease with history of MI 2019  -Have been holding Amlodipine and losartan due to bleeding and blood pressure low normal. Continued metoprolol and Rosuvastatin.  -Systolic blood pressure currently 140s - 150's.  -Will plan to restart losartan 25 mg daily.    Diabetes mellitus type 2  -Holding home Trulicity (weekly)  -Continue SSI to #2 scale TID  with meals  -HgbA1C 7.1 July of last year  -Currently fasting and postprandial glucose not controlled.      History of glaucoma  -Continue home eyedrops.  -Hospital does not have Rhopressa, patient advised to bring in her own medications.    History of osteoarthritis  -Avoid NSAIDs.  Tylenol as needed.    DVT prophylaxis  -SCDs.      Code Status: FULL CODE   NOK: Bryce Corea (Spouse) [phone]      Yanet Chavez MD

## 2024-02-14 NOTE — CARE PLAN
The patient's goals for the shift include be safe and free from fall/injury throughout the shift.    The clinical goals for the shift include Pt will not have melana or sign or symptoms of GIB through out the shift.    Problem: Safety  Goal: Patient will be injury free during hospitalization  Outcome: Progressing  Goal: I will remain free of falls  Outcome: Progressing     Problem: Discharge Barriers  Goal: My discharge needs are met  Outcome: Progressing     Problem: Skin  Goal: Decreased wound size/increased tissue granulation at next dressing change  Outcome: Progressing  Goal: Participates in plan/prevention/treatment measures  Outcome: Progressing  Goal: Prevent/manage excess moisture  Outcome: Progressing  Goal: Prevent/minimize sheer/friction injuries  Outcome: Progressing  Goal: Promote/optimize nutrition  Outcome: Progressing  Goal: Promote skin healing  Outcome: Progressing

## 2024-02-14 NOTE — CARE PLAN
The patient's goals for the shift include Be able to get out of bed    The clinical goals for the shift include Will not have s/s GIB during shift      Problem: Chronic Conditions and Co-morbidities  Goal: Patient's chronic conditions and co-morbidity symptoms are monitored and maintained or improved  Flowsheets (Taken 2/14/2024 0057)  Care Plan - Patient's Chronic Conditions and Co-Morbidity Symptoms are Monitored and Maintained or Improved:   Monitor and assess patient's chronic conditions and comorbid symptoms for stability, deterioration, or improvement   Update acute care plan with appropriate goals if chronic or comorbid symptoms are exacerbated and prevent overall improvement and discharge   Collaborate with multidisciplinary team to address chronic and comorbid conditions and prevent exacerbation or deterioration

## 2024-02-14 NOTE — PROGRESS NOTES
Vania Andrews is a 69 y.o. female on day 8 of admission presenting with Acute saddle pulmonary embolism without acute cor pulmonale (CMS/HCC).    Transitional Care Coordination Progress Note:  Patient discussed during interdisciplinary rounds.   Team members present: MD and TCC  Plan per Medical/Surgical team: Patient getting Colonoscopy today or tomorrow  Payor: Medicare  Discharge disposition: Home, no needs does not want rehab or home care and will make her own follow up appointments.  Potential Barriers: None  ADOD: 02/16/24    JARET HUGO

## 2024-02-15 ENCOUNTER — APPOINTMENT (OUTPATIENT)
Dept: GASTROENTEROLOGY | Facility: HOSPITAL | Age: 69
DRG: 176 | End: 2024-02-15
Payer: MEDICARE

## 2024-02-15 LAB
ANION GAP SERPL CALC-SCNC: 16 MMOL/L (ref 10–20)
BASOPHILS # BLD MANUAL: 0.18 X10*3/UL (ref 0–0.1)
BASOPHILS NFR BLD MANUAL: 1.7 %
BUN SERPL-MCNC: 13 MG/DL (ref 6–23)
CALCIUM SERPL-MCNC: 10.2 MG/DL (ref 8.6–10.6)
CHLORIDE SERPL-SCNC: 106 MMOL/L (ref 98–107)
CO2 SERPL-SCNC: 25 MMOL/L (ref 21–32)
CREAT SERPL-MCNC: 1.24 MG/DL (ref 0.5–1.05)
EGFRCR SERPLBLD CKD-EPI 2021: 48 ML/MIN/1.73M*2
EOSINOPHIL # BLD MANUAL: 0.19 X10*3/UL (ref 0–0.7)
EOSINOPHIL NFR BLD MANUAL: 1.8 %
ERYTHROCYTE [DISTWIDTH] IN BLOOD BY AUTOMATED COUNT: 30.9 % (ref 11.5–14.5)
GLUCOSE BLD MANUAL STRIP-MCNC: 117 MG/DL (ref 74–99)
GLUCOSE BLD MANUAL STRIP-MCNC: 128 MG/DL (ref 74–99)
GLUCOSE BLD MANUAL STRIP-MCNC: 97 MG/DL (ref 74–99)
GLUCOSE SERPL-MCNC: 117 MG/DL (ref 74–99)
HCT VFR BLD AUTO: 29.3 % (ref 36–46)
HGB BLD-MCNC: 8.3 G/DL (ref 12–16)
HYPOCHROMIA BLD QL SMEAR: ABNORMAL
IMM GRANULOCYTES # BLD AUTO: 0.04 X10*3/UL (ref 0–0.7)
IMM GRANULOCYTES NFR BLD AUTO: 0.4 % (ref 0–0.9)
INR PPP: 1.1 (ref 0.9–1.1)
LYMPHOCYTES # BLD MANUAL: 0.8 X10*3/UL (ref 1.2–4.8)
LYMPHOCYTES NFR BLD MANUAL: 7.8 %
MCH RBC QN AUTO: 22.1 PG (ref 26–34)
MCHC RBC AUTO-ENTMCNC: 28.3 G/DL (ref 32–36)
MCV RBC AUTO: 78 FL (ref 80–100)
MONOCYTES # BLD MANUAL: 0.36 X10*3/UL (ref 0.1–1)
MONOCYTES NFR BLD MANUAL: 3.5 %
NEUTS SEG # BLD MANUAL: 8.78 X10*3/UL (ref 1.2–7)
NEUTS SEG NFR BLD MANUAL: 85.2 %
NRBC BLD-RTO: 0.2 /100 WBCS (ref 0–0)
PLATELET # BLD AUTO: 321 X10*3/UL (ref 150–450)
POTASSIUM SERPL-SCNC: 4.5 MMOL/L (ref 3.5–5.3)
PROTHROMBIN TIME: 12.9 SECONDS (ref 9.8–12.8)
RBC # BLD AUTO: 3.76 X10*6/UL (ref 4–5.2)
RBC MORPH BLD: ABNORMAL
SODIUM SERPL-SCNC: 142 MMOL/L (ref 136–145)
TOTAL CELLS COUNTED BLD: 115
UFH PPP CHRO-ACNC: 0.2 IU/ML
WBC # BLD AUTO: 10.3 X10*3/UL (ref 4.4–11.3)

## 2024-02-15 PROCEDURE — G0500 MOD SEDAT ENDO SERVICE >5YRS: HCPCS | Performed by: INTERNAL MEDICINE

## 2024-02-15 PROCEDURE — 2500000004 HC RX 250 GENERAL PHARMACY W/ HCPCS (ALT 636 FOR OP/ED)

## 2024-02-15 PROCEDURE — 80048 BASIC METABOLIC PNL TOTAL CA: CPT | Performed by: INTERNAL MEDICINE

## 2024-02-15 PROCEDURE — 2500000004 HC RX 250 GENERAL PHARMACY W/ HCPCS (ALT 636 FOR OP/ED): Performed by: INTERNAL MEDICINE

## 2024-02-15 PROCEDURE — 2500000001 HC RX 250 WO HCPCS SELF ADMINISTERED DRUGS (ALT 637 FOR MEDICARE OP): Performed by: NURSE PRACTITIONER

## 2024-02-15 PROCEDURE — 2500000002 HC RX 250 W HCPCS SELF ADMINISTERED DRUGS (ALT 637 FOR MEDICARE OP, ALT 636 FOR OP/ED)

## 2024-02-15 PROCEDURE — 7100000010 HC PHASE TWO TIME - EACH INCREMENTAL 1 MINUTE

## 2024-02-15 PROCEDURE — 2500000001 HC RX 250 WO HCPCS SELF ADMINISTERED DRUGS (ALT 637 FOR MEDICARE OP)

## 2024-02-15 PROCEDURE — 85027 COMPLETE CBC AUTOMATED: CPT | Performed by: INTERNAL MEDICINE

## 2024-02-15 PROCEDURE — 99232 SBSQ HOSP IP/OBS MODERATE 35: CPT | Performed by: INTERNAL MEDICINE

## 2024-02-15 PROCEDURE — 45378 DIAGNOSTIC COLONOSCOPY: CPT | Performed by: INTERNAL MEDICINE

## 2024-02-15 PROCEDURE — 3700000012 HC SEDATION LEVEL 5+ TIME - INITIAL 15 MINUTES 5/> YEARS

## 2024-02-15 PROCEDURE — 82947 ASSAY GLUCOSE BLOOD QUANT: CPT

## 2024-02-15 PROCEDURE — 85520 HEPARIN ASSAY: CPT

## 2024-02-15 PROCEDURE — 85007 BL SMEAR W/DIFF WBC COUNT: CPT | Performed by: INTERNAL MEDICINE

## 2024-02-15 PROCEDURE — 1200000002 HC GENERAL ROOM WITH TELEMETRY DAILY

## 2024-02-15 PROCEDURE — 7100000009 HC PHASE TWO TIME - INITIAL BASE CHARGE

## 2024-02-15 PROCEDURE — 94640 AIRWAY INHALATION TREATMENT: CPT

## 2024-02-15 PROCEDURE — 85610 PROTHROMBIN TIME: CPT | Performed by: INTERNAL MEDICINE

## 2024-02-15 PROCEDURE — 0DJD8ZZ INSPECTION OF LOWER INTESTINAL TRACT, VIA NATURAL OR ARTIFICIAL OPENING ENDOSCOPIC: ICD-10-PCS | Performed by: INTERNAL MEDICINE

## 2024-02-15 RX ORDER — FENTANYL CITRATE 50 UG/ML
INJECTION, SOLUTION INTRAMUSCULAR; INTRAVENOUS AS NEEDED
Status: COMPLETED | OUTPATIENT
Start: 2024-02-15 | End: 2024-02-15

## 2024-02-15 RX ORDER — MIDAZOLAM HYDROCHLORIDE 1 MG/ML
INJECTION, SOLUTION INTRAMUSCULAR; INTRAVENOUS AS NEEDED
Status: COMPLETED | OUTPATIENT
Start: 2024-02-15 | End: 2024-02-15

## 2024-02-15 RX ADMIN — ROSUVASTATIN CALCIUM 20 MG: 20 TABLET, FILM COATED ORAL at 20:33

## 2024-02-15 RX ADMIN — FENTANYL CITRATE 25 MCG: 50 INJECTION, SOLUTION INTRAMUSCULAR; INTRAVENOUS at 17:40

## 2024-02-15 RX ADMIN — ACETAMINOPHEN 650 MG: 325 TABLET ORAL at 20:33

## 2024-02-15 RX ADMIN — TIMOLOL MALEATE 1 DROP: 5 SOLUTION/ DROPS OPHTHALMIC at 20:33

## 2024-02-15 RX ADMIN — BRIMONIDINE TARTRATE 1 DROP: 2 SOLUTION/ DROPS OPHTHALMIC at 20:33

## 2024-02-15 RX ADMIN — FENTANYL CITRATE 50 MCG: 50 INJECTION, SOLUTION INTRAMUSCULAR; INTRAVENOUS at 17:32

## 2024-02-15 RX ADMIN — ACETAMINOPHEN 650 MG: 325 TABLET ORAL at 03:07

## 2024-02-15 RX ADMIN — TIMOLOL MALEATE 1 DROP: 5 SOLUTION/ DROPS OPHTHALMIC at 09:54

## 2024-02-15 RX ADMIN — BRIMONIDINE TARTRATE 1 DROP: 2 SOLUTION/ DROPS OPHTHALMIC at 09:54

## 2024-02-15 RX ADMIN — MIDAZOLAM 2 MG: 1 INJECTION INTRAMUSCULAR; INTRAVENOUS at 17:32

## 2024-02-15 RX ADMIN — TIOTROPIUM BROMIDE INHALATION SPRAY 2 PUFF: 3.12 SPRAY, METERED RESPIRATORY (INHALATION) at 10:05

## 2024-02-15 RX ADMIN — DICYCLOMINE HYDROCHLORIDE 20 MG: 10 CAPSULE ORAL at 20:33

## 2024-02-15 RX ADMIN — ACETAMINOPHEN 650 MG: 325 TABLET ORAL at 09:55

## 2024-02-15 RX ADMIN — FAMOTIDINE 20 MG: 20 TABLET, FILM COATED ORAL at 20:33

## 2024-02-15 RX ADMIN — LATANOPROST 1 DROP: 50 SOLUTION/ DROPS OPHTHALMIC at 09:54

## 2024-02-15 RX ADMIN — LOSARTAN POTASSIUM 25 MG: 25 TABLET, FILM COATED ORAL at 20:33

## 2024-02-15 RX ADMIN — METOPROLOL SUCCINATE 200 MG: 100 TABLET, EXTENDED RELEASE ORAL at 09:54

## 2024-02-15 RX ADMIN — HEPARIN SODIUM 700 UNITS/HR: 10000 INJECTION, SOLUTION INTRAVENOUS at 19:16

## 2024-02-15 RX ADMIN — MIDAZOLAM 1 MG: 1 INJECTION INTRAMUSCULAR; INTRAVENOUS at 17:40

## 2024-02-15 ASSESSMENT — PAIN DESCRIPTION - LOCATION
LOCATION: HEAD
LOCATION: HIP
LOCATION: HEAD

## 2024-02-15 ASSESSMENT — COGNITIVE AND FUNCTIONAL STATUS - GENERAL
CLIMB 3 TO 5 STEPS WITH RAILING: A LOT
TURNING FROM BACK TO SIDE WHILE IN FLAT BAD: A LITTLE
MOBILITY SCORE: 16
MOVING TO AND FROM BED TO CHAIR: A LITTLE
DRESSING REGULAR UPPER BODY CLOTHING: A LITTLE
DAILY ACTIVITIY SCORE: 20
DRESSING REGULAR LOWER BODY CLOTHING: A LITTLE
STANDING UP FROM CHAIR USING ARMS: A LITTLE
WALKING IN HOSPITAL ROOM: A LOT
TOILETING: A LITTLE
HELP NEEDED FOR BATHING: A LITTLE
MOVING FROM LYING ON BACK TO SITTING ON SIDE OF FLAT BED WITH BEDRAILS: A LITTLE

## 2024-02-15 ASSESSMENT — PAIN SCALES - GENERAL
PAINLEVEL_OUTOF10: 0 - NO PAIN
PAINLEVEL_OUTOF10: 0 - NO PAIN
PAINLEVEL_OUTOF10: 2
PAINLEVEL_OUTOF10: 0 - NO PAIN
PAINLEVEL_OUTOF10: 3
PAINLEVEL_OUTOF10: 0 - NO PAIN
PAINLEVEL_OUTOF10: 0 - NO PAIN
PAINLEVEL_OUTOF10: 3
PAINLEVEL_OUTOF10: 0 - NO PAIN
PAINLEVEL_OUTOF10: 2
PAINLEVEL_OUTOF10: 5 - MODERATE PAIN
PAINLEVEL_OUTOF10: 0 - NO PAIN
PAINLEVEL_OUTOF10: 0 - NO PAIN
PAINLEVEL_OUTOF10: 2
PAINLEVEL_OUTOF10: 4
PAINLEVEL_OUTOF10: 0 - NO PAIN
PAINLEVEL_OUTOF10: 0 - NO PAIN
PAINLEVEL_OUTOF10: 2
PAINLEVEL_OUTOF10: 3

## 2024-02-15 ASSESSMENT — PAIN - FUNCTIONAL ASSESSMENT
PAIN_FUNCTIONAL_ASSESSMENT: 0-10

## 2024-02-15 ASSESSMENT — PAIN DESCRIPTION - ORIENTATION: ORIENTATION: RIGHT

## 2024-02-15 ASSESSMENT — PAIN DESCRIPTION - DESCRIPTORS: DESCRIPTORS: ACHING

## 2024-02-15 NOTE — NURSING NOTE
End of shift note  VSS. Patient reported headache and dizziness. Tylenol administered and VSS. DO Jad notified. No new orders or interventions needed after tylenol. Patient remained free from injury and/or falls. Patient bowel prepped during the night. BM are now clear yellow in order for colonoscopy today. Heparin gtt turned off 4am. Patient remains safe at this time with call light in reach and non-skid socks applied. Peggy Hayden RN

## 2024-02-15 NOTE — CARE PLAN
Problem: Safety  Goal: Patient will be injury free during hospitalization  Outcome: Progressing     Problem: Safety  Goal: I will remain free of falls  Outcome: Progressing     Problem: Skin  Goal: Promote skin healing  Outcome: Progressing  Flowsheets (Taken 2/15/2024 1232)  Promote skin healing:   Assess skin/pad under line(s)/device(s)   Turn/reposition every 2 hours/use positioning/transfer devices     Problem: Skin  Goal: Promote skin healing  Outcome: Progressing  Flowsheets (Taken 2/15/2024 1232)  Promote skin healing:   Assess skin/pad under line(s)/device(s)   Turn/reposition every 2 hours/use positioning/transfer devices   The patient's goals for the shift include Be able to get out of bed    The clinical goals for the shift include patients hgb will be >7 by the end of this shift

## 2024-02-15 NOTE — PROGRESS NOTES
[facility]  [facility]  CONSULT FOLLOW-UP         SUBJECTIVE     Reason for Consult: GIB    Interval Events/Subjective:   - NAOE, pt afebrile and HDS  - Hgb stable with no evidence of GI bleeding    ROS: Complete ROS obtained, negative unless otherwise indicated above.     Medications:  [Held by provider] amLODIPine, 10 mg, oral, Daily  betamethasone (augmented), 1 Application, Topical, Daily  brimonidine, 1 drop, Both Eyes, BID  dicyclomine, 20 mg, oral, TID  famotidine, 20 mg, oral, Nightly  insulin lispro, 0-10 Units, subcutaneous, TID with meals  latanoprost, 1 drop, Both Eyes, q AM  loratadine, 10 mg, oral, Daily  losartan, 25 mg, oral, Nightly  metoprolol succinate XL, 200 mg, oral, Daily  pantoprazole, 40 mg, oral, BID AC  polyethylene glycol, 17 g, oral, Daily  rosuvastatin, 20 mg, oral, Nightly  timolol, 1 drop, Both Eyes, BID  tiotropium, 2 Inhalation, inhalation, Daily      PRN medications: acetaminophen, albuterol, benzocaine-menthol, benzonatate, dextrose 10 % in water (D10W), dextrose, glucagon, heparin, ondansetron, ondansetron, polyethylene glycol, polyethylene glycol, polyethylene glycol-electrolytes      EXAM     Physical Exam   Vitals:    02/15/24 0804   BP: 148/67   Pulse: 61   Resp: 18   Temp: 36.5 °C (97.7 °F)   SpO2: 98%         General: NAD, AA&O x 3  Eyes: EOMI, PERRLA  ENT: MMM  Heart: RRR  Lungs: No respiratory distress  Abdomen: Soft, non tender, non distended  Skin: No jaundice   Neuro: Appropriately responds to questions/commands         DATA                                                                            Labs     Lab Results   Component Value Date    WBC 12.3 (H) 02/14/2024    HGB 9.3 (L) 02/14/2024    HCT 32.8 (L) 02/14/2024    MCV 80 02/14/2024     02/14/2024     Lab Results   Component Value Date    GLUCOSE 193 (H) 02/14/2024    CALCIUM 10.2 02/14/2024     02/14/2024    K 4.2 02/14/2024    CO2 27 02/14/2024      02/14/2024    BUN 17 02/14/2024    CREATININE 1.43 (H) 02/14/2024     Lab Results   Component Value Date    ALT 26 02/05/2024    AST 22 02/05/2024    GGT 33 12/23/2020    ALKPHOS 140 (H) 02/05/2024    BILITOT 0.4 02/05/2024     Lab Results   Component Value Date    INR 1.2 (H) 02/08/2024    INR 1.0 02/08/2024    INR 1.1 02/05/2024    PROTIME 13.2 (H) 02/08/2024    PROTIME 11.5 02/08/2024    PROTIME 12.4 02/05/2024                                                                                  Imaging     === 02/05/24 ===    CT ANGIO CHEST FOR PULMONARY EMBOLISM    - Impression -  1. There is an acute saddle type pulmonary embolus extending to  bilateral upper and right lower lobar and segmental branches .  Calculated RV to LV ratio of 1.0.  2. Main pulmonary artery is dilated up to 3.5 cm which can be seen  with pulmonary arterial hypertension.  3. Large hiatal hernia with significant portion of the stomach  intrathoracic in location. There is adjacent compressive atelectasis.  4. 3.8 cm hypodense lesion in the left upper quadrant is incompletely  imaged, likely arising from the left kidney. This measures higher  than simple fluid attenuation. This can be further evaluated with  nonemergent renal ultrasound.  5. Additional findings as described above.      MACRO:  Elle Ceballos discussed the significance and urgency of this critical  finding by telephone with  Dr. Zamora On 2/6/2024 at 2:22 am.  (**-RCF-**) Findings:  See findings.    Signed by: Elle Ceballos 2/6/2024 2:26 AM  Dictation workstation:   [id number]                                                                         GI Procedures           ASSESSMENT / PLAN     Assessment and Recommendations:     Vania Andrews is a 68 y.o. 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 GI is consulted for dark stools and down-trending H/H.     # Dark stool   # Anemia  :: Developed abdominal pain, nausea dark stools during current admission for PE and on heparin  :: Hbg 9.2 (2/8 am) -> 7.8 (2/8 pm) -> 7.2 (2/9 am) -> 7.5 (2/9 pm)  :: BUN 16 (2/6) -> 42 (2/8) -> 24 (2/9)  :: Recent past admission for SOB and anemia requiring blood transfusion. Not scoped at that time with plan for capsule endoscopy  :: EGD 1/2019 showed 9 cm hiatal hernia and gastritis, small gastric lipoma. Underwent capsule endoscopy done inpatient in 2019 and was negative (no official report available).  EGD 3/2023 with 6 cm hiatal hernia, multiple gastric fundic polyps. No celiac diease. Colonoscopy 12/2011 with 5mm TA/internal hemorhoids, 1/2019 with 2mm polyp (pathology report non available).   - New dark stools following anticoagulation with drop in Hgb and rise in BUN may be due to upper GI bleed, however BUN and Hgb stabilizing now.    EGD (2/9)   Tortuous esophagus  Regular Z-line 29 cm from the incisors  Large 11 cm hiatal hernia  Multiple sessile polyps measuring smaller than 5 mm in the hiatal hernia; no bleeding was identified. Appearances consistent with fundic gland polyps as noted on recent EGD.  Linear erosion in the body of the stomach; no bleeding was identified;  Scattered patchy erythema seen in body of stomach. No evidence of bleeding  The duodenal bulb and 2nd part of the duodenum appeared normal.    Update 2/14: Re-engaged by primary team to pursue colonoscopy inpatient.     Colonoscopy (2/15):   Normal terminal ileum, ileocecal valve, and appendiceal orifice (photodocumented).  There was  significant amount of thick liquid stool and solid stool which obscured much of the underlying mucosa requiring extensive water lavage.  Multiple small, medium and large, extensive diverticula with no inflammation in the ascending colon, transverse colon, descending colon and sigmoid colon; no bleeding was identified  External medium hemorrhoids observed during retroflexion; no bleeding was identified  No polyps were visualized on this procedure.  No blood was seen in the terminal ileum or in the colon on this procedure.    Recommendations:  - Okay to resume diet      NICOLE per primary team     ------------------------------------------------------------------------  Perry Danielson MD  Gastroenterology Fellow  After 5PM and on Weekends, please page on-call fellow.    To be discussed with service attending Dr. Bower  Final recommendations pending attending attestation.

## 2024-02-15 NOTE — PROGRESS NOTES
[facility]  [facility]  CONSULT FOLLOW-UP         SUBJECTIVE     Reason for Consult: GIB    Interval Events/Subjective:   - NAOE, pt afebrile and HDS  - Hgb stable with no evidence of GI bleeding    ROS: Complete ROS obtained, negative unless otherwise indicated above.     Medications:  [Held by provider] amLODIPine, 10 mg, oral, Daily  betamethasone (augmented), 1 Application, Topical, Daily  brimonidine, 1 drop, Both Eyes, BID  dicyclomine, 20 mg, oral, TID  famotidine, 20 mg, oral, Nightly  insulin lispro, 0-10 Units, subcutaneous, TID with meals  latanoprost, 1 drop, Both Eyes, q AM  loratadine, 10 mg, oral, Daily  losartan, 25 mg, oral, Nightly  metoprolol succinate XL, 200 mg, oral, Daily  pantoprazole, 40 mg, oral, BID AC  polyethylene glycol, 17 g, oral, Daily  rosuvastatin, 20 mg, oral, Nightly  timolol, 1 drop, Both Eyes, BID  tiotropium, 2 Inhalation, inhalation, Daily      PRN medications: acetaminophen, albuterol, benzocaine-menthol, benzonatate, dextrose 10 % in water (D10W), dextrose, glucagon, heparin, ondansetron, ondansetron, polyethylene glycol, polyethylene glycol, polyethylene glycol-electrolytes      EXAM     Physical Exam   Vitals:    02/14/24 2004   BP: 153/80   Pulse: 60   Resp: 18   Temp: 37 °C (98.6 °F)   SpO2: 100%         General: NAD, AA&O x 3  Eyes: EOMI, PERRLA  ENT: MMM  Heart: RRR  Lungs: No respiratory distress  Abdomen: Soft, non tender, non distended  Skin: No jaundice   Neuro: Appropriately responds to questions/commands         DATA                                                                            Labs     Lab Results   Component Value Date    WBC 12.3 (H) 02/14/2024    HGB 9.3 (L) 02/14/2024    HCT 32.8 (L) 02/14/2024    MCV 80 02/14/2024     02/14/2024     Lab Results   Component Value Date    GLUCOSE 193 (H) 02/14/2024    CALCIUM 10.2 02/14/2024     02/14/2024    K 4.2 02/14/2024    CO2 27 02/14/2024      02/14/2024    BUN 17 02/14/2024    CREATININE 1.43 (H) 02/14/2024     Lab Results   Component Value Date    ALT 26 02/05/2024    AST 22 02/05/2024    GGT 33 12/23/2020    ALKPHOS 140 (H) 02/05/2024    BILITOT 0.4 02/05/2024     Lab Results   Component Value Date    INR 1.2 (H) 02/08/2024    INR 1.0 02/08/2024    INR 1.1 02/05/2024    PROTIME 13.2 (H) 02/08/2024    PROTIME 11.5 02/08/2024    PROTIME 12.4 02/05/2024                                                                                  Imaging     === 02/05/24 ===    CT ANGIO CHEST FOR PULMONARY EMBOLISM    - Impression -  1. There is an acute saddle type pulmonary embolus extending to  bilateral upper and right lower lobar and segmental branches .  Calculated RV to LV ratio of 1.0.  2. Main pulmonary artery is dilated up to 3.5 cm which can be seen  with pulmonary arterial hypertension.  3. Large hiatal hernia with significant portion of the stomach  intrathoracic in location. There is adjacent compressive atelectasis.  4. 3.8 cm hypodense lesion in the left upper quadrant is incompletely  imaged, likely arising from the left kidney. This measures higher  than simple fluid attenuation. This can be further evaluated with  nonemergent renal ultrasound.  5. Additional findings as described above.      MACRO:  Elle Ceballos discussed the significance and urgency of this critical  finding by telephone with  Dr. Zamora On 2/6/2024 at 2:22 am.  (**-RCF-**) Findings:  See findings.    Signed by: Elle Ceballos 2/6/2024 2:26 AM  Dictation workstation:   [id number]                                                                         GI Procedures           ASSESSMENT / PLAN     Assessment and Recommendations:     Vania Andrews is a 68 y.o. 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 GI is consulted for dark stools and down-trending H/H.     # Dark stool   # Anemia  :: Developed abdominal pain, nausea dark stools during current admission for PE and on heparin  :: Hbg 9.2 (2/8 am) -> 7.8 (2/8 pm) -> 7.2 (2/9 am) -> 7.5 (2/9 pm)  :: BUN 16 (2/6) -> 42 (2/8) -> 24 (2/9)  :: Recent past admission for SOB and anemia requiring blood transfusion. Not scoped at that time with plan for capsule endoscopy  :: EGD 1/2019 showed 9 cm hiatal hernia and gastritis, small gastric lipoma. Underwent capsule endoscopy done inpatient in 2019 and was negative (no official report available).  EGD 3/2023 with 6 cm hiatal hernia, multiple gastric fundic polyps. No celiac diease. Colonoscopy 12/2011 with 5mm TA/internal hemorhoids, 1/2019 with 2mm polyp (pathology report non available).   - New dark stools following anticoagulation with drop in Hgb and rise in BUN may be due to upper GI bleed, however BUN and Hgb stabilizing now.    EGD (2/9)   Tortuous esophagus  Regular Z-line 29 cm from the incisors  Large 11 cm hiatal hernia  Multiple sessile polyps measuring smaller than 5 mm in the hiatal hernia; no bleeding was identified. Appearances consistent with fundic gland polyps as noted on recent EGD.  Linear erosion in the body of the stomach; no bleeding was identified;  Scattered patchy erythema seen in body of stomach. No evidence of bleeding  The duodenal bulb and 2nd part of the duodenum appeared normal.    Update 2/14: Re-engaged by primary team to pursue colonoscopy inpatient.     Recommendations:  -Will plan colonoscopy tomorrow   -please maintain clear liquid diet today  -please have patient be NPO  after midnight, with exception to bowel prep and meds  -Please order Golytely 2 liters to be administered at 6 pm and an additional 2 liters at 4 am in the morning. If the patient is not having clear stools at 0600 tomorrow morning then please administer an additional 2 liters of Golytely  - Please turn off heparin gtt @ 4 am      NICOLE per primary team     ------------------------------------------------------------------------  Perry Danielson MD  Gastroenterology Fellow  After 5PM and on Weekends, please page on-call fellow.    To be discussed with service attending Dr. Bower  Final recommendations pending attending attestation.

## 2024-02-15 NOTE — CARE PLAN
The patient's goals for the shift include be safe and free from falls/injury throughout the shifr    The clinical goals for the shift include will not have sign of symptoms of GIB/melena through out the shift      Problem: Safety  Goal: Patient will be injury free during hospitalization  2/14/2024 2242 by Elzbieta Andrew RN  Outcome: Progressing  2/14/2024 1717 by Elzbieta Andrew RN  Outcome: Progressing  Goal: I will remain free of falls  2/14/2024 2242 by Elzbieta Andrew RN  Outcome: Progressing  2/14/2024 1717 by Elzbieta Andrew RN  Outcome: Progressing     Problem: Skin  Goal: Decreased wound size/increased tissue granulation at next dressing change  2/14/2024 2242 by Elzbieta Andrew RN  Outcome: Progressing  2/14/2024 1717 by Elzbieta Andrew RN  Outcome: Progressing  Goal: Participates in plan/prevention/treatment measures  2/14/2024 2242 by Elzbieta Andrew RN  Outcome: Progressing  2/14/2024 1717 by Elzbieta Andrew RN  Outcome: Progressing  Goal: Prevent/manage excess moisture  2/14/2024 2242 by Elzbieta Andrew RN  Outcome: Progressing  2/14/2024 1717 by Elzbieta Andrew RN  Outcome: Progressing  Goal: Prevent/minimize sheer/friction injuries  2/14/2024 2242 by Elzbieta Andrew RN  Outcome: Progressing  2/14/2024 1717 by Elzbieta Andrew RN  Outcome: Progressing  Goal: Promote/optimize nutrition  2/14/2024 2242 by Elzbieta Andrew RN  Outcome: Progressing  2/14/2024 1717 by Elzbieta Andrew RN  Outcome: Progressing  Goal: Promote skin healing  2/14/2024 2242 by Elzbieta Andrew RN  Outcome: Progressing  2/14/2024 1717 by Elzbieta Andrew RN  Outcome: Progressing     Problem: Chronic Conditions and Co-morbidities  Goal: Patient's chronic conditions and co-morbidity symptoms are monitored and maintained or improved  2/14/2024 2242 by Elzbieta Andrew RN  Outcome: Progressing  2/14/2024  1717 by Elzbieta Andrew, RN  Outcome: Progressing

## 2024-02-15 NOTE — PROGRESS NOTES
"Vania Andrews is a 69 y.o. female on day 9 of admission presenting with Acute saddle pulmonary embolism without acute cor pulmonale (CMS/HCC).    Subjective   Patient seen and evaluated at bedside with no active complaints. ROS negative      Objective     General: Sitting up in a chair without distress.  Cooperative. Moderately obese.  Skin: No rashes or ulcerations.  HEENT: Sclera is white.  Mucous membranes moist.  Neck: Supple.  No JVD.  Cardiac: Regular rate and rhythm, S1/S2 normal.  Lungs: Clear to auscultation bilaterally, no wheezing or crackles, no accessory muscle use at rest.  Abdomen: Soft, nontender, moderately obese abdomen, BS +  Extremities: No cyanosis.  Obesity versus edema, difficult to tell.  Neurologic: Alert and oriented x3.  No focal deficits.  Psychiatric: Appropriate mood and behavior.  Currently no agitation.     Last Recorded Vitals  Blood pressure 101/80, pulse 69, temperature 36.5 °C (97.7 °F), resp. rate 18, height 1.499 m (4' 11\"), weight 86.2 kg (190 lb), SpO2 97 %. On room air.    Intake/Output last 3 Shifts:  I/O last 3 completed shifts:  In: 749.5 (8.7 mL/kg) [P.O.:720; I.V.:29.5 (0.3 mL/kg)]  Out: - (0 mL/kg)   Weight: 86.2 kg     Relevant Results  [Held by provider] amLODIPine, 10 mg, oral, Daily  betamethasone (augmented), 1 Application, Topical, Daily  brimonidine, 1 drop, Both Eyes, BID  dicyclomine, 20 mg, oral, TID  famotidine, 20 mg, oral, Nightly  insulin lispro, 0-10 Units, subcutaneous, TID with meals  latanoprost, 1 drop, Both Eyes, q AM  loratadine, 10 mg, oral, Daily  losartan, 25 mg, oral, Nightly  metoprolol succinate XL, 200 mg, oral, Daily  pantoprazole, 40 mg, oral, BID AC  polyethylene glycol, 17 g, oral, Daily  rosuvastatin, 20 mg, oral, Nightly  timolol, 1 drop, Both Eyes, BID  tiotropium, 2 Inhalation, inhalation, Daily       heparin, 0-4,500 Units/hr, Last Rate: Stopped (02/15/24 0400)       PRN medications: acetaminophen, albuterol, benzocaine-menthol, " benzonatate, dextrose 10 % in water (D10W), dextrose, glucagon, heparin, ondansetron, ondansetron, polyethylene glycol, polyethylene glycol, polyethylene glycol-electrolytes     Results for orders placed or performed during the hospital encounter of 02/05/24 (from the past 24 hour(s))   Basic metabolic panel   Result Value Ref Range    Glucose 193 (H) 74 - 99 mg/dL    Sodium 139 136 - 145 mmol/L    Potassium 4.2 3.5 - 5.3 mmol/L    Chloride 104 98 - 107 mmol/L    Bicarbonate 27 21 - 32 mmol/L    Anion Gap 12 10 - 20 mmol/L    Urea Nitrogen 17 6 - 23 mg/dL    Creatinine 1.43 (H) 0.50 - 1.05 mg/dL    eGFR 40 (L) >60 mL/min/1.73m*2    Calcium 10.2 8.6 - 10.6 mg/dL   Magnesium   Result Value Ref Range    Magnesium 2.07 1.60 - 2.40 mg/dL   POCT GLUCOSE   Result Value Ref Range    POCT Glucose 190 (H) 74 - 99 mg/dL   POCT GLUCOSE   Result Value Ref Range    POCT Glucose 128 (H) 74 - 99 mg/dL   POCT GLUCOSE   Result Value Ref Range    POCT Glucose 117 (H) 74 - 99 mg/dL   CBC and Auto Differential   Result Value Ref Range    WBC 10.3 4.4 - 11.3 x10*3/uL    nRBC 0.2 (H) 0.0 - 0.0 /100 WBCs    RBC 3.76 (L) 4.00 - 5.20 x10*6/uL    Hemoglobin 8.3 (L) 12.0 - 16.0 g/dL    Hematocrit 29.3 (L) 36.0 - 46.0 %    MCV 78 (L) 80 - 100 fL    MCH 22.1 (L) 26.0 - 34.0 pg    MCHC 28.3 (L) 32.0 - 36.0 g/dL    RDW 30.9 (H) 11.5 - 14.5 %    Platelets 321 150 - 450 x10*3/uL    Immature Granulocytes %, Automated 0.4 0.0 - 0.9 %    Immature Granulocytes Absolute, Automated 0.04 0.00 - 0.70 x10*3/uL   Basic metabolic panel   Result Value Ref Range    Glucose 117 (H) 74 - 99 mg/dL    Sodium 142 136 - 145 mmol/L    Potassium 4.5 3.5 - 5.3 mmol/L    Chloride 106 98 - 107 mmol/L    Bicarbonate 25 21 - 32 mmol/L    Anion Gap 16 10 - 20 mmol/L    Urea Nitrogen 13 6 - 23 mg/dL    Creatinine 1.24 (H) 0.50 - 1.05 mg/dL    eGFR 48 (L) >60 mL/min/1.73m*2    Calcium 10.2 8.6 - 10.6 mg/dL   Protime-INR   Result Value Ref Range    Protime 12.9 (H) 9.8 - 12.8  seconds    INR 1.1 0.9 - 1.1   Manual Differential   Result Value Ref Range    Neutrophils %, Manual 85.2 40.0 - 80.0 %    Lymphocytes %, Manual 7.8 13.0 - 44.0 %    Monocytes %, Manual 3.5 2.0 - 10.0 %    Eosinophils %, Manual 1.8 0.0 - 6.0 %    Basophils %, Manual 1.7 0.0 - 2.0 %    Seg Neutrophils Absolute, Manual 8.78 (H) 1.20 - 7.00 x10*3/uL    Lymphocytes Absolute, Manual 0.80 (L) 1.20 - 4.80 x10*3/uL    Monocytes Absolute, Manual 0.36 0.10 - 1.00 x10*3/uL    Eosinophils Absolute, Manual 0.19 0.00 - 0.70 x10*3/uL    Basophils Absolute, Manual 0.18 (H) 0.00 - 0.10 x10*3/uL    Total Cells Counted 115     RBC Morphology See Below     Hypochromia Mild         Hospital Course:  Vania Andrews is a 68 y.o. 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  She had recent admission for acute on chronic anemia, dyspnea with chronic cough.   Patient was sent in from her hematologist office after she was evaluated for dyspnea and fatigue.  Her hematologist sent her to the ED with concern for potential PE.  Patient was not have acute saddle PE and new acute right lower extremity DVT.  Patient placed on IV heparin drip and admitted to stepdown unit. Patient had melanotic stool and GI consulted. EGD done 2/9 without any active bleeding. Discussed with patient who she wants to manage her anticoagulation and she has requested her Hematologist.    Assessment/Plan     Acute saddle PE  Acute right lower extremity DVT  History of chronic left lower extremity thrombosis  -Continue IV  heparin drip and monitor for any signs of bleeding.  (Held this am for colonoscopy)  -Hematology following appreciate recommendations.     GI bleed  History of GERD  -Status post EGD on 2/9.  No active bleeding found.   - Colonoscopy today  -Continue PPI twice a day.  -Monitor hemoglobin.    History of iron deficiency anemia  -Follows with Dr. Spears.   -Hematology recommends IV iron sucrose 300 mg x 1.      Hypertension  Hyperlipidemia  Coronary artery disease with history of MI 2019  -Have been holding Amlodipine and losartan due to bleeding and blood pressure low normal. Continued metoprolol and Rosuvastatin.  -Systolic blood pressure currently 140s - 150's.  -Will plan to restart losartan 25 mg daily.    Diabetes mellitus type 2  -Holding home Trulicity (weekly)  -Continue SSI to #2 scale TID with meals  -HgbA1C 7.1 July of last year  -Currently fasting and postprandial glucose not controlled.      History of glaucoma  -Continue home eyedrops.  -Hospital does not have Rhopressa, patient advised to bring in her own medications.    History of osteoarthritis  -Avoid NSAIDs.  Tylenol as needed.    DVT prophylaxis  -SCDs.      Code Status: FULL CODE   NOK: Bryce Corea (Spouse) [phone]      Yanet Chavez MD

## 2024-02-16 ENCOUNTER — PHARMACY VISIT (OUTPATIENT)
Dept: PHARMACY | Facility: CLINIC | Age: 69
End: 2024-02-16
Payer: MEDICARE

## 2024-02-16 VITALS
BODY MASS INDEX: 38.3 KG/M2 | OXYGEN SATURATION: 100 % | HEIGHT: 59 IN | HEART RATE: 80 BPM | DIASTOLIC BLOOD PRESSURE: 76 MMHG | WEIGHT: 190 LBS | SYSTOLIC BLOOD PRESSURE: 148 MMHG | RESPIRATION RATE: 17 BRPM | TEMPERATURE: 98.1 F

## 2024-02-16 DIAGNOSIS — D50.0 IRON DEFICIENCY ANEMIA DUE TO CHRONIC BLOOD LOSS: Primary | ICD-10-CM

## 2024-02-16 LAB
BASOPHILS # BLD AUTO: 0.06 X10*3/UL (ref 0–0.1)
BASOPHILS NFR BLD AUTO: 0.5 %
EOSINOPHIL # BLD AUTO: 0.28 X10*3/UL (ref 0–0.7)
EOSINOPHIL NFR BLD AUTO: 2.3 %
ERYTHROCYTE [DISTWIDTH] IN BLOOD BY AUTOMATED COUNT: 30.9 % (ref 11.5–14.5)
GLUCOSE BLD MANUAL STRIP-MCNC: 140 MG/DL (ref 74–99)
GLUCOSE BLD MANUAL STRIP-MCNC: 163 MG/DL (ref 74–99)
GLUCOSE BLD MANUAL STRIP-MCNC: 167 MG/DL (ref 74–99)
HCT VFR BLD AUTO: 28.8 % (ref 36–46)
HGB BLD-MCNC: 8.5 G/DL (ref 12–16)
IMM GRANULOCYTES # BLD AUTO: 0.05 X10*3/UL (ref 0–0.7)
IMM GRANULOCYTES NFR BLD AUTO: 0.4 % (ref 0–0.9)
LYMPHOCYTES # BLD AUTO: 1.38 X10*3/UL (ref 1.2–4.8)
LYMPHOCYTES NFR BLD AUTO: 11.1 %
MCH RBC QN AUTO: 22.3 PG (ref 26–34)
MCHC RBC AUTO-ENTMCNC: 29.5 G/DL (ref 32–36)
MCV RBC AUTO: 75 FL (ref 80–100)
MONOCYTES # BLD AUTO: 0.6 X10*3/UL (ref 0.1–1)
MONOCYTES NFR BLD AUTO: 4.8 %
NEUTROPHILS # BLD AUTO: 10.06 X10*3/UL (ref 1.2–7.7)
NEUTROPHILS NFR BLD AUTO: 80.9 %
NRBC BLD-RTO: 0 /100 WBCS (ref 0–0)
OVALOCYTES BLD QL SMEAR: NORMAL
PLATELET # BLD AUTO: 352 X10*3/UL (ref 150–450)
POLYCHROMASIA BLD QL SMEAR: NORMAL
RBC # BLD AUTO: 3.82 X10*6/UL (ref 4–5.2)
RBC MORPH BLD: NORMAL
SCHISTOCYTES BLD QL SMEAR: NORMAL
UFH PPP CHRO-ACNC: 0.1 IU/ML
WBC # BLD AUTO: 12.4 X10*3/UL (ref 4.4–11.3)

## 2024-02-16 PROCEDURE — 94640 AIRWAY INHALATION TREATMENT: CPT

## 2024-02-16 PROCEDURE — 2500000001 HC RX 250 WO HCPCS SELF ADMINISTERED DRUGS (ALT 637 FOR MEDICARE OP): Performed by: INTERNAL MEDICINE

## 2024-02-16 PROCEDURE — 99239 HOSP IP/OBS DSCHRG MGMT >30: CPT | Performed by: INTERNAL MEDICINE

## 2024-02-16 PROCEDURE — 82947 ASSAY GLUCOSE BLOOD QUANT: CPT

## 2024-02-16 PROCEDURE — 2500000004 HC RX 250 GENERAL PHARMACY W/ HCPCS (ALT 636 FOR OP/ED)

## 2024-02-16 PROCEDURE — 36415 COLL VENOUS BLD VENIPUNCTURE: CPT | Performed by: INTERNAL MEDICINE

## 2024-02-16 PROCEDURE — 2500000002 HC RX 250 W HCPCS SELF ADMINISTERED DRUGS (ALT 637 FOR MEDICARE OP, ALT 636 FOR OP/ED): Performed by: INTERNAL MEDICINE

## 2024-02-16 PROCEDURE — 2500000001 HC RX 250 WO HCPCS SELF ADMINISTERED DRUGS (ALT 637 FOR MEDICARE OP): Performed by: NURSE PRACTITIONER

## 2024-02-16 PROCEDURE — 85520 HEPARIN ASSAY: CPT

## 2024-02-16 PROCEDURE — RXMED WILLOW AMBULATORY MEDICATION CHARGE

## 2024-02-16 PROCEDURE — 2500000004 HC RX 250 GENERAL PHARMACY W/ HCPCS (ALT 636 FOR OP/ED): Performed by: NURSE PRACTITIONER

## 2024-02-16 PROCEDURE — 85025 COMPLETE CBC W/AUTO DIFF WBC: CPT | Performed by: INTERNAL MEDICINE

## 2024-02-16 RX ADMIN — LORATADINE 10 MG: 10 TABLET ORAL at 08:39

## 2024-02-16 RX ADMIN — PANTOPRAZOLE SODIUM 40 MG: 40 TABLET, DELAYED RELEASE ORAL at 17:14

## 2024-02-16 RX ADMIN — TIOTROPIUM BROMIDE INHALATION SPRAY 2 PUFF: 3.12 SPRAY, METERED RESPIRATORY (INHALATION) at 10:19

## 2024-02-16 RX ADMIN — ACETAMINOPHEN 650 MG: 325 TABLET ORAL at 14:03

## 2024-02-16 RX ADMIN — BENZOCAINE AND MENTHOL 1 LOZENGE: 15; 3.6 LOZENGE ORAL at 08:39

## 2024-02-16 RX ADMIN — ACETAMINOPHEN 650 MG: 325 TABLET ORAL at 08:40

## 2024-02-16 RX ADMIN — PANTOPRAZOLE SODIUM 40 MG: 40 TABLET, DELAYED RELEASE ORAL at 06:46

## 2024-02-16 RX ADMIN — METOPROLOL SUCCINATE 200 MG: 100 TABLET, EXTENDED RELEASE ORAL at 08:39

## 2024-02-16 RX ADMIN — APIXABAN 10 MG: 5 TABLET, FILM COATED ORAL at 08:39

## 2024-02-16 RX ADMIN — TIMOLOL MALEATE 1 DROP: 5 SOLUTION/ DROPS OPHTHALMIC at 08:40

## 2024-02-16 RX ADMIN — LATANOPROST 1 DROP: 50 SOLUTION/ DROPS OPHTHALMIC at 08:40

## 2024-02-16 RX ADMIN — INSULIN LISPRO 1 UNITS: 100 INJECTION, SOLUTION INTRAVENOUS; SUBCUTANEOUS at 14:03

## 2024-02-16 RX ADMIN — HEPARIN SODIUM 700 UNITS/HR: 10000 INJECTION, SOLUTION INTRAVENOUS at 03:09

## 2024-02-16 RX ADMIN — HEPARIN SODIUM 3000 UNITS: 5000 INJECTION INTRAVENOUS; SUBCUTANEOUS at 07:09

## 2024-02-16 RX ADMIN — BRIMONIDINE TARTRATE 1 DROP: 2 SOLUTION/ DROPS OPHTHALMIC at 08:40

## 2024-02-16 RX ADMIN — DICYCLOMINE HYDROCHLORIDE 20 MG: 10 CAPSULE ORAL at 08:39

## 2024-02-16 RX ADMIN — DICYCLOMINE HYDROCHLORIDE 20 MG: 10 CAPSULE ORAL at 14:03

## 2024-02-16 ASSESSMENT — COGNITIVE AND FUNCTIONAL STATUS - GENERAL
WALKING IN HOSPITAL ROOM: A LOT
CLIMB 3 TO 5 STEPS WITH RAILING: A LOT
MOVING TO AND FROM BED TO CHAIR: A LITTLE
TURNING FROM BACK TO SIDE WHILE IN FLAT BAD: A LITTLE
CLIMB 3 TO 5 STEPS WITH RAILING: A LOT
DAILY ACTIVITIY SCORE: 20
STANDING UP FROM CHAIR USING ARMS: A LITTLE
DRESSING REGULAR LOWER BODY CLOTHING: A LITTLE
DRESSING REGULAR UPPER BODY CLOTHING: A LITTLE
STANDING UP FROM CHAIR USING ARMS: A LITTLE
DRESSING REGULAR UPPER BODY CLOTHING: A LITTLE
HELP NEEDED FOR BATHING: A LITTLE
TOILETING: A LITTLE
TURNING FROM BACK TO SIDE WHILE IN FLAT BAD: A LITTLE
MOBILITY SCORE: 16
DRESSING REGULAR LOWER BODY CLOTHING: A LITTLE
MOBILITY SCORE: 16
HELP NEEDED FOR BATHING: A LITTLE
MOVING FROM LYING ON BACK TO SITTING ON SIDE OF FLAT BED WITH BEDRAILS: A LITTLE
MOVING TO AND FROM BED TO CHAIR: A LITTLE
MOVING FROM LYING ON BACK TO SITTING ON SIDE OF FLAT BED WITH BEDRAILS: A LITTLE
DAILY ACTIVITIY SCORE: 19
WALKING IN HOSPITAL ROOM: A LOT
TOILETING: A LITTLE
PERSONAL GROOMING: A LITTLE

## 2024-02-16 ASSESSMENT — PAIN - FUNCTIONAL ASSESSMENT
PAIN_FUNCTIONAL_ASSESSMENT: 0-10

## 2024-02-16 ASSESSMENT — PAIN DESCRIPTION - LOCATION
LOCATION: HEAD
LOCATION: HEAD

## 2024-02-16 ASSESSMENT — PAIN SCALES - GENERAL
PAINLEVEL_OUTOF10: 0 - NO PAIN
PAINLEVEL_OUTOF10: 3
PAINLEVEL_OUTOF10: 3
PAINLEVEL_OUTOF10: 0 - NO PAIN

## 2024-02-16 NOTE — CARE PLAN
Hematology Consults Team    Colonoscopy w/o bleeding, pt restarted Eliquis this AM. Orders placed for weekly outpt labs. Pt is scheduled for outpt follow up with Dr. Spears on March 11. Hematology will sign off.    Mina John MS4

## 2024-02-16 NOTE — PROGRESS NOTES
Vania Andrews is a 69 y.o. female on day 10 of admission presenting with Acute saddle pulmonary embolism without acute cor pulmonale (CMS/HCC).  Transitional Care Coordination Progress Note:  Patient discussed during interdisciplinary rounds.   Team members present: MD and TCC  Plan per Medical/Surgical team: Waiting on lab results for patient, received Colonoscopy yesterday.  Payor: Medicare  Discharge disposition: Home- does not want SNF or Home care.  Potential Barriers: None  ADOD: 02/17/24    JARET HUGO

## 2024-02-16 NOTE — DISCHARGE SUMMARY
Discharge Diagnosis  Acute saddle pulmonary embolism without acute cor pulmonale (CMS/HCC)    Issues Requiring Follow-Up  Follow up with Dr Spears    Test Results Pending At Discharge  Pending Labs       Order Current Status    CBC and Auto Differential Preliminary result            Hospital Course  Vania Andrews is a 68 y.o. 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  She had recent admission (1/24- 1/28/24) for acute on chronic microcytic anemia c/f GIB, dyspnea with chronic cough.  Plan was to follow up with GI o/p for capsule study as c/f GIB Was at Heme outpt visit on 2/5 and noted to have RLE swelling, advised to go to ED for evaluation.     She presented to  ED for elevated D-dimer (13,543).  Imaging significant for acute saddle PE extended to bilateral upper and right lower lobar segmental branches.  LE vascular US significant for occlusive thrombus in the right distal femoral and popliteal veins. PERC team was consulted in the ED.  Bedside Pocus showed normal RV size and function.  She was started on Heparin infusion and admitted to Step down    2/7 Patient remains HDS on RA and heparin gtt.  Transfer orders placed for RNF with tele.  Ongoing abdominal cramping overnight into 2/8.  Given aggress bowel regimen at patients request.  Started on Simethicone Q6 hrs with slight improvement in symptoms. KUB ordered    2/8 large dark tarry stool w/o balbina blood.  H/H remains stable.  Plan to consult GI in morning.  Scheduled for Capsule study on 2/16, want to see if they  can perform this while inpatient.     2/9 GI consulted.  Patient taken for EGD around 1530 in the afternoon.  Remains HDS on heparin gtt without further episodes of dark stool.     EGD Findings: Tortuous esophagus  Regular Z-line 29 cm from the incisors  Large 11 cm hiatal hernia  Multiple sessile polyps measuring smaller than 5 mm in the hiatal hernia; no bleeding was identified. Appearances consistent with fundic gland polyps as noted on recent EGD.  Linear erosion in the body of the stomach; no bleeding was identified;  Scattered patchy erythema seen in body of stomach. No evidence of bleeding  The duodenal bulb and 2nd part of the duodenum appeared normal.    Recommendations:       Was for OP capsule. Hematology consulted and recommended IP colonoscopy which was done and there was no evidence of active bleeding.  She does have hemorrhoids and diverticulosis.  They recommended transitioning to Eliquis after the colonoscopy which was done.  Her hemoglobin has remained stable.  No signs of active bleeding.    Pertinent Physical Exam At Time of Discharge  Physical Exam  Alert and oriented x3  Chest good air entry  Cardiac s1 and s2  Abdomen soft nondistended  No LE edema    Home Medications     Medication List      START taking these medications     * apixaban 5 mg tablet; Commonly known as: Eliquis; Take 2 tablets (10   mg) by mouth 2 times a day for 13 doses.   * apixaban 5 mg tablet; Commonly known as: Eliquis; Take 1 tablet (5 mg)   by mouth 2 times a day. Do not start before February 23, 2024.; Start   taking on: February 23, 2024  * This list has 2 medication(s) that are the same as other medications   prescribed for you. Read the directions carefully, and ask your doctor or   other care provider to review them with you.     CHANGE how you take these medications     insulin lispro 100 unit/mL injection; Commonly known as: HumaLOG KwikPen   Insulin; Use with sliding scale 3 times a day, up to 30 units daily;  "What   changed: additional instructions     CONTINUE taking these medications     Accu-Chek Guide test strips strip; Generic drug: blood sugar diagnostic;   Use 1 test strip to test blood glucose twice daily.   albuterol 90 mcg/actuation inhaler; Inhale 2 puffs every 4 hours if   needed for wheezing or shortness of breath (You may also use this 10-15   minutes prior to exertional activity as needed).   amLODIPine 10 mg tablet; Commonly known as: Norvasc; Take 1 tablet (10   mg) by mouth once daily.   benzonatate 200 mg capsule; Commonly known as: Tessalon   brimonidine-timoloL 0.2-0.5 % ophthalmic solution; Commonly known as:   Combigan; Administer 1 drop into both eyes 2 times a day.   D3-2000 50 mcg (2,000 unit) capsule; Generic drug: cholecalciferol   diclofenac sodium 1 % kit   dicyclomine 20 mg tablet; Commonly known as: Bentyl   famotidine 20 mg tablet; Commonly known as: Pepcid   ferrous sulfate (325 mg ferrous sulfate) tablet; Take 1 tablet by mouth   once daily with breakfast.   fluticasone 50 mcg/actuation nasal spray; Commonly known as: Flonase;   Administer 2 sprays into each nostril once daily.   furosemide 20 mg tablet; Commonly known as: Lasix; Take 1 tablet (20 mg)   by mouth once daily as needed (As needed for leg swelling).   latanoprost 0.005 % ophthalmic solution; Commonly known as: Xalatan;   Administer 1 drop into both eyes once daily in the morning.   levocetirizine 5 mg tablet; Commonly known as: Xyzal; Take 1 tablet (5   mg) by mouth once daily in the evening.   losartan 25 mg tablet; Commonly known as: Cozaar; Take 1 tablet (25 mg)   by mouth once daily.   metoprolol succinate  mg 24 hr tablet; Commonly known as:   Toprol-XL; Take 1 tablet (200 mg) by mouth once daily.   omeprazole 40 mg DR capsule; Commonly known as: PriLOSEC; Take 1 capsule   (40 mg) by mouth 2 times a day before meals.   * pen needle, diabetic 31 gauge x 5/16\" needle; Use to inject 1-4 times   daily as directed.   " "* Advocate Pen Needle 33 gauge x 5/32\" needle; Generic drug: pen needle,   diabetic; Use for sliding scale up to 3 times a day   polyethylene glycol 17 gram/dose powder; Commonly known as: Glycolax,   Miralax   Rhopressa 0.02 % drops opthalmic solution; Generic drug: netarsudiL;   Administer 1 drop into both eyes once daily in the evening.   rosuvastatin 20 mg tablet; Commonly known as: Crestor; Take 1 tablet (20   mg) by mouth once daily.   Spiriva Respimat 2.5 mcg/actuation inhaler; Generic drug: tiotropium;   Inhale 2 puffs once daily.   Trulicity 0.75 mg/0.5 mL pen injector; Generic drug: dulaglutide; Inject   0.75 mg (1 pen) under the skin 1 (one) time per week.  * This list has 2 medication(s) that are the same as other medications   prescribed for you. Read the directions carefully, and ask your doctor or   other care provider to review them with you.     STOP taking these medications     aspirin 81 mg chewable tablet   betamethasone (augmented) 0.05 % cream; Commonly known as: Diprolene AF       Outpatient Follow-Up  Future Appointments   Date Time Provider Department Center   3/4/2024  2:00 PM Dawna Sandoval PA-C [id number] [facility]   3/11/2024 10:00 AM Garland Spears MD [id number] [facility]   3/14/2024  9:30 AM Remedios Garcia MD [id number] [facility]   3/18/2024 12:00 PM Monique Gross MD [id number] [facility]   4/23/2024 10:00 AM Laura Mata MD [id number] [facility]   4/30/2024 11:00 AM Felicia Arndt DO [id number] [facility]       Yanet Chavez MD  "

## 2024-02-16 NOTE — CARE PLAN
Problem: Safety  Goal: Patient will be injury free during hospitalization  Outcome: Progressing     Problem: Safety  Goal: I will remain free of falls  Outcome: Progressing     Problem: Discharge Barriers  Goal: My discharge needs are met  Outcome: Progressing     Problem: Discharge Barriers  Goal: My discharge needs are met  Outcome: Progressing     Problem: Skin  Goal: Promote skin healing  Outcome: Progressing     Problem: Skin  Goal: Promote/optimize nutrition  2/16/2024 1124 by Sharmin Mariscal RN  Flowsheets (Taken 2/16/2024 1124)  Promote/optimize nutrition:   Monitor/record intake including meals   Consume > 50% meals/supplements  2/16/2024 1124 by Sharmin Mariscal RN  Outcome: Progressing  Flowsheets (Taken 2/16/2024 1124)  Promote/optimize nutrition:   Monitor/record intake including meals   Consume > 50% meals/supplements   The patient's goals for the shift include Be able to get out of bed    The clinical goals for the shift include Patient will be discharged on 02/16/24 by 15:00

## 2024-02-17 DIAGNOSIS — I10 PRIMARY HYPERTENSION: ICD-10-CM

## 2024-02-17 NOTE — NURSING NOTE
Discharge Note:  This RN went over discharge instructions with patient, patient verbalized understanding. Midline and peripheral IV taken out intact. Patient has all belongings including meds to bed, cell phone, cell phone , candy, clothing, shoes, hat and coat. While inpatient patient misplaced wallet, possibly mixed in linen.  PD notified, Detective Eid's telephone number given to patient. Patient to follow up with Detective Eid. Transport took patient off unit via wheelchair. Disposition is home. Sharmin Mariscal RN

## 2024-02-18 RX ORDER — LOSARTAN POTASSIUM 25 MG/1
25 TABLET ORAL DAILY
Qty: 90 TABLET | Refills: 1 | Status: SHIPPED | OUTPATIENT
Start: 2024-02-18

## 2024-02-18 RX ORDER — AMLODIPINE BESYLATE 10 MG/1
10 TABLET ORAL DAILY
Qty: 90 TABLET | Refills: 1 | Status: SHIPPED | OUTPATIENT
Start: 2024-02-18

## 2024-02-19 ENCOUNTER — PATIENT OUTREACH (OUTPATIENT)
Dept: PRIMARY CARE | Facility: CLINIC | Age: 69
End: 2024-02-19
Payer: MEDICARE

## 2024-02-23 ENCOUNTER — OFFICE VISIT (OUTPATIENT)
Dept: PRIMARY CARE | Facility: CLINIC | Age: 69
End: 2024-02-23
Payer: MEDICARE

## 2024-02-23 VITALS
BODY MASS INDEX: 39.07 KG/M2 | HEIGHT: 59 IN | SYSTOLIC BLOOD PRESSURE: 131 MMHG | WEIGHT: 193.8 LBS | OXYGEN SATURATION: 95 % | HEART RATE: 79 BPM | DIASTOLIC BLOOD PRESSURE: 77 MMHG

## 2024-02-23 DIAGNOSIS — D50.9 IRON DEFICIENCY ANEMIA, UNSPECIFIED IRON DEFICIENCY ANEMIA TYPE: ICD-10-CM

## 2024-02-23 DIAGNOSIS — Z86.711 HISTORY OF PULMONARY EMBOLISM: ICD-10-CM

## 2024-02-23 DIAGNOSIS — Z09 HOSPITAL DISCHARGE FOLLOW-UP: Primary | ICD-10-CM

## 2024-02-23 DIAGNOSIS — E11.22 TYPE 2 DIABETES MELLITUS WITH STAGE 3A CHRONIC KIDNEY DISEASE, WITHOUT LONG-TERM CURRENT USE OF INSULIN (MULTI): ICD-10-CM

## 2024-02-23 DIAGNOSIS — Z86.718 H/O DEEP VENOUS THROMBOSIS: ICD-10-CM

## 2024-02-23 DIAGNOSIS — N18.31 TYPE 2 DIABETES MELLITUS WITH STAGE 3A CHRONIC KIDNEY DISEASE, WITHOUT LONG-TERM CURRENT USE OF INSULIN (MULTI): ICD-10-CM

## 2024-02-23 DIAGNOSIS — Z79.01 ON CONTINUOUS ORAL ANTICOAGULATION: ICD-10-CM

## 2024-02-23 DIAGNOSIS — I82.411 ACUTE DEEP VEIN THROMBOSIS (DVT) OF FEMORAL VEIN OF RIGHT LOWER EXTREMITY (MULTI): ICD-10-CM

## 2024-02-23 DIAGNOSIS — N18.31 STAGE 3A CHRONIC KIDNEY DISEASE (MULTI): ICD-10-CM

## 2024-02-23 PROBLEM — M06.9 RHEUMATOID ARTHRITIS INVOLVING BOTH HANDS (MULTI): Status: RESOLVED | Noted: 2023-08-29 | Resolved: 2024-02-23

## 2024-02-23 PROCEDURE — 4010F ACE/ARB THERAPY RXD/TAKEN: CPT | Performed by: STUDENT IN AN ORGANIZED HEALTH CARE EDUCATION/TRAINING PROGRAM

## 2024-02-23 PROCEDURE — 1111F DSCHRG MED/CURRENT MED MERGE: CPT | Performed by: STUDENT IN AN ORGANIZED HEALTH CARE EDUCATION/TRAINING PROGRAM

## 2024-02-23 PROCEDURE — 1159F MED LIST DOCD IN RCRD: CPT | Performed by: STUDENT IN AN ORGANIZED HEALTH CARE EDUCATION/TRAINING PROGRAM

## 2024-02-23 PROCEDURE — 3078F DIAST BP <80 MM HG: CPT | Performed by: STUDENT IN AN ORGANIZED HEALTH CARE EDUCATION/TRAINING PROGRAM

## 2024-02-23 PROCEDURE — 3075F SYST BP GE 130 - 139MM HG: CPT | Performed by: STUDENT IN AN ORGANIZED HEALTH CARE EDUCATION/TRAINING PROGRAM

## 2024-02-23 PROCEDURE — 1125F AMNT PAIN NOTED PAIN PRSNT: CPT | Performed by: STUDENT IN AN ORGANIZED HEALTH CARE EDUCATION/TRAINING PROGRAM

## 2024-02-23 PROCEDURE — 99496 TRANSJ CARE MGMT HIGH F2F 7D: CPT | Performed by: STUDENT IN AN ORGANIZED HEALTH CARE EDUCATION/TRAINING PROGRAM

## 2024-02-23 PROCEDURE — 3008F BODY MASS INDEX DOCD: CPT | Performed by: STUDENT IN AN ORGANIZED HEALTH CARE EDUCATION/TRAINING PROGRAM

## 2024-02-23 PROCEDURE — 1036F TOBACCO NON-USER: CPT | Performed by: STUDENT IN AN ORGANIZED HEALTH CARE EDUCATION/TRAINING PROGRAM

## 2024-02-23 PROCEDURE — 1160F RVW MEDS BY RX/DR IN RCRD: CPT | Performed by: STUDENT IN AN ORGANIZED HEALTH CARE EDUCATION/TRAINING PROGRAM

## 2024-02-23 RX ORDER — TRAMADOL HYDROCHLORIDE 50 MG/1
50 TABLET ORAL EVERY 8 HOURS PRN
Qty: 21 TABLET | Refills: 0 | Status: SHIPPED | OUTPATIENT
Start: 2024-02-23 | End: 2024-03-01

## 2024-02-23 NOTE — PROGRESS NOTES
"Subjective   Patient ID: Vania Andrews is a 69 y.o. female who presents for Follow-up (Hospital follow-up.).        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  Plan was to follow up with GI o/p for capsule study as c/f GIB .Was at Heme outpt visit on 2/5 and noted to have RLE swelling, advised to go to ED for evaluation .    2/5- 216  elevated D-dimer (13,543).  Imaging significant for acute saddle PE extended to bilateral upper and right lower lobar segmental branches.  LE vascular US significant for occlusive thrombus in the right distal femoral and popliteal veins. was started on Heparin infusion .    2/9 ; EGD for dark stools , no bleeding identified   2/15 Colonoscopy :no active bleeding   Was started on Eliquis     Today she c/o right leg pain , which started after the colonoscopy. No worsening since initial onset  , not worsening either   Visit Vitals  /77   Pulse 79   Ht 1.499 m (4' 11\")   Wt 87.9 kg (193 lb 12.8 oz)   LMP  (LMP Unknown)   SpO2 95%   BMI 39.14 kg/m²   OB Status Postmenopausal   Smoking Status Former   BSA 1.91 m²      No LMP recorded (lmp unknown). Patient is postmenopausal.     Review of Systems    Constitutional : No feeling poorly / fevers/ chills / night sweats/ fatigue   Cardiovascular : No CP /Palpitations/ lower extremity edema / syncope   Respiratory : No " Cough /ORTIZ/Dyspnea at rest   Gastrointestinal : No abd pain / N/V  No bloody stools/ melena / constipation  Endo : No polyuria/polydipsia/ muscle weakness / sluggishness   CNS: No confusion / HA/ tingling/ numbness/ weakness of extremities  Psychiatric: No anxiety/ depression/ SI/HI    All other systems have been reviewed and are negative for complaint       Physical Exam    Constitutional : Vitals reviewed. Alert and in no distress  Cardiovascular : RRR, Normal S1, S2, No pericardial rub/ gallop, no peripheral edema   Pulmonary: No respiratory distress, CTAB   MSK : Normal gait and station , strength and tone     Neurologic : CNs 2-12 grossly intact , no obvious FNDs  Psych : A,Ox3, normal mood and affect      Assessment/Plan   Diagnoses and all orders for this visit:  Hospital discharge follow-up  -     CBC; Future  On continuous oral anticoagulation  History of pulmonary embolism  Comments:  feb 2024  H/O deep venous thrombosis  Comments:  2020  Iron deficiency anemia, unspecified iron deficiency anemia type  -     CBC; Future  Stage 3a chronic kidney disease (CMS/[facility])  -     Basic Metabolic Panel; Future  Type 2 diabetes mellitus with stage 3a chronic kidney disease, without long-term current use of insulin (CMS/[facility])  Acute deep vein thrombosis (DVT) of femoral vein of right lower extremity (CMS/[facility])  -     traMADol (Ultram) 50 mg tablet; Take 1 tablet (50 mg) by mouth every 8 hours if needed for severe pain (7 - 10) for up to 7 days.      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 s/p L partial nephrectomy and L adrenalectomy (2007), CKD stage 3 d/t loss of nephron mass and HTN, asthma, HLD, T2DM (est with endo ), MARYLU not using CPAP, s/p hysterectomy and b/l oophorectomy, inferior NSTEMI (1/2019) complicated by chronic Fe deficiency anemia s/p iron infusions with resolution of anemia as of May 2021, partially obstructed Ross's hernia with sx ( Feb 2022 with repair in June 2022   ), H/o provoked DVT in 2020 , with cessation of eliquis after 6m  with recurrent DVT and saddle PE  ( Feb2024 ) now on AC     AC is indefinite   RLE pain likely from DVT even though she reports onset after 2/15  ( DVT diagnosed 2/5)   CBC and BMP to be done today to monitor for anemia and CKD    pt'sQs were addressed     Conditions addressed and mgmt as noted above.  Pertinent labs, images/ imaging reports , chart review was done .   Age appropriate labs / labs for mgmt of chronic medical conditions ordered, further mgmt pending the results.

## 2024-02-26 PROBLEM — L85.3 ASTEATOSIS CUTIS: Status: ACTIVE | Noted: 2024-02-26

## 2024-02-26 PROBLEM — I82.409 ACUTE DEEP VEIN THROMBOSIS (DVT) OF LOWER EXTREMITY (MULTI): Status: ACTIVE | Noted: 2023-03-27

## 2024-02-26 PROBLEM — L29.9 GENERALIZED PRURITUS: Status: ACTIVE | Noted: 2024-02-26

## 2024-02-26 PROBLEM — R60.0 LOCALIZED EDEMA: Status: ACTIVE | Noted: 2023-11-06

## 2024-02-26 PROBLEM — M17.11 OSTEOARTHRITIS OF RIGHT KNEE: Status: ACTIVE | Noted: 2023-07-21

## 2024-02-26 PROBLEM — M79.672 LEFT FOOT PAIN: Status: ACTIVE | Noted: 2024-02-26

## 2024-02-26 PROBLEM — M25.579 ACUTE ANKLE PAIN: Status: ACTIVE | Noted: 2024-02-26

## 2024-02-26 PROBLEM — K56.600 PARTIAL OBSTRUCTION OF SMALL INTESTINE (MULTI): Status: ACTIVE | Noted: 2024-02-26

## 2024-02-26 PROBLEM — U07.1 DISEASE DUE TO SEVERE ACUTE RESPIRATORY SYNDROME CORONAVIRUS 2 (SARS-COV-2): Status: ACTIVE | Noted: 2024-02-26

## 2024-02-26 PROBLEM — M79.662 PAIN OF LEFT CALF: Status: ACTIVE | Noted: 2023-10-31

## 2024-02-26 PROBLEM — Z85.831 HISTORY OF MALIGNANT NEOPLASM OF RETROPERITONEUM: Status: ACTIVE | Noted: 2023-03-27

## 2024-02-26 PROBLEM — F19.21 HISTORY OF SUBSTANCE DEPENDENCE (MULTI): Status: ACTIVE | Noted: 2023-03-27

## 2024-02-26 PROBLEM — M10.9 ARTICULAR GOUT: Status: ACTIVE | Noted: 2023-03-27

## 2024-02-26 PROBLEM — M06.9 RHEUMATOID ARTHRITIS OF HAND (MULTI): Status: ACTIVE | Noted: 2023-03-27

## 2024-02-26 PROBLEM — K57.30 DIVERTICULA OF INTESTINE: Status: ACTIVE | Noted: 2024-02-26

## 2024-02-26 PROBLEM — J30.9 ALLERGIC RHINITIS: Status: ACTIVE | Noted: 2024-02-26

## 2024-02-26 PROBLEM — I82.512 CHRONIC DEEP VEIN THROMBOSIS (DVT) OF FEMORAL VEIN OF LEFT LOWER EXTREMITY (MULTI): Status: ACTIVE | Noted: 2024-02-26

## 2024-02-26 RX ORDER — FERROUS GLUCONATE 324(38)MG
38 TABLET ORAL
COMMUNITY
Start: 2023-12-22 | End: 2024-12-21

## 2024-02-27 ENCOUNTER — LAB (OUTPATIENT)
Dept: LAB | Facility: LAB | Age: 69
End: 2024-02-27
Payer: MEDICARE

## 2024-02-27 ENCOUNTER — OFFICE VISIT (OUTPATIENT)
Dept: CARDIOLOGY | Facility: HOSPITAL | Age: 69
End: 2024-02-27
Payer: MEDICARE

## 2024-02-27 ENCOUNTER — TELEPHONE (OUTPATIENT)
Dept: HEMATOLOGY/ONCOLOGY | Facility: HOSPITAL | Age: 69
End: 2024-02-27

## 2024-02-27 VITALS
BODY MASS INDEX: 38.71 KG/M2 | OXYGEN SATURATION: 95 % | SYSTOLIC BLOOD PRESSURE: 151 MMHG | DIASTOLIC BLOOD PRESSURE: 85 MMHG | HEIGHT: 59 IN | HEART RATE: 66 BPM | WEIGHT: 192 LBS

## 2024-02-27 DIAGNOSIS — R07.9 CHEST PAIN, UNSPECIFIED TYPE: ICD-10-CM

## 2024-02-27 DIAGNOSIS — D50.0 IRON DEFICIENCY ANEMIA DUE TO CHRONIC BLOOD LOSS: Primary | ICD-10-CM

## 2024-02-27 DIAGNOSIS — I26.92 ACUTE SADDLE PULMONARY EMBOLISM WITHOUT ACUTE COR PULMONALE (MULTI): ICD-10-CM

## 2024-02-27 DIAGNOSIS — R06.02 SHORTNESS OF BREATH: ICD-10-CM

## 2024-02-27 DIAGNOSIS — D50.0 IRON DEFICIENCY ANEMIA DUE TO CHRONIC BLOOD LOSS: ICD-10-CM

## 2024-02-27 DIAGNOSIS — C64.2 RENAL CELL CARCINOMA OF LEFT KIDNEY (MULTI): Primary | ICD-10-CM

## 2024-02-27 DIAGNOSIS — Z09 HOSPITAL DISCHARGE FOLLOW-UP: ICD-10-CM

## 2024-02-27 DIAGNOSIS — D50.9 IRON DEFICIENCY ANEMIA, UNSPECIFIED IRON DEFICIENCY ANEMIA TYPE: ICD-10-CM

## 2024-02-27 DIAGNOSIS — I82.411 ACUTE DEEP VEIN THROMBOSIS (DVT) OF FEMORAL VEIN OF RIGHT LOWER EXTREMITY (MULTI): ICD-10-CM

## 2024-02-27 DIAGNOSIS — N18.31 STAGE 3A CHRONIC KIDNEY DISEASE (MULTI): ICD-10-CM

## 2024-02-27 LAB
ANION GAP SERPL CALC-SCNC: 11 MMOL/L (ref 10–20)
APPEARANCE UR: CLEAR
BASOPHILS # BLD AUTO: 0.05 X10*3/UL (ref 0–0.1)
BASOPHILS NFR BLD AUTO: 0.6 %
BILIRUB UR STRIP.AUTO-MCNC: NEGATIVE MG/DL
BNP SERPL-MCNC: 54 PG/ML (ref 0–99)
BUN SERPL-MCNC: 16 MG/DL (ref 6–23)
CALCIUM SERPL-MCNC: 10.1 MG/DL (ref 8.6–10.6)
CHLORIDE SERPL-SCNC: 108 MMOL/L (ref 98–107)
CO2 SERPL-SCNC: 28 MMOL/L (ref 21–32)
COLOR UR: NORMAL
CREAT SERPL-MCNC: 1.33 MG/DL (ref 0.5–1.05)
EGFRCR SERPLBLD CKD-EPI 2021: 44 ML/MIN/1.73M*2
EOSINOPHIL # BLD AUTO: 0.27 X10*3/UL (ref 0–0.7)
EOSINOPHIL NFR BLD AUTO: 3.2 %
ERYTHROCYTE [DISTWIDTH] IN BLOOD BY AUTOMATED COUNT: 27.9 % (ref 11.5–14.5)
GLUCOSE SERPL-MCNC: 154 MG/DL (ref 74–99)
GLUCOSE UR STRIP.AUTO-MCNC: NORMAL MG/DL
HCT VFR BLD AUTO: 34 % (ref 36–46)
HGB BLD-MCNC: 10.2 G/DL (ref 12–16)
HYPOCHROMIA BLD QL SMEAR: NORMAL
IMM GRANULOCYTES # BLD AUTO: 0.03 X10*3/UL (ref 0–0.7)
IMM GRANULOCYTES NFR BLD AUTO: 0.4 % (ref 0–0.9)
KETONES UR STRIP.AUTO-MCNC: NEGATIVE MG/DL
LEUKOCYTE ESTERASE UR QL STRIP.AUTO: NEGATIVE
LYMPHOCYTES # BLD AUTO: 1.39 X10*3/UL (ref 1.2–4.8)
LYMPHOCYTES NFR BLD AUTO: 16.5 %
MCH RBC QN AUTO: 23.7 PG (ref 26–34)
MCHC RBC AUTO-ENTMCNC: 30 G/DL (ref 32–36)
MCV RBC AUTO: 79 FL (ref 80–100)
MONOCYTES # BLD AUTO: 0.42 X10*3/UL (ref 0.1–1)
MONOCYTES NFR BLD AUTO: 5 %
NEUTROPHILS # BLD AUTO: 6.27 X10*3/UL (ref 1.2–7.7)
NEUTROPHILS NFR BLD AUTO: 74.3 %
NITRITE UR QL STRIP.AUTO: NEGATIVE
NRBC BLD-RTO: 0 /100 WBCS (ref 0–0)
PH UR STRIP.AUTO: 5.5 [PH]
PLATELET # BLD AUTO: 373 X10*3/UL (ref 150–450)
POLYCHROMASIA BLD QL SMEAR: NORMAL
POTASSIUM SERPL-SCNC: 4.6 MMOL/L (ref 3.5–5.3)
PROT UR STRIP.AUTO-MCNC: NEGATIVE MG/DL
RBC # BLD AUTO: 4.3 X10*6/UL (ref 4–5.2)
RBC # UR STRIP.AUTO: NEGATIVE /UL
RBC MORPH BLD: NORMAL
SODIUM SERPL-SCNC: 142 MMOL/L (ref 136–145)
SP GR UR STRIP.AUTO: 1.03
UROBILINOGEN UR STRIP.AUTO-MCNC: NORMAL MG/DL
VIT B12 SERPL-MCNC: 577 PG/ML (ref 211–911)
WBC # BLD AUTO: 8.4 X10*3/UL (ref 4.4–11.3)

## 2024-02-27 PROCEDURE — 1160F RVW MEDS BY RX/DR IN RCRD: CPT | Performed by: INTERNAL MEDICINE

## 2024-02-27 PROCEDURE — 99214 OFFICE O/P EST MOD 30 MIN: CPT | Performed by: INTERNAL MEDICINE

## 2024-02-27 PROCEDURE — 1159F MED LIST DOCD IN RCRD: CPT | Performed by: INTERNAL MEDICINE

## 2024-02-27 PROCEDURE — 3077F SYST BP >= 140 MM HG: CPT | Performed by: INTERNAL MEDICINE

## 2024-02-27 PROCEDURE — 85025 COMPLETE CBC W/AUTO DIFF WBC: CPT

## 2024-02-27 PROCEDURE — 1111F DSCHRG MED/CURRENT MED MERGE: CPT | Performed by: INTERNAL MEDICINE

## 2024-02-27 PROCEDURE — 80048 BASIC METABOLIC PNL TOTAL CA: CPT

## 2024-02-27 PROCEDURE — 36415 COLL VENOUS BLD VENIPUNCTURE: CPT

## 2024-02-27 PROCEDURE — 4010F ACE/ARB THERAPY RXD/TAKEN: CPT | Performed by: INTERNAL MEDICINE

## 2024-02-27 PROCEDURE — 3008F BODY MASS INDEX DOCD: CPT | Performed by: INTERNAL MEDICINE

## 2024-02-27 PROCEDURE — 1125F AMNT PAIN NOTED PAIN PRSNT: CPT | Performed by: INTERNAL MEDICINE

## 2024-02-27 PROCEDURE — 82607 VITAMIN B-12: CPT

## 2024-02-27 PROCEDURE — 1036F TOBACCO NON-USER: CPT | Performed by: INTERNAL MEDICINE

## 2024-02-27 PROCEDURE — 3079F DIAST BP 80-89 MM HG: CPT | Performed by: INTERNAL MEDICINE

## 2024-02-27 PROCEDURE — 83880 ASSAY OF NATRIURETIC PEPTIDE: CPT

## 2024-02-27 PROCEDURE — 81003 URINALYSIS AUTO W/O SCOPE: CPT

## 2024-02-27 NOTE — PROGRESS NOTES
Subjective   Vania Andrews is a 69 y.o. female female presenting for cardiology follow-up after recent admission with PE on background of DVT, trigger finger, CKD III, DM2, HLD, GERD, gout, L RCC, type I and Lt adrenal cortical adenoma s/p L partial nephrectomy and L adrenalectomy (2007), s/p hysterectomy and b/l oophorectomy, recurrent UTI, abdominal hernia, hiatal hernia.      Investigations:  CTPE (2/6/2024) -saddle type pulmonary embolus extending to the bilateral upper and right lower lobar segmental branches.    Lower extremity duplex (11/2023) - chronic DVT of left popliteal and calf veins.   Nuclear stress test (11/2023) - small perfusion defect of the apical inferolateral wall (prior infarct with some umm-infarct ischemia).   TTE (11/2023) - LVEF 55-60%, distal septal/apical hypokinesis.   CACS (12/2021) - 124.  EKG (10/2022) - NSR.  Nuclear stress test (1/2021) - attenuation artifact (fixed defect), no ischemia.  TTE (2020) - LVEF 55-60%, distal apical hypokinesis.   CMR (2019) - LVEF 70%, severe hypokinesis of the mid inferior wall with associated transmural enhancement consistent with a prior infarction. Dilated MPA 3.5 cm    Chief Complaint:  Pulmonary embolism    HPI  Refer to ER from clinic with right lower extremity swelling.  Within the ER she was noted to have an elevated D-dimer and subsequently had a CT PE that showed an acute saddle PE extending into the bilateral upper and right lower lobe segmental branches.  Ultrasound of lower extremity revealed an occlusive thrombus in the right distal femoral and popliteal veins.  The PERT team was consulted but given normal oxygen saturations, normal RV size and function and stable hemodynamics she was treated medically with heparin infusion.  Had EGD following dark stools in the hospital which showed helical hernia, multiple sessile polyps and scattered patchy erythema in the body of the stomach but no evidence of bleeding.  Plan for subsequent  investigation with capsule endoscopy.  Commenced on apixaban [with load] during the hospital admission. Did receive 2 x PRRC whilst in hospital. Has intermittently required transfusions in the past.     Progress:  Reports pain in right hip and buttock following colonoscopy. Some right lower leg swelling.  On apixaban 5mg bid since 2/22/2024. No blood in stool.   Reports dyspnea has improved.  Not currently on lasix.   Occasional exertional chest tightness. No chest pain at rest.     ROS  A 10-system review was performed and was unremarkable apart from what is presented in the HPI.     Objective   Physical Exam  Alert and orientated.   Appropriate responses, normal affect.  No respiratory distress at rest.   Skin warm and dry.   Normal radial pulse character and volume. Clinically SR.   Anicteric sclera, no conjunctival pallor.   No JVD or carotid bruits.  Heart sounds dual, no added heart sounds or audible murmurs.   Chest clear on auscultation.   Some right lower leg swelling.     Lab Review:   Lab Results   Component Value Date     02/15/2024    K 4.5 02/15/2024     02/15/2024    CO2 25 02/15/2024    BUN 13 02/15/2024    CREATININE 1.24 (H) 02/15/2024    GLUCOSE 117 (H) 02/15/2024    CALCIUM 10.2 02/15/2024     Lab Results   Component Value Date    WBC 12.4 (H) 02/16/2024    HGB 8.5 (L) 02/16/2024    HCT 28.8 (L) 02/16/2024    MCV 75 (L) 02/16/2024     02/16/2024     Lab Results   Component Value Date    CHOL 171 10/31/2023    TRIG 71 10/31/2023    HDL 61.2 10/31/2023       Assessment/Plan   In summary, Vania Andrews is a 69 y.o. female female presenting for cardiology follow-up after recent admission with saddle PE on background of DVT, trigger finger, CKD III, DM2, HLD, GERD, gout, L RCC, type I and Lt adrenal cortical adenoma s/p L partial nephrectomy and L adrenalectomy (2007), s/p hysterectomy and b/l oophorectomy, recurrent UTI, abdominal hernia, hiatal hernia.  She denies shortness of  breath or chest pain since discharge and her oxygen saturations were 95% on room air in clinic today.  She has been compliant with her prescribed apixaban.  Her main complaint at present is right thigh pain that is limiting her mobility.  On examination her chest was clear on auscultation and she had some right lower extremity edema and pain on palpation of the posterior thigh/buttock.  I will arrange for a repeat lower limb venous Doppler ultrasound to exclude propagation of her DVT.  Additionally I have arranged for an ultrasound KUB as her CTA in hospital noted a lesion at the site of the left kidney where she previously had a partial nephrectomy for RCC.  In the interim she should continue her current medications including apixaban.  She has stable exertional chest tightness (reportedly present prior to her PE diagnosis) and I will arrange a coronary CTA in April if her symptoms persist despite anticoagulation.  In the interim she should continue her current medications including apixaban.  I will follow-up with Mrs. Andrews in April after her investigations.

## 2024-02-27 NOTE — TELEPHONE ENCOUNTER
----- Message from Garland Spears MD sent at 2/27/2024 12:51 PM EST -----  H/H stable to improved, ok to continue AC for now. Recommend count check every 2 weeks and when she is symptomatic, we can reduce frequency in a month if no further bleeding. Please remind her to schedule her capsule enteroscopy. Thanks

## 2024-02-27 NOTE — PATIENT INSTRUCTIONS
Your cardiovascular examination was satisfactory.  Continue current medications including apixaban 5 mg twice a day.    I have ordered an ultrasound of your right lower leg to further investigate your new thigh/buttock pain.  Please call [phone] to schedule this test.    I have ordered an ultrasound of your kidneys.  Please call [phone] to schedule this test.    Attend your follow-up appointment with vascular medicine.    I will plan your heart CT scan for early April.  We will notify you of a date in the coming weeks.    I will follow-up with you after your CT scan.

## 2024-02-27 NOTE — TELEPHONE ENCOUNTER
RN spoke to the patient and relayed message from MD. She asked if she needs to be taking oral iron or getting any infusions. MD Jenkins said she does not need anything at this time and he will recheck labs at her follow up on 3/11.  is working on her GI referral. The patient also mentioned that she has another US scheduled for her leg because she still has pain in her leg where the clot is. She said she had a doctor's appt today and they ordered the scan. RN sent a message to MD Jenkins to update him.

## 2024-02-29 ENCOUNTER — HOSPITAL ENCOUNTER (OUTPATIENT)
Dept: VASCULAR MEDICINE | Facility: HOSPITAL | Age: 69
Discharge: HOME | End: 2024-02-29
Payer: MEDICARE

## 2024-02-29 DIAGNOSIS — I82.411 ACUTE DEEP VEIN THROMBOSIS (DVT) OF FEMORAL VEIN OF RIGHT LOWER EXTREMITY (MULTI): ICD-10-CM

## 2024-02-29 DIAGNOSIS — I26.92 ACUTE SADDLE PULMONARY EMBOLISM WITHOUT ACUTE COR PULMONALE (MULTI): ICD-10-CM

## 2024-02-29 DIAGNOSIS — R60.1 GENERALIZED EDEMA: ICD-10-CM

## 2024-02-29 PROCEDURE — 93971 EXTREMITY STUDY: CPT | Performed by: INTERNAL MEDICINE

## 2024-02-29 PROCEDURE — 93971 EXTREMITY STUDY: CPT

## 2024-03-04 ENCOUNTER — OFFICE VISIT (OUTPATIENT)
Dept: DERMATOLOGY | Facility: CLINIC | Age: 69
End: 2024-03-04
Payer: MEDICARE

## 2024-03-04 ENCOUNTER — APPOINTMENT (OUTPATIENT)
Dept: VASCULAR MEDICINE | Facility: HOSPITAL | Age: 69
End: 2024-03-04
Payer: MEDICARE

## 2024-03-04 DIAGNOSIS — L29.9 PRURITUS: ICD-10-CM

## 2024-03-04 NOTE — PROGRESS NOTES
Subjective   HPI: Vania Andrews is a 69 y.o. female is here for follow up evaluation and treatment of generalized pruritus.     ROS: No other skin or systemic complaints other than what is documented elsewhere in the note.    ALLERGIES: Adhesive, Amoxicillin, Bee sting kit, Cephalexin, Gadolinium-containing contrast media, Iodine, Latex, Pioglitazone, Rubella virus live vaccine, Salicylates, Shellfish derived, Sulfa (sulfonamide antibiotics), Sulfamethoxazole-trimethoprim, and Iodinated contrast media    SOCIAL:  reports that she has quit smoking. Her smoking use included cigarettes. She has never been exposed to tobacco smoke. She has never used smokeless tobacco. She reports current alcohol use. She reports that she does not currently use drugs.    Objective       Assessment/Plan   1. Pruritus         FOLLOW UP: ***    The patient was encouraged to contact me with any further questions or concerns.  Dawna Sandoval PA-C  3/4/2024

## 2024-03-05 ENCOUNTER — SPECIALTY PHARMACY (OUTPATIENT)
Dept: PHARMACY | Facility: CLINIC | Age: 69
End: 2024-03-05

## 2024-03-06 NOTE — PROGRESS NOTES
[facility]astroenterology Clinic New Consult Note    Reason For Consult  Hospital discharge follow up     History Of Present Illness  Vania Andrews is a 69 y.o. female with a past medical history of chronic VEDA presents for hospitalization follow up.    Pt has a history of VEDA. Hb was as low as 8.7 and ferritin 18 at least from 2019. EGD 1/2019 showed 9 cm hiatal hernia and gastritis, small gastric lipoma. Colonoscopy 1/2019 showed one 2mm TA in the rectum, diverticulosis in sigmoid colon but no signs of bleeding. Underwent capsule endoscopy done inpatient in 2019 and was negative (no official report available).   Pt received IV iron and Hb normalized to 12~13 in 2021. Noted to decrease to 11 in 8/2022 which further down trended to 8 in 2/2023.      Pt presents to [facility] 1/24/2024 for acute on chronic SOB. Found to have acute on chronic microcytic anemia. GI consulted and planned for outpatient capsule endoscopy giving no signs of overt GI bleeding as well as because pt had been taking iron pills. Discharged with plan for outpatient capsule endoscopy.  Pt presented again to ED on 2/5/2024 for RLE swelling and found to have acute saddle PE extended to bilateral upper and right lower lobar segmental branches. PT was started on heparin. 2 days later patient started to have dark tarry stool without balbina blood and Hb drop anemia/elevation UN (Hb 7.2 from 9.2, UN 42 from 16).  PT underwent EGD/Colonoscopy. EGD showed no signs of bleeding. Had multiple sessile polyps consistent with fundic gland polyps as noted on recent EGD, erythema in the stomach. Colonoscopy showed no signs of bleeding throughout the colon or terminal ileum. Showed extensive diverticula throughout the colon without inflammation. Hb on discharge was 10.2     Pt presented today for post hospitalization follow up.   Overall pt is getting better. Has BM 1 daily and color is brown. Pt is taking eliquis daily. Had hernia repaired, nephrosectomy but no bowel  resection.       Past Medical History  Past Medical History:   Diagnosis Date    Abdominal distension (gaseous) 07/31/2019    Abdominal bloating    Cancer (CMS/HCC)     Cataract     Coronary artery disease     Diabetes mellitus (CMS/HCC)     Diverticulosis of intestine, part unspecified, without perforation or abscess without bleeding 06/09/2013    Diverticulosis    Elevation of levels of liver transaminase levels 11/13/2020    Transaminitis    Encounter for follow-up examination after completed treatment for conditions other than malignant neoplasm 01/26/2019    Hospital discharge follow-up    Glaucoma     Hypertension     Long term (current) use of antibiotics 10/07/2016    Need for prophylactic antibiotic    Other amnesia 10/28/2014    Memory loss    Other conditions influencing health status 02/06/2019    History of cough    Other specified health status 02/07/2019    Hepatitis C antibody test negative    Other specified soft tissue disorders 06/14/2017    Leg swelling    Pain in left toe(s) 05/15/2017    Pain of toe of left foot    Pain in right foot 10/12/2016    Pain of right heel    Pain in right shoulder 06/22/2016    Acute pain of right shoulder    Personal history of diseases of the blood and blood-forming organs and certain disorders involving the immune mechanism 04/09/2018    History of anemia    Personal history of other diseases of the respiratory system 04/02/2018    History of sinusitis    Personal history of other diseases of the respiratory system 03/19/2014    History of upper respiratory infection    Personal history of other malignant neoplasm of kidney 01/14/2021    Personal history of malignant neoplasm of kidney    Personal history of other medical treatment 08/08/2017    History of screening mammography    Personal history of other specified conditions 11/17/2014    History of abdominal pain    Personal history of other specified conditions 06/14/2017    History of urinary frequency     Personal history of other specified conditions 2021    History of dysuria    Personal history of other specified conditions 2014    History of breast lump    Unspecified abdominal hernia without obstruction or gangrene 2014    Hernia       Surgical History  Past Surgical History:   Procedure Laterality Date    BREAST BIOPSY  2016    Biopsy Breast Percutaneous Needle Core     SECTION, CLASSIC  2014     Section    CHOLECYSTECTOMY  2017    Cholecystectomy Laparoscopic    HAND SURGERY  2020    Hand Surgery                                                                                                                                                          HERNIA REPAIR  2014    Hernia Repair    MR HEAD ANGIO WO IV CONTRAST  2014    MR HEAD ANGIO WO IV CONTRAST 2014 [facility]    OTHER SURGICAL HISTORY  2021    Nephrectomy    TOTAL ABDOMINAL HYSTERECTOMY W/ BILATERAL SALPINGOOPHORECTOMY  2014    Total Abdominal Hysterectomy With Removal Of Both Ovaries       Social History  Social Determinants of Health     Tobacco Use: Medium Risk (3/7/2024)    Patient History     Smoking Tobacco Use: Former     Smokeless Tobacco Use: Never     Passive Exposure: Never   Alcohol Use: Not At Risk (2024)    AUDIT-C     Frequency of Alcohol Consumption: Never     Average Number of Drinks: Patient does not drink     Frequency of Binge Drinking: Never   Financial Resource Strain: Low Risk  (2024)    Overall Financial Resource Strain (CARDIA)     Difficulty of Paying Living Expenses: Not hard at all   Food Insecurity: Not on file   Transportation Needs: No Transportation Needs (2024)    PRAPARE - Transportation     Lack of Transportation (Medical): No     Lack of Transportation (Non-Medical): No   Physical Activity: Not on file   Stress: Not on file   Social Connections: Not on file   Intimate Partner Violence: Not on file    Depression: Not at risk (2/7/2024)    PHQ-2     PHQ-2 Score: 0   Housing Stability: Low Risk  (2/12/2024)    Housing Stability Vital Sign     Unable to Pay for Housing in the Last Year: No     Number of Places Lived in the Last Year: 1     Unstable Housing in the Last Year: No   Utilities: Not on file   Digital Equity: Not on file       Family History  Family History   Problem Relation Name Age of Onset    Aneurysm Mother      Hypertension Mother      Pancreatic cancer Mother      Diabetes Mother      Other (laryngeal Cancer) Mother      Heart disease Father      Pulmonary embolism Brother      Breast cancer Father's Sister      Breast cancer Maternal Cousin          Allergies  Allergies   Allergen Reactions    Adhesive Itching    Amoxicillin Hives and Itching    Bee Sting Kit Swelling    Cephalexin Itching and Nausea Only    Gadolinium-Containing Contrast Media Hives    Iodine Unknown    Latex Other    Pioglitazone Swelling    Rubella Virus Live Vaccine Unknown    Salicylates Other    Shellfish Derived Swelling    Sulfa (Sulfonamide Antibiotics) Unknown    Sulfamethoxazole-Trimethoprim Hives and Itching    Iodinated Contrast Media Itching and Rash       Home Medications    Current Outpatient Medications:     Accu-Chek Guide test strips strip, Use 1 test strip to test blood glucose twice daily., Disp: 100 strip, Rfl: 2    albuterol 90 mcg/actuation inhaler, Inhale 2 puffs every 4 hours if needed for wheezing or shortness of breath (You may also use this 10-15 minutes prior to exertional activity as needed)., Disp: 18 g, Rfl: 5    amLODIPine (Norvasc) 10 mg tablet, Take 1 tablet (10 mg) by mouth once daily., Disp: 90 tablet, Rfl: 1    apixaban (Eliquis) 5 mg tablet, Take 1 tablet (5 mg) by mouth 2 times a day., Disp: 60 tablet, Rfl: 1    brimonidine-timoloL (Combigan) 0.2-0.5 % ophthalmic solution, Administer 1 drop into both eyes 2 times a day., Disp: 10 mL, Rfl: 6    cholecalciferol (D3-2000) 50 mcg (2,000 unit)  "capsule, Take 1 capsule (50 mcg) by mouth once daily., Disp: , Rfl:     diclofenac sodium 1 % kit, Apply 1 Application topically if needed.  APPLY TO LOWER EXTREMITIES, 4 GM OF GEL TO AFFECTED AREA 4 TIMES DAILY. DO NOT APPLY MORE THAN 16 GM DAILY TO ANY ONE AFFECTED JOINT., Disp: , Rfl:     dicyclomine (Bentyl) 20 mg tablet, Take 1 tablet (20 mg) by mouth. 3-4 times daily as needed., Disp: , Rfl:     dulaglutide (Trulicity) 0.75 mg/0.5 mL pen injector, Inject 0.75 mg (1 pen) under the skin 1 (one) time per week., Disp: 12 each, Rfl: 3    famotidine (Pepcid) 20 mg tablet, Take 1 tablet (20 mg) by mouth once daily at bedtime., Disp: , Rfl:     fluticasone (Flonase) 50 mcg/actuation nasal spray, Administer 2 sprays into each nostril once daily., Disp: 16 g, Rfl: 3    insulin lispro (HumaLOG KwikPen Insulin) 100 unit/mL injection, Use with sliding scale 3 times a day, up to 30 units daily (Patient taking differently: Use with sliding scale 3 times a day, up to 30 units daily. Patient states she takes only when taking prednisone), Disp: 15 mL, Rfl: 2    latanoprost (Xalatan) 0.005 % ophthalmic solution, Administer 1 drop into both eyes once daily in the morning., Disp: 2.5 mL, Rfl: 6    levocetirizine (Xyzal) 5 mg tablet, Take 1 tablet (5 mg) by mouth once daily in the evening., Disp: 90 tablet, Rfl: 1    losartan (Cozaar) 25 mg tablet, Take 1 tablet (25 mg) by mouth once daily., Disp: 90 tablet, Rfl: 1    metoprolol succinate XL (Toprol-XL) 200 mg 24 hr tablet, Take 1 tablet (200 mg) by mouth once daily., Disp: 90 tablet, Rfl: 3    netarsudiL (Rhopressa) 0.02 % drops opthalmic solution, Administer 1 drop into both eyes once daily in the evening., Disp: 2.5 mL, Rfl: 6    omeprazole (PriLOSEC) 40 mg DR capsule, Take 1 capsule (40 mg) by mouth 2 times a day before meals., Disp: 60 capsule, Rfl: 1    pen needle, diabetic 31 gauge x 5/16\" needle, Use to inject 1-4 times daily as directed., Disp: 100 each, Rfl: 11    pen " "needle, diabetic 33 gauge x 5/32\" needle, Use for sliding scale up to 3 times a day, Disp: 100 each, Rfl: 2    polyethylene glycol (Glycolax, Miralax) 17 gram/dose powder, Take 17 g by mouth if needed. IN 8 OUNCES OF WATER, JUICE, OR TEA AND DRINK, Disp: , Rfl:     rosuvastatin (Crestor) 20 mg tablet, Take 1 tablet (20 mg) by mouth once daily., Disp: 30 tablet, Rfl: 2    tiotropium (Spiriva Respimat) 2.5 mcg/actuation inhaler, Inhale 2 puffs once daily., Disp: 1 each, Rfl: 3    ferrous gluconate 324 (38 Fe) mg tablet, Take 1 tablet (38 mg of iron) by mouth once daily., Disp: , Rfl:     ferrous sulfate, 325 mg ferrous sulfate, tablet, Take 1 tablet by mouth once daily with breakfast. (Patient not taking: Reported on 3/7/2024), Disp: 90 tablet, Rfl: 1    furosemide (Lasix) 20 mg tablet, Take 1 tablet (20 mg) by mouth once daily as needed (As needed for leg swelling). (Patient not taking: Reported on 3/7/2024), Disp: 30 tablet, Rfl: 11    polyethylene glycol-electrolytes (Nulytely) 420 gram solution, Take 2,000 mL by mouth 1 time for 1 dose., Disp: 2000 mL, Rfl: 0    Current Facility-Administered Medications:     nitroglycerin (Nitrostat) SL tablet 0.4 mg, 0.4 mg, sublingual, Once, Vianney Marrufo MD    Review of Systems  12 point ROS otherwise negative, unless indicated above     Physical Exam  Constitutional: On wheelchair. No distress, alert and cooperative   Respiratory/Thorax: Patent airways, CTAB, normal breath sounds with good chest expansion, thorax symmetric   Cardiovascular: Regular, rate and rhythm, no murmurs, 2+ equal pulses of the extremities, normal S 1and S 2   Gastrointestinal: Nondistended, soft, non-tender, no rebound tenderness or guarding, no masses palpable, no organomegaly, +BS, no bruits   Neurological: Alert   Psychological: Appropriate mood and behavior     Last Recorded Vitals  Blood pressure 143/88, pulse 73, temperature 36.2 °C (97.1 °F), height 1.499 m (4' 11\"), weight 89.8 kg (198 lb), SpO2 " 96 %.    General: well-nourished, no acute distress  HEENT: PERRLA, EOM intact, no scleral icterus, moist MM  Respiratory: CTA bilaterally, normal work of breathing  Cardiovascular: RRR, no murmurs/rubs/gallops  Abdomen: Soft, nontender, nondistended, bowel sounds present, no masses palpated, no organomeagly  Extremities: no edema, no asterixis  Neuro: alert and oriented, CNII-XII grossly intact, moves all 4 extremities with no focal deficits    Relevant Results    Current Outpatient Medications:     Accu-Chek Guide test strips strip, Use 1 test strip to test blood glucose twice daily., Disp: 100 strip, Rfl: 2    albuterol 90 mcg/actuation inhaler, Inhale 2 puffs every 4 hours if needed for wheezing or shortness of breath (You may also use this 10-15 minutes prior to exertional activity as needed)., Disp: 18 g, Rfl: 5    amLODIPine (Norvasc) 10 mg tablet, Take 1 tablet (10 mg) by mouth once daily., Disp: 90 tablet, Rfl: 1    apixaban (Eliquis) 5 mg tablet, Take 1 tablet (5 mg) by mouth 2 times a day., Disp: 60 tablet, Rfl: 1    brimonidine-timoloL (Combigan) 0.2-0.5 % ophthalmic solution, Administer 1 drop into both eyes 2 times a day., Disp: 10 mL, Rfl: 6    cholecalciferol (D3-2000) 50 mcg (2,000 unit) capsule, Take 1 capsule (50 mcg) by mouth once daily., Disp: , Rfl:     diclofenac sodium 1 % kit, Apply 1 Application topically if needed.  APPLY TO LOWER EXTREMITIES, 4 GM OF GEL TO AFFECTED AREA 4 TIMES DAILY. DO NOT APPLY MORE THAN 16 GM DAILY TO ANY ONE AFFECTED JOINT., Disp: , Rfl:     dicyclomine (Bentyl) 20 mg tablet, Take 1 tablet (20 mg) by mouth. 3-4 times daily as needed., Disp: , Rfl:     dulaglutide (Trulicity) 0.75 mg/0.5 mL pen injector, Inject 0.75 mg (1 pen) under the skin 1 (one) time per week., Disp: 12 each, Rfl: 3    famotidine (Pepcid) 20 mg tablet, Take 1 tablet (20 mg) by mouth once daily at bedtime., Disp: , Rfl:     fluticasone (Flonase) 50 mcg/actuation nasal spray, Administer 2 sprays  "into each nostril once daily., Disp: 16 g, Rfl: 3    insulin lispro (HumaLOG KwikPen Insulin) 100 unit/mL injection, Use with sliding scale 3 times a day, up to 30 units daily (Patient taking differently: Use with sliding scale 3 times a day, up to 30 units daily. Patient states she takes only when taking prednisone), Disp: 15 mL, Rfl: 2    latanoprost (Xalatan) 0.005 % ophthalmic solution, Administer 1 drop into both eyes once daily in the morning., Disp: 2.5 mL, Rfl: 6    levocetirizine (Xyzal) 5 mg tablet, Take 1 tablet (5 mg) by mouth once daily in the evening., Disp: 90 tablet, Rfl: 1    losartan (Cozaar) 25 mg tablet, Take 1 tablet (25 mg) by mouth once daily., Disp: 90 tablet, Rfl: 1    metoprolol succinate XL (Toprol-XL) 200 mg 24 hr tablet, Take 1 tablet (200 mg) by mouth once daily., Disp: 90 tablet, Rfl: 3    netarsudiL (Rhopressa) 0.02 % drops opthalmic solution, Administer 1 drop into both eyes once daily in the evening., Disp: 2.5 mL, Rfl: 6    omeprazole (PriLOSEC) 40 mg DR capsule, Take 1 capsule (40 mg) by mouth 2 times a day before meals., Disp: 60 capsule, Rfl: 1    pen needle, diabetic 31 gauge x 5/16\" needle, Use to inject 1-4 times daily as directed., Disp: 100 each, Rfl: 11    pen needle, diabetic 33 gauge x 5/32\" needle, Use for sliding scale up to 3 times a day, Disp: 100 each, Rfl: 2    polyethylene glycol (Glycolax, Miralax) 17 gram/dose powder, Take 17 g by mouth if needed. IN 8 OUNCES OF WATER, JUICE, OR TEA AND DRINK, Disp: , Rfl:     rosuvastatin (Crestor) 20 mg tablet, Take 1 tablet (20 mg) by mouth once daily., Disp: 30 tablet, Rfl: 2    tiotropium (Spiriva Respimat) 2.5 mcg/actuation inhaler, Inhale 2 puffs once daily., Disp: 1 each, Rfl: 3    ferrous gluconate 324 (38 Fe) mg tablet, Take 1 tablet (38 mg of iron) by mouth once daily., Disp: , Rfl:     ferrous sulfate, 325 mg ferrous sulfate, tablet, Take 1 tablet by mouth once daily with breakfast. (Patient not taking: Reported on " 3/7/2024), Disp: 90 tablet, Rfl: 1    furosemide (Lasix) 20 mg tablet, Take 1 tablet (20 mg) by mouth once daily as needed (As needed for leg swelling). (Patient not taking: Reported on 3/7/2024), Disp: 30 tablet, Rfl: 11    polyethylene glycol-electrolytes (Nulytely) 420 gram solution, Take 2,000 mL by mouth 1 time for 1 dose., Disp: 2000 mL, Rfl: 0    Current Facility-Administered Medications:     nitroglycerin (Nitrostat) SL tablet 0.4 mg, 0.4 mg, sublingual, Once, Vianney Marrufo MD     Lab Results   Component Value Date    WBC 8.4 02/27/2024    HGB 10.2 (L) 02/27/2024    HCT 34.0 (L) 02/27/2024    MCV 79 (L) 02/27/2024     02/27/2024     Lab Results   Component Value Date    GLUCOSE 154 (H) 02/27/2024    CALCIUM 10.1 02/27/2024     02/27/2024    K 4.6 02/27/2024    CO2 28 02/27/2024     (H) 02/27/2024    BUN 16 02/27/2024    CREATININE 1.33 (H) 02/27/2024     Lab Results   Component Value Date    ALT 26 02/05/2024    AST 22 02/05/2024    GGT 33 12/23/2020    ALKPHOS 140 (H) 02/05/2024    BILITOT 0.4 02/05/2024         Procedures:  3/27/2023 EGD     Impression:            - 6 cm hiatal hernia.                         - Multiple fundic gland polyps. Biopsied.                         - A medium amount of food (residue) in the stomach.                         - The examination was otherwise normal. Biopsies were                          taken with a cold forceps from stomach and duodenum.     FINAL DIAGNOSIS   A.  SECOND PORTION DUODENUM COLD BIOPSY:   -- UNREMARKABLE DUODENAL MUCOSA, NO FEATURES OF CELIAC DISEASE.     B.  GASTRIC COLD BIOPSY:   -- OXYNTIC GASTRIC MUCOSA WITHOUT ABNORMALITY, HELICOBACTER PYLORI STAIN NOT   INDICATED.     C. GASTRIC POLYP COLD BIOPSY:   -- FUNDIC GLAND POLYPS.      1/14/2019 Colonoscopy  Impression:            - The examined portion of the ileum was normal.                         - One 2 mm polyp in the rectum, removed with a cold                          biopsy  forceps. Resected and retrieved.                         - Non-bleeding moderate diverticulosis in the sigmoid                          colon.                         - The colon was otherwise unremarkable. No                          ulcerations, AVMs, colitis or other obvious bleeding                          lesions.                         - The distal rectum and anal verge are normal on                          retroflexion view.     12/28/2011  Impression:            - The examined portion of the ileum was normal.                         - The entire examined colon is normal.                         - Diverticulosis sigmoid colon and descending colon.                         - One 5 mm polyp in the rectum (benign appearing).                          Resected and retrieved.                         - Non-bleeding internal hemorrhoids.                         - The examination was otherwise normal.      ASSESSMENT/PLAN:    Vania Andrews is a 69 y.o. female with a past medical history of chronic VEDA presents for hospitalization follow up. Pt had EGD/Colonoscopy/capsule endoscopy in 2019 negative. Pt again developed microcytic anemia which was exacerbated by the anticoagulation for PE. Repeat EGD/Colonoscopy 2/2015 which were grossly negative. Plan for capsule endoscopy to rule out small intestine AVM.      Plan  -Repeat CBC/Iron panels   -Capsule endoscopy     -->Patient to continue to hold iron pills until capsule done    --> Patient to drink 2L Golytely prep the day at night  -May consider MRE if capsule endoscopy negative  -RTC in 3 mos       Tatiana Diaz MD

## 2024-03-07 ENCOUNTER — OFFICE VISIT (OUTPATIENT)
Dept: GASTROENTEROLOGY | Facility: HOSPITAL | Age: 69
End: 2024-03-07
Payer: MEDICARE

## 2024-03-07 VITALS
DIASTOLIC BLOOD PRESSURE: 88 MMHG | BODY MASS INDEX: 39.92 KG/M2 | HEIGHT: 59 IN | OXYGEN SATURATION: 96 % | WEIGHT: 198 LBS | SYSTOLIC BLOOD PRESSURE: 143 MMHG | TEMPERATURE: 97.1 F | HEART RATE: 73 BPM

## 2024-03-07 DIAGNOSIS — D50.0 IRON DEFICIENCY ANEMIA DUE TO CHRONIC BLOOD LOSS: Primary | ICD-10-CM

## 2024-03-07 PROCEDURE — 3079F DIAST BP 80-89 MM HG: CPT | Performed by: STUDENT IN AN ORGANIZED HEALTH CARE EDUCATION/TRAINING PROGRAM

## 2024-03-07 PROCEDURE — 1160F RVW MEDS BY RX/DR IN RCRD: CPT | Performed by: STUDENT IN AN ORGANIZED HEALTH CARE EDUCATION/TRAINING PROGRAM

## 2024-03-07 PROCEDURE — 1125F AMNT PAIN NOTED PAIN PRSNT: CPT | Performed by: STUDENT IN AN ORGANIZED HEALTH CARE EDUCATION/TRAINING PROGRAM

## 2024-03-07 PROCEDURE — 3008F BODY MASS INDEX DOCD: CPT | Performed by: STUDENT IN AN ORGANIZED HEALTH CARE EDUCATION/TRAINING PROGRAM

## 2024-03-07 PROCEDURE — 99214 OFFICE O/P EST MOD 30 MIN: CPT | Performed by: STUDENT IN AN ORGANIZED HEALTH CARE EDUCATION/TRAINING PROGRAM

## 2024-03-07 PROCEDURE — 1111F DSCHRG MED/CURRENT MED MERGE: CPT | Performed by: STUDENT IN AN ORGANIZED HEALTH CARE EDUCATION/TRAINING PROGRAM

## 2024-03-07 PROCEDURE — 3077F SYST BP >= 140 MM HG: CPT | Performed by: STUDENT IN AN ORGANIZED HEALTH CARE EDUCATION/TRAINING PROGRAM

## 2024-03-07 PROCEDURE — 4010F ACE/ARB THERAPY RXD/TAKEN: CPT | Performed by: STUDENT IN AN ORGANIZED HEALTH CARE EDUCATION/TRAINING PROGRAM

## 2024-03-07 PROCEDURE — 1036F TOBACCO NON-USER: CPT | Performed by: STUDENT IN AN ORGANIZED HEALTH CARE EDUCATION/TRAINING PROGRAM

## 2024-03-07 PROCEDURE — 1159F MED LIST DOCD IN RCRD: CPT | Performed by: STUDENT IN AN ORGANIZED HEALTH CARE EDUCATION/TRAINING PROGRAM

## 2024-03-07 RX ORDER — POLYETHYLENE GLYCOL 3350, SODIUM CHLORIDE, SODIUM BICARBONATE, POTASSIUM CHLORIDE 420; 11.2; 5.72; 1.48 G/4L; G/4L; G/4L; G/4L
2000 POWDER, FOR SOLUTION ORAL ONCE
Qty: 2000 ML | Refills: 0 | Status: SHIPPED | OUTPATIENT
Start: 2024-03-07 | End: 2024-03-14 | Stop reason: WASHOUT

## 2024-03-07 ASSESSMENT — PAIN SCALES - GENERAL: PAINLEVEL: 8

## 2024-03-07 NOTE — PATIENT INSTRUCTIONS
It was a pleasure working with you today.  As we discussed     -Repeat blood test  -Schedule capsule endoscopy   -Continue to hold iron pills    Please return to clinic in 3 (order has placed).    Please contact me via my chart or via  ([phone]) if you have any question.   Looking forward to seeing you again!

## 2024-03-08 ENCOUNTER — LAB (OUTPATIENT)
Dept: LAB | Facility: HOSPITAL | Age: 69
End: 2024-03-08
Payer: MEDICARE

## 2024-03-08 DIAGNOSIS — D50.0 IRON DEFICIENCY ANEMIA DUE TO CHRONIC BLOOD LOSS: ICD-10-CM

## 2024-03-08 LAB
BASOPHILS # BLD AUTO: 0.05 X10*3/UL (ref 0–0.1)
BASOPHILS NFR BLD AUTO: 0.5 %
DACRYOCYTES BLD QL SMEAR: NORMAL
EOSINOPHIL # BLD AUTO: 0.19 X10*3/UL (ref 0–0.7)
EOSINOPHIL NFR BLD AUTO: 2 %
ERYTHROCYTE [DISTWIDTH] IN BLOOD BY AUTOMATED COUNT: 25.7 % (ref 11.5–14.5)
FERRITIN SERPL-MCNC: 81 NG/ML (ref 8–150)
GIANT PLATELETS BLD QL SMEAR: NORMAL
HCT VFR BLD AUTO: 33.3 % (ref 36–46)
HGB BLD-MCNC: 10 G/DL (ref 12–16)
HOLD SPECIMEN: NORMAL
HYPOCHROMIA BLD QL SMEAR: NORMAL
IMM GRANULOCYTES # BLD AUTO: 0.04 X10*3/UL (ref 0–0.7)
IMM GRANULOCYTES NFR BLD AUTO: 0.4 % (ref 0–0.9)
IRON SATN MFR SERPL: 11 % (ref 25–45)
IRON SERPL-MCNC: 44 UG/DL (ref 35–150)
LYMPHOCYTES # BLD AUTO: 1.46 X10*3/UL (ref 1.2–4.8)
LYMPHOCYTES NFR BLD AUTO: 15.6 %
MCH RBC QN AUTO: 24.1 PG (ref 26–34)
MCHC RBC AUTO-ENTMCNC: 30 G/DL (ref 32–36)
MCV RBC AUTO: 80 FL (ref 80–100)
MONOCYTES # BLD AUTO: 0.52 X10*3/UL (ref 0.1–1)
MONOCYTES NFR BLD AUTO: 5.6 %
NEUTROPHILS # BLD AUTO: 7.08 X10*3/UL (ref 1.2–7.7)
NEUTROPHILS NFR BLD AUTO: 75.9 %
NRBC BLD-RTO: 0 /100 WBCS (ref 0–0)
OVALOCYTES BLD QL SMEAR: NORMAL
PLATELET # BLD AUTO: 313 X10*3/UL (ref 150–450)
PLATELET CLUMP BLD QL SMEAR: PRESENT
POLYCHROMASIA BLD QL SMEAR: NORMAL
RBC # BLD AUTO: 4.15 X10*6/UL (ref 4–5.2)
RBC MORPH BLD: NORMAL
SCHISTOCYTES BLD QL SMEAR: NORMAL
TIBC SERPL-MCNC: 396 UG/DL (ref 240–445)
UIBC SERPL-MCNC: 352 UG/DL (ref 110–370)
WBC # BLD AUTO: 9.3 X10*3/UL (ref 4.4–11.3)

## 2024-03-08 PROCEDURE — 36415 COLL VENOUS BLD VENIPUNCTURE: CPT

## 2024-03-08 PROCEDURE — 82728 ASSAY OF FERRITIN: CPT | Performed by: STUDENT IN AN ORGANIZED HEALTH CARE EDUCATION/TRAINING PROGRAM

## 2024-03-08 PROCEDURE — 83540 ASSAY OF IRON: CPT | Performed by: STUDENT IN AN ORGANIZED HEALTH CARE EDUCATION/TRAINING PROGRAM

## 2024-03-08 PROCEDURE — 85025 COMPLETE CBC W/AUTO DIFF WBC: CPT

## 2024-03-11 ENCOUNTER — LAB (OUTPATIENT)
Dept: LAB | Facility: HOSPITAL | Age: 69
End: 2024-03-11
Payer: MEDICARE

## 2024-03-11 ENCOUNTER — OFFICE VISIT (OUTPATIENT)
Dept: HEMATOLOGY/ONCOLOGY | Facility: HOSPITAL | Age: 69
End: 2024-03-11
Payer: MEDICARE

## 2024-03-11 ENCOUNTER — HOSPITAL ENCOUNTER (OUTPATIENT)
Dept: VASCULAR MEDICINE | Facility: HOSPITAL | Age: 69
Discharge: HOME | End: 2024-03-11
Payer: MEDICARE

## 2024-03-11 VITALS
HEART RATE: 76 BPM | TEMPERATURE: 96.8 F | DIASTOLIC BLOOD PRESSURE: 81 MMHG | SYSTOLIC BLOOD PRESSURE: 137 MMHG | RESPIRATION RATE: 16 BRPM | OXYGEN SATURATION: 100 %

## 2024-03-11 DIAGNOSIS — M79.604 RIGHT LEG PAIN: ICD-10-CM

## 2024-03-11 DIAGNOSIS — I82.411 ACUTE DEEP VEIN THROMBOSIS (DVT) OF FEMORAL VEIN OF RIGHT LOWER EXTREMITY (MULTI): ICD-10-CM

## 2024-03-11 DIAGNOSIS — D50.0 IRON DEFICIENCY ANEMIA DUE TO CHRONIC BLOOD LOSS: ICD-10-CM

## 2024-03-11 DIAGNOSIS — I82.411 ACUTE DEEP VEIN THROMBOSIS (DVT) OF FEMORAL VEIN OF RIGHT LOWER EXTREMITY (MULTI): Primary | ICD-10-CM

## 2024-03-11 DIAGNOSIS — M79.89 RIGHT LEG SWELLING: ICD-10-CM

## 2024-03-11 DIAGNOSIS — I82.562 CHRONIC DEEP VEIN THROMBOSIS (DVT) OF CALF MUSCLE VEIN OF LEFT LOWER EXTREMITY (MULTI): ICD-10-CM

## 2024-03-11 DIAGNOSIS — I82.512 CHRONIC EMBOLISM AND THROMBOSIS OF LEFT FEMORAL VEIN (MULTI): ICD-10-CM

## 2024-03-11 DIAGNOSIS — R60.1 GENERALIZED EDEMA: ICD-10-CM

## 2024-03-11 LAB
D DIMER PPP FEU-MCNC: 560 NG/ML FEU
FERRITIN SERPL-MCNC: 75 NG/ML (ref 8–150)
HOLD SPECIMEN: NORMAL
HOLD SPECIMEN: NORMAL
IRON SATN MFR SERPL: 8 % (ref 25–45)
IRON SERPL-MCNC: 29 UG/DL (ref 35–150)
TIBC SERPL-MCNC: 380 UG/DL (ref 240–445)
UIBC SERPL-MCNC: 351 UG/DL (ref 110–370)

## 2024-03-11 PROCEDURE — 36415 COLL VENOUS BLD VENIPUNCTURE: CPT

## 2024-03-11 PROCEDURE — 1159F MED LIST DOCD IN RCRD: CPT | Performed by: STUDENT IN AN ORGANIZED HEALTH CARE EDUCATION/TRAINING PROGRAM

## 2024-03-11 PROCEDURE — 1125F AMNT PAIN NOTED PAIN PRSNT: CPT | Performed by: STUDENT IN AN ORGANIZED HEALTH CARE EDUCATION/TRAINING PROGRAM

## 2024-03-11 PROCEDURE — 4010F ACE/ARB THERAPY RXD/TAKEN: CPT | Performed by: STUDENT IN AN ORGANIZED HEALTH CARE EDUCATION/TRAINING PROGRAM

## 2024-03-11 PROCEDURE — 93971 EXTREMITY STUDY: CPT | Performed by: STUDENT IN AN ORGANIZED HEALTH CARE EDUCATION/TRAINING PROGRAM

## 2024-03-11 PROCEDURE — 3075F SYST BP GE 130 - 139MM HG: CPT | Performed by: STUDENT IN AN ORGANIZED HEALTH CARE EDUCATION/TRAINING PROGRAM

## 2024-03-11 PROCEDURE — 85379 FIBRIN DEGRADATION QUANT: CPT | Performed by: STUDENT IN AN ORGANIZED HEALTH CARE EDUCATION/TRAINING PROGRAM

## 2024-03-11 PROCEDURE — 99215 OFFICE O/P EST HI 40 MIN: CPT | Performed by: STUDENT IN AN ORGANIZED HEALTH CARE EDUCATION/TRAINING PROGRAM

## 2024-03-11 PROCEDURE — 3079F DIAST BP 80-89 MM HG: CPT | Performed by: STUDENT IN AN ORGANIZED HEALTH CARE EDUCATION/TRAINING PROGRAM

## 2024-03-11 PROCEDURE — 3008F BODY MASS INDEX DOCD: CPT | Performed by: STUDENT IN AN ORGANIZED HEALTH CARE EDUCATION/TRAINING PROGRAM

## 2024-03-11 PROCEDURE — 83540 ASSAY OF IRON: CPT | Performed by: STUDENT IN AN ORGANIZED HEALTH CARE EDUCATION/TRAINING PROGRAM

## 2024-03-11 PROCEDURE — 1036F TOBACCO NON-USER: CPT | Performed by: STUDENT IN AN ORGANIZED HEALTH CARE EDUCATION/TRAINING PROGRAM

## 2024-03-11 PROCEDURE — 1111F DSCHRG MED/CURRENT MED MERGE: CPT | Performed by: STUDENT IN AN ORGANIZED HEALTH CARE EDUCATION/TRAINING PROGRAM

## 2024-03-11 PROCEDURE — 82728 ASSAY OF FERRITIN: CPT | Performed by: STUDENT IN AN ORGANIZED HEALTH CARE EDUCATION/TRAINING PROGRAM

## 2024-03-11 PROCEDURE — 93971 EXTREMITY STUDY: CPT

## 2024-03-11 PROCEDURE — 1160F RVW MEDS BY RX/DR IN RCRD: CPT | Performed by: STUDENT IN AN ORGANIZED HEALTH CARE EDUCATION/TRAINING PROGRAM

## 2024-03-11 ASSESSMENT — PAIN SCALES - GENERAL: PAINLEVEL_OUTOF10: 9

## 2024-03-11 NOTE — PROGRESS NOTES
Patient ID: Vania Andrews is a 69 y.o. female.  Referring Physician: Garland Speras MD  [id number] [place] [address]  Primary Care Provider: Laura Mata MD  Visit Type: Follow Up  Diagnosis: Anemia       Subjective    HPI  69 y.o. 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   Has previously seen  at  back in 2021 for similar concerns.     Recently admitted at  for VEDA with Hb ~5, ferritin 21, TIBC >450, hypoproliferative retic, relatively normal coags, admission notes allude to GI bleeding, but pt does not account for this. Folate B12 not checked. Hapto WNL, LDH mildly elevated, bili indirectly elevated also during admission. Was treated with 1 dose of iron sucrose during admission 300mg x1 and transfusions.   Had GIB in 2019, needing transfusions. Were not able to identify where the bleed was from. Was seen at [facility] for VEDA source of bleeding unclear, imaging noted below. Received (IV femuroxytol) on 3/23 and 3/30/2021. Since then at recent admission has had IV iron sucrose.   After DVT was given apixaban for 1yr, but asked to stop after 3 months by her cardiologist  for presumably a provoked VTE event with concern for GI bleeding.   Currently on ASA 81mg daily. Not on PO iron, has tried it in the past but has issues with constipation despite colace.   Age appropriate cancer screening: last colo in '19, last mammo oct '23 has axillary adenopathy   FMH: mother had throat and  pancreatic cancer   Social history: former smoker, quit >20yrs, social drinker, no RDA. 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 Was started on heparin IV but noted to have recurrent GI bleeding early in the year and also suspected during admission, accordingly underwent EGD and colo after briefly holding her AC. Both were negative for a source of bleeding.   24: at follow up today pt has an antalgic gait needing a wheelchair eventually. Reports pain in the right thigh started at the time of discharge from hospital (where she was admitted for DVT/PE) not at admission and has remained unchanged since. Worried about clot progression. Has not missed any doses of the AC. Swelling in  RLE is improving but this has not changed the pain.     Review of Systems - Oncology  10 point review of systems negative except as stated in HPI    Objective   Past Surgical History:   Procedure Laterality Date    BREAST BIOPSY  2016    Biopsy Breast Percutaneous Needle Core     SECTION, CLASSIC  2014     Section    CHOLECYSTECTOMY  2017    Cholecystectomy Laparoscopic    HAND SURGERY  2020    Hand Surgery                                                                                                                                                          HERNIA REPAIR  2014    Hernia Repair    MR HEAD ANGIO WO IV CONTRAST  2014    MR HEAD ANGIO WO IV CONTRAST 2014 [facility] [place] [facility] ANCILLARY LEGACY    OTHER SURGICAL HISTORY  2021    Nephrectomy    TOTAL ABDOMINAL HYSTERECTOMY W/ BILATERAL SALPINGOOPHORECTOMY  2014    Total Abdominal Hysterectomy With Removal Of Both Ovaries     Family History   Problem Relation Name Age of Onset    Aneurysm Mother      Hypertension Mother      Pancreatic cancer  Mother      Diabetes Mother      Other (laryngeal Cancer) Mother      Heart disease Father      Pulmonary embolism Brother      Breast cancer Father's Sister      Breast cancer Maternal Cousin       Oncology History    No history exists.       Physical Exam  Vitals reviewed.   Constitutional:       General: She is not in acute distress.     Appearance: She is obese. She is not toxic-appearing.   HENT:      Head: Normocephalic and atraumatic.   Cardiovascular:      Rate and Rhythm: Normal rate and regular rhythm.      Heart sounds: No murmur heard.  Pulmonary:      Effort: Pulmonary effort is normal.   Abdominal:      General: There is distension.      Palpations: Abdomen is soft.      Tenderness: There is abdominal tenderness.      Comments: No palpable splenomegaly, abdominal tenderness diffuse    Musculoskeletal:      Comments: Significant RLE swelling up to mid thigh--> improved, LLE not significantly swollen. Has pain and tenderness to palpation on right thigh mid-thigh not related to movement.    Neurological:      General: No focal deficit present.      Mental Status: She is oriented to person, place, and time.   Psychiatric:         Mood and Affect: Mood normal.       BSA: There is no height or weight on file to calculate BSA.  /81 (BP Location: Left arm, Patient Position: Sitting, BP Cuff Size: Large adult)   Pulse 76   Temp 36 °C (96.8 °F) (Skin)   Resp 16   LMP  (LMP Unknown)   SpO2 100%     Labs:  Lab Results   Component Value Date    WBC 9.3 03/08/2024    NEUTROABS 7.08 03/08/2024    IGABSOL 0.04 03/08/2024    LYMPHSABS 1.46 03/08/2024    MONOSABS 0.52 03/08/2024    EOSABS 0.19 03/08/2024    BASOSABS 0.05 03/08/2024    RBC 4.15 03/08/2024    MCV 80 03/08/2024    MCHC 30.0 (L) 03/08/2024    HGB 10.0 (L) 03/08/2024    HCT 33.3 (L) 03/08/2024     03/08/2024     Lab Results   Component Value Date    RETICCTPCT 1.3 02/05/2024      Lab Results   Component Value Date    CREATININE 1.33 (H)  "02/27/2024    BUN 16 02/27/2024    EGFR 44 (L) 02/27/2024     02/27/2024    K 4.6 02/27/2024     (H) 02/27/2024    CO2 28 02/27/2024      Lab Results   Component Value Date    ALT 26 02/05/2024    AST 22 02/05/2024    GGT 33 12/23/2020    ALKPHOS 140 (H) 02/05/2024    BILITOT 0.4 02/05/2024      Lab Results   Component Value Date    TSH 2.41 02/10/2023     Lab Results   Component Value Date    TSH 2.41 02/10/2023     Lab Results   Component Value Date    IRON 44 03/08/2024    TIBC 396 03/08/2024    FERRITIN 81 03/08/2024      Lab Results   Component Value Date    [id number] 577 02/27/2024      Lab Results   Component Value Date    FOLATE 10.0 02/05/2024     Lab Results   Component Value Date    ERIKA NEGATIVE 12/23/2020    SEDRATE 37 (H) 11/26/2023      Lab Results   Component Value Date    CRP 1.03 (H) 11/26/2023      No results found for: \"MAIKEL\"  Lab Results   Component Value Date     (H) 01/25/2024     Lab Results   Component Value Date    HAPTOGLOBIN 196 01/26/2024     Lab Results   Component Value Date    SPEP Increased level of beta globulins. 02/05/2024     No results found for: \"IGG\", \"IGM\", \"IGA\"  No components found for: \"PT\"  aPTT   Date/Time Value Ref Range Status   02/10/2024 10:36 AM 82 (H) 27 - 38 seconds Final   02/08/2024 09:13 PM 69 (H) 27 - 38 seconds Final   02/08/2024 10:12 AM 35 27 - 38 seconds Final      Latest Reference Range & Units 05/03/21 09:17 10/16/21 02:40 07/20/22 09:14 02/10/23 13:08 07/21/23 10:51 01/26/24 01:29 02/07/24 03:16 03/08/24 10:31 03/11/24 12:05   FERRITIN 8 - 150 ng/mL 396 (H) 1,207 (H)    21  81 75   IRON 35 - 150 ug/dL 91  33 (L) 10 (L) 23 (L) 51 74 44 29 (L)   TIBC 240 - 445 ug/dL 295  421 >460 ! >473 ! COMMENT ONLY 352 396 380   UIBC 110 - 370 ug/dL      >450 (H) 278 352 351   % Saturation 25 - 45 % 31  8 (L) NOT CALC. NOT CALC. COMMENT ONLY 21 (L) 11 (L) 8 (L)   (H): Data is abnormally high  (L): Data is abnormally low  !: Data is " abnormal    Assessment/Plan    69 y.o. 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   Has previously seen  at  back in 2021 for similar concerns.      # Anemia: Hb last WNL in '22, since then progressive decline with obvious GI bleeding in '23, microcytic received IV ferumoxytol and sucrose intermittently and has improved to ~10 recently. Has h/o iron deficiency, but has been normal as recently as 2022 not suggestive of congenital hemoglobinopathy. Has hypoproliferative retic index. In follow up since last visit myeloma labs did not detect clonal protein, B12 and folate are WNL. In summary anemia likely related to VEDA with ongoing GI losses with stool occult being positive as recently as 2/2024 (of note her iron panel does not suggest severe deficiency but is low for renal disease), renal disease.   Plan:  - script for PO iron sent, but this is now on hold until she  can get her pill enteroscopy done.    - follow up next: 2 month   - labs prior to follow up: CBC/diff, retic, iron panel including ferritin     # RLE swelling: now h/o recurrent VTE events last one being submassive. She was off AC in between, is now back on 5mg Q12H of apixaban which she has been adherent with. Notes today significant pain the the RLE started at discharge from hospital, while I am not suspicious this is progressive thrombosis a DVT scan was done and results do not indicate progressive clotting. I had d/w pt during visit that if the  scan is negative, would proceed with MRI. Given her scan was negative we attempted to contact the pt on multiple occasions at multiple numbers as listed in the notes, to no avail, no answer or call back. Eventually returned a call where the information was relayed but I received no communication about persisting pain. In the interim has seen VM and a CT has been ordered.   Plan:  - advised she remain on apixaban 5mg Q12H.     Garland Jenkins MD

## 2024-03-12 ENCOUNTER — TELEPHONE (OUTPATIENT)
Dept: HEMATOLOGY/ONCOLOGY | Facility: HOSPITAL | Age: 69
End: 2024-03-12
Payer: MEDICARE

## 2024-03-12 NOTE — TELEPHONE ENCOUNTER
"RN attempted to call both the patient's number and her  but there was no answer. RN left a message to call the office back on the patient's voicemail. MD Jenkins said \"scan is negative for a DVT, can you let her know she can continue apixaban, if pain is still there today, I will order MRI\"   "

## 2024-03-14 ENCOUNTER — HOSPITAL ENCOUNTER (OUTPATIENT)
Dept: RADIOLOGY | Facility: CLINIC | Age: 69
Discharge: HOME | End: 2024-03-14
Payer: MEDICARE

## 2024-03-14 ENCOUNTER — OFFICE VISIT (OUTPATIENT)
Dept: CARDIOLOGY | Facility: CLINIC | Age: 69
End: 2024-03-14
Payer: MEDICARE

## 2024-03-14 VITALS
OXYGEN SATURATION: 96 % | BODY MASS INDEX: 39.79 KG/M2 | HEART RATE: 75 BPM | DIASTOLIC BLOOD PRESSURE: 83 MMHG | SYSTOLIC BLOOD PRESSURE: 160 MMHG | WEIGHT: 197 LBS

## 2024-03-14 DIAGNOSIS — M25.561 CHRONIC PAIN OF RIGHT KNEE: ICD-10-CM

## 2024-03-14 DIAGNOSIS — G89.29 CHRONIC PAIN OF RIGHT KNEE: ICD-10-CM

## 2024-03-14 DIAGNOSIS — I82.411 ACUTE DEEP VEIN THROMBOSIS (DVT) OF FEMORAL VEIN OF RIGHT LOWER EXTREMITY (MULTI): ICD-10-CM

## 2024-03-14 DIAGNOSIS — M79.651 PAIN OF RIGHT THIGH: ICD-10-CM

## 2024-03-14 DIAGNOSIS — M25.551 PAIN OF RIGHT HIP: ICD-10-CM

## 2024-03-14 DIAGNOSIS — M25.551 PAIN OF RIGHT HIP: Primary | ICD-10-CM

## 2024-03-14 DIAGNOSIS — I26.92 ACUTE SADDLE PULMONARY EMBOLISM WITHOUT ACUTE COR PULMONALE (MULTI): ICD-10-CM

## 2024-03-14 PROCEDURE — 3008F BODY MASS INDEX DOCD: CPT | Performed by: INTERNAL MEDICINE

## 2024-03-14 PROCEDURE — 1036F TOBACCO NON-USER: CPT | Performed by: INTERNAL MEDICINE

## 2024-03-14 PROCEDURE — 73552 X-RAY EXAM OF FEMUR 2/>: CPT | Mod: RT

## 2024-03-14 PROCEDURE — 1160F RVW MEDS BY RX/DR IN RCRD: CPT | Performed by: INTERNAL MEDICINE

## 2024-03-14 PROCEDURE — 99214 OFFICE O/P EST MOD 30 MIN: CPT | Performed by: INTERNAL MEDICINE

## 2024-03-14 PROCEDURE — 4010F ACE/ARB THERAPY RXD/TAKEN: CPT | Performed by: INTERNAL MEDICINE

## 2024-03-14 PROCEDURE — 73502 X-RAY EXAM HIP UNI 2-3 VIEWS: CPT | Mod: RT

## 2024-03-14 PROCEDURE — 1159F MED LIST DOCD IN RCRD: CPT | Performed by: INTERNAL MEDICINE

## 2024-03-14 PROCEDURE — 3079F DIAST BP 80-89 MM HG: CPT | Performed by: INTERNAL MEDICINE

## 2024-03-14 PROCEDURE — 73552 X-RAY EXAM OF FEMUR 2/>: CPT | Mod: RIGHT SIDE | Performed by: RADIOLOGY

## 2024-03-14 PROCEDURE — 73502 X-RAY EXAM HIP UNI 2-3 VIEWS: CPT | Mod: RIGHT SIDE | Performed by: RADIOLOGY

## 2024-03-14 PROCEDURE — 73560 X-RAY EXAM OF KNEE 1 OR 2: CPT | Mod: RT

## 2024-03-14 PROCEDURE — 3077F SYST BP >= 140 MM HG: CPT | Performed by: INTERNAL MEDICINE

## 2024-03-14 PROCEDURE — 1111F DSCHRG MED/CURRENT MED MERGE: CPT | Performed by: INTERNAL MEDICINE

## 2024-03-14 NOTE — PROGRESS NOTES
I saw and evaluated the patient. I personally obtained the key and critical portions of the history and physical exam or was physically present for key and critical portions performed by the trainee. I agree with the trainee's medical decision making as described in the trainee's note.    MD Uriel Sneed MD PhD

## 2024-03-14 NOTE — PROGRESS NOTES
Chief Complaint:   DVT/PE     History of Present Illness:    Vania Andrews is a 70 y/o woman with history of provoked left femoropopliteal/calf DVT in 2020 who was admitted in February with new unprovoked PE and right popliteal DVT. She is here to follow up after that admission.    She was last seen on 12/12/2023, at which time she was reporting severe left ankle pain, which I felt was not due to chronic post-thrombotic change in the left leg. (She had been seen in the ED for pain and due to prior history of DVT had an ultrasound, which again showed chronic post-thrombotic change on the left; she was started back on anticoagulation, which I asked her to stop.)    She was admitted due to elevated D-dimer done, which led to CTA of the chest. Treated with heparin and transitioned back to apixaban. She has chronic anemia with workup ongoing in GI.    Today her biggest complaint is severe pain in the right thigh; feels like pulling and tearing; worse with standing and bearing weight; worse with walking.  She notices pain in the buttock as well. Symptoms are improved if she lies on her left side.    She denies bleeding including epistaxis, gingival bleeding, hemoptysis, hematemesis, hematochezia, melena, hematuria, and vaginal bleeding.       Past Medical History:   Diagnosis Date    Abdominal distension (gaseous) 07/31/2019    Abdominal bloating    Cancer (CMS/HCC)     Cataract     Coronary artery disease     Diabetes mellitus (CMS/HCC)     Diverticulosis of intestine, part unspecified, without perforation or abscess without bleeding 06/09/2013    Diverticulosis    Elevation of levels of liver transaminase levels 11/13/2020    Transaminitis    Encounter for follow-up examination after completed treatment for conditions other than malignant neoplasm 01/26/2019    Hospital discharge follow-up    Glaucoma     Hypertension     Long term (current) use of antibiotics 10/07/2016    Need for prophylactic antibiotic    Other  amnesia 10/28/2014    Memory loss    Other conditions influencing health status 2019    History of cough    Other specified health status 2019    Hepatitis C antibody test negative    Other specified soft tissue disorders 2017    Leg swelling    Pain in left toe(s) 05/15/2017    Pain of toe of left foot    Pain in right foot 10/12/2016    Pain of right heel    Pain in right shoulder 2016    Acute pain of right shoulder    Personal history of diseases of the blood and blood-forming organs and certain disorders involving the immune mechanism 2018    History of anemia    Personal history of other diseases of the respiratory system 2018    History of sinusitis    Personal history of other diseases of the respiratory system 2014    History of upper respiratory infection    Personal history of other malignant neoplasm of kidney 2021    Personal history of malignant neoplasm of kidney    Personal history of other medical treatment 2017    History of screening mammography    Personal history of other specified conditions 2014    History of abdominal pain    Personal history of other specified conditions 2017    History of urinary frequency    Personal history of other specified conditions 2021    History of dysuria    Personal history of other specified conditions 2014    History of breast lump    Unspecified abdominal hernia without obstruction or gangrene 2014    Hernia     Past Surgical History:   Procedure Laterality Date    BREAST BIOPSY  2016    Biopsy Breast Percutaneous Needle Core     SECTION, CLASSIC  2014     Section    CHOLECYSTECTOMY  2017    Cholecystectomy Laparoscopic    HAND SURGERY  2020    Hand Surgery                                                                                                                                                          HERNIA REPAIR  2014     Hernia Repair    MR HEAD ANGIO WO IV CONTRAST  11/17/2014    MR HEAD ANGIO WO IV CONTRAST 11/17/2014 [facility] [place] ANCILLARY LEGACY    OTHER SURGICAL HISTORY  01/14/2021    Nephrectomy    TOTAL ABDOMINAL HYSTERECTOMY W/ BILATERAL SALPINGOOPHORECTOMY  04/21/2014    Total Abdominal Hysterectomy With Removal Of Both Ovaries     Social History     Tobacco Use    Smoking status: Former     Types: Cigarettes     Passive exposure: Never    Smokeless tobacco: Never   Vaping Use    Vaping Use: Never used   Substance Use Topics    Alcohol use: Not Currently    Drug use: Not Currently         Family History:  Family History   Problem Relation Name Age of Onset    Aneurysm Mother      Hypertension Mother      Pancreatic cancer Mother      Diabetes Mother      Other (laryngeal Cancer) Mother      Heart disease Father      Pulmonary embolism Brother      Breast cancer Father's Sister      Breast cancer Maternal Cousin          Allergies:  Adhesive, Amoxicillin, Bee sting kit, Cephalexin, Gadolinium-containing contrast media, Iodine, Latex, Pioglitazone, Rubella virus live vaccine, Salicylates, Shellfish derived, Sulfa (sulfonamide antibiotics), Sulfamethoxazole-trimethoprim, and Iodinated contrast media    Outpatient Medications:  Current Outpatient Medications   Medication Instructions    Accu-Chek Guide test strips strip Use 1 test strip to test blood glucose twice daily.    albuterol 90 mcg/actuation inhaler 2 puffs, inhalation, Every 4 hours PRN    amLODIPine (NORVASC) 10 mg, oral, Daily    apixaban (ELIQUIS) 5 mg, oral, 2 times daily    brimonidine-timoloL (Combigan) 0.2-0.5 % ophthalmic solution 1 drop, Both Eyes, 2 times daily    cholecalciferol (D3-2000) 2,000 Units, oral, Daily    diclofenac sodium 1 % kit 1 Application, Topical, As needed,  APPLY TO LOWER EXTREMITIES, 4 GM OF GEL TO AFFECTED AREA 4 TIMES DAILY. DO NOT APPLY MORE THAN 16 GM DAILY TO ANY ONE AFFECTED JOINT.    dicyclomine (BENTYL) 20 mg, oral, 3-4 times daily as  "needed.    dulaglutide (Trulicity) 0.75 mg/0.5 mL pen injector Inject 0.75 mg (1 pen) under the skin 1 (one) time per week.    famotidine (Pepcid) 20 mg tablet 1 tablet, oral, Nightly    ferrous gluconate 324 (38 Fe) mg tablet 38 mg of iron, oral, Daily RT    fluticasone (Flonase) 50 mcg/actuation nasal spray 2 sprays, Each Nostril, Daily    furosemide (LASIX) 20 mg, oral, Daily PRN    insulin lispro (HumaLOG KwikPen Insulin) 100 unit/mL injection Use with sliding scale 3 times a day, up to 30 units daily    latanoprost (Xalatan) 0.005 % ophthalmic solution 1 drop, Both Eyes, Every morning    levocetirizine (XYZAL) 5 mg, oral, Every evening    losartan (COZAAR) 25 mg, oral, Daily    metoprolol succinate XL (TOPROL-XL) 200 mg, oral, Daily    netarsudiL (Rhopressa) 0.02 % drops opthalmic solution 1 drop, Both Eyes, Every evening    omeprazole (PRILOSEC) 40 mg, oral, 2 times daily before meals    pen needle, diabetic 31 gauge x 5/16\" needle Use to inject 1-4 times daily as directed.    pen needle, diabetic 33 gauge x 5/32\" needle Use for sliding scale up to 3 times a day    polyethylene glycol (GLYCOLAX, MIRALAX) 17 g, oral, As needed, IN 8 OUNCES OF WATER, JUICE, OR TEA AND DRINK    rosuvastatin (CRESTOR) 20 mg, oral, Daily    tiotropium (Spiriva Respimat) 2.5 mcg/actuation inhaler 2 puffs, inhalation, Daily     Blood pressure 160/83, pulse 75, weight 89.4 kg (197 lb), SpO2 96 %.    Physical Exam:  Mild distress due to pain  Heart regular, systolic murmur  Lungs clear  Abdomen soft  Right distal thigh tenderness  Right proximal calf tenderness  Antalgic gait; using wheelchair here in the office for even short distances     Last Labs:  CBC -  Lab Results   Component Value Date    WBC 9.3 03/08/2024    HGB 10.0 (L) 03/08/2024    HCT 33.3 (L) 03/08/2024    MCV 80 03/08/2024     03/08/2024       CMP -  Lab Results   Component Value Date    CALCIUM 10.1 02/27/2024    PHOS 3.1 02/10/2024    PROT 6.8 02/05/2024    " ALBUMIN 3.0 (L) 02/10/2024    AST 22 02/05/2024    ALT 26 02/05/2024    ALKPHOS 140 (H) 02/05/2024    BILITOT 0.4 02/05/2024       LIPID PANEL -   Lab Results   Component Value Date    CHOL 171 10/31/2023    TRIG 71 10/31/2023    HDL 61.2 10/31/2023    CHHDL 2.8 10/31/2023    LDLF 93 08/03/2022    VLDL 14 10/31/2023    NHDL 110 10/31/2023       RENAL FUNCTION PANEL -   Lab Results   Component Value Date    GLUCOSE 154 (H) 02/27/2024     02/27/2024    K 4.6 02/27/2024     (H) 02/27/2024    CO2 28 02/27/2024    ANIONGAP 11 02/27/2024    BUN 16 02/27/2024    CREATININE 1.33 (H) 02/27/2024    CALCIUM 10.1 02/27/2024    PHOS 3.1 02/10/2024    ALBUMIN 3.0 (L) 02/10/2024        Lab Results   Component Value Date    BNP 54 02/27/2024    HGBA1C 7.3 (A) 12/11/2023     Venous duplex ultrasound 11/13/2020   **CRITICAL RESULT**  Critical Result: Left leg DVT  Notification called to Lydia Hidalgo MD on 11/13/2020 at 3:55:35 PM.     CONCLUSIONS:  Right Lower Venous: No evidence of acute deep vein thrombus visualized in the right lower extremity.  Left Lower Venous: There is acute occlusive deep vein thrombosis visualized in the distal femoral, popliteal, peroneal and gastrocnemius veins. There is acute non-occlusive deep vein thrombosis visualized in the posterior tibial vein.    Venous duplex ultrasound 1/21/2021   CONCLUSIONS:     Right Lower Venous: Right common femoral vein is negative for deep vein thrombus.     Left Lower Venous: No evidence of acute deep vein thrombus visualized in the left lower extremity. There are chronic changes visualized in the popliteal vein.     Comparison:  Compared with study from 11/13/2020, Significant improvement in thrombus burden left leg, just some chronic (post thrombotic) changes in the poplieal vein.    Venous duplex ultrasound 11/6/2023   CONCLUSIONS:  Right Lower Venous: No evidence of acute deep vein thrombus visualized in the right lower extremity.  Left Lower  Venous: No evidence of acute deep vein thrombus visualized in the left lower extremity. There are chronic changes visualized in the popliteal, posterior tibial and peroneal veins.    Venous duplex ultrasound 2/5/2024   IMPRESSION:  There is occlusive thrombus in the right distal femoral and popliteal  veins.      Poor visualization of the calf veins.    CTA chest 2/6/2024  IMPRESSION:  1. There is an acute saddle type pulmonary embolus extending to  bilateral upper and right lower lobar and segmental branches .  Calculated RV to LV ratio of 1.0.  2. Main pulmonary artery is dilated up to 3.5 cm which can be seen  with pulmonary arterial hypertension.  3. Large hiatal hernia with significant portion of the stomach  intrathoracic in location. There is adjacent compressive atelectasis.  4. 3.8 cm hypodense lesion in the left upper quadrant is incompletely  imaged, likely arising from the left kidney. This measures higher  than simple fluid attenuation. This can be further evaluated with  nonemergent renal ultrasound.    X-ray hip and thigh 3/14/2024  FINDINGS:  AP view of the pelvis and two views of the right hip and right femur  are obtained. Severe right hip joint space loss with subchondral  sclerosis and osteophytes. No erosions. No acute  fracture-dislocation. Mild subchondral sclerosis and osteophyte  involving the left hip. The left hip joint space appear preserved.  The SI joints are patent and symmetric.      IMPRESSION:  No acute fracture or dislocation.      Advanced degenerative changes of the right hip.    Diagnoses and all orders for this visit:  Pain of right hip (Primary)  -     XR hip right with pelvis when performed 2 or 3 views; Future  -     Cancel: XR knee right 3 views; Future  -     Cancel: XR femur right 1 view; Future  Pain of right thigh  -     XR hip right with pelvis when performed 2 or 3 views; Future  -     Cancel: XR knee right 3 views; Future  -     Cancel: XR femur right 1 view;  Future  -     CT femur right wo IV contrast; Future  Chronic pain of right knee  -     XR hip right with pelvis when performed 2 or 3 views; Future  -     Cancel: XR knee right 3 views; Future  -     Cancel: XR femur right 1 view; Future  Acute saddle pulmonary embolism without acute cor pulmonale (CMS/HCC)  Acute deep vein thrombosis (DVT) of femoral vein of right lower extremity (CMS/HCC)    We got imaging today; severe hip arthritis but no acute findings  Her symptoms are very atypical for DVT  We will get a CT scan of the thigh  Will determine next steps after that  Meantime continue anticoagulation       Remedios Garcia MD, MS

## 2024-03-14 NOTE — TELEPHONE ENCOUNTER
Vania called back. Message relayed to her as below. Patient verbalized with teach back. No further needs at this time.

## 2024-03-18 ENCOUNTER — OFFICE VISIT (OUTPATIENT)
Dept: ENDOCRINOLOGY | Facility: CLINIC | Age: 69
End: 2024-03-18
Payer: MEDICARE

## 2024-03-18 VITALS — SYSTOLIC BLOOD PRESSURE: 135 MMHG | HEART RATE: 74 BPM | DIASTOLIC BLOOD PRESSURE: 97 MMHG

## 2024-03-18 DIAGNOSIS — N18.31 TYPE 2 DIABETES MELLITUS WITH STAGE 3A CHRONIC KIDNEY DISEASE, WITHOUT LONG-TERM CURRENT USE OF INSULIN (MULTI): ICD-10-CM

## 2024-03-18 DIAGNOSIS — E11.22 TYPE 2 DIABETES MELLITUS WITH STAGE 3A CHRONIC KIDNEY DISEASE, WITHOUT LONG-TERM CURRENT USE OF INSULIN (MULTI): ICD-10-CM

## 2024-03-18 DIAGNOSIS — E11.65 TYPE 2 DIABETES MELLITUS WITH HYPERGLYCEMIA, WITHOUT LONG-TERM CURRENT USE OF INSULIN (MULTI): Primary | ICD-10-CM

## 2024-03-18 LAB — POC HEMOGLOBIN A1C: 7.1 % (ref 4.2–6.5)

## 2024-03-18 PROCEDURE — 1036F TOBACCO NON-USER: CPT | Performed by: STUDENT IN AN ORGANIZED HEALTH CARE EDUCATION/TRAINING PROGRAM

## 2024-03-18 PROCEDURE — 3008F BODY MASS INDEX DOCD: CPT | Performed by: STUDENT IN AN ORGANIZED HEALTH CARE EDUCATION/TRAINING PROGRAM

## 2024-03-18 PROCEDURE — 1160F RVW MEDS BY RX/DR IN RCRD: CPT | Performed by: STUDENT IN AN ORGANIZED HEALTH CARE EDUCATION/TRAINING PROGRAM

## 2024-03-18 PROCEDURE — 4010F ACE/ARB THERAPY RXD/TAKEN: CPT | Performed by: STUDENT IN AN ORGANIZED HEALTH CARE EDUCATION/TRAINING PROGRAM

## 2024-03-18 PROCEDURE — 1159F MED LIST DOCD IN RCRD: CPT | Performed by: STUDENT IN AN ORGANIZED HEALTH CARE EDUCATION/TRAINING PROGRAM

## 2024-03-18 PROCEDURE — 83036 HEMOGLOBIN GLYCOSYLATED A1C: CPT | Performed by: STUDENT IN AN ORGANIZED HEALTH CARE EDUCATION/TRAINING PROGRAM

## 2024-03-18 PROCEDURE — 3075F SYST BP GE 130 - 139MM HG: CPT | Performed by: STUDENT IN AN ORGANIZED HEALTH CARE EDUCATION/TRAINING PROGRAM

## 2024-03-18 PROCEDURE — 1125F AMNT PAIN NOTED PAIN PRSNT: CPT | Performed by: STUDENT IN AN ORGANIZED HEALTH CARE EDUCATION/TRAINING PROGRAM

## 2024-03-18 PROCEDURE — 3080F DIAST BP >= 90 MM HG: CPT | Performed by: STUDENT IN AN ORGANIZED HEALTH CARE EDUCATION/TRAINING PROGRAM

## 2024-03-18 PROCEDURE — 99214 OFFICE O/P EST MOD 30 MIN: CPT | Performed by: STUDENT IN AN ORGANIZED HEALTH CARE EDUCATION/TRAINING PROGRAM

## 2024-03-18 RX ORDER — BLOOD SUGAR DIAGNOSTIC
STRIP MISCELLANEOUS
Qty: 100 STRIP | Refills: 2 | Status: SHIPPED | OUTPATIENT
Start: 2024-03-18

## 2024-03-18 RX ORDER — LANCETS
EACH MISCELLANEOUS
Qty: 100 EACH | Refills: 2 | Status: SHIPPED | OUTPATIENT
Start: 2024-03-18

## 2024-03-18 RX ORDER — INSULIN LISPRO 100 [IU]/ML
INJECTION, SOLUTION INTRAVENOUS; SUBCUTANEOUS
Qty: 15 ML | Refills: 2 | Status: SHIPPED | OUTPATIENT
Start: 2024-03-18

## 2024-03-18 RX ORDER — PEN NEEDLE, DIABETIC 30 GX3/16"
NEEDLE, DISPOSABLE MISCELLANEOUS
Qty: 100 EACH | Refills: 11 | Status: SHIPPED | OUTPATIENT
Start: 2024-03-18

## 2024-03-18 RX ORDER — AMMONIUM LACTATE 12 G/100G
LOTION TOPICAL AS NEEDED
Qty: 225 G | Refills: 3 | Status: SHIPPED | OUTPATIENT
Start: 2024-03-18 | End: 2025-03-18

## 2024-03-18 ASSESSMENT — PAIN SCALES - GENERAL: PAINLEVEL: 8

## 2024-03-18 NOTE — PROGRESS NOTES
"68 F PMH: HTN, HLD< NSTEMI, DVT, Obesity, CKD, RA, GERD, MARYLU     Following up for DM2    Diabetes History     DM diagnosed > 10 years ago,     Complications Micro and Macro-CKD no albuminuria   A1c:   Lab Results   Component Value Date    HGBA1C 7.3 (A) 12/11/2023   IO A1c 7.1%     Receives steroids when given dye     Regimen   Basaglar -been off for months   No SGLT2-due to recurrent UTI   Trulicity 0.75mg weekly-does not take consistently     Previously:  Basaglar   Ozempic-GI intolerant  Pioglitazone -dced due to leg swelling   Tradjenta-previously      SMBG   Fingerstick in the 180s     Hypoglycemia none known    Comorbidities and Screening  Eye Exam: sees ophtha, glaucoma and cataract  Foot exam: needs    Lipid  Lab Results   Component Value Date    CHOL 171 10/31/2023    CHOL 176 08/03/2022    CHOL 142 06/23/2021     Lab Results   Component Value Date    HDL 61.2 10/31/2023    HDL 55.5 08/03/2022    HDL 58.6 06/23/2021     Lab Results   Component Value Date    LDLCALC 96 10/31/2023     Lab Results   Component Value Date    TRIG 71 10/31/2023    TRIG 137 08/03/2022    TRIG 88 06/23/2021     No components found for: \"CHOLHDL\"      Statin- rosuvastatin 20mg   Cr and albuminuria- losartan    Lab Results   Component Value Date    CREATININE 1.33 (H) 02/27/2024    EGFR 44 (L) 02/27/2024      ACE/ARB- losartan 25mg     Past Medical History:   Diagnosis Date    Abdominal distension (gaseous) 07/31/2019    Abdominal bloating    Cancer (CMS/HCC)     Cataract     Coronary artery disease     Diabetes mellitus (CMS/HCC)     Diverticulosis of intestine, part unspecified, without perforation or abscess without bleeding 06/09/2013    Diverticulosis    Elevation of levels of liver transaminase levels 11/13/2020    Transaminitis    Encounter for follow-up examination after completed treatment for conditions other than malignant neoplasm 01/26/2019    Hospital discharge follow-up    Glaucoma     Hypertension     Long term " (current) use of antibiotics 10/07/2016    Need for prophylactic antibiotic    Other amnesia 10/28/2014    Memory loss    Other conditions influencing health status 02/06/2019    History of cough    Other specified health status 02/07/2019    Hepatitis C antibody test negative    Other specified soft tissue disorders 06/14/2017    Leg swelling    Pain in left toe(s) 05/15/2017    Pain of toe of left foot    Pain in right foot 10/12/2016    Pain of right heel    Pain in right shoulder 06/22/2016    Acute pain of right shoulder    Personal history of diseases of the blood and blood-forming organs and certain disorders involving the immune mechanism 04/09/2018    History of anemia    Personal history of other diseases of the respiratory system 04/02/2018    History of sinusitis    Personal history of other diseases of the respiratory system 03/19/2014    History of upper respiratory infection    Personal history of other malignant neoplasm of kidney 01/14/2021    Personal history of malignant neoplasm of kidney    Personal history of other medical treatment 08/08/2017    History of screening mammography    Personal history of other specified conditions 11/17/2014    History of abdominal pain    Personal history of other specified conditions 06/14/2017    History of urinary frequency    Personal history of other specified conditions 06/09/2021    History of dysuria    Personal history of other specified conditions 11/28/2014    History of breast lump    Unspecified abdominal hernia without obstruction or gangrene 04/21/2014    Hernia     Family History   Problem Relation Name Age of Onset    Aneurysm Mother      Hypertension Mother      Pancreatic cancer Mother      Diabetes Mother      Other (laryngeal Cancer) Mother      Heart disease Father      Pulmonary embolism Brother      Breast cancer Father's Sister      Breast cancer Maternal Cousin        Social History     Socioeconomic History    Marital status:       Spouse name: Not on file    Number of children: Not on file    Years of education: Not on file    Highest education level: Not on file   Occupational History    Not on file   Tobacco Use    Smoking status: Former     Types: Cigarettes     Passive exposure: Never    Smokeless tobacco: Never   Vaping Use    Vaping Use: Never used   Substance and Sexual Activity    Alcohol use: Not Currently    Drug use: Not Currently    Sexual activity: Not on file   Other Topics Concern    Not on file   Social History Narrative    Not on file     Social Determinants of Health     Financial Resource Strain: Low Risk  (2/12/2024)    Overall Financial Resource Strain (CARDIA)     Difficulty of Paying Living Expenses: Not hard at all   Food Insecurity: Not on file   Transportation Needs: No Transportation Needs (2/12/2024)    PRAPARE - Transportation     Lack of Transportation (Medical): No     Lack of Transportation (Non-Medical): No   Physical Activity: Not on file   Stress: Not on file   Social Connections: Not on file   Intimate Partner Violence: Not on file   Housing Stability: Low Risk  (2/12/2024)    Housing Stability Vital Sign     Unable to Pay for Housing in the Last Year: No     Number of Places Lived in the Last Year: 1     Unstable Housing in the Last Year: No    Social: retired visiting nurse     ROS:  Leg swelling, leg pain   Negative except those noted in current and interim history    Physical Exam  Cardiovascular:      Rate and Rhythm: Normal rate and regular rhythm.   Abdominal:      Comments: Abdominal obesity    Musculoskeletal:         General: Swelling present.      Comments: Bilateral lower extremity swelling   Feet:      Comments: Dry skin but intact, neuropathy throughout but +2pulses bl  Skin:     General: Skin is warm.      Coloration: Skin is not jaundiced.   Neurological:      General: No focal deficit present.      Mental Status: She is oriented to person, place, and time.   Psychiatric:         Mood and  "Affect: Mood normal.         Behavior: Behavior normal.          labs and imaging reviewed, pertinent findings listed on HPI and Impression      Problem List Items Addressed This Visit       Diabetes mellitus (CMS/[facility]) - Primary    Relevant Medications    ammonium lactate (Lac-Hydrin) 12 % lotion    dulaglutide (Trulicity) 0.75 mg/0.5 mL pen injector    Accu-Chek Guide test strips strip    lancets (Lancets,Ultra Thin) misc    pen needle, diabetic 31 gauge x 5/16\" needle    insulin lispro (HumaLOG KwikPen Insulin) 100 unit/mL injection    Other Relevant Orders    Albumin , Urine Random     DM2 with vascular complications including CAD, CKD     Recently in the hospital in Feb for PE, she self discontinued her meds during that time due to her leg pain.  Intermittently receives prednisone for contrast.  Requiring minimal insulin during hospitalization  IO A1c done today and was 7.1%    Restart trulicity 0.75mg weekly   Insulin sliding scale as needed when on prednisone 2:50 >150  Needs urine albumin checked     Foot exam done today   Will discuss diabetes screening at next visit     Rechallenge with SGLT2? in the future due to CKD    Follow up in about 2-3 months per patient request      "

## 2024-03-18 NOTE — PATIENT INSTRUCTIONS
Restart trulicity 0.75mg weekly      Follow up in 3-4 months     Monique Gross MD  Divison of Endocrinology   [facility]   Phone: [phone]    option 4, then option 1  Fax: [phone]

## 2024-03-19 ENCOUNTER — PATIENT OUTREACH (OUTPATIENT)
Dept: PRIMARY CARE | Facility: CLINIC | Age: 69
End: 2024-03-19
Payer: MEDICARE

## 2024-03-19 NOTE — PROGRESS NOTES
Call placed regarding one month post discharge follow up call.  At time of outreach call the patient feels as if their condition is about the same since initial visit with PCP.  Patient reported some pain in the right leg, patient reported that the tramadol makes her drowsy and does not touch her pain.

## 2024-03-20 PROCEDURE — RXMED WILLOW AMBULATORY MEDICATION CHARGE

## 2024-03-21 DIAGNOSIS — H40.1133 PRIMARY OPEN ANGLE GLAUCOMA (POAG) OF BOTH EYES, SEVERE STAGE: Primary | ICD-10-CM

## 2024-03-22 ENCOUNTER — DOCUMENTATION (OUTPATIENT)
Dept: PRIMARY CARE | Facility: CLINIC | Age: 69
End: 2024-03-22
Payer: MEDICARE

## 2024-03-25 ENCOUNTER — APPOINTMENT (OUTPATIENT)
Dept: GASTROENTEROLOGY | Facility: HOSPITAL | Age: 69
End: 2024-03-25
Payer: MEDICARE

## 2024-03-25 RX ORDER — BRIMONIDINE TARTRATE AND TIMOLOL MALEATE 2; 5 MG/ML; MG/ML
1 SOLUTION OPHTHALMIC 2 TIMES DAILY
Qty: 10 ML | Refills: 6 | Status: SHIPPED | OUTPATIENT
Start: 2024-03-25 | End: 2024-03-28

## 2024-03-26 ENCOUNTER — PHARMACY VISIT (OUTPATIENT)
Dept: PHARMACY | Facility: CLINIC | Age: 69
End: 2024-03-26
Payer: MEDICARE

## 2024-03-27 ENCOUNTER — HOSPITAL ENCOUNTER (OUTPATIENT)
Dept: RADIOLOGY | Facility: HOSPITAL | Age: 69
Discharge: HOME | End: 2024-03-27
Payer: MEDICARE

## 2024-03-27 ENCOUNTER — PHARMACY VISIT (OUTPATIENT)
Dept: PHARMACY | Facility: CLINIC | Age: 69
End: 2024-03-27

## 2024-03-27 DIAGNOSIS — C64.2 RENAL CELL CARCINOMA OF LEFT KIDNEY (MULTI): ICD-10-CM

## 2024-03-27 PROCEDURE — 76770 US EXAM ABDO BACK WALL COMP: CPT | Performed by: RADIOLOGY

## 2024-03-27 PROCEDURE — 76770 US EXAM ABDO BACK WALL COMP: CPT

## 2024-03-28 ENCOUNTER — OFFICE VISIT (OUTPATIENT)
Dept: OPHTHALMOLOGY | Facility: CLINIC | Age: 69
End: 2024-03-28
Payer: MEDICARE

## 2024-03-28 ENCOUNTER — DOCUMENTATION (OUTPATIENT)
Dept: PRIMARY CARE | Facility: CLINIC | Age: 69
End: 2024-03-28

## 2024-03-28 DIAGNOSIS — H40.1133 PRIMARY OPEN ANGLE GLAUCOMA (POAG) OF BOTH EYES, SEVERE STAGE: ICD-10-CM

## 2024-03-28 PROCEDURE — 99212 OFFICE O/P EST SF 10 MIN: CPT | Performed by: OPHTHALMOLOGY

## 2024-03-28 RX ORDER — LATANOPROST 50 UG/ML
1 SOLUTION/ DROPS OPHTHALMIC EVERY MORNING
Qty: 2.5 ML | Refills: 6 | Status: SHIPPED | OUTPATIENT
Start: 2024-03-28 | End: 2024-05-30 | Stop reason: SDUPTHER

## 2024-03-28 RX ORDER — BRIMONIDINE TARTRATE 2 MG/ML
1 SOLUTION/ DROPS OPHTHALMIC 2 TIMES DAILY
Qty: 15 ML | Refills: 11 | Status: SHIPPED | OUTPATIENT
Start: 2024-03-28 | End: 2024-05-30

## 2024-03-28 RX ORDER — NETARSUDIL 0.2 MG/ML
1 SOLUTION/ DROPS OPHTHALMIC; TOPICAL EVERY EVENING
Qty: 2.5 ML | Refills: 6 | Status: SHIPPED | OUTPATIENT
Start: 2024-03-28 | End: 2024-05-30 | Stop reason: SDUPTHER

## 2024-03-28 ASSESSMENT — VISUAL ACUITY
OS_SC: 20/25
OD_SC+: +2
OD_PH_SC+: -2
OD_PH_SC: 20/25
OS_SC+: -2
OD_SC: 20/50
METHOD: SNELLEN - LINEAR

## 2024-03-28 ASSESSMENT — ENCOUNTER SYMPTOMS
CARDIOVASCULAR NEGATIVE: 0
EYES NEGATIVE: 1
CONSTITUTIONAL NEGATIVE: 0
MUSCULOSKELETAL NEGATIVE: 0
ALLERGIC/IMMUNOLOGIC NEGATIVE: 0
HEMATOLOGIC/LYMPHATIC NEGATIVE: 0
RESPIRATORY NEGATIVE: 0
NEUROLOGICAL NEGATIVE: 0
ENDOCRINE NEGATIVE: 0
PSYCHIATRIC NEGATIVE: 0
GASTROINTESTINAL NEGATIVE: 0

## 2024-03-28 ASSESSMENT — PACHYMETRY
OD_CT(UM): 564
OS_CT(UM): 573

## 2024-03-28 ASSESSMENT — TONOMETRY
OS_IOP_MMHG: 20
OD_IOP_MMHG: 22

## 2024-03-28 ASSESSMENT — EXTERNAL EXAM - LEFT EYE: OS_EXAM: NORMAL

## 2024-03-28 ASSESSMENT — CONF VISUAL FIELD
OD_INFERIOR_NASAL_RESTRICTION: 0
OS_INFERIOR_NASAL_RESTRICTION: 0
METHOD: COUNTING FINGERS
OS_INFERIOR_TEMPORAL_RESTRICTION: 0
OS_SUPERIOR_TEMPORAL_RESTRICTION: 0
OD_SUPERIOR_TEMPORAL_RESTRICTION: 0
OD_NORMAL: 1
OD_INFERIOR_TEMPORAL_RESTRICTION: 0
OD_SUPERIOR_NASAL_RESTRICTION: 0
OS_NORMAL: 1
OS_SUPERIOR_NASAL_RESTRICTION: 0

## 2024-03-28 ASSESSMENT — EXTERNAL EXAM - RIGHT EYE: OD_EXAM: NORMAL

## 2024-03-28 ASSESSMENT — SLIT LAMP EXAM - LIDS
COMMENTS: NORMAL
COMMENTS: NORMAL

## 2024-03-28 ASSESSMENT — CUP TO DISC RATIO
OD_RATIO: 0.75
OS_RATIO: 0.7

## 2024-03-29 ENCOUNTER — HOSPITAL ENCOUNTER (OUTPATIENT)
Dept: RADIOLOGY | Facility: CLINIC | Age: 69
Discharge: HOME | End: 2024-03-29
Payer: MEDICARE

## 2024-03-29 DIAGNOSIS — M79.651 PAIN OF RIGHT THIGH: ICD-10-CM

## 2024-03-29 PROCEDURE — 73700 CT LOWER EXTREMITY W/O DYE: CPT | Mod: RIGHT SIDE | Performed by: RADIOLOGY

## 2024-03-29 PROCEDURE — 73700 CT LOWER EXTREMITY W/O DYE: CPT | Mod: RT

## 2024-04-01 DIAGNOSIS — I26.92 ACUTE SADDLE PULMONARY EMBOLISM WITHOUT ACUTE COR PULMONALE (MULTI): ICD-10-CM

## 2024-04-01 DIAGNOSIS — N18.30 STAGE 3 CHRONIC KIDNEY DISEASE, UNSPECIFIED WHETHER STAGE 3A OR 3B CKD (MULTI): Primary | ICD-10-CM

## 2024-04-01 DIAGNOSIS — Z91.041 CONTRAST MEDIA ALLERGY: ICD-10-CM

## 2024-04-01 DIAGNOSIS — Z00.00 EXAMINATION: ICD-10-CM

## 2024-04-01 DIAGNOSIS — Z91.041 CONTRAST MEDIA ALLERGY: Primary | ICD-10-CM

## 2024-04-02 RX ORDER — METOPROLOL TARTRATE 100 MG/1
100 TABLET ORAL ONCE
Status: DISCONTINUED | OUTPATIENT
Start: 2024-04-08 | End: 2024-05-06 | Stop reason: HOSPADM

## 2024-04-02 RX ORDER — DIPHENHYDRAMINE HCL 25 MG
50 CAPSULE ORAL ONCE
Status: DISCONTINUED | OUTPATIENT
Start: 2024-04-08 | End: 2024-04-05 | Stop reason: ALTCHOICE

## 2024-04-02 RX ORDER — PREDNISONE 50 MG/1
50 TABLET ORAL ONCE
Status: DISCONTINUED | OUTPATIENT
Start: 2024-04-08 | End: 2024-04-05 | Stop reason: ALTCHOICE

## 2024-04-02 RX ORDER — PREDNISONE 50 MG/1
50 TABLET ORAL ONCE
Status: DISCONTINUED | OUTPATIENT
Start: 2024-04-07 | End: 2024-04-05 | Stop reason: ALTCHOICE

## 2024-04-03 ENCOUNTER — PATIENT OUTREACH (OUTPATIENT)
Dept: PRIMARY CARE | Facility: CLINIC | Age: 69
End: 2024-04-03
Payer: MEDICARE

## 2024-04-03 NOTE — PROGRESS NOTES
Spoke to Ms. Anrdews and introduced to her the benefits of Chronic care management. She would like for me to call her back next week with her decision to utilized CCM services.

## 2024-04-04 ENCOUNTER — LAB (OUTPATIENT)
Dept: LAB | Facility: LAB | Age: 69
End: 2024-04-04
Payer: MEDICARE

## 2024-04-04 DIAGNOSIS — E11.65 TYPE 2 DIABETES MELLITUS WITH HYPERGLYCEMIA, WITHOUT LONG-TERM CURRENT USE OF INSULIN (MULTI): ICD-10-CM

## 2024-04-04 DIAGNOSIS — D50.0 IRON DEFICIENCY ANEMIA DUE TO CHRONIC BLOOD LOSS: ICD-10-CM

## 2024-04-04 LAB
BASOPHILS # BLD AUTO: 0.05 X10*3/UL (ref 0–0.1)
BASOPHILS NFR BLD AUTO: 0.6 %
CREAT UR-MCNC: 93.6 MG/DL (ref 20–320)
EOSINOPHIL # BLD AUTO: 0.24 X10*3/UL (ref 0–0.7)
EOSINOPHIL NFR BLD AUTO: 3.1 %
ERYTHROCYTE [DISTWIDTH] IN BLOOD BY AUTOMATED COUNT: 18.9 % (ref 11.5–14.5)
HCT VFR BLD AUTO: 33.3 % (ref 36–46)
HGB BLD-MCNC: 9.9 G/DL (ref 12–16)
IMM GRANULOCYTES # BLD AUTO: 0.03 X10*3/UL (ref 0–0.7)
IMM GRANULOCYTES NFR BLD AUTO: 0.4 % (ref 0–0.9)
LYMPHOCYTES # BLD AUTO: 1.4 X10*3/UL (ref 1.2–4.8)
LYMPHOCYTES NFR BLD AUTO: 18.2 %
MCH RBC QN AUTO: 25 PG (ref 26–34)
MCHC RBC AUTO-ENTMCNC: 29.7 G/DL (ref 32–36)
MCV RBC AUTO: 84 FL (ref 80–100)
MICROALBUMIN UR-MCNC: <7 MG/L
MICROALBUMIN/CREAT UR: NORMAL MG/G{CREAT}
MONOCYTES # BLD AUTO: 0.47 X10*3/UL (ref 0.1–1)
MONOCYTES NFR BLD AUTO: 6.1 %
NEUTROPHILS # BLD AUTO: 5.51 X10*3/UL (ref 1.2–7.7)
NEUTROPHILS NFR BLD AUTO: 71.6 %
NRBC BLD-RTO: 0 /100 WBCS (ref 0–0)
PLATELET # BLD AUTO: 352 X10*3/UL (ref 150–450)
RBC # BLD AUTO: 3.96 X10*6/UL (ref 4–5.2)
WBC # BLD AUTO: 7.7 X10*3/UL (ref 4.4–11.3)

## 2024-04-04 PROCEDURE — 82043 UR ALBUMIN QUANTITATIVE: CPT

## 2024-04-04 PROCEDURE — 82570 ASSAY OF URINE CREATININE: CPT

## 2024-04-04 PROCEDURE — 85025 COMPLETE CBC W/AUTO DIFF WBC: CPT

## 2024-04-04 PROCEDURE — 36415 COLL VENOUS BLD VENIPUNCTURE: CPT

## 2024-04-05 ENCOUNTER — PHARMACY VISIT (OUTPATIENT)
Dept: PHARMACY | Facility: CLINIC | Age: 69
End: 2024-04-05
Payer: COMMERCIAL

## 2024-04-05 PROCEDURE — RXMED WILLOW AMBULATORY MEDICATION CHARGE

## 2024-04-05 RX ORDER — DIPHENHYDRAMINE HCL 25 MG
CAPSULE ORAL
Qty: 2 CAPSULE | Refills: 0 | Status: SHIPPED | OUTPATIENT
Start: 2024-04-05 | End: 2024-05-03 | Stop reason: SDUPTHER

## 2024-04-05 RX ORDER — PREDNISONE 50 MG/1
TABLET ORAL
Qty: 3 TABLET | Refills: 0 | Status: SHIPPED | OUTPATIENT
Start: 2024-04-07 | End: 2024-05-03 | Stop reason: SDUPTHER

## 2024-04-05 RX ORDER — PREDNISONE 50 MG/1
TABLET ORAL
Qty: 3 TABLET | Refills: 0 | OUTPATIENT
Start: 2024-04-05

## 2024-04-05 NOTE — ADDENDUM NOTE
----- Message from Vinay Plascencia MD sent at 4/3/2022 10:04 PM CDT -----  Kidneys stable   Addended by: LUIS F DELA CRUZ on: 4/5/2024 11:18 AM     Modules accepted: Orders

## 2024-04-08 ENCOUNTER — HOSPITAL ENCOUNTER (INPATIENT)
Dept: RADIOLOGY | Facility: HOSPITAL | Age: 69
Discharge: HOME | End: 2024-04-08
Payer: MEDICARE

## 2024-04-08 DIAGNOSIS — R07.9 CHEST PAIN, UNSPECIFIED TYPE: ICD-10-CM

## 2024-04-08 RX ORDER — METOPROLOL TARTRATE 1 MG/ML
5 INJECTION, SOLUTION INTRAVENOUS ONCE
Status: DISCONTINUED | OUTPATIENT
Start: 2024-04-08 | End: 2024-04-09 | Stop reason: HOSPADM

## 2024-04-08 RX ORDER — METOPROLOL TARTRATE 1 MG/ML
5 INJECTION, SOLUTION INTRAVENOUS ONCE AS NEEDED
Status: DISCONTINUED | OUTPATIENT
Start: 2024-04-08 | End: 2024-04-09 | Stop reason: HOSPADM

## 2024-04-08 RX ORDER — LORAZEPAM 2 MG/ML
0.5 INJECTION INTRAMUSCULAR EVERY 5 MIN PRN
Status: DISCONTINUED | OUTPATIENT
Start: 2024-04-08 | End: 2024-04-09 | Stop reason: HOSPADM

## 2024-04-08 RX ORDER — NITROGLYCERIN 0.4 MG/1
0.8 TABLET SUBLINGUAL ONCE
Status: DISCONTINUED | OUTPATIENT
Start: 2024-04-08 | End: 2024-04-09 | Stop reason: HOSPADM

## 2024-04-08 RX ORDER — METOPROLOL TARTRATE 100 MG/1
100 TABLET ORAL ONCE
Status: DISCONTINUED | OUTPATIENT
Start: 2024-04-08 | End: 2024-04-09 | Stop reason: HOSPADM

## 2024-04-08 RX ORDER — METOPROLOL TARTRATE 100 MG/1
100 TABLET ORAL ONCE AS NEEDED
Status: DISCONTINUED | OUTPATIENT
Start: 2024-04-08 | End: 2024-04-09 | Stop reason: HOSPADM

## 2024-04-09 DIAGNOSIS — R07.9 CHEST PAIN, UNSPECIFIED TYPE: Primary | ICD-10-CM

## 2024-04-11 ENCOUNTER — PATIENT OUTREACH (OUTPATIENT)
Dept: PRIMARY CARE | Facility: CLINIC | Age: 69
End: 2024-04-11
Payer: MEDICARE

## 2024-04-11 NOTE — PROGRESS NOTES
I spoke to Ms. Darryl regarding her interest in chronic care management. I reintroduced CCM and the services provided,  and she says that she has a lot going on right now and would just like for me to call her back in a couple weeks for her decision.

## 2024-04-16 ENCOUNTER — SPECIALTY PHARMACY (OUTPATIENT)
Dept: PHARMACY | Facility: CLINIC | Age: 69
End: 2024-04-16

## 2024-04-16 ENCOUNTER — APPOINTMENT (OUTPATIENT)
Dept: CARDIOLOGY | Facility: HOSPITAL | Age: 69
End: 2024-04-16
Payer: MEDICARE

## 2024-04-16 DIAGNOSIS — E11.22 TYPE 2 DIABETES MELLITUS WITH STAGE 3A CHRONIC KIDNEY DISEASE, WITHOUT LONG-TERM CURRENT USE OF INSULIN (MULTI): ICD-10-CM

## 2024-04-16 DIAGNOSIS — N18.31 TYPE 2 DIABETES MELLITUS WITH STAGE 3A CHRONIC KIDNEY DISEASE, WITHOUT LONG-TERM CURRENT USE OF INSULIN (MULTI): ICD-10-CM

## 2024-04-16 RX ORDER — DULAGLUTIDE 0.75 MG/.5ML
0.75 INJECTION, SOLUTION SUBCUTANEOUS
Qty: 12 EACH | Refills: 3 | Status: CANCELLED | OUTPATIENT
Start: 2024-04-21 | End: 2025-04-21

## 2024-04-23 ENCOUNTER — SPECIALTY PHARMACY (OUTPATIENT)
Dept: PHARMACY | Facility: CLINIC | Age: 69
End: 2024-04-23

## 2024-04-23 ENCOUNTER — OFFICE VISIT (OUTPATIENT)
Dept: PRIMARY CARE | Facility: CLINIC | Age: 69
End: 2024-04-23
Payer: MEDICARE

## 2024-04-23 ENCOUNTER — PATIENT OUTREACH (OUTPATIENT)
Dept: PRIMARY CARE | Facility: CLINIC | Age: 69
End: 2024-04-23

## 2024-04-23 ENCOUNTER — APPOINTMENT (OUTPATIENT)
Dept: PRIMARY CARE | Facility: CLINIC | Age: 69
End: 2024-04-23
Payer: MEDICARE

## 2024-04-23 VITALS
OXYGEN SATURATION: 96 % | WEIGHT: 199 LBS | SYSTOLIC BLOOD PRESSURE: 129 MMHG | HEIGHT: 59 IN | HEART RATE: 65 BPM | BODY MASS INDEX: 40.12 KG/M2 | DIASTOLIC BLOOD PRESSURE: 67 MMHG

## 2024-04-23 DIAGNOSIS — E11.22 TYPE 2 DIABETES MELLITUS WITH STAGE 3A CHRONIC KIDNEY DISEASE, WITHOUT LONG-TERM CURRENT USE OF INSULIN (MULTI): ICD-10-CM

## 2024-04-23 DIAGNOSIS — N18.31 TYPE 2 DIABETES MELLITUS WITH STAGE 3A CHRONIC KIDNEY DISEASE, WITHOUT LONG-TERM CURRENT USE OF INSULIN (MULTI): ICD-10-CM

## 2024-04-23 DIAGNOSIS — M16.11 PRIMARY OSTEOARTHRITIS OF RIGHT HIP: Primary | ICD-10-CM

## 2024-04-23 DIAGNOSIS — Z86.711 HISTORY OF PULMONARY EMBOLISM: ICD-10-CM

## 2024-04-23 DIAGNOSIS — Z86.718 H/O DEEP VENOUS THROMBOSIS: ICD-10-CM

## 2024-04-23 DIAGNOSIS — I10 PRIMARY HYPERTENSION: ICD-10-CM

## 2024-04-23 PROBLEM — F19.21 HISTORY OF SUBSTANCE DEPENDENCE (MULTI): Status: RESOLVED | Noted: 2023-03-27 | Resolved: 2024-04-23

## 2024-04-23 PROBLEM — M06.9 RHEUMATOID ARTHRITIS OF HAND (MULTI): Status: RESOLVED | Noted: 2023-03-27 | Resolved: 2024-04-23

## 2024-04-23 PROCEDURE — 3062F POS MACROALBUMINURIA REV: CPT | Performed by: STUDENT IN AN ORGANIZED HEALTH CARE EDUCATION/TRAINING PROGRAM

## 2024-04-23 PROCEDURE — 1036F TOBACCO NON-USER: CPT | Performed by: STUDENT IN AN ORGANIZED HEALTH CARE EDUCATION/TRAINING PROGRAM

## 2024-04-23 PROCEDURE — 4010F ACE/ARB THERAPY RXD/TAKEN: CPT | Performed by: STUDENT IN AN ORGANIZED HEALTH CARE EDUCATION/TRAINING PROGRAM

## 2024-04-23 PROCEDURE — 1159F MED LIST DOCD IN RCRD: CPT | Performed by: STUDENT IN AN ORGANIZED HEALTH CARE EDUCATION/TRAINING PROGRAM

## 2024-04-23 PROCEDURE — 3074F SYST BP LT 130 MM HG: CPT | Performed by: STUDENT IN AN ORGANIZED HEALTH CARE EDUCATION/TRAINING PROGRAM

## 2024-04-23 PROCEDURE — 3078F DIAST BP <80 MM HG: CPT | Performed by: STUDENT IN AN ORGANIZED HEALTH CARE EDUCATION/TRAINING PROGRAM

## 2024-04-23 PROCEDURE — 99214 OFFICE O/P EST MOD 30 MIN: CPT | Performed by: STUDENT IN AN ORGANIZED HEALTH CARE EDUCATION/TRAINING PROGRAM

## 2024-04-23 PROCEDURE — 1160F RVW MEDS BY RX/DR IN RCRD: CPT | Performed by: STUDENT IN AN ORGANIZED HEALTH CARE EDUCATION/TRAINING PROGRAM

## 2024-04-23 NOTE — PROGRESS NOTES
"Subjective   Patient ID: Vania Andrews is a 69 y.o. female who presents for Follow-up (4 month follow-up. ).        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 s/p L partial nephrectomy and L adrenalectomy (2007), CKD stage 3 d/t loss of nephron mass and HTN, asthma, HLD, T2DM (est with endo ), MARYLU not using CPAP, s/p hysterectomy and b/l oophorectomy, inferior NSTEMI (1/2019) complicated by chronic Fe deficiency anemia  with h/o iron infusions, partially obstructed Ross's hernia with sx ( Feb 2022 with repair in June 2022  ), H/o provoked DVT in 2020 , with cessation of eliquis after 6m  , now resumed after saddle PE In Feb 2024     Advanced OA of right hip     She is anxious abt heart cath on 5/6    PO iron on hold in anticipation of pill enteroscopy but she started to crave ice which she she experiences with worsening of anemia   9.9on 4/4           Visit Vitals  /67   Pulse 65   Ht 1.499 m (4' 11\")   Wt 90.3 kg (199 lb)   LMP  (LMP Unknown)   SpO2 96%   BMI 40.19 kg/m²   OB Status Postmenopausal   Smoking Status Former   BSA 1.94 m²      No LMP recorded (lmp unknown). Patient is postmenopausal.   Current Outpatient Medications   Medication Instructions    Accu-Chek Guide test strips strip Use 1 test strip to test blood sugar twice daily.    albuterol 90 mcg/actuation inhaler 2 puffs, inhalation, Every 4 hours PRN    amLODIPine (NORVASC) 10 mg, oral, Daily    ammonium lactate (Lac-Hydrin) 12 % lotion Topical, As needed    apixaban (ELIQUIS) 5 mg, oral, 2 times daily    brimonidine (AlphaGAN) 0.2 % ophthalmic solution 1 drop, Both Eyes, 2 times daily    cholecalciferol (D3-2000) 2,000 Units, oral, Daily    diclofenac sodium 1 % kit 1 Application, Topical, As needed,  APPLY TO LOWER EXTREMITIES, 4 GM OF GEL TO AFFECTED AREA 4 TIMES DAILY. DO NOT APPLY MORE THAN 16 GM DAILY TO ANY ONE AFFECTED JOINT.    dicyclomine (BENTYL) 20 mg, oral, 3-4 times daily as needed.    " "diphenhydrAMINE (BENADryl) 25 mg capsule Take 2 capsules (50mg) as a single dose 1 hour before CT scan as directed.    dulaglutide (Trulicity) 0.75 mg/0.5 mL pen injector Inject 0.75 mg (1 pen) under the skin 1 (one) time per week.    famotidine (Pepcid) 20 mg tablet 1 tablet, oral, Nightly    ferrous gluconate 324 (38 Fe) mg tablet 38 mg of iron, oral, Daily RT    fluticasone (Flonase) 50 mcg/actuation nasal spray 2 sprays, Each Nostril, Daily    furosemide (LASIX) 20 mg, oral, Daily PRN    insulin lispro (HumaLOG KwikPen Insulin) 100 unit/mL injection Use with sliding scale 3 times a day, up to 30 units daily    lancets (Lancets,Ultra Thin) misc Use to check blood sugar twice daily    latanoprost (Xalatan) 0.005 % ophthalmic solution 1 drop, Both Eyes, Every morning    levocetirizine (XYZAL) 5 mg, oral, Every evening    losartan (COZAAR) 25 mg, oral, Daily    metoprolol succinate XL (TOPROL-XL) 200 mg, oral, Daily    netarsudiL (Rhopressa) 0.02 % drops opthalmic solution 1 drop, Both Eyes, Every evening    omeprazole (PRILOSEC) 40 mg, oral, 2 times daily before meals    pen needle, diabetic 31 gauge x 5/16\" needle Use to inject 1-4 times daily as directed.    pen needle, diabetic 33 gauge x 5/32\" needle Use for sliding scale up to 3 times a day    polyethylene glycol (GLYCOLAX, MIRALAX) 17 g, oral, As needed, IN 8 OUNCES OF WATER, JUICE, OR TEA AND DRINK    predniSONE (Deltasone) 50 mg tablet Take prednisone 50mg (1 tablet) 13 hours, 7 hours and 1 hour prior to your CT scan.    rosuvastatin (CRESTOR) 20 mg, oral, Daily    tiotropium (Spiriva Respimat) 2.5 mcg/actuation inhaler 2 puffs, inhalation, Daily      Social History     Tobacco Use    Smoking status: Former     Types: Cigarettes     Passive exposure: Never    Smokeless tobacco: Never   Substance Use Topics    Alcohol use: Not Currently        Review of Systems    Constitutional : No feeling poorly / fevers/ chills / night sweats/ fatigue   Cardiovascular : " No CP /Palpitations/ lower extremity edema / syncope   Respiratory : No Cough /ORTIZ/Dyspnea at rest   Gastrointestinal : No abd pain / N/V  No bloody stools/ melena / constipation  Endo : No polyuria/polydipsia/ muscle weakness / sluggishness   CNS: No confusion / HA/ tingling/ numbness/ weakness of extremities  Psychiatric: No anxiety/ depression/ SI/HI    All other systems have been reviewed and are negative for complaint       Physical Exam    Constitutional : Vitals reviewed. 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 s/p L partial nephrectomy and L adrenalectomy (2007), CKD stage 3 d/t loss of nephron mass and HTN, asthma, HLD, T2DM (est with endo ), MARYLU not using CPAP, s/p hysterectomy and b/l oophorectomy, inferior NSTEMI (1/2019) complicated by chronic Fe deficiency anemia  with h/o iron infusions, partially obstructed Ross's hernia with sx ( Feb 2022 with repair in June 2022  ), H/o provoked DVT in 2020 , with cessation of eliquis after 6m  , now resumed after saddle PE In Feb 2024        Will benefit from iv iron infusions since PO iron is held in anticipation of pill enteroscopy     Pt's anxiety abt cor angiogram addressed.   Pt overwhelmed with morbidity associated with her medical conditions .    Hip OA discussed , ortho referral placed    RTO  in 4m or sooner if needed     Conditions addressed and mgmt as noted above.  Pertinent labs, images/ imaging reports , chart review was  done .   Age appropriate labs / labs for mgmt of chronic medical conditions ordered, further mgmt pending the results.

## 2024-04-23 NOTE — H&P (VIEW-ONLY)
"Subjective   Patient ID: Vania Andrews is a 69 y.o. female who presents for Follow-up (4 month follow-up. ).        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 s/p L partial nephrectomy and L adrenalectomy (2007), CKD stage 3 d/t loss of nephron mass and HTN, asthma, HLD, T2DM (est with endo ), MARYLU not using CPAP, s/p hysterectomy and b/l oophorectomy, inferior NSTEMI (1/2019) complicated by chronic Fe deficiency anemia  with h/o iron infusions, partially obstructed Ross's hernia with sx ( Feb 2022 with repair in June 2022  ), H/o provoked DVT in 2020 , with cessation of eliquis after 6m  , now resumed after saddle PE In Feb 2024     Advanced OA of right hip     She is anxious abt heart cath on 5/6    PO iron on hold in anticipation of pill enteroscopy but she started to crave ice which she she experiences with worsening of anemia   9.9on 4/4           Visit Vitals  /67   Pulse 65   Ht 1.499 m (4' 11\")   Wt 90.3 kg (199 lb)   LMP  (LMP Unknown)   SpO2 96%   BMI 40.19 kg/m²   OB Status Postmenopausal   Smoking Status Former   BSA 1.94 m²      No LMP recorded (lmp unknown). Patient is postmenopausal.   Current Outpatient Medications   Medication Instructions    Accu-Chek Guide test strips strip Use 1 test strip to test blood sugar twice daily.    albuterol 90 mcg/actuation inhaler 2 puffs, inhalation, Every 4 hours PRN    amLODIPine (NORVASC) 10 mg, oral, Daily    ammonium lactate (Lac-Hydrin) 12 % lotion Topical, As needed    apixaban (ELIQUIS) 5 mg, oral, 2 times daily    brimonidine (AlphaGAN) 0.2 % ophthalmic solution 1 drop, Both Eyes, 2 times daily    cholecalciferol (D3-2000) 2,000 Units, oral, Daily    diclofenac sodium 1 % kit 1 Application, Topical, As needed,  APPLY TO LOWER EXTREMITIES, 4 GM OF GEL TO AFFECTED AREA 4 TIMES DAILY. DO NOT APPLY MORE THAN 16 GM DAILY TO ANY ONE AFFECTED JOINT.    dicyclomine (BENTYL) 20 mg, oral, 3-4 times daily as needed.    " "diphenhydrAMINE (BENADryl) 25 mg capsule Take 2 capsules (50mg) as a single dose 1 hour before CT scan as directed.    dulaglutide (Trulicity) 0.75 mg/0.5 mL pen injector Inject 0.75 mg (1 pen) under the skin 1 (one) time per week.    famotidine (Pepcid) 20 mg tablet 1 tablet, oral, Nightly    ferrous gluconate 324 (38 Fe) mg tablet 38 mg of iron, oral, Daily RT    fluticasone (Flonase) 50 mcg/actuation nasal spray 2 sprays, Each Nostril, Daily    furosemide (LASIX) 20 mg, oral, Daily PRN    insulin lispro (HumaLOG KwikPen Insulin) 100 unit/mL injection Use with sliding scale 3 times a day, up to 30 units daily    lancets (Lancets,Ultra Thin) misc Use to check blood sugar twice daily    latanoprost (Xalatan) 0.005 % ophthalmic solution 1 drop, Both Eyes, Every morning    levocetirizine (XYZAL) 5 mg, oral, Every evening    losartan (COZAAR) 25 mg, oral, Daily    metoprolol succinate XL (TOPROL-XL) 200 mg, oral, Daily    netarsudiL (Rhopressa) 0.02 % drops opthalmic solution 1 drop, Both Eyes, Every evening    omeprazole (PRILOSEC) 40 mg, oral, 2 times daily before meals    pen needle, diabetic 31 gauge x 5/16\" needle Use to inject 1-4 times daily as directed.    pen needle, diabetic 33 gauge x 5/32\" needle Use for sliding scale up to 3 times a day    polyethylene glycol (GLYCOLAX, MIRALAX) 17 g, oral, As needed, IN 8 OUNCES OF WATER, JUICE, OR TEA AND DRINK    predniSONE (Deltasone) 50 mg tablet Take prednisone 50mg (1 tablet) 13 hours, 7 hours and 1 hour prior to your CT scan.    rosuvastatin (CRESTOR) 20 mg, oral, Daily    tiotropium (Spiriva Respimat) 2.5 mcg/actuation inhaler 2 puffs, inhalation, Daily      Social History     Tobacco Use    Smoking status: Former     Types: Cigarettes     Passive exposure: Never    Smokeless tobacco: Never   Substance Use Topics    Alcohol use: Not Currently        Review of Systems    Constitutional : No feeling poorly / fevers/ chills / night sweats/ fatigue   Cardiovascular : " No CP /Palpitations/ lower extremity edema / syncope   Respiratory : No Cough /ORTIZ/Dyspnea at rest   Gastrointestinal : No abd pain / N/V  No bloody stools/ melena / constipation  Endo : No polyuria/polydipsia/ muscle weakness / sluggishness   CNS: No confusion / HA/ tingling/ numbness/ weakness of extremities  Psychiatric: No anxiety/ depression/ SI/HI    All other systems have been reviewed and are negative for complaint       Physical Exam    Constitutional : Vitals reviewed. 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 s/p L partial nephrectomy and L adrenalectomy (2007), CKD stage 3 d/t loss of nephron mass and HTN, asthma, HLD, T2DM (est with endo ), MARYLU not using CPAP, s/p hysterectomy and b/l oophorectomy, inferior NSTEMI (1/2019) complicated by chronic Fe deficiency anemia  with h/o iron infusions, partially obstructed Ross's hernia with sx ( Feb 2022 with repair in June 2022  ), H/o provoked DVT in 2020 , with cessation of eliquis after 6m  , now resumed after saddle PE In Feb 2024        Will benefit from iv iron infusions since PO iron is held in anticipation of pill enteroscopy     Pt's anxiety abt cor angiogram addressed.   Pt overwhelmed with morbidity associated with her medical conditions .    Hip OA discussed , ortho referral placed    RTO  in 4m or sooner if needed     Conditions addressed and mgmt as noted above.  Pertinent labs, images/ imaging reports , chart review was  done .   Age appropriate labs / labs for mgmt of chronic medical conditions ordered, further mgmt pending the results.

## 2024-04-24 ENCOUNTER — PATIENT OUTREACH (OUTPATIENT)
Dept: PRIMARY CARE | Facility: CLINIC | Age: 69
End: 2024-04-24
Payer: MEDICARE

## 2024-04-24 ENCOUNTER — PATIENT OUTREACH (OUTPATIENT)
Dept: PRIMARY CARE | Facility: CLINIC | Age: 69
End: 2024-04-24

## 2024-04-24 DIAGNOSIS — N18.31 TYPE 2 DIABETES MELLITUS WITH STAGE 3A CHRONIC KIDNEY DISEASE, WITHOUT LONG-TERM CURRENT USE OF INSULIN (MULTI): ICD-10-CM

## 2024-04-24 DIAGNOSIS — I10 PRIMARY HYPERTENSION: ICD-10-CM

## 2024-04-24 DIAGNOSIS — E11.22 TYPE 2 DIABETES MELLITUS WITH STAGE 3A CHRONIC KIDNEY DISEASE, WITHOUT LONG-TERM CURRENT USE OF INSULIN (MULTI): ICD-10-CM

## 2024-04-24 PROCEDURE — 99487 CPLX CHRNC CARE 1ST 60 MIN: CPT | Performed by: STUDENT IN AN ORGANIZED HEALTH CARE EDUCATION/TRAINING PROGRAM

## 2024-04-24 ASSESSMENT — ENCOUNTER SYMPTOMS
DEPRESSION: 0
OCCASIONAL FEELINGS OF UNSTEADINESS: 0
LOSS OF SENSATION IN FEET: 0

## 2024-04-24 NOTE — PROGRESS NOTES
Patient introduced to Chronic Care Management Services   Patient opted into services on 4/24/2024  Services will be performed under the direction of PCP:   Dr. Mata  Patient has planned  Follow-up on 8/27/24    I have discussed the nature and availability of the service with the patient, the patient's responsibility for potential cost sharing, only one practitioner furnishing services during a calendar month, and the right to stop services at any time.       Ms. Andrews has given verbal consent to receive Chronic care management services. We discussed her diabetes management in which she is unclear on how she manages her diabetes. She was taking Trulicity, however she says that she has not taken it since her last hospital stay which was back in February. She says that she was only taking the insulin when she was on prednisone. I have routed a message to endo along with a referral to a diabetic educator.   We discussed all of her up coming appointments and her Cardiac cath procedure.  I have given her the number to the cath lab for there additional questions and concerns regarding her procedure. She has my contact information for any other questions or concerns.

## 2024-04-24 NOTE — PROGRESS NOTES
Called Ms. Darryl to complete outreach. She is on her way out and would like for me to call her back. Chart reviewed prior to call.

## 2024-04-30 ENCOUNTER — APPOINTMENT (OUTPATIENT)
Dept: SLEEP MEDICINE | Facility: HOSPITAL | Age: 69
End: 2024-04-30
Payer: MEDICARE

## 2024-04-30 ENCOUNTER — TELEPHONE (OUTPATIENT)
Dept: ORTHOPEDIC SURGERY | Facility: HOSPITAL | Age: 69
End: 2024-04-30
Payer: MEDICARE

## 2024-04-30 NOTE — TELEPHONE ENCOUNTER
Called patient to let her know that Dr. Gomez doesn't treat severe hip arthritis or do hip replacement surgery.  Explained that she would likely be better off seeing one of our total joint docs.  I gave her the names of Shirley, Casey, Kristopher and Junior as well as the number to central scheduling so that she could call and make an appointment with someone more appropriate for her arthritis. I told her I would cancel her appointment with Dr. Gomez. CT

## 2024-05-03 ENCOUNTER — TELEPHONE (OUTPATIENT)
Dept: HEMATOLOGY/ONCOLOGY | Facility: HOSPITAL | Age: 69
End: 2024-05-03
Payer: MEDICARE

## 2024-05-03 ENCOUNTER — APPOINTMENT (OUTPATIENT)
Dept: ORTHOPEDIC SURGERY | Facility: HOSPITAL | Age: 69
End: 2024-05-03
Payer: MEDICARE

## 2024-05-03 DIAGNOSIS — Z91.041 CONTRAST MEDIA ALLERGY: ICD-10-CM

## 2024-05-03 DIAGNOSIS — D50.0 IRON DEFICIENCY ANEMIA DUE TO CHRONIC BLOOD LOSS: Primary | ICD-10-CM

## 2024-05-03 RX ORDER — DIPHENHYDRAMINE HCL 25 MG
CAPSULE ORAL
Qty: 2 CAPSULE | Refills: 0 | Status: SHIPPED | OUTPATIENT
Start: 2024-05-03 | End: 2024-05-06 | Stop reason: HOSPADM

## 2024-05-03 RX ORDER — PREDNISONE 50 MG/1
TABLET ORAL
Qty: 3 TABLET | Refills: 0 | Status: SHIPPED | OUTPATIENT
Start: 2024-05-03 | End: 2024-05-06 | Stop reason: HOSPADM

## 2024-05-03 NOTE — TELEPHONE ENCOUNTER
Called patient to notify of standing CBC order and that MD placed iron order. No answer, left voicemail with callback number.

## 2024-05-03 NOTE — TELEPHONE ENCOUNTER
Patient calling to update that she is having a cardiac cath on Monday 5/6 and was instructed to hold blood thinner Sunday and Monday. Also wanted to update that she is craving/eating a lot of ice again so wondering if counts have dropped.

## 2024-05-06 ENCOUNTER — HOSPITAL ENCOUNTER (OUTPATIENT)
Facility: HOSPITAL | Age: 69
Setting detail: OUTPATIENT SURGERY
Discharge: HOME | End: 2024-05-06
Attending: HOSPITALIST | Admitting: HOSPITALIST
Payer: MEDICARE

## 2024-05-06 ENCOUNTER — DOCUMENTATION (OUTPATIENT)
Dept: CARE COORDINATION | Facility: CLINIC | Age: 69
End: 2024-05-06
Payer: MEDICARE

## 2024-05-06 VITALS
OXYGEN SATURATION: 98 % | WEIGHT: 209 LBS | RESPIRATION RATE: 16 BRPM | DIASTOLIC BLOOD PRESSURE: 82 MMHG | HEART RATE: 82 BPM | SYSTOLIC BLOOD PRESSURE: 153 MMHG | BODY MASS INDEX: 42.13 KG/M2 | HEIGHT: 59 IN

## 2024-05-06 DIAGNOSIS — R07.9 CHEST PAIN, UNSPECIFIED TYPE: Primary | ICD-10-CM

## 2024-05-06 DIAGNOSIS — R07.89 OTHER CHEST PAIN: ICD-10-CM

## 2024-05-06 DIAGNOSIS — I26.92 ACUTE SADDLE PULMONARY EMBOLISM WITHOUT ACUTE COR PULMONALE (MULTI): ICD-10-CM

## 2024-05-06 LAB
ANION GAP SERPL CALC-SCNC: 16 MMOL/L (ref 10–20)
BUN SERPL-MCNC: 22 MG/DL (ref 6–23)
CALCIUM SERPL-MCNC: 9.8 MG/DL (ref 8.6–10.6)
CHLORIDE SERPL-SCNC: 109 MMOL/L (ref 98–107)
CO2 SERPL-SCNC: 18 MMOL/L (ref 21–32)
CREAT SERPL-MCNC: 1.19 MG/DL (ref 0.5–1.05)
EGFRCR SERPLBLD CKD-EPI 2021: 50 ML/MIN/1.73M*2
GLUCOSE BLD MANUAL STRIP-MCNC: 175 MG/DL (ref 74–99)
GLUCOSE SERPL-MCNC: 184 MG/DL (ref 74–99)
POTASSIUM SERPL-SCNC: 4.9 MMOL/L (ref 3.5–5.3)
SODIUM SERPL-SCNC: 138 MMOL/L (ref 136–145)

## 2024-05-06 PROCEDURE — 99152 MOD SED SAME PHYS/QHP 5/>YRS: CPT | Performed by: HOSPITALIST

## 2024-05-06 PROCEDURE — 2780000003 HC OR 278 NO HCPCS: Performed by: HOSPITALIST

## 2024-05-06 PROCEDURE — 7100000010 HC PHASE TWO TIME - EACH INCREMENTAL 1 MINUTE: Performed by: HOSPITALIST

## 2024-05-06 PROCEDURE — 7100000009 HC PHASE TWO TIME - INITIAL BASE CHARGE: Performed by: HOSPITALIST

## 2024-05-06 PROCEDURE — 93458 L HRT ARTERY/VENTRICLE ANGIO: CPT | Performed by: HOSPITALIST

## 2024-05-06 PROCEDURE — 2720000007 HC OR 272 NO HCPCS: Performed by: HOSPITALIST

## 2024-05-06 PROCEDURE — 99153 MOD SED SAME PHYS/QHP EA: CPT | Performed by: HOSPITALIST

## 2024-05-06 PROCEDURE — 2550000001 HC RX 255 CONTRASTS: Performed by: HOSPITALIST

## 2024-05-06 PROCEDURE — 2500000004 HC RX 250 GENERAL PHARMACY W/ HCPCS (ALT 636 FOR OP/ED): Performed by: HOSPITALIST

## 2024-05-06 PROCEDURE — 2500000005 HC RX 250 GENERAL PHARMACY W/O HCPCS: Performed by: HOSPITALIST

## 2024-05-06 PROCEDURE — C1760 CLOSURE DEV, VASC: HCPCS | Performed by: HOSPITALIST

## 2024-05-06 PROCEDURE — 82947 ASSAY GLUCOSE BLOOD QUANT: CPT | Mod: 59

## 2024-05-06 PROCEDURE — 82374 ASSAY BLOOD CARBON DIOXIDE: CPT | Performed by: HOSPITALIST

## 2024-05-06 PROCEDURE — 36415 COLL VENOUS BLD VENIPUNCTURE: CPT | Performed by: HOSPITALIST

## 2024-05-06 PROCEDURE — C1894 INTRO/SHEATH, NON-LASER: HCPCS | Performed by: HOSPITALIST

## 2024-05-06 RX ORDER — SODIUM CHLORIDE 9 MG/ML
125 INJECTION, SOLUTION INTRAVENOUS CONTINUOUS
Status: SHIPPED | OUTPATIENT
Start: 2024-05-06 | End: 2024-05-06

## 2024-05-06 RX ORDER — MIDAZOLAM HYDROCHLORIDE 1 MG/ML
INJECTION, SOLUTION INTRAMUSCULAR; INTRAVENOUS AS NEEDED
Status: DISCONTINUED | OUTPATIENT
Start: 2024-05-06 | End: 2024-05-06 | Stop reason: HOSPADM

## 2024-05-06 RX ORDER — LIDOCAINE HYDROCHLORIDE 10 MG/ML
INJECTION, SOLUTION EPIDURAL; INFILTRATION; INTRACAUDAL; PERINEURAL AS NEEDED
Status: DISCONTINUED | OUTPATIENT
Start: 2024-05-06 | End: 2024-05-06 | Stop reason: HOSPADM

## 2024-05-06 RX ORDER — FENTANYL CITRATE 50 UG/ML
INJECTION, SOLUTION INTRAMUSCULAR; INTRAVENOUS AS NEEDED
Status: DISCONTINUED | OUTPATIENT
Start: 2024-05-06 | End: 2024-05-06 | Stop reason: HOSPADM

## 2024-05-06 ASSESSMENT — COLUMBIA-SUICIDE SEVERITY RATING SCALE - C-SSRS
2. HAVE YOU ACTUALLY HAD ANY THOUGHTS OF KILLING YOURSELF?: NO
1. IN THE PAST MONTH, HAVE YOU WISHED YOU WERE DEAD OR WISHED YOU COULD GO TO SLEEP AND NOT WAKE UP?: NO
5. HAVE YOU STARTED TO WORK OUT OR WORKED OUT THE DETAILS OF HOW TO KILL YOURSELF? DO YOU INTEND TO CARRY OUT THIS PLAN?: NO
4. HAVE YOU HAD THESE THOUGHTS AND HAD SOME INTENTION OF ACTING ON THEM?: NO
6. HAVE YOU EVER DONE ANYTHING, STARTED TO DO ANYTHING, OR PREPARED TO DO ANYTHING TO END YOUR LIFE?: NO

## 2024-05-06 NOTE — Clinical Note
Accessed site: right femoral artery.   Ultrasound guidance was used. Unsuccessful, manual pressure held

## 2024-05-06 NOTE — PROGRESS NOTES
Aware patient with heart cath procedure today; will wait until 5/10 to call regarding DSME referral.

## 2024-05-06 NOTE — POST-PROCEDURE NOTE
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  -LVEDP is elevated at 20 mmHg.    Access of the Procedure:   We could not feel right radial artery [patient with prior injury to her right forearm]. Access: 5F right CFA closed with Vascade and manual pressure.     Complications:   None.    Stents/Implants:   None.     Anticoagulation/Antiplatelet Plan:   None.     Estimated Blood Loss:   10 mL    Anesthesia: Moderate Sedation Anesthesia Staff: No anesthesia staff entered.    Any Specimen(s) Removed:   Order Name Source Comment Collection Info Order Time   CBC Blood, Venous   5/6/2024  7:51 AM     Release result to [facility]   Immediate        BASIC METABOLIC PANEL Blood, Venous  Collected By: Cinthia Sloan RN 5/6/2024  7:51 AM     Release result to [facility]   Immediate            Disposition:   Home today after 3-hour bedrest and IV hydration per protocol.   OK to resume her Eliquis tomorrow.   Further management as per Dr. Vianney Marrufo.      Electronically signed by: Donato Cespedes MD, 5/6/2024 10:37 AM

## 2024-05-06 NOTE — Clinical Note
Patient Clipped and Prepped: groin. Prepped with ChloraPrep, a minimum of 3 minute dry time, longer if needed, no pooling noted, patient draped in sterile fashion. Prior authorization is needed for [employer] SENSOR SYSTEM 10D Kit. Qty3 To ensure timely dispensing for the patient, notify pharmacy prior authorization is approved.    See fax Pod 2 Dr. Franks

## 2024-05-06 NOTE — PROGRESS NOTES
Pharmacy Medication History Review    Vania Andrews is a 69 y.o. female admitted for Chest pain. Pharmacy reviewed the patient's [id number] medications and allergies for accuracy.    The list below reflects the updated PTA list. Comments regarding how patient may be taking medications differently can be found in the Admit Orders Activity  Prior to Admission Medications   Prescriptions Last Dose Informant Patient Reported?   Accu-Chek Guide test strips strip  Self No   Sig: Use 1 test strip to test blood sugar twice daily.   albuterol 90 mcg/actuation inhaler Unknown Self No   Sig: Inhale 2 puffs every 4 hours if needed for wheezing or shortness of breath (You may also use this 10-15 minutes prior to exertional activity as needed).   amLODIPine (Norvasc) 10 mg tablet 5/6/2024 Self No   Sig: Take 1 tablet (10 mg) by mouth once daily.   ammonium lactate (Lac-Hydrin) 12 % lotion 5/4/2024 Self No   Sig: Apply topically if needed for dry skin.   apixaban (Eliquis) 5 mg tablet 5/4/2024 Self No   Sig: Take 1 tablet (5 mg) by mouth 2 times a day.   brimonidine (AlphaGAN) 0.2 % ophthalmic solution 5/5/2024 Self No   Sig: Administer 1 drop into both eyes 2 times a day.   diclofenac sodium 1 % kit 5/5/2024 Self Yes   Sig: Apply 1 Application topically if needed.  APPLY TO LOWER EXTREMITIES, 4 GM OF GEL TO AFFECTED AREA 4 TIMES DAILY. DO NOT APPLY MORE THAN 16 GM DAILY TO ANY ONE AFFECTED JOINT.   dicyclomine (Bentyl) 20 mg tablet More than a month Self Yes   Sig: Take 1 tablet (20 mg) by mouth. 3-4 times daily as needed.   diphenhydrAMINE (BENADryl) 25 mg capsule  Self No   Sig: Take 2 capsules (50mg) as a single dose 1 hour before heart catheter as directed.   dulaglutide (Trulicity) 0.75 mg/0.5 mL pen injector 5/1/2024 Self No   Sig: Inject 0.75 mg (1 pen) under the skin 1 (one) time per week.   famotidine (Pepcid) 20 mg tablet 5/5/2024 Self Yes   Sig: Take 1 tablet (20 mg) by mouth once daily at bedtime.   ferrous  "gluconate 324 (38 Fe) mg tablet Unknown Self Yes   Sig: Take 1 tablet (38 mg of iron) by mouth once daily.   fluticasone (Flonase) 50 mcg/actuation nasal spray Past Week Self No   Sig: Administer 2 sprays into each nostril once daily.   Patient taking differently: Administer 2 sprays into each nostril if needed.   furosemide (Lasix) 20 mg tablet Unknown Self No   Sig: Take 1 tablet (20 mg) by mouth once daily as needed (As needed for leg swelling).   insulin lispro (HumaLOG KwikPen Insulin) 100 unit/mL injection Past Month Self No   Sig: Use with sliding scale 3 times a day, up to 30 units daily   lancets (Lancets,Ultra Thin) misc  Self No   Sig: Use to check blood sugar twice daily   latanoprost (Xalatan) 0.005 % ophthalmic solution 5/5/2024 Self No   Sig: Administer 1 drop into both eyes once daily in the morning.   levocetirizine (Xyzal) 5 mg tablet Past Month Self No   Sig: Take 1 tablet (5 mg) by mouth once daily in the evening.   losartan (Cozaar) 25 mg tablet 5/5/2024 Self No   Sig: Take 1 tablet (25 mg) by mouth once daily.   metoprolol succinate XL (Toprol-XL) 200 mg 24 hr tablet 5/6/2024 Self No   Sig: Take 1 tablet (200 mg) by mouth once daily.   netarsudiL (Rhopressa) 0.02 % drops opthalmic solution 5/5/2024 Self No   Sig: Administer 1 drop into both eyes once daily in the evening.   omeprazole (PriLOSEC) 40 mg DR capsule 5/5/2024 Other, Self No   Sig: Take 1 capsule (40 mg) by mouth 2 times a day before meals.   pen needle, diabetic 31 gauge x 5/16\" needle  Self No   Sig: Use to inject 1-4 times daily as directed.   pen needle, diabetic 33 gauge x 5/32\" needle  Self No   Sig: Use for sliding scale up to 3 times a day   polyethylene glycol (Glycolax, Miralax) 17 gram/dose powder More than a month Self Yes   Sig: Take 17 g by mouth if needed. IN 8 OUNCES OF WATER, JUICE, OR TEA AND DRINK   predniSONE (Deltasone) 50 mg tablet  Self No   Sig: Take prednisone 50mg (1 tablet) 13 hours, 7 hours and 1 hour " prior to your  heart catheter. Our staff will advise you of timing of the last dose when you arrive at hospital.   rosuvastatin (Crestor) 20 mg tablet 5/5/2024 Self No   Sig: Take 1 tablet (20 mg) by mouth once daily.   tiotropium (Spiriva Respimat) 2.5 mcg/actuation inhaler Unknown Self No   Sig: Inhale 2 puffs once daily.      Facility-Administered Medications Last Administration Doses Remaining   metoprolol tartrate (Lopressor) tablet 100 mg None recorded 1   nitroglycerin (Nitrostat) SL tablet 0.4 mg None recorded 1           The list below reflects the updated allergy list. Please review each documented allergy for additional clarification and justification.  Allergies  Reviewed by Cinthia Sloan RN on 5/6/2024        Severity Reactions Comments    Adhesive Not Specified Itching     Amoxicillin Not Specified Hives, Itching     Bee Sting Kit Not Specified Swelling     Cephalexin Not Specified Itching, Nausea Only     Gadolinium-containing Contrast Media Not Specified Hives     Iodine Not Specified Unknown     Latex Not Specified Other     Pioglitazone Not Specified Swelling     Rubella Virus Live Vaccine Not Specified Unknown     Salicylates Not Specified Other     Shellfish Derived Not Specified Swelling     Sulfa (sulfonamide Antibiotics) Not Specified Unknown     Sulfamethoxazole-trimethoprim Not Specified Hives, Itching     Iodinated Contrast Media Low Itching, Rash             Patient declines M2B at discharge. Pharmacy has been updated to [facility] in [place].    Sources used to complete the med history include out patient fill history, OARRS, and patient interview      Below are additional concerns with the patient's PTA list.  Pt reports that the Bendryl and Prednisone will be taken today for a CT scan.  Pt reports taking Omeprazole 40 mg once daily  Pt reports that Ferrous Gluconate is currently on hold.  Pt has not started taking Lasix or Spiriva yet    Charlene Lopez, Ru  Transitions of Care  Pharmacist   [facility]  Please reach out via Secure Chat for questions, or if no response call [facility] or [facility]

## 2024-05-06 NOTE — PRE-SEDATION DOCUMENTATION
Sedation Plan    ASA 3     Mallampati class: III.    Risks, benefits, and alternatives discussed with patient and spouse.    Patient reports previous issue with anesthesia that consisted of post-procedural hallucinations. NPO since midnight, contrast allergy prep completed.

## 2024-05-06 NOTE — INTERVAL H&P NOTE
H&P reviewed. The patient was examined and there are no changes to the H&P.     Patient presents for [facility] following Stress test 11/2023 showing rest myocardial perfusion study demonstrate small territory with decrease in perfusion at the apical inferolateral wall.  Stress myocardial imaging demonstrate larger focus of moderate size perfusion defect at apical inferolateral wall.     Patient and spouse at bedside, NPO since midnight, BMP pending at this time. Last dose of Eliquis 5/4, prep completed for contrast allergy.

## 2024-05-06 NOTE — DISCHARGE INSTRUCTIONS
CARDIAC CATHETERIZATION DISCHARGE INSTRUCTIONS (procedure done on 5/6/2024)     FOR SUDDEN AND SEVERE CHEST PAIN, SHORTNESS OF BREATH, EXCESSIVE BLEEDING, SIGNS OF STROKE, OR CHANGES IN MENTAL STATUS YOU SHOULD CALL 911 IMMEDIATELY.     OK to resume your Eliquis tomorrow on 5/7/24.    FOR NEXT 24 HOURS  - Upon discharge, you should return home and rest for the remainder of the day and evening. You do not have to stay on bed rest but should not be very active.  It is recommended a responsible adult be with you for the first 24 hours after the procedure.  - No driving for 24 hours after procedure. Please arrange for someone to drive you home from the hospital today.  - Do not drive, operate machinery, or use power tools for 24 hours after your procedure.  - Do not make any legal decisions for 24 hours after your procedure.  - Do not drink alcoholic beverages for 24 hours after your procedure.    WOUND CARE   *FOR FEMORAL (LEG) ACCESS*  ·      Avoid heavy lifting (over 10 pounds) for 7 days, squatting or excessive bending for 2 days, and strenuous exercise for 7 days.  ·      No submerged bathing, swimming, or hot tubs for the next 7 days, or until fully healed.  ·      Avoid sexual activity for 3-4 days until any groin discomfort has ceased.    - The transparent dressing should be removed from the site 24 hours after the procedure.  Wash the site gently with soap and water. Rinse well and pat dry. Keep the area clean and dry. You may apply a Band-Aid to the site. Avoid lotions, ointments, or powders until fully healed.  - You may shower the day after your procedure.   - It is normal to notice a small bruise around the puncture site and/or a small grape sized or smaller lump. Any large bruising or large lump warrants a call to the office.     - If bleeding should occur, lay down and apply pressure to the affected area for 10 minutes.  If the bleeding stops notify your physician.  If there is a large amount of  bleeding or spurting of blood CALL 911 immediately.  DO NOT drive yourself to the hospital.  - You may experience some tenderness, bruising or minimal inflammation.  If you have any concerns, you may contact the Cath Lab or if any of these symptoms become excessive, contact your cardiologist or go to the emergency room.     OTHER INSTRUCTIONS  - You may take acetaminophen (Tylenol) as directed for discomfort.  If pain is not relieved with acetaminophen (Tylenol), contact your doctor.    - If you notice or experience any of the following, you should notify your doctor or seek medical attention  Chest pain or discomfort  Change in mental status or weakness in extremities.  Dizziness, light headedness, or feeling faint.  Change in the site where the procedure was performed, such as bleeding or an increased area of bruising or swelling.  Tingling, numbness, pain, or coolness in the leg/arm beyond the site where the procedure was performed.  Signs of infection (i.e. shaking chills, temperature > 100 degrees Fahrenheit, warmth, redness) in the leg/arm area where the procedure was performed.  Changes in urination   Bloody or black stools  Vomiting blood  Severe nose bleeds  Any excessive bleeding    - If you DO NOT have an appointment with your cardiologist within 2-4 weeks following your procedure, please contact their office.

## 2024-05-07 ENCOUNTER — TELEPHONE (OUTPATIENT)
Dept: CARE COORDINATION | Facility: CLINIC | Age: 69
End: 2024-05-07
Payer: MEDICARE

## 2024-05-07 DIAGNOSIS — E11.65 TYPE 2 DIABETES MELLITUS WITH HYPERGLYCEMIA, WITHOUT LONG-TERM CURRENT USE OF INSULIN (MULTI): ICD-10-CM

## 2024-05-07 DIAGNOSIS — N18.31 TYPE 2 DIABETES MELLITUS WITH STAGE 3A CHRONIC KIDNEY DISEASE, WITHOUT LONG-TERM CURRENT USE OF INSULIN (MULTI): ICD-10-CM

## 2024-05-07 DIAGNOSIS — E11.22 TYPE 2 DIABETES MELLITUS WITH STAGE 3A CHRONIC KIDNEY DISEASE, WITHOUT LONG-TERM CURRENT USE OF INSULIN (MULTI): ICD-10-CM

## 2024-05-07 NOTE — PROGRESS NOTES
Spoke with patient regarding referral for outpatient diabetes education services; she declined at this time but is mostly concerned she is out of her Trulicity.  IM sent to Dr. Fox.  Patient accepted educator name and contact info when she is feeling up to scheduling appointment.

## 2024-05-09 DIAGNOSIS — E11.22 TYPE 2 DIABETES MELLITUS WITH STAGE 3A CHRONIC KIDNEY DISEASE, WITHOUT LONG-TERM CURRENT USE OF INSULIN (MULTI): ICD-10-CM

## 2024-05-09 DIAGNOSIS — E11.65 TYPE 2 DIABETES MELLITUS WITH HYPERGLYCEMIA, WITHOUT LONG-TERM CURRENT USE OF INSULIN (MULTI): ICD-10-CM

## 2024-05-09 DIAGNOSIS — N18.31 TYPE 2 DIABETES MELLITUS WITH STAGE 3A CHRONIC KIDNEY DISEASE, WITHOUT LONG-TERM CURRENT USE OF INSULIN (MULTI): ICD-10-CM

## 2024-05-09 PROCEDURE — RXMED WILLOW AMBULATORY MEDICATION CHARGE

## 2024-05-09 RX ORDER — DULAGLUTIDE 0.75 MG/.5ML
0.75 INJECTION, SOLUTION SUBCUTANEOUS
Qty: 12 EACH | Refills: 3 | Status: CANCELLED | OUTPATIENT
Start: 2024-05-12 | End: 2025-05-12

## 2024-05-10 ENCOUNTER — OFFICE VISIT (OUTPATIENT)
Dept: ORTHOPEDIC SURGERY | Facility: CLINIC | Age: 69
End: 2024-05-10
Payer: MEDICARE

## 2024-05-10 VITALS — HEIGHT: 59 IN | BODY MASS INDEX: 42.21 KG/M2

## 2024-05-10 DIAGNOSIS — M25.551 HIP PAIN, ACUTE, RIGHT: Primary | ICD-10-CM

## 2024-05-10 DIAGNOSIS — M54.16 LUMBAR RADICULOPATHY, RIGHT: ICD-10-CM

## 2024-05-10 PROCEDURE — 1125F AMNT PAIN NOTED PAIN PRSNT: CPT | Performed by: STUDENT IN AN ORGANIZED HEALTH CARE EDUCATION/TRAINING PROGRAM

## 2024-05-10 PROCEDURE — 3062F POS MACROALBUMINURIA REV: CPT | Performed by: STUDENT IN AN ORGANIZED HEALTH CARE EDUCATION/TRAINING PROGRAM

## 2024-05-10 PROCEDURE — 1160F RVW MEDS BY RX/DR IN RCRD: CPT | Performed by: STUDENT IN AN ORGANIZED HEALTH CARE EDUCATION/TRAINING PROGRAM

## 2024-05-10 PROCEDURE — 99215 OFFICE O/P EST HI 40 MIN: CPT | Performed by: STUDENT IN AN ORGANIZED HEALTH CARE EDUCATION/TRAINING PROGRAM

## 2024-05-10 PROCEDURE — 1159F MED LIST DOCD IN RCRD: CPT | Performed by: STUDENT IN AN ORGANIZED HEALTH CARE EDUCATION/TRAINING PROGRAM

## 2024-05-10 PROCEDURE — 4010F ACE/ARB THERAPY RXD/TAKEN: CPT | Performed by: STUDENT IN AN ORGANIZED HEALTH CARE EDUCATION/TRAINING PROGRAM

## 2024-05-10 ASSESSMENT — PAIN DESCRIPTION - DESCRIPTORS: DESCRIPTORS: ACHING

## 2024-05-10 ASSESSMENT — PAIN - FUNCTIONAL ASSESSMENT: PAIN_FUNCTIONAL_ASSESSMENT: 0-10

## 2024-05-10 ASSESSMENT — PAIN SCALES - GENERAL: PAINLEVEL_OUTOF10: 9

## 2024-05-10 NOTE — PROGRESS NOTES
JENNIFER/TKA Related Summary           L hip: N  L knee: N  R hip: N  R knee: N  Lumbar surgery or significant pathology: N            CC/SUBJECTIVE/HPI            PCP: Laura Mata MD  Referring provider: No ref. provider found  Occupation: Retired (nurse)  Hobbies: Not specified  Vania Andrews is a 69 y.o. female presenting for RLE pain.  She describes the pain mainly as radiating from her buttock to the posterior and lateral aspect of her extremity, frequently past the knee and down to the foot.  At this point, the pain is limiting activities of daily living and hobbies.    R hip  Symptoms  Pain: 9/10  Onset: chronic/gradual  Duration: 3mo-1yr  Location: behind knee, side of hip, buttock, and back/spine  Quality: sharp/stab  Limitations: morning stiffness, pain after activity, difficulty with stairs, difficulty in/out of chair/car, and difficulty with socks/shoes/clothes  Ambulation limit due to pain: >500ft but hurts  Other symptoms: none  Treatment  Tried: activity modification and OTCs  Most recent injection <3mo ago? na  Assistive device: none  Treatment attempted for 3mo-1yr and is no longer effective            HISTORIES (System Generated and Pt Reported on Form Today)       Dental  Pt reported: No active issues     PMH  Pt reported: Denies heart/lung/kidney/liver issues, DM, stroke, seizure, bariatric surgery, anticoag, MRSA, cancer, personal/familial coagulopathies except: none in addition to below  System generated (PMH, problem list both included since EMR change caused discrepancies):   Past Medical History:   Diagnosis Date    Abdominal distension (gaseous) 07/31/2019    Abdominal bloating    Cancer (Multi)     Cataract     Coronary artery disease     Diabetes mellitus (Multi)     Diverticulosis of intestine, part unspecified, without perforation or abscess without bleeding 06/09/2013    Diverticulosis    Elevation of levels of liver transaminase levels 11/13/2020    Transaminitis    Encounter  for follow-up examination after completed treatment for conditions other than malignant neoplasm 01/26/2019    Hospital discharge follow-up    Glaucoma     Hypertension     Long term (current) use of antibiotics 10/07/2016    Need for prophylactic antibiotic    Other amnesia 10/28/2014    Memory loss    Other conditions influencing health status 02/06/2019    History of cough    Other specified health status 02/07/2019    Hepatitis C antibody test negative    Other specified soft tissue disorders 06/14/2017    Leg swelling    Pain in left toe(s) 05/15/2017    Pain of toe of left foot    Pain in right foot 10/12/2016    Pain of right heel    Pain in right shoulder 06/22/2016    Acute pain of right shoulder    Personal history of diseases of the blood and blood-forming organs and certain disorders involving the immune mechanism 04/09/2018    History of anemia    Personal history of other diseases of the respiratory system 04/02/2018    History of sinusitis    Personal history of other diseases of the respiratory system 03/19/2014    History of upper respiratory infection    Personal history of other malignant neoplasm of kidney 01/14/2021    Personal history of malignant neoplasm of kidney    Personal history of other medical treatment 08/08/2017    History of screening mammography    Personal history of other specified conditions 11/17/2014    History of abdominal pain    Personal history of other specified conditions 06/14/2017    History of urinary frequency    Personal history of other specified conditions 06/09/2021    History of dysuria    Personal history of other specified conditions 11/28/2014    History of breast lump    Unspecified abdominal hernia without obstruction or gangrene 04/21/2014    Hernia     Patient Active Problem List   Diagnosis    Renal cell carcinoma, unspecified laterality (Multi)    Anemia    Chronic kidney disease (CKD), stage III (moderate) (Multi)    Diabetes mellitus (Multi)     Esophageal reflux    Mixed hyperlipidemia    Osteoarthritis    Constipation    Abdominal hernia    Abnormal electrocardiogram    Abnormal mammogram    Achilles tendinitis of left lower extremity    Acquired trigger finger    Adenoma of left adrenal gland    Allergy to IVP dye    Anosmia    Anxiety    Asthma (HHS-HCC)    Sleep apnea    Astigmatism of both eyes with presbyopia    Bilateral presbyopia    Bilateral renal cysts    Breast pain, right    Carpal tunnel syndrome    Chest pain    Chronic pain of right wrist    Chronic pain of right wrist    Common migraine without aura    Distal radius fracture, right    Dizziness    DVT (deep venous thrombosis) (Multi)    Elevated IOP    Elevated LFTs    Enlarged lymph nodes    Fatigue    Generalized pain    Gout, arthritis    Grief    H/O non-ST elevation myocardial infarction (NSTEMI)    Headache    Pain of right eye    Hypertension    Increased frequency of urination    Iron deficiency anemia, unspecified    Irregular heart beats    Knee pain    Leg swelling    Memory dysfunction    Morbid obesity (Multi)    Mixed stress and urge urinary incontinence    Nasal congestion    Nausea, vomiting, and diarrhea    Osteopenia    Otalgia of both ears    Pain with urination    Palpitations    Paraesophageal hiatal hernia    Polyuria    Postnasal drip    Primary open angle glaucoma (POAG) of both eyes, severe stage    Recurrent UTI    Right hip pain    Shortness of breath on exertion    Status post nephrectomy    Systolic ejection murmur    Tachycardia    Tinnitus of both ears    Transaminitis    Umbilical hernia    Urinary tract infection    Vitamin D deficiency    Wrist fracture, right    Diabetes mellitus with chronic kidney disease (Multi)    Diabetes mellitus with gastroparesis (Multi)    Cortical age-related cataract of both eyes    Symptomatic anemia    Chronic cough    Tobacco dependence in remission    Acute saddle pulmonary embolism without acute cor pulmonale (Multi)     Acute ankle pain    Acute deep vein thrombosis (DVT) of lower extremity (Multi)    Allergic rhinitis    Articular gout    Asteatosis cutis    Chronic deep vein thrombosis (DVT) of femoral vein of left lower extremity (Multi)    Disease due to severe acute respiratory syndrome coronavirus 2 (SARS-CoV-2)    Diverticula of intestine    Generalized pruritus    History of malignant neoplasm of retroperitoneum    Left foot pain    Localized edema    Osteoarthritis of right knee    Pain of left calf    Partial obstruction of small intestine (Multi)     PSH  Pt reported: Per above.   System generated:   Past Surgical History:   Procedure Laterality Date    BREAST BIOPSY  2016    Biopsy Breast Percutaneous Needle Core     SECTION, CLASSIC  2014     Section    CHOLECYSTECTOMY  2017    Cholecystectomy Laparoscopic    HAND SURGERY  2020    Hand Surgery                                                                                                                                                          HERNIA REPAIR  2014    Hernia Repair    MR HEAD ANGIO WO IV CONTRAST  2014    MR HEAD ANGIO WO IV CONTRAST 2014 [facility]    OTHER SURGICAL HISTORY  2021    Nephrectomy    TOTAL ABDOMINAL HYSTERECTOMY W/ BILATERAL SALPINGOOPHORECTOMY  2014    Total Abdominal Hysterectomy With Removal Of Both Ovaries     Family Hx  Pt reported clot/coagulopathies: none  System generated:   Family History   Problem Relation Name Age of Onset    Aneurysm Mother      Hypertension Mother      Pancreatic cancer Mother      Diabetes Mother      Other (laryngeal Cancer) Mother      Heart disease Father      Pulmonary embolism Brother      Breast cancer Father's Sister      Breast cancer Maternal Cousin       Social Hx  Pt reported substance use: N  System generated:   Social History     Tobacco Use    Smoking status: Former     Types: Cigarettes     Passive exposure: Never     Smokeless tobacco: Never   Vaping Use    Vaping status: Never Used   Substance Use Topics    Alcohol use: Not Currently    Drug use: Not Currently     Allergies  Pt reported (meds, metals): per below  System generated:   Allergies   Allergen Reactions    Adhesive Itching    Amoxicillin Hives and Itching    Bee Sting Kit Swelling    Cephalexin Itching and Nausea Only    Gadolinium-Containing Contrast Media Hives    Iodine Unknown    Latex Other    Pioglitazone Swelling    Rubella Virus Live Vaccine Unknown    Salicylates Other    Shellfish Derived Swelling    Sulfa (Sulfonamide Antibiotics) Unknown    Sulfamethoxazole-Trimethoprim Hives and Itching    Iodinated Contrast Media Itching and Rash     Current Meds  System generated:   Current Outpatient Medications:     Accu-Chek Guide test strips strip, Use 1 test strip to test blood sugar twice daily., Disp: 100 strip, Rfl: 2    albuterol 90 mcg/actuation inhaler, Inhale 2 puffs every 4 hours if needed for wheezing or shortness of breath (You may also use this 10-15 minutes prior to exertional activity as needed)., Disp: 18 g, Rfl: 5    amLODIPine (Norvasc) 10 mg tablet, Take 1 tablet (10 mg) by mouth once daily., Disp: 90 tablet, Rfl: 1    ammonium lactate (Lac-Hydrin) 12 % lotion, Apply topically if needed for dry skin., Disp: 225 g, Rfl: 3    apixaban (Eliquis) 5 mg tablet, Take 1 tablet (5 mg) by mouth 2 times a day., Disp: 60 tablet, Rfl: 1    brimonidine (AlphaGAN) 0.2 % ophthalmic solution, Administer 1 drop into both eyes 2 times a day., Disp: 15 mL, Rfl: 11    cholecalciferol (D3-2000) 50 mcg (2,000 unit) capsule, Take 1 capsule (50 mcg) by mouth once daily., Disp: , Rfl:     diclofenac sodium 1 % kit, Apply 1 Application topically if needed.  APPLY TO LOWER EXTREMITIES, 4 GM OF GEL TO AFFECTED AREA 4 TIMES DAILY. DO NOT APPLY MORE THAN 16 GM DAILY TO ANY ONE AFFECTED JOINT., Disp: , Rfl:     dicyclomine (Bentyl) 20 mg tablet, Take 1 tablet (20 mg) by mouth.  "3-4 times daily as needed., Disp: , Rfl:     diphenhydrAMINE (BENADryl) 25 mg capsule, Take 2 capsules (50mg) as a single dose 1 hour before heart catheter as directed., Disp: 2 capsule, Rfl: 0    dulaglutide (Trulicity) 0.75 mg/0.5 mL pen injector, Inject 0.75 mg (1 pen) under the skin 1 (one) time per week., Disp: 6 mL, Rfl: 3    famotidine (Pepcid) 20 mg tablet, Take 1 tablet (20 mg) by mouth once daily at bedtime., Disp: , Rfl:     ferrous gluconate 324 (38 Fe) mg tablet, Take 1 tablet (38 mg of iron) by mouth once daily., Disp: , Rfl:     fluticasone (Flonase) 50 mcg/actuation nasal spray, Administer 2 sprays into each nostril once daily. (Patient taking differently: Administer 2 sprays into each nostril if needed.), Disp: 16 g, Rfl: 3    furosemide (Lasix) 20 mg tablet, Take 1 tablet (20 mg) by mouth once daily as needed (As needed for leg swelling)., Disp: 30 tablet, Rfl: 11    insulin lispro (HumaLOG KwikPen Insulin) 100 unit/mL injection, Use with sliding scale 3 times a day, up to 30 units daily, Disp: 15 mL, Rfl: 2    lancets (Lancets,Ultra Thin) misc, Use to check blood sugar twice daily, Disp: 100 each, Rfl: 2    latanoprost (Xalatan) 0.005 % ophthalmic solution, Administer 1 drop into both eyes once daily in the morning., Disp: 2.5 mL, Rfl: 6    levocetirizine (Xyzal) 5 mg tablet, Take 1 tablet (5 mg) by mouth once daily in the evening., Disp: 90 tablet, Rfl: 1    losartan (Cozaar) 25 mg tablet, Take 1 tablet (25 mg) by mouth once daily., Disp: 90 tablet, Rfl: 1    metoprolol succinate XL (Toprol-XL) 200 mg 24 hr tablet, Take 1 tablet (200 mg) by mouth once daily., Disp: 90 tablet, Rfl: 3    netarsudiL (Rhopressa) 0.02 % drops opthalmic solution, Administer 1 drop into both eyes once daily in the evening., Disp: 2.5 mL, Rfl: 6    omeprazole (PriLOSEC) 40 mg DR capsule, Take 1 capsule (40 mg) by mouth 2 times a day before meals., Disp: 60 capsule, Rfl: 1    pen needle, diabetic 31 gauge x 5/16\" needle, " "Use to inject 1-4 times daily as directed., Disp: 100 each, Rfl: 11    pen needle, diabetic 33 gauge x 5/32\" needle, Use for sliding scale up to 3 times a day, Disp: 100 each, Rfl: 2    polyethylene glycol (Glycolax, Miralax) 17 gram/dose powder, Take 17 g by mouth if needed. IN 8 OUNCES OF WATER, JUICE, OR TEA AND DRINK, Disp: , Rfl:     predniSONE (Deltasone) 50 mg tablet, Take prednisone 50mg (1 tablet) 13 hours, 7 hours and 1 hour prior to your  heart catheter. Our staff will advise you of timing of the last dose when you arrive at hospital., Disp: 3 tablet, Rfl: 0    rosuvastatin (Crestor) 20 mg tablet, Take 1 tablet (20 mg) by mouth once daily., Disp: 30 tablet, Rfl: 2    tiotropium (Spiriva Respimat) 2.5 mcg/actuation inhaler, Inhale 2 puffs once daily., Disp: 1 each, Rfl: 3    Current Facility-Administered Medications:     metoprolol tartrate (Lopressor) tablet 100 mg, 100 mg, oral, Once, Vianney Marrufo MD    nitroglycerin (Nitrostat) SL tablet 0.4 mg, 0.4 mg, sublingual, Once, Vianney Marrufo MD    ROS: Neg except HPI            OBJECTIVE            Physical exam  Estimated body mass index is 40.19 kg/m² as calculated from the following:    Height as of 4/23/24: 1.499 m (4' 11\").    Weight as of 4/23/24: 90.3 kg (199 lb).  Gen/psych: NAD, conversational, appropriate    Ambulation  Gait: antalgic  Assistive device: none  Spine  Lumbar tenderness: pos  Limited ROM: neg, but with radiation on lateral bends  SLR test: pos    Focused MSK exam: R  Hip  Trendelenberg test: neg  Tenderness: gluteal and lumbar paraspinal muscles  Pain with log roll: neg  Stinchfield: neg  ROM: limited, painful  Flexion: 90º  IR: 10º  ER: 40º  Abd: 30º  Neurovascular    Strength: 5/5 hip/knee/ankle flexion and extension  Sensory (L2-S1): SILT throughout lower extremity  Edema/stasis: 0-10mm pitting edema  Pulse: DP 1+, PT 1+     Imaging  3/14/2024 R hip radiographs (AP Pelvis, AP/Lateral) on my read: Joint space narrowing (severe) with " osteophytes, sclerosis, and subchondral cysts.            ASSESSMENT & PLAN           Patient verbalized understanding of below A&P. All questions answered.  R hip DJD  Differential includes radicular pain from spine. H&P most consistent with intra-articular pain from hip degeneration.  General information  Evaluation, imaging, diagnosis, and treatment options (conservative and surgical as well as risks, benefits, recovery, and outcomes) discussed. Conservative options should be maximized and include activity modification, bracing, weight loss, PT, home exercise, local pain control (ice, heat, topicals), OTCs, and assistive devices. Once exhausted, injections may provide relief. If these fail to improve pain, function, and quality of life, elective arthroplasty may be an option.  Shared decision making   Her symptoms do not correlate well with the finding of hip osteoarthritis, so we will monitor for now.  R Lumbar radiculopathy / Sciatica  Differential includes pain from an articular source in the hip or knee itself or a primary neuropathy. History, exam, and imaging are consistent with lumbar radiculopathy / sciatica.  Background information: Lumbar radiculopathy is caused by inflammation of a nerve root in the lower back (or the sciatic nerve itself), leading to leg pain, weakness, numbness, or tingling.   Treatment: Conservative treatments include Rx and OTC pain meds, limiting heavy lifting while staying active, PT, and weight loss. Once these are exhausted, injections can be helpful. In rare cases, surgery is warranted.  Given the absence of alarming signs of nerve compression (i.e. balbina weakness, bowel/bladder incontinence), I provided a prescription for 6wks of PT and a referral to our nonoperative spine team.  PMH, PSH, other considerations discussed  Would require preoperative clearance: CAD, renal cell carcinoma, abnormal EKG, adenoma of left adrenal gland, NSTEMI, irregular heartbeat, palpitations, SOB  "on exertion, status post nephrectomy  CKD (Cr 1.33 on 2/27/2024) associated with increased complications risk.  Anemia (Hgb 9.9 on 4/4/24)  DM and elective arthroplasty req A1c <7.5 (7.1 on 3/18/24)  Elective surgery requirement of BMI<40.  Hx 2/2024 saddle PE, now on eliquis and follows with vascular  Chronic LLE DVT  Osteopenia  \"Multiple cancers\"  Follow-up: As needed.  "

## 2024-05-14 ENCOUNTER — PATIENT OUTREACH (OUTPATIENT)
Dept: CARE COORDINATION | Facility: CLINIC | Age: 69
End: 2024-05-14
Payer: MEDICARE

## 2024-05-14 ENCOUNTER — DOCUMENTATION (OUTPATIENT)
Dept: CARE COORDINATION | Facility: CLINIC | Age: 69
End: 2024-05-14
Payer: MEDICARE

## 2024-05-14 NOTE — PROGRESS NOTES
Patient not interested in outpatient DSME at this time.  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

## 2024-05-17 ENCOUNTER — DOCUMENTATION (OUTPATIENT)
Dept: CARE COORDINATION | Facility: CLINIC | Age: 69
End: 2024-05-17
Payer: MEDICARE

## 2024-05-17 ENCOUNTER — TELEPHONE (OUTPATIENT)
Dept: CARE COORDINATION | Facility: CLINIC | Age: 69
End: 2024-05-17
Payer: MEDICARE

## 2024-05-17 NOTE — PROGRESS NOTES
Called [facility] (on [address]) on patient's behalf...Trulcity prescription is there and ready for her to ; called patient to notifiy her of this information. She will plan to pick it up today.

## 2024-05-17 NOTE — PROGRESS NOTES
"Called patient regarding referral for outpatient DSME; she originally declined but concerned for her Trulicity.  IM sent to provider.  Today I called patient to [facility] base regarding interest in DSME and she reports she stillis waiting for her Trulicty.  She states was told by pharmacy no order was provided; however, it does appear the order was [facility]; however it is in \"pending medication requests\".  Patient asked that I call on her behalf to office for details on pending medication order; I agreed to do so.    "

## 2024-05-21 PROCEDURE — RXMED WILLOW AMBULATORY MEDICATION CHARGE

## 2024-05-22 ENCOUNTER — PHARMACY VISIT (OUTPATIENT)
Dept: PHARMACY | Facility: CLINIC | Age: 69
End: 2024-05-22
Payer: MEDICARE

## 2024-05-22 ENCOUNTER — DOCUMENTATION (OUTPATIENT)
Dept: PRIMARY CARE | Facility: CLINIC | Age: 69
End: 2024-05-22
Payer: MEDICARE

## 2024-05-22 DIAGNOSIS — E11.22 TYPE 2 DIABETES MELLITUS WITH STAGE 3A CHRONIC KIDNEY DISEASE, WITHOUT LONG-TERM CURRENT USE OF INSULIN (MULTI): ICD-10-CM

## 2024-05-22 DIAGNOSIS — N18.31 TYPE 2 DIABETES MELLITUS WITH STAGE 3A CHRONIC KIDNEY DISEASE, WITHOUT LONG-TERM CURRENT USE OF INSULIN (MULTI): ICD-10-CM

## 2024-05-22 DIAGNOSIS — I10 PRIMARY HYPERTENSION: ICD-10-CM

## 2024-05-22 NOTE — PROGRESS NOTES
Called for chronic care management monthly outreach. Unable to reach. Left voicemail. Chart reviewed.

## 2024-05-23 ENCOUNTER — TELEPHONE (OUTPATIENT)
Dept: HEMATOLOGY/ONCOLOGY | Facility: HOSPITAL | Age: 69
End: 2024-05-23
Payer: MEDICARE

## 2024-05-23 NOTE — TELEPHONE ENCOUNTER
----- Message from Garland Spears MD sent at 5/20/2024  6:12 PM EDT -----  Pt is supposed to get iron panel checked, I can see the orders, can you please have her complete the labs to consider IV iron. Thanks

## 2024-05-23 NOTE — TELEPHONE ENCOUNTER
RN called the patient and informed her that Dr. Jenkins would like for her to get iron labs. She said she will go sometime in the next couple of weeks

## 2024-05-24 ENCOUNTER — PHARMACY VISIT (OUTPATIENT)
Dept: PHARMACY | Facility: CLINIC | Age: 69
End: 2024-05-24
Payer: MEDICARE

## 2024-05-24 PROBLEM — K92.1 MELENA: Status: ACTIVE | Noted: 2024-02-05

## 2024-05-24 PROBLEM — Z86.711 HISTORY OF PULMONARY EMBOLISM: Status: ACTIVE | Noted: 2024-05-24

## 2024-05-28 ENCOUNTER — LAB (OUTPATIENT)
Dept: LAB | Facility: HOSPITAL | Age: 69
End: 2024-05-28
Payer: MEDICARE

## 2024-05-28 ENCOUNTER — OFFICE VISIT (OUTPATIENT)
Dept: CARDIOLOGY | Facility: HOSPITAL | Age: 69
End: 2024-05-28
Payer: MEDICARE

## 2024-05-28 VITALS — SYSTOLIC BLOOD PRESSURE: 121 MMHG | HEART RATE: 70 BPM | DIASTOLIC BLOOD PRESSURE: 76 MMHG | OXYGEN SATURATION: 97 %

## 2024-05-28 DIAGNOSIS — I50.30 HEART FAILURE WITH PRESERVED EJECTION FRACTION, UNSPECIFIED HF CHRONICITY (MULTI): Primary | ICD-10-CM

## 2024-05-28 DIAGNOSIS — R07.9 CHEST PAIN, UNSPECIFIED TYPE: ICD-10-CM

## 2024-05-28 DIAGNOSIS — I26.92 ACUTE SADDLE PULMONARY EMBOLISM WITHOUT ACUTE COR PULMONALE (MULTI): ICD-10-CM

## 2024-05-28 DIAGNOSIS — N18.30 STAGE 3 CHRONIC KIDNEY DISEASE, UNSPECIFIED WHETHER STAGE 3A OR 3B CKD (MULTI): ICD-10-CM

## 2024-05-28 DIAGNOSIS — D50.0 IRON DEFICIENCY ANEMIA DUE TO CHRONIC BLOOD LOSS: ICD-10-CM

## 2024-05-28 LAB
ALBUMIN SERPL BCP-MCNC: 4 G/DL (ref 3.4–5)
ANION GAP SERPL CALC-SCNC: 15 MMOL/L (ref 10–20)
BASOPHILS # BLD AUTO: 0.05 X10*3/UL (ref 0–0.1)
BASOPHILS NFR BLD AUTO: 0.6 %
BUN SERPL-MCNC: 16 MG/DL (ref 6–23)
CALCIUM SERPL-MCNC: 9.2 MG/DL (ref 8.6–10.6)
CHLORIDE SERPL-SCNC: 106 MMOL/L (ref 98–107)
CO2 SERPL-SCNC: 24 MMOL/L (ref 21–32)
CREAT SERPL-MCNC: 1.22 MG/DL (ref 0.5–1.05)
EGFRCR SERPLBLD CKD-EPI 2021: 48 ML/MIN/1.73M*2
EOSINOPHIL # BLD AUTO: 0.26 X10*3/UL (ref 0–0.7)
EOSINOPHIL NFR BLD AUTO: 2.9 %
ERYTHROCYTE [DISTWIDTH] IN BLOOD BY AUTOMATED COUNT: 15.4 % (ref 11.5–14.5)
FERRITIN SERPL-MCNC: 21 NG/ML (ref 8–150)
GLUCOSE SERPL-MCNC: 123 MG/DL (ref 74–99)
HCT VFR BLD AUTO: 31.9 % (ref 36–46)
HGB BLD-MCNC: 9.7 G/DL (ref 12–16)
IMM GRANULOCYTES # BLD AUTO: 0.03 X10*3/UL (ref 0–0.7)
IMM GRANULOCYTES NFR BLD AUTO: 0.3 % (ref 0–0.9)
IRON SATN MFR SERPL: ABNORMAL %
IRON SERPL-MCNC: 25 UG/DL (ref 35–150)
LYMPHOCYTES # BLD AUTO: 1.2 X10*3/UL (ref 1.2–4.8)
LYMPHOCYTES NFR BLD AUTO: 13.5 %
MCH RBC QN AUTO: 24.1 PG (ref 26–34)
MCHC RBC AUTO-ENTMCNC: 30.4 G/DL (ref 32–36)
MCV RBC AUTO: 79 FL (ref 80–100)
MONOCYTES # BLD AUTO: 0.51 X10*3/UL (ref 0.1–1)
MONOCYTES NFR BLD AUTO: 5.7 %
NEUTROPHILS # BLD AUTO: 6.86 X10*3/UL (ref 1.2–7.7)
NEUTROPHILS NFR BLD AUTO: 77 %
NRBC BLD-RTO: 0 /100 WBCS (ref 0–0)
PHOSPHATE SERPL-MCNC: 3.2 MG/DL (ref 2.5–4.9)
PLATELET # BLD AUTO: 457 X10*3/UL (ref 150–450)
POTASSIUM SERPL-SCNC: 4.7 MMOL/L (ref 3.5–5.3)
RBC # BLD AUTO: 4.03 X10*6/UL (ref 4–5.2)
SODIUM SERPL-SCNC: 140 MMOL/L (ref 136–145)
TIBC SERPL-MCNC: ABNORMAL UG/DL
UIBC SERPL-MCNC: >450 UG/DL (ref 110–370)
WBC # BLD AUTO: 8.9 X10*3/UL (ref 4.4–11.3)

## 2024-05-28 PROCEDURE — 36415 COLL VENOUS BLD VENIPUNCTURE: CPT

## 2024-05-28 PROCEDURE — 80069 RENAL FUNCTION PANEL: CPT | Performed by: INTERNAL MEDICINE

## 2024-05-28 PROCEDURE — 99214 OFFICE O/P EST MOD 30 MIN: CPT | Performed by: INTERNAL MEDICINE

## 2024-05-28 PROCEDURE — 3074F SYST BP LT 130 MM HG: CPT | Performed by: INTERNAL MEDICINE

## 2024-05-28 PROCEDURE — 3062F POS MACROALBUMINURIA REV: CPT | Performed by: INTERNAL MEDICINE

## 2024-05-28 PROCEDURE — 83540 ASSAY OF IRON: CPT | Performed by: INTERNAL MEDICINE

## 2024-05-28 PROCEDURE — 85025 COMPLETE CBC W/AUTO DIFF WBC: CPT

## 2024-05-28 PROCEDURE — 4010F ACE/ARB THERAPY RXD/TAKEN: CPT | Performed by: INTERNAL MEDICINE

## 2024-05-28 PROCEDURE — 1160F RVW MEDS BY RX/DR IN RCRD: CPT | Performed by: INTERNAL MEDICINE

## 2024-05-28 PROCEDURE — 1159F MED LIST DOCD IN RCRD: CPT | Performed by: INTERNAL MEDICINE

## 2024-05-28 PROCEDURE — 3078F DIAST BP <80 MM HG: CPT | Performed by: INTERNAL MEDICINE

## 2024-05-28 PROCEDURE — 82728 ASSAY OF FERRITIN: CPT | Performed by: INTERNAL MEDICINE

## 2024-05-28 RX ORDER — FUROSEMIDE 20 MG/1
20 TABLET ORAL DAILY
Qty: 30 TABLET | Refills: 11 | Status: SHIPPED | OUTPATIENT
Start: 2024-05-28 | End: 2025-05-28

## 2024-05-28 ASSESSMENT — ENCOUNTER SYMPTOMS
LOSS OF SENSATION IN FEET: 0
OCCASIONAL FEELINGS OF UNSTEADINESS: 1
DEPRESSION: 0

## 2024-05-28 NOTE — PROGRESS NOTES
Subjective   Vania Andrews is a 69 y.o. 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     Investigations:  LHC (5/6/2024) - LVEDP 20 mmHg, NOCAD - mid LAD 40% stenosis.   CTPE (2/6/2024) -saddle type pulmonary embolus extending to the bilateral upper and right lower lobar segmental branches.    Lower extremity duplex (11/2023) - chronic DVT of left popliteal and calf veins.   Nuclear stress test (11/2023) - small perfusion defect of the apical inferolateral wall (prior infarct with some umm-infarct ischemia).   TTE (11/2023) - LVEF 55-60%, distal septal/apical hypokinesis.   CACS (12/2021) - 124.  EKG (10/2022) - NSR.  Nuclear stress test (1/2021) - attenuation artifact (fixed defect), no ischemia.  TTE (2020) - LVEF 55-60%, distal apical hypokinesis.   CMR (2019) - LVEF 70%, severe hypokinesis of the mid inferior wall with associated transmural enhancement consistent with a prior infarction. Dilated MPA 3.5 cm    Chief Complaint:  Follow-up (3 month fuv)    HPI  LHC showed NOCAD however LVEDP was elevated at 20 mmHg.  Has developed progressive lower limb edema.  Ongoing exertional dyspnea with household activities.   Completed 10-day lasix trial without issue.  Prior bladder infections, not currently on Jardiance.     ROS  A 10-system review was performed and was unremarkable apart from what is presented in the HPI.     Objective   Physical Exam  Alert and orientated.   Appropriate responses, normal affect.  No respiratory distress at rest.   Skin warm and dry.   Normal radial pulse character and volume. Clinically SR.   Anicteric sclera, no conjunctival pallor.   No definite JVD at 90 degrees or carotid bruits.  Heart sounds dual, no added heart sounds or audible  murmurs.   Chest clear on auscultation.   Calves soft, non-tender.  Bilateral pitting pedal edema to the mid-shins    Lab Review:   Lab Results   Component Value Date     05/06/2024    K 4.9 05/06/2024     (H) 05/06/2024    CO2 18 (L) 05/06/2024    BUN 22 05/06/2024    CREATININE 1.19 (H) 05/06/2024    GLUCOSE 184 (H) 05/06/2024    CALCIUM 9.8 05/06/2024     Lab Results   Component Value Date    CKTOTAL 112 12/23/2020    TROPONINI <0.02 02/05/2022     Lab Results   Component Value Date    WBC 7.7 04/04/2024    HGB 9.9 (L) 04/04/2024    HCT 33.3 (L) 04/04/2024    MCV 84 04/04/2024     04/04/2024     Lab Results   Component Value Date    CHOL 171 10/31/2023    TRIG 71 10/31/2023    HDL 61.2 10/31/2023       Assessment/Plan   In summary, Vania Andrews is a 69 y.o. 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  Reassuringly her recent LHC, which was performed in light of evidence of a mid inferior wall infarct on CMR, showed nonobstructive coronary disease only [mid LAD 40%].  However the LHC did demonstrate an elevated LVEDP of 20 mmHg consistent with HFpEF, which is the likely  of her exertional dyspnea and bilateral lower limb edema.  At this stage I have commenced her on Lasix 20 mg daily as initial management as she is not currently Jardiance candidate due to multiple prior UTIs.  If her renal function remains stable she will be for consideration of addition of an MRA going forward.  I will follow-up with Mrs. Andrews in 3 months to assess her progress.

## 2024-05-28 NOTE — PATIENT INSTRUCTIONS
Your heart catheter showed no evidence of significant blockages in the heart arteries.  There was mild plaque in an artery that will be treated with your existing medications.    The heart catheter did show elevated pressures in the heart.  As you have also had lower leg swelling you should resume furosemide 20 mg daily.    I have renewed your Eliquis prescription.  Your other medications are unchanged.    Please notify my office if your leg swelling does not improve with furosemide.  I will follow-up with him in clinic in 3 months to assess your progress.

## 2024-05-30 ENCOUNTER — PATIENT OUTREACH (OUTPATIENT)
Dept: PRIMARY CARE | Facility: CLINIC | Age: 69
End: 2024-05-30

## 2024-05-30 ENCOUNTER — OFFICE VISIT (OUTPATIENT)
Dept: OPHTHALMOLOGY | Facility: CLINIC | Age: 69
End: 2024-05-30
Payer: MEDICARE

## 2024-05-30 DIAGNOSIS — E11.22 TYPE 2 DIABETES MELLITUS WITH STAGE 3A CHRONIC KIDNEY DISEASE, WITHOUT LONG-TERM CURRENT USE OF INSULIN (MULTI): ICD-10-CM

## 2024-05-30 DIAGNOSIS — H40.1133 PRIMARY OPEN ANGLE GLAUCOMA (POAG) OF BOTH EYES, SEVERE STAGE: ICD-10-CM

## 2024-05-30 DIAGNOSIS — H40.1132 PRIMARY OPEN ANGLE GLAUCOMA (POAG) OF BOTH EYES, MODERATE STAGE: Primary | ICD-10-CM

## 2024-05-30 DIAGNOSIS — I10 PRIMARY HYPERTENSION: ICD-10-CM

## 2024-05-30 DIAGNOSIS — N18.31 TYPE 2 DIABETES MELLITUS WITH STAGE 3A CHRONIC KIDNEY DISEASE, WITHOUT LONG-TERM CURRENT USE OF INSULIN (MULTI): ICD-10-CM

## 2024-05-30 PROCEDURE — 3062F POS MACROALBUMINURIA REV: CPT | Performed by: OPHTHALMOLOGY

## 2024-05-30 PROCEDURE — 99490 CHRNC CARE MGMT STAFF 1ST 20: CPT | Performed by: STUDENT IN AN ORGANIZED HEALTH CARE EDUCATION/TRAINING PROGRAM

## 2024-05-30 PROCEDURE — 4010F ACE/ARB THERAPY RXD/TAKEN: CPT | Performed by: OPHTHALMOLOGY

## 2024-05-30 PROCEDURE — 99214 OFFICE O/P EST MOD 30 MIN: CPT | Performed by: OPHTHALMOLOGY

## 2024-05-30 PROCEDURE — 1160F RVW MEDS BY RX/DR IN RCRD: CPT | Performed by: OPHTHALMOLOGY

## 2024-05-30 PROCEDURE — 1036F TOBACCO NON-USER: CPT | Performed by: OPHTHALMOLOGY

## 2024-05-30 PROCEDURE — 1159F MED LIST DOCD IN RCRD: CPT | Performed by: OPHTHALMOLOGY

## 2024-05-30 RX ORDER — BRIMONIDINE TARTRATE AND TIMOLOL MALEATE 2; 5 MG/ML; MG/ML
1 SOLUTION OPHTHALMIC 2 TIMES DAILY
Qty: 10 ML | Refills: 11 | Status: SHIPPED | OUTPATIENT
Start: 2024-05-30

## 2024-05-30 RX ORDER — NETARSUDIL 0.2 MG/ML
1 SOLUTION/ DROPS OPHTHALMIC; TOPICAL EVERY EVENING
Qty: 2.5 ML | Refills: 6 | Status: SHIPPED | OUTPATIENT
Start: 2024-05-30

## 2024-05-30 RX ORDER — LATANOPROST 50 UG/ML
1 SOLUTION/ DROPS OPHTHALMIC EVERY MORNING
Qty: 2.5 ML | Refills: 6 | Status: SHIPPED | OUTPATIENT
Start: 2024-05-30

## 2024-05-30 ASSESSMENT — ENCOUNTER SYMPTOMS
GASTROINTESTINAL NEGATIVE: 0
EYES NEGATIVE: 1
CONSTITUTIONAL NEGATIVE: 0
PSYCHIATRIC NEGATIVE: 0
ALLERGIC/IMMUNOLOGIC NEGATIVE: 0
MUSCULOSKELETAL NEGATIVE: 0
RESPIRATORY NEGATIVE: 0
HEMATOLOGIC/LYMPHATIC NEGATIVE: 0
ENDOCRINE NEGATIVE: 0
CARDIOVASCULAR NEGATIVE: 0
NEUROLOGICAL NEGATIVE: 0

## 2024-05-30 ASSESSMENT — CONF VISUAL FIELD
OD_SUPERIOR_TEMPORAL_RESTRICTION: 0
OD_INFERIOR_NASAL_RESTRICTION: 0
OD_SUPERIOR_NASAL_RESTRICTION: 0
OD_NORMAL: 1
OS_INFERIOR_NASAL_RESTRICTION: 0
OS_INFERIOR_TEMPORAL_RESTRICTION: 0
METHOD: COUNTING FINGERS
OS_SUPERIOR_NASAL_RESTRICTION: 0
OS_SUPERIOR_TEMPORAL_RESTRICTION: 0
OS_NORMAL: 1
OD_INFERIOR_TEMPORAL_RESTRICTION: 0

## 2024-05-30 ASSESSMENT — TONOMETRY
OD_IOP_MMHG: 16
IOP_METHOD: GOLDMANN APPLANATION
OS_IOP_MMHG: 12

## 2024-05-30 ASSESSMENT — CUP TO DISC RATIO
OS_RATIO: 0.7
OD_RATIO: 0.75

## 2024-05-30 ASSESSMENT — EXTERNAL EXAM - RIGHT EYE: OD_EXAM: NORMAL

## 2024-05-30 ASSESSMENT — PACHYMETRY
OS_CT(UM): 573
OD_CT(UM): 564

## 2024-05-30 ASSESSMENT — VISUAL ACUITY
OD_SC: 20/30
METHOD: SNELLEN - LINEAR
OS_SC+: +2
OD_SC+: -1
OS_SC: 20/40

## 2024-05-30 ASSESSMENT — SLIT LAMP EXAM - LIDS
COMMENTS: NORMAL
COMMENTS: NORMAL

## 2024-05-30 ASSESSMENT — EXTERNAL EXAM - LEFT EYE: OS_EXAM: NORMAL

## 2024-05-30 NOTE — PROGRESS NOTES
Chronic care management outreach complete. Ms. Andrews says that she is doing well. We reviewed her previous appointments and her upcomming appointments. I mentioned to her about her being a No show to her endocrinology appointment. She said that she did not know she had an appointment that day and that she will reschedule. We also discussed all of her referrals and canceled appointments. I will send her the number the scheduling via [employer] and she said that she will reschedule and schedule all of her appointments. Her blood sugar this morning was 180. Her gives herself her Trulicity injections on Fridays. No other questions or concerns at this time.

## 2024-05-30 NOTE — PROGRESS NOTES
1-oag iop much better ou  2-ns ou pt desires surgery, h/o clots  3-litzy tears ou    Rto  4 mos ? Cat express os then od

## 2024-06-11 ENCOUNTER — DOCUMENTATION (OUTPATIENT)
Dept: CARE COORDINATION | Facility: CLINIC | Age: 69
End: 2024-06-11
Payer: MEDICARE

## 2024-06-20 ENCOUNTER — OFFICE VISIT (OUTPATIENT)
Dept: HEMATOLOGY/ONCOLOGY | Facility: HOSPITAL | Age: 69
End: 2024-06-20
Payer: MEDICARE

## 2024-06-20 VITALS
WEIGHT: 198.2 LBS | TEMPERATURE: 96.4 F | OXYGEN SATURATION: 98 % | SYSTOLIC BLOOD PRESSURE: 128 MMHG | HEART RATE: 66 BPM | HEIGHT: 58 IN | DIASTOLIC BLOOD PRESSURE: 79 MMHG | BODY MASS INDEX: 41.6 KG/M2 | RESPIRATION RATE: 16 BRPM

## 2024-06-20 DIAGNOSIS — M79.89 RIGHT LEG SWELLING: ICD-10-CM

## 2024-06-20 DIAGNOSIS — I82.411 ACUTE DEEP VEIN THROMBOSIS (DVT) OF FEMORAL VEIN OF RIGHT LOWER EXTREMITY (MULTI): ICD-10-CM

## 2024-06-20 DIAGNOSIS — D50.0 IRON DEFICIENCY ANEMIA DUE TO CHRONIC BLOOD LOSS: ICD-10-CM

## 2024-06-20 DIAGNOSIS — M79.604 RIGHT LEG PAIN: ICD-10-CM

## 2024-06-20 DIAGNOSIS — I82.562 CHRONIC DEEP VEIN THROMBOSIS (DVT) OF CALF MUSCLE VEIN OF LEFT LOWER EXTREMITY (MULTI): ICD-10-CM

## 2024-06-20 PROCEDURE — 4010F ACE/ARB THERAPY RXD/TAKEN: CPT | Performed by: STUDENT IN AN ORGANIZED HEALTH CARE EDUCATION/TRAINING PROGRAM

## 2024-06-20 PROCEDURE — 3078F DIAST BP <80 MM HG: CPT | Performed by: STUDENT IN AN ORGANIZED HEALTH CARE EDUCATION/TRAINING PROGRAM

## 2024-06-20 PROCEDURE — 3062F POS MACROALBUMINURIA REV: CPT | Performed by: STUDENT IN AN ORGANIZED HEALTH CARE EDUCATION/TRAINING PROGRAM

## 2024-06-20 PROCEDURE — 3074F SYST BP LT 130 MM HG: CPT | Performed by: STUDENT IN AN ORGANIZED HEALTH CARE EDUCATION/TRAINING PROGRAM

## 2024-06-20 PROCEDURE — 99214 OFFICE O/P EST MOD 30 MIN: CPT | Performed by: STUDENT IN AN ORGANIZED HEALTH CARE EDUCATION/TRAINING PROGRAM

## 2024-06-20 PROCEDURE — 1159F MED LIST DOCD IN RCRD: CPT | Performed by: STUDENT IN AN ORGANIZED HEALTH CARE EDUCATION/TRAINING PROGRAM

## 2024-06-20 PROCEDURE — 1125F AMNT PAIN NOTED PAIN PRSNT: CPT | Performed by: STUDENT IN AN ORGANIZED HEALTH CARE EDUCATION/TRAINING PROGRAM

## 2024-06-20 ASSESSMENT — PAIN SCALES - GENERAL: PAINLEVEL: 5

## 2024-06-20 NOTE — PROGRESS NOTES
Patient ID: Vania Andrews is a 69 y.o. female.  Referring Physician: Garland Spears MD  [id number] [place] [address]  Primary Care Provider: Laura Mata MD  Visit Type: Follow Up  Diagnosis: Anemia       Subjective    HPI  69 y.o. 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   Has previously seen  at  back in 2021 for similar concerns.     Recently admitted at  for VEDA with Hb ~5, ferritin 21, TIBC >450, hypoproliferative retic, relatively normal coags, admission notes allude to GI bleeding, but pt does not account for this. Folate B12 not checked. Hapto WNL, LDH mildly elevated, bili indirectly elevated also during admission. Was treated with 1 dose of iron sucrose during admission 300mg x1 and transfusions.   Had GIB in 2019, needing transfusions. Were not able to identify where the bleed was from. Was seen at [facility] for VEDA source of bleeding unclear, imaging noted below. Received (IV femuroxytol) on 3/23 and 3/30/2021. Since then at recent admission has had IV iron sucrose.   After DVT was given apixaban for 1yr, but asked to stop after 3 months by her cardiologist  for presumably a provoked VTE event with concern for GI bleeding.   Currently on ASA 81mg daily. Not on PO iron, has tried it in the past but has issues with constipation despite colace.   Age appropriate cancer screening: last colo in '19, last mammo oct '23 has axillary adenopathy   FMH: mother had throat and  "pancreatic cancer   Social history: former smoker, quit >20yrs, social drinker, no RDA. 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 Was started on heparin IV but noted to have recurrent GI bleeding early in the year and also suspected during admission, accordingly underwent EGD and colo after briefly holding her AC. Both were negative for a source of bleeding.   24: at follow up today pt has an antalgic gait needing a wheelchair eventually. Reports pain in the right thigh started at the time of discharge from hospital (where she was admitted for DVT/PE) not at admission and has remained unchanged since. Worried about clot progression. Has not missed any doses of the AC. Swelling in  RLE is improving but this has not changed the pain.   24: presents alone for visit, her leg pain is \"half of what it was\" when she last met me. Worked up by GI for cardiac issues. Apparently a/w sciatica. Continues on her apixaban 5mg Q12H, no obvious GI bleeding. Is worried about her PEs. Since we last met informs me she has cousins with VTE events. Has not been taking her PO iron since she was told by GI to hold until pill enteroscopy, is waiting to schedule.     Review of Systems - Oncology  10 point review of systems negative except as stated in HPI    Objective   Past Surgical History:   Procedure Laterality Date    BREAST BIOPSY  2016    Biopsy Breast Percutaneous Needle Core    CARDIAC CATHETERIZATION N/A 2024    Procedure: Left Heart Cath;  Surgeon: Donato Cespedes MD;  Location: [place] [address] Cardiac Cath Lab;  Service: Cardiovascular;  Laterality: N/A;     SECTION, CLASSIC  2014     Section    CHOLECYSTECTOMY  2017    Cholecystectomy Laparoscopic    HAND SURGERY  2020    Hand Surgery               " "                                                                                                                                           HERNIA REPAIR  11/17/2014    Hernia Repair    MR HEAD ANGIO WO IV CONTRAST  11/17/2014    MR HEAD ANGIO WO IV CONTRAST 11/17/2014 [facility]    OTHER SURGICAL HISTORY  01/14/2021    Nephrectomy    TOTAL ABDOMINAL HYSTERECTOMY W/ BILATERAL SALPINGOOPHORECTOMY  04/21/2014    Total Abdominal Hysterectomy With Removal Of Both Ovaries     Oncology History    No history exists.       Physical Exam  Vitals reviewed.   Constitutional:       General: She is not in acute distress.     Appearance: She is not toxic-appearing.   HENT:      Head: Normocephalic and atraumatic.   Pulmonary:      Effort: Pulmonary effort is normal.   Musculoskeletal:      Comments: Continues to have some right lower extremity swelling, however this is improved.   Neurological:      General: No focal deficit present.      Mental Status: She is oriented to person, place, and time.   Psychiatric:         Mood and Affect: Mood normal.       BSA: 1.93 meters squared  /79 (BP Location: Left arm, Patient Position: Sitting)   Pulse 66   Temp 35.8 °C (96.4 °F) (Temporal)   Resp 16   Ht (S) 1.485 m (4' 10.47\")   Wt 89.9 kg (198 lb 3.2 oz)   LMP  (LMP Unknown)   SpO2 98%   BMI 40.77 kg/m²     Labs:  Lab Results   Component Value Date    WBC 8.9 05/28/2024    NEUTROABS 6.86 05/28/2024    IGABSOL 0.03 05/28/2024    LYMPHSABS 1.20 05/28/2024    MONOSABS 0.51 05/28/2024    EOSABS 0.26 05/28/2024    BASOSABS 0.05 05/28/2024    RBC 4.03 05/28/2024    MCV 79 (L) 05/28/2024    MCHC 30.4 (L) 05/28/2024    HGB 9.7 (L) 05/28/2024    HCT 31.9 (L) 05/28/2024     (H) 05/28/2024      Lab Results   Component Value Date    CREATININE 1.22 (H) 05/28/2024    BUN 16 05/28/2024    EGFR 48 (L) 05/28/2024     05/28/2024    K 4.7 05/28/2024     05/28/2024    CO2 24 05/28/2024       Latest Reference Range " & Units 05/03/21 09:17 01/26/24 01:29 03/08/24 10:31 03/11/24 12:05 5/28/2024   FERRITIN 8 - 150 ng/mL 396 (H) 21 81 75 21   IRON 35 - 150 ug/dL 91 51 44 29 (L) 25   TIBC 240 - 445 ug/dL 295 COMMENT ONLY 396 380 >450   UIBC 110 - 370 ug/dL  >450 (H) 352 351    % Saturation 25 - 45 % 31 COMMENT ONLY 11 (L) 8 (L)        Assessment/Plan    69 y.o. 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   Has previously seen  at  back in 2021 for similar concerns.      # Anemia: Hb last WNL in '22, since then progressive decline with obvious GI bleeding in '23, microcytic received IV ferumoxytol and sucrose intermittently and has improved to ~10 recently. Has h/o iron deficiency, but has been normal as recently as 2022 not suggestive of congenital hemoglobinopathy. Has hypoproliferative retic index. In follow up since last visit myeloma labs did not detect clonal protein, B12 and folate are WNL. In summary anemia likely related to VEDA with ongoing GI losses with stool occult being positive as recently as 2/2024.  Her labs from 5/20/2024, continue to show significant iron deficiency.  The patient has not been on oral iron that she has been advised to hold the drug pending a pill enteroscopy.  However the patient has not actually scheduled the procedure and therefore at this time remains untreated for her iron deficiency anemia.  Plan:  -Have advised the patient contact her GI team as soon as possible to get the enteroscopy  scheduled and completed and then start herself on oral iron.  - follow up next: 3 months, I have ordered standing labs for her in the interim.  - labs prior to follow up: CBC/diff, retic, iron panel including ferritin     # RLE swelling: now h/o recurrent VTE events last one being submassive. She was off AC in between, is now back on 5mg Q12H of apixaban which she has been adherent with. Notes today significant pain the the RLE started at discharge from hospital, while I am not suspicious this is progressive thrombosis a DVT scan was done and results do not indicate progressive clotting. I had d/w pt during visit that if the scan is negative, would proceed with MRI.  Patient's workup since is indicated possible joint related issues as etiology.  No further testing for myself at this time.  Plan:  - advised she remain on apixaban 5mg Q12H.     Garland Jenkins MD

## 2024-06-24 ENCOUNTER — SPECIALTY PHARMACY (OUTPATIENT)
Dept: PHARMACY | Facility: CLINIC | Age: 69
End: 2024-06-24

## 2024-06-24 ENCOUNTER — PATIENT OUTREACH (OUTPATIENT)
Dept: PRIMARY CARE | Facility: CLINIC | Age: 69
End: 2024-06-24
Payer: MEDICARE

## 2024-06-24 DIAGNOSIS — I10 PRIMARY HYPERTENSION: ICD-10-CM

## 2024-06-24 DIAGNOSIS — E11.22 TYPE 2 DIABETES MELLITUS WITH STAGE 3A CHRONIC KIDNEY DISEASE, WITHOUT LONG-TERM CURRENT USE OF INSULIN (MULTI): ICD-10-CM

## 2024-06-24 DIAGNOSIS — N18.31 TYPE 2 DIABETES MELLITUS WITH STAGE 3A CHRONIC KIDNEY DISEASE, WITHOUT LONG-TERM CURRENT USE OF INSULIN (MULTI): ICD-10-CM

## 2024-06-24 NOTE — PROGRESS NOTES
Chronic care management outreach complete. Ms. Andrews says that she is doing ok. She's a little stressed thinking about her up coming endoscopy 7/8. She says that her blood sugars have been running in the 200s. I asked her about rescheduling her endocrinology appointment from our previous conversations, she says that she will reschedule, along with sleep medicine. She takes her Trulicity on Wednesdays. I educated her on rotating sites when injecting her trulicity. Upcomming appointments reviewed. No other questions or concerns at this time.

## 2024-06-26 ENCOUNTER — DOCUMENTATION (OUTPATIENT)
Dept: CARE COORDINATION | Facility: CLINIC | Age: 69
End: 2024-06-26
Payer: MEDICARE

## 2024-06-26 NOTE — PROGRESS NOTES
"Several attempts made to offer outpatient DSME; no return calls or effort to schedule; DSME Enrollment closed due to \"uninterested\". Educator remains available as needed: [phone].  "

## 2024-07-08 ENCOUNTER — HOSPITAL ENCOUNTER (OUTPATIENT)
Dept: GASTROENTEROLOGY | Facility: HOSPITAL | Age: 69
Setting detail: OUTPATIENT SURGERY
Discharge: HOME | End: 2024-07-08
Payer: MEDICARE

## 2024-07-08 DIAGNOSIS — D50.0 IRON DEFICIENCY ANEMIA DUE TO CHRONIC BLOOD LOSS: ICD-10-CM

## 2024-07-08 PROCEDURE — 91110 GI TRC IMG INTRAL ESOPH-ILE: CPT

## 2024-07-08 PROCEDURE — 2720000007 HC OR 272 NO HCPCS

## 2024-07-09 PROCEDURE — 91110 GI TRC IMG INTRAL ESOPH-ILE: CPT | Performed by: INTERNAL MEDICINE

## 2024-07-12 ENCOUNTER — TELEPHONE (OUTPATIENT)
Dept: ADMISSION | Facility: HOSPITAL | Age: 69
End: 2024-07-12
Payer: MEDICARE

## 2024-07-12 NOTE — TELEPHONE ENCOUNTER
Patient called requesting refill for ferrous gluconate.   Previously held d/t patient needing a capsule endoscopy which was completed on 7/8/24.  This medication was previously prescribed by another MD but since seeing Dr. Spears requesting he send this medication to [facility] on file.  Next FUV 9/19/24.

## 2024-07-16 ENCOUNTER — LAB (OUTPATIENT)
Dept: LAB | Facility: LAB | Age: 69
End: 2024-07-16
Payer: MEDICARE

## 2024-07-16 DIAGNOSIS — D50.0 IRON DEFICIENCY ANEMIA DUE TO CHRONIC BLOOD LOSS: ICD-10-CM

## 2024-07-16 LAB
BASOPHILS # BLD AUTO: 0.06 X10*3/UL (ref 0–0.1)
BASOPHILS NFR BLD AUTO: 0.7 %
EOSINOPHIL # BLD AUTO: 0.19 X10*3/UL (ref 0–0.7)
EOSINOPHIL NFR BLD AUTO: 2.4 %
ERYTHROCYTE [DISTWIDTH] IN BLOOD BY AUTOMATED COUNT: 17.7 % (ref 11.5–14.5)
FERRITIN SERPL-MCNC: 127 NG/ML (ref 8–150)
HCT VFR BLD AUTO: 33.8 % (ref 36–46)
HGB BLD-MCNC: 9.3 G/DL (ref 12–16)
HGB RETIC QN: 25 PG (ref 28–38)
IMM GRANULOCYTES # BLD AUTO: 0.02 X10*3/UL (ref 0–0.7)
IMM GRANULOCYTES NFR BLD AUTO: 0.2 % (ref 0–0.9)
IMMATURE RETIC FRACTION: 41.1 %
IRON SATN MFR SERPL: 7 % (ref 25–45)
IRON SERPL-MCNC: 28 UG/DL (ref 35–150)
LYMPHOCYTES # BLD AUTO: 1.52 X10*3/UL (ref 1.2–4.8)
LYMPHOCYTES NFR BLD AUTO: 19 %
MCH RBC QN AUTO: 22.4 PG (ref 26–34)
MCHC RBC AUTO-ENTMCNC: 27.5 G/DL (ref 32–36)
MCV RBC AUTO: 81 FL (ref 80–100)
MONOCYTES # BLD AUTO: 0.51 X10*3/UL (ref 0.1–1)
MONOCYTES NFR BLD AUTO: 6.4 %
NEUTROPHILS # BLD AUTO: 5.71 X10*3/UL (ref 1.2–7.7)
NEUTROPHILS NFR BLD AUTO: 71.3 %
NRBC BLD-RTO: 0 /100 WBCS (ref 0–0)
PLATELET # BLD AUTO: 299 X10*3/UL (ref 150–450)
RBC # BLD AUTO: 4.16 X10*6/UL (ref 4–5.2)
RETICS #: 0.08 X10*6/UL (ref 0.02–0.11)
RETICS/RBC NFR AUTO: 2 % (ref 0.5–2)
TIBC SERPL-MCNC: 406 UG/DL (ref 240–445)
UIBC SERPL-MCNC: 378 UG/DL (ref 110–370)
WBC # BLD AUTO: 8 X10*3/UL (ref 4.4–11.3)

## 2024-07-16 PROCEDURE — 85045 AUTOMATED RETICULOCYTE COUNT: CPT

## 2024-07-16 PROCEDURE — 83540 ASSAY OF IRON: CPT

## 2024-07-16 PROCEDURE — 82728 ASSAY OF FERRITIN: CPT

## 2024-07-16 PROCEDURE — 36415 COLL VENOUS BLD VENIPUNCTURE: CPT

## 2024-07-16 PROCEDURE — 85025 COMPLETE CBC W/AUTO DIFF WBC: CPT

## 2024-07-16 PROCEDURE — 83550 IRON BINDING TEST: CPT

## 2024-07-18 ENCOUNTER — OFFICE VISIT (OUTPATIENT)
Dept: GASTROENTEROLOGY | Facility: HOSPITAL | Age: 69
End: 2024-07-18
Payer: MEDICARE

## 2024-07-18 VITALS
SYSTOLIC BLOOD PRESSURE: 115 MMHG | HEART RATE: 74 BPM | BODY MASS INDEX: 40.75 KG/M2 | OXYGEN SATURATION: 97 % | TEMPERATURE: 96.6 F | DIASTOLIC BLOOD PRESSURE: 73 MMHG | WEIGHT: 198.1 LBS

## 2024-07-18 DIAGNOSIS — K59.00 CONSTIPATION, UNSPECIFIED CONSTIPATION TYPE: ICD-10-CM

## 2024-07-18 DIAGNOSIS — D50.0 IRON DEFICIENCY ANEMIA DUE TO CHRONIC BLOOD LOSS: Primary | ICD-10-CM

## 2024-07-18 RX ORDER — POLYETHYLENE GLYCOL 3350 17 G/17G
17 POWDER, FOR SOLUTION ORAL 2 TIMES DAILY
Qty: 3060 G | Refills: 3 | Status: SHIPPED | OUTPATIENT
Start: 2024-07-18 | End: 2025-07-18

## 2024-07-18 RX ORDER — POLYETHYLENE GLYCOL 3350 17 G/17G
17 POWDER, FOR SOLUTION ORAL 2 TIMES DAILY
Qty: 1020 G | Refills: 11 | Status: SHIPPED | OUTPATIENT
Start: 2024-07-18 | End: 2024-07-18 | Stop reason: WASHOUT

## 2024-07-18 SDOH — ECONOMIC STABILITY: FOOD INSECURITY: WITHIN THE PAST 12 MONTHS, YOU WORRIED THAT YOUR FOOD WOULD RUN OUT BEFORE YOU GOT MONEY TO BUY MORE.: NEVER TRUE

## 2024-07-18 SDOH — ECONOMIC STABILITY: FOOD INSECURITY: WITHIN THE PAST 12 MONTHS, THE FOOD YOU BOUGHT JUST DIDN'T LAST AND YOU DIDN'T HAVE MONEY TO GET MORE.: NEVER TRUE

## 2024-07-18 ASSESSMENT — LIFESTYLE VARIABLES
HOW MANY STANDARD DRINKS CONTAINING ALCOHOL DO YOU HAVE ON A TYPICAL DAY: PATIENT DOES NOT DRINK
HOW OFTEN DO YOU HAVE SIX OR MORE DRINKS ON ONE OCCASION: NEVER
AUDIT-C TOTAL SCORE: 0
HOW OFTEN DO YOU HAVE A DRINK CONTAINING ALCOHOL: NEVER
SKIP TO QUESTIONS 9-10: 1

## 2024-07-18 ASSESSMENT — ENCOUNTER SYMPTOMS
VOMITING: 0
SORE THROAT: 0
DIARRHEA: 0
SHORTNESS OF BREATH: 0
NAUSEA: 0
SLEEP DISTURBANCE: 0
CHILLS: 0
ABDOMINAL PAIN: 1
HEADACHES: 0
MYALGIAS: 0
DYSURIA: 0
COUGH: 0
CONSTIPATION: 1
FEVER: 0
PALPITATIONS: 0
HEMATURIA: 0
ARTHRALGIAS: 0
BLOOD IN STOOL: 0

## 2024-07-18 ASSESSMENT — PATIENT HEALTH QUESTIONNAIRE - PHQ9
SUM OF ALL RESPONSES TO PHQ9 QUESTIONS 1 & 2: 0
2. FEELING DOWN, DEPRESSED OR HOPELESS: NOT AT ALL
1. LITTLE INTEREST OR PLEASURE IN DOING THINGS: NOT AT ALL

## 2024-07-18 ASSESSMENT — PAIN SCALES - GENERAL: PAINLEVEL: 4

## 2024-07-18 NOTE — PROGRESS NOTES
Gastroenterology Clinic Note    History Of Present Illness  Vania Andrews is a 69 y.o. female presenting with chronic VEDA for follow up after capsule endoscopy 7/8/24 with no abnormal findings.     Pt had EGD/Colonoscopy/capsule endoscopy in 2019 negative.     Last seen in Clinic 3/7/24 By Dr. Tatiana Diaz after 1/2024 hospitalization where found to have acute on chronic microcytic anemia planned for outpatient endoscopy then represented 2/2024 found to have saddle PE started on heparin developed melena and worsening anemia PT underwent EGD/Colonoscopy 2/2024. EGD showed no signs of bleeding. Had multiple sessile polyps consistent with fundic gland polyps as noted on recent EGD, erythema in the stomach. Colonoscopy showed no signs of bleeding throughout the colon or terminal ileum. Showed extensive diverticula throughout the colon without inflammation. Hb on discharge was 10.2 patient discharged on eliquis. At 3/2024 visit patient planned for capsule endoscopy.     Capsule endoscopy 7/8/24  Quality of preparation was excellent   The visualized stomach was normal   The visualized small bowel was normal. No blood, angioectasia, erosions, ulcerations, strictures or masses were seen  Green stool was noted in the colon     7/16/24 labs   Iron 28 L (from 29 3/2024)   TIBC  406 (from 380 3/2024)   % Sat  7 (from 8 3/2024)   Ferritin 127 (from 75 3/2024)     Saw hematology 6/20/24 Dr. Spears for VEDA who reviewed her previous workup and suspects VEDA is 2/2 GI losses, negative for MM and congenital hemoglobinopathy was prescribed 325mg ferrous sulfate and recommended to follow up in 3 months.       Subjective  Patient reports that since her last visit she saw saw hematology who prescribed her iron which she started on Tuesday taking it once a day. Reports that bowel movements after the capsule it took awhile for her to have a bowel movement then had 2 days of diarrhea but currently having some constipation reports once daily  having more gas and feeling like needs to go. Previously having 2-3 bowel movements a day.     Review of Systems  Review of Systems   Constitutional:  Negative for chills and fever.   HENT:  Negative for congestion and sore throat.    Eyes:  Negative for visual disturbance.   Respiratory:  Negative for cough and shortness of breath.    Cardiovascular:  Positive for leg swelling. Negative for palpitations.   Gastrointestinal:  Positive for abdominal pain and constipation. Negative for blood in stool, diarrhea, nausea and vomiting.   Genitourinary:  Negative for dysuria and hematuria.   Musculoskeletal:  Negative for arthralgias and myalgias.   Skin:  Negative for rash.   Neurological:  Negative for headaches.   Psychiatric/Behavioral:  Negative for sleep disturbance.         Medications:    Current Outpatient Medications:     Accu-Chek Guide test strips strip, Use 1 test strip to test blood sugar twice daily., Disp: 100 strip, Rfl: 2    albuterol 90 mcg/actuation inhaler, Inhale 2 puffs every 4 hours if needed for wheezing or shortness of breath (You may also use this 10-15 minutes prior to exertional activity as needed)., Disp: 18 g, Rfl: 5    amLODIPine (Norvasc) 10 mg tablet, Take 1 tablet (10 mg) by mouth once daily., Disp: 90 tablet, Rfl: 1    ammonium lactate (Lac-Hydrin) 12 % lotion, Apply topically if needed for dry skin., Disp: 225 g, Rfl: 3    apixaban (Eliquis) 5 mg tablet, Take 1 tablet (5 mg) by mouth 2 times a day. Ok to resume on 5/7., Disp: 60 tablet, Rfl: 5    brimonidine-timoloL (Combigan) 0.2-0.5 % ophthalmic solution, Administer 1 drop into both eyes 2 times a day., Disp: 10 mL, Rfl: 11    diclofenac sodium 1 % kit, Apply 1 Application topically if needed.  APPLY TO LOWER EXTREMITIES, 4 GM OF GEL TO AFFECTED AREA 4 TIMES DAILY. DO NOT APPLY MORE THAN 16 GM DAILY TO ANY ONE AFFECTED JOINT., Disp: , Rfl:     dicyclomine (Bentyl) 20 mg tablet, Take 1 tablet (20 mg) by mouth. 3-4 times daily as  "needed., Disp: , Rfl:     dulaglutide (Trulicity) 0.75 mg/0.5 mL pen injector, Inject 0.75 mg (1 pen) under the skin 1 (one) time per week., Disp: 6 mL, Rfl: 3    famotidine (Pepcid) 20 mg tablet, Take 1 tablet (20 mg) by mouth once daily at bedtime., Disp: , Rfl:     ferrous sulfate, 325 mg ferrous sulfate, tablet, Take 1 tablet by mouth once daily. On empty stomach, 1 before or 2 hours after meal with water. Do not take with antacids, tea, calcium containing products (milk, cheese), Disp: 90 tablet, Rfl: 1    fluticasone (Flonase) 50 mcg/actuation nasal spray, Administer 2 sprays into each nostril once daily. (Patient taking differently: Administer 2 sprays into each nostril if needed.), Disp: 16 g, Rfl: 3    furosemide (Lasix) 20 mg tablet, Take 1 tablet (20 mg) by mouth once daily., Disp: 30 tablet, Rfl: 11    insulin lispro (HumaLOG KwikPen Insulin) 100 unit/mL injection, Use with sliding scale 3 times a day, up to 30 units daily, Disp: 15 mL, Rfl: 2    lancets (Lancets,Ultra Thin) misc, Use to check blood sugar twice daily, Disp: 100 each, Rfl: 2    latanoprost (Xalatan) 0.005 % ophthalmic solution, Administer 1 drop into both eyes once daily in the morning., Disp: 2.5 mL, Rfl: 6    levocetirizine (Xyzal) 5 mg tablet, Take 1 tablet (5 mg) by mouth once daily in the evening., Disp: 90 tablet, Rfl: 1    losartan (Cozaar) 25 mg tablet, Take 1 tablet (25 mg) by mouth once daily., Disp: 90 tablet, Rfl: 1    metoprolol succinate XL (Toprol-XL) 200 mg 24 hr tablet, Take 1 tablet (200 mg) by mouth once daily., Disp: 90 tablet, Rfl: 3    netarsudiL (Rhopressa) 0.02 % drops opthalmic solution, Administer 1 drop into both eyes once daily in the evening., Disp: 2.5 mL, Rfl: 6    omeprazole (PriLOSEC) 40 mg DR capsule, Take 1 capsule (40 mg) by mouth 2 times a day before meals., Disp: 60 capsule, Rfl: 1    pen needle, diabetic 31 gauge x 5/16\" needle, Use to inject 1-4 times daily as directed., Disp: 100 each, Rfl: 11    " "pen needle, diabetic 33 gauge x 5/32\" needle, Use for sliding scale up to 3 times a day, Disp: 100 each, Rfl: 2    polyethylene glycol (Glycolax, Miralax) 17 gram/dose powder, Take 17 g by mouth if needed. IN 8 OUNCES OF WATER, JUICE, OR TEA AND DRINK, Disp: , Rfl:     rosuvastatin (Crestor) 20 mg tablet, Take 1 tablet (20 mg) by mouth once daily., Disp: 30 tablet, Rfl: 2    tiotropium (Spiriva Respimat) 2.5 mcg/actuation inhaler, Inhale 2 puffs once daily., Disp: 1 each, Rfl: 3    Current Facility-Administered Medications:     nitroglycerin (Nitrostat) SL tablet 0.4 mg, 0.4 mg, sublingual, Once, Vianney Marrufo MD    Physical Exam  General: well-nourished, no acute distress  HEENT: PERRLA, EOM intact, no scleral icterus, moist MM  Respiratory: CTA bilaterally, normal work of breathing  Cardiovascular: RRR, no murmurs/rubs/gallops  Abdomen: Soft, nontender, nondistended  Extremities: no edema, no asterixis  Neuro: alert and oriented, CNII-XII grossly intact, moves all 4 extremities with no focal deficits      Last Recorded Vitals  There were no vitals taken for this visit.    Relevant Results    Lab Results   Component Value Date    WBC 8.0 07/16/2024    HGB 9.3 (L) 07/16/2024    HCT 33.8 (L) 07/16/2024    MCV 81 07/16/2024     07/16/2024     Lab Results   Component Value Date    GLUCOSE 123 (H) 05/28/2024    CALCIUM 9.2 05/28/2024     05/28/2024    K 4.7 05/28/2024    CO2 24 05/28/2024     05/28/2024    BUN 16 05/28/2024    CREATININE 1.22 (H) 05/28/2024     Lab Results   Component Value Date    ALT 26 02/05/2024    AST 22 02/05/2024    GGT 33 12/23/2020    ALKPHOS 140 (H) 02/05/2024    BILITOT 0.4 02/05/2024       ASSESSMENT/PLAN:    Vania Andrews is a 69 y.o. female presenting with chronic VEDA, PE/DVT on eliquis (2/2024) here for follow up for VEDA. EGD/Colonoscopy/capsule endoscopy in 2019 negative. Pt again developed microcytic anemia which was exacerbated by the anticoagulation for PE. Repeat " EGD/Colonoscopy 2/2024 which were grossly negative and capsule endoscopy 7/8/24 with no abnormal findings or cause of VEDA.     Discussed could perform MR Enterogrophy however we do not suspect we will see anything and patient agrees to hold off on this. We suspect she has a AVM somewhere that intermittently bleeds and then when scoping we have been unable to see it as bleeding has ceased. Patient following with hematology now and started on iron pills, reports some constipation associated.     Plan  -Continue iron per hematology   -Defer further imaging at this time   -RTC in 6 months to reassess VEDA at that time   -Miralax as needed titrate to 2-3 Bms a day or to feeling of complete evacuation       Venus Mandel MD

## 2024-07-18 NOTE — PATIENT INSTRUCTIONS
Today we discussed your recent capsule endoscopy that did not show any active bleeding or the cause of your bleeding.     Unfortunately sometimes we may not find the cause of the anemia. For now we will see you back in 6 months and see how your blood counts do with iron that you recently started.     Please also start taking miralax 17gm daily and titrate to have 1-3 bowel movements a day.

## 2024-07-22 ENCOUNTER — DOCUMENTATION (OUTPATIENT)
Dept: PRIMARY CARE | Facility: CLINIC | Age: 69
End: 2024-07-22
Payer: MEDICARE

## 2024-07-22 DIAGNOSIS — N18.31 TYPE 2 DIABETES MELLITUS WITH STAGE 3A CHRONIC KIDNEY DISEASE, WITHOUT LONG-TERM CURRENT USE OF INSULIN (MULTI): ICD-10-CM

## 2024-07-22 DIAGNOSIS — I10 PRIMARY HYPERTENSION: ICD-10-CM

## 2024-07-22 DIAGNOSIS — E11.22 TYPE 2 DIABETES MELLITUS WITH STAGE 3A CHRONIC KIDNEY DISEASE, WITHOUT LONG-TERM CURRENT USE OF INSULIN (MULTI): ICD-10-CM

## 2024-07-22 NOTE — PROGRESS NOTES
Called Vania for Chronic care management outreach. Unable to reach. Left voicemail. Chart reviewed.

## 2024-07-23 ENCOUNTER — SPECIALTY PHARMACY (OUTPATIENT)
Dept: PHARMACY | Facility: CLINIC | Age: 69
End: 2024-07-23

## 2024-07-23 PROCEDURE — RXMED WILLOW AMBULATORY MEDICATION CHARGE

## 2024-07-25 ENCOUNTER — APPOINTMENT (OUTPATIENT)
Dept: GASTROENTEROLOGY | Facility: HOSPITAL | Age: 69
End: 2024-07-25
Payer: MEDICARE

## 2024-07-26 DIAGNOSIS — I10 PRIMARY HYPERTENSION: ICD-10-CM

## 2024-07-26 RX ORDER — METOPROLOL SUCCINATE 200 MG/1
200 TABLET, EXTENDED RELEASE ORAL DAILY
Qty: 90 TABLET | Refills: 3 | Status: SHIPPED | OUTPATIENT
Start: 2024-07-26

## 2024-07-30 ENCOUNTER — PATIENT OUTREACH (OUTPATIENT)
Dept: PRIMARY CARE | Facility: CLINIC | Age: 69
End: 2024-07-30
Payer: MEDICARE

## 2024-07-30 ENCOUNTER — SPECIALTY PHARMACY (OUTPATIENT)
Dept: PHARMACY | Facility: CLINIC | Age: 69
End: 2024-07-30

## 2024-07-30 DIAGNOSIS — I10 PRIMARY HYPERTENSION: ICD-10-CM

## 2024-07-30 DIAGNOSIS — N18.31 TYPE 2 DIABETES MELLITUS WITH STAGE 3A CHRONIC KIDNEY DISEASE, WITHOUT LONG-TERM CURRENT USE OF INSULIN (MULTI): ICD-10-CM

## 2024-07-30 DIAGNOSIS — E11.22 TYPE 2 DIABETES MELLITUS WITH STAGE 3A CHRONIC KIDNEY DISEASE, WITHOUT LONG-TERM CURRENT USE OF INSULIN (MULTI): ICD-10-CM

## 2024-07-30 DIAGNOSIS — E11.65 TYPE 2 DIABETES MELLITUS WITH HYPERGLYCEMIA, WITHOUT LONG-TERM CURRENT USE OF INSULIN (MULTI): ICD-10-CM

## 2024-07-30 PROCEDURE — 99439 CHRNC CARE MGMT STAF EA ADDL: CPT | Performed by: STUDENT IN AN ORGANIZED HEALTH CARE EDUCATION/TRAINING PROGRAM

## 2024-07-30 PROCEDURE — 99490 CHRNC CARE MGMT STAFF 1ST 20: CPT | Performed by: STUDENT IN AN ORGANIZED HEALTH CARE EDUCATION/TRAINING PROGRAM

## 2024-07-30 NOTE — PROGRESS NOTES
Chronic care management outreach complete. I spoke to Ms. Andrews and she says that she is ok. She says that her blood sugars ranger from 120-180. She has not taken Trulicity in at least a month. She prefers to go back to taking trjenta that she was previously taking for diabetes. She says that she does not like taking shots. I reminded her that this was a discussion that she needs to have with her Endocrinologist. She is aware that she was a no show to her appointment back in May and never rescheduled. I gave her the numbet to Dr. Gross's office. Following our outreach she tried to schedule her appointment, however Dr. Gross was booked until December. Ms. Andrews says that she left a message asking for a call back to see if she can get a sooner appointment.   I mentioned to her that the Diabetic educator had been trying to reach her. She said that she will give her a call back. I gave her the number listed in the notes. She also mentioned her frustrations with [employer]. I gave her the number to [employer] help desk. We reviewed upcomming appointments. She says that she does not need any refills. She has my contact information for any additional questions or concerns.

## 2024-07-31 RX ORDER — DULAGLUTIDE 0.75 MG/.5ML
0.75 INJECTION, SOLUTION SUBCUTANEOUS
Qty: 12 EACH | Refills: 3 | Status: SHIPPED | OUTPATIENT
Start: 2024-08-04 | End: 2025-08-04

## 2024-08-01 ENCOUNTER — PHARMACY VISIT (OUTPATIENT)
Dept: PHARMACY | Facility: CLINIC | Age: 69
End: 2024-08-01
Payer: MEDICARE

## 2024-08-14 ENCOUNTER — PATIENT OUTREACH (OUTPATIENT)
Dept: PRIMARY CARE | Facility: CLINIC | Age: 69
End: 2024-08-14
Payer: MEDICARE

## 2024-08-14 DIAGNOSIS — I10 PRIMARY HYPERTENSION: ICD-10-CM

## 2024-08-14 DIAGNOSIS — N18.31 TYPE 2 DIABETES MELLITUS WITH STAGE 3A CHRONIC KIDNEY DISEASE, WITHOUT LONG-TERM CURRENT USE OF INSULIN (MULTI): ICD-10-CM

## 2024-08-14 DIAGNOSIS — E11.65 TYPE 2 DIABETES MELLITUS WITH HYPERGLYCEMIA, WITHOUT LONG-TERM CURRENT USE OF INSULIN (MULTI): Primary | ICD-10-CM

## 2024-08-14 DIAGNOSIS — E11.22 TYPE 2 DIABETES MELLITUS WITH STAGE 3A CHRONIC KIDNEY DISEASE, WITHOUT LONG-TERM CURRENT USE OF INSULIN (MULTI): ICD-10-CM

## 2024-08-14 RX ORDER — METFORMIN HYDROCHLORIDE 500 MG/1
500 TABLET ORAL
Qty: 180 TABLET | Refills: 3 | Status: SHIPPED | OUTPATIENT
Start: 2024-08-14 | End: 2024-08-14

## 2024-08-14 RX ORDER — METFORMIN HYDROCHLORIDE 500 MG/1
500 TABLET ORAL
Qty: 180 TABLET | Refills: 3 | Status: SHIPPED | OUTPATIENT
Start: 2024-08-14 | End: 2025-08-14

## 2024-08-14 NOTE — PROGRESS NOTES
Chronic care management outreach complete. Spoke to Ms. Andrews to inform her that Dr. Gross (Armando) messaged me today stating that she is sending over metformin to her pharmacy. Ms. Andrews is concerned because at one pont she was on metformin, but was taken off of it because she only has one kidney. I will rely her concern to Dr. Gross. She says that he blood sugars on average run around 180. I also informed Ms. Andrews that her Endo appointment was moved up to 11/22 at 4:20pm. We reviewed her appointments. No need for refills at this time.

## 2024-08-19 ENCOUNTER — DOCUMENTATION (OUTPATIENT)
Dept: PRIMARY CARE | Facility: CLINIC | Age: 69
End: 2024-08-19
Payer: MEDICARE

## 2024-08-19 DIAGNOSIS — N18.31 TYPE 2 DIABETES MELLITUS WITH STAGE 3A CHRONIC KIDNEY DISEASE, WITHOUT LONG-TERM CURRENT USE OF INSULIN (MULTI): ICD-10-CM

## 2024-08-19 DIAGNOSIS — E11.22 TYPE 2 DIABETES MELLITUS WITH STAGE 3A CHRONIC KIDNEY DISEASE, WITHOUT LONG-TERM CURRENT USE OF INSULIN (MULTI): ICD-10-CM

## 2024-08-19 DIAGNOSIS — E11.65 TYPE 2 DIABETES MELLITUS WITH HYPERGLYCEMIA, WITHOUT LONG-TERM CURRENT USE OF INSULIN (MULTI): Primary | ICD-10-CM

## 2024-08-19 PROCEDURE — RXMED WILLOW AMBULATORY MEDICATION CHARGE

## 2024-08-19 RX ORDER — GLIPIZIDE 5 MG/1
5 TABLET, FILM COATED, EXTENDED RELEASE ORAL DAILY
Qty: 30 TABLET | Refills: 6 | Status: SHIPPED | OUTPATIENT
Start: 2024-08-19 | End: 2025-08-19

## 2024-08-19 NOTE — PROGRESS NOTES
Called MsTico Darryl to inform her that Dr. Gross sent her a prescription for glipizide 5 mg over to [facility]. I let her know to take 1 tablet by mouth once daily and not to crush, chew or split the medication. She says that she had taken glipizide in the past and it caused for her blood sugars to go too low, but she is willing to try it this time around. I told her to make sure she takes it with her breakfast to avoid low blood sugars. She expressed understanding and says that she will take the glipizide and monitor her blood sugars.

## 2024-08-22 ENCOUNTER — PHARMACY VISIT (OUTPATIENT)
Dept: PHARMACY | Facility: CLINIC | Age: 69
End: 2024-08-22
Payer: MEDICARE

## 2024-08-23 DIAGNOSIS — E11.65 TYPE 2 DIABETES MELLITUS WITH HYPERGLYCEMIA, WITHOUT LONG-TERM CURRENT USE OF INSULIN: ICD-10-CM

## 2024-08-23 DIAGNOSIS — N18.31 TYPE 2 DIABETES MELLITUS WITH STAGE 3A CHRONIC KIDNEY DISEASE, WITHOUT LONG-TERM CURRENT USE OF INSULIN (MULTI): ICD-10-CM

## 2024-08-23 DIAGNOSIS — E11.22 TYPE 2 DIABETES MELLITUS WITH STAGE 3A CHRONIC KIDNEY DISEASE, WITHOUT LONG-TERM CURRENT USE OF INSULIN (MULTI): ICD-10-CM

## 2024-08-27 ENCOUNTER — APPOINTMENT (OUTPATIENT)
Dept: PRIMARY CARE | Facility: CLINIC | Age: 69
End: 2024-08-27
Payer: MEDICARE

## 2024-08-27 VITALS
HEIGHT: 58 IN | SYSTOLIC BLOOD PRESSURE: 133 MMHG | OXYGEN SATURATION: 93 % | HEART RATE: 82 BPM | WEIGHT: 200 LBS | BODY MASS INDEX: 41.98 KG/M2 | DIASTOLIC BLOOD PRESSURE: 77 MMHG

## 2024-08-27 DIAGNOSIS — E11.22 TYPE 2 DIABETES MELLITUS WITH STAGE 3A CHRONIC KIDNEY DISEASE, WITHOUT LONG-TERM CURRENT USE OF INSULIN (MULTI): ICD-10-CM

## 2024-08-27 DIAGNOSIS — Z00.00 MEDICARE ANNUAL WELLNESS VISIT, SUBSEQUENT: Primary | ICD-10-CM

## 2024-08-27 DIAGNOSIS — N18.31 TYPE 2 DIABETES MELLITUS WITH STAGE 3A CHRONIC KIDNEY DISEASE, WITHOUT LONG-TERM CURRENT USE OF INSULIN (MULTI): ICD-10-CM

## 2024-08-27 DIAGNOSIS — I10 PRIMARY HYPERTENSION: ICD-10-CM

## 2024-08-27 PROCEDURE — 3062F POS MACROALBUMINURIA REV: CPT | Performed by: STUDENT IN AN ORGANIZED HEALTH CARE EDUCATION/TRAINING PROGRAM

## 2024-08-27 PROCEDURE — 4010F ACE/ARB THERAPY RXD/TAKEN: CPT | Performed by: STUDENT IN AN ORGANIZED HEALTH CARE EDUCATION/TRAINING PROGRAM

## 2024-08-27 PROCEDURE — 99214 OFFICE O/P EST MOD 30 MIN: CPT | Performed by: STUDENT IN AN ORGANIZED HEALTH CARE EDUCATION/TRAINING PROGRAM

## 2024-08-27 PROCEDURE — 1036F TOBACCO NON-USER: CPT | Performed by: STUDENT IN AN ORGANIZED HEALTH CARE EDUCATION/TRAINING PROGRAM

## 2024-08-27 PROCEDURE — 3008F BODY MASS INDEX DOCD: CPT | Performed by: STUDENT IN AN ORGANIZED HEALTH CARE EDUCATION/TRAINING PROGRAM

## 2024-08-27 PROCEDURE — 1170F FXNL STATUS ASSESSED: CPT | Performed by: STUDENT IN AN ORGANIZED HEALTH CARE EDUCATION/TRAINING PROGRAM

## 2024-08-27 PROCEDURE — 3075F SYST BP GE 130 - 139MM HG: CPT | Performed by: STUDENT IN AN ORGANIZED HEALTH CARE EDUCATION/TRAINING PROGRAM

## 2024-08-27 PROCEDURE — G0439 PPPS, SUBSEQ VISIT: HCPCS | Performed by: STUDENT IN AN ORGANIZED HEALTH CARE EDUCATION/TRAINING PROGRAM

## 2024-08-27 PROCEDURE — 1124F ACP DISCUSS-NO DSCNMKR DOCD: CPT | Performed by: STUDENT IN AN ORGANIZED HEALTH CARE EDUCATION/TRAINING PROGRAM

## 2024-08-27 PROCEDURE — 1160F RVW MEDS BY RX/DR IN RCRD: CPT | Performed by: STUDENT IN AN ORGANIZED HEALTH CARE EDUCATION/TRAINING PROGRAM

## 2024-08-27 PROCEDURE — 1159F MED LIST DOCD IN RCRD: CPT | Performed by: STUDENT IN AN ORGANIZED HEALTH CARE EDUCATION/TRAINING PROGRAM

## 2024-08-27 PROCEDURE — 3078F DIAST BP <80 MM HG: CPT | Performed by: STUDENT IN AN ORGANIZED HEALTH CARE EDUCATION/TRAINING PROGRAM

## 2024-08-27 RX ORDER — AMLODIPINE BESYLATE 10 MG/1
10 TABLET ORAL DAILY
Qty: 90 TABLET | Refills: 1 | Status: SHIPPED | OUTPATIENT
Start: 2024-08-27

## 2024-08-27 RX ORDER — LOSARTAN POTASSIUM 25 MG/1
25 TABLET ORAL DAILY
Qty: 90 TABLET | Refills: 1 | Status: SHIPPED | OUTPATIENT
Start: 2024-08-27

## 2024-08-27 ASSESSMENT — ACTIVITIES OF DAILY LIVING (ADL)
DOING_HOUSEWORK: INDEPENDENT
MANAGING_FINANCES: INDEPENDENT
DRESSING: INDEPENDENT
GROCERY_SHOPPING: INDEPENDENT
TAKING_MEDICATION: INDEPENDENT
BATHING: INDEPENDENT

## 2024-08-27 NOTE — PROGRESS NOTES
Subjective   Reason for Visit: Vania Andrews is an 69 y.o. female here for a Medicare Wellness visit.     Past Medical, Surgical, and Family History reviewed and updated in chart.    Reviewed all medications by prescribing practitioner or clinical pharmacist (such as prescriptions, OTCs, herbal therapies and supplements) and documented in the medical record.    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 s/p L partial nephrectomy and L adrenalectomy (2007), CKD stage 3 d/t loss of nephron mass and HTN, asthma, HLD, T2DM (est with endo ), MARYLU not using CPAP, s/p hysterectomy and b/l oophorectomy, inferior NSTEMI (1/2019) complicated by chronic Fe deficiency anemia  with h/o iron infusions, partially obstructed Ross's hernia with sx ( Feb 2022 with repair in June 2022  ), H/o provoked DVT in 2020 , with cessation of eliquis after 6m  , now resumed after saddle PE In Feb 2024           Patient Care Team:  Laura Mata MD as PCP - General (Family Medicine)  Laura Mata MD as PCP - [facility] Attributed Provider  Remedios Garcia MD, MS as Consulting Physician (Vascular Medicine)  Garland Spears MD as Consulting Physician (Hematology and Oncology)  Kavya Parsons as Care Manager (Case Management)     Current Outpatient Medications   Medication Instructions    Accu-Chek Guide test strips strip Use 1 test strip to test blood sugar twice daily.    albuterol 90 mcg/actuation inhaler 2 puffs, inhalation, Every 4 hours PRN    amLODIPine (NORVASC) 10 mg, oral, Daily    ammonium lactate (Lac-Hydrin) 12 % lotion Topical, As needed    apixaban (ELIQUIS) 5 mg, oral, 2 times daily, Ok to resume on 5/7.    brimonidine-timoloL (Combigan) 0.2-0.5 % ophthalmic solution 1 drop, Both Eyes, 2 times daily    diclofenac sodium 1 % kit 1 Application, Topical, As needed,  APPLY TO LOWER EXTREMITIES, 4 GM OF GEL TO AFFECTED AREA 4 TIMES DAILY. DO NOT APPLY MORE THAN 16 GM DAILY TO  "ANY ONE AFFECTED JOINT.    dicyclomine (BENTYL) 20 mg, oral, 3-4 times daily as needed.    dulaglutide (Trulicity) 0.75 mg/0.5 mL pen injector Inject 0.75 mg (1 pen) under the skin 1 (one) time per week.    dulaglutide (Trulicity) 0.75 mg/0.5 mL pen injector Inject 0.75 mg (1 pen) under the skin 1 (one) time per week.    famotidine (Pepcid) 20 mg tablet 1 tablet, oral, Nightly    ferrous sulfate, 325 mg ferrous sulfate, tablet 1 tablet, oral, Daily, On empty stomach, 1 before or 2 hours after meal with water. Do not take with antacids, tea, calcium containing products (milk, cheese)    fluticasone (Flonase) 50 mcg/actuation nasal spray 2 sprays, Each Nostril, Daily    furosemide (LASIX) 20 mg, oral, Daily    glipiZIDE XL (GLUCOTROL XL) 5 mg, oral, Daily, Do not crush, chew, or split.    insulin lispro (HumaLOG KwikPen Insulin) 100 unit/mL injection Use with sliding scale 3 times a day, up to 30 units daily    lancets (Lancets,Ultra Thin) misc Use to check blood sugar twice daily    latanoprost (Xalatan) 0.005 % ophthalmic solution 1 drop, Both Eyes, Every morning    levocetirizine (XYZAL) 5 mg, oral, Every evening    losartan (COZAAR) 25 mg, oral, Daily    metoprolol succinate XL (TOPROL-XL) 200 mg, oral, Daily    netarsudiL (Rhopressa) 0.02 % drops opthalmic solution 1 drop, Both Eyes, Every evening    omeprazole (PRILOSEC) 40 mg, oral, 2 times daily before meals    pen needle, diabetic 31 gauge x 5/16\" needle Use to inject 1-4 times daily as directed.    pen needle, diabetic 33 gauge x 5/32\" needle Use for sliding scale up to 3 times a day    polyethylene glycol (GLYCOLAX, MIRALAX) 17 g, oral, 2 times daily    rosuvastatin (CRESTOR) 20 mg, oral, Daily    tiotropium (Spiriva Respimat) 2.5 mcg/actuation inhaler 2 puffs, inhalation, Daily        Social History     Tobacco Use    Smoking status: Former     Types: Cigarettes     Passive exposure: Never    Smokeless tobacco: Never   Substance Use Topics    Alcohol use: " "Not Currently        Review of Systems  Constitutional: no chills, no fever and no night sweats.     Eyes: no blurred vision and no eyesight problems.     ENT: no hearing loss, no nasal congestion, no nasal discharge, no hoarseness and no sore throat.     Cardiovascular: no chest pain, no intermittent leg claudication, no lower extremity edema, no palpitations and no syncope.     Respiratory: no cough, no shortness of breath during exertion, no shortness of breath at rest and no wheezing.     Gastrointestinal: no abdominal pain, no blood in stools, no constipation, no diarrhea, no melena, no nausea, no rectal pain and no vomiting.     Genitourinary: no dysuria, no change in urinary frequency, no urinary hesitancy, no feelings of urinary urgency and no vaginal discharge.     Musculoskeletal: no arthralgias, no back pain and no myalgias.     Integumentary: no new skin lesions and no rashes.     Neurological: no difficulty walking, no headache, no limb weakness, no numbness and no tingling.     Psychiatric: no anxiety, no depression, no anhedonia and no substance use disorders.     Endocrine: no recent weight gain and no recent weight loss.     Hematologic/Lymphatic: no tendency for easy bruising and no swollen glands.          All other systems have been reviewed and are negative for complaint.    Objective   Vitals:  /77   Pulse 82   Ht 1.485 m (4' 10.47\")   Wt 90.7 kg (200 lb)   LMP  (LMP Unknown)   SpO2 93%   BMI 41.13 kg/m²       Physical Exam    Constitutional: Alert and in no acute distress. Well developed, well nourished.     Eyes: Normal external exam. Pupils were equal in size, round, reactive to light (PERRL) with normal accommodation and extraocular movements intact (EOMI).     Ears, Nose, Mouth, and Throat: External inspection of ears and nose: Normal.  Otoscopic examination: Normal.      Neck: No neck mass was observed. Supple.     Cardiovascular: Heart rate and rhythm were normal, normal S1 " and S2, no gallops, no murmurs and no pericardial rub    Pulmonary: No respiratory distress. Clear bilateral breath sounds.     Abdomen: Soft nontender; no abdominal mass palpated. No organomegaly.     Musculoskeletal: No joint swelling seen, normal movements of all extremities. Range of motion: Normal.  Muscle strength/tone: Normal.        Neurologic: Deep tendon reflexes were 2+ and symmetric. Sensation: Normal.     Psychiatric: Judgment and insight: Intact. Mood and affect: Normal.    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 s/p L partial nephrectomy and L adrenalectomy (2007), CKD stage 3 d/t loss of nephron mass and HTN, asthma, HLD, T2DM (est with endo ), MARYLU not using CPAP, s/p hysterectomy and b/l oophorectomy, inferior NSTEMI (1/2019) complicated by chronic Fe deficiency anemia  with h/o iron infusions, partially obstructed Ross's hernia with sx ( Feb 2022 with repair in June 2022  ), H/o provoked DVT in 2020 , with cessation of eliquis after 6m  , now resumed after saddle PE In Feb 2024       Immunizations :  Influenza : recommended annually in the fall  [id number] : Given previously     Shingles: recommended to receive at the pharmacy    Cancer screenings:   Mammogram :   Screening  ordered  Cervical cancer:  Screening  not indicated  Colon cancer:     Screening current n. Done 02/2024, due 2029  Lung cancer :     Screening not indicated  HIV screening:   Screening not indicated  Osteoporosis :   Screening current , osteopenia in 2023        This note is intended for the physician writing it,  as well as to communicate findings to other healthcare professionals. These notes use medical lexicon that may be misunderstood by non medical persons. Therefore, interpretations of medical notes and terminology should be approached with caution.

## 2024-08-27 NOTE — PROGRESS NOTES
"Subjective   Patient ID: Vania Andrews is a 69 y.o. female who presents for Follow-up (4 month follow-up. ) and Medicare Annual Wellness Visit Subsequent.        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 s/p L partial nephrectomy and L adrenalectomy (2007), CKD stage 3 d/t loss of nephron mass and HTN, asthma, HLD, T2DM (est with endo ), MARYLU not using CPAP, s/p hysterectomy and b/l oophorectomy, inferior NSTEMI (1/2019) complicated by chronic Fe deficiency anemia  with h/o iron infusions, partially obstructed Ross's hernia with sx ( Feb 2022 with repair in June 2022  ), H/o provoked DVT in 2020 , with cessation of eliquis after 6m  , now resumed after saddle PE In Feb 2024         Chronic HA in the left parietal areas, sharp in nature/ stabbing , intermittent   No N/V/ vision /Gait changes  Occasional dizziness        Visit Vitals  /77   Pulse 82   Ht 1.485 m (4' 10.47\")   Wt 90.7 kg (200 lb)   LMP  (LMP Unknown)   SpO2 93%   BMI 41.13 kg/m²   OB Status Postmenopausal   Smoking Status Former   BSA 1.93 m²      No LMP recorded (lmp unknown). Patient is postmenopausal.   Current Outpatient Medications   Medication Instructions    Accu-Chek Guide test strips strip Use 1 test strip to test blood sugar twice daily.    albuterol 90 mcg/actuation inhaler 2 puffs, inhalation, Every 4 hours PRN    amLODIPine (NORVASC) 10 mg, oral, Daily    ammonium lactate (Lac-Hydrin) 12 % lotion Topical, As needed    apixaban (ELIQUIS) 5 mg, oral, 2 times daily, Ok to resume on 5/7.    brimonidine-timoloL (Combigan) 0.2-0.5 % ophthalmic solution 1 drop, Both Eyes, 2 times daily    diclofenac sodium 1 % kit 1 Application, Topical, As needed,  APPLY TO LOWER EXTREMITIES, 4 GM OF GEL TO AFFECTED AREA 4 TIMES DAILY. DO NOT APPLY MORE THAN 16 GM DAILY TO ANY ONE AFFECTED JOINT.    dicyclomine (BENTYL) 20 mg, oral, 3-4 times daily as needed.    dulaglutide (Trulicity) 0.75 mg/0.5 mL pen injector " "Inject 0.75 mg (1 pen) under the skin 1 (one) time per week.    dulaglutide (Trulicity) 0.75 mg/0.5 mL pen injector Inject 0.75 mg (1 pen) under the skin 1 (one) time per week.    famotidine (Pepcid) 20 mg tablet 1 tablet, oral, Nightly    ferrous sulfate, 325 mg ferrous sulfate, tablet 1 tablet, oral, Daily, On empty stomach, 1 before or 2 hours after meal with water. Do not take with antacids, tea, calcium containing products (milk, cheese)    fluticasone (Flonase) 50 mcg/actuation nasal spray 2 sprays, Each Nostril, Daily    furosemide (LASIX) 20 mg, oral, Daily    glipiZIDE XL (GLUCOTROL XL) 5 mg, oral, Daily, Do not crush, chew, or split.    insulin lispro (HumaLOG KwikPen Insulin) 100 unit/mL injection Use with sliding scale 3 times a day, up to 30 units daily    lancets (Lancets,Ultra Thin) misc Use to check blood sugar twice daily    latanoprost (Xalatan) 0.005 % ophthalmic solution 1 drop, Both Eyes, Every morning    levocetirizine (XYZAL) 5 mg, oral, Every evening    losartan (COZAAR) 25 mg, oral, Daily    metoprolol succinate XL (TOPROL-XL) 200 mg, oral, Daily    netarsudiL (Rhopressa) 0.02 % drops opthalmic solution 1 drop, Both Eyes, Every evening    omeprazole (PRILOSEC) 40 mg, oral, 2 times daily before meals    pen needle, diabetic 31 gauge x 5/16\" needle Use to inject 1-4 times daily as directed.    pen needle, diabetic 33 gauge x 5/32\" needle Use for sliding scale up to 3 times a day    polyethylene glycol (GLYCOLAX, MIRALAX) 17 g, oral, 2 times daily    rosuvastatin (CRESTOR) 20 mg, oral, Daily    tiotropium (Spiriva Respimat) 2.5 mcg/actuation inhaler 2 puffs, inhalation, Daily      Social History     Tobacco Use    Smoking status: Former     Types: Cigarettes     Passive exposure: Never    Smokeless tobacco: Never   Substance Use Topics    Alcohol use: Not Currently        Review of Systems    Constitutional : No feeling poorly / fevers/ chills / night sweats/ fatigue   Cardiovascular : No CP " /Palpitations/ lower extremity edema / syncope   Respiratory : No Cough /ORTIZ/Dyspnea at rest   Gastrointestinal : No abd pain / N/V  No bloody stools/ melena / constipation  Endo : No polyuria/polydipsia/ muscle weakness / sluggishness   CNS: No confusion / HA/ tingling/ numbness/ weakness of extremities  Psychiatric: No anxiety/ depression/ SI/HI    All other systems have been reviewed and are negative for complaint       Physical Exam    Constitutional : Vitals reviewed. Alert and in no distress  Cardiovascular : RRR, Normal S1, S2, No pericardial rub/ gallop, no peripheral edema   Pulmonary: No respiratory distress, CTAB   MSK : Normal gait and station , strength and tone   Skin: Warm to touch ,  normal skin turgor   Neurologic : CNs 2-12 grossly intact , no obvious FNDs  Psych : A,Ox3, normal mood and affect      Assessment/Plan   Diagnoses and all orders for this visit:  Medicare annual wellness visit, subsequent  Type 2 diabetes mellitus with stage 3a chronic kidney disease, without long-term current use of insulin (Multi)  -     Hemoglobin A1C; Future  -     Albumin-Creatinine Ratio, Urine Random; Future  Primary hypertension  -     Lipid Panel; Future  -     TSH with reflex to Free T4 if abnormal; Future  -     Hepatic Function Panel; Future  -     amLODIPine (Norvasc) 10 mg tablet; Take 1 tablet (10 mg) by mouth once daily.  -     losartan (Cozaar) 25 mg tablet; Take 1 tablet (25 mg) by mouth once daily.      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 s/p L partial nephrectomy and L adrenalectomy (2007), CKD stage 3 d/t loss of nephron mass and HTN, asthma, HLD, T2DM (est with endo ), MARYLU not using CPAP, s/p hysterectomy and b/l oophorectomy, inferior NSTEMI (1/2019) complicated by chronic Fe deficiency anemia  with h/o iron infusions, partially obstructed Ross's hernia with sx ( Feb 2022 with repair in June 2022  ), H/o provoked DVT in 2020 , with cessation of eliquis  after 6m  , now resumed after saddle PE In Feb 2024       HTN: at goal     Anemia , chronic : EGD, Colonoscopy and small bowel endoscopy  showed no e/o bleeding   On [place] tabls every day   Est with heme     DM2 : due for A1c, est with endo   Trulicity co pay higher . So on glipizide    Left parietal Ha: intermittent with no alarming sx at this time , as noted in Hpi  If worsening in intensity and frequency , pt advised to go to the ER or RTO         Conditions addressed and mgmt as noted above.  Pertinent labs, images/ imaging reports , chart review was done .   Age appropriate labs / labs for mgmt of chronic medical conditions ordered, further mgmt pending the results.       RTO before [place]     This note is intended for the physician writing it, as well as to communicate findings to other healthcare professionals. These notes use medical lexicon that may be misunderstood by non medical persons. Therefore, interpretations of medical notes and terminology should be approached with caution.

## 2024-09-03 ENCOUNTER — APPOINTMENT (OUTPATIENT)
Dept: CARDIOLOGY | Facility: HOSPITAL | Age: 69
End: 2024-09-03
Payer: MEDICARE

## 2024-09-03 VITALS
HEIGHT: 59 IN | OXYGEN SATURATION: 97 % | DIASTOLIC BLOOD PRESSURE: 82 MMHG | HEART RATE: 62 BPM | BODY MASS INDEX: 40.4 KG/M2 | SYSTOLIC BLOOD PRESSURE: 130 MMHG

## 2024-09-03 DIAGNOSIS — I26.92 ACUTE SADDLE PULMONARY EMBOLISM WITHOUT ACUTE COR PULMONALE (MULTI): ICD-10-CM

## 2024-09-03 DIAGNOSIS — I50.30 HEART FAILURE WITH PRESERVED EJECTION FRACTION, UNSPECIFIED HF CHRONICITY (MULTI): Primary | ICD-10-CM

## 2024-09-03 PROCEDURE — 3075F SYST BP GE 130 - 139MM HG: CPT | Performed by: INTERNAL MEDICINE

## 2024-09-03 PROCEDURE — 1159F MED LIST DOCD IN RCRD: CPT | Performed by: INTERNAL MEDICINE

## 2024-09-03 PROCEDURE — 3079F DIAST BP 80-89 MM HG: CPT | Performed by: INTERNAL MEDICINE

## 2024-09-03 PROCEDURE — 99214 OFFICE O/P EST MOD 30 MIN: CPT | Performed by: INTERNAL MEDICINE

## 2024-09-03 PROCEDURE — 1160F RVW MEDS BY RX/DR IN RCRD: CPT | Performed by: INTERNAL MEDICINE

## 2024-09-03 PROCEDURE — 4010F ACE/ARB THERAPY RXD/TAKEN: CPT | Performed by: INTERNAL MEDICINE

## 2024-09-03 PROCEDURE — 3062F POS MACROALBUMINURIA REV: CPT | Performed by: INTERNAL MEDICINE

## 2024-09-03 PROCEDURE — 1125F AMNT PAIN NOTED PAIN PRSNT: CPT | Performed by: INTERNAL MEDICINE

## 2024-09-03 ASSESSMENT — ENCOUNTER SYMPTOMS
LOSS OF SENSATION IN FEET: 0
DEPRESSION: 0
OCCASIONAL FEELINGS OF UNSTEADINESS: 1

## 2024-09-03 ASSESSMENT — PATIENT HEALTH QUESTIONNAIRE - PHQ9
1. LITTLE INTEREST OR PLEASURE IN DOING THINGS: NOT AT ALL
2. FEELING DOWN, DEPRESSED OR HOPELESS: NOT AT ALL
SUM OF ALL RESPONSES TO PHQ9 QUESTIONS 1 AND 2: 0

## 2024-09-03 ASSESSMENT — PAIN SCALES - GENERAL: PAINLEVEL: 7

## 2024-09-03 NOTE — PATIENT INSTRUCTIONS
Thank you for attending the Cardiology clinic at [facility] today. It was nice to meet you.     Your cardiovascular examination and blood pressure today were satisfactory.     Continue your current medications including as needed lasix for leg swelling.     I will enquire with  specialty pharmacy about the cost of Eliquis going forward.     I will follow-up with you in clinic in 6-months.

## 2024-09-03 NOTE — PROGRESS NOTES
Subjective   Vania Andrews is a 69 y.o. 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     Investigations:  LHC (5/6/2024) - LVEDP 20 mmHg, NOCAD - mid LAD 40% stenosis.   CTPE (2/6/2024) -saddle type pulmonary embolus extending to the bilateral upper and right lower lobar segmental branches.    Lower extremity duplex (11/2023) - chronic DVT of left popliteal and calf veins.   Nuclear stress test (11/2023) - small perfusion defect of the apical inferolateral wall (prior infarct with some umm-infarct ischemia).   TTE (11/2023) - LVEF 55-60%, distal septal/apical hypokinesis.   CACS (12/2021) - 124.  EKG (10/2022) - NSR.  Nuclear stress test (1/2021) - attenuation artifact (fixed defect), no ischemia.  TTE (2020) - LVEF 55-60%, distal apical hypokinesis.   CMR (2019) - LVEF 70%, severe hypokinesis of the mid inferior wall with associated transmural enhancement consistent with a prior infarction. Dilated MPA 3.5 cm    Chief Complaint:  Exertional dyspnea, PE/HFpEF follow-up     HPI  Feels fair.   Ongoing exertional dyspnea, some improvement.   Can walk 10 ft before stopping due to shortness of breath.   Intermittent ankle swelling, some improvement after commencing furosemide 20mg daily.   No orthopnea or PND.   Palpitations have reduced since last review.  Does occasionally have sciatica.   Remains anticoagulated for management of DVT/PE.   Noted to be anemic, no clear sources of bleeding, has been reviewed by gastroenterology, scopes negative. Currently taking an iron replacement.      CV risk:  HTN - well controlled on losartan 25mg daily and amlodipine 10mg daily.  LDL 96 mg/dL      ROS  A 10-system review was performed and was unremarkable apart from what is presented  in the HPI.     Objective   Physical Exam  Alert and orientated.   Appropriate responses, normal affect.  No respiratory distress at rest.   Skin warm and dry.   Normal radial pulse character and volume. Clinically SR.   Anicteric sclera, no conjunctival pallor.   No definite JVD at 90 degrees or carotid bruits.  Heart sounds dual, no added heart sounds or audible murmurs.   Chest clear on auscultation.   Calves soft, non-tender.  Bilateral pitting pedal edema to the mid-shins    Lab Review:   Lab Results   Component Value Date     05/28/2024    K 4.7 05/28/2024     05/28/2024    CO2 24 05/28/2024    BUN 16 05/28/2024    CREATININE 1.22 (H) 05/28/2024    GLUCOSE 123 (H) 05/28/2024    CALCIUM 9.2 05/28/2024     Lab Results   Component Value Date    CKTOTAL 112 12/23/2020    TROPONINI <0.02 02/05/2022     Lab Results   Component Value Date    WBC 8.0 07/16/2024    HGB 9.3 (L) 07/16/2024    HCT 33.8 (L) 07/16/2024    MCV 81 07/16/2024     07/16/2024     Lab Results   Component Value Date    CHOL 171 10/31/2023    TRIG 71 10/31/2023    HDL 61.2 10/31/2023       Assessment/Plan   In summary, Vania Andrews is a 69 y.o. 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  She has been stable since last review but continues to experience exertional dyspnea. Her lower limb edema has improved after initiating furosemide, however, she has been noted to be anemic without any culprit sources of bleeding. She remains of apixaban for management of saddle embolism and has undergone colonoscopy/endoscopy that were unremarkable. On examination today, she was normotensive with mild pitting edema to the mid shins. I have advised her to continue her current medications (will  enquire with  specialty pharmacy as to whether more cost-effective options for Apixaban are available). She will remain on lasix for her LE edema/HFpEF. As previous, Jardiance is not currently an option due to prior UTIs. I will follow-up with Mrs Andrews in 6-months to assess her progress.

## 2024-09-06 ENCOUNTER — LAB (OUTPATIENT)
Dept: LAB | Facility: LAB | Age: 69
End: 2024-09-06
Payer: MEDICARE

## 2024-09-06 DIAGNOSIS — N18.31 TYPE 2 DIABETES MELLITUS WITH STAGE 3A CHRONIC KIDNEY DISEASE, WITHOUT LONG-TERM CURRENT USE OF INSULIN (MULTI): ICD-10-CM

## 2024-09-06 DIAGNOSIS — I10 PRIMARY HYPERTENSION: ICD-10-CM

## 2024-09-06 DIAGNOSIS — E11.22 TYPE 2 DIABETES MELLITUS WITH STAGE 3A CHRONIC KIDNEY DISEASE, WITHOUT LONG-TERM CURRENT USE OF INSULIN (MULTI): ICD-10-CM

## 2024-09-06 LAB
ALBUMIN SERPL BCP-MCNC: 3.9 G/DL (ref 3.4–5)
ALP SERPL-CCNC: 141 U/L (ref 33–136)
ALT SERPL W P-5'-P-CCNC: 17 U/L (ref 7–45)
AST SERPL W P-5'-P-CCNC: 21 U/L (ref 9–39)
BILIRUB DIRECT SERPL-MCNC: 0.1 MG/DL (ref 0–0.3)
BILIRUB SERPL-MCNC: 0.4 MG/DL (ref 0–1.2)
CHOLEST SERPL-MCNC: 195 MG/DL (ref 0–199)
CHOLESTEROL/HDL RATIO: 3.3
CREAT UR-MCNC: 184.7 MG/DL (ref 20–320)
EST. AVERAGE GLUCOSE BLD GHB EST-MCNC: 143 MG/DL
HBA1C MFR BLD: 6.6 %
HDLC SERPL-MCNC: 58.8 MG/DL
LDLC SERPL CALC-MCNC: 107 MG/DL
MICROALBUMIN UR-MCNC: 7.2 MG/L
MICROALBUMIN/CREAT UR: 3.9 UG/MG CREAT
NON HDL CHOLESTEROL: 136 MG/DL (ref 0–149)
PROT SERPL-MCNC: 6.9 G/DL (ref 6.4–8.2)
TRIGL SERPL-MCNC: 145 MG/DL (ref 0–149)
TSH SERPL-ACNC: 2.16 MIU/L (ref 0.44–3.98)
VLDL: 29 MG/DL (ref 0–40)

## 2024-09-06 PROCEDURE — 82043 UR ALBUMIN QUANTITATIVE: CPT

## 2024-09-06 PROCEDURE — 36415 COLL VENOUS BLD VENIPUNCTURE: CPT

## 2024-09-06 PROCEDURE — 84443 ASSAY THYROID STIM HORMONE: CPT

## 2024-09-06 PROCEDURE — 80061 LIPID PANEL: CPT

## 2024-09-06 PROCEDURE — 80076 HEPATIC FUNCTION PANEL: CPT

## 2024-09-06 PROCEDURE — 83036 HEMOGLOBIN GLYCOSYLATED A1C: CPT

## 2024-09-06 PROCEDURE — 82570 ASSAY OF URINE CREATININE: CPT

## 2024-09-09 ENCOUNTER — PATIENT OUTREACH (OUTPATIENT)
Dept: PRIMARY CARE | Facility: CLINIC | Age: 69
End: 2024-09-09
Payer: MEDICARE

## 2024-09-09 DIAGNOSIS — I10 PRIMARY HYPERTENSION: ICD-10-CM

## 2024-09-09 DIAGNOSIS — E11.22 TYPE 2 DIABETES MELLITUS WITH STAGE 3A CHRONIC KIDNEY DISEASE, WITHOUT LONG-TERM CURRENT USE OF INSULIN (MULTI): ICD-10-CM

## 2024-09-09 DIAGNOSIS — N18.31 TYPE 2 DIABETES MELLITUS WITH STAGE 3A CHRONIC KIDNEY DISEASE, WITHOUT LONG-TERM CURRENT USE OF INSULIN (MULTI): ICD-10-CM

## 2024-09-09 NOTE — PROGRESS NOTES
Chronic care management outreach complete. Vania is doing well. I informed her that her A1C was 6.6, in which she was pleased. She is taking the glipizide as prescribed. Blood sugar was 113. She does not report any low blood sugars, which was her original concern with starting back up taking glipizide. No need for refills at this time. Up coming appointments reviewed. I informed her about the flu shot clinic dates. She is not interested in the flu shot at this time.  She complains of a pain that she believes to be nerve pain down her right side. I informed her that she has a referral to medical spine and PT. I gave her the number to make her referrals. No other questions or concerns at this time.

## 2024-09-19 ENCOUNTER — LAB (OUTPATIENT)
Dept: LAB | Facility: HOSPITAL | Age: 69
End: 2024-09-19
Payer: MEDICARE

## 2024-09-19 ENCOUNTER — OFFICE VISIT (OUTPATIENT)
Dept: HEMATOLOGY/ONCOLOGY | Facility: HOSPITAL | Age: 69
End: 2024-09-19
Payer: MEDICARE

## 2024-09-19 VITALS
WEIGHT: 200.9 LBS | OXYGEN SATURATION: 96 % | DIASTOLIC BLOOD PRESSURE: 76 MMHG | BODY MASS INDEX: 42.17 KG/M2 | RESPIRATION RATE: 16 BRPM | SYSTOLIC BLOOD PRESSURE: 124 MMHG | TEMPERATURE: 97.2 F | HEART RATE: 69 BPM | HEIGHT: 58 IN

## 2024-09-19 DIAGNOSIS — Z79.01 ON CONTINUOUS ORAL ANTICOAGULATION: Primary | ICD-10-CM

## 2024-09-19 DIAGNOSIS — I26.92 ACUTE SADDLE PULMONARY EMBOLISM WITHOUT ACUTE COR PULMONALE (MULTI): ICD-10-CM

## 2024-09-19 DIAGNOSIS — D50.0 IRON DEFICIENCY ANEMIA DUE TO CHRONIC BLOOD LOSS: ICD-10-CM

## 2024-09-19 DIAGNOSIS — I82.5Y3 CHRONIC DEEP VEIN THROMBOSIS (DVT) OF PROXIMAL VEIN OF BOTH LOWER EXTREMITIES (MULTI): ICD-10-CM

## 2024-09-19 LAB
ALBUMIN SERPL BCP-MCNC: 3.7 G/DL (ref 3.4–5)
ALP SERPL-CCNC: 138 U/L (ref 33–136)
ALT SERPL W P-5'-P-CCNC: 13 U/L (ref 7–45)
ANION GAP SERPL CALC-SCNC: 12 MMOL/L (ref 10–20)
AST SERPL W P-5'-P-CCNC: 15 U/L (ref 9–39)
BASOPHILS # BLD AUTO: 0.05 X10*3/UL (ref 0–0.1)
BASOPHILS NFR BLD AUTO: 0.7 %
BILIRUB SERPL-MCNC: 0.3 MG/DL (ref 0–1.2)
BUN SERPL-MCNC: 16 MG/DL (ref 6–23)
CALCIUM SERPL-MCNC: 9.4 MG/DL (ref 8.6–10.3)
CHLORIDE SERPL-SCNC: 107 MMOL/L (ref 98–107)
CO2 SERPL-SCNC: 27 MMOL/L (ref 21–32)
CREAT SERPL-MCNC: 1.39 MG/DL (ref 0.5–1.05)
EGFRCR SERPLBLD CKD-EPI 2021: 41 ML/MIN/1.73M*2
EOSINOPHIL # BLD AUTO: 0.23 X10*3/UL (ref 0–0.7)
EOSINOPHIL NFR BLD AUTO: 3 %
ERYTHROCYTE [DISTWIDTH] IN BLOOD BY AUTOMATED COUNT: 18.9 % (ref 11.5–14.5)
FERRITIN SERPL-MCNC: 28 NG/ML (ref 8–150)
GLUCOSE SERPL-MCNC: 128 MG/DL (ref 74–99)
HCT VFR BLD AUTO: 36.4 % (ref 36–46)
HGB BLD-MCNC: 11.1 G/DL (ref 12–16)
IMM GRANULOCYTES # BLD AUTO: 0.03 X10*3/UL (ref 0–0.7)
IMM GRANULOCYTES NFR BLD AUTO: 0.4 % (ref 0–0.9)
IRON SATN MFR SERPL: 79 % (ref 25–45)
IRON SERPL-MCNC: 346 UG/DL (ref 35–150)
LYMPHOCYTES # BLD AUTO: 1.51 X10*3/UL (ref 1.2–4.8)
LYMPHOCYTES NFR BLD AUTO: 20 %
MCH RBC QN AUTO: 25 PG (ref 26–34)
MCHC RBC AUTO-ENTMCNC: 30.5 G/DL (ref 32–36)
MCV RBC AUTO: 82 FL (ref 80–100)
MONOCYTES # BLD AUTO: 0.47 X10*3/UL (ref 0.1–1)
MONOCYTES NFR BLD AUTO: 6.2 %
NEUTROPHILS # BLD AUTO: 5.26 X10*3/UL (ref 1.2–7.7)
NEUTROPHILS NFR BLD AUTO: 69.7 %
NRBC BLD-RTO: 0 /100 WBCS (ref 0–0)
PLATELET # BLD AUTO: 350 X10*3/UL (ref 150–450)
POTASSIUM SERPL-SCNC: 4.3 MMOL/L (ref 3.5–5.3)
PROT SERPL-MCNC: 7 G/DL (ref 6.4–8.2)
RBC # BLD AUTO: 4.44 X10*6/UL (ref 4–5.2)
SODIUM SERPL-SCNC: 142 MMOL/L (ref 136–145)
TIBC SERPL-MCNC: 436 UG/DL (ref 240–445)
UIBC SERPL-MCNC: 90 UG/DL (ref 110–370)
WBC # BLD AUTO: 7.6 X10*3/UL (ref 4.4–11.3)

## 2024-09-19 PROCEDURE — 80053 COMPREHEN METABOLIC PANEL: CPT

## 2024-09-19 PROCEDURE — 99214 OFFICE O/P EST MOD 30 MIN: CPT | Performed by: STUDENT IN AN ORGANIZED HEALTH CARE EDUCATION/TRAINING PROGRAM

## 2024-09-19 PROCEDURE — 83540 ASSAY OF IRON: CPT

## 2024-09-19 PROCEDURE — 36415 COLL VENOUS BLD VENIPUNCTURE: CPT

## 2024-09-19 PROCEDURE — 3008F BODY MASS INDEX DOCD: CPT | Performed by: STUDENT IN AN ORGANIZED HEALTH CARE EDUCATION/TRAINING PROGRAM

## 2024-09-19 PROCEDURE — 3044F HG A1C LEVEL LT 7.0%: CPT | Performed by: STUDENT IN AN ORGANIZED HEALTH CARE EDUCATION/TRAINING PROGRAM

## 2024-09-19 PROCEDURE — 3061F NEG MICROALBUMINURIA REV: CPT | Performed by: STUDENT IN AN ORGANIZED HEALTH CARE EDUCATION/TRAINING PROGRAM

## 2024-09-19 PROCEDURE — 1159F MED LIST DOCD IN RCRD: CPT | Performed by: STUDENT IN AN ORGANIZED HEALTH CARE EDUCATION/TRAINING PROGRAM

## 2024-09-19 PROCEDURE — 4010F ACE/ARB THERAPY RXD/TAKEN: CPT | Performed by: STUDENT IN AN ORGANIZED HEALTH CARE EDUCATION/TRAINING PROGRAM

## 2024-09-19 PROCEDURE — 85025 COMPLETE CBC W/AUTO DIFF WBC: CPT

## 2024-09-19 PROCEDURE — 82728 ASSAY OF FERRITIN: CPT

## 2024-09-19 PROCEDURE — 1125F AMNT PAIN NOTED PAIN PRSNT: CPT | Performed by: STUDENT IN AN ORGANIZED HEALTH CARE EDUCATION/TRAINING PROGRAM

## 2024-09-19 PROCEDURE — 3074F SYST BP LT 130 MM HG: CPT | Performed by: STUDENT IN AN ORGANIZED HEALTH CARE EDUCATION/TRAINING PROGRAM

## 2024-09-19 PROCEDURE — 3078F DIAST BP <80 MM HG: CPT | Performed by: STUDENT IN AN ORGANIZED HEALTH CARE EDUCATION/TRAINING PROGRAM

## 2024-09-19 PROCEDURE — 3049F LDL-C 100-129 MG/DL: CPT | Performed by: STUDENT IN AN ORGANIZED HEALTH CARE EDUCATION/TRAINING PROGRAM

## 2024-09-19 ASSESSMENT — PAIN SCALES - GENERAL: PAINLEVEL: 6

## 2024-09-19 NOTE — PROGRESS NOTES
Patient ID: Vania Andrews is a 69 y.o. female.  Referring Physician: Garland Spears MD  [address] [place] [address]  Primary Care Provider: Laura Mata MD  Visit Type: Follow Up  Diagnosis: Anemia       Subjective    HPI  69 y.o. 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   Has previously seen  at  back in 2021 for similar concerns.     Recently admitted at  for VEDA with Hb ~5, ferritin 21, TIBC >450, hypoproliferative retic, relatively normal coags, admission notes allude to GI bleeding, but pt does not account for this. Folate B12 not checked. Hapto WNL, LDH mildly elevated, bili indirectly elevated also during admission. Was treated with 1 dose of iron sucrose during admission 300mg x1 and transfusions.   Had GIB in 2019, needing transfusions. Were not able to identify where the bleed was from. Was seen at [facility] for VEDA source of bleeding unclear, imaging noted below. Received (IV femuroxytol) on 3/23 and 3/30/2021. Since then at recent admission has had IV iron sucrose.   After DVT was given apixaban for 1yr, but asked to stop after 3 months by her cardiologist  for presumably a provoked VTE event with concern for GI bleeding.   Currently on ASA 81mg daily. Not on PO iron, has tried it in the past but has issues with constipation despite colace.   Age appropriate cancer screening: last colo in '19, last mammo oct '23 has axillary adenopathy   FMH: mother had throat and  "pancreatic cancer   Social history: former smoker, quit >20yrs, social drinker, no RDA. 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 Was started on heparin IV but noted to have recurrent GI bleeding early in the year and also suspected during admission, accordingly underwent EGD and colo after briefly holding her AC. Both were negative for a source of bleeding.   24: at follow up today pt has an antalgic gait needing a wheelchair eventually. Reports pain in the right thigh started at the time of discharge from hospital (where she was admitted for DVT/PE) not at admission and has remained unchanged since. Worried about clot progression. Has not missed any doses of the AC. Swelling in  RLE is improving but this has not changed the pain.   24: presents alone for visit, her leg pain is \"half of what it was\" when she last met me. Worked up by GI for cardiac issues. Apparently a/w sciatica. Continues on her apixaban 5mg Q12H, no obvious GI bleeding. Is worried about her PEs. Since we last met informs me she has cousins with VTE events. Has not been taking her PO iron since she was told by GI to hold until pill enteroscopy, is waiting to schedule.     Review of Systems - Oncology  10 point review of systems negative except as stated in HPI    Objective   Past Surgical History:   Procedure Laterality Date    BREAST BIOPSY  2016    Biopsy Breast Percutaneous Needle Core    CARDIAC CATHETERIZATION N/A 2024    Procedure: Left Heart Cath;  Surgeon: Donato Cespedes MD;  Location: [place] [address] Cardiac Cath Lab;  Service: Cardiovascular;  Laterality: N/A;     SECTION, CLASSIC  2014     Section    CHOLECYSTECTOMY  2017    Cholecystectomy Laparoscopic    HAND SURGERY  2020    Hand Surgery               " "                                                                                                                                           HERNIA REPAIR  11/17/2014    Hernia Repair    MR HEAD ANGIO WO IV CONTRAST  11/17/2014    MR HEAD ANGIO WO IV CONTRAST 11/17/2014 [facility] ANCILLARY LEGACY    OTHER SURGICAL HISTORY  01/14/2021    Nephrectomy    TOTAL ABDOMINAL HYSTERECTOMY W/ BILATERAL SALPINGOOPHORECTOMY  04/21/2014    Total Abdominal Hysterectomy With Removal Of Both Ovaries     Oncology History    No history exists.       Physical Exam  Vitals reviewed.   Constitutional:       General: She is not in acute distress.     Appearance: She is not toxic-appearing.   HENT:      Head: Normocephalic and atraumatic.   Pulmonary:      Effort: Pulmonary effort is normal.   Musculoskeletal:      Comments: Continues to have some right lower extremity swelling, however this is improved.   Neurological:      General: No focal deficit present.      Mental Status: She is oriented to person, place, and time.   Psychiatric:         Mood and Affect: Mood normal.       BSA: 1.92 meters squared  /76 (BP Location: Left arm, Patient Position: Sitting)   Pulse 69   Temp 36.2 °C (97.2 °F) (Temporal)   Resp 16   Ht 1.464 m (4' 9.64\")   Wt 91.1 kg (200 lb 14.4 oz)   LMP  (LMP Unknown)   SpO2 96%   BMI 42.52 kg/m²     Labs:  Lab Results   Component Value Date    WBC 8.0 07/16/2024    NEUTROABS 5.71 07/16/2024    IGABSOL 0.02 07/16/2024    LYMPHSABS 1.52 07/16/2024    MONOSABS 0.51 07/16/2024    EOSABS 0.19 07/16/2024    BASOSABS 0.06 07/16/2024    RBC 4.16 07/16/2024    MCV 81 07/16/2024    MCHC 27.5 (L) 07/16/2024    HGB 9.3 (L) 07/16/2024    HCT 33.8 (L) 07/16/2024     07/16/2024      Lab Results   Component Value Date    CREATININE 1.22 (H) 05/28/2024    BUN 16 05/28/2024    EGFR 48 (L) 05/28/2024     05/28/2024    K 4.7 05/28/2024     05/28/2024    CO2 24 05/28/2024       Latest Reference Range & Units " 05/03/21 09:17 01/26/24 01:29 03/08/24 10:31 03/11/24 12:05 5/28/2024   FERRITIN 8 - 150 ng/mL 396 (H) 21 81 75 21   IRON 35 - 150 ug/dL 91 51 44 29 (L) 25   TIBC 240 - 445 ug/dL 295 COMMENT ONLY 396 380 >450   UIBC 110 - 370 ug/dL  >450 (H) 352 351    % Saturation 25 - 45 % 31 COMMENT ONLY 11 (L) 8 (L)        Assessment/Plan    69 y.o. 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   Has previously seen  at  back in 2021 for similar concerns.      # Anemia: Hb last WNL in '22, since then progressive decline with obvious GI bleeding in '23, microcytic received IV ferumoxytol and sucrose intermittently and has improved to ~10 recently. Has h/o iron deficiency, but has been normal as recently as 2022 not suggestive of congenital hemoglobinopathy. Has hypoproliferative retic index. In follow up since last visit myeloma labs did not detect clonal protein, B12 and folate are WNL. In summary anemia likely related to VEDA with ongoing GI losses with stool occult being positive as recently as 2/2024.  Her labs from 5/20/2024, continue to show significant iron deficiency.  The patient has not been on oral iron that she has been advised to hold the drug pending a pill enteroscopy.  However the patient has not actually scheduled the procedure and therefore at this time remains untreated for her iron deficiency anemia.  Plan:  -Have advised the patient contact her GI team as soon as possible to get the enteroscopy scheduled and  completed and then start herself on oral iron.  - follow up next: 3 months, I have ordered standing labs for her in the interim.  - labs prior to follow up: CBC/diff, retic, iron panel including ferritin     # RLE swelling: now h/o recurrent VTE events last one being submassive. She was off AC in between, is now back on 5mg Q12H of apixaban which she has been adherent with. Notes today significant pain the the RLE started at discharge from hospital, while I am not suspicious this is progressive thrombosis a DVT scan was done and results do not indicate progressive clotting. I had d/w pt during visit that if the scan is negative, would proceed with MRI.  Patient's workup since is indicated possible joint related issues as etiology.  No further testing for myself at this time.  Plan:  - advised she remain on apixaban 5mg Q12H.     Garland Jenkins MD

## 2024-09-19 NOTE — PROGRESS NOTES
Patient ID: Vania Andrews is a 69 y.o. female.  Referring Physician: Garland Spears MD  [address] [place] [address]  Primary Care Provider: Laura Mata MD  Visit Type: Follow Up  Diagnosis: Anemia       Subjective    HPI  69 y.o. 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   Has previously seen  at  back in 2021 for similar concerns.     Re anemia:   Recently admitted at  for VEDA with Hb ~5, ferritin 21, TIBC >450, hypoproliferative retic, relatively normal coags, admission notes allude to GI bleeding, but pt does not account for this. Folate B12 not checked. Hapto WNL, LDH mildly elevated, bili indirectly elevated also during admission. Was treated with 1 dose of iron sucrose during admission 300mg x1 and transfusions.   Had GIB in 2019, needing transfusions. Were not able to identify where the bleed was from. Was seen at [facility] for VEDA source of bleeding unclear, imaging noted below. Received (IV femuroxytol) on 3/23 and 3/30/2021. Since then at recent admission has had IV iron sucrose.   Currently on ASA 81mg daily. Not on PO iron, has tried it in the past but has issues with constipation despite colace.   Age appropriate cancer screening: last colo in '24, last mammo oct '23 has axillary adenopathy   FMH: mother had throat and pancreatic cancer   Social history: former smoker, quit >20yrs, social drinker, no RDA. 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 There is acute non-occlusive deep vein thrombosis visualized in the posterior tibial vein.\" Was given apixaban for 1yr, but asked to stop after 3 months by her cardiologist  for presumably a provoked VTE event with concern for GI bleeding.   Jan-Feb/2024: At initial visit with me admitted due to concern for VTE had a submassive saddle PE (acute saddle type pulmonary embolus extending to  bilateral upper and right lower lobar and segmental branches. Calculated RV to LV ratio of 1.0) and DVT (occlusive thrombus in the right distal femoral and popliteal veins.)was off AC at presentation. Was started on heparin IV but noted to have recurrent GI bleeding early in the year and also suspected during admission, accordingly underwent EGD and colo after briefly holding her AC. Both were negative for a source of bleeding. Eventually transitioned to apixaban 5mg Q12H.     03/11/24: at follow up today pt has an antalgic gait needing a wheelchair eventually. Reports pain in the right thigh started at the time of discharge from hospital (where she was admitted for DVT/PE) not at admission and has remained unchanged since. Worried about clot progression. Has not missed any doses of the AC. Swelling in  RLE is improving but this has not changed the pain.   06/20/24: presents alone for visit, her leg pain is \"half of what it was\" when she last met me. Worked up by GI for cardiac issues. Apparently a/w sciatica. Continues on her apixaban 5mg Q12H, no obvious GI bleeding. Is worried about her PEs. Since we last met informs me she has cousins with VTE events. Has not been taking her PO iron since she was told by GI to hold until pill enteroscopy, is waiting to schedule.     9/19/2024: " Patient present with her spouse. Capsule endoscopy done in 2024, did not show any lesions, bleed or masses. Patient saw GI in July who suspected patient may have an AVM that intermittently bleeds. Patient is taking iron tabs daily and still on apixaban 5 mg Q 12 hrs as prescribed, has not missed any dose. Reports ongoing B/L LE swelling, seeing cardio who has her on furosemide 20 mg as needed for swelling.     Review of Systems - Oncology  10 point review of systems negative except as stated in HPI    Objective   Past Surgical History:   Procedure Laterality Date    BREAST BIOPSY  2016    Biopsy Breast Percutaneous Needle Core    CARDIAC CATHETERIZATION N/A 2024    Procedure: Left Heart Cath;  Surgeon: Donato Cespedes MD;  Location: [place] [address] Cardiac Cath Lab;  Service: Cardiovascular;  Laterality: N/A;     SECTION, CLASSIC  2014     Section    CHOLECYSTECTOMY  2017    Cholecystectomy Laparoscopic    HAND SURGERY  2020    Hand Surgery                                                                                                                                                          HERNIA REPAIR  2014    Hernia Repair    MR HEAD ANGIO WO IV CONTRAST  2014    MR HEAD ANGIO WO IV CONTRAST 2014 [facility] ANCILLARY LEGACY    OTHER SURGICAL HISTORY  2021    Nephrectomy    TOTAL ABDOMINAL HYSTERECTOMY W/ BILATERAL SALPINGOOPHORECTOMY  2014    Total Abdominal Hysterectomy With Removal Of Both Ovaries     Physical Exam  Vitals reviewed.   Constitutional:       General: She is not in acute distress.     Appearance: She is not toxic-appearing.   HENT:      Head: Normocephalic and atraumatic.   Pulmonary:      Effort: Pulmonary effort is normal.   Musculoskeletal:      Comments: Continues to have some right lower extremity swelling, however this is improved.   Neurological:      General: No focal deficit present.      Mental Status: She is oriented to  "person, place, and time.   Psychiatric:         Mood and Affect: Mood normal.     BSA: 1.92 meters squared  /76 (BP Location: Left arm, Patient Position: Sitting)   Pulse 69   Temp 36.2 °C (97.2 °F) (Temporal)   Resp 16   Ht 1.464 m (4' 9.64\")   Wt 91.1 kg (200 lb 14.4 oz)   LMP  (LMP Unknown)   SpO2 96%   BMI 42.52 kg/m²     Labs:  Lab Results   Component Value Date    WBC 8.0 07/16/2024    NEUTROABS 5.71 07/16/2024    IGABSOL 0.02 07/16/2024    LYMPHSABS 1.52 07/16/2024    MONOSABS 0.51 07/16/2024    EOSABS 0.19 07/16/2024    BASOSABS 0.06 07/16/2024    RBC 4.16 07/16/2024    MCV 81 07/16/2024    MCHC 27.5 (L) 07/16/2024    HGB 9.3 (L) 07/16/2024    HCT 33.8 (L) 07/16/2024     07/16/2024      Lab Results   Component Value Date    CREATININE 1.22 (H) 05/28/2024    BUN 16 05/28/2024    EGFR 48 (L) 05/28/2024     05/28/2024    K 4.7 05/28/2024     05/28/2024    CO2 24 05/28/2024       Latest Reference Range & Units 05/03/21 09:17 01/26/24 01:29 03/08/24 10:31 03/11/24 12:05 5/28/2024   FERRITIN 8 - 150 ng/mL 396 (H) 21 81 75 21   IRON 35 - 150 ug/dL 91 51 44 29 (L) 25   TIBC 240 - 445 ug/dL 295 COMMENT ONLY 396 380 >450   UIBC 110 - 370 ug/dL  >450 (H) 352 351    % Saturation 25 - 45 % 31 COMMENT ONLY 11 (L) 8 (L)        Assessment/Plan    69 y.o. 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   Has previously seen  at  back in 2021 for similar concerns.      # Anemia: Hb last WNL in " '22, since then progressive decline with obvious GI bleeding in '23, microcytic received IV ferumoxytol and sucrose intermittently, labs from this visit showing Hb ~11. Has iron deficiency with labs showing evidence she is taking PO iron, MCV has normalized suggesting against congenital hemoglobinopathy. Further work up for anemia at past visit showed negative myeloma labs, B12 and folate are WNL. In summary anemia likely related to VEDA with ongoing GI losses with stool occult being positive as recently as 2/2024.  Her labs from 5/20/2024, and 7/2024 continue to show significant iron deficiency.    Labs from today showing persistent VEDA with evidence of taking PO iron. Etiology suggestive of GI bleeding but no obvious source found as yet, suspect intermittently bleeding AVM.   Plan:  -CBC/d, CMP, iron studies today   - will redose IV iron  - follow up next: 5 months, I have ordered standing labs for her in the interim Q3 months and PRN   - labs prior to follow up: CBC/diff, retic, iron panel including ferritin     # RLE swelling: now h/o recurrent VTE events last one being submassive. She was off AC in between, is now back on 5mg Q12H of apixaban which she has been adherent with. Notes today significant pain the the RLE started at discharge from hospital, while I am not suspicious this is progressive thrombosis a DVT scan was done and results do not indicate progressive clotting. I had d/w pt during visit that if the scan is negative, would proceed with MRI.  Patient's workup since is indicated possible joint related issues as etiology additionally now has ?fluid overload being treated by cardiology.  No further testing for myself at this time.  Plan:  - Advised she remain on apixaban  -RX given for compression stocking due to B/L LE swelling     # Hypercoagulability/submassive PE/DVT recurrent: no clear etiology for her thrombosis events as noted above has had age appropriate cancer screening.   Plan:   - will dose  reduced to 2.5mg Q12H due to bleeding     Garland Jenkins MD

## 2024-09-20 ENCOUNTER — TRANSCRIBE ORDERS (OUTPATIENT)
Dept: ORTHOPEDIC SURGERY | Facility: HOSPITAL | Age: 69
End: 2024-09-20
Payer: MEDICARE

## 2024-09-20 DIAGNOSIS — M54.16 LUMBAR RADICULOPATHY, RIGHT: ICD-10-CM

## 2024-09-24 ENCOUNTER — OFFICE VISIT (OUTPATIENT)
Dept: ORTHOPEDIC SURGERY | Facility: CLINIC | Age: 69
End: 2024-09-24
Payer: MEDICARE

## 2024-09-24 ENCOUNTER — HOSPITAL ENCOUNTER (OUTPATIENT)
Dept: RADIOLOGY | Facility: CLINIC | Age: 69
Discharge: HOME | End: 2024-09-24
Payer: MEDICARE

## 2024-09-24 VITALS — BODY MASS INDEX: 40.32 KG/M2 | HEIGHT: 59 IN | WEIGHT: 200 LBS

## 2024-09-24 DIAGNOSIS — M54.16 LUMBAR RADICULOPATHY, RIGHT: ICD-10-CM

## 2024-09-24 DIAGNOSIS — M70.71 BURSITIS OF RIGHT HIP, UNSPECIFIED BURSA: ICD-10-CM

## 2024-09-24 DIAGNOSIS — M25.551 HIP PAIN, ACUTE, RIGHT: ICD-10-CM

## 2024-09-24 DIAGNOSIS — M54.16 LUMBAR RADICULOPATHY: Primary | ICD-10-CM

## 2024-09-24 PROCEDURE — 99213 OFFICE O/P EST LOW 20 MIN: CPT | Performed by: PHYSICIAN ASSISTANT

## 2024-09-24 PROCEDURE — 4010F ACE/ARB THERAPY RXD/TAKEN: CPT | Performed by: PHYSICIAN ASSISTANT

## 2024-09-24 PROCEDURE — 72110 X-RAY EXAM L-2 SPINE 4/>VWS: CPT

## 2024-09-24 PROCEDURE — 1159F MED LIST DOCD IN RCRD: CPT | Performed by: PHYSICIAN ASSISTANT

## 2024-09-24 PROCEDURE — 3044F HG A1C LEVEL LT 7.0%: CPT | Performed by: PHYSICIAN ASSISTANT

## 2024-09-24 PROCEDURE — 3049F LDL-C 100-129 MG/DL: CPT | Performed by: PHYSICIAN ASSISTANT

## 2024-09-24 PROCEDURE — 3061F NEG MICROALBUMINURIA REV: CPT | Performed by: PHYSICIAN ASSISTANT

## 2024-09-24 PROCEDURE — 3008F BODY MASS INDEX DOCD: CPT | Performed by: PHYSICIAN ASSISTANT

## 2024-09-24 PROCEDURE — 72110 X-RAY EXAM L-2 SPINE 4/>VWS: CPT | Performed by: RADIOLOGY

## 2024-09-24 RX ORDER — PREDNISONE 10 MG/1
TABLET ORAL
Qty: 24 TABLET | Refills: 0 | Status: SHIPPED | OUTPATIENT
Start: 2024-09-24 | End: 2024-09-30

## 2024-09-24 ASSESSMENT — PAIN - FUNCTIONAL ASSESSMENT: PAIN_FUNCTIONAL_ASSESSMENT: 0-10

## 2024-09-24 NOTE — PROGRESS NOTES
HPI:  Vania Andrews is a 69 y.o. female who presents to the spine clinic for evaluation of back and RLE pain. Referred by Dr. Pacheco (OV 05/10/24).     She comes in today with main complaint of RLE pain. Main pain is in the right buttock/hip with radiation into the groin. Pain worse with getting up from a seated position, weight bearing, ambulation.     Additionally reports pain in the low back. Admits to another pain radiating into the posterior thigh to the lower leg with numbness/tingling.     Reports symptoms started this past  Feb 2024 after she was admitted to the hospital with concerns for GIB and had a colonoscopy performed.     She is currently taking Eliquis 5mg q 12h daily - she tells me she had a FUV with hematology and was advised not to discontinue due to history of multiple VTE events.     No injections in the spine or hip. No physical therapy. She is not currently taking anything for pain.     History of CKD and DM.     ROS:  Reviewed on EMR and patient intake sheet.    PMH/SH:  Reviewed on EMR and patient intake sheet.    Exam:  Physical Exam  Spine Musculoskeletal Exam    Well appearing, NAD  Pleasant & cooperative  BMI 40.40  + Significant TTP right troch  lower extremity sensation intact to light touch  lower extremity motor 5/5 throughout  antalgic gait & station; diff with weight bearing/ambulation due to RLE pain    Radiology:     Xrays lumbar spine performed in the office today 09/24/24 personally reviewed. No scoliosis, fractures, or instability with flex/ext. Advanced degenerative changes including facet arthropathy in the lower lumbar spine and multilevel loss of disc space. Grade 1 retrolisthesis L4-5. Severe disc degeneration with osteophyte formation T11-T12.     Assessment and Plan:  1. Hip pain, acute, right  - Referral to Medical Spine    2. Lumbar radiculopathy  - Xray lumbar spine  - PredniSONE (Deltasone) 10 mg tablet; Take 5 tablets (50 mg) by mouth once daily for 2 days, THEN  4 tablets (40 mg) once daily for 2 days, THEN 3 tablets (30 mg) once daily for 1 day, THEN 2 tablets (20 mg) once daily for 1 day, THEN 1 tablet (10 mg) once daily for 1 day.  Dispense: 24 tablet; Refill: 0  - Referral, outpatient PT    3. Bursitis of right hip, unspecified bursa  - Referral, outpatient PT    I reviewed the imaging in detail with the patient today. Unfortunately, I  think she has a combination of acute hip pain/bursitis and lumbar radiculopathy. She cannot take NSAIDs or undergo injections on the Eliquis. She would be a poor surgical candidate and she is not interested in surgery at this time.     We discussed conservative treatment today. I think she would benefit from a right hip injection as she is most symptomatic from the hip bursitis at this time, if she is able to come off the eliquis for a short time and then resume.     In the meantime prescription for prednisone taper provided along with referral for outpatient PT.     Should her symptoms fail to improve an MRI of the lumbar spine would be next appropriate step.    Patient in agreement to plan of care. Of course Vania Andrews is welcome to call at anytime with questions or concerns.     Cassie Martinez PA-C  Department of Orthopaedic Surgery    [address] [place] [address]    Voicemail: [phone]   Appts: [phone]  Fax: [phone]

## 2024-09-25 PROBLEM — D50.0 IRON DEFICIENCY ANEMIA DUE TO CHRONIC BLOOD LOSS: Status: ACTIVE | Noted: 2023-08-29

## 2024-09-25 PROBLEM — Z79.01 ON CONTINUOUS ORAL ANTICOAGULATION: Status: ACTIVE | Noted: 2024-09-25

## 2024-09-25 RX ORDER — EPINEPHRINE 0.3 MG/.3ML
0.3 INJECTION SUBCUTANEOUS EVERY 5 MIN PRN
OUTPATIENT
Start: 2024-10-02

## 2024-09-25 RX ORDER — HEPARIN SODIUM,PORCINE/PF 10 UNIT/ML
50 SYRINGE (ML) INTRAVENOUS AS NEEDED
OUTPATIENT
Start: 2024-10-02

## 2024-09-25 RX ORDER — HEPARIN 100 UNIT/ML
500 SYRINGE INTRAVENOUS AS NEEDED
OUTPATIENT
Start: 2024-10-02

## 2024-09-25 RX ORDER — FAMOTIDINE 10 MG/ML
20 INJECTION INTRAVENOUS ONCE AS NEEDED
OUTPATIENT
Start: 2024-10-02

## 2024-09-25 RX ORDER — ALBUTEROL SULFATE 0.83 MG/ML
3 SOLUTION RESPIRATORY (INHALATION) AS NEEDED
OUTPATIENT
Start: 2024-10-02

## 2024-09-25 RX ORDER — DIPHENHYDRAMINE HYDROCHLORIDE 50 MG/ML
50 INJECTION INTRAMUSCULAR; INTRAVENOUS AS NEEDED
OUTPATIENT
Start: 2024-10-02

## 2024-10-03 ENCOUNTER — TELEPHONE (OUTPATIENT)
Dept: HEMATOLOGY/ONCOLOGY | Facility: CLINIC | Age: 69
End: 2024-10-03
Payer: MEDICARE

## 2024-10-03 DIAGNOSIS — H40.1132 PRIMARY OPEN ANGLE GLAUCOMA (POAG) OF BOTH EYES, MODERATE STAGE: Primary | ICD-10-CM

## 2024-10-03 PROCEDURE — RXMED WILLOW AMBULATORY MEDICATION CHARGE

## 2024-10-03 RX ORDER — BRIMONIDINE TARTRATE, TIMOLOL MALEATE 2; 5 MG/ML; MG/ML
1 SOLUTION/ DROPS OPHTHALMIC 2 TIMES DAILY
COMMUNITY
End: 2024-10-03 | Stop reason: SDUPTHER

## 2024-10-03 RX ORDER — BRIMONIDINE TARTRATE, TIMOLOL MALEATE 2; 5 MG/ML; MG/ML
1 SOLUTION/ DROPS OPHTHALMIC 2 TIMES DAILY
Qty: 30 ML | Refills: 3 | Status: SHIPPED | OUTPATIENT
Start: 2024-10-03

## 2024-10-03 NOTE — TELEPHONE ENCOUNTER
Spoke tp pt and reviewed plan of care. She was transferred to schedulers to schedule her IV iron. Exam appt moved up to December.

## 2024-10-03 NOTE — TELEPHONE ENCOUNTER
----- Message from Garland Spears sent at 10/2/2024  9:16 AM EDT -----  Her iron panel is worse, go ahead and given the iron, but have her continue the apixaban at current dose.  thanks  ----- Message -----  From: Shaila Stout RN  Sent: 9/26/2024   1:07 PM EDT  To: Garland Spears MD; JESSICA Maddox    Her most recent labs look better, can you please confirm that you still want infusion? And still want to decrease eliquis?  ----- Message -----  From: Garland Spears MD  Sent: 9/25/2024   7:29 AM EDT  To: JESSICA Jensen; #    Please ask her to dose reduce the apixaban once the current meds are over 2/2 bleeding, labs showing progressive iron deficiency while on PO iron, please recheck labs day of infusion, after asking her to stop PO iron (now) until she is done with IV. Proceed with infusion of IV. Please change to 3 month follow up please.

## 2024-10-05 ENCOUNTER — PHARMACY VISIT (OUTPATIENT)
Dept: PHARMACY | Facility: CLINIC | Age: 69
End: 2024-10-05
Payer: MEDICARE

## 2024-10-07 ENCOUNTER — PATIENT OUTREACH (OUTPATIENT)
Dept: PRIMARY CARE | Facility: CLINIC | Age: 69
End: 2024-10-07
Payer: MEDICARE

## 2024-10-07 ENCOUNTER — PATIENT OUTREACH (OUTPATIENT)
Dept: PRIMARY CARE | Facility: CLINIC | Age: 69
End: 2024-10-07

## 2024-10-07 ENCOUNTER — INFUSION (OUTPATIENT)
Dept: HEMATOLOGY/ONCOLOGY | Facility: HOSPITAL | Age: 69
End: 2024-10-07
Payer: MEDICARE

## 2024-10-07 VITALS
TEMPERATURE: 98.6 F | WEIGHT: 199.96 LBS | RESPIRATION RATE: 18 BRPM | DIASTOLIC BLOOD PRESSURE: 66 MMHG | BODY MASS INDEX: 40.39 KG/M2 | OXYGEN SATURATION: 99 % | SYSTOLIC BLOOD PRESSURE: 128 MMHG | HEART RATE: 74 BPM

## 2024-10-07 DIAGNOSIS — E11.22 TYPE 2 DIABETES MELLITUS WITH STAGE 3A CHRONIC KIDNEY DISEASE, WITHOUT LONG-TERM CURRENT USE OF INSULIN (MULTI): ICD-10-CM

## 2024-10-07 DIAGNOSIS — I10 PRIMARY HYPERTENSION: ICD-10-CM

## 2024-10-07 DIAGNOSIS — N18.31 TYPE 2 DIABETES MELLITUS WITH STAGE 3A CHRONIC KIDNEY DISEASE, WITHOUT LONG-TERM CURRENT USE OF INSULIN (MULTI): ICD-10-CM

## 2024-10-07 DIAGNOSIS — Z79.01 ON CONTINUOUS ORAL ANTICOAGULATION: ICD-10-CM

## 2024-10-07 DIAGNOSIS — D50.0 IRON DEFICIENCY ANEMIA DUE TO CHRONIC BLOOD LOSS: ICD-10-CM

## 2024-10-07 LAB
ERYTHROCYTE [DISTWIDTH] IN BLOOD BY AUTOMATED COUNT: 17.1 % (ref 11.5–14.5)
FERRITIN SERPL-MCNC: 26 NG/ML (ref 8–150)
HCT VFR BLD AUTO: 36.8 % (ref 36–46)
HGB BLD-MCNC: 11.3 G/DL (ref 12–16)
IRON SATN MFR SERPL: 6 % (ref 25–45)
IRON SERPL-MCNC: 19 UG/DL (ref 35–150)
MCH RBC QN AUTO: 26.5 PG (ref 26–34)
MCHC RBC AUTO-ENTMCNC: 30.7 G/DL (ref 32–36)
MCV RBC AUTO: 86 FL (ref 80–100)
NRBC BLD-RTO: 0 /100 WBCS (ref 0–0)
PLATELET # BLD AUTO: 296 X10*3/UL (ref 150–450)
RBC # BLD AUTO: 4.27 X10*6/UL (ref 4–5.2)
TIBC SERPL-MCNC: 338 UG/DL (ref 240–445)
UIBC SERPL-MCNC: 319 UG/DL (ref 110–370)
WBC # BLD AUTO: 9.4 X10*3/UL (ref 4.4–11.3)

## 2024-10-07 PROCEDURE — 85027 COMPLETE CBC AUTOMATED: CPT

## 2024-10-07 PROCEDURE — 96365 THER/PROPH/DIAG IV INF INIT: CPT | Mod: INF

## 2024-10-07 PROCEDURE — 83550 IRON BINDING TEST: CPT

## 2024-10-07 PROCEDURE — 2500000004 HC RX 250 GENERAL PHARMACY W/ HCPCS (ALT 636 FOR OP/ED): Mod: JZ | Performed by: STUDENT IN AN ORGANIZED HEALTH CARE EDUCATION/TRAINING PROGRAM

## 2024-10-07 PROCEDURE — 82728 ASSAY OF FERRITIN: CPT

## 2024-10-07 RX ORDER — DIPHENHYDRAMINE HYDROCHLORIDE 50 MG/ML
50 INJECTION INTRAMUSCULAR; INTRAVENOUS AS NEEDED
Status: DISCONTINUED | OUTPATIENT
Start: 2024-10-07 | End: 2024-10-07 | Stop reason: HOSPADM

## 2024-10-07 RX ORDER — DIPHENHYDRAMINE HYDROCHLORIDE 50 MG/ML
50 INJECTION INTRAMUSCULAR; INTRAVENOUS AS NEEDED
Status: CANCELLED | OUTPATIENT
Start: 2024-10-07

## 2024-10-07 RX ORDER — ALBUTEROL SULFATE 0.83 MG/ML
3 SOLUTION RESPIRATORY (INHALATION) AS NEEDED
Status: DISCONTINUED | OUTPATIENT
Start: 2024-10-07 | End: 2024-10-07 | Stop reason: HOSPADM

## 2024-10-07 RX ORDER — FAMOTIDINE 10 MG/ML
20 INJECTION INTRAVENOUS ONCE AS NEEDED
Status: DISCONTINUED | OUTPATIENT
Start: 2024-10-07 | End: 2024-10-07 | Stop reason: HOSPADM

## 2024-10-07 RX ORDER — EPINEPHRINE 0.3 MG/.3ML
0.3 INJECTION SUBCUTANEOUS EVERY 5 MIN PRN
Status: DISCONTINUED | OUTPATIENT
Start: 2024-10-07 | End: 2024-10-07 | Stop reason: HOSPADM

## 2024-10-07 RX ORDER — EPINEPHRINE 0.3 MG/.3ML
0.3 INJECTION SUBCUTANEOUS EVERY 5 MIN PRN
Status: CANCELLED | OUTPATIENT
Start: 2024-10-07

## 2024-10-07 RX ORDER — FAMOTIDINE 10 MG/ML
20 INJECTION INTRAVENOUS ONCE AS NEEDED
Status: CANCELLED | OUTPATIENT
Start: 2024-10-07

## 2024-10-07 RX ORDER — ALBUTEROL SULFATE 0.83 MG/ML
3 SOLUTION RESPIRATORY (INHALATION) AS NEEDED
Status: CANCELLED | OUTPATIENT
Start: 2024-10-07

## 2024-10-07 ASSESSMENT — PAIN SCALES - GENERAL: PAINLEVEL: 0-NO PAIN

## 2024-10-07 NOTE — PROGRESS NOTES
[facility] Infusion Nursing Note  10/07/24    Vania Andrews is a 69 y.o. year old female patient presenting to outpatient infusion for Feraheme infusion    Line removed/maintained prior to discharge: PIV removed    Administrations This Visit       ferumoxytol (Feraheme) 510 mg in sodium chloride 0.9% 127 mL IV       Admin Date  10/07/2024 Action  New Bag Dose  510 mg Rate  381 mL/hr Route  intravenous Documented By  Leah Arana RN                  Hypersensitivity reaction noted: No  Patient tolerated treatment well. Discharged home in stable condition.      LEAH ARANA RN

## 2024-10-07 NOTE — PROGRESS NOTES
Chronic care management outreach complete. Ms. Andrews says that she is doing ok. She is concerned about her kidney function and is wondering if she should be seeing a kidney doctor. She says that she sometimes have pain near her kidney (she has one kidney) and she had increased urination. She looked at her renal labs in my chart and she is wondering if creatinine of 1.39 and her GFR of 41 should be of concern. Will inform Dr. Mata of Ms. Andrews's concern.     Upcoming appointment reviewed. Medications reviewed. No need for refills. Informed Ms. Andrews of the flu shot clinic date and time. She has my contact information for any additional questions or concerns.

## 2024-10-07 NOTE — PROGRESS NOTES
Called and spoke to Ms. Andrews to inform her of Dr. Mata's response to her concern regarding her seeing a kidney doctor. Stating that Known CKD stge 3, stable   No nephro interventions at this time. Control Bps and Bgs better to prevent further worsening. Avoid nsaids / pop.Increase water intake.  Ms. Andrews expressed understanding.

## 2024-10-22 PROCEDURE — RXMED WILLOW AMBULATORY MEDICATION CHARGE

## 2024-10-22 RX ORDER — BLOOD SUGAR DIAGNOSTIC
STRIP MISCELLANEOUS
Qty: 200 STRIP | Refills: 1 | Status: SHIPPED | OUTPATIENT
Start: 2024-10-22

## 2024-10-22 RX ORDER — LANCETS
EACH MISCELLANEOUS
Qty: 200 EACH | Refills: 1 | Status: SHIPPED | OUTPATIENT
Start: 2024-10-22

## 2024-10-23 ENCOUNTER — SPECIALTY PHARMACY (OUTPATIENT)
Dept: PHARMACY | Facility: CLINIC | Age: 69
End: 2024-10-23

## 2024-10-24 ENCOUNTER — PHARMACY VISIT (OUTPATIENT)
Dept: PHARMACY | Facility: CLINIC | Age: 69
End: 2024-10-24
Payer: MEDICARE

## 2024-10-28 ENCOUNTER — PHARMACY VISIT (OUTPATIENT)
Dept: PHARMACY | Facility: CLINIC | Age: 69
End: 2024-10-28
Payer: MEDICARE

## 2024-10-28 PROCEDURE — RXMED WILLOW AMBULATORY MEDICATION CHARGE

## 2024-10-31 ENCOUNTER — APPOINTMENT (OUTPATIENT)
Dept: OPHTHALMOLOGY | Facility: CLINIC | Age: 69
End: 2024-10-31
Payer: MEDICARE

## 2024-10-31 DIAGNOSIS — H40.10X2 OPEN-ANGLE GLAUCOMA OF BOTH EYES, MODERATE STAGE, UNSPECIFIED OPEN-ANGLE GLAUCOMA TYPE: Primary | ICD-10-CM

## 2024-10-31 PROCEDURE — 99214 OFFICE O/P EST MOD 30 MIN: CPT | Performed by: OPHTHALMOLOGY

## 2024-10-31 ASSESSMENT — PACHYMETRY
OS_CT(UM): 573
OD_CT(UM): 564

## 2024-10-31 ASSESSMENT — CONF VISUAL FIELD
OD_SUPERIOR_NASAL_RESTRICTION: 0
OS_INFERIOR_TEMPORAL_RESTRICTION: 0
OD_INFERIOR_TEMPORAL_RESTRICTION: 0
OS_SUPERIOR_TEMPORAL_RESTRICTION: 0
OS_INFERIOR_NASAL_RESTRICTION: 0
OD_INFERIOR_NASAL_RESTRICTION: 0
OS_NORMAL: 1
OD_SUPERIOR_TEMPORAL_RESTRICTION: 0
OS_SUPERIOR_NASAL_RESTRICTION: 0
OD_NORMAL: 1

## 2024-10-31 ASSESSMENT — ENCOUNTER SYMPTOMS
RESPIRATORY NEGATIVE: 0
PSYCHIATRIC NEGATIVE: 0
EYES NEGATIVE: 1
ENDOCRINE NEGATIVE: 0
CONSTITUTIONAL NEGATIVE: 0
GASTROINTESTINAL NEGATIVE: 0
HEMATOLOGIC/LYMPHATIC NEGATIVE: 0
MUSCULOSKELETAL NEGATIVE: 0
CARDIOVASCULAR NEGATIVE: 0
NEUROLOGICAL NEGATIVE: 0
ALLERGIC/IMMUNOLOGIC NEGATIVE: 0

## 2024-10-31 ASSESSMENT — TONOMETRY
OD_IOP_MMHG: 15
IOP_METHOD: TONOPEN
OS_IOP_MMHG: 17

## 2024-10-31 ASSESSMENT — SLIT LAMP EXAM - LIDS
COMMENTS: NORMAL
COMMENTS: NORMAL

## 2024-10-31 ASSESSMENT — CUP TO DISC RATIO
OD_RATIO: 0.75
OS_RATIO: 0.85

## 2024-10-31 ASSESSMENT — EXTERNAL EXAM - LEFT EYE: OS_EXAM: NORMAL

## 2024-10-31 ASSESSMENT — VISUAL ACUITY
OS_SC: 20/
METHOD: SNELLEN - LINEAR
OD_SC: 20/25

## 2024-10-31 ASSESSMENT — EXTERNAL EXAM - RIGHT EYE: OD_EXAM: NORMAL

## 2024-11-08 ENCOUNTER — PATIENT OUTREACH (OUTPATIENT)
Dept: PRIMARY CARE | Facility: CLINIC | Age: 69
End: 2024-11-08
Payer: MEDICARE

## 2024-11-08 DIAGNOSIS — N18.31 TYPE 2 DIABETES MELLITUS WITH STAGE 3A CHRONIC KIDNEY DISEASE, WITHOUT LONG-TERM CURRENT USE OF INSULIN (MULTI): ICD-10-CM

## 2024-11-08 DIAGNOSIS — E11.22 TYPE 2 DIABETES MELLITUS WITH STAGE 3A CHRONIC KIDNEY DISEASE, WITHOUT LONG-TERM CURRENT USE OF INSULIN (MULTI): ICD-10-CM

## 2024-11-08 DIAGNOSIS — I10 PRIMARY HYPERTENSION: ICD-10-CM

## 2024-11-08 NOTE — PROGRESS NOTES
Called Ms. Darryl for her CCM outreach. She is on her way out the door. Would like for me to call her back next week. Chart reviewed.

## 2024-11-11 ENCOUNTER — APPOINTMENT (OUTPATIENT)
Dept: PHYSICAL THERAPY | Facility: HOSPITAL | Age: 69
End: 2024-11-11
Payer: MEDICARE

## 2024-11-13 PROCEDURE — RXMED WILLOW AMBULATORY MEDICATION CHARGE

## 2024-11-15 ENCOUNTER — PHARMACY VISIT (OUTPATIENT)
Dept: PHARMACY | Facility: CLINIC | Age: 69
End: 2024-11-15
Payer: MEDICARE

## 2024-11-15 ENCOUNTER — SPECIALTY PHARMACY (OUTPATIENT)
Dept: PHARMACY | Facility: CLINIC | Age: 69
End: 2024-11-15

## 2024-11-20 ENCOUNTER — PATIENT OUTREACH (OUTPATIENT)
Dept: PRIMARY CARE | Facility: CLINIC | Age: 69
End: 2024-11-20
Payer: MEDICARE

## 2024-11-20 ENCOUNTER — SPECIALTY PHARMACY (OUTPATIENT)
Dept: PHARMACY | Facility: CLINIC | Age: 69
End: 2024-11-20

## 2024-11-20 DIAGNOSIS — E11.22 TYPE 2 DIABETES MELLITUS WITH STAGE 3A CHRONIC KIDNEY DISEASE, WITHOUT LONG-TERM CURRENT USE OF INSULIN (MULTI): ICD-10-CM

## 2024-11-20 DIAGNOSIS — N18.31 TYPE 2 DIABETES MELLITUS WITH STAGE 3A CHRONIC KIDNEY DISEASE, WITHOUT LONG-TERM CURRENT USE OF INSULIN (MULTI): ICD-10-CM

## 2024-11-20 DIAGNOSIS — I10 PRIMARY HYPERTENSION: ICD-10-CM

## 2024-11-20 NOTE — PROGRESS NOTES
Chronic care management outreach complete. Ms. Andrews is battling a cold. She has been coughing for five days, coughing up clear mucus. No cough medicine, just cough drops, and tea. She says that she is constantly urinating and her feet start to cramp. I encouraged her to increase her fluid intake. Blood sugars run in the 120s-140s. This morning her blood sugar was 148. Still having pain in her right hip. Says that tylenol does not work. Will start PT next week. Up coming appointments reviewed. Will let me know if she needs refills.

## 2024-11-22 ENCOUNTER — APPOINTMENT (OUTPATIENT)
Dept: ENDOCRINOLOGY | Facility: CLINIC | Age: 69
End: 2024-11-22
Payer: MEDICARE

## 2024-11-22 VITALS — DIASTOLIC BLOOD PRESSURE: 74 MMHG | SYSTOLIC BLOOD PRESSURE: 102 MMHG | HEART RATE: 79 BPM

## 2024-11-22 DIAGNOSIS — L29.9 ITCHING: ICD-10-CM

## 2024-11-22 DIAGNOSIS — R32 URINARY INCONTINENCE, UNSPECIFIED TYPE: Primary | ICD-10-CM

## 2024-11-22 DIAGNOSIS — N18.31 TYPE 2 DIABETES MELLITUS WITH STAGE 3A CHRONIC KIDNEY DISEASE, WITHOUT LONG-TERM CURRENT USE OF INSULIN (MULTI): ICD-10-CM

## 2024-11-22 DIAGNOSIS — E11.65 TYPE 2 DIABETES MELLITUS WITH HYPERGLYCEMIA, WITHOUT LONG-TERM CURRENT USE OF INSULIN: ICD-10-CM

## 2024-11-22 DIAGNOSIS — E11.22 TYPE 2 DIABETES MELLITUS WITH STAGE 3A CHRONIC KIDNEY DISEASE, WITHOUT LONG-TERM CURRENT USE OF INSULIN (MULTI): ICD-10-CM

## 2024-11-22 PROCEDURE — 99214 OFFICE O/P EST MOD 30 MIN: CPT | Performed by: STUDENT IN AN ORGANIZED HEALTH CARE EDUCATION/TRAINING PROGRAM

## 2024-11-22 PROCEDURE — 3074F SYST BP LT 130 MM HG: CPT | Performed by: STUDENT IN AN ORGANIZED HEALTH CARE EDUCATION/TRAINING PROGRAM

## 2024-11-22 PROCEDURE — 4010F ACE/ARB THERAPY RXD/TAKEN: CPT | Performed by: STUDENT IN AN ORGANIZED HEALTH CARE EDUCATION/TRAINING PROGRAM

## 2024-11-22 PROCEDURE — 3044F HG A1C LEVEL LT 7.0%: CPT | Performed by: STUDENT IN AN ORGANIZED HEALTH CARE EDUCATION/TRAINING PROGRAM

## 2024-11-22 PROCEDURE — 3061F NEG MICROALBUMINURIA REV: CPT | Performed by: STUDENT IN AN ORGANIZED HEALTH CARE EDUCATION/TRAINING PROGRAM

## 2024-11-22 PROCEDURE — 1159F MED LIST DOCD IN RCRD: CPT | Performed by: STUDENT IN AN ORGANIZED HEALTH CARE EDUCATION/TRAINING PROGRAM

## 2024-11-22 PROCEDURE — G2211 COMPLEX E/M VISIT ADD ON: HCPCS | Performed by: STUDENT IN AN ORGANIZED HEALTH CARE EDUCATION/TRAINING PROGRAM

## 2024-11-22 PROCEDURE — 3049F LDL-C 100-129 MG/DL: CPT | Performed by: STUDENT IN AN ORGANIZED HEALTH CARE EDUCATION/TRAINING PROGRAM

## 2024-11-22 PROCEDURE — 3078F DIAST BP <80 MM HG: CPT | Performed by: STUDENT IN AN ORGANIZED HEALTH CARE EDUCATION/TRAINING PROGRAM

## 2024-11-22 RX ORDER — INSULIN LISPRO 100 [IU]/ML
INJECTION, SOLUTION INTRAVENOUS; SUBCUTANEOUS
Qty: 15 ML | Refills: 2 | Status: SHIPPED | OUTPATIENT
Start: 2024-11-22

## 2024-11-22 RX ORDER — LANCETS
EACH MISCELLANEOUS
Qty: 200 EACH | Refills: 1 | Status: SHIPPED | OUTPATIENT
Start: 2024-11-22

## 2024-11-22 RX ORDER — GLIPIZIDE 2.5 MG/1
2.5 TABLET ORAL DAILY
Qty: 90 TABLET | Refills: 1 | Status: SHIPPED | OUTPATIENT
Start: 2024-11-22

## 2024-11-22 RX ORDER — PEN NEEDLE, DIABETIC 30 GX3/16"
NEEDLE, DISPOSABLE MISCELLANEOUS
Qty: 100 EACH | Refills: 11 | Status: SHIPPED | OUTPATIENT
Start: 2024-11-22

## 2024-11-22 RX ORDER — BLOOD SUGAR DIAGNOSTIC
STRIP MISCELLANEOUS
Qty: 200 STRIP | Refills: 1 | Status: SHIPPED | OUTPATIENT
Start: 2024-11-22

## 2024-11-22 RX ORDER — HYDROCORTISONE 1 %
CREAM (GRAM) TOPICAL
Qty: 30 G | Refills: 1 | Status: SHIPPED | OUTPATIENT
Start: 2024-11-22

## 2024-11-22 NOTE — PATIENT INSTRUCTIONS
Decrease glipizide to 2.5mg daily with breakfast    -Apply hydrocortisone cream before your trulcity injection  -we are sending the trulicity 0.75mg weekly    Once you start the trulicity, stop glipizide    When you are on steroids, you can use the humalog sliding scale as needed     Urology referral     Follow up in 3 months    Monique Gross MD  Divison of Endocrinology   [facility]   Phone: [phone]    option 4, then option 1  Fax: [phone]

## 2024-11-22 NOTE — PROGRESS NOTES
"68 F PMH: HTN, HLD< NSTEMI, DVT, Obesity, CKD, RA, GERD, MARYLU, RCC s/p nephrectomy     Following up for DM2    Diabetes History     DM diagnosed > 10 years ago,     Complications Micro and Macro-CKD, neuropathy  A1c:   Lab Results   Component Value Date    HGBA1C 6.6 (H) 09/06/2024   IO A1c 7.1%       Regimen   Basaglar -been off for months   No SGLT2-due to recurrent UTI   Trulicity 0.75mg weekly-unable to get due to cost   Glipizide 5mg daily    Previously:  Basaglar   Ozempic-GI intolerant  Pioglitazone -dced due to leg swelling   Tradjenta-previously      SMBG   Fingerstick 100-140s     Hypoglycemia none known    Comorbidities and Screening  Eye Exam: sees ophtha, glaucoma and cataract  Foot exam: 2/2024    Lipid  Lab Results   Component Value Date    CHOL 195 09/06/2024    CHOL 171 10/31/2023    CHOL 176 08/03/2022     Lab Results   Component Value Date    HDL 58.8 09/06/2024    HDL 61.2 10/31/2023    HDL 55.5 08/03/2022     Lab Results   Component Value Date    LDLCALC 107 (H) 09/06/2024    LDLCALC 96 10/31/2023     Lab Results   Component Value Date    TRIG 145 09/06/2024    TRIG 71 10/31/2023    TRIG 137 08/03/2022     No components found for: \"CHOLHDL\"      Statin- rosuvastatin 20mg   Cr and albuminuria- losartan    Lab Results   Component Value Date    CREATININE 1.39 (H) 09/19/2024    EGFR 41 (L) 09/19/2024    ALBUMINUR 7.2 09/06/2024      ACE/ARB- losartan 25mg     Past Medical History:   Diagnosis Date    Abdominal distension (gaseous) 07/31/2019    Abdominal bloating    Cancer (Multi)     Cataract     Coronary artery disease     Diabetes mellitus (Multi)     Diverticulosis of intestine, part unspecified, without perforation or abscess without bleeding 06/09/2013    Diverticulosis    Elevation of levels of liver transaminase levels 11/13/2020    Transaminitis    Encounter for follow-up examination after completed treatment for conditions other than malignant neoplasm 01/26/2019    Hospital discharge " follow-up    Glaucoma     Hypertension     Long term (current) use of antibiotics 10/07/2016    Need for prophylactic antibiotic    Other amnesia 10/28/2014    Memory loss    Other conditions influencing health status 02/06/2019    History of cough    Other specified health status 02/07/2019    Hepatitis C antibody test negative    Other specified soft tissue disorders 06/14/2017    Leg swelling    Pain in left toe(s) 05/15/2017    Pain of toe of left foot    Pain in right foot 10/12/2016    Pain of right heel    Pain in right shoulder 06/22/2016    Acute pain of right shoulder    Personal history of diseases of the blood and blood-forming organs and certain disorders involving the immune mechanism 04/09/2018    History of anemia    Personal history of other diseases of the respiratory system 04/02/2018    History of sinusitis    Personal history of other diseases of the respiratory system 03/19/2014    History of upper respiratory infection    Personal history of other malignant neoplasm of kidney 01/14/2021    Personal history of malignant neoplasm of kidney    Personal history of other medical treatment 08/08/2017    History of screening mammography    Personal history of other specified conditions 11/17/2014    History of abdominal pain    Personal history of other specified conditions 06/14/2017    History of urinary frequency    Personal history of other specified conditions 06/09/2021    History of dysuria    Personal history of other specified conditions 11/28/2014    History of breast lump    Unspecified abdominal hernia without obstruction or gangrene 04/21/2014    Hernia     Family History   Problem Relation Name Age of Onset    Aneurysm Mother      Hypertension Mother      Pancreatic cancer Mother      Diabetes Mother      Other (laryngeal Cancer) Mother      Heart disease Father      Pulmonary embolism Brother      Breast cancer Father's Sister      Breast cancer Maternal Cousin        Social History      Socioeconomic History    Marital status:      Spouse name: Not on file    Number of children: Not on file    Years of education: Not on file    Highest education level: Not on file   Occupational History    Not on file   Tobacco Use    Smoking status: Former     Types: Cigarettes     Passive exposure: Never    Smokeless tobacco: Never   Vaping Use    Vaping status: Never Used   Substance and Sexual Activity    Alcohol use: Yes     Comment: 1-2 x per month    Drug use: Never    Sexual activity: Not on file   Other Topics Concern    Not on file   Social History Narrative    Not on file     Social Drivers of Health     Financial Resource Strain: Low Risk  (2/12/2024)    Overall Financial Resource Strain (CARDIA)     Difficulty of Paying Living Expenses: Not hard at all   Food Insecurity: No Food Insecurity (7/18/2024)    Hunger Vital Sign     Worried About Running Out of Food in the Last Year: Never true     Ran Out of Food in the Last Year: Never true   Transportation Needs: No Transportation Needs (2/12/2024)    PRAPARE - Transportation     Lack of Transportation (Medical): No     Lack of Transportation (Non-Medical): No   Physical Activity: Not on file   Stress: Not on file   Social Connections: Not on file   Intimate Partner Violence: Not on file   Housing Stability: Low Risk  (2/12/2024)    Housing Stability Vital Sign     Unable to Pay for Housing in the Last Year: No     Number of Places Lived in the Last Year: 1     Unstable Housing in the Last Year: No    Social: retired visiting nurse     ROS:  Leg swelling, leg pain   Negative except those noted in current and interim history    Physical Exam  Constitutional:       Appearance: Normal appearance.   Cardiovascular:      Rate and Rhythm: Normal rate and regular rhythm.   Abdominal:      Palpations: Abdomen is soft.      Comments: Abdominal obesity   Musculoskeletal:      Comments: Trace edema bilateral lower extremity   Skin:     General: Skin is  "warm.   Neurological:      Mental Status: She is alert and oriented to person, place, and time.   Psychiatric:         Behavior: Behavior normal.          labs and imaging reviewed, pertinent findings listed on HPI and Impression      Problem List Items Addressed This Visit       Diabetes mellitus (Multi)    Relevant Medications    dulaglutide (Trulicity) 0.75 mg/0.5 mL pen injector    glipiZIDE 2.5 mg tablet    insulin lispro (HumaLOG KwikPen Insulin) 100 unit/mL injection    pen needle, diabetic 31 gauge x 5/16\" needle    Accu-Chek Guide test strips strip    lancets (Accu-Chek Softclix Lancets) misc     Other Visit Diagnoses       Urinary incontinence, unspecified type    -  Primary    Relevant Orders    Referral to Urology    Itching        Relevant Medications    hydrocortisone 1 % cream            DM2 with vascular complications including CAD, CKD     Restart trulicity 0.75mg weekly -will send for PAP  Insulin sliding scale as needed when on prednisone 2:50 >200  Decrease glipizide to 2.5mg in the am to avoid lows due to CKD- can stop this once trulicity is started     Rechallenge with SGLT2? in the future due to CKD, but has history of recurrent UTI          "

## 2024-11-25 ENCOUNTER — EVALUATION (OUTPATIENT)
Dept: PHYSICAL THERAPY | Facility: HOSPITAL | Age: 69
End: 2024-11-25
Payer: MEDICARE

## 2024-11-25 ENCOUNTER — TELEPHONE (OUTPATIENT)
Dept: CARDIOLOGY | Facility: HOSPITAL | Age: 69
End: 2024-11-25

## 2024-11-25 DIAGNOSIS — I25.10 CORONARY ARTERY CALCIFICATION: ICD-10-CM

## 2024-11-25 DIAGNOSIS — M54.16 LUMBAR RADICULOPATHY: ICD-10-CM

## 2024-11-25 PROCEDURE — 97162 PT EVAL MOD COMPLEX 30 MIN: CPT | Mod: GP

## 2024-11-25 ASSESSMENT — ENCOUNTER SYMPTOMS
LOSS OF SENSATION IN FEET: 1
OCCASIONAL FEELINGS OF UNSTEADINESS: 1
DEPRESSION: 0

## 2024-11-25 NOTE — PROGRESS NOTES
Physical Therapy Initial Evaluation    Patient Name:Vania Andrews  MRN:[id number]  Today's Date:2024  Referred by: Cassie Martinez  Time Calculation  Start Time: 1125  Stop Time: 1230  Time Calculation (min): 65 min    Therapy Diagnosis  Problem List Items Addressed This Visit             ICD-10-CM    Lumbar radiculopathy M54.16     Insurance  Visit number: 1   Approved number of visits: 75  Auth required: No  Authorization date range:   Onset Date: 2024  Medicare Certification Period:  Beginnin2024            Endin25  Payor: MEDICARE / Plan: MEDICARE PART A AND B / Product Type: *No Product type* /     Precautions/Fall Risk:  Fall Risk: Moderate based on professional judgment  Pacemaker: no    Seizures: No    Post Op Movement/Restrictions: No:  Weight Bearing Status: As tolerated  History of heart attack in 2019; blood clots bilateral legs, h/o kidney cancer (removed) and a spot on the other kidney currently; heart disease; asthma; glaucoma and cataracts in right eye; anemia    SUBJECTIVE  Chief complaint/reason for visit: patient c/o right greater than left leg pain - history of blood clots in legs and heart since January, numbness/pain in low back/hip/leg; feels like something is running down her leg but nothing there; tingling/numbness in legs/pulling sensation in groin/upper thigh  Mechanism of Injury:  unknown- has had back problems for awhile - herniated discs in neck and back and spurs; OA  Location of Pain: low back, right greater than left leg   Current Pain Level (0-10): 8   High Pain Level (0-10): 10   Low Pain Level (0-10): 6  Pain Quality: sharp  Pain Exacerbating Factors:  getting up, sitting up, laying down, walking  Pain Relieving Factors:  nothing    Medical Screening: Reviewed medical history form with patient and medical screening assessed.   Red Flags: none  Current Medical Management: X-rays Xrays lumbar spine performed 24: No scoliosis, fractures, or  instability with flex/ext. Advanced degenerative changes including facet arthropathy in the lower lumbar spine and multilevel loss of disc space. Grade 1 retrolisthesis L4-5. Severe disc degeneration with osteophyte formation T11-T12. Hernia surgery 2023.  CT scan of femur 3/1524 IMPRESSION:1. Severe degenerative change of the right hip. There are findings in the femoral head which may relate to prominent subchondral cystic change with sclerosis although a component of avascular necrosis is  not excluded. MRI is recommended for further assessment. Depending on the nature of the patient's clinical symptoms, MRI of the femur rather than just MRI of the hip may be considered.  2. Other degenerative changes as above.    Prior Level of Function (PLOF)  Patient previously independent with all ADLs  Exercise/Physical Activity: No  Functional limitations: sitting, standing , walking , and lifting  Work Status: retired Visiting RN   Current Status: worsened   Patient Awareness: Patient is aware of  her diagnosis and prognosis.   Living Environment: house  Social Support: spouse / significant other  Personal Factors That May Impact Care:  difficulty with ambulation    OBJECTIVE  Other Measures  Oswestry Disablity Index (TRACY): 35   Lumbar flexion AROM (degrees): inferior knee  Lumbar extension AROM (degrees): neutral  LE MMT screen: right LE grossly 4-/5, left grossly 4+/5  Flexion or extension bias: supine vs hooklying - hooklying better; slight increase pain with SKTC right and no change on left, unable to lay prone   Palpation: tender to touch greater trochanter  Gait: very antalgic and guarded  Special Tests: +SLR right  Posture:rounded shoulders   Pain limited evaluation    ASSESSMENT  Pain limited evaluation.  Patient is a 69 y.o. female with multiple co-morbidities who presents with complaint of pain in low back and right greater than left leg pain. Standardized testing and measures administered today reveal that the  patient has multiple impairments in body structures and functions, activity limitations, and participation restrictions. These include subjective and objective findings such as pain, tenderness to palpation of the affected area, decreased ROM, decreased strength, decreased flexibility, and decreased function. The patient's impairments are likely influenced by mechanical dysfunction, deconditioning, degenerative changes, and pain . Skilled PT services are warranted in order to realize measurable and meaningful change in the above outcome measures and achieve improvements in the patient's functional status and individual goals.    Problem List: Activity limitations, ADL/IADLs/Self Care, Balance, Decreased functional level, Decreased knowledge of assistive device use, Decreased knowledge of HEP, Edema, Fall risk, Flexibility, Gait/locomotion, Pain, Posture, Range of motion, and Strength  Rehab Potential:fair  Clinical Presentation: Evolving with changing characteristics   Evaluation Complexity: moderate    PLAN of Care  Goals: Goals set and discussed today.   Patient's Goal for Treatment: Relieve pain and Reduce symptoms  Lumbar Spine Goals:  By discharge, patient will:  Demonstrate independence with home exercise program  Tolerate increased exercise without adverse reaction  Increase strength of right lower extremity to grossly 4/5 MMT to improve the ability to perform essential ADLs  Report decrease in pain by >= 2 points to meet MCID  Decrease score of Modified TRACY by > 6 points to meet the MCID  Ambulate throughout therapy session with min increase in symptoms  Be able to sleep through the night with min increase in symptoms    Planned interventions include prn:  Therapeutic exercise, Manual therapy, Gait training, Therapeutic activity, E stim unattended, and aquatic therapy  Frequency and duration: 1 time(s) a week, for up to  10 visits .   Plan of care was developed with input and agreement by the patient.      Plan for next session: Reach out to referring MD for clearance for aquatic therapy    Treatment/Education/Resources Provided Today: Initial evaluation, instruction regarding home exercise program and patient education regarding diagnosis, prognosis, contributing factors, co morbidities, and role of PT    Response to Treatment: Improved knowledge and understanding of condition    Evaluation PT Evaluation Time Entry  PT Evaluation (Moderate) Time Entry: 60

## 2024-11-27 ENCOUNTER — APPOINTMENT (OUTPATIENT)
Dept: OPHTHALMOLOGY | Facility: CLINIC | Age: 69
End: 2024-11-27
Payer: MEDICARE

## 2024-11-27 DIAGNOSIS — H40.1132 PRIMARY OPEN ANGLE GLAUCOMA (POAG) OF BOTH EYES, MODERATE STAGE: ICD-10-CM

## 2024-11-27 DIAGNOSIS — H40.1133 PRIMARY OPEN ANGLE GLAUCOMA (POAG) OF BOTH EYES, SEVERE STAGE: ICD-10-CM

## 2024-11-27 PROCEDURE — 99214 OFFICE O/P EST MOD 30 MIN: CPT | Performed by: OPHTHALMOLOGY

## 2024-11-27 RX ORDER — LATANOPROST 50 UG/ML
1 SOLUTION/ DROPS OPHTHALMIC EVERY MORNING
Qty: 2.5 ML | Refills: 11 | Status: SHIPPED | OUTPATIENT
Start: 2024-11-27

## 2024-11-27 RX ORDER — BRIMONIDINE TARTRATE, TIMOLOL MALEATE 2; 5 MG/ML; MG/ML
1 SOLUTION/ DROPS OPHTHALMIC 2 TIMES DAILY
Qty: 30 ML | Refills: 11 | Status: SHIPPED | OUTPATIENT
Start: 2024-11-27

## 2024-11-27 ASSESSMENT — ENCOUNTER SYMPTOMS
MUSCULOSKELETAL NEGATIVE: 0
CARDIOVASCULAR NEGATIVE: 0
HEMATOLOGIC/LYMPHATIC NEGATIVE: 0
ALLERGIC/IMMUNOLOGIC NEGATIVE: 0
RESPIRATORY NEGATIVE: 0
CONSTITUTIONAL NEGATIVE: 0
NEUROLOGICAL NEGATIVE: 0
ENDOCRINE NEGATIVE: 0
GASTROINTESTINAL NEGATIVE: 0
EYES NEGATIVE: 0
PSYCHIATRIC NEGATIVE: 0

## 2024-11-27 ASSESSMENT — SLIT LAMP EXAM - LIDS
COMMENTS: NORMAL
COMMENTS: NORMAL

## 2024-11-27 ASSESSMENT — EXTERNAL EXAM - LEFT EYE: OS_EXAM: NORMAL

## 2024-11-27 ASSESSMENT — PACHYMETRY
OS_CT(UM): 573
OD_CT(UM): 564

## 2024-11-27 ASSESSMENT — VISUAL ACUITY
OD_SC: 20/30
METHOD: SNELLEN - LINEAR
OD_SC+: -2

## 2024-11-27 ASSESSMENT — CUP TO DISC RATIO
OS_RATIO: 0.85
OD_RATIO: 0.75

## 2024-11-27 ASSESSMENT — TONOMETRY
OS_IOP_MMHG: 15
IOP_METHOD: GOLDMANN APPLANATION
OD_IOP_MMHG: 16

## 2024-11-27 ASSESSMENT — EXTERNAL EXAM - RIGHT EYE: OD_EXAM: NORMAL

## 2024-11-27 NOTE — PROGRESS NOTES
1-oag iop better target <13, increase combigna to bid ou + latanoprost ou am    Rto 1-2 mos iop check ? Slt if iop not <13

## 2024-12-01 RX ORDER — ROSUVASTATIN CALCIUM 20 MG/1
20 TABLET, COATED ORAL DAILY
Qty: 90 TABLET | Refills: 3 | Status: SHIPPED | OUTPATIENT
Start: 2024-12-01

## 2024-12-10 ENCOUNTER — TREATMENT (OUTPATIENT)
Dept: PHYSICAL THERAPY | Facility: CLINIC | Age: 69
End: 2024-12-10
Payer: MEDICARE

## 2024-12-10 DIAGNOSIS — M54.16 LUMBAR RADICULOPATHY: Primary | ICD-10-CM

## 2024-12-10 PROCEDURE — 97113 AQUATIC THERAPY/EXERCISES: CPT | Mod: GP,CQ

## 2024-12-10 ASSESSMENT — PAIN - FUNCTIONAL ASSESSMENT: PAIN_FUNCTIONAL_ASSESSMENT: 0-10

## 2024-12-10 ASSESSMENT — PAIN SCALES - GENERAL: PAINLEVEL_OUTOF10: 8

## 2024-12-10 NOTE — PROGRESS NOTES
Physical Therapy  Physical Therapy Treatment    Patient Name: Vania Andrews  MRN: [id number]  Today's Date: 12/10/2024  Time Calculation  Start Time: 0903  Stop Time: 0943  Time Calculation (min): 40 min    Insurance:  Visit number:  2 out of [place]  Authorization information: no auth   Insurance Type: MEDICARE A&B     General:  Reason for visit:   Referred by: Cassie Martinez     Current Problem:   1. Lumbar radiculopathy          SUBJECTIVE:   Pt reports that they are feeling pain in their lower back, buttock and hips. Pt reports having a fear of water and began to cry upon entering.      Precautions:   Precautions  Precautions Comment: History of heart attack in 2019; blood clots bilateral legs, h/o kidney cancer (removed) and a spot on the other kidney currently; heart disease; asthma; glaucoma and cataracts in right eye; anemia     Pain:   Pain Assessment  Pain Assessment: 0-10  0-10 (Numeric) Pain Score: 8    OBJECTIVE:    Facial grimaces while entering pool  Forward flexed posture and decreased gait speed    Treatments:  Aquatic therapy  3ft depth :  Fwd walking 7'   Lateral walking 7'   Hip abduction 6'   Alternating marching 7'   Heel raises/toe raises  5'  Alternating hip extension 7'   HEP: no exercises given at initial evaluation    ASSESSMENT:   Pt required encouragement throughout treatment session due to fear of water. Pt required constant  of UE of bars and shrugging of shoulders throughout session.     Charges:  Aquatic therapy x3      PLAN:  Continue to progress aquatic based exercises for next session.

## 2024-12-11 ENCOUNTER — APPOINTMENT (OUTPATIENT)
Dept: PRIMARY CARE | Facility: CLINIC | Age: 69
End: 2024-12-11
Payer: MEDICARE

## 2024-12-11 VITALS
BODY MASS INDEX: 40.6 KG/M2 | OXYGEN SATURATION: 92 % | HEART RATE: 75 BPM | WEIGHT: 201.4 LBS | TEMPERATURE: 98.3 F | HEIGHT: 59 IN

## 2024-12-11 DIAGNOSIS — J20.9 ACUTE BRONCHITIS, UNSPECIFIED ORGANISM: Primary | ICD-10-CM

## 2024-12-11 DIAGNOSIS — M25.551 RIGHT HIP PAIN: ICD-10-CM

## 2024-12-11 PROCEDURE — G2211 COMPLEX E/M VISIT ADD ON: HCPCS | Performed by: STUDENT IN AN ORGANIZED HEALTH CARE EDUCATION/TRAINING PROGRAM

## 2024-12-11 PROCEDURE — 99214 OFFICE O/P EST MOD 30 MIN: CPT | Performed by: STUDENT IN AN ORGANIZED HEALTH CARE EDUCATION/TRAINING PROGRAM

## 2024-12-11 PROCEDURE — 1159F MED LIST DOCD IN RCRD: CPT | Performed by: STUDENT IN AN ORGANIZED HEALTH CARE EDUCATION/TRAINING PROGRAM

## 2024-12-11 PROCEDURE — 3061F NEG MICROALBUMINURIA REV: CPT | Performed by: STUDENT IN AN ORGANIZED HEALTH CARE EDUCATION/TRAINING PROGRAM

## 2024-12-11 PROCEDURE — 4010F ACE/ARB THERAPY RXD/TAKEN: CPT | Performed by: STUDENT IN AN ORGANIZED HEALTH CARE EDUCATION/TRAINING PROGRAM

## 2024-12-11 PROCEDURE — 1160F RVW MEDS BY RX/DR IN RCRD: CPT | Performed by: STUDENT IN AN ORGANIZED HEALTH CARE EDUCATION/TRAINING PROGRAM

## 2024-12-11 PROCEDURE — 3049F LDL-C 100-129 MG/DL: CPT | Performed by: STUDENT IN AN ORGANIZED HEALTH CARE EDUCATION/TRAINING PROGRAM

## 2024-12-11 PROCEDURE — 1036F TOBACCO NON-USER: CPT | Performed by: STUDENT IN AN ORGANIZED HEALTH CARE EDUCATION/TRAINING PROGRAM

## 2024-12-11 PROCEDURE — 3008F BODY MASS INDEX DOCD: CPT | Performed by: STUDENT IN AN ORGANIZED HEALTH CARE EDUCATION/TRAINING PROGRAM

## 2024-12-11 PROCEDURE — 3044F HG A1C LEVEL LT 7.0%: CPT | Performed by: STUDENT IN AN ORGANIZED HEALTH CARE EDUCATION/TRAINING PROGRAM

## 2024-12-11 RX ORDER — DOXYCYCLINE 100 MG/1
100 CAPSULE ORAL 2 TIMES DAILY
Qty: 20 CAPSULE | Refills: 0 | Status: SHIPPED | OUTPATIENT
Start: 2024-12-11 | End: 2024-12-21

## 2024-12-11 NOTE — PROGRESS NOTES
"Subjective   Patient ID: Vania Andrews is a 69 y.o. female who presents for Follow-up (Pt declined flu shot. 151/86 Cough for a month. Discuss MRI for right hip. ).        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 and L adrenalectomy (2007), CKD stage 3 d/t loss of nephron mass and HTN, asthma, HLD, T2DM (est with endo ), MARYLU not using CPAP, s/p hysterectomy and b/l oophorectomy, inferior NSTEMI (1/2019) complicated by chronic Fe deficiency anemia  with h/o iron infusions, partially obstructed Ross's hernia with sx ( Feb 2022 with repair in June 2022  ), H/o provoked DVT in 2020 , with cessation of eliquis after 6m  , now resumed after saddle PE In Feb 2024      Cough X 1 month   Can't get mucus up             Visit Vitals  Pulse 75   Temp 36.8 °C (98.3 °F) (Oral)   Ht 1.499 m (4' 11\")   Wt 91.4 kg (201 lb 6.4 oz)   SpO2 92%   BMI 40.68 kg/m²   OB Status Unknown   Smoking Status Former   BSA 1.95 m²      No LMP recorded.   Current Outpatient Medications   Medication Instructions    Accu-Chek Guide test strips strip Use 1 test strip to test blood sugar twice daily.    albuterol 90 mcg/actuation inhaler 2 puffs, inhalation, Every 4 hours PRN    amLODIPine (NORVASC) 10 mg, oral, Daily    ammonium lactate (Lac-Hydrin) 12 % lotion Topical, As needed    brimonidine-timoloL (Combigan) 0.2-0.5 % ophthalmic solution 1 drop, Both Eyes, 2 times daily    Combigan 0.2-0.5 % ophthalmic solution 1 drop, Both Eyes, 2 times daily    diclofenac sodium 1 % kit 1 Application, As needed    dicyclomine (BENTYL) 20 mg    doxycycline (VIBRAMYCIN) 100 mg, oral, 2 times daily, Take with at least 8 ounces (large glass) of water, do not lie down for 30 minutes after    dulaglutide (TRULICITY) 0.75 mg, subcutaneous, Weekly    Eliquis 2.5 mg, oral, Every 12 hours    famotidine (Pepcid) 20 mg tablet 1 tablet, Nightly    ferrous sulfate, 325 mg ferrous sulfate, tablet 1 tablet, " "oral, Daily, On empty stomach, 1 before or 2 hours after meal with water. Do not take with antacids, tea, calcium containing products (milk, cheese)    fluticasone (Flonase) 50 mcg/actuation nasal spray 2 sprays, Each Nostril, Daily    furosemide (LASIX) 20 mg, oral, Daily    glipiZIDE 2.5 mg, oral, Daily    hydrocortisone 1 % cream Apply as needed to the injection site    insulin lispro (HumaLOG KwikPen Insulin) 100 unit/mL injection Use with sliding scale 3 times a day, up to 30 units daily    lancets (Accu-Chek Softclix Lancets) misc Use to check blood sugar twice daily    latanoprost (Xalatan) 0.005 % ophthalmic solution 1 drop, Both Eyes, Every morning    levocetirizine (XYZAL) 5 mg, oral, Every evening    losartan (COZAAR) 25 mg, oral, Daily    metoprolol succinate XL (TOPROL-XL) 200 mg, oral, Daily    netarsudiL (Rhopressa) 0.02 % drops opthalmic solution 1 drop, Both Eyes, Every evening    omeprazole (PRILOSEC) 40 mg, oral, 2 times daily before meals    pen needle, diabetic 31 gauge x 5/16\" needle Use to inject 1-4 times daily as directed.    pen needle, diabetic 33 gauge x 5/32\" needle Use for sliding scale up to 3 times a day    polyethylene glycol (GLYCOLAX, MIRALAX) 17 g, oral, 2 times daily    rosuvastatin (CRESTOR) 20 mg, oral, Daily    tiotropium (Spiriva Respimat) 2.5 mcg/actuation inhaler 2 puffs, inhalation, Daily      Social History     Tobacco Use    Smoking status: Former     Types: Cigarettes     Passive exposure: Never    Smokeless tobacco: Never   Substance Use Topics    Alcohol use: Yes     Comment: 1-2 x per month        Review of Systems    Constitutional : No feeling poorly / fevers/ chills / night sweats/ fatigue   Cardiovascular : No CP /Palpitations/ lower extremity edema / syncope   Respiratory : No Cough /ORTIZ/Dyspnea at rest   Gastrointestinal : No abd pain / N/V  No bloody stools/ melena / constipation  Endo : No polyuria/polydipsia/ muscle weakness / sluggishness   CNS: No " confusion / HA/ tingling/ numbness/ weakness of extremities  Psychiatric: No anxiety/ depression/ SI/HI    All other systems have been reviewed and are negative for complaint       Physical Exam    Constitutional : Vitals reviewed. Alert and in no distress  Cardiovascular : RRR, Normal S1, S2, No pericardial rub/ gallop, no peripheral edema   Pulmonary: No respiratory distress, CTAB   MSK : Normal gait and station , strength and tone   Skin: Warm to touch ,  normal skin turgor   Neurologic : CNs 2-12 grossly intact , no obvious FNDs  Psych : A,Ox3, normal mood and affect      Assessment/Plan   Diagnoses and all orders for this visit:  Right hip pain  -     Referral to Orthopaedic Surgery; Future  Acute bronchitis, unspecified organism  -     doxycycline (Vibramycin) 100 mg capsule; Take 1 capsule (100 mg) by mouth 2 times a day for 10 days. 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 s/p L partial nephrectomy and L adrenalectomy (2007), CKD stage 3 d/t loss of nephron mass and HTN, asthma, HLD, T2DM (est with endo ), MARYLU not using CPAP, s/p hysterectomy and b/l oophorectomy, inferior NSTEMI (1/2019) complicated by chronic Fe deficiency anemia  with h/o iron infusions, partially obstructed Ross's hernia with sx ( Feb 2022 with repair in June 2022  ), H/o provoked DVT in 2020 , with cessation of eliquis after 6m  , now resumed after saddle PE In Feb 2024     Rx as above for acute bronchitis   Fu with ortho MD for hip concerns and Mri Qs      Conditions addressed and mgmt as noted above.  Pertinent labs, images/ imaging reports , chart review was done .   Age appropriate labs / labs for mgmt of chronic medical conditions ordered, further mgmt pending the results.       This note is intended for the physician writing it, as well as to communicate findings to other healthcare professionals.  These notes use medical lexicon that may be misunderstood by non medical persons. Therefore, interpretations of medical notes and terminology should be approached with caution.

## 2024-12-12 ENCOUNTER — PATIENT OUTREACH (OUTPATIENT)
Dept: PRIMARY CARE | Facility: CLINIC | Age: 69
End: 2024-12-12
Payer: MEDICARE

## 2024-12-12 ENCOUNTER — DOCUMENTATION (OUTPATIENT)
Dept: PRIMARY CARE | Facility: CLINIC | Age: 69
End: 2024-12-12
Payer: MEDICARE

## 2024-12-12 DIAGNOSIS — E11.22 TYPE 2 DIABETES MELLITUS WITH STAGE 3A CHRONIC KIDNEY DISEASE, WITHOUT LONG-TERM CURRENT USE OF INSULIN (MULTI): ICD-10-CM

## 2024-12-12 DIAGNOSIS — I10 PRIMARY HYPERTENSION: ICD-10-CM

## 2024-12-12 DIAGNOSIS — N18.31 TYPE 2 DIABETES MELLITUS WITH STAGE 3A CHRONIC KIDNEY DISEASE, WITHOUT LONG-TERM CURRENT USE OF INSULIN (MULTI): ICD-10-CM

## 2024-12-12 NOTE — PROGRESS NOTES
Chronic care management outreach complete. Spoke to Ms. Andrews and reviewed her PCP appointment from yesterday. All questions and concerns addressed. Reviewed other previous appointments such as endo and ophthalmology. She will be starting back using [employer], waiting to hear back from specialty pharmacy with cost assistance. She is still having pain in her right hip. She is receiving PT and was referred to aquatic PT. Upcoming appointments reviewed. No refills needed at this time.

## 2024-12-13 ENCOUNTER — SPECIALTY PHARMACY (OUTPATIENT)
Dept: PHARMACY | Facility: CLINIC | Age: 69
End: 2024-12-13

## 2024-12-16 ENCOUNTER — APPOINTMENT (OUTPATIENT)
Dept: HEMATOLOGY/ONCOLOGY | Facility: HOSPITAL | Age: 69
End: 2024-12-16
Payer: MEDICARE

## 2024-12-16 ENCOUNTER — APPOINTMENT (OUTPATIENT)
Dept: ENDOCRINOLOGY | Facility: CLINIC | Age: 69
End: 2024-12-16
Payer: MEDICARE

## 2024-12-17 ENCOUNTER — PHARMACY VISIT (OUTPATIENT)
Dept: PHARMACY | Facility: CLINIC | Age: 69
End: 2024-12-17
Payer: MEDICARE

## 2024-12-17 ENCOUNTER — SPECIALTY PHARMACY (OUTPATIENT)
Dept: PHARMACY | Facility: CLINIC | Age: 69
End: 2024-12-17

## 2024-12-17 PROCEDURE — RXMED WILLOW AMBULATORY MEDICATION CHARGE

## 2024-12-18 ENCOUNTER — TREATMENT (OUTPATIENT)
Dept: PHYSICAL THERAPY | Facility: CLINIC | Age: 69
End: 2024-12-18
Payer: MEDICARE

## 2024-12-18 ENCOUNTER — PHARMACY VISIT (OUTPATIENT)
Dept: PHARMACY | Facility: CLINIC | Age: 69
End: 2024-12-18

## 2024-12-18 DIAGNOSIS — M54.16 LUMBAR RADICULOPATHY: Primary | ICD-10-CM

## 2024-12-18 PROCEDURE — 97113 AQUATIC THERAPY/EXERCISES: CPT | Mod: GP,CQ

## 2024-12-18 ASSESSMENT — PAIN - FUNCTIONAL ASSESSMENT: PAIN_FUNCTIONAL_ASSESSMENT: 0-10

## 2024-12-18 ASSESSMENT — PAIN SCALES - GENERAL: PAINLEVEL_OUTOF10: 8

## 2024-12-18 NOTE — PROGRESS NOTES
Physical Therapy  Physical Therapy Treatment    Patient Name: Vania Andrews  MRN: [id number]  Today's Date: 12/18/2024  Time Calculation  Start Time: 0804  Stop Time: 0833  Time Calculation (min): 29 min    Insurance:  Visit number:  3 out of [place]  Authorization information: no auth   Insurance Type: MEDICARE A&B     General:  Reason for visit:   Referred by: Cassie Martinez     Current Problem:   1. Lumbar radiculopathy          SUBJECTIVE:   Pt reports having a stiff neck and being unable to turn neck around stating that this is a 10/10 pain. Pt reports that their hip remains an 8/10 pain level.      Patient arrived to appointment at 7:57am and was in the pool at 8:05 am  Precautions:   Precautions  Precautions Comment: History of heart attack in 2019; blood clots bilateral legs, h/o kidney cancer (removed) and a spot on the other kidney currently; heart disease; asthma; glaucoma and cataracts in right eye; anemia     Pain:   Pain Assessment  Pain Assessment: 0-10  0-10 (Numeric) Pain Score: 8    OBJECTIVE:      Decreased gait speed and stride length    Treatments:  Aquatic therapy  3ft depth :  Fwd walking 5'   Lateral walking 5'   Mini squats 5'   Hip abduction 5'  HS stretches 2' ea.     HEP: Access Code: [email or web address]  URL: [email or web address]  Date: 12/18/2024  Prepared by: Robert Mcintosh    Exercises  - Standing Hip Abduction with Counter Support  - 1 x daily - 7 x weekly - 3 sets - 10 reps  - Mini Squat with Counter Support  - 1 x daily - 7 x weekly - 3 sets - 10 reps  - Supine Bridge  - 1 x daily - 7 x weekly - 3 sets - 10 reps  - Supine Ankle Pumps  - 1 x daily - 7 x weekly - 5 sets - 10 reps    ASSESSMENT:   Pt tolerated shorter than normal treatment session due to late arrival appropriately not noting any increase in pain during water based exercises. Pt struggled to lift up their LE to get onto step for HS stretch but was able to hinge their hips to increase stretch on musculature.      Charges:  Aquatic therapy x32     PLAN:  Attempt balance based movements for next session.

## 2024-12-19 ENCOUNTER — CLINICAL SUPPORT (OUTPATIENT)
Dept: EMERGENCY MEDICINE | Facility: HOSPITAL | Age: 69
End: 2024-12-19
Payer: MEDICARE

## 2024-12-19 ENCOUNTER — APPOINTMENT (OUTPATIENT)
Dept: RADIOLOGY | Facility: HOSPITAL | Age: 69
End: 2024-12-19
Payer: MEDICARE

## 2024-12-19 ENCOUNTER — HOSPITAL ENCOUNTER (EMERGENCY)
Facility: HOSPITAL | Age: 69
Discharge: HOME | End: 2024-12-20
Attending: EMERGENCY MEDICINE
Payer: MEDICARE

## 2024-12-19 DIAGNOSIS — M54.2 NECK PAIN: Primary | ICD-10-CM

## 2024-12-19 LAB
ALBUMIN SERPL BCP-MCNC: 4.3 G/DL (ref 3.4–5)
ALP SERPL-CCNC: 170 U/L (ref 33–136)
ALT SERPL W P-5'-P-CCNC: 12 U/L (ref 7–45)
ANION GAP SERPL CALC-SCNC: 16 MMOL/L (ref 10–20)
AST SERPL W P-5'-P-CCNC: 19 U/L (ref 9–39)
ATRIAL RATE: 99 BPM
BASOPHILS # BLD AUTO: 0.03 X10*3/UL (ref 0–0.1)
BASOPHILS NFR BLD AUTO: 0.3 %
BILIRUB SERPL-MCNC: 0.5 MG/DL (ref 0–1.2)
BUN SERPL-MCNC: 9 MG/DL (ref 6–23)
CALCIUM SERPL-MCNC: 10 MG/DL (ref 8.6–10.6)
CHLORIDE SERPL-SCNC: 108 MMOL/L (ref 98–107)
CO2 SERPL-SCNC: 22 MMOL/L (ref 21–32)
CREAT SERPL-MCNC: 1.09 MG/DL (ref 0.5–1.05)
CRP SERPL-MCNC: 5.2 MG/DL
EGFRCR SERPLBLD CKD-EPI 2021: 55 ML/MIN/1.73M*2
EOSINOPHIL # BLD AUTO: 0.09 X10*3/UL (ref 0–0.7)
EOSINOPHIL NFR BLD AUTO: 0.8 %
ERYTHROCYTE [DISTWIDTH] IN BLOOD BY AUTOMATED COUNT: 14.1 % (ref 11.5–14.5)
ERYTHROCYTE [SEDIMENTATION RATE] IN BLOOD BY WESTERGREN METHOD: 59 MM/H (ref 0–30)
GLUCOSE SERPL-MCNC: 142 MG/DL (ref 74–99)
HCT VFR BLD AUTO: 39.2 % (ref 36–46)
HGB BLD-MCNC: 13.3 G/DL (ref 12–16)
IMM GRANULOCYTES # BLD AUTO: 0.06 X10*3/UL (ref 0–0.7)
IMM GRANULOCYTES NFR BLD AUTO: 0.5 % (ref 0–0.9)
LYMPHOCYTES # BLD AUTO: 1.43 X10*3/UL (ref 1.2–4.8)
LYMPHOCYTES NFR BLD AUTO: 12.2 %
MCH RBC QN AUTO: 28.5 PG (ref 26–34)
MCHC RBC AUTO-ENTMCNC: 33.9 G/DL (ref 32–36)
MCV RBC AUTO: 84 FL (ref 80–100)
MONOCYTES # BLD AUTO: 0.78 X10*3/UL (ref 0.1–1)
MONOCYTES NFR BLD AUTO: 6.6 %
NEUTROPHILS # BLD AUTO: 9.35 X10*3/UL (ref 1.2–7.7)
NEUTROPHILS NFR BLD AUTO: 79.6 %
NRBC BLD-RTO: 0 /100 WBCS (ref 0–0)
P AXIS: 57 DEGREES
P OFFSET: 181 MS
P ONSET: 129 MS
PLATELET # BLD AUTO: 283 X10*3/UL (ref 150–450)
POTASSIUM SERPL-SCNC: 4.6 MMOL/L (ref 3.5–5.3)
PR INTERVAL: 168 MS
PROT SERPL-MCNC: 8.3 G/DL (ref 6.4–8.2)
Q ONSET: 213 MS
QRS COUNT: 16 BEATS
QRS DURATION: 82 MS
QT INTERVAL: 334 MS
QTC CALCULATION(BAZETT): 428 MS
QTC FREDERICIA: 394 MS
R AXIS: -30 DEGREES
RBC # BLD AUTO: 4.66 X10*6/UL (ref 4–5.2)
SODIUM SERPL-SCNC: 141 MMOL/L (ref 136–145)
T AXIS: 43 DEGREES
T OFFSET: 380 MS
VENTRICULAR RATE: 99 BPM
WBC # BLD AUTO: 11.7 X10*3/UL (ref 4.4–11.3)

## 2024-12-19 PROCEDURE — 93005 ELECTROCARDIOGRAM TRACING: CPT

## 2024-12-19 PROCEDURE — 99285 EMERGENCY DEPT VISIT HI MDM: CPT | Performed by: EMERGENCY MEDICINE

## 2024-12-19 PROCEDURE — 96374 THER/PROPH/DIAG INJ IV PUSH: CPT | Mod: 59

## 2024-12-19 PROCEDURE — 86140 C-REACTIVE PROTEIN: CPT

## 2024-12-19 PROCEDURE — 70498 CT ANGIOGRAPHY NECK: CPT

## 2024-12-19 PROCEDURE — 72125 CT NECK SPINE W/O DYE: CPT

## 2024-12-19 PROCEDURE — 99285 EMERGENCY DEPT VISIT HI MDM: CPT | Mod: 25 | Performed by: EMERGENCY MEDICINE

## 2024-12-19 PROCEDURE — 2500000005 HC RX 250 GENERAL PHARMACY W/O HCPCS

## 2024-12-19 PROCEDURE — 70496 CT ANGIOGRAPHY HEAD: CPT

## 2024-12-19 PROCEDURE — 70480 CT ORBIT/EAR/FOSSA W/O DYE: CPT

## 2024-12-19 PROCEDURE — 70498 CT ANGIOGRAPHY NECK: CPT | Performed by: RADIOLOGY

## 2024-12-19 PROCEDURE — 85652 RBC SED RATE AUTOMATED: CPT

## 2024-12-19 PROCEDURE — 36415 COLL VENOUS BLD VENIPUNCTURE: CPT

## 2024-12-19 PROCEDURE — 70480 CT ORBIT/EAR/FOSSA W/O DYE: CPT | Performed by: RADIOLOGY

## 2024-12-19 PROCEDURE — 85025 COMPLETE CBC W/AUTO DIFF WBC: CPT

## 2024-12-19 PROCEDURE — 80053 COMPREHEN METABOLIC PANEL: CPT

## 2024-12-19 PROCEDURE — 2500000004 HC RX 250 GENERAL PHARMACY W/ HCPCS (ALT 636 FOR OP/ED)

## 2024-12-19 PROCEDURE — 72125 CT NECK SPINE W/O DYE: CPT | Performed by: RADIOLOGY

## 2024-12-19 PROCEDURE — 2500000001 HC RX 250 WO HCPCS SELF ADMINISTERED DRUGS (ALT 637 FOR MEDICARE OP)

## 2024-12-19 PROCEDURE — 2550000001 HC RX 255 CONTRASTS: Performed by: EMERGENCY MEDICINE

## 2024-12-19 PROCEDURE — 96376 TX/PRO/DX INJ SAME DRUG ADON: CPT | Mod: 59

## 2024-12-19 PROCEDURE — 70496 CT ANGIOGRAPHY HEAD: CPT | Performed by: RADIOLOGY

## 2024-12-19 PROCEDURE — 96375 TX/PRO/DX INJ NEW DRUG ADDON: CPT | Mod: 59

## 2024-12-19 RX ORDER — LIDOCAINE 560 MG/1
1 PATCH PERCUTANEOUS; TOPICAL; TRANSDERMAL DAILY
Status: DISCONTINUED | OUTPATIENT
Start: 2024-12-19 | End: 2024-12-20 | Stop reason: HOSPADM

## 2024-12-19 RX ORDER — ACETAMINOPHEN 325 MG/1
975 TABLET ORAL ONCE
Status: COMPLETED | OUTPATIENT
Start: 2024-12-19 | End: 2024-12-19

## 2024-12-19 RX ORDER — DIPHENHYDRAMINE HYDROCHLORIDE 50 MG/ML
50 INJECTION INTRAMUSCULAR; INTRAVENOUS ONCE
Status: COMPLETED | OUTPATIENT
Start: 2024-12-19 | End: 2024-12-19

## 2024-12-19 RX ADMIN — LIDOCAINE 4% 1 PATCH: 40 PATCH TOPICAL at 12:13

## 2024-12-19 RX ADMIN — ACETAMINOPHEN 975 MG: 325 TABLET ORAL at 12:13

## 2024-12-19 RX ADMIN — METHYLPREDNISOLONE SODIUM SUCCINATE 40 MG: 40 INJECTION, POWDER, FOR SOLUTION INTRAMUSCULAR; INTRAVENOUS at 12:13

## 2024-12-19 RX ADMIN — METHYLPREDNISOLONE SODIUM SUCCINATE 40 MG: 40 INJECTION, POWDER, FOR SOLUTION INTRAMUSCULAR; INTRAVENOUS at 16:29

## 2024-12-19 RX ADMIN — IOHEXOL 80 ML: 350 INJECTION, SOLUTION INTRAVENOUS at 17:58

## 2024-12-19 RX ADMIN — DIPHENHYDRAMINE HYDROCHLORIDE 50 MG: 50 INJECTION INTRAMUSCULAR; INTRAVENOUS at 16:48

## 2024-12-19 ASSESSMENT — PAIN SCALES - GENERAL
PAINLEVEL_OUTOF10: 6
PAINLEVEL_OUTOF10: 5 - MODERATE PAIN
PAINLEVEL_OUTOF10: 10 - WORST POSSIBLE PAIN

## 2024-12-19 ASSESSMENT — PAIN - FUNCTIONAL ASSESSMENT
PAIN_FUNCTIONAL_ASSESSMENT: 0-10
PAIN_FUNCTIONAL_ASSESSMENT: 0-10

## 2024-12-19 NOTE — PROGRESS NOTES
Need for expedited contrast prep    Patient endorses having itching in the past in response to contrast but endorses she has tolerated contrast without symptoms with 4-hour prep.  Radiology recommended maximizing patient safety with 13-hour prep. No clear etiology of patient's head and neck pain at this time but patient is on blood thinner and has recent history of thromboembolic events and differential of symptoms includes intracranial hemorrhage, thrombotic event to neck vasculature, dissection from recent manipulation and benefits of expedited prep felt to outweigh risks.  Patient endorsed desire for expedited prep in discussion of risks and benefits rather than full 13-hour prep.  This was felt to be medically reasonable given patient has never had airway symptoms in the past, only endorsed mild itching symptoms and endorses symptoms were completely controlled in the past with Benadryl.    ** Please excuse any errors in grammar or translation related to this dictation. Voice recognition software was utilized to prepare this document. **       Dez Marin MD  [facility] Emergency Medicine

## 2024-12-19 NOTE — ED PROVIDER NOTES
HPI   Chief Complaint   Patient presents with    Neck Pain       Patient is a 69-year-old female with history of left renal cell carcinoma s/p left partial nephrectomy and left adrenalectomy, CKD 3 secondary to loss of nephron mass and hypertension, asthma, HLD, T2DM, MARYLU without CPAP, s/p hysterectomy and oophorectomy, inferior NSTEMI (January 2019), chronic iron deficiency anemia with iron infusions, Ross's hernia, provoked DVT in 2020, PE in 2024 on apixaban presenting with concern for severe neck pain.  Endorses initially stiff neck 3 days ago that has become progressively worse without clear provocation.  Reports intense pain limiting her ability to turn or flex her neck that now radiates to the back of her head.  Does endorse chronic cough for approximately a month that she thinks makes pain worse but does not endorse anything else that caused it.  Denies any falls, traumatic injury.  Denies fevers, chills, malaise or any other infectious symptoms.   endorses she has been in her normal mental status without confusion.  Patient does not endorse any visual acuity changes, photophobia, sensory loss or other neurological deficits.  Reports she has been doing the treatments of hot water soaks as recommended by PT but has not been effective.  Does not report she is taking any home pain medications.            Patient History   Past Medical History:   Diagnosis Date    Abdominal distension (gaseous) 07/31/2019    Abdominal bloating    Cancer (Multi)     Cataract     Coronary artery disease     Diabetes mellitus (Multi)     Diverticulosis of intestine, part unspecified, without perforation or abscess without bleeding 06/09/2013    Diverticulosis    Elevation of levels of liver transaminase levels 11/13/2020    Transaminitis    Encounter for follow-up examination after completed treatment for conditions other than malignant neoplasm 01/26/2019    Hospital discharge follow-up    Glaucoma     Hypertension      Long term (current) use of antibiotics 10/07/2016    Need for prophylactic antibiotic    Other amnesia 10/28/2014    Memory loss    Other conditions influencing health status 2019    History of cough    Other specified health status 2019    Hepatitis C antibody test negative    Other specified soft tissue disorders 2017    Leg swelling    Pain in left toe(s) 05/15/2017    Pain of toe of left foot    Pain in right foot 10/12/2016    Pain of right heel    Pain in right shoulder 2016    Acute pain of right shoulder    Personal history of diseases of the blood and blood-forming organs and certain disorders involving the immune mechanism 2018    History of anemia    Personal history of other diseases of the respiratory system 2018    History of sinusitis    Personal history of other diseases of the respiratory system 2014    History of upper respiratory infection    Personal history of other malignant neoplasm of kidney 2021    Personal history of malignant neoplasm of kidney    Personal history of other medical treatment 2017    History of screening mammography    Personal history of other specified conditions 2014    History of abdominal pain    Personal history of other specified conditions 2017    History of urinary frequency    Personal history of other specified conditions 2021    History of dysuria    Personal history of other specified conditions 2014    History of breast lump    Unspecified abdominal hernia without obstruction or gangrene 2014    Hernia     Past Surgical History:   Procedure Laterality Date    BREAST BIOPSY  2016    Biopsy Breast Percutaneous Needle Core    CARDIAC CATHETERIZATION N/A 2024    Procedure: Left Heart Cath;  Surgeon: Donato Cespedes MD;  Location: [place] [address] Cardiac Cath Lab;  Service: Cardiovascular;  Laterality: N/A;     SECTION, CLASSIC  2014     Section     CHOLECYSTECTOMY  08/29/2017    Cholecystectomy Laparoscopic    HAND SURGERY  12/18/2020    Hand Surgery                                                                                                                                                          HERNIA REPAIR  11/17/2014    Hernia Repair    MR HEAD ANGIO WO IV CONTRAST  11/17/2014    MR HEAD ANGIO WO IV CONTRAST 11/17/2014 [facility]    OTHER SURGICAL HISTORY  01/14/2021    Nephrectomy    TOTAL ABDOMINAL HYSTERECTOMY W/ BILATERAL SALPINGOOPHORECTOMY  04/21/2014    Total Abdominal Hysterectomy With Removal Of Both Ovaries     Family History   Problem Relation Name Age of Onset    Aneurysm Mother      Hypertension Mother      Pancreatic cancer Mother      Diabetes Mother      Other (laryngeal Cancer) Mother      Heart disease Father      Pulmonary embolism Brother      Breast cancer Father's Sister      Breast cancer Maternal Cousin       Social History     Tobacco Use    Smoking status: Former     Types: Cigarettes     Passive exposure: Never    Smokeless tobacco: Never   Vaping Use    Vaping status: Never Used   Substance Use Topics    Alcohol use: Yes     Comment: 1-2 x per month    Drug use: Never       Physical Exam   ED Triage Vitals [12/19/24 0952]   Temperature Heart Rate Respirations BP   36.4 °C (97.5 °F) (!) 105 16 132/89      Pulse Ox Temp src Heart Rate Source Patient Position   98 % -- -- --      BP Location FiO2 (%)     -- --       Physical Exam  Constitutional:       Appearance: Normal appearance.   HENT:      Head: Normocephalic and atraumatic.   Eyes:      Extraocular Movements: Extraocular movements intact.   Neck:      Comments: Midline but is well as diffuse bilateral and acceptable neck tenderness.  Refuses/unable to neck flex or rotate neck due to pain.  No visible swelling or fluctuance.  Cardiovascular:      Rate and Rhythm: Normal rate.   Pulmonary:      Effort: Pulmonary effort is normal.   Musculoskeletal:          General: Normal range of motion.   Skin:     General: Skin is warm and dry.   Neurological:      Mental Status: She is alert.      Comments: Cranial nerves II through XII grossly intact.  Sensation tact to light touch in upper and lower extremities.  Ambulates without deficit.  Age-appropriate strength in upper and lower extremities.   Psychiatric:         Mood and Affect: Mood normal.         Behavior: Behavior normal.           ED Course & [facility]   ED Course as of 12/20/24 0905   Thu Dec 19, 2024   1142 Patient is a 69-year-old female with a long medical history including cancer, self-reported diabetes, blood clots currently on Eliquis, cervical radiculopathy who recently saw PT yesterday, presenting with 3 days of worsening head and neck pain which is severe and mostly posterior.  On my evaluation she is tearful and unable to range her neck at all.  She states that her pain started gradually and is continuing to get worse.  She also has some viral URI-like symptoms. [DM]   1152 Plan for CT imaging of the head and neck including vascular studies and images of the auditory canals.  Pain meds ordered.  Labs pending.  Presentation seems more consistent with an MSK related cause of her pain such as a bulging disc or cervical radiculopathy.  No red flags for spinal cord pathology.  No MRI ordered at this time.  Presentation not consistent with CNS infection or meningitis. [DM]   1520 Pending CT imaging at time of sign out to evening team.  [DM]   1950 CT venogram head w and wo iv contrast  IMPRESSION:  1. No CT evidence of acute intracranial abnormality.  2. Unremarkable CT venogram.   [MH]   1950 CT cervical spine wo IV contrast  IMPRESSION:  1. No acute fracture or traumatic malalignment of the cervical spine.  2. Multilevel degenerative changes of the cervical spine.  3. There is erosive change, sclerosis of the dens and calcified  circumdental pannus. The appearance is suggestive of CPPD.   [MH]   1951 CT angio head  and neck w and wo IV contrast  IMPRESSION:  1. No CT evidence of acute intracranial abnormality.  2. No hemodynamically significant intracranial or extracranial  stenosis, occlusion, or aneurysm.  3. Incidentally noted retropharyngeal course of the right internal  carotid artery.   []   1951 CT internal auditory canals posterior fossa wo IV contrast  IMPRESSION:  Right CT temporal bone:  1. The mastoid air cells are clear.  2. Incidentally noted high-riding right jugular bulb.      Left CT temporal bone:  1. The mastoid air cells are clear.  2. Unremarkable CT of the left temporal bone.   [MH]      ED Course User Index  [DM] Dez Marin MD  [] Gurpreet Jovel MD         Diagnoses as of 12/20/24 0905   Neck pain                 No data recorded     [place] Coma Scale Score: 15 (12/19/24 1134 : Rosa M Curran RN)                           Medical Decision Making  Patient is a 69-year-old female with history of left renal cell carcinoma s/p left partial nephrectomy and left adrenalectomy, CKD 3 secondary to loss of nephron mass and hypertension, asthma, HLD, T2DM, MARYLU without CPAP, s/p hysterectomy and oophorectomy, inferior NSTEMI (January 2019), chronic iron deficiency anemia with iron infusions, Ross's hernia, provoked DVT in 2020, PE in 2024 on apixaban presenting with concern for severe neck pain.  No clear etiology of symptoms with intense stiffness with significant pain with movement most concerning for musculoskeletal cause of symptoms.  Patient otherwise mentating normally, no focal neurologic deficits, no infectious symptoms, pain on exam less consistent with meningsmus and lumbar puncture deferred at this time.  Treated symptomatically with acetaminophen and lidocaine.  Given severity of pain, lack of clear cause of pain, CT head without contrast ordered to evaluate for ICH.  CT angio head and neck ordered to evaluate for thromboembolic event given history of DVT and PE and CT IAC ordered  given mastoid tenderness although otherwise without significant infectious symptoms.  No reported traumatic injury but CT C-spine ordered given limited neck mobility with concern predominantly for inflammatory or degenerative changes of C-spine rather than acute fracture.  Patient did have itching in response to contrast in the past but reports she has tolerated 4-hour contrast prep without symptoms.  4-hour versus 13-hour contrast prep discussed with patient  with decision made to proceed with 4-hour prep given there are emergent pathologies on the differential including thrombolic event and ICH.  Patient has never had airway or anaphylactic symptoms with contrast and this was felt to be medically appropriate.  Patient pretreated with diphenhydramine and steroids.  Handed off pending CT imaging results.    Patient seen and discussed with Dr. Tania Quintana MD, PhD  Emergency Medicine PGY3          Procedure  Procedures     Zenon Quintana MD  Resident  12/20/24 4668    I saw and evaluated the patient. I personally obtained the key and critical portions of the history and physical exam or was physically present for key and critical portions performed by the resident/fellow/OLIVIA. I reviewed the resident/fellow/OLIVIA's documentation and discussed the patient with the resident/fellow/OLIVIA. I agree with the resident/fellow/OLIVIA's medical decision making as documented in the note.    ** Please excuse any errors in grammar or translation related to this dictation. Voice recognition software was utilized to prepare this document. **       Dez Marin MD  [facility] Emergency Medicine        Dez Marin MD  12/20/24 5464

## 2024-12-20 VITALS
DIASTOLIC BLOOD PRESSURE: 80 MMHG | RESPIRATION RATE: 16 BRPM | TEMPERATURE: 97.5 F | WEIGHT: 200 LBS | HEART RATE: 74 BPM | HEIGHT: 59 IN | SYSTOLIC BLOOD PRESSURE: 144 MMHG | BODY MASS INDEX: 40.32 KG/M2 | OXYGEN SATURATION: 98 %

## 2024-12-20 ASSESSMENT — PAIN DESCRIPTION - PAIN TYPE: TYPE: ACUTE PAIN

## 2024-12-20 ASSESSMENT — PAIN - FUNCTIONAL ASSESSMENT: PAIN_FUNCTIONAL_ASSESSMENT: 0-10

## 2024-12-20 ASSESSMENT — PAIN SCALES - GENERAL: PAINLEVEL_OUTOF10: 5 - MODERATE PAIN

## 2024-12-20 NOTE — PROGRESS NOTES
Emergency Medicine Transition of Care Note.    I received Vania Andrews in signout from Dr. Quintana.  Please see the previous ED provider note for all HPI, PE and MDM up to the time of signout at 1500. This is in addition to the primary record.    In brief Vania Andrews is an 69 y.o. female presenting for neck pain.    Chief Complaint   Patient presents with    Neck Pain     At the time of signout we were awaiting: CT head, CT IAC, CT C-spine, and CT venogram    ED Course as of 12/21/24 1844   Thu Dec 19, 2024   1142 Patient is a 69-year-old female with a long medical history including cancer, self-reported diabetes, blood clots currently on Eliquis, cervical radiculopathy who recently saw PT yesterday, presenting with 3 days of worsening head and neck pain which is severe and mostly posterior.  On my evaluation she is tearful and unable to range her neck at all.  She states that her pain started gradually and is continuing to get worse.  She also has some viral URI-like symptoms. [DM]   1152 Plan for CT imaging of the head and neck including vascular studies and images of the auditory canals.  Pain meds ordered.  Labs pending.  Presentation seems more consistent with an MSK related cause of her pain such as a bulging disc or cervical radiculopathy.  No red flags for spinal cord pathology.  No MRI ordered at this time.  Presentation not consistent with CNS infection or meningitis. [DM]   1520 Pending CT imaging at time of sign out to evening team.  [DM]   1950 CT venogram head w and wo iv contrast  IMPRESSION:  1. No CT evidence of acute intracranial abnormality.  2. Unremarkable CT venogram.   [MH]   1950 CT cervical spine wo IV contrast  IMPRESSION:  1. No acute fracture or traumatic malalignment of the cervical spine.  2. Multilevel degenerative changes of the cervical spine.  3. There is erosive change, sclerosis of the dens and calcified  circumdental pannus. The appearance is suggestive of CPPD.   [MH]   1951  CT angio head and neck w and wo IV contrast  IMPRESSION:  1. No CT evidence of acute intracranial abnormality.  2. No hemodynamically significant intracranial or extracranial  stenosis, occlusion, or aneurysm.  3. Incidentally noted retropharyngeal course of the right internal  carotid artery.   []   1951 CT internal auditory canals posterior fossa wo IV contrast  IMPRESSION:  Right CT temporal bone:  1. The mastoid air cells are clear.  2. Incidentally noted high-riding right jugular bulb.      Left CT temporal bone:  1. The mastoid air cells are clear.  2. Unremarkable CT of the left temporal bone.   [MH]      ED Course User Index  [DM] Dez Marin MD  [] Gurpreet Jovel MD         Diagnoses as of 12/21/24 1844   Neck pain       Medical Decision Making  Patient's workup significant for CPPD of the neck.  Patient presentation was discussed with neurosurgery, Marika Haywood MD, who noted that no surgical intervention is indicated at this time.  Offered patient MRI for further evaluation.  Patient declined at this time would like to pursue MRI outpatient.  Recommended patient to undergo MRI of the brain and C-spine.  Patient ordered rheumatology referral.  Patient received rheumatology clinic phone number.  Discussed ED findings, plan for outpatient primary care and rheumatology follow-up, supportive treatment of pain, and strict return precautions for any new or worsening symptoms including but limited to concerning pain, decreased range of motion of the neck, and fevers.  Patient stated understanding and agreement with the plan.  All questions were answered.  Patient discharged in stable condition.    Final diagnoses:   [M54.2] Neck pain           Procedure  Procedures    Patient presentation and plan discussed with attending physician.    Gurpreet Jovel MD

## 2024-12-20 NOTE — DISCHARGE INSTRUCTIONS
You presented to the ED for neck pain.  Your CAT scan of your neck displayed calcium pyrophosphate dihydrate deposition.  Your presentation was discussed with neurosurgery who do not recommend any acute surgical intervention at this time.  We discussed obtaining MRI of the brain and C-spine.  You declined at this time.  Please obtain MRI brain and C-spine outpatient.  Follow-up with primary care as well as rheumatology within the next week.  Please return to the emergency department for any new or worsening symptoms.  Please treat your symptoms supportively with Tylenol, lidocaine patches, and Voltaren gel 4 times a day.

## 2024-12-23 ENCOUNTER — HOSPITAL ENCOUNTER (OUTPATIENT)
Dept: RADIOLOGY | Facility: HOSPITAL | Age: 69
Discharge: HOME | End: 2024-12-23
Payer: MEDICARE

## 2024-12-23 DIAGNOSIS — Z12.31 ENCOUNTER FOR SCREENING MAMMOGRAM FOR MALIGNANT NEOPLASM OF BREAST: ICD-10-CM

## 2024-12-23 PROCEDURE — 77067 SCR MAMMO BI INCL CAD: CPT

## 2024-12-23 PROCEDURE — 77067 SCR MAMMO BI INCL CAD: CPT | Performed by: STUDENT IN AN ORGANIZED HEALTH CARE EDUCATION/TRAINING PROGRAM

## 2024-12-23 PROCEDURE — 77063 BREAST TOMOSYNTHESIS BI: CPT | Performed by: STUDENT IN AN ORGANIZED HEALTH CARE EDUCATION/TRAINING PROGRAM

## 2024-12-27 ENCOUNTER — APPOINTMENT (OUTPATIENT)
Dept: ENDOCRINOLOGY | Facility: CLINIC | Age: 69
End: 2024-12-27
Payer: MEDICARE

## 2025-01-03 PROCEDURE — RXMED WILLOW AMBULATORY MEDICATION CHARGE

## 2025-01-06 ENCOUNTER — OFFICE VISIT (OUTPATIENT)
Dept: UROLOGY | Facility: HOSPITAL | Age: 70
End: 2025-01-06
Payer: MEDICARE

## 2025-01-06 DIAGNOSIS — R32 URINARY INCONTINENCE, UNSPECIFIED TYPE: Primary | ICD-10-CM

## 2025-01-06 DIAGNOSIS — I82.5Y3 CHRONIC DEEP VEIN THROMBOSIS (DVT) OF PROXIMAL VEIN OF BOTH LOWER EXTREMITIES (MULTI): ICD-10-CM

## 2025-01-06 DIAGNOSIS — Z79.01 ON CONTINUOUS ORAL ANTICOAGULATION: ICD-10-CM

## 2025-01-06 DIAGNOSIS — N18.30 STAGE 3 CHRONIC KIDNEY DISEASE, UNSPECIFIED WHETHER STAGE 3A OR 3B CKD (MULTI): ICD-10-CM

## 2025-01-06 DIAGNOSIS — I26.92 ACUTE SADDLE PULMONARY EMBOLISM WITHOUT ACUTE COR PULMONALE (MULTI): ICD-10-CM

## 2025-01-06 LAB
POC APPEARANCE, URINE: CLEAR
POC BILIRUBIN, URINE: NEGATIVE
POC BLOOD, URINE: NEGATIVE
POC COLOR, URINE: YELLOW
POC GLUCOSE, URINE: NEGATIVE MG/DL
POC KETONES, URINE: NEGATIVE MG/DL
POC LEUKOCYTES, URINE: NEGATIVE
POC NITRITE,URINE: NEGATIVE
POC PH, URINE: 5.5 PH
POC PROTEIN, URINE: NEGATIVE MG/DL
POC SPECIFIC GRAVITY, URINE: 1.02
POC UROBILINOGEN, URINE: 0.2 EU/DL

## 2025-01-06 PROCEDURE — 51798 US URINE CAPACITY MEASURE: CPT | Performed by: NURSE PRACTITIONER

## 2025-01-06 PROCEDURE — 99214 OFFICE O/P EST MOD 30 MIN: CPT | Mod: 25 | Performed by: NURSE PRACTITIONER

## 2025-01-06 PROCEDURE — G2211 COMPLEX E/M VISIT ADD ON: HCPCS | Performed by: NURSE PRACTITIONER

## 2025-01-06 PROCEDURE — 1159F MED LIST DOCD IN RCRD: CPT | Performed by: NURSE PRACTITIONER

## 2025-01-06 PROCEDURE — 1036F TOBACCO NON-USER: CPT | Performed by: NURSE PRACTITIONER

## 2025-01-06 PROCEDURE — 1160F RVW MEDS BY RX/DR IN RCRD: CPT | Performed by: NURSE PRACTITIONER

## 2025-01-06 PROCEDURE — 4010F ACE/ARB THERAPY RXD/TAKEN: CPT | Performed by: NURSE PRACTITIONER

## 2025-01-06 PROCEDURE — 81003 URINALYSIS AUTO W/O SCOPE: CPT | Performed by: NURSE PRACTITIONER

## 2025-01-06 PROCEDURE — 99204 OFFICE O/P NEW MOD 45 MIN: CPT | Performed by: NURSE PRACTITIONER

## 2025-01-06 NOTE — PROGRESS NOTES
[facility] [place]  Outpatient Clinic Note    Patient Name:  Vania Andrews  MRN:  [id number]  :  1955  Date of Service: 2025     Visit type: Follow up visit    HPI    Interval History:  Vania Andrews is a 69 y.o. female who is being seen today for  problems listed below.     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 De Leon. Patient last seen 20. 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 She reports experiencing UTI symptoms of frequency and dysuria but would have negative cultures. She also reports MAGAN from coughing and sneezing as well as UUI, noting wearing pads and changing x4-5 per day. Of note, patient also reports experiencing recent right flank pain. NTF x2. Denies gross hematuria. Patient is a former smoker.   Plan:   Patient last seen 20. 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 She reports experiencing UTI symptoms of frequency and dysuria but would have negative cultures. She also reports MAGAN from coughing and sneezing as well as UUI, noting wearing pads and changing x4-5 per day. Of note, patient also reports experiencing recent right flank pain. NTF x2.      UA showed trace blood and trace leukocytes, will send for microscopy and culture. PVR was 12 mL. BMP on 21 demonstrated a creatinine level of 1.41 mg/dL and GFR of 45 mL/min. Abdominal ultrasound 21 showed stable 2.3 cm cyst left kidney and S/P left partial nephrectomy. She will repeat a renal ultrasound for further evaluation of the right flank pain and she will  follow up with Rhina Quezada CNP in 1 month to review the results. All questions and concerns were addressed. Patient verbalizes understanding and has no other questions at this time.     1/6/25: Patient presents for follow up of urinary incontinence. She is accompanied by her . She reports urinary urgency, frequency and incontinence which requires her to wear depends. She has nocturia x 4 which she finds bothersome. She has tried anticholinergics in the past but stopped due to drying side effects. She also has glaucoma and dry eyes, and cannot be started on anticholinergics at this time. PVR 0 ml. She is very concerned about her kidney function as she only has one kidney, and is requesting referral to nephrologist.    IPSS: 14  QOL: 3    Frequency: Q 1 hours when she's drinking a lot fluids  Urgency: yes  Urge Incontinence: yes  Nocturia: 4 times per night    Stream: (mild/moderate/strong) strong  Hesitancy: no  Straining: no  Intermittency: no  Sensation of Incomplete Emptying: no    Stress Incontinence: yes  Pads:  4 depends per day     Dysuria:  sometimes  Gross Hematuria: No  UTI:  yes  Stones:  no    Consumes 30 oz of fluid per day.     Has used medications for bladder in the past? Yes, does not remember which    Has had urodynamic testing in the past? no    Bowel Function:   Pt has yes constipation.     Past Medical History:   Diagnosis Date    Abdominal distension (gaseous) 07/31/2019    Abdominal bloating    Cancer (Multi)     Cataract     Coronary artery disease     Diabetes mellitus (Multi)     Diverticulosis of intestine, part unspecified, without perforation or abscess without bleeding 06/09/2013    Diverticulosis    Elevation of levels of liver transaminase levels 11/13/2020    Transaminitis    Encounter for follow-up examination after completed treatment for conditions other than malignant neoplasm 01/26/2019    Hospital discharge follow-up    Glaucoma     Hypertension     Long term (current)  use of antibiotics 10/07/2016    Need for prophylactic antibiotic    Other amnesia 10/28/2014    Memory loss    Other conditions influencing health status 2019    History of cough    Other specified health status 2019    Hepatitis C antibody test negative    Other specified soft tissue disorders 2017    Leg swelling    Pain in left toe(s) 05/15/2017    Pain of toe of left foot    Pain in right foot 10/12/2016    Pain of right heel    Pain in right shoulder 2016    Acute pain of right shoulder    Personal history of diseases of the blood and blood-forming organs and certain disorders involving the immune mechanism 2018    History of anemia    Personal history of other diseases of the respiratory system 2018    History of sinusitis    Personal history of other diseases of the respiratory system 2014    History of upper respiratory infection    Personal history of other malignant neoplasm of kidney 2021    Personal history of malignant neoplasm of kidney    Personal history of other medical treatment 2017    History of screening mammography    Personal history of other specified conditions 2014    History of abdominal pain    Personal history of other specified conditions 2017    History of urinary frequency    Personal history of other specified conditions 2021    History of dysuria    Personal history of other specified conditions 2014    History of breast lump    Unspecified abdominal hernia without obstruction or gangrene 2014    Hernia       Past Surgical History:   Procedure Laterality Date    BREAST BIOPSY Right 2014    Biopsy Breast Percutaneous Needle Core    BREAST BIOPSY Right 2018    CARDIAC CATHETERIZATION N/A 2024    Procedure: Left Heart Cath;  Surgeon: Donato Cespedes MD;  Location: [place] [facility] [address] [facility];  Service: Cardiovascular;  Laterality: N/A;     SECTION, CLASSIC  2014      Section    CHOLECYSTECTOMY  2017    Cholecystectomy Laparoscopic    HAND SURGERY  2020    Hand Surgery                                                                                                                                                          HERNIA REPAIR  2014    Hernia Repair    MR HEAD ANGIO WO IV CONTRAST  2014    MR HEAD ANGIO WO IV CONTRAST 2014 [facility]    OTHER SURGICAL HISTORY  2021    Nephrectomy    TOTAL ABDOMINAL HYSTERECTOMY W/ BILATERAL SALPINGOOPHORECTOMY  2014    Total Abdominal Hysterectomy With Removal Of Both Ovaries       Social History     Socioeconomic History    Marital status:      Spouse name: Not on file    Number of children: Not on file    Years of education: Not on file    Highest education level: Not on file   Occupational History    Not on file   Tobacco Use    Smoking status: Former     Types: Cigarettes     Passive exposure: Never    Smokeless tobacco: Never   Vaping Use    Vaping status: Never Used   Substance and Sexual Activity    Alcohol use: Yes     Comment: 1-2 x per month    Drug use: Never    Sexual activity: Not on file   Other Topics Concern    Not on file   Social History Narrative    Not on file     Social Drivers of Health     Financial Resource Strain: Low Risk  (2024)    Overall Financial Resource Strain (CARDIA)     Difficulty of Paying Living Expenses: Not hard at all   Food Insecurity: No Food Insecurity (2024)    Hunger Vital Sign     Worried About Running Out of Food in the Last Year: Never true     Ran Out of Food in the Last Year: Never true   Transportation Needs: No Transportation Needs (2024)    PRAPARE - Transportation     Lack of Transportation (Medical): No     Lack of Transportation (Non-Medical): No   Physical Activity: Not on file   Stress: Not on file   Social Connections: Not on file   Intimate Partner Violence: Not on file   Housing Stability: Low Risk   (2/12/2024)    Housing Stability Vital Sign     Unable to Pay for Housing in the Last Year: No     Number of Places Lived in the Last Year: 1     Unstable Housing in the Last Year: No       Allergies   Allergen Reactions    Adhesive Itching    Amoxicillin Hives and Itching    Bee Sting Kit Swelling    Cephalexin Itching and Nausea Only    Gadolinium-Containing Contrast Media Hives    Iodine Unknown    Latex Other    Pioglitazone Swelling    Rubella Virus Live Vaccine Unknown    Salicylates Other    Shellfish Derived Swelling    Sulfa (Sulfonamide Antibiotics) Unknown    Sulfamethoxazole-Trimethoprim Hives and Itching    Iodinated Contrast Media Itching and Rash          Current Outpatient Medications:     Accu-Chek Guide test strips strip, Use 1 test strip to test blood sugar twice daily., Disp: 200 strip, Rfl: 1    albuterol 90 mcg/actuation inhaler, Inhale 2 puffs every 4 hours if needed for wheezing or shortness of breath (You may also use this 10-15 minutes prior to exertional activity as needed)., Disp: 18 g, Rfl: 5    amLODIPine (Norvasc) 10 mg tablet, Take 1 tablet (10 mg) by mouth once daily., Disp: 90 tablet, Rfl: 1    ammonium lactate (Lac-Hydrin) 12 % lotion, Apply topically if needed for dry skin., Disp: 225 g, Rfl: 3    apixaban (Eliquis) 2.5 mg tablet, Take 1 tablet (2.5 mg) by mouth every 12 hours., Disp: 180 tablet, Rfl: 1    brimonidine-timoloL (Combigan) 0.2-0.5 % ophthalmic solution, Administer 1 drop into both eyes 2 times a day., Disp: 10 mL, Rfl: 11    Combigan 0.2-0.5 % ophthalmic solution, Administer 1 drop into both eyes 2 times a day., Disp: 30 mL, Rfl: 11    diclofenac sodium 1 % kit, Apply 1 Application topically if needed.  APPLY TO LOWER EXTREMITIES, 4 GM OF GEL TO AFFECTED AREA 4 TIMES DAILY. DO NOT APPLY MORE THAN 16 GM DAILY TO ANY ONE AFFECTED JOINT., Disp: , Rfl:     dulaglutide (Trulicity) 0.75 mg/0.5 mL pen injector, Inject 0.75 mg under the skin 1 (one) time per week., Disp: 12  "each, Rfl: 3    famotidine (Pepcid) 20 mg tablet, Take 1 tablet (20 mg) by mouth once daily at bedtime., Disp: , Rfl:     ferrous sulfate, 325 mg ferrous sulfate, tablet, Take 1 tablet by mouth once daily. On empty stomach, 1 before or 2 hours after meal with water. Do not take with antacids, tea, calcium containing products (milk, cheese), Disp: 90 tablet, Rfl: 1    fluticasone (Flonase) 50 mcg/actuation nasal spray, Administer 2 sprays into each nostril once daily., Disp: 16 g, Rfl: 3    furosemide (Lasix) 20 mg tablet, Take 1 tablet (20 mg) by mouth once daily., Disp: 30 tablet, Rfl: 11    glipiZIDE 2.5 mg tablet, Take 2.5 mg by mouth once daily., Disp: 90 tablet, Rfl: 1    hydrocortisone 1 % cream, Apply as needed to the injection site, Disp: 30 g, Rfl: 1    insulin lispro (HumaLOG KwikPen Insulin) 100 unit/mL injection, Use with sliding scale 3 times a day, up to 30 units daily, Disp: 15 mL, Rfl: 2    lancets (Accu-Chek Softclix Lancets) misc, Use to check blood sugar twice daily, Disp: 200 each, Rfl: 1    latanoprost (Xalatan) 0.005 % ophthalmic solution, Administer 1 drop into both eyes once daily in the morning., Disp: 2.5 mL, Rfl: 11    levocetirizine (Xyzal) 5 mg tablet, Take 1 tablet (5 mg) by mouth once daily in the evening., Disp: 90 tablet, Rfl: 1    losartan (Cozaar) 25 mg tablet, Take 1 tablet (25 mg) by mouth once daily., Disp: 90 tablet, Rfl: 1    metoprolol succinate XL (Toprol-XL) 200 mg 24 hr tablet, Take 1 tablet (200 mg) by mouth once daily., Disp: 90 tablet, Rfl: 3    netarsudiL (Rhopressa) 0.02 % drops opthalmic solution, Administer 1 drop into both eyes once daily in the evening., Disp: 2.5 mL, Rfl: 6    omeprazole (PriLOSEC) 40 mg DR capsule, Take 1 capsule (40 mg) by mouth 2 times a day before meals., Disp: 60 capsule, Rfl: 1    pen needle, diabetic 31 gauge x 5/16\" needle, Use to inject 1-4 times daily as directed., Disp: 100 each, Rfl: 11    pen needle, diabetic 33 gauge x 5/32\" needle, " Use for sliding scale up to 3 times a day, Disp: 100 each, Rfl: 2    polyethylene glycol (Glycolax, Miralax) 17 gram/dose powder, Take 17 g by mouth 2 times a day., Disp: 3060 g, Rfl: 3    rosuvastatin (Crestor) 20 mg tablet, Take 1 tablet (20 mg) by mouth once daily., Disp: 90 tablet, Rfl: 3    tiotropium (Spiriva Respimat) 2.5 mcg/actuation inhaler, Inhale 2 puffs once daily., Disp: 1 each, Rfl: 3    Current Facility-Administered Medications:     nitroglycerin (Nitrostat) SL tablet 0.4 mg, 0.4 mg, sublingual, Once, Vianney Marrufo MD     Review of system:  All other systems have been reviewed and are negative for complaints      Last recorded vitals:  There were no vitals taken for this visit.    Physical Exam:  General: Appears comfortable and in no apparent distress.  Head: Normocephalic, atraumatic  Eyes: Non-injected conjunctiva, sclera clear, no proptosis  Lungs: Breathing is easy, non-labored. Speaking in clear and complete sentences. Normal diaphragmatic movement.  Cardiovascular: no peripheral edema, cyanosis or pallor.   Abdomen: soft, non-distended, non-tender  : Bladder: non tender, not distended  MSK: Ambulatory with steady gait, unassisted  Skin: No visible rashes or lesions  Neurologic: Alert, oriented to person, place, and time  Psychiatric: mood and affect appropriate      Imaging  === 03/27/24 ===    US RENAL COMPLETE    - Impression -  1. Postsurgical changes status post left partial nephrectomy with  partially visualized remaining renal parenchyma and re-demonstration  of simple renal cysts. No discrete soft tissue lesions in the  surgical bed within the limitations of the ultrasound and  incompletely visualized ankle bed. Cross-sectional imaging can be  obtained for further evaluation as clinically indicated.  2. Mild pelvic fullness and few simple renal cysts. Otherwise no  significant abnormality of the right kidney.      I personally reviewed the images/study and I agree with the  "findings  as stated by Resident Salvatore Romero MD. This study was interpreted  at [facility], [place], [place].    MACRO:  None      Signed by: Nico Lara 3/27/2024 10:33 PM  Dictation workstation:   [id number]        Labs  Lab Results   Component Value Date    WBC 11.7 (H) 12/19/2024    HGB 13.3 12/19/2024    HCT 39.2 12/19/2024    MCV 84 12/19/2024     12/19/2024     Lab Results   Component Value Date    GLUCOSE 142 (H) 12/19/2024    CALCIUM 10.0 12/19/2024     12/19/2024    K 4.6 12/19/2024    CO2 22 12/19/2024     (H) 12/19/2024    BUN 9 12/19/2024    CREATININE 1.09 (H) 12/19/2024       Assessment and Plan:  Vania Andrews is a 69 y.o. female with history of dysuria, recurrent UTIs, bilateral renal cysts, who presents for follow up.     1.Today we discussed the definition of Overactive Bladder (OAB) as a clinical diagnosis that refers to \"urinary urgency, usually accompanied by frequency and nocturia, with or without urge incontinence, in the absence of urinary tract infection or any other obvious pathology.\" We discussed in detail the risk factors for OAB including bladder inflammation, chronic bladder outlet obstruction (urethral stenosis), central nervous systems disorders, and vaginal delivery of a child, postmenopausal status. I had a long discussion with patient regarding the first line treatment for OAB is behavioral therapy with or without medication therapy.     Behavioral therapy include the following elements:   1) Avoid activities that exacerbate incontinence  2) Maintain a voiding diary  3) Timed/scheduled voiding to empty the bladder before incontinence or severe urgency occurs  4) Bladder training  5) Kegel exercises and pelvic floor muscle training  6) Fluid intake management-avoid excessive fluid intake  7) Dietary management-avoid bladder irritants (caffeine, alcohol, spicy food, acidic food)  8) Avoid " constipation  9) Stop smoking (If currently smoking)    Patient was informed that second line treatment includes medications. We discussed Mirabegron and that the side effects include possible increase in blood pressure in a small minority of patients, however insurance does not always cover this. We also discussed anticholinergic medications which can have the side effects of dry eyes, dry mouth, constipation and rarely cognitive changes.    I mentioned 3rd line management options of neuromodulation and Botox injections as well    -Patient is not a candidate for anticholinergics due to history of glaucoma.  -Discussed neuromodulation as an option for urinary incontinence, she would like time to think about this. Information on neuromodulation provided.    Plan:  -UA today negative for infection  -PVR 0 ml  -Referral for Pelvic Floor Physical Therapy placed. List of locations provided for patient to call and schedule appointment  -Patient will consider Neuromodulation as she is not a candidate for anticholinergic medications  -Referral for Nephrologist and primary care placed for patient to establish care per her request  -Reviewed CMP on 12/19/24 with patient which shows improving creatinine and eGFR levels  -Follow-up in 1 month, or sooner if needed, to reassess symptoms     All questions and concerns were addressed. Patient verbalizes understanding and has no other questions at this time.     Some elements copied from Dr. De Leon's note on 6/9/21, the elements have been updated and all reflect current decision making from today, 01/06/25    E&M visit today is associated with current or anticipated ongoing medical care services related to a patient's single, serious condition or a complex condition.    MOR Santo-CNP   [facility] [place]  01/06/25

## 2025-01-08 ENCOUNTER — HOSPITAL ENCOUNTER (OUTPATIENT)
Dept: RADIOLOGY | Facility: HOSPITAL | Age: 70
Discharge: HOME | End: 2025-01-08
Payer: MEDICARE

## 2025-01-08 ENCOUNTER — APPOINTMENT (OUTPATIENT)
Dept: PHYSICAL THERAPY | Facility: CLINIC | Age: 70
End: 2025-01-08
Payer: MEDICARE

## 2025-01-08 ENCOUNTER — OFFICE VISIT (OUTPATIENT)
Dept: ORTHOPEDIC SURGERY | Facility: HOSPITAL | Age: 70
End: 2025-01-08
Payer: MEDICARE

## 2025-01-08 ENCOUNTER — PHARMACY VISIT (OUTPATIENT)
Dept: PHARMACY | Facility: CLINIC | Age: 70
End: 2025-01-08
Payer: MEDICARE

## 2025-01-08 VITALS — HEIGHT: 59 IN | BODY MASS INDEX: 40.32 KG/M2 | WEIGHT: 200 LBS

## 2025-01-08 DIAGNOSIS — M25.551 RIGHT HIP PAIN: ICD-10-CM

## 2025-01-08 DIAGNOSIS — M16.11 PRIMARY OSTEOARTHRITIS OF RIGHT HIP: Primary | ICD-10-CM

## 2025-01-08 PROCEDURE — 73502 X-RAY EXAM HIP UNI 2-3 VIEWS: CPT | Mod: RT

## 2025-01-08 PROCEDURE — 99214 OFFICE O/P EST MOD 30 MIN: CPT | Performed by: STUDENT IN AN ORGANIZED HEALTH CARE EDUCATION/TRAINING PROGRAM

## 2025-01-08 NOTE — PROGRESS NOTES
Ct Summers MD   Adult Reconstruction and Joint Replacement Surgery  Phone: [phone]     Fax: [phone]       Name: Vania Andrews  Age: 69 y.o.   : 1955   Date of Visit: 2025    INITIAL CONSULTATION    CC: Right hip pain    HPI:  This patient presents with 1 years of RIGHT hip pain. Reports recent hospitalization for 'clots'. Also had prior colonoscopy that seemed to have triggered this RIGHT hip pain.     Patient has tried the following Ice, Tylenol (arthritis dosing) , Activity modification, Physical therapy, Hyaluronic acid injections, and Xray. Date of last steroid injection: never. Patient does have pain at night. Patient does not report falls related to this problem. Patient is able to walk 2-3 blocks. Patient is currently using nothing as assistive device. Primarily complains of buttock and lateral hip pain. Patient has difficulty with donning and doffing shoes and socks, stairs, and getting in/out of car . The pain is significantly impacting their ability to perform activities of daily living. Patient reports no longer able to do activities such as turning in bed.     Focused History  PMH: Reviewed and PE/DVT: no. MI. DM. Chronic pain. CKD.   PSH: Reviewed , Hip/Knee replacement: no, Hip/Knee surgery: no, Anesthesia complications: no, Spine surgery: no, Surgical infection: no, and Weight loss surgery: no  Meds: Reviewed, Current Anticoagulants: Eliquis, Weight loss medication: no, and Current Opioids: no  Allergies: Reviewed  and The patient reports no contraindications or allergies to cephalosporins, aspirin, NSAIDs or opioids, except as noted above.  FH: No family history of any bleeding or clotting disorders.  SHx: Reviewed, Occupation: retired, Current smoker: no, EtOH intake weekly: minimal, Social support: , and Preferred physical activities: walking  Dental Hx: Last routine cleanin yr ago and Discussed that all invasive dental work must be completed at least 3  months prior to joint replacement surgery. Patient understands they are to avoid any invasive dental work 3-6 months post-surgically.   Yarsani: no    PROMS/HISTORY  PROMs   No questionnaires on file.     Past Medical History:   Diagnosis Date    Abdominal distension (gaseous) 07/31/2019    Abdominal bloating    Cancer (Multi)     Cataract     Coronary artery disease     Diabetes mellitus (Multi)     Diverticulosis of intestine, part unspecified, without perforation or abscess without bleeding 06/09/2013    Diverticulosis    Elevation of levels of liver transaminase levels 11/13/2020    Transaminitis    Encounter for follow-up examination after completed treatment for conditions other than malignant neoplasm 01/26/2019    Hospital discharge follow-up    Glaucoma     Hypertension     Long term (current) use of antibiotics 10/07/2016    Need for prophylactic antibiotic    Other amnesia 10/28/2014    Memory loss    Other conditions influencing health status 02/06/2019    History of cough    Other specified health status 02/07/2019    Hepatitis C antibody test negative    Other specified soft tissue disorders 06/14/2017    Leg swelling    Pain in left toe(s) 05/15/2017    Pain of toe of left foot    Pain in right foot 10/12/2016    Pain of right heel    Pain in right shoulder 06/22/2016    Acute pain of right shoulder    Personal history of diseases of the blood and blood-forming organs and certain disorders involving the immune mechanism 04/09/2018    History of anemia    Personal history of other diseases of the respiratory system 04/02/2018    History of sinusitis    Personal history of other diseases of the respiratory system 03/19/2014    History of upper respiratory infection    Personal history of other malignant neoplasm of kidney 01/14/2021    Personal history of malignant neoplasm of kidney    Personal history of other medical treatment 08/08/2017    History of screening mammography    Personal  history of other specified conditions 11/17/2014    History of abdominal pain    Personal history of other specified conditions 06/14/2017    History of urinary frequency    Personal history of other specified conditions 06/09/2021    History of dysuria    Personal history of other specified conditions 11/28/2014    History of breast lump    Unspecified abdominal hernia without obstruction or gangrene 04/21/2014    Hernia       Past Medical History:   Diagnosis Date    Abdominal distension (gaseous) 07/31/2019    Abdominal bloating    Cancer (Multi)     Cataract     Coronary artery disease     Diabetes mellitus (Multi)     Diverticulosis of intestine, part unspecified, without perforation or abscess without bleeding 06/09/2013    Diverticulosis    Elevation of levels of liver transaminase levels 11/13/2020    Transaminitis    Encounter for follow-up examination after completed treatment for conditions other than malignant neoplasm 01/26/2019    Hospital discharge follow-up    Glaucoma     Hypertension     Long term (current) use of antibiotics 10/07/2016    Need for prophylactic antibiotic    Other amnesia 10/28/2014    Memory loss    Other conditions influencing health status 02/06/2019    History of cough    Other specified health status 02/07/2019    Hepatitis C antibody test negative    Other specified soft tissue disorders 06/14/2017    Leg swelling    Pain in left toe(s) 05/15/2017    Pain of toe of left foot    Pain in right foot 10/12/2016    Pain of right heel    Pain in right shoulder 06/22/2016    Acute pain of right shoulder    Personal history of diseases of the blood and blood-forming organs and certain disorders involving the immune mechanism 04/09/2018    History of anemia    Personal history of other diseases of the respiratory system 04/02/2018    History of sinusitis    Personal history of other diseases of the respiratory system 03/19/2014    History of upper respiratory infection    Personal  history of other malignant neoplasm of kidney 2021    Personal history of malignant neoplasm of kidney    Personal history of other medical treatment 2017    History of screening mammography    Personal history of other specified conditions 2014    History of abdominal pain    Personal history of other specified conditions 2017    History of urinary frequency    Personal history of other specified conditions 2021    History of dysuria    Personal history of other specified conditions 2014    History of breast lump    Unspecified abdominal hernia without obstruction or gangrene 2014    Hernia     Documented in chart and reviewed.     Past Surgical History:   Procedure Laterality Date    BREAST BIOPSY Right 2014    Biopsy Breast Percutaneous Needle Core    BREAST BIOPSY Right 2018    CARDIAC CATHETERIZATION N/A 2024    Procedure: Left Heart Cath;  Surgeon: Donato Cespedes MD;  Location: [place] [address] [facility];  Service: Cardiovascular;  Laterality: N/A;     SECTION, CLASSIC  2014     Section    CHOLECYSTECTOMY  2017    Cholecystectomy Laparoscopic    HAND SURGERY  2020    Hand Surgery                                                                                                                                                          HERNIA REPAIR  2014    Hernia Repair    MR HEAD ANGIO WO IV CONTRAST  2014    MR HEAD ANGIO WO IV CONTRAST 2014 [facility] ANCILLARY LEGACY    OTHER SURGICAL HISTORY  2021    Nephrectomy    TOTAL ABDOMINAL HYSTERECTOMY W/ BILATERAL SALPINGOOPHORECTOMY  2014    Total Abdominal Hysterectomy With Removal Of Both Ovaries       Allergies: She is allergic to adhesive, amoxicillin, bee sting kit, cephalexin, gadolinium-containing contrast media, iodine, latex, pioglitazone, rubella virus live vaccine, salicylates, shellfish derived, sulfa (sulfonamide antibiotics),  "sulfamethoxazole-trimethoprim, and iodinated contrast media.     Medications:  Current Outpatient Medications   Medication Instructions    Accu-Chek Guide test strips strip Use 1 test strip to test blood sugar twice daily.    albuterol 90 mcg/actuation inhaler 2 puffs, inhalation, Every 4 hours PRN    amLODIPine (NORVASC) 10 mg, oral, Daily    ammonium lactate (Lac-Hydrin) 12 % lotion Topical, As needed    apixaban (ELIQUIS) 2.5 mg, oral, Every 12 hours    brimonidine-timoloL (Combigan) 0.2-0.5 % ophthalmic solution 1 drop, Both Eyes, 2 times daily    Combigan 0.2-0.5 % ophthalmic solution 1 drop, Both Eyes, 2 times daily    diclofenac sodium 1 % kit 1 Application, As needed    famotidine (Pepcid) 20 mg tablet 1 tablet, Nightly    fluticasone (Flonase) 50 mcg/actuation nasal spray 2 sprays, Each Nostril, Daily    furosemide (LASIX) 20 mg, oral, Daily    glipiZIDE 2.5 mg, oral, Daily    hydrocortisone 1 % cream Apply as needed to the injection site    insulin lispro (HumaLOG KwikPen Insulin) 100 unit/mL injection Use with sliding scale 3 times a day, up to 30 units daily    lancets (Accu-Chek Softclix Lancets) misc Use to check blood sugar twice daily    latanoprost (Xalatan) 0.005 % ophthalmic solution 1 drop, Both Eyes, Every morning    levocetirizine (XYZAL) 5 mg, oral, Every evening    losartan (COZAAR) 25 mg, oral, Daily    metoprolol succinate XL (TOPROL-XL) 200 mg, oral, Daily    netarsudiL (Rhopressa) 0.02 % drops opthalmic solution 1 drop, Both Eyes, Every evening    omeprazole (PRILOSEC) 40 mg, oral, 2 times daily before meals    pen needle, diabetic 31 gauge x 5/16\" needle Use to inject 1-4 times daily as directed.    pen needle, diabetic 33 gauge x 5/32\" needle Use for sliding scale up to 3 times a day    polyethylene glycol (GLYCOLAX, MIRALAX) 17 g, oral, 2 times daily    rosuvastatin (CRESTOR) 20 mg, oral, Daily    tiotropium (Spiriva Respimat) 2.5 mcg/actuation inhaler 2 puffs, inhalation, Daily    " Trulicity 0.75 mg, subcutaneous, Weekly       Family History   Problem Relation Name Age of Onset    Aneurysm Mother      Hypertension Mother      Pancreatic cancer Mother      Diabetes Mother      Other (laryngeal Cancer) Mother      Heart disease Father      Pulmonary embolism Brother      Breast cancer Father's Sister      Breast cancer Maternal Cousin       Documented in chart and reviewed.     Social History     Tobacco Use    Smoking status: Former     Types: Cigarettes     Passive exposure: Never    Smokeless tobacco: Never   Substance Use Topics    Alcohol use: Yes     Comment: 1-2 x per month        Review of Systems: Review of systems completed with medical assistant intake. Please refer to this note.     Physical Exam:  BMI: 43.    General: The patient is well appearing and has an appropriate affect.     Neurological Examination: SILT in SPN/DPN/Sural/Saphenous/Tibial nerves. 5/5 EHL, FHL, Tibial anterior, Gastrocnemius. Coordination grossly intact.     Cardiovascular Exam: Capillary refill <2 seconds.     Lymphatic Examination: There is no obvious lymphatic swelling present around the involved joint.    Skin Exam: Skin around the pertinent joint is without evidence of infection or rash.    Gait: The patient ambulates with a coxalgic gait.     Lumbar spine:    No tenderness to palpation midline.    Negative straight leg raise bilaterally.    Right Hip Examination:  Gait: Coxalgic gait.    Examination of the hip reveals the skin to be intact.    There is mild tenderness over the greater trochanter.    There is no obvious swelling.    There is a positive Stinchfield test.    Range of motion is: full extension to 90 degrees of flexion.    The hip internally rotates to 10 degrees and externally rotates to 30 degrees.    Abduction is 30 degrees and adduction is 15 degrees.    There is groin and buttock pain with hip motion.    There is a negative straight leg raise.    Abductor strength 4/5.    Left Hip  Examination:  Examination of the hip reveals the skin to be intact.    There is no tenderness over the greater trochanter.    There is no obvious swelling.    There is a negative Stinchfield test.    Range of motion is full extension to 95 degrees of flexion.    The hip internally rotates to 20 and externally rotates 40 degrees.    Abduction is 45 degrees and adduction is 20 degrees.    There is no groin and buttock pain with hip motion.    There is a negative straight leg raise.    Abductor strength 4+/5.    Right Knee Examination:  Examination of the right knee reveals the skin to be intact. There is no obvious swelling.    There is no tenderness to palpation.    Range of motion is full extension to 120 degrees of flexion.    The knee is stable.    There is no grinding with range of motion.    There is no patellofemoral crepitus.    Left Knee Examination:  Examination of the left knee reveals the skin to be intact. There is no obvious swelling.    There is no tenderness to palpation.    Range of motion is full extension to 120 degrees of flexion.    The knee is stable.    There is no grinding with range of motion.    There is no patellofemoral crepitus.    Prior Labs:   Prior Labs:   Lab Results   Component Value Date    WBC 7.5 01/09/2025    HGB 11.3 (L) 01/09/2025    HCT 35.0 (L) 01/09/2025    MCV 89 01/09/2025     01/09/2025      Lab Results   Component Value Date    INR 1.1 02/15/2024    INR 1.2 (H) 02/08/2024    INR 1.0 02/08/2024    PROTIME 12.9 (H) 02/15/2024    PROTIME 13.2 (H) 02/08/2024    PROTIME 11.5 02/08/2024         Lab Results   Component Value Date    GLUCOSE 149 (H) 01/09/2025    CALCIUM 9.3 01/09/2025     01/09/2025    K 4.2 01/09/2025    CO2 25 01/09/2025     01/09/2025    BUN 16 01/09/2025    CREATININE 1.12 (H) 01/09/2025      Lab Results   Component Value Date    CKTOTAL 112 12/23/2020    TROPONINI <0.02 02/05/2022      Lab Results   Component Value Date    HGBA1C 6.6 (H)  09/06/2024           Lab Results   Component Value Date    CRP 5.20 (H) 12/19/2024      Lab Results   Component Value Date    SEDRATE 59 (H) 12/19/2024         Radiographs:  Radiographs were personally reviewed today with the patient. There is evidence of severe RIGHT  hip osteoarthritis with bone on bone apposition.    Impression:  This patient presents with severe RIGHT  hip osteoarthritis with bone on bone apposition.    Diagnosis:   Primary osteoarthritis of right hip    Right hip pain      Recommendations / Plan:    I have discussed the options in detail with the patient. We have discussed anti-inflammatory medication, activity modification, physical therapy, corticosteroid injections, and total hip replacement surgery. The patient is not yet optimized for surgery.     The risks and benefits of all these treatment options have been discussed in detail.     The patient has tried at least 3 months of the above conservative treatments and continues to have disabling pain, impaired activities of daily living and worsened quality of life.  Reviewed the surgical optimization steps to optimize their chances for a successful joint replacement surgery.      Currently their BMI is 43.  Discussed that obesity is a risk factor for continued progression of osteoarthritis. Each pound of weight loss offloads their hip and knee joints by 3-6 pounds.  The most effective of these options is weight loss mainly through restricting caloric intake. They would need to lose significant weight before being scheduled for surgery. A referral to a nutritionist was offered, though patient reports she has tried previously. Has been on weight loss medication.     Patient is currently in physical therapy.  Patient will continue their home exercise program. Strategies for pain management using over-the-counter anti-inflammatory medications reviewed - tylenol.  Discussed the potential benefit of a steroid injection. Shared decision was made to  defer given cystic changes and high risk for rapidly progressive arthritis. Encouraged them to maintain range of motion and strength around the hip and knee joints.  They will continue to implement these strategies in addressing their pain.       Recommend the patient continue optimizing nonsurgical treatment interventions as outlined above for management of their arthritis.  I would be happy to see them again at any point to discuss surgery if indicated or they are more optimized or to review progress with nonsurgical treatment of arthritis.  The patient verbalizes understanding with the recommendations and treatment plan as outlined above and is in agreement.  Questions were addressed.    _____________  Ct Summers MD   Attending Orthopaedic Surgeon  [facility]    [facility]       This office note was transcribed with dictation software.  Please excuse any typographical errors, program misunderstandings leading to inadvertent insertions or deletions of inappropriate wording, pronoun errors and other unintentional transcription errors not noticed on proof-reading.

## 2025-01-08 NOTE — LETTER
2025     Laura Mata MD  [address]   [address]  [facility] [address]    Patient: Vania Andrews   YOB: 1955   Date of Visit: 2025       Dear Dr. Laura Mata MD:    Thank you for referring Vania Andrews to me for evaluation. Below are my notes for this consultation.  If you have questions, please do not hesitate to call me. I look forward to following your patient along with you.       Sincerely,     Ct Summers MD      CC: No Recipients  ______________________________________________________________________________________     Ct Summers MD   Adult Reconstruction and Joint Replacement Surgery  Phone: [phone]     Fax: [phone]       Name: Vania Andrews  Age: 69 y.o.   : 1955   Date of Visit: 2025    INITIAL CONSULTATION    CC: Right hip pain    HPI:  This patient presents with 1 years of RIGHT hip pain. Reports recent hospitalization for 'clots'. Also had prior colonoscopy that seemed to have triggered this RIGHT hip pain.     Patient has tried the following Ice, Tylenol (arthritis dosing) , Activity modification, Physical therapy, Hyaluronic acid injections, and Xray. Date of last steroid injection: never. Patient does have pain at night. Patient does not report falls related to this problem. Patient is able to walk 2-3 blocks. Patient is currently using nothing as assistive device. Primarily complains of buttock and lateral hip pain. Patient has difficulty with donning and doffing shoes and socks, stairs, and getting in/out of car . The pain is significantly impacting their ability to perform activities of daily living. Patient reports no longer able to do activities such as turning in bed.     Focused History  PMH: Reviewed and PE/DVT: no. MI. DM. Chronic pain. CKD.   PSH: Reviewed , Hip/Knee replacement: no, Hip/Knee surgery: no, Anesthesia complications: no, Spine surgery: no, Surgical infection: no, and Weight loss  surgery: no  Meds: Reviewed, Current Anticoagulants: Eliquis, Weight loss medication: no, and Current Opioids: no  Allergies: Reviewed  and The patient reports no contraindications or allergies to cephalosporins, aspirin, NSAIDs or opioids, except as noted above.  FH: No family history of any bleeding or clotting disorders.  SHx: Reviewed, Occupation: retired, Current smoker: no, EtOH intake weekly: minimal, Social support: , and Preferred physical activities: walking  Dental Hx: Last routine cleanin yr ago and Discussed that all invasive dental work must be completed at least 3 months prior to joint replacement surgery. Patient understands they are to avoid any invasive dental work 3-6 months post-surgically.   Bahai: no    PROMS/HISTORY  PROMs   No questionnaires on file.     Past Medical History:   Diagnosis Date   • Abdominal distension (gaseous) 2019    Abdominal bloating   • Cancer (Multi)    • Cataract    • Coronary artery disease    • Diabetes mellitus (Multi)    • Diverticulosis of intestine, part unspecified, without perforation or abscess without bleeding 2013    Diverticulosis   • Elevation of levels of liver transaminase levels 2020    Transaminitis   • Encounter for follow-up examination after completed treatment for conditions other than malignant neoplasm 2019    Hospital discharge follow-up   • Glaucoma    • Hypertension    • Long term (current) use of antibiotics 10/07/2016    Need for prophylactic antibiotic   • Other amnesia 10/28/2014    Memory loss   • Other conditions influencing health status 2019    History of cough   • Other specified health status 2019    Hepatitis C antibody test negative   • Other specified soft tissue disorders 2017    Leg swelling   • Pain in left toe(s) 05/15/2017    Pain of toe of left foot   • Pain in right foot 10/12/2016    Pain of right heel   • Pain in right shoulder 2016    Acute pain of  right shoulder   • Personal history of diseases of the blood and blood-forming organs and certain disorders involving the immune mechanism 04/09/2018    History of anemia   • Personal history of other diseases of the respiratory system 04/02/2018    History of sinusitis   • Personal history of other diseases of the respiratory system 03/19/2014    History of upper respiratory infection   • Personal history of other malignant neoplasm of kidney 01/14/2021    Personal history of malignant neoplasm of kidney   • Personal history of other medical treatment 08/08/2017    History of screening mammography   • Personal history of other specified conditions 11/17/2014    History of abdominal pain   • Personal history of other specified conditions 06/14/2017    History of urinary frequency   • Personal history of other specified conditions 06/09/2021    History of dysuria   • Personal history of other specified conditions 11/28/2014    History of breast lump   • Unspecified abdominal hernia without obstruction or gangrene 04/21/2014    Hernia       Past Medical History:   Diagnosis Date   • Abdominal distension (gaseous) 07/31/2019    Abdominal bloating   • Cancer (Multi)    • Cataract    • Coronary artery disease    • Diabetes mellitus (Multi)    • Diverticulosis of intestine, part unspecified, without perforation or abscess without bleeding 06/09/2013    Diverticulosis   • Elevation of levels of liver transaminase levels 11/13/2020    Transaminitis   • Encounter for follow-up examination after completed treatment for conditions other than malignant neoplasm 01/26/2019    Hospital discharge follow-up   • Glaucoma    • Hypertension    • Long term (current) use of antibiotics 10/07/2016    Need for prophylactic antibiotic   • Other amnesia 10/28/2014    Memory loss   • Other conditions influencing health status 02/06/2019    History of cough   • Other specified health status 02/07/2019    Hepatitis C antibody test negative   •  Other specified soft tissue disorders 2017    Leg swelling   • Pain in left toe(s) 05/15/2017    Pain of toe of left foot   • Pain in right foot 10/12/2016    Pain of right heel   • Pain in right shoulder 2016    Acute pain of right shoulder   • Personal history of diseases of the blood and blood-forming organs and certain disorders involving the immune mechanism 2018    History of anemia   • Personal history of other diseases of the respiratory system 2018    History of sinusitis   • Personal history of other diseases of the respiratory system 2014    History of upper respiratory infection   • Personal history of other malignant neoplasm of kidney 2021    Personal history of malignant neoplasm of kidney   • Personal history of other medical treatment 2017    History of screening mammography   • Personal history of other specified conditions 2014    History of abdominal pain   • Personal history of other specified conditions 2017    History of urinary frequency   • Personal history of other specified conditions 2021    History of dysuria   • Personal history of other specified conditions 2014    History of breast lump   • Unspecified abdominal hernia without obstruction or gangrene 2014    Hernia     Documented in chart and reviewed.     Past Surgical History:   Procedure Laterality Date   • BREAST BIOPSY Right 2014    Biopsy Breast Percutaneous Needle Core   • BREAST BIOPSY Right 2018   • CARDIAC CATHETERIZATION N/A 2024    Procedure: Left Heart Cath;  Surgeon: Donato Cespedes MD;  Location: [place] [address] Cardiac Cath Lab;  Service: Cardiovascular;  Laterality: N/A;   •  SECTION, CLASSIC  2014     Section   • CHOLECYSTECTOMY  2017    Cholecystectomy Laparoscopic   • HAND SURGERY  2020    Hand Surgery                                                                                                                                                          • HERNIA REPAIR  11/17/2014    Hernia Repair   • MR HEAD ANGIO WO IV CONTRAST  11/17/2014    MR HEAD ANGIO WO IV CONTRAST 11/17/2014 [facility] [place] [facility]CILLARY LEGACY   • OTHER SURGICAL HISTORY  01/14/2021    Nephrectomy   • TOTAL ABDOMINAL HYSTERECTOMY W/ BILATERAL SALPINGOOPHORECTOMY  04/21/2014    Total Abdominal Hysterectomy With Removal Of Both Ovaries       Allergies: She is allergic to adhesive, amoxicillin, bee sting kit, cephalexin, gadolinium-containing contrast media, iodine, latex, pioglitazone, rubella virus live vaccine, salicylates, shellfish derived, sulfa (sulfonamide antibiotics), sulfamethoxazole-trimethoprim, and iodinated contrast media.     Medications:  Current Outpatient Medications   Medication Instructions   • Accu-Chek Guide test strips strip Use 1 test strip to test blood sugar twice daily.   • albuterol 90 mcg/actuation inhaler 2 puffs, inhalation, Every 4 hours PRN   • amLODIPine (NORVASC) 10 mg, oral, Daily   • ammonium lactate (Lac-Hydrin) 12 % lotion Topical, As needed   • apixaban (ELIQUIS) 2.5 mg, oral, Every 12 hours   • brimonidine-timoloL (Combigan) 0.2-0.5 % ophthalmic solution 1 drop, Both Eyes, 2 times daily   • Combigan 0.2-0.5 % ophthalmic solution 1 drop, Both Eyes, 2 times daily   • diclofenac sodium 1 % kit 1 Application, As needed   • famotidine (Pepcid) 20 mg tablet 1 tablet, Nightly   • fluticasone (Flonase) 50 mcg/actuation nasal spray 2 sprays, Each Nostril, Daily   • furosemide (LASIX) 20 mg, oral, Daily   • glipiZIDE 2.5 mg, oral, Daily   • hydrocortisone 1 % cream Apply as needed to the injection site   • insulin lispro (HumaLOG KwikPen Insulin) 100 unit/mL injection Use with sliding scale 3 times a day, up to 30 units daily   • lancets (Accu-Chek Softclix Lancets) misc Use to check blood sugar twice daily   • latanoprost (Xalatan) 0.005 % ophthalmic solution 1 drop, Both Eyes, Every morning   • levocetirizine (XYZAL) 5  "mg, oral, Every evening   • losartan (COZAAR) 25 mg, oral, Daily   • metoprolol succinate XL (TOPROL-XL) 200 mg, oral, Daily   • netarsudiL (Rhopressa) 0.02 % drops opthalmic solution 1 drop, Both Eyes, Every evening   • omeprazole (PRILOSEC) 40 mg, oral, 2 times daily before meals   • pen needle, diabetic 31 gauge x 5/16\" needle Use to inject 1-4 times daily as directed.   • pen needle, diabetic 33 gauge x 5/32\" needle Use for sliding scale up to 3 times a day   • polyethylene glycol (GLYCOLAX, MIRALAX) 17 g, oral, 2 times daily   • rosuvastatin (CRESTOR) 20 mg, oral, Daily   • tiotropium (Spiriva Respimat) 2.5 mcg/actuation inhaler 2 puffs, inhalation, Daily   • Trulicity 0.75 mg, subcutaneous, Weekly       Family History   Problem Relation Name Age of Onset   • Aneurysm Mother     • Hypertension Mother     • Pancreatic cancer Mother     • Diabetes Mother     • Other (laryngeal Cancer) Mother     • Heart disease Father     • Pulmonary embolism Brother     • Breast cancer Father's Sister     • Breast cancer Maternal Cousin       Documented in chart and reviewed.     Social History     Tobacco Use   • Smoking status: Former     Types: Cigarettes     Passive exposure: Never   • Smokeless tobacco: Never   Substance Use Topics   • Alcohol use: Yes     Comment: 1-2 x per month        Review of Systems: Review of systems completed with medical assistant intake. Please refer to this note.     Physical Exam:  BMI: 43.    General: The patient is well appearing and has an appropriate affect.     Neurological Examination: SILT in SPN/DPN/Sural/Saphenous/Tibial nerves. 5/5 EHL, FHL, Tibial anterior, Gastrocnemius. Coordination grossly intact.     Cardiovascular Exam: Capillary refill <2 seconds.     Lymphatic Examination: There is no obvious lymphatic swelling present around the involved joint.    Skin Exam: Skin around the pertinent joint is without evidence of infection or rash.    Gait: The patient ambulates with a coxalgic " gait.     Lumbar spine:    No tenderness to palpation midline.    Negative straight leg raise bilaterally.    Right Hip Examination:  Gait: Coxalgic gait.    Examination of the hip reveals the skin to be intact.    There is mild tenderness over the greater trochanter.    There is no obvious swelling.    There is a positive Stinchfield test.    Range of motion is: full extension to 90 degrees of flexion.    The hip internally rotates to 10 degrees and externally rotates to 30 degrees.    Abduction is 30 degrees and adduction is 15 degrees.    There is groin and buttock pain with hip motion.    There is a negative straight leg raise.    Abductor strength 4/5.    Left Hip Examination:  Examination of the hip reveals the skin to be intact.    There is no tenderness over the greater trochanter.    There is no obvious swelling.    There is a negative Stinchfield test.    Range of motion is full extension to 95 degrees of flexion.    The hip internally rotates to 20 and externally rotates 40 degrees.    Abduction is 45 degrees and adduction is 20 degrees.    There is no groin and buttock pain with hip motion.    There is a negative straight leg raise.    Abductor strength 4+/5.    Right Knee Examination:  Examination of the right knee reveals the skin to be intact. There is no obvious swelling.    There is no tenderness to palpation.    Range of motion is full extension to 120 degrees of flexion.    The knee is stable.    There is no grinding with range of motion.    There is no patellofemoral crepitus.    Left Knee Examination:  Examination of the left knee reveals the skin to be intact. There is no obvious swelling.    There is no tenderness to palpation.    Range of motion is full extension to 120 degrees of flexion.    The knee is stable.    There is no grinding with range of motion.    There is no patellofemoral crepitus.    Prior Labs:   Prior Labs:   Lab Results   Component Value Date    WBC 7.5 01/09/2025    HGB  11.3 (L) 01/09/2025    HCT 35.0 (L) 01/09/2025    MCV 89 01/09/2025     01/09/2025      Lab Results   Component Value Date    INR 1.1 02/15/2024    INR 1.2 (H) 02/08/2024    INR 1.0 02/08/2024    PROTIME 12.9 (H) 02/15/2024    PROTIME 13.2 (H) 02/08/2024    PROTIME 11.5 02/08/2024         Lab Results   Component Value Date    GLUCOSE 149 (H) 01/09/2025    CALCIUM 9.3 01/09/2025     01/09/2025    K 4.2 01/09/2025    CO2 25 01/09/2025     01/09/2025    BUN 16 01/09/2025    CREATININE 1.12 (H) 01/09/2025      Lab Results   Component Value Date    CKTOTAL 112 12/23/2020    TROPONINI <0.02 02/05/2022      Lab Results   Component Value Date    HGBA1C 6.6 (H) 09/06/2024           Lab Results   Component Value Date    CRP 5.20 (H) 12/19/2024      Lab Results   Component Value Date    SEDRATE 59 (H) 12/19/2024         Radiographs:  Radiographs were personally reviewed today with the patient. There is evidence of severe RIGHT  hip osteoarthritis with bone on bone apposition.    Impression:  This patient presents with severe RIGHT  hip osteoarthritis with bone on bone apposition.    Diagnosis:   Primary osteoarthritis of right hip    Right hip pain      Recommendations / Plan:    I have discussed the options in detail with the patient. We have discussed anti-inflammatory medication, activity modification, physical therapy, corticosteroid injections, and total hip replacement surgery. The patient is not yet optimized for surgery.     The risks and benefits of all these treatment options have been discussed in detail.     The patient has tried at least 3 months of the above conservative treatments and continues to have disabling pain, impaired activities of daily living and worsened quality of life.  Reviewed the surgical optimization steps to optimize their chances for a successful joint replacement surgery.      Currently their BMI is 43.  Discussed that obesity is a risk factor for continued progression of  osteoarthritis. Each pound of weight loss offloads their hip and knee joints by 3-6 pounds.  The most effective of these options is weight loss mainly through restricting caloric intake. They would need to lose significant weight before being scheduled for surgery. A referral to a nutritionist was offered, though patient reports she has tried previously. Has been on weight loss medication.     Patient is currently in physical therapy.  Patient will continue their home exercise program. Strategies for pain management using over-the-counter anti-inflammatory medications reviewed - tylenol.  Discussed the potential benefit of a steroid injection. Shared decision was made to defer given cystic changes and high risk for rapidly progressive arthritis. Encouraged them to maintain range of motion and strength around the hip and knee joints.  They will continue to implement these strategies in addressing their pain.       Recommend the patient continue optimizing nonsurgical treatment interventions as outlined above for management of their arthritis.  I would be happy to see them again at any point to discuss surgery if indicated or they are more optimized or to review progress with nonsurgical treatment of arthritis.  The patient verbalizes understanding with the recommendations and treatment plan as outlined above and is in agreement.  Questions were addressed.    _____________  Ct Summers MD   Attending Orthopaedic Surgeon  [facility]    [facility]       This office note was transcribed with dictation software.  Please excuse any typographical errors, program misunderstandings leading to inadvertent insertions or deletions of inappropriate wording, pronoun errors and other unintentional transcription errors not noticed on proof-reading.

## 2025-01-09 ENCOUNTER — OFFICE VISIT (OUTPATIENT)
Dept: GASTROENTEROLOGY | Facility: HOSPITAL | Age: 70
End: 2025-01-09
Payer: MEDICARE

## 2025-01-09 ENCOUNTER — LAB (OUTPATIENT)
Dept: LAB | Facility: HOSPITAL | Age: 70
End: 2025-01-09
Payer: MEDICARE

## 2025-01-09 ENCOUNTER — OFFICE VISIT (OUTPATIENT)
Dept: HEMATOLOGY/ONCOLOGY | Facility: HOSPITAL | Age: 70
End: 2025-01-09
Payer: MEDICARE

## 2025-01-09 VITALS
WEIGHT: 218.48 LBS | SYSTOLIC BLOOD PRESSURE: 111 MMHG | BODY MASS INDEX: 44.13 KG/M2 | HEART RATE: 71 BPM | DIASTOLIC BLOOD PRESSURE: 60 MMHG | OXYGEN SATURATION: 100 % | RESPIRATION RATE: 18 BRPM | TEMPERATURE: 98.4 F

## 2025-01-09 VITALS
HEIGHT: 59 IN | HEART RATE: 99 BPM | BODY MASS INDEX: 43.14 KG/M2 | DIASTOLIC BLOOD PRESSURE: 83 MMHG | SYSTOLIC BLOOD PRESSURE: 123 MMHG | RESPIRATION RATE: 16 BRPM | TEMPERATURE: 97.5 F | WEIGHT: 214 LBS

## 2025-01-09 DIAGNOSIS — I82.519 CHRONIC DEEP VEIN THROMBOSIS (DVT) OF FEMORAL VEIN, UNSPECIFIED LATERALITY (MULTI): ICD-10-CM

## 2025-01-09 DIAGNOSIS — I26.92 ACUTE SADDLE PULMONARY EMBOLISM WITHOUT ACUTE COR PULMONALE (MULTI): ICD-10-CM

## 2025-01-09 DIAGNOSIS — Z86.711 HISTORY OF PULMONARY EMBOLISM: ICD-10-CM

## 2025-01-09 DIAGNOSIS — I82.5Y3 CHRONIC DEEP VEIN THROMBOSIS (DVT) OF PROXIMAL VEIN OF BOTH LOWER EXTREMITIES (MULTI): ICD-10-CM

## 2025-01-09 DIAGNOSIS — D50.0 IRON DEFICIENCY ANEMIA DUE TO CHRONIC BLOOD LOSS: ICD-10-CM

## 2025-01-09 DIAGNOSIS — Z79.01 ON CONTINUOUS ORAL ANTICOAGULATION: ICD-10-CM

## 2025-01-09 DIAGNOSIS — D50.0 IRON DEFICIENCY ANEMIA DUE TO CHRONIC BLOOD LOSS: Primary | ICD-10-CM

## 2025-01-09 LAB
ANION GAP SERPL CALC-SCNC: 12 MMOL/L (ref 10–20)
BASOPHILS # BLD AUTO: 0.04 X10*3/UL (ref 0–0.1)
BASOPHILS NFR BLD AUTO: 0.5 %
BUN SERPL-MCNC: 16 MG/DL (ref 6–23)
CALCIUM SERPL-MCNC: 9.3 MG/DL (ref 8.6–10.3)
CHLORIDE SERPL-SCNC: 107 MMOL/L (ref 98–107)
CO2 SERPL-SCNC: 25 MMOL/L (ref 21–32)
CREAT SERPL-MCNC: 1.12 MG/DL (ref 0.5–1.05)
EGFRCR SERPLBLD CKD-EPI 2021: 54 ML/MIN/1.73M*2
EOSINOPHIL # BLD AUTO: 0.22 X10*3/UL (ref 0–0.7)
EOSINOPHIL NFR BLD AUTO: 2.9 %
ERYTHROCYTE [DISTWIDTH] IN BLOOD BY AUTOMATED COUNT: 13.7 % (ref 11.5–14.5)
FERRITIN SERPL-MCNC: 25 NG/ML (ref 8–150)
GLUCOSE SERPL-MCNC: 149 MG/DL (ref 74–99)
HCT VFR BLD AUTO: 35 % (ref 36–46)
HGB BLD-MCNC: 11.3 G/DL (ref 12–16)
HGB RETIC QN: 30 PG (ref 28–38)
IMM GRANULOCYTES # BLD AUTO: 0.03 X10*3/UL (ref 0–0.7)
IMM GRANULOCYTES NFR BLD AUTO: 0.4 % (ref 0–0.9)
IMMATURE RETIC FRACTION: 15.9 %
IRON SATN MFR SERPL: 11 % (ref 25–45)
IRON SERPL-MCNC: 44 UG/DL (ref 35–150)
LYMPHOCYTES # BLD AUTO: 1.66 X10*3/UL (ref 1.2–4.8)
LYMPHOCYTES NFR BLD AUTO: 22.2 %
MCH RBC QN AUTO: 28.6 PG (ref 26–34)
MCHC RBC AUTO-ENTMCNC: 32.3 G/DL (ref 32–36)
MCV RBC AUTO: 89 FL (ref 80–100)
MONOCYTES # BLD AUTO: 0.46 X10*3/UL (ref 0.1–1)
MONOCYTES NFR BLD AUTO: 6.2 %
NEUTROPHILS # BLD AUTO: 5.06 X10*3/UL (ref 1.2–7.7)
NEUTROPHILS NFR BLD AUTO: 67.8 %
NRBC BLD-RTO: 0 /100 WBCS (ref 0–0)
PLATELET # BLD AUTO: 327 X10*3/UL (ref 150–450)
POTASSIUM SERPL-SCNC: 4.2 MMOL/L (ref 3.5–5.3)
RBC # BLD AUTO: 3.95 X10*6/UL (ref 4–5.2)
RETICS #: 0.07 X10*6/UL (ref 0.02–0.11)
RETICS/RBC NFR AUTO: 1.8 % (ref 0.5–2)
SODIUM SERPL-SCNC: 140 MMOL/L (ref 136–145)
TIBC SERPL-MCNC: 407 UG/DL (ref 240–445)
UIBC SERPL-MCNC: 363 UG/DL (ref 110–370)
WBC # BLD AUTO: 7.5 X10*3/UL (ref 4.4–11.3)

## 2025-01-09 PROCEDURE — 1125F AMNT PAIN NOTED PAIN PRSNT: CPT | Performed by: STUDENT IN AN ORGANIZED HEALTH CARE EDUCATION/TRAINING PROGRAM

## 2025-01-09 PROCEDURE — 85025 COMPLETE CBC W/AUTO DIFF WBC: CPT

## 2025-01-09 PROCEDURE — 4010F ACE/ARB THERAPY RXD/TAKEN: CPT | Performed by: STUDENT IN AN ORGANIZED HEALTH CARE EDUCATION/TRAINING PROGRAM

## 2025-01-09 PROCEDURE — 82728 ASSAY OF FERRITIN: CPT

## 2025-01-09 PROCEDURE — 3074F SYST BP LT 130 MM HG: CPT | Performed by: STUDENT IN AN ORGANIZED HEALTH CARE EDUCATION/TRAINING PROGRAM

## 2025-01-09 PROCEDURE — 36415 COLL VENOUS BLD VENIPUNCTURE: CPT

## 2025-01-09 PROCEDURE — 85045 AUTOMATED RETICULOCYTE COUNT: CPT

## 2025-01-09 PROCEDURE — 99214 OFFICE O/P EST MOD 30 MIN: CPT | Performed by: STUDENT IN AN ORGANIZED HEALTH CARE EDUCATION/TRAINING PROGRAM

## 2025-01-09 PROCEDURE — 83540 ASSAY OF IRON: CPT

## 2025-01-09 PROCEDURE — 3078F DIAST BP <80 MM HG: CPT | Performed by: STUDENT IN AN ORGANIZED HEALTH CARE EDUCATION/TRAINING PROGRAM

## 2025-01-09 PROCEDURE — 80048 BASIC METABOLIC PNL TOTAL CA: CPT

## 2025-01-09 PROCEDURE — G2211 COMPLEX E/M VISIT ADD ON: HCPCS | Performed by: STUDENT IN AN ORGANIZED HEALTH CARE EDUCATION/TRAINING PROGRAM

## 2025-01-09 PROCEDURE — 1159F MED LIST DOCD IN RCRD: CPT | Performed by: STUDENT IN AN ORGANIZED HEALTH CARE EDUCATION/TRAINING PROGRAM

## 2025-01-09 ASSESSMENT — ENCOUNTER SYMPTOMS
CHILLS: 0
HEMATURIA: 0
COUGH: 0
PALPITATIONS: 0
SHORTNESS OF BREATH: 0
FEVER: 0
SLEEP DISTURBANCE: 0
HEADACHES: 0
NAUSEA: 0
CONSTIPATION: 1
SORE THROAT: 0
DYSURIA: 0
MYALGIAS: 0
BLOOD IN STOOL: 0
VOMITING: 0
ARTHRALGIAS: 0
DIARRHEA: 0
ABDOMINAL PAIN: 1

## 2025-01-09 ASSESSMENT — PAIN SCALES - GENERAL
PAINLEVEL_OUTOF10: 8
PAINLEVEL_OUTOF10: 0-NO PAIN

## 2025-01-09 NOTE — PROGRESS NOTES
Gastroenterology Clinic Note    History Of Present Illness  Vania Andrews is a 69 y.o. female presenting with PMHx of left RCC w/partial nephrectomy and adrenenalectomy, CKD3,DLD, T2DM, MARYLU on CPAP, CAD, VEDA on iron transfusions, Provoked DVT and PE 2/2024 on apixaban chronic VEDA presenting for follow up of her VEDA.     Initially seen in clinic 3/7/24 after 1/2024 hospitalization where found to have acute on chronic microcytic anemia planned for outpatient endoscopy then represented 2/2024 found to have saddle PE started on heparin developed melena and worsening anemia PT underwent EGD/Colonoscopy 2/2024. EGD showed no signs of bleeding but had a 11cm hiatal hernia and gastritis. Colonoscopy  with extensive diverticula but no bleeding. Hb on discharge was 10.2 patient discharged on eliquis. Patient then had capsule endoscopy 7/2024 with excellent prep and no source of anemia. Patient following with Tamika Spears for her VEDA which he suspects given negative heme work up is 2/2 chronic GI losses.     Since last visit 7.2024 tamika switched her from oral iron to iron infusions due to lack of response with PO. She saw tamika Spears this morning who repeated iron studies and CBC. Her last iron infusion was 10/2024.     Denies any blood in stools, no melena or hematochezia, but does report easily fatigable. Reports a lot of gas, reports it is embarrassing.     Heart burn/GERD is under control with her omeprazole. Reports symptoms are about once a month where she has to take MAALOX to help with her symptoms. No dysphagia, no odonophagia.       1/9/24  10/7/24  7/16/24   3/2024  Iron 44  19  28 L   29   TIBC 407   338  406   80  % Sat 11    6  7   8   Ferritin  25    26  127   75         PREVIOUS ENDOSCOPIES:   7/8/24Capsule endoscopy   Quality of preparation was excellent   The visualized stomach was normal   The visualized small bowel was normal. No blood, angioectasia, erosions, ulcerations, strictures or masses  were seen  Green stool was noted in the colon     2/15/24 Colonoscopy   Findings  Normal terminal ileum, ileocecal valve, and appendiceal orifice (photodocumented).  There was significant amount of thick liquid stool and solid stool which obscured much of the underlying mucosa requiring extensive water lavage.  Multiple small, medium and large, extensive diverticula with no inflammation in the ascending colon, transverse colon, descending colon and sigmoid colon; no bleeding was identified  External medium hemorrhoids observed during retroflexion; no bleeding was identified  No polyps were visualized on this procedure.  No blood was seen in the terminal ileum or in the colon on this procedure.  2/9/24 EGD     Findings  Tortuous esophagus  Regular Z-line 29 cm from the incisors  Large 11 cm hiatal hernia  Multiple sessile polyps measuring smaller than 5 mm in the hiatal hernia; no bleeding was identified. Appearances consistent with fundic gland polyps as noted on recent EGD.  Linear erosion in the body of the stomach; no bleeding was identified;  Scattered patchy erythema seen in body of stomach. No evidence of bleeding  The duodenal bulb and 2nd part of the duodenum appeared normal.    Pt had EGD/Colonoscopy/capsule endoscopy in 2019 negative.       Review of Systems  Review of Systems   Constitutional:  Negative for chills and fever.   HENT:  Negative for congestion and sore throat.    Eyes:  Negative for visual disturbance.   Respiratory:  Negative for cough and shortness of breath.    Cardiovascular:  Positive for leg swelling. Negative for palpitations.   Gastrointestinal:  Positive for abdominal pain and constipation. Negative for blood in stool, diarrhea, nausea and vomiting.   Genitourinary:  Negative for dysuria and hematuria.   Musculoskeletal:  Negative for arthralgias and myalgias.   Skin:  Negative for rash.   Neurological:  Negative for headaches.   Psychiatric/Behavioral:  Negative for sleep  disturbance.         Medications:    Current Outpatient Medications:     Accu-Chek Guide test strips strip, Use 1 test strip to test blood sugar twice daily., Disp: 200 strip, Rfl: 1    albuterol 90 mcg/actuation inhaler, Inhale 2 puffs every 4 hours if needed for wheezing or shortness of breath (You may also use this 10-15 minutes prior to exertional activity as needed)., Disp: 18 g, Rfl: 5    amLODIPine (Norvasc) 10 mg tablet, Take 1 tablet (10 mg) by mouth once daily., Disp: 90 tablet, Rfl: 1    ammonium lactate (Lac-Hydrin) 12 % lotion, Apply topically if needed for dry skin., Disp: 225 g, Rfl: 3    apixaban (Eliquis) 2.5 mg tablet, Take 1 tablet (2.5 mg) by mouth every 12 hours., Disp: 180 tablet, Rfl: 1    brimonidine-timoloL (Combigan) 0.2-0.5 % ophthalmic solution, Administer 1 drop into both eyes 2 times a day., Disp: 10 mL, Rfl: 11    Combigan 0.2-0.5 % ophthalmic solution, Administer 1 drop into both eyes 2 times a day., Disp: 30 mL, Rfl: 11    diclofenac sodium 1 % kit, Apply 1 Application topically if needed.  APPLY TO LOWER EXTREMITIES, 4 GM OF GEL TO AFFECTED AREA 4 TIMES DAILY. DO NOT APPLY MORE THAN 16 GM DAILY TO ANY ONE AFFECTED JOINT., Disp: , Rfl:     dulaglutide (Trulicity) 0.75 mg/0.5 mL pen injector, Inject 0.75 mg under the skin 1 (one) time per week., Disp: 12 each, Rfl: 3    famotidine (Pepcid) 20 mg tablet, Take 1 tablet (20 mg) by mouth once daily at bedtime., Disp: , Rfl:     ferrous sulfate, 325 mg ferrous sulfate, tablet, Take 1 tablet by mouth once daily. On empty stomach, 1 before or 2 hours after meal with water. Do not take with antacids, tea, calcium containing products (milk, cheese), Disp: 90 tablet, Rfl: 1    fluticasone (Flonase) 50 mcg/actuation nasal spray, Administer 2 sprays into each nostril once daily., Disp: 16 g, Rfl: 3    furosemide (Lasix) 20 mg tablet, Take 1 tablet (20 mg) by mouth once daily., Disp: 30 tablet, Rfl: 11    glipiZIDE 2.5 mg tablet, Take 2.5 mg by  "mouth once daily., Disp: 90 tablet, Rfl: 1    hydrocortisone 1 % cream, Apply as needed to the injection site, Disp: 30 g, Rfl: 1    insulin lispro (HumaLOG KwikPen Insulin) 100 unit/mL injection, Use with sliding scale 3 times a day, up to 30 units daily, Disp: 15 mL, Rfl: 2    lancets (Accu-Chek Softclix Lancets) misc, Use to check blood sugar twice daily, Disp: 200 each, Rfl: 1    latanoprost (Xalatan) 0.005 % ophthalmic solution, Administer 1 drop into both eyes once daily in the morning., Disp: 2.5 mL, Rfl: 11    levocetirizine (Xyzal) 5 mg tablet, Take 1 tablet (5 mg) by mouth once daily in the evening., Disp: 90 tablet, Rfl: 1    losartan (Cozaar) 25 mg tablet, Take 1 tablet (25 mg) by mouth once daily., Disp: 90 tablet, Rfl: 1    metoprolol succinate XL (Toprol-XL) 200 mg 24 hr tablet, Take 1 tablet (200 mg) by mouth once daily., Disp: 90 tablet, Rfl: 3    netarsudiL (Rhopressa) 0.02 % drops opthalmic solution, Administer 1 drop into both eyes once daily in the evening., Disp: 2.5 mL, Rfl: 6    omeprazole (PriLOSEC) 40 mg DR capsule, Take 1 capsule (40 mg) by mouth 2 times a day before meals., Disp: 60 capsule, Rfl: 1    pen needle, diabetic 31 gauge x 5/16\" needle, Use to inject 1-4 times daily as directed., Disp: 100 each, Rfl: 11    pen needle, diabetic 33 gauge x 5/32\" needle, Use for sliding scale up to 3 times a day, Disp: 100 each, Rfl: 2    polyethylene glycol (Glycolax, Miralax) 17 gram/dose powder, Take 17 g by mouth 2 times a day., Disp: 3060 g, Rfl: 3    rosuvastatin (Crestor) 20 mg tablet, Take 1 tablet (20 mg) by mouth once daily., Disp: 90 tablet, Rfl: 3    tiotropium (Spiriva Respimat) 2.5 mcg/actuation inhaler, Inhale 2 puffs once daily., Disp: 1 each, Rfl: 3    Current Facility-Administered Medications:     nitroglycerin (Nitrostat) SL tablet 0.4 mg, 0.4 mg, sublingual, Once, Vianney Marrufo MD    Physical Exam  General: well-nourished, no acute distress  HEENT: PERRLA, EOM intact, no scleral " icterus, moist MM  Respiratory: CTA bilaterally, normal work of breathing  Cardiovascular: RRR, no murmurs/rubs/gallops  Abdomen: Soft, nontender, nondistended  Extremities: no edema, no asterixis  Neuro: alert and oriented, CNII-XII grossly intact, moves all 4 extremities with no focal deficits      Last Recorded Vitals  There were no vitals taken for this visit.    Relevant Results    Lab Results   Component Value Date    WBC 7.5 01/09/2025    HGB 11.3 (L) 01/09/2025    HCT 35.0 (L) 01/09/2025    MCV 89 01/09/2025     01/09/2025     Lab Results   Component Value Date    GLUCOSE 142 (H) 12/19/2024    CALCIUM 10.0 12/19/2024     12/19/2024    K 4.6 12/19/2024    CO2 22 12/19/2024     (H) 12/19/2024    BUN 9 12/19/2024    CREATININE 1.09 (H) 12/19/2024     Lab Results   Component Value Date    ALT 12 12/19/2024    AST 19 12/19/2024    GGT 33 12/23/2020    ALKPHOS 170 (H) 12/19/2024    BILITOT 0.5 12/19/2024       ASSESSMENT/PLAN:  Vania Andrews is a 69 y.o. female presenting with PMHx of left RCC w/partial nephrectomy and adrenenalectomy, CKD3,DLD, T2DM, MARYLU on CPAP, CAD, VEDA on iron transfusions, Provoked DVT and PE 2/2024 on apixaban chronic VEDA presenting for follow up of her VEDA.     EGD/Colonoscopy/capsule endoscopy in 2019 negative. Pt again developed microcytic anemia which was exacerbated by the anticoagulation for PE. Repeat EGD/Colonoscopy 2/2024 which were grossly negative and capsule endoscopy 7/8/24 with no abnormal source of VEDA found.     Review of previous EGD notes large 11cm hiatal hernia, although no linda lesions were noted at that time she could very likely have these give size of her hernia which with her anticoagulation could intermittently bleed and be source of VEDA.     VEDA   -Recommend Repeat EGD to reevaluate her hiatal hernia, look for linda erosions or other source of VEDA   -If patient noted to have linda lesions would recommend/refer to surgery for repair of her  hiatal hernia     2. Constipation   -Continue Miralax as needed titrate to 2-3 Bms a day or to feeling of complete evacuation     RTC 3 months     Venus Mandel MD     ---------------------------------------------------------------    I have examined the patient and reviewed the trainee's note.  I agree with the following documentation with edits to reflect my assessment and recommendations.  Additional comments below as needed.    Edward Alvarado MD

## 2025-01-09 NOTE — PROGRESS NOTES
Patient ID: Vania Andrews is a 69 y.o. female.  Referring Physician: No referring provider defined for this encounter.  Primary Care Provider: Laura Mata MD  Visit Type: Follow Up  Diagnosis: Anemia       Subjective    HPI  69 y.o. 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   Has previously seen  at  back in 2021 for similar concerns.     Re anemia:   Recently admitted at  for VEDA with Hb ~5, ferritin 21, TIBC >450, hypoproliferative retic, relatively normal coags, admission notes allude to GI bleeding, but pt does not account for this. Folate B12 not checked. Hapto WNL, LDH mildly elevated, bili indirectly elevated also during admission. Was treated with 1 dose of iron sucrose during admission 300mg x1 and transfusions.   Had GIB in 2019, needing transfusions. Were not able to identify where the bleed was from. Was seen at [facility] for VEDA source of bleeding unclear, imaging noted below. Received (IV femuroxytol) on 3/23 and 3/30/2021. Since then at recent admission has had IV iron sucrose.   Currently on ASA 81mg daily. Not on PO iron, has tried it in the past but has issues with constipation despite colace.   Age appropriate cancer screening: last colo in '24, last mammo oct '23 has axillary adenopathy   FMH: mother had throat and pancreatic cancer   Social history: former smoker, quit >20yrs, social drinker, no RDA. No CT/RT, previously worked at [employer] as a nurse  but has stopped working  "since her right wrist fracture in 8/2020    Re thrombosis history:   11/13/24: LLE swelling DVT scan showed \"acute occlusive deep vein thrombosis visualized in the distal femoral, popliteal, peroneal and gastrocnemius veins. There is acute non-occlusive deep vein thrombosis visualized in the posterior tibial vein.\" Was given apixaban for 1yr, but asked to stop after 3 months by her cardiologist  for presumably a provoked VTE event with concern for GI bleeding.   Jan-Feb/2024: At initial visit with me admitted due to concern for VTE had a submassive saddle PE (acute saddle type pulmonary embolus extending to  bilateral upper and right lower lobar and segmental branches. Calculated RV to LV ratio of 1.0) and DVT (occlusive thrombus in the right distal femoral and popliteal veins.)was off AC at presentation. Was started on heparin IV but noted to have recurrent GI bleeding early in the year and also suspected during admission, accordingly underwent EGD and colo after briefly holding her AC. Both were negative for a source of bleeding. Eventually transitioned to apixaban 5mg Q12H.     03/11/24: at follow up today pt has an antalgic gait needing a wheelchair eventually. Reports pain in the right thigh started at the time of discharge from hospital (where she was admitted for DVT/PE) not at admission and has remained unchanged since. Worried about clot progression. Has not missed any doses of the AC. Swelling in  RLE is improving but this has not changed the pain.   06/20/24: presents alone for visit, her leg pain is \"half of what it was\" when she last met me. Worked up by GI for cardiac issues. Apparently a/w sciatica. Continues on her apixaban 5mg Q12H, no obvious GI bleeding. Is worried about her PEs. Since we last met informs me she has cousins with VTE events. Has not been taking her PO iron since she was told by GI to hold until pill enteroscopy, is waiting to schedule.     9/19/2024: Patient present with " her spouse. Capsule endoscopy done in 2024, did not show any lesions, bleed or masses. Patient saw GI in July who suspected patient may have an AVM that intermittently bleeds. Patient is taking iron tabs daily and still on apixaban 5 mg Q 12 hrs as prescribed, has not missed any dose. Reports ongoing B/L LE swelling, seeing cardio who has her on furosemide 20 mg as needed for swelling.   25: reports she is in pain right thigh and gluteal region,       Review of Systems - Oncology  10 point review of systems negative except as stated in HPI    Objective   Past Surgical History:   Procedure Laterality Date   • BREAST BIOPSY Right 2014    Biopsy Breast Percutaneous Needle Core   • BREAST BIOPSY Right 2018   • CARDIAC CATHETERIZATION N/A 2024    Procedure: Left Heart Cath;  Surgeon: Donato Cespedes MD;  Location: [place] [address] Cardiac Cath Lab;  Service: Cardiovascular;  Laterality: N/A;   •  SECTION, CLASSIC  2014     Section   • CHOLECYSTECTOMY  2017    Cholecystectomy Laparoscopic   • HAND SURGERY  2020    Hand Surgery                                                                                                                                                         • HERNIA REPAIR  2014    Hernia Repair   • MR HEAD ANGIO WO IV CONTRAST  2014    MR HEAD ANGIO WO IV CONTRAST 2014 [facility] ANCILLARY LEGACY   • OTHER SURGICAL HISTORY  2021    Nephrectomy   • TOTAL ABDOMINAL HYSTERECTOMY W/ BILATERAL SALPINGOOPHORECTOMY  2014    Total Abdominal Hysterectomy With Removal Of Both Ovaries     Physical Exam  Vitals reviewed.   Constitutional:       General: She is not in acute distress.     Appearance: She is not toxic-appearing.   HENT:      Head: Normocephalic and atraumatic.   Pulmonary:      Effort: Pulmonary effort is normal.   Musculoskeletal:      Comments: Continues to have some right lower extremity swelling, however this is  improved.   Neurological:      General: No focal deficit present.      Mental Status: She is oriented to person, place, and time.   Psychiatric:         Mood and Affect: Mood normal.     BSA: 2.03 meters squared  /60 (BP Location: Left arm, Patient Position: Sitting, BP Cuff Size: Large adult)   Pulse 71   Temp 36.9 °C (98.4 °F) (Skin)   Resp 18   Wt 99.1 kg (218 lb 7.6 oz)   LMP  (LMP Unknown)   SpO2 100%   BMI 44.13 kg/m²     Labs:  Lab Results   Component Value Date    WBC 11.7 (H) 12/19/2024    NEUTROABS 9.35 (H) 12/19/2024    IGABSOL 0.06 12/19/2024    LYMPHSABS 1.43 12/19/2024    MONOSABS 0.78 12/19/2024    EOSABS 0.09 12/19/2024    BASOSABS 0.03 12/19/2024    RBC 4.66 12/19/2024    MCV 84 12/19/2024    MCHC 33.9 12/19/2024    HGB 13.3 12/19/2024    HCT 39.2 12/19/2024     12/19/2024      Lab Results   Component Value Date    CREATININE 1.09 (H) 12/19/2024    BUN 9 12/19/2024    EGFR 55 (L) 12/19/2024     12/19/2024    K 4.6 12/19/2024     (H) 12/19/2024    CO2 22 12/19/2024       Latest Reference Range & Units 05/03/21 09:17 01/26/24 01:29 03/08/24 10:31 03/11/24 12:05 5/28/2024   FERRITIN 8 - 150 ng/mL 396 (H) 21 81 75 21   IRON 35 - 150 ug/dL 91 51 44 29 (L) 25   TIBC 240 - 445 ug/dL 295 COMMENT ONLY 396 380 >450   UIBC 110 - 370 ug/dL  >450 (H) 352 351    % Saturation 25 - 45 % 31 COMMENT ONLY 11 (L) 8 (L)        Assessment/Plan    69 y.o. 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    Has previously seen  at  back in 2021 for similar concerns.      # Anemia: Hb last WNL in '22, since then progressive decline with obvious GI bleeding in '23, microcytic received IV ferumoxytol and sucrose intermittently, labs from this visit showing Hb ~11. Has iron deficiency with labs showing evidence she is taking PO iron, MCV has normalized suggesting against congenital hemoglobinopathy. Further work up for anemia at past visit showed negative myeloma labs, B12 and folate are WNL. In summary anemia likely related to VEDA with ongoing GI losses with stool occult being positive as recently as 2/2024.  Her labs from 5/20/2024, and 7/2024 continue to show significant iron deficiency.    Labs from today showing persistent VEDA with evidence of taking PO iron. Etiology suggestive of GI bleeding but no obvious source found as yet, suspect intermittently bleeding AVM.   Plan:  -CBC/d, CMP, iron studies today   - will redose IV iron  - follow up next: 5 months, I have ordered standing labs for her in the interim Q3 months and PRN   - labs prior to follow up: CBC/diff, retic, iron panel including ferritin     # RLE swelling: now h/o recurrent VTE events last one being submassive. She was off AC in between, is now back on 5mg Q12H of apixaban which she has been adherent with. Notes today significant pain the the RLE started at discharge from hospital, while I am not suspicious this is progressive thrombosis a DVT scan was done and results do not indicate progressive clotting. I had d/w pt during visit that if the scan is negative, would proceed with MRI.  Patient's workup since is indicated possible joint related issues as etiology additionally now has ?fluid overload being treated by cardiology.  No further testing for myself at this time.  Plan:  - Advised she remain on apixaban  -RX given for compression stocking due to B/L LE swelling     # Hypercoagulability/submassive PE/DVT recurrent: no clear etiology  for her thrombosis events as noted above has had age appropriate cancer screening.   Plan:   - will dose reduced to 2.5mg Q12H due to bleeding     Garland Jenkins MD

## 2025-01-10 ENCOUNTER — SOCIAL WORK (OUTPATIENT)
Dept: HEMATOLOGY/ONCOLOGY | Facility: HOSPITAL | Age: 70
End: 2025-01-10
Payer: MEDICARE

## 2025-01-10 PROCEDURE — RXMED WILLOW AMBULATORY MEDICATION CHARGE

## 2025-01-12 DIAGNOSIS — K21.9 GASTROESOPHAGEAL REFLUX DISEASE WITHOUT ESOPHAGITIS: ICD-10-CM

## 2025-01-15 ENCOUNTER — DOCUMENTATION (OUTPATIENT)
Dept: PRIMARY CARE | Facility: CLINIC | Age: 70
End: 2025-01-15
Payer: MEDICARE

## 2025-01-15 NOTE — PROGRESS NOTES
Called Ms. Andrews for ccm outreach. Unable to reach. No vm available at this time. Chart reviewed.

## 2025-01-16 ENCOUNTER — TELEPHONE (OUTPATIENT)
Dept: ADMISSION | Facility: HOSPITAL | Age: 70
End: 2025-01-16
Payer: MEDICARE

## 2025-01-16 ENCOUNTER — APPOINTMENT (OUTPATIENT)
Dept: PHYSICAL THERAPY | Facility: CLINIC | Age: 70
End: 2025-01-16
Payer: MEDICARE

## 2025-01-16 DIAGNOSIS — Z79.01 ON CONTINUOUS ORAL ANTICOAGULATION: ICD-10-CM

## 2025-01-16 DIAGNOSIS — D50.0 IRON DEFICIENCY ANEMIA DUE TO CHRONIC BLOOD LOSS: Primary | ICD-10-CM

## 2025-01-16 NOTE — TELEPHONE ENCOUNTER
The patient was recently seen by Dr. Jenkins, says she missed a call Monday with results, wants to know if she needs infusions. Also just received a letter for Jury Duty, wondering if Dr. Jenkins can excuse her from this or if she should reach out to another physician. Please advise.

## 2025-01-17 ENCOUNTER — TELEPHONE (OUTPATIENT)
Dept: ADMISSION | Facility: HOSPITAL | Age: 70
End: 2025-01-17

## 2025-01-17 ENCOUNTER — TELEPHONE (OUTPATIENT)
Dept: PRIMARY CARE | Facility: CLINIC | Age: 70
End: 2025-01-17

## 2025-01-17 ENCOUNTER — PATIENT OUTREACH (OUTPATIENT)
Dept: PRIMARY CARE | Facility: CLINIC | Age: 70
End: 2025-01-17
Payer: MEDICARE

## 2025-01-17 DIAGNOSIS — M25.551 RIGHT HIP PAIN: ICD-10-CM

## 2025-01-17 DIAGNOSIS — N18.31 TYPE 2 DIABETES MELLITUS WITH STAGE 3A CHRONIC KIDNEY DISEASE, WITHOUT LONG-TERM CURRENT USE OF INSULIN (MULTI): ICD-10-CM

## 2025-01-17 DIAGNOSIS — I10 PRIMARY HYPERTENSION: ICD-10-CM

## 2025-01-17 DIAGNOSIS — E11.22 TYPE 2 DIABETES MELLITUS WITH STAGE 3A CHRONIC KIDNEY DISEASE, WITHOUT LONG-TERM CURRENT USE OF INSULIN (MULTI): ICD-10-CM

## 2025-01-17 RX ORDER — ALBUTEROL SULFATE 0.83 MG/ML
3 SOLUTION RESPIRATORY (INHALATION) AS NEEDED
OUTPATIENT
Start: 2025-01-22

## 2025-01-17 RX ORDER — OMEPRAZOLE 40 MG/1
40 CAPSULE, DELAYED RELEASE ORAL
Qty: 60 CAPSULE | Refills: 1 | Status: SHIPPED | OUTPATIENT
Start: 2025-01-17

## 2025-01-17 RX ORDER — FAMOTIDINE 10 MG/ML
20 INJECTION INTRAVENOUS ONCE AS NEEDED
OUTPATIENT
Start: 2025-01-22

## 2025-01-17 RX ORDER — EPINEPHRINE 0.3 MG/.3ML
0.3 INJECTION SUBCUTANEOUS EVERY 5 MIN PRN
OUTPATIENT
Start: 2025-01-22

## 2025-01-17 RX ORDER — DIPHENHYDRAMINE HYDROCHLORIDE 50 MG/ML
50 INJECTION INTRAMUSCULAR; INTRAVENOUS AS NEEDED
OUTPATIENT
Start: 2025-01-22

## 2025-01-17 NOTE — PROGRESS NOTES
Social Work Note    Referral Source:  CAREN Jenkins MD  Meeting Location: Phone  Person(s) Present: Patient  Identified Needs: Prescription Assistance  Impression and Plan: LISW received referral from MD stating requires assistance with high cost of co-pays.LISW contacted pt via phone to discuss. Per pt, she spoke with speciality pharmacy who answered pt's questions regarding medication co-pays. Patient denies any further psychosocial or support service needs at this time. Patient encouraged to contact LISW if any needs arise.    Interventions Provided: Assessment  Estimated Time Spent: 10 min    SW will continue to follow as needed.

## 2025-01-17 NOTE — TELEPHONE ENCOUNTER
Contacted patient, made her aware that she indeed will need IV iron and that once orders are placed, scheduling will be reaching out to assist. She is aware that Dr. Jenkins will not be writing a Jury duty excuse letter, she is going to be calling her PCP

## 2025-01-17 NOTE — PROGRESS NOTES
Chronic care management outreach complete. Returned call for Vania. She inquired about her letter to be excused from jury duty. I informed her that a message was already given to pcp. We reviewed all of her appointments since our last outreach. I reminded her of scheduling her referrals. (EGD, nutrition and PT) She is still goes to [facility].   Still having right hip pain. Seeing ortho.   She says that she monitors her blood sugars and says that they are typically in the 200s. We discussed swapping out juice and pop for water and choosing healthier food options. She does  her Trulicity injection on Fridays.   Medications reviewed. No refills needed at this time.   Up coming appointments reviewed. Questions and concerns addressed.

## 2025-01-17 NOTE — TELEPHONE ENCOUNTER
Patient has an EGD on 3/3 and is wondering when/how she should discontinue her blood thinners prior to the procedure?

## 2025-01-20 ENCOUNTER — APPOINTMENT (OUTPATIENT)
Dept: RHEUMATOLOGY | Facility: CLINIC | Age: 70
End: 2025-01-20
Payer: MEDICARE

## 2025-01-20 ENCOUNTER — SPECIALTY PHARMACY (OUTPATIENT)
Dept: PHARMACY | Facility: CLINIC | Age: 70
End: 2025-01-20

## 2025-01-20 VITALS
SYSTOLIC BLOOD PRESSURE: 127 MMHG | WEIGHT: 197 LBS | HEIGHT: 58 IN | HEART RATE: 65 BPM | DIASTOLIC BLOOD PRESSURE: 86 MMHG | BODY MASS INDEX: 41.35 KG/M2

## 2025-01-20 DIAGNOSIS — M54.2 NECK PAIN: Primary | ICD-10-CM

## 2025-01-20 DIAGNOSIS — R93.89 IMAGING ABNORMALITY: ICD-10-CM

## 2025-01-20 PROCEDURE — 99205 OFFICE O/P NEW HI 60 MIN: CPT

## 2025-01-20 PROCEDURE — RXMED WILLOW AMBULATORY MEDICATION CHARGE

## 2025-01-20 PROCEDURE — 1036F TOBACCO NON-USER: CPT

## 2025-01-20 PROCEDURE — 4010F ACE/ARB THERAPY RXD/TAKEN: CPT

## 2025-01-20 PROCEDURE — 3079F DIAST BP 80-89 MM HG: CPT

## 2025-01-20 PROCEDURE — 3074F SYST BP LT 130 MM HG: CPT

## 2025-01-20 PROCEDURE — 1125F AMNT PAIN NOTED PAIN PRSNT: CPT

## 2025-01-20 PROCEDURE — 3008F BODY MASS INDEX DOCD: CPT

## 2025-01-20 PROCEDURE — 1159F MED LIST DOCD IN RCRD: CPT

## 2025-01-20 RX ORDER — GABAPENTIN 100 MG/1
CAPSULE ORAL
Qty: 183 CAPSULE | Refills: 0 | Status: SHIPPED | OUTPATIENT
Start: 2025-01-20 | End: 2025-03-23

## 2025-01-20 ASSESSMENT — PAIN SCALES - GENERAL: PAINLEVEL_OUTOF10: 8

## 2025-01-20 NOTE — PATIENT INSTRUCTIONS
Please get blood work today  Schedule MRI of the neck - call [phone] to schedule appointment  Start gabapentin. Start with 100 mg on night #1, then 200 mg on night #2, then 300 mg nightly from there on     RTC in 6 weeks. If MRI is not done before that, please reschedule your appointment    Rheumatology clinic phone number: [phone]

## 2025-01-20 NOTE — PROGRESS NOTES
"Rheumatology Note      Patient Vania Andrews  MRN [id number]     CC: Ms. Vania Andrews is a 69 y.o. female with history of RCC and adrenal cortical adenoma s/p L partial nephrectomy and adrenalectomy (2007), CKD stage 3 d/t loss of nephron mass and HTN, asthma, HLD, T2DM, MARYLU not using CPAP, inferior NSTEMI, VEDA with history of iron infusions, partially obstructed Ross's hernia with sx (Feb 2022 with repair in June 2022 ), history of provoked DVT in 2020, saddle PE in 2/2024 on anticoagulation who presents to the clinic for new patient evaluation of neck pain.    Subjective    Subjective   History of Presenting Illness  Patient was referred to rheumatology for evaluation of neck pain and abnormal findings on imaging concerning for CPPD involving the cervical spine. Patient had gone to the ED on 12/19 for severe neck pain.     Overall, patient has many areas of pain including the low back radiating down the right leg.  Right hip pain, right knee pain, intermittent left knee pain.  Has needlelike sensation in the right foot.  Sometimes pain is \"past the 20 \".  Her right wrist is painful and has had prior surgery.  Her left first finger intermittently gets trigger finger.  Most of these pains started around a year ago.  Usually worse at night.  Usually improved with rest unless she is having a spasm.  Does have stiffness that lasts up to 30 minutes.    Regarding her neck pain, she did have sudden onset neck pain that began a few days prior to her presentation to the ED.  The rest of her pain seem to be more progressive over the past year.  She has had neck pain in the past but it has been several years and was never this painful.  She does not have acute onset of swelling, throbbing, warmth in any of her peripheral joints.    Regarding her history of lumbar radiculopathy affecting the RLE and bursitis of the right hip - saw orthopedics in the past and recommended conservative treatment, felt she was too high risk " for injection while on anticoagulation. She was seen again in orthopedics for right hip pain with osteoarthritis, needs to work on weight loss in order to discuss possible hip replacement.     ROS:  Per HPI    Past Medical History:   Diagnosis Date    Abdominal distension (gaseous) 07/31/2019    Abdominal bloating    Cancer (Multi)     Cataract     Coronary artery disease     Diabetes mellitus (Multi)     Diverticulosis of intestine, part unspecified, without perforation or abscess without bleeding 06/09/2013    Diverticulosis    Elevation of levels of liver transaminase levels 11/13/2020    Transaminitis    Encounter for follow-up examination after completed treatment for conditions other than malignant neoplasm 01/26/2019    Hospital discharge follow-up    Glaucoma     Hypertension     Long term (current) use of antibiotics 10/07/2016    Need for prophylactic antibiotic    Other amnesia 10/28/2014    Memory loss    Other conditions influencing health status 02/06/2019    History of cough    Other specified health status 02/07/2019    Hepatitis C antibody test negative    Other specified soft tissue disorders 06/14/2017    Leg swelling    Pain in left toe(s) 05/15/2017    Pain of toe of left foot    Pain in right foot 10/12/2016    Pain of right heel    Pain in right shoulder 06/22/2016    Acute pain of right shoulder    Personal history of diseases of the blood and blood-forming organs and certain disorders involving the immune mechanism 04/09/2018    History of anemia    Personal history of other diseases of the respiratory system 04/02/2018    History of sinusitis    Personal history of other diseases of the respiratory system 03/19/2014    History of upper respiratory infection    Personal history of other malignant neoplasm of kidney 01/14/2021    Personal history of malignant neoplasm of kidney    Personal history of other medical treatment 08/08/2017    History of screening mammography    Personal history of  "other specified conditions 11/17/2014    History of abdominal pain    Personal history of other specified conditions 06/14/2017    History of urinary frequency    Personal history of other specified conditions 06/09/2021    History of dysuria    Personal history of other specified conditions 11/28/2014    History of breast lump    Unspecified abdominal hernia without obstruction or gangrene 04/21/2014    Hernia        Allergies   Allergen Reactions    Adhesive Itching    Amoxicillin Hives and Itching    Bee Sting Kit Swelling    Cephalexin Itching and Nausea Only    Gadolinium-Containing Contrast Media Hives    Iodine Unknown    Latex Other    Pioglitazone Swelling    Rubella Virus Live Vaccine Unknown    Salicylates Other    Shellfish Derived Swelling    Sulfa (Sulfonamide Antibiotics) Unknown    Sulfamethoxazole-Trimethoprim Hives and Itching    Iodinated Contrast Media Itching and Rash        Objective   Objective     /86   Pulse 65   Ht 1.473 m (4' 10\")   Wt 89.4 kg (197 lb)   LMP  (LMP Unknown)   BMI 41.17 kg/m²      PHYSICAL EXAM:  General - NAD, pleasant, AAOx3  Head: Normocephalic, atraumatic  Eyes - PERRLA, EOMI. No conjunctiva injection.   Mouth/ENT - Moist oral and nasal mucosa. No facial rash.   Skin - No rashes or ulcers  Extremities - Bilateral lower extremity edema  Neurological - Alert and oriented x3,  grossly intact. No focal deficit.    Musculoskeletal  Shoulders: Full ROM however painful, no swelling, warmth or tenderness.  Elbows: Full ROM, without pain, no swelling, warmth or tenderness.  Wrists/Hands: Full ROM, without pain, no swelling, warmth or tenderness.  Hands : 5/5.    Hips: Limited ROM due to pain  Knees:  Full ROM, without pain, no swelling, warmth or point tenderness.  Ankles/Feet: Full ROM, without pain, swelling, warmth or tenderness.  Toes: No swelling, warmth or tenderness. Metatarsal squeeze negative  Cervical spine: Limitation of movement with extension and " "rotation (more right sided rotation)  Lumbar spine: No tenderness or limitation of movement     LABS:  Lab Results   Component Value Date    WBC 7.5 01/09/2025    HGB 11.3 (L) 01/09/2025    HCT 35.0 (L) 01/09/2025    MCV 89 01/09/2025     01/09/2025      Lab Results   Component Value Date    GLUCOSE 149 (H) 01/09/2025    CO2 25 01/09/2025    BUN 16 01/09/2025    EGFR 54 (L) 01/09/2025    CALCIUM 9.3 01/09/2025    ALBUMIN 4.3 12/19/2024      Lab Results   Component Value Date    CRP 5.20 (H) 12/19/2024    CRP 1.03 (H) 11/26/2023    CRP 7.55 (A) 10/16/2021     Lab Results   Component Value Date    SEDRATE 59 (H) 12/19/2024    SEDRATE 37 (H) 11/26/2023     No results found for: \"C3\", \"C4\"  No results found for: \"RF\", \"CITAB\"  No results found for: \"ANATITER\", \"ARNP\", \"ASMRN\", \"ASSA\", \"ASSB\", \"ASCL\", \"JO1\", \"ACHR\", \"ACEN\", \"RIBPP\", \"DNADS\", \"ANCPA\", \"ANCTI\", \"PR3\", \"MPO\"  Lab Results   Component Value Date    PROTUR NEGATIVE 01/06/2025    GLUCOSEU NEGATIVE 01/06/2025    BLOODU NEGATIVE 01/06/2025    KETONESU NEGATIVE 01/06/2025    NITRITEU NEGATIVE 01/06/2025    LEUKOCYTESU NEGATIVE 02/27/2024     No results found for: \"UTPCR\"     Lab Results   Component Value Date    URICACID 5.2 11/26/2023     No results found for: \"COLORFL\", \"CLARITYFLUID\", \"WBCFL\", \"NEUTROBFREL\", \"LYMPHSBFREL\", \"EOSBFREL\", \"BASOBFREL\", \"PLASMACFLD\", \"RBCFL\", \"CRYSFL\"    IMAGING:  XR right hip with pelvis 1/8/2025:  Severe right and moderate left hip osteoarthrosis    CT cervical spine 12/19/2024:  1. No acute fracture or traumatic malalignment of the cervical spine.  2. Multilevel degenerative changes of the cervical spine.  3. There is erosive change, sclerosis of the dens and calcified circumdental pannus. The appearance is suggestive of CPPD.    XR lumbar spine 9/24/2024:  Numerous metallic coils and surgical clips are seen in the abdomen.  Osteopenia is present.  End-plate sclerosis and spur formation is seen throughout the lumbar spine. " Facet hypertrophy is identified. Narrowing of L3-4, L4-5 and L5-S1 disc space is seen. Grade 1 anterolisthesis of L4 on L5 is seen. No significant change in alignment is seen with flexion or extension.    XR right knee 3/24/2024:  No acute fracture or dislocation. Mild arthritic changes of the right knee likely due to CPPD arthropathy given chondrocalcinosis.    DEXA 10/24/2023  SPINE L1-L4 Bone Mineral Density: 0.969  T-Score -0.7  Z-Score 0.5  LEFT FEMUR -TOTAL Bone Mineral Density: 0.776  T-Score -1.4   Z-Score  -0.6  LEFT FEMUR -NECK Bone Mineral Density: 0.599  T-Score -2.3  Z-Score -1.1    10-year Fracture Risk:  Major Osteoporotic Fracture  5.0  Hip Fracture                        0.9    Assessment    Assessment & Plan   Ms. Vania Andrews is a 69 y.o. female with history of RCC and adrenal cortical adenoma s/p L partial nephrectomy and adrenalectomy (2007), CKD stage 3 d/t loss of nephron mass and HTN, asthma, HLD, T2DM, MARYLU not using CPAP, inferior NSTEMI, VEDA with history of iron infusions, partially obstructed Ross's hernia with sx (Feb 2022 with repair in June 2022 ), history of provoked DVT in 2020, saddle PE in 2/2024 on anticoagulation, bilateral CTS surgery who presents to the clinic for new patient evaluation of neck pain and concern for CPPD.    Patient was in the ED on 12/19 with acute episode of severe neck pain. Imaging findings of the cervical spine notable for erosive change, sclerosis of the dens and calcified circumdental pannus. Patient had elevated inflammatory markers with CRP 5.2, ESR 59.     In presence of circumdental pannus, the differential includes RA versus CPPD and will evaluate further as outlined below. Imaging review did show chondrocalcinosis in the right knee on prior XR however symptoms in knee appear more mechanical, and less so like pseudogout flare. Overall, her pain is mostly mechanical, however the sudden onset neck pain and pannus do raise for concern for possible  underlying inflammatory process.    #Circumdental pannus  - Per ED notes, case was discussed with neurosurgery with no surgical indication  - MRI cervical spine for better characterization  - Check RF, CCP  - Check calcium and PTH (PTH in the 200s in 2023) as hyperparathyroidism can contribute to CPPD    #Osteoarthritis of cervical spine, lumbar spine, right hip  #Radiculopathy  - Tylenol PRN  - Start gabapentin nightly  - Counseled on weight loss, so that it will not be a limiting factor in case a hip replacement is recommended in the future    #Osteopenia  - Lowest T-score -2.3 in left femoral neck with FRAX 5% major, 0.9% in hip  - Prior radial fracture secondary to fall  - No indication for treatment at this time. Repeat DEXA 10/2025    RTC in 6-8 weeks (follow up should be scheduled for AFTER MRI is completed)    Case seen and discussed with Dr. Lara  Signature: Sharmin Wall, DO  Date: January 20, 2025

## 2025-01-20 NOTE — TELEPHONE ENCOUNTER
Patient inquiring about holding eliquis for her follow up EGD scheduled in March. Patient advised that RN will check with Dr Spears as to how to plan based on goal of procedure and notify her.  Also reports she took her last 2.5mg eliquis today. Delivery was scheduled for 25 but was delayed until 25 and now she is without 2.5mg tablet until delivery. Patient states she has a few 5mg tablets from previous rx that are not . Per Dr Spears ok to take 5mg in place of 2.5mg tonight X 1 and BID tomorrow. Patient will resume the eliquis 2.5mg on 25 with delivery.

## 2025-01-21 ENCOUNTER — PHARMACY VISIT (OUTPATIENT)
Dept: PHARMACY | Facility: CLINIC | Age: 70
End: 2025-01-21
Payer: MEDICARE

## 2025-01-21 ENCOUNTER — TREATMENT (OUTPATIENT)
Dept: PHYSICAL THERAPY | Facility: CLINIC | Age: 70
End: 2025-01-21
Payer: MEDICARE

## 2025-01-21 DIAGNOSIS — M54.16 LUMBAR RADICULOPATHY: Primary | ICD-10-CM

## 2025-01-21 DIAGNOSIS — R32 URINARY INCONTINENCE, UNSPECIFIED TYPE: ICD-10-CM

## 2025-01-21 PROCEDURE — 97113 AQUATIC THERAPY/EXERCISES: CPT | Mod: GP

## 2025-01-21 ASSESSMENT — PAIN - FUNCTIONAL ASSESSMENT: PAIN_FUNCTIONAL_ASSESSMENT: 0-10

## 2025-01-21 ASSESSMENT — PAIN SCALES - GENERAL: PAINLEVEL_OUTOF10: 7

## 2025-01-21 NOTE — PROGRESS NOTES
Physical Therapy  Physical Therapy Treatment    Patient Name: Vania Andrews  MRN: [id number]  Today's Date: 1/21/2025  Time Calculation  Start Time: 1000  Stop Time: 1045  Time Calculation (min): 45 min    Insurance:  Visit number:  4 out of [place]  Authorization information: no auth   Insurance Type: MEDICARE A&B     General:  Reason for visit: Lumbar radiculopathy   Referred by: Cassie Martinez     Current Problem:   1. Lumbar radiculopathy            SUBJECTIVE:   Patient reports that her back and R>L leg pain today. Patient reports that she felt good after visit. Patient reports that she is always in pain. Patient reports that she was told that she needs to lose weight before she gets hip replacement.     Precautions:    Fall Risk: Moderate based on professional judgment  Pacemaker: no    Seizures: No    Post Op Movement/Restrictions: No:  Weight Bearing Status: As tolerated  History of heart attack in 2019; blood clots bilateral legs, h/o kidney cancer (removed) and a spot on the other kidney currently; heart disease; asthma; glaucoma and cataracts in right eye; anemia     Pain:   Pain Assessment  Pain Assessment: 0-10  0-10 (Numeric) Pain Score: 7    OBJECTIVE:    Patient demonstrates max UE support to perform stair negation out of pool at end of session     Treatments:  Aquatic therapy    Water level: 3ft.  Water temperature: 92 degrees    3 ft.  Walk F/B x5' ea  Walk lateral x5'  MIP x5'  Alt. Hip ABD/ADD x5'  A/P leg swings x3' ea  HR/TR x3'  Mini squats x3'  HS stretch on 1st step x1'    HEP: Access Code: [email or web address]  URL: [email or web address]  Date: 12/18/2024  Prepared by: Robert Mcintosh    Exercises  - Standing Hip Abduction with Counter Support  - 1 x daily - 7 x weekly - 3 sets - 10 reps  - Mini Squat with Counter Support  - 1 x daily - 7 x weekly - 3 sets - 10 reps  - Supine Bridge  - 1 x daily - 7 x weekly - 3 sets - 10 reps  - Supine Ankle Pumps  - 1 x daily - 7 x weekly - 5 sets - 10  reps    ASSESSMENT:   Patient tolerated interventions within buoyant environment to improve strength and mobility. Patient encouraged to perform interventions within pain free environment. Patient plans to follow up next visit for recheck on land.     Charges:  Aquatic therapy x3     PLAN:  Recheck on land next visit

## 2025-01-22 ENCOUNTER — APPOINTMENT (OUTPATIENT)
Dept: OPHTHALMOLOGY | Facility: CLINIC | Age: 70
End: 2025-01-22
Payer: MEDICARE

## 2025-01-22 ENCOUNTER — TELEPHONE (OUTPATIENT)
Dept: HEMATOLOGY/ONCOLOGY | Facility: CLINIC | Age: 70
End: 2025-01-22

## 2025-01-22 DIAGNOSIS — H40.10X2 OPEN-ANGLE GLAUCOMA OF BOTH EYES, MODERATE STAGE, UNSPECIFIED OPEN-ANGLE GLAUCOMA TYPE: Primary | ICD-10-CM

## 2025-01-22 PROCEDURE — 1159F MED LIST DOCD IN RCRD: CPT | Performed by: OPHTHALMOLOGY

## 2025-01-22 PROCEDURE — 1036F TOBACCO NON-USER: CPT | Performed by: OPHTHALMOLOGY

## 2025-01-22 PROCEDURE — 4010F ACE/ARB THERAPY RXD/TAKEN: CPT | Performed by: OPHTHALMOLOGY

## 2025-01-22 PROCEDURE — 99214 OFFICE O/P EST MOD 30 MIN: CPT | Performed by: OPHTHALMOLOGY

## 2025-01-22 ASSESSMENT — VISUAL ACUITY
OS_SC+: -1
METHOD: SNELLEN - LINEAR
OS_SC: 20/40
OD_SC: 20/30
OD_SC+: -1

## 2025-01-22 ASSESSMENT — ENCOUNTER SYMPTOMS
ENDOCRINE NEGATIVE: 0
EYES NEGATIVE: 0
HEMATOLOGIC/LYMPHATIC NEGATIVE: 0
CARDIOVASCULAR NEGATIVE: 0
ALLERGIC/IMMUNOLOGIC NEGATIVE: 0
MUSCULOSKELETAL NEGATIVE: 0
CONSTITUTIONAL NEGATIVE: 0
RESPIRATORY NEGATIVE: 0
GASTROINTESTINAL NEGATIVE: 0
PSYCHIATRIC NEGATIVE: 0
NEUROLOGICAL NEGATIVE: 0

## 2025-01-22 ASSESSMENT — TONOMETRY
IOP_METHOD: GOLDMANN APPLANATION
OS_IOP_MMHG: 18
OD_IOP_MMHG: 20

## 2025-01-22 ASSESSMENT — PACHYMETRY
OS_CT(UM): 573
OD_CT(UM): 564

## 2025-01-22 ASSESSMENT — EXTERNAL EXAM - RIGHT EYE: OD_EXAM: NORMAL

## 2025-01-22 ASSESSMENT — CUP TO DISC RATIO
OD_RATIO: 0.75
OS_RATIO: 0.85

## 2025-01-22 ASSESSMENT — SLIT LAMP EXAM - LIDS
COMMENTS: NORMAL
COMMENTS: NORMAL

## 2025-01-22 ASSESSMENT — EXTERNAL EXAM - LEFT EYE: OS_EXAM: NORMAL

## 2025-01-22 NOTE — PROGRESS NOTES
1-patient for slt due to poor compliance  Has questions: answeredpred 4x4  os  Rto iop check  and slt od 1mo    Continue meds

## 2025-01-22 NOTE — TELEPHONE ENCOUNTER
RN called Vania to confirm Eliquis 2.5mg arrived today. Patient confirmed.  Patient advised that per Dr Spears she should plan to hold Eliqius for 48 hours prior to EGD. Restart of Eliquis will be be determined based on procedure and potential biopsies. Patient verbalizes understanding.

## 2025-01-24 ENCOUNTER — DOCUMENTATION (OUTPATIENT)
Dept: PRIMARY CARE | Facility: CLINIC | Age: 70
End: 2025-01-24
Payer: MEDICARE

## 2025-01-24 NOTE — PROGRESS NOTES
Received a call from Ms. Andrews inquiring about her letter to excuse her from jury duty. Will route message to Dr. Mata.

## 2025-01-27 ENCOUNTER — INFUSION (OUTPATIENT)
Dept: HEMATOLOGY/ONCOLOGY | Facility: HOSPITAL | Age: 70
End: 2025-01-27
Payer: MEDICARE

## 2025-01-27 VITALS
BODY MASS INDEX: 42.43 KG/M2 | SYSTOLIC BLOOD PRESSURE: 123 MMHG | DIASTOLIC BLOOD PRESSURE: 68 MMHG | OXYGEN SATURATION: 97 % | WEIGHT: 203 LBS | HEART RATE: 69 BPM | TEMPERATURE: 99 F | RESPIRATION RATE: 18 BRPM

## 2025-01-27 DIAGNOSIS — Z79.01 ON CONTINUOUS ORAL ANTICOAGULATION: ICD-10-CM

## 2025-01-27 DIAGNOSIS — D50.0 IRON DEFICIENCY ANEMIA DUE TO CHRONIC BLOOD LOSS: ICD-10-CM

## 2025-01-27 LAB
BASOPHILS # BLD AUTO: 0.05 X10*3/UL (ref 0–0.1)
BASOPHILS NFR BLD AUTO: 0.6 %
EOSINOPHIL # BLD AUTO: 0.24 X10*3/UL (ref 0–0.7)
EOSINOPHIL NFR BLD AUTO: 3.1 %
ERYTHROCYTE [DISTWIDTH] IN BLOOD BY AUTOMATED COUNT: 13.4 % (ref 11.5–14.5)
HCT VFR BLD AUTO: 34.9 % (ref 36–46)
HGB BLD-MCNC: 11 G/DL (ref 12–16)
IMM GRANULOCYTES # BLD AUTO: 0.02 X10*3/UL (ref 0–0.7)
IMM GRANULOCYTES NFR BLD AUTO: 0.3 % (ref 0–0.9)
LYMPHOCYTES # BLD AUTO: 1.63 X10*3/UL (ref 1.2–4.8)
LYMPHOCYTES NFR BLD AUTO: 20.8 %
MCH RBC QN AUTO: 28.1 PG (ref 26–34)
MCHC RBC AUTO-ENTMCNC: 31.5 G/DL (ref 32–36)
MCV RBC AUTO: 89 FL (ref 80–100)
MONOCYTES # BLD AUTO: 0.52 X10*3/UL (ref 0.1–1)
MONOCYTES NFR BLD AUTO: 6.6 %
NEUTROPHILS # BLD AUTO: 5.38 X10*3/UL (ref 1.2–7.7)
NEUTROPHILS NFR BLD AUTO: 68.6 %
NRBC BLD-RTO: 0 /100 WBCS (ref 0–0)
PLATELET # BLD AUTO: 331 X10*3/UL (ref 150–450)
RBC # BLD AUTO: 3.92 X10*6/UL (ref 4–5.2)
WBC # BLD AUTO: 7.8 X10*3/UL (ref 4.4–11.3)

## 2025-01-27 PROCEDURE — 85025 COMPLETE CBC W/AUTO DIFF WBC: CPT

## 2025-01-27 PROCEDURE — 96365 THER/PROPH/DIAG IV INF INIT: CPT | Mod: INF

## 2025-01-27 PROCEDURE — 2500000004 HC RX 250 GENERAL PHARMACY W/ HCPCS (ALT 636 FOR OP/ED): Performed by: STUDENT IN AN ORGANIZED HEALTH CARE EDUCATION/TRAINING PROGRAM

## 2025-01-27 RX ORDER — ALBUTEROL SULFATE 0.83 MG/ML
3 SOLUTION RESPIRATORY (INHALATION) AS NEEDED
Status: CANCELLED | OUTPATIENT
Start: 2025-01-27

## 2025-01-27 RX ORDER — ALBUTEROL SULFATE 0.83 MG/ML
3 SOLUTION RESPIRATORY (INHALATION) AS NEEDED
Status: DISCONTINUED | OUTPATIENT
Start: 2025-01-27 | End: 2025-01-27 | Stop reason: HOSPADM

## 2025-01-27 RX ORDER — DIPHENHYDRAMINE HYDROCHLORIDE 50 MG/ML
50 INJECTION INTRAMUSCULAR; INTRAVENOUS AS NEEDED
Status: CANCELLED | OUTPATIENT
Start: 2025-01-27

## 2025-01-27 RX ORDER — FAMOTIDINE 10 MG/ML
20 INJECTION INTRAVENOUS ONCE AS NEEDED
Status: DISCONTINUED | OUTPATIENT
Start: 2025-01-27 | End: 2025-01-27 | Stop reason: HOSPADM

## 2025-01-27 RX ORDER — FAMOTIDINE 10 MG/ML
20 INJECTION INTRAVENOUS ONCE AS NEEDED
Status: CANCELLED | OUTPATIENT
Start: 2025-01-27

## 2025-01-27 RX ORDER — EPINEPHRINE 0.3 MG/.3ML
0.3 INJECTION SUBCUTANEOUS EVERY 5 MIN PRN
Status: DISCONTINUED | OUTPATIENT
Start: 2025-01-27 | End: 2025-01-27 | Stop reason: HOSPADM

## 2025-01-27 RX ORDER — DIPHENHYDRAMINE HYDROCHLORIDE 50 MG/ML
50 INJECTION INTRAMUSCULAR; INTRAVENOUS AS NEEDED
Status: DISCONTINUED | OUTPATIENT
Start: 2025-01-27 | End: 2025-01-27 | Stop reason: HOSPADM

## 2025-01-27 RX ORDER — EPINEPHRINE 0.3 MG/.3ML
0.3 INJECTION SUBCUTANEOUS EVERY 5 MIN PRN
Status: CANCELLED | OUTPATIENT
Start: 2025-01-27

## 2025-01-27 RX ADMIN — FERUMOXYTOL 510 MG: 510 INJECTION INTRAVENOUS at 16:32

## 2025-01-27 ASSESSMENT — PAIN SCALES - GENERAL: PAINLEVEL_OUTOF10: 0-NO PAIN

## 2025-01-27 NOTE — PROGRESS NOTES
Pt arrived to [facility] infusion for scheduled treatment. Pt received feraheme infusion without incident and stayed for 30 minute post infusion observation. Pt discharged home in stable condition with .

## 2025-01-28 ENCOUNTER — DOCUMENTATION (OUTPATIENT)
Dept: PRIMARY CARE | Facility: CLINIC | Age: 70
End: 2025-01-28
Payer: MEDICARE

## 2025-01-28 LAB
ALBUMIN SERPL-MCNC: 3.7 G/DL (ref 3.6–5.1)
ALBUMIN/GLOB SERPL: 1.5 (CALC) (ref 1–2.5)
ALP SERPL-CCNC: 149 U/L (ref 37–153)
ALT SERPL-CCNC: 11 U/L (ref 6–29)
AST SERPL-CCNC: 16 U/L (ref 10–35)
BILIRUB SERPL-MCNC: 0.2 MG/DL (ref 0.2–1.2)
BUN SERPL-MCNC: 13 MG/DL (ref 7–25)
BUN/CREAT SERPL: 11 (CALC) (ref 6–22)
CALCIUM SERPL-MCNC: 9 MG/DL (ref 8.6–10.4)
CCP IGG SERPL-ACNC: <16 UNITS
CHLORIDE SERPL-SCNC: 110 MMOL/L (ref 98–110)
CO2 SERPL-SCNC: 22 MMOL/L (ref 20–32)
CREAT SERPL-MCNC: 1.18 MG/DL (ref 0.5–1.05)
CRP SERPL-MCNC: 7.3 MG/L
EGFRCR SERPLBLD CKD-EPI 2021: 50 ML/MIN/1.73M2
ERYTHROCYTE [SEDIMENTATION RATE] IN BLOOD BY WESTERGREN METHOD: 25 MM/H
GLOBULIN SER CALC-MCNC: 2.4 G/DL (CALC) (ref 1.9–3.7)
GLUCOSE SERPL-MCNC: 215 MG/DL (ref 65–139)
PARATHYRIN AB [PRESENCE] IN SERUM: NORMAL
POTASSIUM SERPL-SCNC: 4.4 MMOL/L (ref 3.5–5.3)
PROT SERPL-MCNC: 6.1 G/DL (ref 6.1–8.1)
RHEUMATOID FACT SERPL-ACNC: <10 IU/ML
SODIUM SERPL-SCNC: 140 MMOL/L (ref 135–146)

## 2025-01-29 ENCOUNTER — TELEPHONE (OUTPATIENT)
Dept: RHEUMATOLOGY | Facility: CLINIC | Age: 70
End: 2025-01-29
Payer: MEDICARE

## 2025-01-29 ENCOUNTER — DOCUMENTATION (OUTPATIENT)
Dept: PRIMARY CARE | Facility: CLINIC | Age: 70
End: 2025-01-29
Payer: MEDICARE

## 2025-01-29 ENCOUNTER — SPECIALTY PHARMACY (OUTPATIENT)
Dept: PHARMACY | Facility: CLINIC | Age: 70
End: 2025-01-29

## 2025-01-29 DIAGNOSIS — F41.9 ANXIETY: Primary | ICD-10-CM

## 2025-01-29 PROCEDURE — RXMED WILLOW AMBULATORY MEDICATION CHARGE

## 2025-01-29 RX ORDER — LORAZEPAM 1 MG/1
1 TABLET ORAL ONCE
Qty: 1 TABLET | Refills: 0 | Status: SHIPPED | OUTPATIENT
Start: 2025-01-29 | End: 2025-01-29

## 2025-01-29 NOTE — TELEPHONE ENCOUNTER
Prescribed Xanax to be taken 30 minutes prior to MRI. Spoke with patient, confirmed she has someone to drive her to and from her appointment.

## 2025-01-29 NOTE — PROGRESS NOTES
Ms. Andrews called again asking about her letter to excuse her from jury duty. I informed her that Dr. Mata was made aware yesterday. Will route message to pcp.

## 2025-01-29 NOTE — TELEPHONE ENCOUNTER
Patient would like to let Dr. Wall know that her MR has been scheduled and she now needs medication to help her for it. Please advise.

## 2025-01-30 ENCOUNTER — TELEPHONE (OUTPATIENT)
Dept: ADMISSION | Facility: HOSPITAL | Age: 70
End: 2025-01-30
Payer: MEDICARE

## 2025-01-30 NOTE — TELEPHONE ENCOUNTER
Per 1/22 note from MD/RN partner, plan to hold AC for 48hrs and if no biopsy can restart immediately after the EGD. If there is a biopsy will defer restarting to the proceduralist. With rec to restart ASAP.     Pt notified of the above information & she verbalized understanding. I also sent the instructions to her in a [email or web address] message, per her request. She is aware of her FUV in July.

## 2025-01-30 NOTE — TELEPHONE ENCOUNTER
Pt called- she is scheduled for an EGD on 3/3. The GI dept recommended she be off of her Eliquis for 5 days prior, but they told her to call Dr. Jenkins for instructions since it is prescribed by him. She wants to know how long she needs to hold the Eliquis before and after the procedure. Message sent to the team.

## 2025-02-03 ENCOUNTER — HOSPITAL ENCOUNTER (OUTPATIENT)
Dept: RADIOLOGY | Facility: CLINIC | Age: 70
Discharge: HOME | End: 2025-02-03
Payer: MEDICARE

## 2025-02-03 ENCOUNTER — TREATMENT (OUTPATIENT)
Dept: PHYSICAL THERAPY | Facility: CLINIC | Age: 70
End: 2025-02-03
Payer: MEDICARE

## 2025-02-03 DIAGNOSIS — R93.89 IMAGING ABNORMALITY: ICD-10-CM

## 2025-02-03 DIAGNOSIS — M54.16 LUMBAR RADICULOPATHY: Primary | ICD-10-CM

## 2025-02-03 DIAGNOSIS — M54.16 LUMBAR RADICULOPATHY: ICD-10-CM

## 2025-02-03 DIAGNOSIS — M54.2 NECK PAIN: ICD-10-CM

## 2025-02-03 PROCEDURE — 72141 MRI NECK SPINE W/O DYE: CPT | Performed by: RADIOLOGY

## 2025-02-03 PROCEDURE — 97110 THERAPEUTIC EXERCISES: CPT | Mod: GP

## 2025-02-03 PROCEDURE — 72141 MRI NECK SPINE W/O DYE: CPT

## 2025-02-03 ASSESSMENT — PAIN - FUNCTIONAL ASSESSMENT: PAIN_FUNCTIONAL_ASSESSMENT: 0-10

## 2025-02-03 ASSESSMENT — PAIN SCALES - GENERAL: PAINLEVEL_OUTOF10: 8

## 2025-02-03 NOTE — PROGRESS NOTES
Physical Therapy  Physical Therapy Orthopedic Progress Note    Patient Name: Vania Andrews  MRN: [id number]  Today's Date: 2/3/2025    Time Calculation  Start Time: 1045  Stop Time: 1113  Time Calculation (min): 28 min    Date of evaluation: 11/25/24  Number of visits attended: 5    Insurance:  [place]: 5 out of [place]  Authorization information: no auth   Insurance Type: MEDICARE A&B      General:  Reason for visit: Lumbar radiculopathy   Referred by: Cassie Martinez     Current Problem:   1. Lumbar radiculopathy  Follow Up In Physical Therapy           Precautions:Fall Risk: Moderate based on professional judgment  Pacemaker: no    Seizures: No    Post Op Movement/Restrictions: No:  Weight Bearing Status: As tolerated  History of heart attack in 2019; blood clots bilateral legs, h/o kidney cancer (removed) and a spot on the other kidney currently; heart disease; asthma; glaucoma and cataracts in right eye; anemia       Subjective   Patient reports that she feels better in the pool for a little bit after session. Patient reports that she has pain in her neck and shoulder, lower back and R buttocks/hip. Patient has numbness/tingling on R side, denies left side at this date. Patient reports that sitting, standing and lying still bothers her.   Patient states that she is getting MRI on neck today.     Current Condition:  same    PAIN  Pain Assessment: 0-10  0-10 (Numeric) Pain Score: 8      Objective   Ambulation: Patient ambulates with significant antalgic gait pattern with decreased gait speed and step length.     Outcome Measures: Updated 2/3/2025  Other Measures  Oswestry Disablity Index (TRACY): 30/50     Treatments:    Recheck   Verbally went over HEP with PT demonstration to continue aquatic based interventions    Charges: TE x2 units     Assessment:   Patient made limited progress through POC with inconsistent with attendance. Patient responded well to aquatic based interventions without increase in pain.  Patient continues to demonstrate poor gait mechanics and decreased gross LE strength. Patient has improved TRACY by 5 points compared to evaluation but remains limited in functional mobility. Patient reports secondary to complaint of neck pain in which she is getting MRI today. Patient education on importance of compliance with HEP. Patient encouraged to follow up with referring provider with pain continues to worse. At this time is appropriate for discharge secondary to poor progress through POC.      Goals: Updated 2/3/2025  Patient's Goal for Treatment: Relieve pain and Reduce symptoms  Lumbar Spine Goals:  By discharge, patient will:  Demonstrate independence with home exercise program-- PROGRESSING  2. Tolerate increased exercise without adverse reaction-- MET  3. Increase strength of right lower extremity to grossly 4/5 MMT to improve the ability to perform essential ADLs--PROGRESSING   4. Report decrease in pain by >= 2 points to meet MCID--PROGRESSING   5. Decrease score of Modified TRACY by > 6 points to meet the MCID-- PROGRESSING (5 point improvement)    6. Ambulate throughout therapy session with min increase in symptoms--PROGRESSING   7. Be able to sleep through the night with min increase in symptoms-- PROGRESSING     Plan of Care: Updated 2/3/2025   Discharge with independent pool program and encouraged to follow up with referring provider secondary to poor progress.

## 2025-02-04 ENCOUNTER — PHARMACY VISIT (OUTPATIENT)
Dept: PHARMACY | Facility: CLINIC | Age: 70
End: 2025-02-04
Payer: MEDICARE

## 2025-02-05 PROCEDURE — RXMED WILLOW AMBULATORY MEDICATION CHARGE

## 2025-02-06 LAB
ALBUMIN SERPL-MCNC: 3.7 G/DL (ref 3.6–5.1)
ALBUMIN/GLOB SERPL: 1.5 (CALC) (ref 1–2.5)
ALP SERPL-CCNC: 149 U/L (ref 37–153)
ALT SERPL-CCNC: 11 U/L (ref 6–29)
AST SERPL-CCNC: 16 U/L (ref 10–35)
BILIRUB SERPL-MCNC: 0.2 MG/DL (ref 0.2–1.2)
BUN SERPL-MCNC: 13 MG/DL (ref 7–25)
BUN/CREAT SERPL: 11 (CALC) (ref 6–22)
CALCIUM SERPL-MCNC: 9 MG/DL (ref 8.6–10.4)
CCP IGG SERPL-ACNC: <16 UNITS
CHLORIDE SERPL-SCNC: 110 MMOL/L (ref 98–110)
CO2 SERPL-SCNC: 22 MMOL/L (ref 20–32)
CREAT SERPL-MCNC: 1.18 MG/DL (ref 0.5–1.05)
CRP SERPL-MCNC: 7.3 MG/L
EGFRCR SERPLBLD CKD-EPI 2021: 50 ML/MIN/1.73M2
ERYTHROCYTE [SEDIMENTATION RATE] IN BLOOD BY WESTERGREN METHOD: 25 MM/H
GLOBULIN SER CALC-MCNC: 2.4 G/DL (CALC) (ref 1.9–3.7)
GLUCOSE SERPL-MCNC: 215 MG/DL (ref 65–139)
PARATHYRIN AB [PRESENCE] IN SERUM: NEGATIVE
POTASSIUM SERPL-SCNC: 4.4 MMOL/L (ref 3.5–5.3)
PROT SERPL-MCNC: 6.1 G/DL (ref 6.1–8.1)
RHEUMATOID FACT SERPL-ACNC: <10 IU/ML
SODIUM SERPL-SCNC: 140 MMOL/L (ref 135–146)

## 2025-02-09 NOTE — PROGRESS NOTES
[facility]  Outpatient Clinic Note    Patient Name:  Vania Andrews  MRN:  [id number]  :  1955  Date of Service: 2/10/2025     Visit type: Follow up visit    HPI    Interval History:  Vania Andrews is a 70 y.o. female who is being seen today for  problems listed below.     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 De Leon. Patient last seen 20. 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 She reports experiencing UTI symptoms of frequency and dysuria but would have negative cultures. She also reports MAGAN from coughing and sneezing as well as UUI, noting wearing pads and changing x4-5 per day. Of note, patient also reports experiencing recent right flank pain. NTF x2. Denies gross hematuria. Patient is a former smoker.   Plan:   Patient last seen 20. 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 She reports experiencing UTI symptoms of frequency and dysuria but would have negative cultures. She also reports MAGAN from coughing and sneezing as well as UUI, noting wearing pads and changing x4-5 per day. Of note, patient also reports experiencing recent right flank pain. NTF x2.      UA showed trace blood and trace leukocytes, will send for microscopy and culture. PVR was 12 mL. BMP on 21 demonstrated a creatinine level of 1.41 mg/dL and GFR of 45 mL/min. Abdominal ultrasound 21 showed stable 2.3 cm cyst left kidney and S/P left partial nephrectomy. She will repeat a renal ultrasound for further evaluation of the right flank pain and she will  follow up with Rhina Quezada CNP in 1 month to review the results. All questions and concerns were addressed. Patient verbalizes understanding and has no other questions at this time.     1/6/25: Patient presents for follow up of urinary incontinence. She is accompanied by her . She reports urinary urgency, frequency and incontinence which requires her to wear depends. She has nocturia x 4 which she finds bothersome. She has tried anticholinergics in the past but stopped due to drying side effects. She also has glaucoma and dry eyes, and cannot be started on anticholinergics at this time. PVR 0 ml. She is very concerned about her kidney function as she only has one kidney, and is requesting referral to nephrologist.  Referral for Pelvic Floor Physical Therapy placed. List of locations provided for patient to call and schedule appointment  -Patient will consider Neuromodulation as she is not a candidate for anticholinergic medications  -Referral for Nephrologist and primary care placed for patient to establish care per her request  -Reviewed CMP on 12/19/24 with patient which shows improving creatinine and eGFR levels    IPSS: 14  QOL: 3    Frequency: Q 1 hours when she's drinking a lot fluids  Urgency: yes  Urge Incontinence: yes  Nocturia: 4 times per night    Stream: (mild/moderate/strong) strong  Hesitancy: no  Straining: no  Intermittency: no  Sensation of Incomplete Emptying: no    Stress Incontinence: yes  Pads:  4 depends per day     Dysuria:  sometimes  Gross Hematuria: No  UTI:  yes  Stones:  no    Consumes 30 oz of fluid per day.     Has used medications for bladder in the past? Yes, does not remember which    Has had urodynamic testing in the past? no    Bowel Function:   Pt has constipation.     2/10/25: Patient presents for follow up. She is accompanied by her . She continues to have urinary incontinence and states at times she is unable to tell when she's urinating and notices her depends  is soaked. She has not scheduled appointment with PFPT as she forgot.    IPSS: 14    Past Medical History:   Diagnosis Date    Abdominal distension (gaseous) 07/31/2019    Abdominal bloating    Cancer (Multi)     Cataract     Coronary artery disease     Diabetes mellitus (Multi)     Diverticulosis of intestine, part unspecified, without perforation or abscess without bleeding 06/09/2013    Diverticulosis    Elevation of levels of liver transaminase levels 11/13/2020    Transaminitis    Encounter for follow-up examination after completed treatment for conditions other than malignant neoplasm 01/26/2019    Hospital discharge follow-up    Glaucoma     Hypertension     Long term (current) use of antibiotics 10/07/2016    Need for prophylactic antibiotic    Other amnesia 10/28/2014    Memory loss    Other conditions influencing health status 02/06/2019    History of cough    Other specified health status 02/07/2019    Hepatitis C antibody test negative    Other specified soft tissue disorders 06/14/2017    Leg swelling    Pain in left toe(s) 05/15/2017    Pain of toe of left foot    Pain in right foot 10/12/2016    Pain of right heel    Pain in right shoulder 06/22/2016    Acute pain of right shoulder    Personal history of diseases of the blood and blood-forming organs and certain disorders involving the immune mechanism 04/09/2018    History of anemia    Personal history of other diseases of the respiratory system 04/02/2018    History of sinusitis    Personal history of other diseases of the respiratory system 03/19/2014    History of upper respiratory infection    Personal history of other malignant neoplasm of kidney 01/14/2021    Personal history of malignant neoplasm of kidney    Personal history of other medical treatment 08/08/2017    History of screening mammography    Personal history of other specified conditions 11/17/2014    History of abdominal pain    Personal history of other specified conditions  2017    History of urinary frequency    Personal history of other specified conditions 2021    History of dysuria    Personal history of other specified conditions 2014    History of breast lump    Unspecified abdominal hernia without obstruction or gangrene 2014    Hernia       Past Surgical History:   Procedure Laterality Date    BREAST BIOPSY Right 2014    Biopsy Breast Percutaneous Needle Core    BREAST BIOPSY Right 2018    CARDIAC CATHETERIZATION N/A 2024    Procedure: Left Heart Cath;  Surgeon: Donato Cespedes MD;  Location: [place] [address] Cardiac Cath [facility];  Service: Cardiovascular;  Laterality: N/A;     SECTION, CLASSIC  2014     Section    CHOLECYSTECTOMY  2017    Cholecystectomy Laparoscopic    HAND SURGERY  2020    Hand Surgery                                                                                                                                                          HERNIA REPAIR  2014    Hernia Repair    MR HEAD ANGIO WO IV CONTRAST  2014    MR HEAD ANGIO WO IV CONTRAST 2014 [facility] ANCILLARY LEGACY    OTHER SURGICAL HISTORY  2021    Nephrectomy    TOTAL ABDOMINAL HYSTERECTOMY W/ BILATERAL SALPINGOOPHORECTOMY  2014    Total Abdominal Hysterectomy With Removal Of Both Ovaries       Social History     Socioeconomic History    Marital status:      Spouse name: Not on file    Number of children: Not on file    Years of education: Not on file    Highest education level: Not on file   Occupational History    Not on file   Tobacco Use    Smoking status: Former     Types: Cigarettes     Passive exposure: Never    Smokeless tobacco: Never   Vaping Use    Vaping status: Never Used   Substance and Sexual Activity    Alcohol use: Yes     Comment: 1-2 x per month    Drug use: Never    Sexual activity: Not on file   Other Topics Concern    Not on file   Social History Narrative    Not on file     Social  Drivers of Health     Financial Resource Strain: Low Risk  (2/12/2024)    Overall Financial Resource Strain (CARDIA)     Difficulty of Paying Living Expenses: Not hard at all   Food Insecurity: No Food Insecurity (7/18/2024)    Hunger Vital Sign     Worried About Running Out of Food in the Last Year: Never true     Ran Out of Food in the Last Year: Never true   Transportation Needs: No Transportation Needs (2/12/2024)    PRAPARE - Transportation     Lack of Transportation (Medical): No     Lack of Transportation (Non-Medical): No   Physical Activity: Not on file   Stress: Not on file   Social Connections: Not on file   Intimate Partner Violence: Not on file   Housing Stability: Low Risk  (2/12/2024)    Housing Stability Vital Sign     Unable to Pay for Housing in the Last Year: No     Number of Places Lived in the Last Year: 1     Unstable Housing in the Last Year: No       Allergies   Allergen Reactions    Adhesive Itching    Amoxicillin Hives and Itching    Bee Sting Kit Swelling    Cephalexin Itching and Nausea Only    Gadolinium-Containing Contrast Media Hives    Iodine Unknown    Latex Other    Pioglitazone Swelling    Rubella Virus Live Vaccine Unknown    Salicylates Other    Shellfish Derived Swelling    Sulfa (Sulfonamide Antibiotics) Unknown    Sulfamethoxazole-Trimethoprim Hives and Itching    Iodinated Contrast Media Itching and Rash          Current Outpatient Medications:     Accu-Chek Guide test strips strip, Use 1 test strip to test blood sugar twice daily., Disp: 200 strip, Rfl: 1    albuterol 90 mcg/actuation inhaler, Inhale 2 puffs every 4 hours if needed for wheezing or shortness of breath (You may also use this 10-15 minutes prior to exertional activity as needed)., Disp: 18 g, Rfl: 5    amLODIPine (Norvasc) 10 mg tablet, Take 1 tablet (10 mg) by mouth once daily., Disp: 90 tablet, Rfl: 1    ammonium lactate (Lac-Hydrin) 12 % lotion, Apply topically if needed for dry skin., Disp: 225 g, Rfl: 3     apixaban (Eliquis) 2.5 mg tablet, Take 1 tablet (2.5 mg) by mouth every 12 hours., Disp: 180 tablet, Rfl: 1    brimonidine-timoloL (Combigan) 0.2-0.5 % ophthalmic solution, Administer 1 drop into both eyes 2 times a day. (Patient not taking: Reported on 1/20/2025), Disp: 10 mL, Rfl: 11    colchicine 0.6 mg tablet, Take 1 tablet (0.6 mg) by mouth once daily., Disp: 30 tablet, Rfl: 5    Combigan 0.2-0.5 % ophthalmic solution, Administer 1 drop into both eyes 2 times a day., Disp: 30 mL, Rfl: 11    diclofenac sodium 1 % kit, Apply 1 Application topically if needed.  APPLY TO LOWER EXTREMITIES, 4 GM OF GEL TO AFFECTED AREA 4 TIMES DAILY. DO NOT APPLY MORE THAN 16 GM DAILY TO ANY ONE AFFECTED JOINT., Disp: , Rfl:     dulaglutide (Trulicity) 0.75 mg/0.5 mL pen injector, Inject 0.75 mg under the skin 1 (one) time per week., Disp: 12 each, Rfl: 3    famotidine (Pepcid) 20 mg tablet, Take 1 tablet (20 mg) by mouth once daily at bedtime., Disp: , Rfl:     fluticasone (Flonase) 50 mcg/actuation nasal spray, Administer 2 sprays into each nostril once daily., Disp: 16 g, Rfl: 3    furosemide (Lasix) 20 mg tablet, Take 1 tablet (20 mg) by mouth once daily., Disp: 30 tablet, Rfl: 11    gabapentin (Neurontin) 100 mg capsule, Take 1 capsule (100 mg) by mouth once daily at bedtime for 1 day, THEN 2 capsules (200 mg) once daily at bedtime for 1 day, THEN 3 capsules (300 mg) once daily at bedtime., Disp: 183 capsule, Rfl: 0    glipiZIDE 2.5 mg tablet, Take 2.5 mg by mouth once daily., Disp: 90 tablet, Rfl: 1    hydrocortisone 1 % cream, Apply as needed to the injection site, Disp: 30 g, Rfl: 1    insulin lispro (HumaLOG KwikPen Insulin) 100 unit/mL injection, Use with sliding scale 3 times a day, up to 30 units daily, Disp: 15 mL, Rfl: 2    lancets (Accu-Chek Softclix Lancets) misc, Use to check blood sugar twice daily, Disp: 200 each, Rfl: 1    latanoprost (Xalatan) 0.005 % ophthalmic solution, Administer 1 drop into both eyes once  "daily in the morning., Disp: 2.5 mL, Rfl: 11    levocetirizine (Xyzal) 5 mg tablet, Take 1 tablet (5 mg) by mouth once daily in the evening., Disp: 90 tablet, Rfl: 1    LORazepam (Ativan) 1 mg tablet, Take 1 tablet (1 mg) by mouth 1 time for 1 dose. Take 30 minutes prior to your MRI, Disp: 1 tablet, Rfl: 0    losartan (Cozaar) 25 mg tablet, Take 1 tablet (25 mg) by mouth once daily., Disp: 90 tablet, Rfl: 1    metoprolol succinate XL (Toprol-XL) 200 mg 24 hr tablet, Take 1 tablet (200 mg) by mouth once daily., Disp: 90 tablet, Rfl: 3    netarsudiL (Rhopressa) 0.02 % drops opthalmic solution, Administer 1 drop into both eyes once daily in the evening. (Patient not taking: Reported on 1/20/2025), Disp: 2.5 mL, Rfl: 6    omeprazole (PriLOSEC) 40 mg DR capsule, Take 1 capsule (40 mg) by mouth 2 times a day before meals. (Patient taking differently: Take 1 capsule (40 mg) by mouth once daily.), Disp: 60 capsule, Rfl: 1    pen needle, diabetic 31 gauge x 5/16\" needle, Use to inject 1-4 times daily as directed., Disp: 100 each, Rfl: 11    pen needle, diabetic 33 gauge x 5/32\" needle, Use for sliding scale up to 3 times a day (Patient not taking: Reported on 1/20/2025), Disp: 100 each, Rfl: 2    polyethylene glycol (Glycolax, Miralax) 17 gram/dose powder, Take 17 g by mouth 2 times a day., Disp: 3060 g, Rfl: 3    rosuvastatin (Crestor) 20 mg tablet, Take 1 tablet (20 mg) by mouth once daily., Disp: 90 tablet, Rfl: 3    tiotropium (Spiriva Respimat) 2.5 mcg/actuation inhaler, Inhale 2 puffs once daily., Disp: 1 each, Rfl: 3    Current Facility-Administered Medications:     nitroglycerin (Nitrostat) SL tablet 0.4 mg, 0.4 mg, sublingual, Once, Vianney Marrufo MD     Review of system:  All other systems have been reviewed and are negative for complaints      Last recorded vitals:  There were no vitals taken for this visit.    Physical Exam:  General: Appears comfortable and in no apparent distress.  Head: Normocephalic, " atraumatic  Eyes: Non-injected conjunctiva, sclera clear, no proptosis  Lungs: Breathing is easy, non-labored. Speaking in clear and complete sentences. Normal diaphragmatic movement.  Cardiovascular: no peripheral edema, cyanosis or pallor.   Abdomen: soft, non-distended, non-tender  : Bladder: non tender, not distended  MSK: Ambulatory with steady gait, unassisted  Skin: No visible rashes or lesions  Neurologic: Alert, oriented to person, place, and time  Psychiatric: mood and affect appropriate      Imaging  === 03/27/24 ===    US RENAL COMPLETE    - Impression -  1. Postsurgical changes status post left partial nephrectomy with  partially visualized remaining renal parenchyma and re-demonstration  of simple renal cysts. No discrete soft tissue lesions in the  surgical bed within the limitations of the ultrasound and  incompletely visualized ankle bed. Cross-sectional imaging can be  obtained for further evaluation as clinically indicated.  2. Mild pelvic fullness and few simple renal cysts. Otherwise no  significant abnormality of the right kidney.      I personally reviewed the images/study and I agree with the findings  as stated by Resident Salvatore Romero MD. This study was interpreted  at [place], [place].    MACRO:  None      Signed by: Nico Lara 3/27/2024 10:33 PM  Dictation workstation:   [id number]        Labs  Lab Results   Component Value Date    WBC 7.8 01/27/2025    HGB 11.0 (L) 01/27/2025    HCT 34.9 (L) 01/27/2025    MCV 89 01/27/2025     01/27/2025     Lab Results   Component Value Date    GLUCOSE 215 (H) 01/27/2025    CALCIUM 9.0 01/27/2025     01/27/2025    K 4.4 01/27/2025    CO2 22 01/27/2025     01/27/2025    BUN 13 01/27/2025    CREATININE 1.18 (H) 01/27/2025       Assessment and Plan:  Vania Andrews is a 70 y.o. female with history of dysuria, recurrent UTIs, bilateral renal cysts, who presents for  "follow up.     1.Today we discussed the definition of Overactive Bladder (OAB) as a clinical diagnosis that refers to \"urinary urgency, usually accompanied by frequency and nocturia, with or without urge incontinence, in the absence of urinary tract infection or any other obvious pathology.\" We discussed in detail the risk factors for OAB including bladder inflammation, chronic bladder outlet obstruction (urethral stenosis), central nervous systems disorders, and vaginal delivery of a child, postmenopausal status. I had a long discussion with patient regarding the first line treatment for OAB is behavioral therapy with or without medication therapy.     Behavioral therapy include the following elements:   1) Avoid activities that exacerbate incontinence  2) Maintain a voiding diary  3) Timed/scheduled voiding to empty the bladder before incontinence or severe urgency occurs  4) Bladder training  5) Kegel exercises and pelvic floor muscle training  6) Fluid intake management-avoid excessive fluid intake  7) Dietary management-avoid bladder irritants (caffeine, alcohol, spicy food, acidic food)  8) Avoid constipation  9) Stop smoking (If currently smoking)    Patient was informed that second line treatment includes medications. We discussed Mirabegron and that the side effects include possible increase in blood pressure in a small minority of patients, however insurance does not always cover this. We also discussed anticholinergic medications which can have the side effects of dry eyes, dry mouth, constipation and rarely cognitive changes.    I mentioned 3rd line management options of neuromodulation and Botox injections as well    -Patient is not a candidate for anticholinergics due to history of glaucoma.  -Discussed neuromodulation as an option for urinary incontinence, she would like time to think about this. Information on neuromodulation provided.    Plan:  -Patient will call closest location and schedule " appointment for PFPT  -Patient will continue to implement dietary modifications to help with urinary incontinence  -Referral to Dr. Simons to discuss botox injection for OAB placed  -Follow-up as scheduled, or sooner if needed, to reassess symptoms     All questions and concerns were addressed. Patient verbalizes understanding and has no other questions at this time.     Some elements copied from my note on 1/6/25, the elements have been updated and all reflect current decision making from today, 02/10/25    E&M visit today is associated with current or anticipated ongoing medical care services related to a patient's single, serious condition or a complex condition.    MOR Santo-CNP   [facility]  02/10/25

## 2025-02-10 ENCOUNTER — OFFICE VISIT (OUTPATIENT)
Dept: UROLOGY | Facility: HOSPITAL | Age: 70
End: 2025-02-10
Payer: MEDICARE

## 2025-02-10 ENCOUNTER — APPOINTMENT (OUTPATIENT)
Dept: PRIMARY CARE | Facility: CLINIC | Age: 70
End: 2025-02-10
Payer: MEDICARE

## 2025-02-10 DIAGNOSIS — R32 URINARY INCONTINENCE, UNSPECIFIED TYPE: Primary | ICD-10-CM

## 2025-02-10 PROCEDURE — 4010F ACE/ARB THERAPY RXD/TAKEN: CPT | Performed by: NURSE PRACTITIONER

## 2025-02-10 PROCEDURE — 1159F MED LIST DOCD IN RCRD: CPT | Performed by: NURSE PRACTITIONER

## 2025-02-10 PROCEDURE — 1160F RVW MEDS BY RX/DR IN RCRD: CPT | Performed by: NURSE PRACTITIONER

## 2025-02-10 PROCEDURE — 99213 OFFICE O/P EST LOW 20 MIN: CPT | Performed by: NURSE PRACTITIONER

## 2025-02-10 PROCEDURE — G2211 COMPLEX E/M VISIT ADD ON: HCPCS | Performed by: NURSE PRACTITIONER

## 2025-02-10 PROCEDURE — 1036F TOBACCO NON-USER: CPT | Performed by: NURSE PRACTITIONER

## 2025-02-11 DIAGNOSIS — M54.2 NECK PAIN: ICD-10-CM

## 2025-02-11 DIAGNOSIS — R93.89 IMAGING ABNORMALITY: ICD-10-CM

## 2025-02-11 PROCEDURE — RXMED WILLOW AMBULATORY MEDICATION CHARGE

## 2025-02-11 RX ORDER — GABAPENTIN 100 MG/1
CAPSULE ORAL
Qty: 183 CAPSULE | Refills: 0 | Status: SHIPPED | OUTPATIENT
Start: 2025-02-11 | End: 2025-04-14

## 2025-02-12 ENCOUNTER — PHARMACY VISIT (OUTPATIENT)
Dept: PHARMACY | Facility: CLINIC | Age: 70
End: 2025-02-12
Payer: MEDICARE

## 2025-02-12 ENCOUNTER — OFFICE VISIT (OUTPATIENT)
Dept: ORTHOPEDIC SURGERY | Facility: HOSPITAL | Age: 70
End: 2025-02-12
Payer: MEDICARE

## 2025-02-12 ENCOUNTER — HOSPITAL ENCOUNTER (OUTPATIENT)
Dept: RADIOLOGY | Facility: HOSPITAL | Age: 70
Discharge: HOME | End: 2025-02-12
Payer: MEDICARE

## 2025-02-12 ENCOUNTER — SPECIALTY PHARMACY (OUTPATIENT)
Dept: PHARMACY | Facility: CLINIC | Age: 70
End: 2025-02-12

## 2025-02-12 VITALS — BODY MASS INDEX: 42.61 KG/M2 | WEIGHT: 203 LBS | HEIGHT: 58 IN

## 2025-02-12 DIAGNOSIS — M25.521 ELBOW PAIN, RIGHT: Primary | ICD-10-CM

## 2025-02-12 DIAGNOSIS — M25.521 ELBOW PAIN, RIGHT: ICD-10-CM

## 2025-02-12 PROCEDURE — 73130 X-RAY EXAM OF HAND: CPT | Mod: 50

## 2025-02-12 PROCEDURE — 1159F MED LIST DOCD IN RCRD: CPT | Performed by: STUDENT IN AN ORGANIZED HEALTH CARE EDUCATION/TRAINING PROGRAM

## 2025-02-12 PROCEDURE — 99214 OFFICE O/P EST MOD 30 MIN: CPT | Performed by: STUDENT IN AN ORGANIZED HEALTH CARE EDUCATION/TRAINING PROGRAM

## 2025-02-12 PROCEDURE — 4010F ACE/ARB THERAPY RXD/TAKEN: CPT | Performed by: STUDENT IN AN ORGANIZED HEALTH CARE EDUCATION/TRAINING PROGRAM

## 2025-02-12 PROCEDURE — G2211 COMPLEX E/M VISIT ADD ON: HCPCS | Performed by: STUDENT IN AN ORGANIZED HEALTH CARE EDUCATION/TRAINING PROGRAM

## 2025-02-12 PROCEDURE — 3008F BODY MASS INDEX DOCD: CPT | Performed by: STUDENT IN AN ORGANIZED HEALTH CARE EDUCATION/TRAINING PROGRAM

## 2025-02-12 PROCEDURE — 73080 X-RAY EXAM OF ELBOW: CPT | Mod: RT

## 2025-02-12 ASSESSMENT — PAIN DESCRIPTION - DESCRIPTORS: DESCRIPTORS: ACHING;NUMBNESS;TINGLING;SORE

## 2025-02-12 ASSESSMENT — PAIN - FUNCTIONAL ASSESSMENT: PAIN_FUNCTIONAL_ASSESSMENT: 0-10

## 2025-02-12 ASSESSMENT — PAIN SCALES - GENERAL: PAINLEVEL_OUTOF10: 5 - MODERATE PAIN

## 2025-02-12 NOTE — PROGRESS NOTES
CHIEF COMPLAINT: Right SF pain and weakness   DOI:~1 month ago  DOS:none      HISTORY OF PRESENT ILLNESS    Patient is a 70 y.o. ***-hand dominant female ***, ***Occupation, ***smoker, ***DM, ***thyroid disease, who presents today for evaluation of ***.   Onset: ***  Location: ***  Duration: ***  Character: ***  Alleviating Factors: ***  Aggravating Factors: ***  Radiation: ***  Timing: ***  Severity: ***    The patient ***denies prior surgeries this location.   The patient ***denies numbness, tingling, paresthesias    PHYSICAL EXAM    Extremities / Musculoskeletal:                      ***      IMAGING / LABS / EMGs:    Right elbow x-ray series obtained today independently reviewed demonstrating stable alignment of radiocapitellar ulnohumeral joints.  Moderate age-appropriate degenerative changes    Bilateral hand x-ray series obtained today and independent reviewed demonstrating normal global alignment signs of prior distal radius fracture with retained vascular clips on the right side.  Moderate CMC arthritis.  No signs of acute fracture or dislocation    Past Medical History:   Diagnosis Date    Abdominal distension (gaseous) 07/31/2019    Abdominal bloating    Cancer (Multi)     Cataract     Coronary artery disease     Diabetes mellitus (Multi)     Diverticulosis of intestine, part unspecified, without perforation or abscess without bleeding 06/09/2013    Diverticulosis    Elevation of levels of liver transaminase levels 11/13/2020    Transaminitis    Encounter for follow-up examination after completed treatment for conditions other than malignant neoplasm 01/26/2019    Hospital discharge follow-up    Glaucoma     Hypertension     Long term (current) use of antibiotics 10/07/2016    Need for prophylactic antibiotic    Other amnesia 10/28/2014    Memory loss    Other conditions influencing health status 02/06/2019    History of cough    Other specified health status 02/07/2019    Hepatitis C antibody test  negative    Other specified soft tissue disorders 06/14/2017    Leg swelling    Pain in left toe(s) 05/15/2017    Pain of toe of left foot    Pain in right foot 10/12/2016    Pain of right heel    Pain in right shoulder 06/22/2016    Acute pain of right shoulder    Personal history of diseases of the blood and blood-forming organs and certain disorders involving the immune mechanism 04/09/2018    History of anemia    Personal history of other diseases of the respiratory system 04/02/2018    History of sinusitis    Personal history of other diseases of the respiratory system 03/19/2014    History of upper respiratory infection    Personal history of other malignant neoplasm of kidney 01/14/2021    Personal history of malignant neoplasm of kidney    Personal history of other medical treatment 08/08/2017    History of screening mammography    Personal history of other specified conditions 11/17/2014    History of abdominal pain    Personal history of other specified conditions 06/14/2017    History of urinary frequency    Personal history of other specified conditions 06/09/2021    History of dysuria    Personal history of other specified conditions 11/28/2014    History of breast lump    Unspecified abdominal hernia without obstruction or gangrene 04/21/2014    Hernia       Medication Documentation Review Audit       Reviewed by Naila Chauhan LPN (Licensed Nurse) on 02/12/25 at 1120      Medication Order Taking? Sig Documenting Provider Last Dose Status   Accu-Chek Guide test strips strip [id number]  Use 1 test strip to test blood sugar twice daily. Monique Gross MD  Active   albuterol 90 mcg/actuation inhaler [id number] No Inhale 2 puffs every 4 hours if needed for wheezing or shortness of breath (You may also use this 10-15 minutes prior to exertional activity as needed). Josesito Magana MD Taking Active   amLODIPine (Norvasc) 10 mg tablet [id number] No Take 1 tablet (10 mg) by mouth once daily. Laura  Kim Mata MD Taking Active   ammonium lactate (Lac-Hydrin) 12 % lotion [id number] No Apply topically if needed for dry skin. Monique Gross MD Taking Active   apixaban (Eliquis) 2.5 mg tablet [id number]  Take 1 tablet (2.5 mg) by mouth every 12 hours. Garland Spears MD  Active   brimonidine-timoloL (Combigan) 0.2-0.5 % ophthalmic solution [id number] No Administer 1 drop into both eyes 2 times a day.   Patient not taking: Reported on 1/20/2025    Mingo Leigh MD Taking Active   colchicine 0.6 mg tablet [id number]  Take 1 tablet (0.6 mg) by mouth once daily. Sharmin Wall DO  Active   Combigan 0.2-0.5 % ophthalmic solution [id number]  Administer 1 drop into both eyes 2 times a day. Mingo Leigh MD  Active   diclofenac sodium 1 % kit [id number] No Apply 1 Application topically if needed.  APPLY TO LOWER EXTREMITIES, 4 GM OF GEL TO AFFECTED AREA 4 TIMES DAILY. DO NOT APPLY MORE THAN 16 GM DAILY TO ANY ONE AFFECTED JOINT. Historical Provider, MD Taking Active   dulaglutide (Trulicity) 0.75 mg/0.5 mL pen injector [id number]  Inject 0.75 mg under the skin 1 (one) time per week. Monique Gross MD  Active   famotidine (Pepcid) 20 mg tablet [id number] No Take 1 tablet (20 mg) by mouth once daily at bedtime. Historical Provider, MD Taking Active   fluticasone (Flonase) 50 mcg/actuation nasal spray [id number] No Administer 2 sprays into each nostril once daily. Laura Mata MD Taking Active   furosemide (Lasix) 20 mg tablet [id number] No Take 1 tablet (20 mg) by mouth once daily. Vianney Marrufo MD Taking Active     Discontinued 02/11/25 1822   gabapentin (Neurontin) 100 mg capsule [id number]  Take 1 capsule (100 mg) by mouth once daily at bedtime for 1 day, THEN 2 capsules (200 mg) once daily at bedtime for 1 day, THEN 3 capsules (300 mg) once daily at bedtime. Sharmin Wall DO  Active   glipiZIDE 2.5 mg tablet [id number]  Take 2.5 mg by mouth once daily. Monique Gross MD  Active  "  hydrocortisone 1 % cream [id number]  Apply as needed to the injection site Monique Gross MD  Active   insulin lispro (HumaLOG KwikPen Insulin) 100 unit/mL injection [id number]  Use with sliding scale 3 times a day, up to 30 units daily Monique Gross MD  Active   lancets (Accu-Chek Softclix Lancets) misc [id number]  Use to check blood sugar twice daily Monique Gross MD  Active   latanoprost (Xalatan) 0.005 % ophthalmic solution [id number]  Administer 1 drop into both eyes once daily in the morning. Mingo Leigh MD  Active   levocetirizine (Xyzal) 5 mg tablet [id number] No Take 1 tablet (5 mg) by mouth once daily in the evening. Laura Mata MD Taking Active   LORazepam (Ativan) 1 mg tablet [id number]  Take 1 tablet (1 mg) by mouth 1 time for 1 dose. Take 30 minutes prior to your MRI Sharmin Mae,    25 2359   losartan (Cozaar) 25 mg tablet [id number] No Take 1 tablet (25 mg) by mouth once daily. Laura Mata MD Taking Active   metoprolol succinate XL (Toprol-XL) 200 mg 24 hr tablet [id number] No Take 1 tablet (200 mg) by mouth once daily. Laura Mata MD Taking Active   netarsudiL (Rhopressa) 0.02 % drops opthalmic solution [id number] No Administer 1 drop into both eyes once daily in the evening.   Patient not taking: Reported on 2025    Mingo Leigh MD Taking Active   nitroglycerin (Nitrostat) SL tablet 0.4 mg [id number]   Vianney Marrufo MD  Active   omeprazole (PriLOSEC) 40 mg DR capsule [id number]  Take 1 capsule (40 mg) by mouth 2 times a day before meals.   Patient taking differently: Take 1 capsule (40 mg) by mouth once daily.    Laura Mata MD  Active   pen needle, diabetic 31 gauge x 5/16\" needle [id number]  Use to inject 1-4 times daily as directed. Monique Gross MD  Active   pen needle, diabetic 33 gauge x 5/32\" needle [id number] No Use for sliding scale up to 3 times a day   Patient not taking: Reported on 2025    " Monique Gross MD Taking Active   polyethylene glycol (Glycolax, Miralax) 17 gram/dose powder [id number] No Take 17 g by mouth 2 times a day. Jessica Durbin MD Taking Active   rosuvastatin (Crestor) 20 mg tablet [id number]  Take 1 tablet (20 mg) by mouth once daily. Vianney Marrufo MD  Active   tiotropium (Spiriva Respimat) 2.5 mcg/actuation inhaler [id number] No Inhale 2 puffs once daily. Josesito Magana MD Taking Active                    Allergies   Allergen Reactions    Adhesive Itching    Amoxicillin Hives and Itching    Bee Sting Kit Swelling    Cephalexin Itching and Nausea Only    Gadolinium-Containing Contrast Media Hives    Iodine Unknown    Latex Other    Pioglitazone Swelling    Rubella Virus Live Vaccine Unknown    Salicylates Other    Shellfish Derived Swelling    Sulfa (Sulfonamide Antibiotics) Unknown    Sulfamethoxazole-Trimethoprim Hives and Itching    Iodinated Contrast Media Itching and Rash       Past Surgical History:   Procedure Laterality Date    BREAST BIOPSY Right 2014    Biopsy Breast Percutaneous Needle Core    BREAST BIOPSY Right 2018    CARDIAC CATHETERIZATION N/A 2024    Procedure: Left Heart Cath;  Surgeon: Donato Cespedes MD;  Location: [place] [address] [facility];  Service: Cardiovascular;  Laterality: N/A;     SECTION, CLASSIC  2014     Section    CHOLECYSTECTOMY  2017    Cholecystectomy Laparoscopic    HAND SURGERY  2020    Hand Surgery                                                                                                                                                          HERNIA REPAIR  2014    Hernia Repair    MR HEAD ANGIO WO IV CONTRAST  2014    MR HEAD ANGIO WO IV CONTRAST 2014 [facility] [place] [facility]    OTHER SURGICAL HISTORY  2021    Nephrectomy    TOTAL ABDOMINAL HYSTERECTOMY W/ BILATERAL SALPINGOOPHORECTOMY  2014    Total Abdominal Hysterectomy With Removal Of Both Ovaries          ASSESSMENT: ***    PLAN: ***    Follow-up in: ***  XR at next visit: ***    The diagnosis and treatment plan were reviewed with the patient. All questions were answered. The patient verbalized understanding of the treatment plan. There were no barriers to understanding identified.     Note dictated with [employer] software.  Completed without full type editing and sent to avoid delay.    Jordan Jiménez M.D.    Department of Orthopaedics  Hand/Upper Extremity  Phone: [phone]  Appt. Phone: [phone]\

## 2025-02-17 ENCOUNTER — DOCUMENTATION (OUTPATIENT)
Dept: PRIMARY CARE | Facility: CLINIC | Age: 70
End: 2025-02-17
Payer: MEDICARE

## 2025-02-17 NOTE — PROGRESS NOTES
Called Vania for Chronic care management outreach. Unable to reach. Left voicemail to return call. Chart reviewed.

## 2025-02-24 ENCOUNTER — APPOINTMENT (OUTPATIENT)
Dept: HEMATOLOGY/ONCOLOGY | Facility: HOSPITAL | Age: 70
End: 2025-02-24
Payer: MEDICARE

## 2025-02-24 ENCOUNTER — HOSPITAL ENCOUNTER (OUTPATIENT)
Dept: NEUROLOGY | Facility: HOSPITAL | Age: 70
Discharge: HOME | End: 2025-02-24
Payer: MEDICARE

## 2025-02-24 DIAGNOSIS — M25.521 ELBOW PAIN, RIGHT: ICD-10-CM

## 2025-02-24 PROCEDURE — 95885 MUSC TST DONE W/NERV TST LIM: CPT | Mod: GC | Performed by: PSYCHIATRY & NEUROLOGY

## 2025-02-24 PROCEDURE — 95885 MUSC TST DONE W/NERV TST LIM: CPT | Performed by: PSYCHIATRY & NEUROLOGY

## 2025-02-24 PROCEDURE — 95913 NRV CNDJ TEST 13/> STUDIES: CPT | Performed by: PSYCHIATRY & NEUROLOGY

## 2025-02-26 ENCOUNTER — PATIENT OUTREACH (OUTPATIENT)
Dept: PRIMARY CARE | Facility: CLINIC | Age: 70
End: 2025-02-26

## 2025-02-26 ENCOUNTER — APPOINTMENT (OUTPATIENT)
Dept: OPHTHALMOLOGY | Facility: CLINIC | Age: 70
End: 2025-02-26
Payer: MEDICARE

## 2025-02-26 DIAGNOSIS — E11.22 TYPE 2 DIABETES MELLITUS WITH STAGE 3A CHRONIC KIDNEY DISEASE, WITHOUT LONG-TERM CURRENT USE OF INSULIN (MULTI): ICD-10-CM

## 2025-02-26 DIAGNOSIS — I10 PRIMARY HYPERTENSION: ICD-10-CM

## 2025-02-26 DIAGNOSIS — N18.31 TYPE 2 DIABETES MELLITUS WITH STAGE 3A CHRONIC KIDNEY DISEASE, WITHOUT LONG-TERM CURRENT USE OF INSULIN (MULTI): ICD-10-CM

## 2025-02-26 DIAGNOSIS — H40.10X2 OPEN-ANGLE GLAUCOMA OF BOTH EYES, MODERATE STAGE, UNSPECIFIED OPEN-ANGLE GLAUCOMA TYPE: Primary | ICD-10-CM

## 2025-02-26 PROCEDURE — 99490 CHRNC CARE MGMT STAFF 1ST 20: CPT | Performed by: STUDENT IN AN ORGANIZED HEALTH CARE EDUCATION/TRAINING PROGRAM

## 2025-02-26 PROCEDURE — 99214 OFFICE O/P EST MOD 30 MIN: CPT | Performed by: OPHTHALMOLOGY

## 2025-02-26 PROCEDURE — RXMED WILLOW AMBULATORY MEDICATION CHARGE

## 2025-02-26 RX ORDER — PREDNISOLONE ACETATE 10 MG/ML
1 SUSPENSION/ DROPS OPHTHALMIC 4 TIMES DAILY
Qty: 5 ML | Refills: 0 | Status: ON HOLD | OUTPATIENT
Start: 2025-02-26 | End: 2025-03-25

## 2025-02-26 ASSESSMENT — VISUAL ACUITY
OD_SC: 20/30
METHOD: SNELLEN - LINEAR
OD_SC+: -1

## 2025-02-26 ASSESSMENT — ENCOUNTER SYMPTOMS
CONSTITUTIONAL NEGATIVE: 0
RESPIRATORY NEGATIVE: 0
HEMATOLOGIC/LYMPHATIC NEGATIVE: 0
ALLERGIC/IMMUNOLOGIC NEGATIVE: 0
EYES NEGATIVE: 0
MUSCULOSKELETAL NEGATIVE: 0
ENDOCRINE NEGATIVE: 0
GASTROINTESTINAL NEGATIVE: 0
CARDIOVASCULAR NEGATIVE: 0
NEUROLOGICAL NEGATIVE: 0
PSYCHIATRIC NEGATIVE: 0

## 2025-02-26 ASSESSMENT — PACHYMETRY
OS_CT(UM): 573
OD_CT(UM): 564

## 2025-02-26 ASSESSMENT — CUP TO DISC RATIO
OD_RATIO: 0.75
OS_RATIO: 0.85

## 2025-02-26 ASSESSMENT — SLIT LAMP EXAM - LIDS
COMMENTS: NORMAL
COMMENTS: NORMAL

## 2025-02-26 ASSESSMENT — TONOMETRY
OD_IOP_MMHG: 16
IOP_METHOD: GOLDMANN APPLANATION
OS_IOP_MMHG: 15

## 2025-02-26 ASSESSMENT — EXTERNAL EXAM - LEFT EYE: OS_EXAM: NORMAL

## 2025-02-26 ASSESSMENT — EXTERNAL EXAM - RIGHT EYE: OD_EXAM: NORMAL

## 2025-02-26 ASSESSMENT — CONF VISUAL FIELD
OS_SUPERIOR_NASAL_RESTRICTION: 0
OS_INFERIOR_NASAL_RESTRICTION: 0
OS_INFERIOR_TEMPORAL_RESTRICTION: 0
OS_SUPERIOR_TEMPORAL_RESTRICTION: 0

## 2025-02-26 NOTE — PROGRESS NOTES
Chronic care management outreach complete. Vania is not feeling well today. She is battling a cold that she has had for about a week. She has no fever, she has a cough and stuffy nose. She is taking mucinex and drinking tea with honey and lemon. I encouraged to drink plenty of fluids in addition to resting.  She does not monitor her bp at home, but says that her bp is good when she goes to the doctor.   Her last blood sugar taken yesterday was 150.   She did receive her new medications colchicine and gabapentin in the mail.   Reviewed medications. No need for refills.  Reviewed previous appointments. No questions or concerns regarding her appointments.   I informed her that she has a referral to neurosurgery for her neck pain. She is aware, and just have not felt the need to schedule that appointment as of now.   Reviewed up coming appointments.

## 2025-02-27 ENCOUNTER — SPECIALTY PHARMACY (OUTPATIENT)
Dept: PHARMACY | Facility: CLINIC | Age: 70
End: 2025-02-27

## 2025-02-27 ENCOUNTER — OFFICE VISIT (OUTPATIENT)
Dept: URGENT CARE | Age: 70
End: 2025-02-27
Payer: MEDICARE

## 2025-02-27 VITALS
BODY MASS INDEX: 42.61 KG/M2 | RESPIRATION RATE: 20 BRPM | SYSTOLIC BLOOD PRESSURE: 126 MMHG | HEART RATE: 82 BPM | OXYGEN SATURATION: 96 % | WEIGHT: 203 LBS | DIASTOLIC BLOOD PRESSURE: 85 MMHG | HEIGHT: 58 IN | TEMPERATURE: 98.2 F

## 2025-02-27 DIAGNOSIS — J01.90 ACUTE NON-RECURRENT SINUSITIS, UNSPECIFIED LOCATION: ICD-10-CM

## 2025-02-27 DIAGNOSIS — R06.09 DOE (DYSPNEA ON EXERTION): Primary | ICD-10-CM

## 2025-02-27 DIAGNOSIS — R06.2 WHEEZING: ICD-10-CM

## 2025-02-27 PROCEDURE — 4010F ACE/ARB THERAPY RXD/TAKEN: CPT | Performed by: PHYSICIAN ASSISTANT

## 2025-02-27 PROCEDURE — 3079F DIAST BP 80-89 MM HG: CPT | Performed by: PHYSICIAN ASSISTANT

## 2025-02-27 PROCEDURE — 3008F BODY MASS INDEX DOCD: CPT | Performed by: PHYSICIAN ASSISTANT

## 2025-02-27 PROCEDURE — 1159F MED LIST DOCD IN RCRD: CPT | Performed by: PHYSICIAN ASSISTANT

## 2025-02-27 PROCEDURE — 1160F RVW MEDS BY RX/DR IN RCRD: CPT | Performed by: PHYSICIAN ASSISTANT

## 2025-02-27 PROCEDURE — 94640 AIRWAY INHALATION TREATMENT: CPT | Performed by: PHYSICIAN ASSISTANT

## 2025-02-27 PROCEDURE — 3074F SYST BP LT 130 MM HG: CPT | Performed by: PHYSICIAN ASSISTANT

## 2025-02-27 PROCEDURE — 99214 OFFICE O/P EST MOD 30 MIN: CPT | Performed by: PHYSICIAN ASSISTANT

## 2025-02-27 RX ORDER — LEVALBUTEROL TARTRATE 45 UG/1
1-2 AEROSOL, METERED ORAL EVERY 6 HOURS PRN
Qty: 15 G | Refills: 0 | Status: ON HOLD | OUTPATIENT
Start: 2025-02-27 | End: 2025-03-03 | Stop reason: WASHOUT

## 2025-02-27 RX ORDER — ALBUTEROL SULFATE 0.83 MG/ML
2.5 SOLUTION RESPIRATORY (INHALATION) ONCE
Status: DISCONTINUED | OUTPATIENT
Start: 2025-02-27 | End: 2025-02-27 | Stop reason: HOSPADM

## 2025-02-27 RX ORDER — DOXYCYCLINE 100 MG/1
100 CAPSULE ORAL 2 TIMES DAILY
Qty: 20 CAPSULE | Refills: 0 | Status: ON HOLD | OUTPATIENT
Start: 2025-02-27 | End: 2025-03-03 | Stop reason: WASHOUT

## 2025-02-27 RX ORDER — METHYLPREDNISOLONE 4 MG/1
TABLET ORAL
Qty: 21 TABLET | Refills: 0 | Status: ON HOLD | OUTPATIENT
Start: 2025-02-27 | End: 2025-03-03 | Stop reason: WASHOUT

## 2025-02-27 RX ADMIN — ALBUTEROL SULFATE 2.5 MG: 0.83 SOLUTION RESPIRATORY (INHALATION) at 11:20

## 2025-02-27 NOTE — PATIENT INSTRUCTIONS
Go to emergency department for worsening symptoms or concerns.     take tylenol, 1000mg max, every 8 hours, for discomfort.    take antihistamine during the day, ex zyrtec, claritin, etc, for nasal congestion/runny nose.    use saline nasal spray, for nasal congestion/runny nose.    Continue mucinex expectorant.     follow up with primary care if no improvement in 3 - 4 days.    finish all antibiotics, doxycycline

## 2025-02-27 NOTE — PROGRESS NOTES
Subjective   Patient ID: Vania Andrews is a 70 y.o. female. They present today with a chief complaint of Nasal Congestion and Cough (Chest congestion /1x wk).    History of Present Illness  Here with     Nasal congestion/rhinorrhea, ORTIZ, productive cough (worse laying/am), wheezing started one week ago.     Denies chest pain, fever, chills, sweats, ear pain, sore throat.    Hx asthma, not using albuterol inhaler        Past Medical History  Allergies as of 02/27/2025 - Reviewed 02/27/2025   Allergen Reaction Noted    Adhesive Itching 11/04/2010    Amoxicillin Hives and Itching 10/20/2010    Bee sting kit Swelling 02/09/2024    Cephalexin Itching and Nausea Only 07/21/2023    Gadolinium-containing contrast media Hives 01/22/2024    Iodine Unknown 01/13/2009    Latex Other 10/20/2010    Pioglitazone Swelling 07/21/2023    Rubella virus live vaccine Unknown 01/22/2024    Salicylates Other 10/20/2010    Shellfish derived Swelling 07/21/2023    Sulfa (sulfonamide antibiotics) Unknown 01/13/2009    Sulfamethoxazole-trimethoprim Hives and Itching 07/21/2023    Iodinated contrast media Itching and Rash 10/20/2010       (Not in a hospital admission)       Past Medical History:   Diagnosis Date    Abdominal distension (gaseous) 07/31/2019    Abdominal bloating    Asthma     Cancer (Multi)     Cataract     Coronary artery disease     Diabetes mellitus (Multi)     Diverticulosis of intestine, part unspecified, without perforation or abscess without bleeding 06/09/2013    Diverticulosis    Elevation of levels of liver transaminase levels 11/13/2020    Transaminitis    Encounter for follow-up examination after completed treatment for conditions other than malignant neoplasm 01/26/2019    Hospital discharge follow-up    Glaucoma     Hypertension     Long term (current) use of antibiotics 10/07/2016    Need for prophylactic antibiotic    Other amnesia 10/28/2014    Memory loss    Other conditions influencing health status  2019    History of cough    Other specified health status 2019    Hepatitis C antibody test negative    Other specified soft tissue disorders 2017    Leg swelling    Pain in left toe(s) 05/15/2017    Pain of toe of left foot    Pain in right foot 10/12/2016    Pain of right heel    Pain in right shoulder 2016    Acute pain of right shoulder    Personal history of diseases of the blood and blood-forming organs and certain disorders involving the immune mechanism 2018    History of anemia    Personal history of other diseases of the respiratory system 2018    History of sinusitis    Personal history of other diseases of the respiratory system 2014    History of upper respiratory infection    Personal history of other malignant neoplasm of kidney 2021    Personal history of malignant neoplasm of kidney    Personal history of other medical treatment 2017    History of screening mammography    Personal history of other specified conditions 2014    History of abdominal pain    Personal history of other specified conditions 2017    History of urinary frequency    Personal history of other specified conditions 2021    History of dysuria    Personal history of other specified conditions 2014    History of breast lump    Unspecified abdominal hernia without obstruction or gangrene 2014    Hernia       Past Surgical History:   Procedure Laterality Date    BREAST BIOPSY Right 2014    Biopsy Breast Percutaneous Needle Core    BREAST BIOPSY Right 2018    CARDIAC CATHETERIZATION N/A 2024    Procedure: Left Heart Cath;  Surgeon: Donato Cespedes MD;  Location: [place] [address] Cardiac Cath Lab;  Service: Cardiovascular;  Laterality: N/A;     SECTION, CLASSIC  2014     Section    CHOLECYSTECTOMY  2017    Cholecystectomy Laparoscopic    HAND SURGERY  2020    Hand Surgery                                       "                                                                                                                    HERNIA REPAIR  11/17/2014    Hernia Repair    MR HEAD ANGIO WO IV CONTRAST  11/17/2014    MR HEAD ANGIO WO IV CONTRAST 11/17/2014 [facility] ANCILLARY LEGACY    OTHER SURGICAL HISTORY  01/14/2021    Nephrectomy    TOTAL ABDOMINAL HYSTERECTOMY W/ BILATERAL SALPINGOOPHORECTOMY  04/21/2014    Total Abdominal Hysterectomy With Removal Of Both Ovaries        reports that she has quit smoking. Her smoking use included cigarettes. She has never been exposed to tobacco smoke. She has never used smokeless tobacco. She reports current alcohol use. She reports that she does not use drugs.    Review of Systems  Review of Systems                               Objective    Vitals:    02/27/25 1023   BP: 126/85   Pulse: 82   Resp: 20   Temp: 36.8 °C (98.2 °F)   TempSrc: Oral   SpO2: 96%   Weight: 92.1 kg (203 lb)   Height: 1.473 m (4' 10\")     No LMP recorded (lmp unknown). Patient is postmenopausal.    Physical Exam    Procedures    Point of Care Test & Imaging Results from this visit  No results found for this visit on 02/27/25.   No results found.    Diagnostic study results (if any) were reviewed by [facility].    Assessment/Plan   Allergies, medications, history, and pertinent labs/EKGs/Imaging reviewed by Mary Anne Hernandez PA-C.     Medical Decision Making  ***    Orders and Diagnoses  There are no diagnoses linked to this encounter.    Medical Admin Record      Patient disposition: { Disposition:17350}    Electronically signed by [facility]  10:36 AM      " Test & Imaging Results from this visit  No results found for this visit on 02/27/25.   No results found.    Diagnostic study results (if any) were reviewed by [facility].    Assessment/Plan   Allergies, medications, history, and pertinent labs/EKGs/Imaging reviewed by Mary Anne Hernandez PA-C.         Orders and Diagnoses  There are no diagnoses linked to this encounter.    Medical Admin Record      Patient disposition: Home    Electronically signed by [facility]  10:36 AM

## 2025-02-28 ENCOUNTER — HOSPITAL ENCOUNTER (INPATIENT)
Facility: HOSPITAL | Age: 70
End: 2025-02-28
Attending: STUDENT IN AN ORGANIZED HEALTH CARE EDUCATION/TRAINING PROGRAM | Admitting: INTERNAL MEDICINE
Payer: MEDICARE

## 2025-02-28 ENCOUNTER — APPOINTMENT (OUTPATIENT)
Dept: RADIOLOGY | Facility: HOSPITAL | Age: 70
End: 2025-02-28
Payer: MEDICARE

## 2025-02-28 ENCOUNTER — PHARMACY VISIT (OUTPATIENT)
Dept: PHARMACY | Facility: CLINIC | Age: 70
End: 2025-02-28
Payer: MEDICARE

## 2025-02-28 DIAGNOSIS — R00.0 TACHYCARDIA: ICD-10-CM

## 2025-02-28 DIAGNOSIS — F17.201 TOBACCO DEPENDENCE IN REMISSION: ICD-10-CM

## 2025-02-28 DIAGNOSIS — K21.9 GASTROESOPHAGEAL REFLUX DISEASE WITHOUT ESOPHAGITIS: ICD-10-CM

## 2025-02-28 DIAGNOSIS — H40.1132 PRIMARY OPEN ANGLE GLAUCOMA (POAG) OF BOTH EYES, MODERATE STAGE: ICD-10-CM

## 2025-02-28 DIAGNOSIS — D62 ACUTE ON CHRONIC BLOOD LOSS ANEMIA: ICD-10-CM

## 2025-02-28 DIAGNOSIS — E11.65 TYPE 2 DIABETES MELLITUS WITH HYPERGLYCEMIA, WITHOUT LONG-TERM CURRENT USE OF INSULIN: ICD-10-CM

## 2025-02-28 DIAGNOSIS — K92.1 BLACK TARRY STOOLS: Primary | ICD-10-CM

## 2025-02-28 DIAGNOSIS — K59.00 CONSTIPATION, UNSPECIFIED CONSTIPATION TYPE: ICD-10-CM

## 2025-02-28 DIAGNOSIS — I50.30 HEART FAILURE WITH PRESERVED EJECTION FRACTION, UNSPECIFIED HF CHRONICITY: ICD-10-CM

## 2025-02-28 DIAGNOSIS — J30.2 SEASONAL ALLERGIES: ICD-10-CM

## 2025-02-28 DIAGNOSIS — N18.30 STAGE 3 CHRONIC KIDNEY DISEASE, UNSPECIFIED WHETHER STAGE 3A OR 3B CKD (MULTI): ICD-10-CM

## 2025-02-28 DIAGNOSIS — Z85.831: ICD-10-CM

## 2025-02-28 DIAGNOSIS — R05.3 CHRONIC COUGH: ICD-10-CM

## 2025-02-28 LAB
ABO GROUP (TYPE) IN BLOOD: NORMAL
ALBUMIN SERPL BCP-MCNC: 3.6 G/DL (ref 3.4–5)
ALP SERPL-CCNC: 130 U/L (ref 33–136)
ALT SERPL W P-5'-P-CCNC: 24 U/L (ref 7–45)
ANION GAP SERPL CALC-SCNC: 8 MMOL/L (ref 10–20)
ANTIBODY SCREEN: NORMAL
APPEARANCE UR: CLEAR
APTT PPP: 27 SECONDS (ref 26–36)
AST SERPL W P-5'-P-CCNC: 21 U/L (ref 9–39)
BACTERIA #/AREA URNS AUTO: ABNORMAL /HPF
BASOPHILS # BLD AUTO: 0.05 X10*3/UL (ref 0–0.1)
BASOPHILS # BLD AUTO: 0.06 X10*3/UL (ref 0–0.1)
BASOPHILS NFR BLD AUTO: 0.5 %
BASOPHILS NFR BLD AUTO: 0.5 %
BILIRUB SERPL-MCNC: 0.3 MG/DL (ref 0–1.2)
BILIRUB UR STRIP.AUTO-MCNC: NEGATIVE MG/DL
BUN SERPL-MCNC: 29 MG/DL (ref 6–23)
CALCIUM SERPL-MCNC: 9.3 MG/DL (ref 8.6–10.6)
CHLORIDE SERPL-SCNC: 109 MMOL/L (ref 98–107)
CO2 SERPL-SCNC: 27 MMOL/L (ref 21–32)
COLOR UR: ABNORMAL
CREAT SERPL-MCNC: 1.45 MG/DL (ref 0.5–1.05)
EGFRCR SERPLBLD CKD-EPI 2021: 39 ML/MIN/1.73M*2
EOSINOPHIL # BLD AUTO: 0.21 X10*3/UL (ref 0–0.7)
EOSINOPHIL # BLD AUTO: 0.21 X10*3/UL (ref 0–0.7)
EOSINOPHIL NFR BLD AUTO: 1.7 %
EOSINOPHIL NFR BLD AUTO: 2.1 %
ERYTHROCYTE [DISTWIDTH] IN BLOOD BY AUTOMATED COUNT: 14.8 % (ref 11.5–14.5)
ERYTHROCYTE [DISTWIDTH] IN BLOOD BY AUTOMATED COUNT: 15.1 % (ref 11.5–14.5)
GLUCOSE BLD MANUAL STRIP-MCNC: 182 MG/DL (ref 74–99)
GLUCOSE BLD MANUAL STRIP-MCNC: 183 MG/DL (ref 74–99)
GLUCOSE SERPL-MCNC: 123 MG/DL (ref 74–99)
GLUCOSE UR STRIP.AUTO-MCNC: NORMAL MG/DL
HCT VFR BLD AUTO: 23.8 % (ref 36–46)
HCT VFR BLD AUTO: 26.4 % (ref 36–46)
HEMOCCULT SP1 STL QL: POSITIVE
HGB BLD-MCNC: 7.4 G/DL (ref 12–16)
HGB BLD-MCNC: 8.4 G/DL (ref 12–16)
IMM GRANULOCYTES # BLD AUTO: 0.06 X10*3/UL (ref 0–0.7)
IMM GRANULOCYTES # BLD AUTO: 0.08 X10*3/UL (ref 0–0.7)
IMM GRANULOCYTES NFR BLD AUTO: 0.6 % (ref 0–0.9)
IMM GRANULOCYTES NFR BLD AUTO: 0.6 % (ref 0–0.9)
INR PPP: 1.2 (ref 0.9–1.1)
IRON SATN MFR SERPL: 13 % (ref 25–45)
IRON SERPL-MCNC: 43 UG/DL (ref 35–150)
KETONES UR STRIP.AUTO-MCNC: NEGATIVE MG/DL
LEUKOCYTE ESTERASE UR QL STRIP.AUTO: NEGATIVE
LYMPHOCYTES # BLD AUTO: 1.97 X10*3/UL (ref 1.2–4.8)
LYMPHOCYTES # BLD AUTO: 2.06 X10*3/UL (ref 1.2–4.8)
LYMPHOCYTES NFR BLD AUTO: 16.3 %
LYMPHOCYTES NFR BLD AUTO: 19.6 %
MAGNESIUM SERPL-MCNC: 2.18 MG/DL (ref 1.6–2.4)
MCH RBC QN AUTO: 28.3 PG (ref 26–34)
MCH RBC QN AUTO: 28.4 PG (ref 26–34)
MCHC RBC AUTO-ENTMCNC: 31.1 G/DL (ref 32–36)
MCHC RBC AUTO-ENTMCNC: 31.8 G/DL (ref 32–36)
MCV RBC AUTO: 89 FL (ref 80–100)
MCV RBC AUTO: 91 FL (ref 80–100)
MONOCYTES # BLD AUTO: 0.54 X10*3/UL (ref 0.1–1)
MONOCYTES # BLD AUTO: 0.59 X10*3/UL (ref 0.1–1)
MONOCYTES NFR BLD AUTO: 4.7 %
MONOCYTES NFR BLD AUTO: 5.4 %
MUCOUS THREADS #/AREA URNS AUTO: ABNORMAL /LPF
NEUTROPHILS # BLD AUTO: 7.24 X10*3/UL (ref 1.2–7.7)
NEUTROPHILS # BLD AUTO: 9.66 X10*3/UL (ref 1.2–7.7)
NEUTROPHILS NFR BLD AUTO: 71.8 %
NEUTROPHILS NFR BLD AUTO: 76.2 %
NITRITE UR QL STRIP.AUTO: NEGATIVE
NRBC BLD-RTO: 0 /100 WBCS (ref 0–0)
NRBC BLD-RTO: 0.2 /100 WBCS (ref 0–0)
PH UR STRIP.AUTO: 5 [PH]
PLATELET # BLD AUTO: 275 X10*3/UL (ref 150–450)
PLATELET # BLD AUTO: 329 X10*3/UL (ref 150–450)
POTASSIUM SERPL-SCNC: 4.1 MMOL/L (ref 3.5–5.3)
PROT SERPL-MCNC: 6.6 G/DL (ref 6.4–8.2)
PROT UR STRIP.AUTO-MCNC: NEGATIVE MG/DL
PROTHROMBIN TIME: 13.4 SECONDS (ref 9.8–12.4)
RBC # BLD AUTO: 2.61 X10*6/UL (ref 4–5.2)
RBC # BLD AUTO: 2.97 X10*6/UL (ref 4–5.2)
RBC # UR STRIP.AUTO: ABNORMAL MG/DL
RBC #/AREA URNS AUTO: ABNORMAL /HPF
RH FACTOR (ANTIGEN D): NORMAL
SODIUM SERPL-SCNC: 140 MMOL/L (ref 136–145)
SP GR UR STRIP.AUTO: 1.02
SQUAMOUS #/AREA URNS AUTO: ABNORMAL /HPF
TIBC SERPL-MCNC: 343 UG/DL (ref 240–445)
UIBC SERPL-MCNC: 300 UG/DL (ref 110–370)
UROBILINOGEN UR STRIP.AUTO-MCNC: NORMAL MG/DL
WBC # BLD AUTO: 10.1 X10*3/UL (ref 4.4–11.3)
WBC # BLD AUTO: 12.7 X10*3/UL (ref 4.4–11.3)
WBC #/AREA URNS AUTO: ABNORMAL /HPF

## 2025-02-28 PROCEDURE — 2500000002 HC RX 250 W HCPCS SELF ADMINISTERED DRUGS (ALT 637 FOR MEDICARE OP, ALT 636 FOR OP/ED)

## 2025-02-28 PROCEDURE — 99285 EMERGENCY DEPT VISIT HI MDM: CPT | Mod: 25 | Performed by: STUDENT IN AN ORGANIZED HEALTH CARE EDUCATION/TRAINING PROGRAM

## 2025-02-28 PROCEDURE — 85025 COMPLETE CBC W/AUTO DIFF WBC: CPT

## 2025-02-28 PROCEDURE — 83540 ASSAY OF IRON: CPT

## 2025-02-28 PROCEDURE — 82947 ASSAY GLUCOSE BLOOD QUANT: CPT

## 2025-02-28 PROCEDURE — 74176 CT ABD & PELVIS W/O CONTRAST: CPT | Performed by: RADIOLOGY

## 2025-02-28 PROCEDURE — 83735 ASSAY OF MAGNESIUM: CPT

## 2025-02-28 PROCEDURE — 74176 CT ABD & PELVIS W/O CONTRAST: CPT

## 2025-02-28 PROCEDURE — 85610 PROTHROMBIN TIME: CPT

## 2025-02-28 PROCEDURE — 96374 THER/PROPH/DIAG INJ IV PUSH: CPT

## 2025-02-28 PROCEDURE — 99223 1ST HOSP IP/OBS HIGH 75: CPT

## 2025-02-28 PROCEDURE — 2500000004 HC RX 250 GENERAL PHARMACY W/ HCPCS (ALT 636 FOR OP/ED)

## 2025-02-28 PROCEDURE — 36415 COLL VENOUS BLD VENIPUNCTURE: CPT

## 2025-02-28 PROCEDURE — 80053 COMPREHEN METABOLIC PANEL: CPT

## 2025-02-28 PROCEDURE — 82270 OCCULT BLOOD FECES: CPT

## 2025-02-28 PROCEDURE — 2500000001 HC RX 250 WO HCPCS SELF ADMINISTERED DRUGS (ALT 637 FOR MEDICARE OP)

## 2025-02-28 PROCEDURE — 85730 THROMBOPLASTIN TIME PARTIAL: CPT

## 2025-02-28 PROCEDURE — 2500000005 HC RX 250 GENERAL PHARMACY W/O HCPCS

## 2025-02-28 PROCEDURE — 36410 VNPNXR 3YR/> PHY/QHP DX/THER: CPT

## 2025-02-28 PROCEDURE — 81001 URINALYSIS AUTO W/SCOPE: CPT | Performed by: STUDENT IN AN ORGANIZED HEALTH CARE EDUCATION/TRAINING PROGRAM

## 2025-02-28 PROCEDURE — 86901 BLOOD TYPING SEROLOGIC RH(D): CPT

## 2025-02-28 PROCEDURE — 83550 IRON BINDING TEST: CPT

## 2025-02-28 PROCEDURE — 2500000004 HC RX 250 GENERAL PHARMACY W/ HCPCS (ALT 636 FOR OP/ED): Performed by: STUDENT IN AN ORGANIZED HEALTH CARE EDUCATION/TRAINING PROGRAM

## 2025-02-28 PROCEDURE — 1210000001 HC SEMI-PRIVATE ROOM DAILY

## 2025-02-28 RX ORDER — DEXTROSE 50 % IN WATER (D50W) INTRAVENOUS SYRINGE
12.5
Status: ACTIVE | OUTPATIENT
Start: 2025-02-28

## 2025-02-28 RX ORDER — ACETAMINOPHEN 325 MG/1
650 TABLET ORAL EVERY 4 HOURS PRN
Status: DISPENSED | OUTPATIENT
Start: 2025-02-28

## 2025-02-28 RX ORDER — PANTOPRAZOLE SODIUM 40 MG/10ML
80 INJECTION, POWDER, LYOPHILIZED, FOR SOLUTION INTRAVENOUS DAILY
Status: DISCONTINUED | OUTPATIENT
Start: 2025-02-28 | End: 2025-02-28

## 2025-02-28 RX ORDER — TALC
3 POWDER (GRAM) TOPICAL NIGHTLY PRN
Status: ACTIVE | OUTPATIENT
Start: 2025-02-28

## 2025-02-28 RX ORDER — LOSARTAN POTASSIUM 25 MG/1
25 TABLET ORAL DAILY
Status: ACTIVE | OUTPATIENT
Start: 2025-02-28

## 2025-02-28 RX ORDER — ALBUTEROL SULFATE 0.83 MG/ML
2.5 SOLUTION RESPIRATORY (INHALATION) EVERY 6 HOURS PRN
Status: DISPENSED | OUTPATIENT
Start: 2025-02-28

## 2025-02-28 RX ORDER — POLYETHYLENE GLYCOL 3350 17 G/17G
17 POWDER, FOR SOLUTION ORAL 2 TIMES DAILY
Status: CANCELLED | OUTPATIENT
Start: 2025-02-28

## 2025-02-28 RX ORDER — AMMONIUM LACTATE 12 G/100G
LOTION TOPICAL AS NEEDED
Status: ACTIVE | OUTPATIENT
Start: 2025-02-28

## 2025-02-28 RX ORDER — FLUTICASONE PROPIONATE 50 MCG
2 SPRAY, SUSPENSION (ML) NASAL DAILY
Status: DISPENSED | OUTPATIENT
Start: 2025-02-28

## 2025-02-28 RX ORDER — COLCHICINE 0.6 MG/1
0.6 TABLET ORAL DAILY
Status: ACTIVE | OUTPATIENT
Start: 2025-02-28

## 2025-02-28 RX ORDER — FAMOTIDINE 20 MG/1
20 TABLET, FILM COATED ORAL NIGHTLY
Status: DISPENSED | OUTPATIENT
Start: 2025-02-28

## 2025-02-28 RX ORDER — BRIMONIDINE TARTRATE AND TIMOLOL MALEATE 2; 5 MG/ML; MG/ML
1 SOLUTION OPHTHALMIC 2 TIMES DAILY
Status: DISCONTINUED | OUTPATIENT
Start: 2025-02-28 | End: 2025-02-28

## 2025-02-28 RX ORDER — AMLODIPINE BESYLATE 10 MG/1
10 TABLET ORAL DAILY
Status: DISPENSED | OUTPATIENT
Start: 2025-02-28

## 2025-02-28 RX ORDER — LATANOPROST 50 UG/ML
1 SOLUTION/ DROPS OPHTHALMIC EVERY MORNING
Status: DISCONTINUED | OUTPATIENT
Start: 2025-03-01 | End: 2025-03-01

## 2025-02-28 RX ORDER — PREDNISOLONE ACETATE 10 MG/ML
1 SUSPENSION/ DROPS OPHTHALMIC 4 TIMES DAILY
Status: DISCONTINUED | OUTPATIENT
Start: 2025-02-28 | End: 2025-03-01

## 2025-02-28 RX ORDER — ACETAMINOPHEN 650 MG/1
650 SUPPOSITORY RECTAL EVERY 4 HOURS PRN
Status: ACTIVE | OUTPATIENT
Start: 2025-02-28

## 2025-02-28 RX ORDER — FUROSEMIDE 20 MG/1
20 TABLET ORAL DAILY
Status: DISPENSED | OUTPATIENT
Start: 2025-02-28

## 2025-02-28 RX ORDER — TIMOLOL MALEATE 5 MG/ML
1 SOLUTION/ DROPS OPHTHALMIC 2 TIMES DAILY
Status: DISCONTINUED | OUTPATIENT
Start: 2025-02-28 | End: 2025-03-01

## 2025-02-28 RX ORDER — DEXTROSE 50 % IN WATER (D50W) INTRAVENOUS SYRINGE
25
Status: ACTIVE | OUTPATIENT
Start: 2025-02-28

## 2025-02-28 RX ORDER — INSULIN LISPRO 100 [IU]/ML
0-5 INJECTION, SOLUTION INTRAVENOUS; SUBCUTANEOUS
Status: DISPENSED | OUTPATIENT
Start: 2025-02-28

## 2025-02-28 RX ORDER — PANTOPRAZOLE SODIUM 40 MG/10ML
40 INJECTION, POWDER, LYOPHILIZED, FOR SOLUTION INTRAVENOUS DAILY
Status: DISCONTINUED | OUTPATIENT
Start: 2025-02-28 | End: 2025-02-28

## 2025-02-28 RX ORDER — ROSUVASTATIN CALCIUM 20 MG/1
20 TABLET, COATED ORAL DAILY
Status: DISPENSED | OUTPATIENT
Start: 2025-02-28

## 2025-02-28 RX ORDER — ENOXAPARIN SODIUM 100 MG/ML
1 INJECTION SUBCUTANEOUS EVERY 12 HOURS
Status: DISPENSED | OUTPATIENT
Start: 2025-02-28

## 2025-02-28 RX ORDER — METOPROLOL SUCCINATE 50 MG/1
200 TABLET, EXTENDED RELEASE ORAL DAILY
Status: DISPENSED | OUTPATIENT
Start: 2025-02-28

## 2025-02-28 RX ORDER — ONDANSETRON HYDROCHLORIDE 2 MG/ML
4 INJECTION, SOLUTION INTRAVENOUS ONCE
Status: DISPENSED | OUTPATIENT
Start: 2025-02-28

## 2025-02-28 RX ORDER — PANTOPRAZOLE SODIUM 40 MG/1
40 TABLET, DELAYED RELEASE ORAL 2 TIMES DAILY
Status: DISPENSED | OUTPATIENT
Start: 2025-02-28

## 2025-02-28 RX ORDER — DEXTROMETHORPHAN HYDROBROMIDE, GUAIFENESIN 5; 100 MG/5ML; MG/5ML
1-2 LIQUID ORAL EVERY 8 HOURS PRN
Status: ON HOLD | COMMUNITY

## 2025-02-28 RX ORDER — ACETAMINOPHEN 160 MG/5ML
650 SOLUTION ORAL EVERY 4 HOURS PRN
Status: ACTIVE | OUTPATIENT
Start: 2025-02-28

## 2025-02-28 RX ORDER — GABAPENTIN 300 MG/1
300 CAPSULE ORAL NIGHTLY
Status: DISPENSED | OUTPATIENT
Start: 2025-02-28

## 2025-02-28 RX ORDER — BRIMONIDINE TARTRATE 2 MG/ML
1 SOLUTION/ DROPS OPHTHALMIC 2 TIMES DAILY
Status: DISCONTINUED | OUTPATIENT
Start: 2025-02-28 | End: 2025-03-01

## 2025-02-28 RX ORDER — POLYETHYLENE GLYCOL 3350 17 G/17G
17 POWDER, FOR SOLUTION ORAL DAILY
Status: DISPENSED | OUTPATIENT
Start: 2025-02-28

## 2025-02-28 RX ADMIN — ACETAMINOPHEN 650 MG: 325 TABLET ORAL at 22:01

## 2025-02-28 RX ADMIN — GABAPENTIN 300 MG: 300 CAPSULE ORAL at 21:41

## 2025-02-28 RX ADMIN — PANTOPRAZOLE SODIUM 80 MG: 40 INJECTION, POWDER, FOR SOLUTION INTRAVENOUS at 10:37

## 2025-02-28 RX ADMIN — ROSUVASTATIN CALCIUM 20 MG: 20 TABLET, FILM COATED ORAL at 15:13

## 2025-02-28 RX ADMIN — PANTOPRAZOLE SODIUM 40 MG: 40 TABLET, DELAYED RELEASE ORAL at 15:13

## 2025-02-28 RX ADMIN — AMLODIPINE BESYLATE 10 MG: 10 TABLET ORAL at 15:14

## 2025-02-28 RX ADMIN — PANTOPRAZOLE SODIUM 40 MG: 40 TABLET, DELAYED RELEASE ORAL at 21:41

## 2025-02-28 RX ADMIN — TIOTROPIUM BROMIDE INHALATION SPRAY 2 PUFF: 3.12 SPRAY, METERED RESPIRATORY (INHALATION) at 15:13

## 2025-02-28 RX ADMIN — BRIMONIDINE TARTRATE 1 DROP: 2 SOLUTION/ DROPS OPHTHALMIC at 16:02

## 2025-02-28 RX ADMIN — FAMOTIDINE 20 MG: 20 TABLET ORAL at 21:41

## 2025-02-28 RX ADMIN — TIMOLOL MALEATE 1 DROP: 5 SOLUTION/ DROPS OPHTHALMIC at 16:02

## 2025-02-28 RX ADMIN — INSULIN LISPRO 1 UNITS: 100 INJECTION, SOLUTION INTRAVENOUS; SUBCUTANEOUS at 16:02

## 2025-02-28 RX ADMIN — ENOXAPARIN SODIUM 90 MG: 100 INJECTION SUBCUTANEOUS at 21:42

## 2025-02-28 RX ADMIN — FLUTICASONE PROPIONATE 2 SPRAY: 50 SPRAY, METERED NASAL at 15:19

## 2025-02-28 RX ADMIN — METOPROLOL SUCCINATE 200 MG: 50 TABLET, EXTENDED RELEASE ORAL at 15:14

## 2025-02-28 SDOH — ECONOMIC STABILITY: FOOD INSECURITY: HOW HARD IS IT FOR YOU TO PAY FOR THE VERY BASICS LIKE FOOD, HOUSING, MEDICAL CARE, AND HEATING?: NOT VERY HARD

## 2025-02-28 SDOH — SOCIAL STABILITY: SOCIAL INSECURITY: WERE YOU ABLE TO COMPLETE ALL THE BEHAVIORAL HEALTH SCREENINGS?: YES

## 2025-02-28 SDOH — SOCIAL STABILITY: SOCIAL INSECURITY: ARE YOU OR HAVE YOU BEEN THREATENED OR ABUSED PHYSICALLY, EMOTIONALLY, OR SEXUALLY BY ANYONE?: NO

## 2025-02-28 SDOH — ECONOMIC STABILITY: FOOD INSECURITY: WITHIN THE PAST 12 MONTHS, THE FOOD YOU BOUGHT JUST DIDN'T LAST AND YOU DIDN'T HAVE MONEY TO GET MORE.: NEVER TRUE

## 2025-02-28 SDOH — ECONOMIC STABILITY: INCOME INSECURITY: IN THE PAST 12 MONTHS HAS THE ELECTRIC, GAS, OIL, OR WATER COMPANY THREATENED TO SHUT OFF SERVICES IN YOUR HOME?: NO

## 2025-02-28 SDOH — ECONOMIC STABILITY: FOOD INSECURITY: WITHIN THE PAST 12 MONTHS, YOU WORRIED THAT YOUR FOOD WOULD RUN OUT BEFORE YOU GOT THE MONEY TO BUY MORE.: NEVER TRUE

## 2025-02-28 SDOH — ECONOMIC STABILITY: HOUSING INSECURITY: AT ANY TIME IN THE PAST 12 MONTHS, WERE YOU HOMELESS OR LIVING IN A SHELTER (INCLUDING NOW)?: NO

## 2025-02-28 SDOH — SOCIAL STABILITY: SOCIAL INSECURITY
WITHIN THE LAST YEAR, HAVE YOU BEEN RAPED OR FORCED TO HAVE ANY KIND OF SEXUAL ACTIVITY BY YOUR PARTNER OR EX-PARTNER?: NO

## 2025-02-28 SDOH — SOCIAL STABILITY: SOCIAL INSECURITY: WITHIN THE LAST YEAR, HAVE YOU BEEN AFRAID OF YOUR PARTNER OR EX-PARTNER?: NO

## 2025-02-28 SDOH — SOCIAL STABILITY: SOCIAL INSECURITY: DO YOU FEEL UNSAFE GOING BACK TO THE PLACE WHERE YOU ARE LIVING?: NO

## 2025-02-28 SDOH — SOCIAL STABILITY: SOCIAL INSECURITY: HAVE YOU HAD THOUGHTS OF HARMING ANYONE ELSE?: NO

## 2025-02-28 SDOH — SOCIAL STABILITY: SOCIAL INSECURITY
WITHIN THE LAST YEAR, HAVE YOU BEEN KICKED, HIT, SLAPPED, OR OTHERWISE PHYSICALLY HURT BY YOUR PARTNER OR EX-PARTNER?: NO

## 2025-02-28 SDOH — ECONOMIC STABILITY: HOUSING INSECURITY: IN THE LAST 12 MONTHS, WAS THERE A TIME WHEN YOU WERE NOT ABLE TO PAY THE MORTGAGE OR RENT ON TIME?: NO

## 2025-02-28 SDOH — SOCIAL STABILITY: SOCIAL INSECURITY: HAS ANYONE EVER THREATENED TO HURT YOUR FAMILY OR YOUR PETS?: NO

## 2025-02-28 SDOH — SOCIAL STABILITY: SOCIAL INSECURITY: ABUSE: ADULT

## 2025-02-28 SDOH — SOCIAL STABILITY: SOCIAL INSECURITY: WITHIN THE LAST YEAR, HAVE YOU BEEN HUMILIATED OR EMOTIONALLY ABUSED IN OTHER WAYS BY YOUR PARTNER OR EX-PARTNER?: NO

## 2025-02-28 SDOH — SOCIAL STABILITY: SOCIAL INSECURITY: DOES ANYONE TRY TO KEEP YOU FROM HAVING/CONTACTING OTHER FRIENDS OR DOING THINGS OUTSIDE YOUR HOME?: NO

## 2025-02-28 SDOH — SOCIAL STABILITY: SOCIAL INSECURITY: DO YOU FEEL ANYONE HAS EXPLOITED OR TAKEN ADVANTAGE OF YOU FINANCIALLY OR OF YOUR PERSONAL PROPERTY?: NO

## 2025-02-28 SDOH — SOCIAL STABILITY: SOCIAL INSECURITY: ARE THERE ANY APPARENT SIGNS OF INJURIES/BEHAVIORS THAT COULD BE RELATED TO ABUSE/NEGLECT?: NO

## 2025-02-28 SDOH — ECONOMIC STABILITY: TRANSPORTATION INSECURITY: IN THE PAST 12 MONTHS, HAS LACK OF TRANSPORTATION KEPT YOU FROM MEDICAL APPOINTMENTS OR FROM GETTING MEDICATIONS?: NO

## 2025-02-28 SDOH — ECONOMIC STABILITY: HOUSING INSECURITY: IN THE PAST 12 MONTHS, HOW MANY TIMES HAVE YOU MOVED WHERE YOU WERE LIVING?: 0

## 2025-02-28 ASSESSMENT — COGNITIVE AND FUNCTIONAL STATUS - GENERAL
MOBILITY SCORE: 14
DRESSING REGULAR UPPER BODY CLOTHING: A LITTLE
DRESSING REGULAR LOWER BODY CLOTHING: A LITTLE
MOBILITY SCORE: 21
TOILETING: A LITTLE
CLIMB 3 TO 5 STEPS WITH RAILING: A LITTLE
CLIMB 3 TO 5 STEPS WITH RAILING: A LOT
DAILY ACTIVITIY SCORE: 19
MOVING TO AND FROM BED TO CHAIR: A LITTLE
HELP NEEDED FOR BATHING: A LITTLE
DAILY ACTIVITIY SCORE: 24
PATIENT BASELINE BEDBOUND: NO
PERSONAL GROOMING: A LITTLE
STANDING UP FROM CHAIR USING ARMS: A LOT
MOVING FROM LYING ON BACK TO SITTING ON SIDE OF FLAT BED WITH BEDRAILS: A LITTLE
TURNING FROM BACK TO SIDE WHILE IN FLAT BAD: A LITTLE
WALKING IN HOSPITAL ROOM: A LITTLE
MOVING TO AND FROM BED TO CHAIR: A LOT
WALKING IN HOSPITAL ROOM: A LOT

## 2025-02-28 ASSESSMENT — LIFESTYLE VARIABLES
HOW OFTEN DO YOU HAVE A DRINK CONTAINING ALCOHOL: NEVER
HOW MANY STANDARD DRINKS CONTAINING ALCOHOL DO YOU HAVE ON A TYPICAL DAY: PATIENT DOES NOT DRINK
SKIP TO QUESTIONS 9-10: 1
AUDIT-C TOTAL SCORE: 0
AUDIT-C TOTAL SCORE: 0
HOW OFTEN DO YOU HAVE 6 OR MORE DRINKS ON ONE OCCASION: NEVER

## 2025-02-28 ASSESSMENT — ACTIVITIES OF DAILY LIVING (ADL)
PATIENT'S MEMORY ADEQUATE TO SAFELY COMPLETE DAILY ACTIVITIES?: YES
HEARING - LEFT EAR: FUNCTIONAL
BATHING: INDEPENDENT
TOILETING: INDEPENDENT
WALKS IN HOME: INDEPENDENT
HEARING - RIGHT EAR: FUNCTIONAL
LACK_OF_TRANSPORTATION: NO
FEEDING YOURSELF: INDEPENDENT
GROOMING: INDEPENDENT
JUDGMENT_ADEQUATE_SAFELY_COMPLETE_DAILY_ACTIVITIES: YES
DRESSING YOURSELF: INDEPENDENT
ADEQUATE_TO_COMPLETE_ADL: YES

## 2025-02-28 ASSESSMENT — PAIN - FUNCTIONAL ASSESSMENT
PAIN_FUNCTIONAL_ASSESSMENT: 0-10
PAIN_FUNCTIONAL_ASSESSMENT: 0-10

## 2025-02-28 ASSESSMENT — PAIN DESCRIPTION - PROGRESSION: CLINICAL_PROGRESSION: NOT CHANGED

## 2025-02-28 ASSESSMENT — PAIN DESCRIPTION - PAIN TYPE: TYPE: ACUTE PAIN

## 2025-02-28 ASSESSMENT — PAIN DESCRIPTION - LOCATION
LOCATION: LEG
LOCATION: ABDOMEN

## 2025-02-28 ASSESSMENT — PAIN DESCRIPTION - ONSET: ONSET: GRADUAL

## 2025-02-28 ASSESSMENT — PAIN SCALES - GENERAL
PAINLEVEL_OUTOF10: 7
PAINLEVEL_OUTOF10: 0 - NO PAIN
PAINLEVEL_OUTOF10: 8

## 2025-02-28 ASSESSMENT — PAIN DESCRIPTION - DESCRIPTORS: DESCRIPTORS: ACHING;CRAMPING

## 2025-02-28 ASSESSMENT — PAIN DESCRIPTION - FREQUENCY: FREQUENCY: CONSTANT/CONTINUOUS

## 2025-02-28 NOTE — ED PROCEDURE NOTE
There was difficulty obtaining IV access on this patient, and multiple attempts by nursing staff and/or medics have failed. Patient required IV access by ED provider under the assistance of ultrasound.      Site was prepped and sterilized before IV insertion.     Ultrasound images were not saved in the patient's chart demonstrating cannulization.     IV Size: 20  Anatomic Side: Left  IV Site: Forearm      Real Graham MD  PGY-2  [facility]       Real Graham MD  Resident  02/28/25 0988

## 2025-02-28 NOTE — ED TRIAGE NOTES
Patient presents to the ED with complaint of black/ tarry stools times 3. Also endorses lower abdominal pain, dizziness, and shortness of breathe on exertion. Was seen at urgent care yesterday for dizziness was prescribed steroids and antibiotics for the shortness of breathe.

## 2025-02-28 NOTE — H&P
MEDICINE ADMISSION NOTE    History of Present Illness  Vania Andrews is a 70 year old female with h/o HTN, DLD, CAD c/b NSTEMI in 2019, left RCC w/partial nephrectomy and adrenalectomy (2007), CKD3, MARYLU not on CPAP, DM2, VEDA, provoked DVT/PE in 2/2024 on apixiban admitted to [facility] on 2/28/25 with melena and possible upper GI bleed.     Pt reports having 3 black stools over the last two days with the most recent occurring this morning. The stools are soft and sticky but formed. She's had darker stools before but never like this. This morning she started feeling dizzy and lightheaded which prompted her to come to ED. She reports mild nausea with one episode of yellow/clear emesis yesterday. She has additionally had a cough and congestion for about a week and a half for which she went to urgent care yesterday. There she was prescribed doxycycline and methylpred for possible pneumonia but she has not taken either.  She denies taking NSAIDs and her last dose of apixiban was yesterday evening.     Of note, she is a GI clinic pt, initially referred after an admission in 1/2024 where she was found to have acute on chronic microcytic anemia. She was scheduled to undergo outpatient endoscopy then was re-admitted in 2/2024 for saddle PE. After starting heparin she developed melena and worsening anemia, prompting inpatient EGD/colonoscopy. EGD showed no signs of bleeding but was notable for an 11 cm hiatal hernia and gastritis. Colonoscopy showed extensive diverticula but no bleeding. She was discharged on apixiban. She then had a capsule endoscopy in 7/2024 which showed no source of her anemia. She also follows with Dr. Spears in heme clinic and has undergone extensive workup for her chronic anemia all of which has been -ve.     Review of Systems  As above. ROS -ve unless stated otherwise.    ED Course:  BP: 116/83  Temperature: 36.5 °C (97.7 °F)  Heart Rate: 78  Respirations: 20  MAP (mmHg): 94  Pulse Ox: 98 %    Current  "Vitals  /75 (BP Location: Left arm, Patient Position: Lying)   Pulse 88   Temp 36.7 °C (98 °F) (Oral)   Resp 18   Ht 1.473 m (4' 10\")   Wt 92.1 kg (203 lb)   LMP  (LMP Unknown)   SpO2 97%   BMI 42.43 kg/m²      Labs:   CBC: WBC 12.7 , HGB 8.4,   BMP: , K 4.1, Cl 109, HCO3 27, BUN 29, CR 1.45, Glu 123  LFTS: AST 21 , ALT 24, ALKPHOS 130 , TBILI 0.3 , DBILI 0.1  COAGS: PT 13.4 , PTT 27  , INR 1.2  UA:   Results from last 7 days   Lab Units 02/28/25  1627   COLOR U  Light-Yellow   PH U  5.0   SPEC GRAV UR  1.022   PROTEIN U mg/dL NEGATIVE   BLOOD UR mg/dL 0.1 (1+)*   NITRITE U  NEGATIVE   WBC UR /HPF NONE   BACTERIA UR /HPF 1+*        EKG  Encounter Date: 12/19/24   ECG 12 lead   Result Value    Ventricular Rate 99    Atrial Rate 99    IN Interval 168    QRS Duration 82    QT Interval 334    QTC Calculation(Bazett) 428    P Axis 57    R Axis -30    T Axis 43    QRS Count 16    Q Onset 213    P Onset 129    P Offset 181    T Offset 380    QTC Fredericia 394    Narrative    Normal sinus rhythm  Left axis deviation  Inferior infarct (cited on or before 31-OCT-2023)  Anterolateral infarct (cited on or before 25-JAN-2024)  Abnormal ECG  When compared with ECG of 06-MAY-2024 08:01,  Questionable change in initial forces of Lateral leads  See ED provider note for full interpretation and clinical correlation  Confirmed by Lei Beasley ([id number]) on 12/19/2024 11:33:22 PM        Imaging:    --- 2/28/25 ---    CT ABDOMEN PELVIS WO IV CONTRAST    IMPRESSION:  1.  Interval development of new pelvic ureteric junction obstruction  with mild perinephric stranding. This may be due to underlying  pyelitis, obstruction due to accessory renal vein, or underlying  ureteric malignancy. Recommend further evaluation and correlation  with urinalysis. A dedicated multiphase CT urogram in 4-6 weeks on an  nonemergent basis is advised to rule out underlying malignancy.  2. Large size hiatal hernia with gastric fundus " and portions of the  gastric body herniating above the level of the diaphragm, this is not  significantly changed compared to prior. There is also associated  distal esophageal thickening, which may be be seen with reflux  esophagitis.  3. Remaining findings are without significant change compared to  prior as described above.    ED Interventions:  Medications   ondansetron (Zofran) injection 4 mg (4 mg intravenous Not Given 2/28/25 1046)   polyethylene glycol (Glycolax, Miralax) packet 17 g (17 g oral Not Given 2/28/25 1350)   melatonin tablet 3 mg (has no administration in time range)   acetaminophen (Tylenol) tablet 650 mg (650 mg oral Not Given 2/28/25 1710)     Or   acetaminophen (Tylenol) oral liquid 650 mg ( oral See Alternative 2/28/25 1710)     Or   acetaminophen (Tylenol) suppository 650 mg ( rectal See Alternative 2/28/25 1710)   ammonium lactate (Lac-Hydrin) 12 % lotion (has no administration in time range)   tiotropium (Spiriva Respimat) 2.5 mcg/actuation inhaler 2 puff (2 puffs inhalation Given 2/28/25 1513)   rosuvastatin (Crestor) tablet 20 mg (20 mg oral Given 2/28/25 1513)   prednisoLONE acetate (Pred-Forte) 1 % ophthalmic suspension 1 drop (1 drop Right Eye Not Given 2/28/25 1609)   pantoprazole (ProtoNix) EC tablet 40 mg (40 mg oral Given 2/28/25 1513)   amLODIPine (Norvasc) tablet 10 mg (10 mg oral Given 2/28/25 1514)   famotidine (Pepcid) tablet 20 mg (has no administration in time range)   fluticasone (Flonase) nasal spray 2 spray (2 sprays Each Nostril Given 2/28/25 1519)   gabapentin (Neurontin) capsule 300 mg (has no administration in time range)   latanoprost (Xalatan) 0.005 % ophthalmic solution 1 drop (has no administration in time range)   losartan (Cozaar) tablet 25 mg ( oral Dose Auto Held 3/4/25 0900)   metoprolol succinate XL (Toprol-XL) 24 hr tablet 200 mg (200 mg oral Given 2/28/25 1514)   furosemide (Lasix) tablet 20 mg (20 mg oral Not Given 2/28/25 1513)   colchicine tablet 0.6  mg ( oral Dose Auto Held 3/4/25 0900)   albuterol 2.5 mg /3 mL (0.083 %) nebulizer solution 2.5 mg (has no administration in time range)   glucagon (Glucagen) injection 1 mg (has no administration in time range)   dextrose 50 % injection 25 g (has no administration in time range)   glucagon (Glucagen) injection 1 mg (has no administration in time range)   dextrose 50 % injection 12.5 g (has no administration in time range)   insulin lispro injection 0-5 Units (1 Units subcutaneous Given 2/28/25 1602)   brimonidine (AlphaGAN) 0.2 % ophthalmic solution 1 drop (1 drop Both Eyes Given 2/28/25 1602)     And   timolol (Timoptic) 0.5 % ophthalmic solution 1 drop (1 drop Both Eyes Given 2/28/25 1602)   enoxaparin (Lovenox) syringe 90 mg (has no administration in time range)       Cardiac Hx:  Last echo: No results found for this or any previous visit.   Last EKG:   Encounter Date: 12/19/24   ECG 12 lead   Result Value    Ventricular Rate 99    Atrial Rate 99    AK Interval 168    QRS Duration 82    QT Interval 334    QTC Calculation(Bazett) 428    P Axis 57    R Axis -30    T Axis 43    QRS Count 16    Q Onset 213    P Onset 129    P Offset 181    T Offset 380    QTC Fredericia 394    Narrative    Normal sinus rhythm  Left axis deviation  Inferior infarct (cited on or before 31-OCT-2023)  Anterolateral infarct (cited on or before 25-JAN-2024)  Abnormal ECG  When compared with ECG of 06-MAY-2024 08:01,  Questionable change in initial forces of Lateral leads  See ED provider note for full interpretation and clinical correlation  Confirmed by Lei Beasley ([id number]) on 12/19/2024 11:33:22 PM        GI Hx:    --- 7/8/24 ---    Capsule Endoscopy   Quality of preparation was excellent   The visualized stomach was normal   The visualized small bowel was normal. No blood, angioectasia, erosions, ulcerations, strictures or masses were seen  Green stool was noted in the colon      --- 2/15/24 ---    Colonoscopy   Findings  Normal  terminal ileum, ileocecal valve, and appendiceal orifice (photodocumented).  There was significant amount of thick liquid stool and solid stool which obscured much of the underlying mucosa requiring extensive water lavage.  Multiple small, medium and large, extensive diverticula with no inflammation in the ascending colon, transverse colon, descending colon and sigmoid colon; no bleeding was identified  External medium hemorrhoids observed during retroflexion; no bleeding was identified  No polyps were visualized on this procedure.  No blood was seen in the terminal ileum or in the colon on this procedure.    --- 2/9/24 ---    EGD   Findings  Tortuous esophagus  Regular Z-line 29 cm from the incisors  Large 11 cm hiatal hernia  Multiple sessile polyps measuring smaller than 5 mm in the hiatal hernia; no bleeding was identified. Appearances consistent with fundic gland polyps as noted on recent EGD.  Linear erosion in the body of the stomach; no bleeding was identified;  Scattered patchy erythema seen in body of stomach. No evidence of bleeding  The duodenal bulb and 2nd part of the duodenum appeared normal.    Past Medical History  Past Medical History:   Diagnosis Date    Abdominal distension (gaseous) 07/31/2019    Abdominal bloating    Asthma     Cancer (Multi)     Cataract     Coronary artery disease     Diabetes mellitus (Multi)     Diverticulosis of intestine, part unspecified, without perforation or abscess without bleeding 06/09/2013    Diverticulosis    Elevation of levels of liver transaminase levels 11/13/2020    Transaminitis    Encounter for follow-up examination after completed treatment for conditions other than malignant neoplasm 01/26/2019    Hospital discharge follow-up    Glaucoma     Hypertension     Long term (current) use of antibiotics 10/07/2016    Need for prophylactic antibiotic    Other amnesia 10/28/2014    Memory loss    Other conditions influencing health status 02/06/2019    History of  cough    Other specified health status 2019    Hepatitis C antibody test negative    Other specified soft tissue disorders 2017    Leg swelling    Pain in left toe(s) 05/15/2017    Pain of toe of left foot    Pain in right foot 10/12/2016    Pain of right heel    Pain in right shoulder 2016    Acute pain of right shoulder    Personal history of diseases of the blood and blood-forming organs and certain disorders involving the immune mechanism 2018    History of anemia    Personal history of other diseases of the respiratory system 2018    History of sinusitis    Personal history of other diseases of the respiratory system 2014    History of upper respiratory infection    Personal history of other malignant neoplasm of kidney 2021    Personal history of malignant neoplasm of kidney    Personal history of other medical treatment 2017    History of screening mammography    Personal history of other specified conditions 2014    History of abdominal pain    Personal history of other specified conditions 2017    History of urinary frequency    Personal history of other specified conditions 2021    History of dysuria    Personal history of other specified conditions 2014    History of breast lump    Unspecified abdominal hernia without obstruction or gangrene 2014    Hernia       Surgical History  Past Surgical History:   Procedure Laterality Date    BREAST BIOPSY Right 2014    Biopsy Breast Percutaneous Needle Core    BREAST BIOPSY Right 2018    CARDIAC CATHETERIZATION N/A 2024    Procedure: Left Heart Cath;  Surgeon: Donato Cespedes MD;  Location: [place] [address] Cardiac Cath Lab;  Service: Cardiovascular;  Laterality: N/A;     SECTION, CLASSIC  2014     Section    CHOLECYSTECTOMY  2017    Cholecystectomy Laparoscopic    HAND SURGERY  2020    Hand Surgery                                                                                                                                                           HERNIA REPAIR  11/17/2014    Hernia Repair    MR HEAD ANGIO WO IV CONTRAST  11/17/2014    MR HEAD ANGIO WO IV CONTRAST 11/17/2014 [facility]    OTHER SURGICAL HISTORY  01/14/2021    Nephrectomy    TOTAL ABDOMINAL HYSTERECTOMY W/ BILATERAL SALPINGOOPHORECTOMY  04/21/2014    Total Abdominal Hysterectomy With Removal Of Both Ovaries       Social History  She reports that she has quit smoking. Her smoking use included cigarettes. She has never been exposed to tobacco smoke. She has never used smokeless tobacco. She reports current alcohol use. She reports that she does not use drugs.    Family History  Family History   Problem Relation Name Age of Onset    Aneurysm Mother      Hypertension Mother      Pancreatic cancer Mother      Diabetes Mother      Other (laryngeal Cancer) Mother      Heart disease Father      Pulmonary embolism Brother      Breast cancer Father's Sister      Breast cancer Maternal Cousin          Allergies  Adhesive, Amoxicillin, Bee sting kit, Cephalexin, Gadolinium-containing contrast media, Iodine, Latex, Pioglitazone, Rubella virus live vaccine, Salicylates, Shellfish derived, Sulfa (sulfonamide antibiotics), Sulfamethoxazole-trimethoprim, and Iodinated contrast media     Physical Exam  General: A&Ox3, NAD.   HEENT: NC/AT. EOMI. MMM.   Respiratory: CTA bilaterally. No wheezes, crackles, or rhonchi. Normal respiratory effort.  Cardiovascular: Regular rate, rhythm. No m/g/r.  Abdominal: Soft, distended d/t body habitus, slightly tender to deep palpation (L>R).   Ext: Non-pitting edema bilateral LE.  Skin: Warm, dry. No rashes or wounds.  Neuro: No focal neuro deficits.  Psych: Appropriate mood and affect.    Medications  Home Meds  Prior to Admission medications    Medication Sig Start Date End Date Taking? Authorizing Provider   acetaminophen (Tylenol 8 HOUR) 650 mg ER tablet  Take 1-2 tablets (650-1,300 mg) by mouth every 8 hours if needed for mild pain (1 - 3). Do not crush, chew, or split.   Yes Historical Provider, MD   amLODIPine (Norvasc) 10 mg tablet Take 1 tablet (10 mg) by mouth once daily. 8/27/24  Yes Laura Mata MD   ammonium lactate (Lac-Hydrin) 12 % lotion Apply topically if needed for dry skin.  Patient taking differently: Apply 1 Application topically if needed for dry skin. 3/18/24 3/18/25 Yes Monique Gross MD   apixaban (Eliquis) 2.5 mg tablet Take 1 tablet (2.5 mg) by mouth every 12 hours. 1/9/25  Yes Garland Spears MD   Combigan 0.2-0.5 % ophthalmic solution Administer 1 drop into both eyes 2 times a day.  Patient taking differently: Administer 1 drop into both eyes once daily. 11/27/24  Yes Mingo Leigh MD   diclofenac sodium 1 % kit Apply 1 Application topically if needed.  APPLY TO LOWER EXTREMITIES, 4 GM OF GEL TO AFFECTED AREA 4 TIMES DAILY. DO NOT APPLY MORE THAN 16 GM DAILY TO ANY ONE AFFECTED JOINT. 6/23/22  Yes Historical Provider, MD   famotidine (Pepcid) 20 mg tablet Take 1 tablet (20 mg) by mouth once daily at bedtime. 1/31/23  Yes Historical Provider, MD   furosemide (Lasix) 20 mg tablet Take 1 tablet (20 mg) by mouth once daily.  Patient taking differently: Take 1 tablet (20 mg) by mouth once daily as needed (For Swelling). 5/28/24 5/28/25 Yes Vianney Marrufo MD   glipiZIDE 2.5 mg tablet Take 2.5 mg by mouth once daily. 11/22/24  Yes Monique Gross MD   hydrocortisone 1 % cream Apply as needed to the injection site 11/22/24  Yes Monique Gross MD   latanoprost (Xalatan) 0.005 % ophthalmic solution Administer 1 drop into both eyes once daily in the morning.  Patient taking differently: Administer 1 drop into both eyes once daily in the evening. 11/27/24  Yes Mingo Leihg MD   losartan (Cozaar) 25 mg tablet Take 1 tablet (25 mg) by mouth once daily. 8/27/24  Yes Laura Mata MD   metoprolol succinate XL  (Toprol-XL) 200 mg 24 hr tablet Take 1 tablet (200 mg) by mouth once daily. 7/26/24  Yes Laura Mata MD   omeprazole (PriLOSEC) 40 mg DR capsule Take 1 capsule (40 mg) by mouth 2 times a day before meals.  Patient taking differently: Take 1 capsule (40 mg) by mouth once daily. 1/17/25  Yes Laura Mata MD   rosuvastatin (Crestor) 20 mg tablet Take 1 tablet (20 mg) by mouth once daily. 12/1/24  Yes Vianney Marrufo MD   Accu-Chek Guide test strips strip Use 1 test strip to test blood sugar twice daily. 11/22/24   Monique Gross MD   albuterol 90 mcg/actuation inhaler Inhale 2 puffs every 4 hours if needed for wheezing or shortness of breath (You may also use this 10-15 minutes prior to exertional activity as needed). 2/1/24   Josesito Magana MD   brimonidine-timoloL (Combigan) 0.2-0.5 % ophthalmic solution Administer 1 drop into both eyes 2 times a day.  Patient not taking: Reported on 2/28/2025 5/30/24   Mingo Leigh MD   colchicine 0.6 mg tablet Take 1 tablet (0.6 mg) by mouth once daily. 2/4/25 8/3/25  Sharmin Wall DO   doxycycline (Vibramycin) 100 mg capsule Take 1 capsule (100 mg) by mouth 2 times a day for 10 days. Take with at least 8 ounces (large glass) of water, do not lie down for 30 minutes after  Patient not taking: Reported on 2/28/2025 2/27/25 3/9/25  Mary Anne Hernandez PA-C   dulaglutide (Trulicity) 0.75 mg/0.5 mL pen injector Inject 0.75 mg under the skin 1 (one) time per week.  Patient taking differently: Inject 0.75 mg under the skin 1 (one) time per week. Pt takes every Friday 11/22/24 11/22/25  Monique Gross MD   fluticasone (Flonase) 50 mcg/actuation nasal spray Administer 2 sprays into each nostril once daily.  Patient taking differently: Administer 2 sprays into each nostril once daily as needed for allergies or rhinitis. 7/21/23   Laura Mata MD   gabapentin (Neurontin) 100 mg capsule Take 3 capsules (300 mg) by mouth once daily at bedtime. 2/11/25  "4/14/25  Sharmin Wall DO   insulin lispro (HumaLOG KwikPen Insulin) 100 unit/mL injection Use with sliding scale 3 times a day, up to 30 units daily  Patient taking differently: Use with sliding scale 3 times a day, up to 30 units daily (Pt takes as needed) 11/22/24   Monique Gross MD   lancets (Accu-Chek Softclix Lancets) misc Use to check blood sugar twice daily 11/22/24   Monique Gross MD   levalbuterol (Xopenex HFA) 45 mcg/actuation inhaler Inhale 1-2 puffs every 6 hours if needed for wheezing.  Patient not taking: Reported on 2/28/2025 2/27/25 2/27/26  Mary Anne Hernandez PA-C   levocetirizine (Xyzal) 5 mg tablet Take 1 tablet (5 mg) by mouth once daily in the evening.  Patient not taking: Reported on 2/28/2025 7/21/23   Laura Mata MD   LORazepam (Ativan) 1 mg tablet Take 1 tablet (1 mg) by mouth 1 time for 1 dose. Take 30 minutes prior to your MRI  Patient not taking: Reported on 2/28/2025 1/29/25 1/29/25  Sharmin Wall DO   methylPREDNISolone (Medrol Dospak) 4 mg tablets Take as directed on package.  Patient not taking: Reported on 2/28/2025 2/27/25   Mary Anne Hernandez PA-C   netarsudiL (Rhopressa) 0.02 % drops opthalmic solution Administer 1 drop into both eyes once daily in the evening.  Patient not taking: Reported on 2/28/2025 5/30/24   Mingo Leigh MD   pen needle, diabetic 31 gauge x 5/16\" needle Use to inject 1-4 times daily as directed. 11/22/24   Monique Gross MD   pen needle, diabetic 33 gauge x 5/32\" needle Use for sliding scale up to 3 times a day  Patient not taking: Reported on 1/20/2025 12/11/23   Monique Gross MD   polyethylene glycol (Glycolax, Miralax) 17 gram/dose powder Take 17 g by mouth 2 times a day.  Patient taking differently: Mix 17 g of powder and drink once daily as needed for constipation. 7/18/24 7/18/25  Jessica Durbin MD   prednisoLONE acetate (Pred-Forte) 1 % ophthalmic suspension Administer 1 drop into the right eye 4 times a day for 4 days. " 2/26/25 3/25/25  Mingo Leigh MD   tiotropium (Spiriva Respimat) 2.5 mcg/actuation inhaler Inhale 2 puffs once daily.  Patient taking differently: Inhale 2 puffs once daily as needed. 2/1/24   Josesito Magana MD     Scheduled medications  amLODIPine, 10 mg, oral, Daily  brimonidine, 1 drop, Both Eyes, BID   And  timolol, 1 drop, Both Eyes, BID  [Held by provider] colchicine, 0.6 mg, oral, Daily  enoxaparin, 1 mg/kg, subcutaneous, q12h  famotidine, 20 mg, oral, Nightly  fluticasone, 2 spray, Each Nostril, Daily  furosemide, 20 mg, oral, Daily  gabapentin, 300 mg, oral, Nightly  insulin lispro, 0-5 Units, subcutaneous, TID AC  [START ON 3/1/2025] latanoprost, 1 drop, Both Eyes, q AM  [Held by provider] losartan, 25 mg, oral, Daily  metoprolol succinate XL, 200 mg, oral, Daily  nitroglycerin, 0.4 mg, sublingual, Once  ondansetron, 4 mg, intravenous, Once  pantoprazole, 40 mg, oral, BID  polyethylene glycol, 17 g, oral, Daily  prednisoLONE acetate, 1 drop, Right Eye, 4x daily  rosuvastatin, 20 mg, oral, Daily  tiotropium, 2 puff, inhalation, Daily      Continuous medications   none  PRN medications  PRN medications: acetaminophen **OR** acetaminophen **OR** acetaminophen, albuterol, ammonium lactate, dextrose, dextrose, glucagon, glucagon, melatonin      Assessment/Plan   70 year old female with h/o HTN, DLD, CAD c/b NSTEMI in 2019, left RCC w/partial nephrectomy and adrenalectomy (2007), CKD3, MARYLU not on CPAP, DM2, VEDA, provoked DVT/PE in 2/2024 on apixiban admitted to [facility] on 2/28/25 with melena and GI bleed.     #melena   #suspected GI bleed  #acute blood loss anemia  #chronic iron deficiency anemia d/t GI losses  : :pt with x3 melenic bowel movements, acute symptomatic anemia   : :known h/o iron deficiency anemia requiring outpatient infusions  : :has undergone extensive outpatient heme workup, all -ve  : :bl hemoglobin 10-11 mg/dL, 8.4 mg/dL on admit  : :hemodynamically stbl   plan  -trend CBC, goal  hemoglobin >7  -maintain active T&S  -consent for blood  -hold home apixiban  -start therapeutic lovenox BID  -continue pantoprazole 40 mg PO BID   -continue home famotidine   -pending EGD on monday     #HTN  #DLD  #CAD c/b NSTEMI 2019  : :home meds include amlodipine 10 mg, losartan 25 mg, furosemide 20 mg, metoprolol succinate 200 mg, rosuvastatin 20 mg, nitroglycerin 0.4 prn   : :bp lower than usual on admit, 110s systolic  plan  -continue home amlodipine, metop succ, rosuvastatin  -hold losartan and furosemide; resume when bp at baseline     #hx DVT/PE   : :saddle PE in 2/2024   : :on apixiban 2.5 mg BID at home  plan  -hold home apixiban iso GI bleed, symptomatic anemia  -therapeutic lovenox BID while umm-procedure   -SCDs    #DM2  #neuropathy  -ACHS glucose  -sliding scale insulin  -continue gabapentin     #CKD3  -avoid nephrotoxins, IV contrast as able  -strict I&O   -trend RFP    #COPD  -continue home spiriva  -albuterol nebs prn     #MARYLU  : :per pt not using CPAP, returned her machine but issue getting another  plan  -RT consult for nocturnal cpap  -TCC to help arrange homegoing cpap    #glaucoma  -continue eye drops      Fluids: Replete PRN  Electrolytes: Keep mg >2, phos >3  and K >4  Nutrition:  NPO Diet; Effective now   Antimicrobials: none  DVT PPX: DVT: Therapeutic Lovenox  GI ppx: pantoprazole 40 mg BID  Bowel care: Miralax  Catheter: None  Lines: PIV  Oxygen: Room Air  Drips: none      Disposition: pending PT/OT eval    Code Status: Full Code (confirmed on admission)   NOK:  Primary Emergency Contact: CoreaMalick ontiveros, Home Phone: [phone]           Patient seen and discussed with attending    ---  Isabella Azul MD  PGY1, Internal Medicine

## 2025-02-28 NOTE — PROGRESS NOTES
Pharmacy Medication History Review    Vania Andrews is a 70 y.o. female admitted for Black tarry stools. Pharmacy reviewed the patient's [id number] medications and allergies for accuracy.    Medications ADDED:  Tylenol   Medications CHANGED:  Lac-Hydrin  Combigan   Flonase   Lasix   Humalog  Xalatan  Miralax  Spiriva   Medications REMOVED/NOT TAKING:   Vibramycin  Xopenex HFA   Xyzal  Ativan  Medrol Dospak   Rhopressa      The list below reflects the updated PTA list.   Prior to Admission Medications   Prescriptions Last Dose Informant   Accu-Chek Guide test strips strip  Self   Sig: Use 1 test strip to test blood sugar twice daily.   Combigan 0.2-0.5 % ophthalmic solution 2/27/2025 Morning Self   Sig: Administer 1 drop into both eyes 2 times a day.   Patient taking differently: Administer 1 drop into both eyes once daily.   LORazepam (Ativan) 1 mg tablet Not Taking    Sig: Take 1 tablet (1 mg) by mouth 1 time for 1 dose. Take 30 minutes prior to your MRI   Patient not taking: Reported on 2/28/2025   acetaminophen (Tylenol 8 HOUR) 650 mg ER tablet Past Week Self   Sig: Take 1-2 tablets (650-1,300 mg) by mouth every 8 hours if needed for mild pain (1 - 3). Do not crush, chew, or split.   albuterol 90 mcg/actuation inhaler  Self   Sig: Inhale 2 puffs every 4 hours if needed for wheezing or shortness of breath (You may also use this 10-15 minutes prior to exertional activity as needed).   amLODIPine (Norvasc) 10 mg tablet 2/27/2025 Morning Self   Sig: Take 1 tablet (10 mg) by mouth once daily.   ammonium lactate (Lac-Hydrin) 12 % lotion Past Week Self   Sig: Apply topically if needed for dry skin.   Patient taking differently: Apply 1 Application topically if needed for dry skin.   apixaban (Eliquis) 2.5 mg tablet 2/27/2025 Evening Self   Sig: Take 1 tablet (2.5 mg) by mouth every 12 hours.   brimonidine-timoloL (Combigan) 0.2-0.5 % ophthalmic solution Not Taking Self   Sig: Administer 1 drop into both eyes 2  times a day.   Patient not taking: Reported on 2/28/2025   colchicine 0.6 mg tablet  Self   Sig: Take 1 tablet (0.6 mg) by mouth once daily.   diclofenac sodium 1 % kit 2/27/2025 Self   Sig: Apply 1 Application topically if needed.  APPLY TO LOWER EXTREMITIES, 4 GM OF GEL TO AFFECTED AREA 4 TIMES DAILY. DO NOT APPLY MORE THAN 16 GM DAILY TO ANY ONE AFFECTED JOINT.   doxycycline (Vibramycin) 100 mg capsule Not Taking Self   Sig: Take 1 capsule (100 mg) by mouth 2 times a day for 10 days. Take with at least 8 ounces (large glass) of water, do not lie down for 30 minutes after   Patient not taking: Reported on 2/28/2025   dulaglutide (Trulicity) 0.75 mg/0.5 mL pen injector 2/21/2025 Self   Sig: Inject 0.75 mg under the skin 1 (one) time per week.   Patient taking differently: Inject 0.75 mg under the skin 1 (one) time per week. Pt takes every Friday   famotidine (Pepcid) 20 mg tablet 2/27/2025 Evening Self   Sig: Take 1 tablet (20 mg) by mouth once daily at bedtime.   fluticasone (Flonase) 50 mcg/actuation nasal spray  Self   Sig: Administer 2 sprays into each nostril once daily.   Patient taking differently: Administer 2 sprays into each nostril once daily as needed for allergies or rhinitis.   furosemide (Lasix) 20 mg tablet Past Month Self   Sig: Take 1 tablet (20 mg) by mouth once daily.   Patient taking differently: Take 1 tablet (20 mg) by mouth once daily as needed (For Swelling).   gabapentin (Neurontin) 100 mg capsule  Self   Sig: Take 3 capsules (300 mg) by mouth once daily at bedtime.   glipiZIDE 2.5 mg tablet Past Week Self   Sig: Take 2.5 mg by mouth once daily.   hydrocortisone 1 % cream Past Week Self   Sig: Apply as needed to the injection site   insulin lispro (HumaLOG KwikPen Insulin) 100 unit/mL injection  Self   Sig: Use with sliding scale 3 times a day, up to 30 units daily   Patient taking differently: Use with sliding scale 3 times a day, up to 30 units daily (Pt takes as needed)   lancets  "(Accu-Chek Softclix Lancets) misc  Self   Sig: Use to check blood sugar twice daily   latanoprost (Xalatan) 0.005 % ophthalmic solution 2/27/2025 Evening Self   Sig: Administer 1 drop into both eyes once daily in the morning.   Patient taking differently: Administer 1 drop into both eyes once daily in the evening.   levalbuterol (Xopenex HFA) 45 mcg/actuation inhaler Not Taking Self   Sig: Inhale 1-2 puffs every 6 hours if needed for wheezing.   Patient not taking: Reported on 2/28/2025   levocetirizine (Xyzal) 5 mg tablet Not Taking Self   Sig: Take 1 tablet (5 mg) by mouth once daily in the evening.   Patient not taking: Reported on 2/28/2025   losartan (Cozaar) 25 mg tablet 2/27/2025 Evening Self   Sig: Take 1 tablet (25 mg) by mouth once daily.   methylPREDNISolone (Medrol Dospak) 4 mg tablets Not Taking Self   Sig: Take as directed on package.   Patient not taking: Reported on 2/28/2025   metoprolol succinate XL (Toprol-XL) 200 mg 24 hr tablet 2/27/2025 Morning Self   Sig: Take 1 tablet (200 mg) by mouth once daily.   netarsudiL (Rhopressa) 0.02 % drops opthalmic solution Not Taking Self   Sig: Administer 1 drop into both eyes once daily in the evening.   Patient not taking: Reported on 2/28/2025   omeprazole (PriLOSEC) 40 mg DR capsule 2/27/2025 Morning Self   Sig: Take 1 capsule (40 mg) by mouth 2 times a day before meals.   Patient taking differently: Take 1 capsule (40 mg) by mouth once daily.   pen needle, diabetic 31 gauge x 5/16\" needle  Self   Sig: Use to inject 1-4 times daily as directed.   pen needle, diabetic 33 gauge x 5/32\" needle  Self   Sig: Use for sliding scale up to 3 times a day   Patient not taking: Reported on 1/20/2025   polyethylene glycol (Glycolax, Miralax) 17 gram/dose powder  Self   Sig: Take 17 g by mouth 2 times a day.   Patient taking differently: Mix 17 g of powder and drink once daily as needed for constipation.   prednisoLONE acetate (Pred-Forte) 1 % ophthalmic suspension  Self " "  Sig: Administer 1 drop into the right eye 4 times a day for 4 days.   rosuvastatin (Crestor) 20 mg tablet 2/27/2025 Evening Self   Sig: Take 1 tablet (20 mg) by mouth once daily.   tiotropium (Spiriva Respimat) 2.5 mcg/actuation inhaler  Self   Sig: Inhale 2 puffs once daily.   Patient taking differently: Inhale 2 puffs once daily as needed.      Facility-Administered Medications Last Administration Doses Remaining   albuterol 2.5 mg /3 mL (0.083 %) nebulizer solution 2.5 mg 2/27/2025 11:20 AM 0   nitroglycerin (Nitrostat) SL tablet 0.4 mg None recorded 1           The list below reflects the updated allergy list. Please review each documented allergy for additional clarification and justification.  Allergies  Reviewed by Breanna Phoenix on 2/28/2025        Severity Reactions Comments    Adhesive Not Specified Itching     Amoxicillin Not Specified Hives, Itching     Bee Sting Kit Not Specified Swelling     Cephalexin Not Specified Itching, Nausea Only     Gadolinium-containing Contrast Media Not Specified Hives     Iodine Not Specified Unknown     Latex Not Specified Other     Pioglitazone Not Specified Swelling     Rubella Virus Live Vaccine Not Specified Unknown     Salicylates Not Specified Other     Shellfish Derived Not Specified Swelling     Sulfa (sulfonamide Antibiotics) Not Specified Unknown     Sulfamethoxazole-trimethoprim Not Specified Hives, Itching     Iodinated Contrast Media Low Itching, Rash             Patient accepts M2B at discharge.     Sources:   [facility]  Pharmacy dispense history  Patient interview Good historian  Chart Review     Additional Comments:  Pt was able to confirm her meds, dosages, and last taken at home.       BREANNA RENEE PHOENIX  Pharmacy Technician  02/28/25     Secure Chat preferred   If no response call [phone] or [employer] (T/A) \"Med Rec\"   "

## 2025-02-28 NOTE — ED PROVIDER NOTES
History of Present Illness     History provided by: Patient and EMS  Limitations to History: None  External Records Reviewed with Brief Summary:  Outpatient notes from orthopedics, ophthalmology, PCP.  Also transfusion note from 1/27/2025, demonstrates receiving Feraheme for chronic iron deficiency anemia    HPI:  Vania Andrews is a 70 y.o. female past medical history of hypertension hyperlipidemia previous PE on Eliquis and CKD in the setting of partial left nephrectomy and right renal infarct who presents today for abdominal pain and black stools.  Patient states that she has previously had this, did have a previous EGD which demonstrated gastritis.  She denies any changes in her medications at home, did not take her Eliquis today.  She states that she has had 3 black tarry bowel movements since last night around 8 PM.  She also Dors is some left upper quadrant abdominal pain as well as some nausea.  She endorses some lightheadedness, but no falls.  She denies any difficulty swallowing or speaking, no numbness weakness or tingling in arms or legs.  She denies any trauma, no hematemesis.  Denies any recent sick contacts.    Physical Exam   Triage vitals:  T 36.5 °C (97.7 °F)  HR 78  /83  RR 20  O2 98 % None (Room air)    Physical Exam  Vitals and nursing note reviewed.   Constitutional:       General: She is not in acute distress.     Appearance: Normal appearance. She is not ill-appearing or diaphoretic.   HENT:      Head: Normocephalic and atraumatic.      Nose: Nose normal.      Mouth/Throat:      Mouth: Mucous membranes are moist.      Pharynx: Oropharynx is clear. No oropharyngeal exudate or posterior oropharyngeal erythema.   Eyes:      General: No scleral icterus.     Extraocular Movements: Extraocular movements intact.      Pupils: Pupils are equal, round, and reactive to light.      Comments: Conjunctivea pale   Cardiovascular:      Rate and Rhythm: Normal rate and regular rhythm.      Pulses:  Normal pulses.      Heart sounds: Normal heart sounds. No murmur heard.     No gallop.   Pulmonary:      Effort: Pulmonary effort is normal. No respiratory distress.      Breath sounds: Normal breath sounds. No stridor. No wheezing, rhonchi or rales.   Abdominal:      General: Bowel sounds are normal. There is no distension.      Palpations: Abdomen is soft. There is no mass.      Comments: Mild diffuse abdominal tenderness left upper quadrant epigastric greater than lower abdominal quadrants   Musculoskeletal:         General: No swelling, deformity or signs of injury. Normal range of motion.      Cervical back: Normal range of motion and neck supple. No tenderness.      Right lower leg: No edema.      Left lower leg: No edema.   Skin:     General: Skin is warm.      Capillary Refill: Capillary refill takes less than 2 seconds.      Findings: No erythema, lesion or rash.   Neurological:      General: No focal deficit present.      Mental Status: She is alert and oriented to person, place, and time. Mental status is at baseline.   Psychiatric:         Mood and Affect: Mood normal.         Behavior: Behavior normal.          Medical Decision Making & ED Course   Medical Decision Makin y.o. female past medical history of hypertension hyperlipidemia previous PE on Eliquis who presents today for abdominal pain and melena.  Patient did bring in a stool sample which does demonstrate frankly melanotic stool in the diaper.  Given this, we will get CBC CMP type and screen as well as iron binding studies.  Additionally, we will get a CT of her abdomen and pelvis to confirm there is no evidence of free air that would suggest a perforated ulcer and a more emergent surgical pathology.  Patient CT scan does not demonstrate any evidence of free air.  She does appear to have a 2.5 hemoglobin drop since labs that were drawn 1 month ago.  Given this, as well as the fact the patient is actively having symptoms, my concern would  be that the patient requires an EGD sooner than her scheduled outpatient EGD.  I will provide her with Protonix here, she has no history of cirrhosis or alcoholic liver disease, so did not believe patient requires coverage with ceftriaxone.  She does also have a mild elevation in her creatinine from baseline, which appears to be somewhere around 1.1, is 1.45 today.  She does also have an elevation in her BUN to 29, which is suggestive of likely blood product breakdown.  Given this, I did discuss patient's case with the admissions coordinator, who will accept them to the medicine team at this time.  ----      Differential diagnoses considered include but are not limited to: Peptic ulcer disease, gastritis, anemia secondary to kidney disease     Social Determinants of Health which Significantly Impact Care: None identified     EKG Independent Interpretation: EKG not obtained    Independent Result Review and Interpretation: Relevant laboratory and radiographic results were reviewed and independently interpreted by myself.  As necessary, they are commented on in the ED Course.    Chronic conditions affecting the patient's care: As documented above in [facility]    The patient was discussed with the following consultants/services: Admission Coordinator who accepted the patient for admission    Care Considerations: As documented above in [facility]    ED Course:  Diagnoses as of 02/28/25 1111   Black tarry stools     Disposition   As a result of their workup, the patient will require admission to the hospital.  The patient was informed of her diagnosis.  The patient was given the opportunity to ask questions and I answered them. The patient agreed to be admitted to the hospital.    Procedures   Procedures    Patient seen and discussed with ED attending physician.    Real Graham MD  Emergency Medicine       Real Graham MD  Resident  02/28/25 [id number]

## 2025-02-28 NOTE — H&P (VIEW-ONLY)
MEDICINE ADMISSION NOTE    History of Present Illness  Vania Andrews is a 70 year old female with h/o HTN, DLD, CAD c/b NSTEMI in 2019, left RCC w/partial nephrectomy and adrenalectomy (2007), CKD3, MARYLU not on CPAP, DM2, VEDA, provoked DVT/PE in 2/2024 on apixiban admitted to [facility] on 2/28/25 with melena and possible upper GI bleed.     Pt reports having 3 black stools over the last two days with the most recent occurring this morning. The stools are soft and sticky but formed. She's had darker stools before but never like this. This morning she started feeling dizzy and lightheaded which prompted her to come to ED. She reports mild nausea with one episode of yellow/clear emesis yesterday. She has additionally had a cough and congestion for about a week and a half for which she went to urgent care yesterday. There she was prescribed doxycycline and methylpred for possible pneumonia but she has not taken either.  She denies taking NSAIDs and her last dose of apixiban was yesterday evening.     Of note, she is a GI clinic pt, initially referred after an admission in 1/2024 where she was found to have acute on chronic microcytic anemia. She was scheduled to undergo outpatient endoscopy then was re-admitted in 2/2024 for saddle PE. After starting heparin she developed melena and worsening anemia, prompting inpatient EGD/colonoscopy. EGD showed no signs of bleeding but was notable for an 11 cm hiatal hernia and gastritis. Colonoscopy showed extensive diverticula but no bleeding. She was discharged on apixiban. She then had a capsule endoscopy in 7/2024 which showed no source of her anemia. She also follows with Dr. Spears in heme clinic and has undergone extensive workup for her chronic anemia all of which has been -ve.     Review of Systems  As above. ROS -ve unless stated otherwise.    ED Course:  BP: 116/83  Temperature: 36.5 °C (97.7 °F)  Heart Rate: 78  Respirations: 20  MAP (mmHg): 94  Pulse Ox: 98 %    Current  "Vitals  /75 (BP Location: Left arm, Patient Position: Lying)   Pulse 88   Temp 36.7 °C (98 °F) (Oral)   Resp 18   Ht 1.473 m (4' 10\")   Wt 92.1 kg (203 lb)   LMP  (LMP Unknown)   SpO2 97%   BMI 42.43 kg/m²      Labs:   CBC: WBC 12.7 , HGB 8.4,   BMP: , K 4.1, Cl 109, HCO3 27, BUN 29, CR 1.45, Glu 123  LFTS: AST 21 , ALT 24, ALKPHOS 130 , TBILI 0.3 , DBILI 0.1  COAGS: PT 13.4 , PTT 27  , INR 1.2  UA:   Results from last 7 days   Lab Units 02/28/25  1627   COLOR U  Light-Yellow   PH U  5.0   SPEC GRAV UR  1.022   PROTEIN U mg/dL NEGATIVE   BLOOD UR mg/dL 0.1 (1+)*   NITRITE U  NEGATIVE   WBC UR /HPF NONE   BACTERIA UR /HPF 1+*        EKG  Encounter Date: 12/19/24   ECG 12 lead   Result Value    Ventricular Rate 99    Atrial Rate 99    MD Interval 168    QRS Duration 82    QT Interval 334    QTC Calculation(Bazett) 428    P Axis 57    R Axis -30    T Axis 43    QRS Count 16    Q Onset 213    P Onset 129    P Offset 181    T Offset 380    QTC Fredericia 394    Narrative    Normal sinus rhythm  Left axis deviation  Inferior infarct (cited on or before 31-OCT-2023)  Anterolateral infarct (cited on or before 25-JAN-2024)  Abnormal ECG  When compared with ECG of 06-MAY-2024 08:01,  Questionable change in initial forces of Lateral leads  See ED provider note for full interpretation and clinical correlation  Confirmed by Lei Beasley ([id number]) on 12/19/2024 11:33:22 PM        Imaging:    --- 2/28/25 ---    CT ABDOMEN PELVIS WO IV CONTRAST    IMPRESSION:  1.  Interval development of new pelvic ureteric junction obstruction  with mild perinephric stranding. This may be due to underlying  pyelitis, obstruction due to accessory renal vein, or underlying  ureteric malignancy. Recommend further evaluation and correlation  with urinalysis. A dedicated multiphase CT urogram in 4-6 weeks on an  nonemergent basis is advised to rule out underlying malignancy.  2. Large size hiatal hernia with gastric fundus " and portions of the  gastric body herniating above the level of the diaphragm, this is not  significantly changed compared to prior. There is also associated  distal esophageal thickening, which may be be seen with reflux  esophagitis.  3. Remaining findings are without significant change compared to  prior as described above.    ED Interventions:  Medications   ondansetron (Zofran) injection 4 mg (4 mg intravenous Not Given 2/28/25 1046)   polyethylene glycol (Glycolax, Miralax) packet 17 g (17 g oral Not Given 2/28/25 1350)   melatonin tablet 3 mg (has no administration in time range)   acetaminophen (Tylenol) tablet 650 mg (650 mg oral Not Given 2/28/25 1710)     Or   acetaminophen (Tylenol) oral liquid 650 mg ( oral See Alternative 2/28/25 1710)     Or   acetaminophen (Tylenol) suppository 650 mg ( rectal See Alternative 2/28/25 1710)   ammonium lactate (Lac-Hydrin) 12 % lotion (has no administration in time range)   tiotropium (Spiriva Respimat) 2.5 mcg/actuation inhaler 2 puff (2 puffs inhalation Given 2/28/25 1513)   rosuvastatin (Crestor) tablet 20 mg (20 mg oral Given 2/28/25 1513)   prednisoLONE acetate (Pred-Forte) 1 % ophthalmic suspension 1 drop (1 drop Right Eye Not Given 2/28/25 1609)   pantoprazole (ProtoNix) EC tablet 40 mg (40 mg oral Given 2/28/25 1513)   amLODIPine (Norvasc) tablet 10 mg (10 mg oral Given 2/28/25 1514)   famotidine (Pepcid) tablet 20 mg (has no administration in time range)   fluticasone (Flonase) nasal spray 2 spray (2 sprays Each Nostril Given 2/28/25 1519)   gabapentin (Neurontin) capsule 300 mg (has no administration in time range)   latanoprost (Xalatan) 0.005 % ophthalmic solution 1 drop (has no administration in time range)   losartan (Cozaar) tablet 25 mg ( oral Dose Auto Held 3/4/25 0900)   metoprolol succinate XL (Toprol-XL) 24 hr tablet 200 mg (200 mg oral Given 2/28/25 1514)   furosemide (Lasix) tablet 20 mg (20 mg oral Not Given 2/28/25 1513)   colchicine tablet 0.6  mg ( oral Dose Auto Held 3/4/25 0900)   albuterol 2.5 mg /3 mL (0.083 %) nebulizer solution 2.5 mg (has no administration in time range)   glucagon (Glucagen) injection 1 mg (has no administration in time range)   dextrose 50 % injection 25 g (has no administration in time range)   glucagon (Glucagen) injection 1 mg (has no administration in time range)   dextrose 50 % injection 12.5 g (has no administration in time range)   insulin lispro injection 0-5 Units (1 Units subcutaneous Given 2/28/25 1602)   brimonidine (AlphaGAN) 0.2 % ophthalmic solution 1 drop (1 drop Both Eyes Given 2/28/25 1602)     And   timolol (Timoptic) 0.5 % ophthalmic solution 1 drop (1 drop Both Eyes Given 2/28/25 1602)   enoxaparin (Lovenox) syringe 90 mg (has no administration in time range)       Cardiac Hx:  Last echo: No results found for this or any previous visit.   Last EKG:   Encounter Date: 12/19/24   ECG 12 lead   Result Value    Ventricular Rate 99    Atrial Rate 99    PA Interval 168    QRS Duration 82    QT Interval 334    QTC Calculation(Bazett) 428    P Axis 57    R Axis -30    T Axis 43    QRS Count 16    Q Onset 213    P Onset 129    P Offset 181    T Offset 380    QTC Fredericia 394    Narrative    Normal sinus rhythm  Left axis deviation  Inferior infarct (cited on or before 31-OCT-2023)  Anterolateral infarct (cited on or before 25-JAN-2024)  Abnormal ECG  When compared with ECG of 06-MAY-2024 08:01,  Questionable change in initial forces of Lateral leads  See ED provider note for full interpretation and clinical correlation  Confirmed by Lei Beasley ([id number]) on 12/19/2024 11:33:22 PM        GI Hx:    --- 7/8/24 ---    Capsule Endoscopy   Quality of preparation was excellent   The visualized stomach was normal   The visualized small bowel was normal. No blood, angioectasia, erosions, ulcerations, strictures or masses were seen  Green stool was noted in the colon      --- 2/15/24 ---    Colonoscopy   Findings  Normal  terminal ileum, ileocecal valve, and appendiceal orifice (photodocumented).  There was significant amount of thick liquid stool and solid stool which obscured much of the underlying mucosa requiring extensive water lavage.  Multiple small, medium and large, extensive diverticula with no inflammation in the ascending colon, transverse colon, descending colon and sigmoid colon; no bleeding was identified  External medium hemorrhoids observed during retroflexion; no bleeding was identified  No polyps were visualized on this procedure.  No blood was seen in the terminal ileum or in the colon on this procedure.    --- 2/9/24 ---    EGD   Findings  Tortuous esophagus  Regular Z-line 29 cm from the incisors  Large 11 cm hiatal hernia  Multiple sessile polyps measuring smaller than 5 mm in the hiatal hernia; no bleeding was identified. Appearances consistent with fundic gland polyps as noted on recent EGD.  Linear erosion in the body of the stomach; no bleeding was identified;  Scattered patchy erythema seen in body of stomach. No evidence of bleeding  The duodenal bulb and 2nd part of the duodenum appeared normal.    Past Medical History  Past Medical History:   Diagnosis Date    Abdominal distension (gaseous) 07/31/2019    Abdominal bloating    Asthma     Cancer (Multi)     Cataract     Coronary artery disease     Diabetes mellitus (Multi)     Diverticulosis of intestine, part unspecified, without perforation or abscess without bleeding 06/09/2013    Diverticulosis    Elevation of levels of liver transaminase levels 11/13/2020    Transaminitis    Encounter for follow-up examination after completed treatment for conditions other than malignant neoplasm 01/26/2019    Hospital discharge follow-up    Glaucoma     Hypertension     Long term (current) use of antibiotics 10/07/2016    Need for prophylactic antibiotic    Other amnesia 10/28/2014    Memory loss    Other conditions influencing health status 02/06/2019    History of  cough    Other specified health status 2019    Hepatitis C antibody test negative    Other specified soft tissue disorders 2017    Leg swelling    Pain in left toe(s) 05/15/2017    Pain of toe of left foot    Pain in right foot 10/12/2016    Pain of right heel    Pain in right shoulder 2016    Acute pain of right shoulder    Personal history of diseases of the blood and blood-forming organs and certain disorders involving the immune mechanism 2018    History of anemia    Personal history of other diseases of the respiratory system 2018    History of sinusitis    Personal history of other diseases of the respiratory system 2014    History of upper respiratory infection    Personal history of other malignant neoplasm of kidney 2021    Personal history of malignant neoplasm of kidney    Personal history of other medical treatment 2017    History of screening mammography    Personal history of other specified conditions 2014    History of abdominal pain    Personal history of other specified conditions 2017    History of urinary frequency    Personal history of other specified conditions 2021    History of dysuria    Personal history of other specified conditions 2014    History of breast lump    Unspecified abdominal hernia without obstruction or gangrene 2014    Hernia       Surgical History  Past Surgical History:   Procedure Laterality Date    BREAST BIOPSY Right 2014    Biopsy Breast Percutaneous Needle Core    BREAST BIOPSY Right 2018    CARDIAC CATHETERIZATION N/A 2024    Procedure: Left Heart Cath;  Surgeon: Donato Cespedes MD;  Location: [place] [address] Cardiac Cath Lab;  Service: Cardiovascular;  Laterality: N/A;     SECTION, CLASSIC  2014     Section    CHOLECYSTECTOMY  2017    Cholecystectomy Laparoscopic    HAND SURGERY  2020    Hand Surgery                                                                                                                                                           HERNIA REPAIR  11/17/2014    Hernia Repair    MR HEAD ANGIO WO IV CONTRAST  11/17/2014    MR HEAD ANGIO WO IV CONTRAST 11/17/2014 [facility]    OTHER SURGICAL HISTORY  01/14/2021    Nephrectomy    TOTAL ABDOMINAL HYSTERECTOMY W/ BILATERAL SALPINGOOPHORECTOMY  04/21/2014    Total Abdominal Hysterectomy With Removal Of Both Ovaries       Social History  She reports that she has quit smoking. Her smoking use included cigarettes. She has never been exposed to tobacco smoke. She has never used smokeless tobacco. She reports current alcohol use. She reports that she does not use drugs.    Family History  Family History   Problem Relation Name Age of Onset    Aneurysm Mother      Hypertension Mother      Pancreatic cancer Mother      Diabetes Mother      Other (laryngeal Cancer) Mother      Heart disease Father      Pulmonary embolism Brother      Breast cancer Father's Sister      Breast cancer Maternal Cousin          Allergies  Adhesive, Amoxicillin, Bee sting kit, Cephalexin, Gadolinium-containing contrast media, Iodine, Latex, Pioglitazone, Rubella virus live vaccine, Salicylates, Shellfish derived, Sulfa (sulfonamide antibiotics), Sulfamethoxazole-trimethoprim, and Iodinated contrast media     Physical Exam  General: A&Ox3, NAD.   HEENT: NC/AT. EOMI. MMM.   Respiratory: CTA bilaterally. No wheezes, crackles, or rhonchi. Normal respiratory effort.  Cardiovascular: Regular rate, rhythm. No m/g/r.  Abdominal: Soft, distended d/t body habitus, slightly tender to deep palpation (L>R).   Ext: Non-pitting edema bilateral LE.  Skin: Warm, dry. No rashes or wounds.  Neuro: No focal neuro deficits.  Psych: Appropriate mood and affect.    Medications  Home Meds  Prior to Admission medications    Medication Sig Start Date End Date Taking? Authorizing Provider   acetaminophen (Tylenol 8 HOUR) 650 mg ER tablet  Take 1-2 tablets (650-1,300 mg) by mouth every 8 hours if needed for mild pain (1 - 3). Do not crush, chew, or split.   Yes Historical Provider, MD   amLODIPine (Norvasc) 10 mg tablet Take 1 tablet (10 mg) by mouth once daily. 8/27/24  Yes Laura Mata MD   ammonium lactate (Lac-Hydrin) 12 % lotion Apply topically if needed for dry skin.  Patient taking differently: Apply 1 Application topically if needed for dry skin. 3/18/24 3/18/25 Yes Monique Gross MD   apixaban (Eliquis) 2.5 mg tablet Take 1 tablet (2.5 mg) by mouth every 12 hours. 1/9/25  Yes Garland Spears MD   Combigan 0.2-0.5 % ophthalmic solution Administer 1 drop into both eyes 2 times a day.  Patient taking differently: Administer 1 drop into both eyes once daily. 11/27/24  Yes Mingo Leigh MD   diclofenac sodium 1 % kit Apply 1 Application topically if needed.  APPLY TO LOWER EXTREMITIES, 4 GM OF GEL TO AFFECTED AREA 4 TIMES DAILY. DO NOT APPLY MORE THAN 16 GM DAILY TO ANY ONE AFFECTED JOINT. 6/23/22  Yes Historical Provider, MD   famotidine (Pepcid) 20 mg tablet Take 1 tablet (20 mg) by mouth once daily at bedtime. 1/31/23  Yes Historical Provider, MD   furosemide (Lasix) 20 mg tablet Take 1 tablet (20 mg) by mouth once daily.  Patient taking differently: Take 1 tablet (20 mg) by mouth once daily as needed (For Swelling). 5/28/24 5/28/25 Yes Vianney Marrufo MD   glipiZIDE 2.5 mg tablet Take 2.5 mg by mouth once daily. 11/22/24  Yes Monique Gross MD   hydrocortisone 1 % cream Apply as needed to the injection site 11/22/24  Yes Monique Gross MD   latanoprost (Xalatan) 0.005 % ophthalmic solution Administer 1 drop into both eyes once daily in the morning.  Patient taking differently: Administer 1 drop into both eyes once daily in the evening. 11/27/24  Yes Mingo Leigh MD   losartan (Cozaar) 25 mg tablet Take 1 tablet (25 mg) by mouth once daily. 8/27/24  Yes Laura Mata MD   metoprolol succinate XL  (Toprol-XL) 200 mg 24 hr tablet Take 1 tablet (200 mg) by mouth once daily. 7/26/24  Yes Laura Mata MD   omeprazole (PriLOSEC) 40 mg DR capsule Take 1 capsule (40 mg) by mouth 2 times a day before meals.  Patient taking differently: Take 1 capsule (40 mg) by mouth once daily. 1/17/25  Yes Laura Mata MD   rosuvastatin (Crestor) 20 mg tablet Take 1 tablet (20 mg) by mouth once daily. 12/1/24  Yes Vianney Marrufo MD   Accu-Chek Guide test strips strip Use 1 test strip to test blood sugar twice daily. 11/22/24   Monique Gross MD   albuterol 90 mcg/actuation inhaler Inhale 2 puffs every 4 hours if needed for wheezing or shortness of breath (You may also use this 10-15 minutes prior to exertional activity as needed). 2/1/24   Josesito Magana MD   brimonidine-timoloL (Combigan) 0.2-0.5 % ophthalmic solution Administer 1 drop into both eyes 2 times a day.  Patient not taking: Reported on 2/28/2025 5/30/24   Mingo eLigh MD   colchicine 0.6 mg tablet Take 1 tablet (0.6 mg) by mouth once daily. 2/4/25 8/3/25  Sharmin Wall DO   doxycycline (Vibramycin) 100 mg capsule Take 1 capsule (100 mg) by mouth 2 times a day for 10 days. Take with at least 8 ounces (large glass) of water, do not lie down for 30 minutes after  Patient not taking: Reported on 2/28/2025 2/27/25 3/9/25  Mary Anne Hernandez PA-C   dulaglutide (Trulicity) 0.75 mg/0.5 mL pen injector Inject 0.75 mg under the skin 1 (one) time per week.  Patient taking differently: Inject 0.75 mg under the skin 1 (one) time per week. Pt takes every Friday 11/22/24 11/22/25  Monique Gross MD   fluticasone (Flonase) 50 mcg/actuation nasal spray Administer 2 sprays into each nostril once daily.  Patient taking differently: Administer 2 sprays into each nostril once daily as needed for allergies or rhinitis. 7/21/23   Laura Mata MD   gabapentin (Neurontin) 100 mg capsule Take 3 capsules (300 mg) by mouth once daily at bedtime. 2/11/25  "4/14/25  Sharmin Wall DO   insulin lispro (HumaLOG KwikPen Insulin) 100 unit/mL injection Use with sliding scale 3 times a day, up to 30 units daily  Patient taking differently: Use with sliding scale 3 times a day, up to 30 units daily (Pt takes as needed) 11/22/24   Monique Gross MD   lancets (Accu-Chek Softclix Lancets) misc Use to check blood sugar twice daily 11/22/24   Monique Gross MD   levalbuterol (Xopenex HFA) 45 mcg/actuation inhaler Inhale 1-2 puffs every 6 hours if needed for wheezing.  Patient not taking: Reported on 2/28/2025 2/27/25 2/27/26  Mary Anne Hernandez PA-C   levocetirizine (Xyzal) 5 mg tablet Take 1 tablet (5 mg) by mouth once daily in the evening.  Patient not taking: Reported on 2/28/2025 7/21/23   Laura Mata MD   LORazepam (Ativan) 1 mg tablet Take 1 tablet (1 mg) by mouth 1 time for 1 dose. Take 30 minutes prior to your MRI  Patient not taking: Reported on 2/28/2025 1/29/25 1/29/25  Sharmin Wall DO   methylPREDNISolone (Medrol Dospak) 4 mg tablets Take as directed on package.  Patient not taking: Reported on 2/28/2025 2/27/25   Mary Anne Hernandez PA-C   netarsudiL (Rhopressa) 0.02 % drops opthalmic solution Administer 1 drop into both eyes once daily in the evening.  Patient not taking: Reported on 2/28/2025 5/30/24   Mingo Leigh MD   pen needle, diabetic 31 gauge x 5/16\" needle Use to inject 1-4 times daily as directed. 11/22/24   Monique Gross MD   pen needle, diabetic 33 gauge x 5/32\" needle Use for sliding scale up to 3 times a day  Patient not taking: Reported on 1/20/2025 12/11/23   Monique Gross MD   polyethylene glycol (Glycolax, Miralax) 17 gram/dose powder Take 17 g by mouth 2 times a day.  Patient taking differently: Mix 17 g of powder and drink once daily as needed for constipation. 7/18/24 7/18/25  Jessica Durbin MD   prednisoLONE acetate (Pred-Forte) 1 % ophthalmic suspension Administer 1 drop into the right eye 4 times a day for 4 days. " 2/26/25 3/25/25  Mingo Leigh MD   tiotropium (Spiriva Respimat) 2.5 mcg/actuation inhaler Inhale 2 puffs once daily.  Patient taking differently: Inhale 2 puffs once daily as needed. 2/1/24   Josesito Magana MD     Scheduled medications  amLODIPine, 10 mg, oral, Daily  brimonidine, 1 drop, Both Eyes, BID   And  timolol, 1 drop, Both Eyes, BID  [Held by provider] colchicine, 0.6 mg, oral, Daily  enoxaparin, 1 mg/kg, subcutaneous, q12h  famotidine, 20 mg, oral, Nightly  fluticasone, 2 spray, Each Nostril, Daily  furosemide, 20 mg, oral, Daily  gabapentin, 300 mg, oral, Nightly  insulin lispro, 0-5 Units, subcutaneous, TID AC  [START ON 3/1/2025] latanoprost, 1 drop, Both Eyes, q AM  [Held by provider] losartan, 25 mg, oral, Daily  metoprolol succinate XL, 200 mg, oral, Daily  nitroglycerin, 0.4 mg, sublingual, Once  ondansetron, 4 mg, intravenous, Once  pantoprazole, 40 mg, oral, BID  polyethylene glycol, 17 g, oral, Daily  prednisoLONE acetate, 1 drop, Right Eye, 4x daily  rosuvastatin, 20 mg, oral, Daily  tiotropium, 2 puff, inhalation, Daily      Continuous medications   none  PRN medications  PRN medications: acetaminophen **OR** acetaminophen **OR** acetaminophen, albuterol, ammonium lactate, dextrose, dextrose, glucagon, glucagon, melatonin      Assessment/Plan   70 year old female with h/o HTN, DLD, CAD c/b NSTEMI in 2019, left RCC w/partial nephrectomy and adrenalectomy (2007), CKD3, MARYLU not on CPAP, DM2, VEDA, provoked DVT/PE in 2/2024 on apixiban admitted to [facility] on 2/28/25 with melena and GI bleed.     #melena   #suspected GI bleed  #acute blood loss anemia  #chronic iron deficiency anemia d/t GI losses  : :pt with x3 melenic bowel movements, acute symptomatic anemia   : :known h/o iron deficiency anemia requiring outpatient infusions  : :has undergone extensive outpatient heme workup, all -ve  : :bl hemoglobin 10-11 mg/dL, 8.4 mg/dL on admit  : :hemodynamically stbl   plan  -trend CBC, goal  hemoglobin >7  -maintain active T&S  -consent for blood  -hold home apixiban  -start therapeutic lovenox BID  -continue pantoprazole 40 mg PO BID   -continue home famotidine   -pending EGD on monday     #HTN  #DLD  #CAD c/b NSTEMI 2019  : :home meds include amlodipine 10 mg, losartan 25 mg, furosemide 20 mg, metoprolol succinate 200 mg, rosuvastatin 20 mg, nitroglycerin 0.4 prn   : :bp lower than usual on admit, 110s systolic  plan  -continue home amlodipine, metop succ, rosuvastatin  -hold losartan and furosemide; resume when bp at baseline     #hx DVT/PE   : :saddle PE in 2/2024   : :on apixiban 2.5 mg BID at home  plan  -hold home apixiban iso GI bleed, symptomatic anemia  -therapeutic lovenox BID while umm-procedure   -SCDs    #DM2  #neuropathy  -ACHS glucose  -sliding scale insulin  -continue gabapentin     #CKD3  -avoid nephrotoxins, IV contrast as able  -strict I&O   -trend RFP    #COPD  -continue home spiriva  -albuterol nebs prn     #MARYLU  : :per pt not using CPAP, returned her machine but issue getting another  plan  -RT consult for nocturnal cpap  -TCC to help arrange homegoing cpap    #glaucoma  -continue eye drops      Fluids: Replete PRN  Electrolytes: Keep mg >2, phos >3  and K >4  Nutrition:  NPO Diet; Effective now   Antimicrobials: none  DVT PPX: DVT: Therapeutic Lovenox  GI ppx: pantoprazole 40 mg BID  Bowel care: Miralax  Catheter: None  Lines: PIV  Oxygen: Room Air  Drips: none      Disposition: pending PT/OT eval    Code Status: Full Code (confirmed on admission)   NOK:  Primary Emergency Contact: CoreaMalick ontiveros, Home Phone: [phone]           Patient seen and discussed with attending    ---  Isabella Azul MD  PGY1, Internal Medicine

## 2025-03-01 LAB
ALBUMIN SERPL BCP-MCNC: 3.1 G/DL (ref 3.4–5)
ALP SERPL-CCNC: 111 U/L (ref 33–136)
ALT SERPL W P-5'-P-CCNC: 25 U/L (ref 7–45)
ANION GAP SERPL CALC-SCNC: 13 MMOL/L (ref 10–20)
AST SERPL W P-5'-P-CCNC: 18 U/L (ref 9–39)
BILIRUB DIRECT SERPL-MCNC: 0 MG/DL (ref 0–0.3)
BILIRUB SERPL-MCNC: 0.2 MG/DL (ref 0–1.2)
BUN SERPL-MCNC: 22 MG/DL (ref 6–23)
CALCIUM SERPL-MCNC: 8.7 MG/DL (ref 8.6–10.6)
CHLORIDE SERPL-SCNC: 111 MMOL/L (ref 98–107)
CO2 SERPL-SCNC: 21 MMOL/L (ref 21–32)
CREAT SERPL-MCNC: 1.18 MG/DL (ref 0.5–1.05)
EGFRCR SERPLBLD CKD-EPI 2021: 50 ML/MIN/1.73M*2
ERYTHROCYTE [DISTWIDTH] IN BLOOD BY AUTOMATED COUNT: 15.1 % (ref 11.5–14.5)
ERYTHROCYTE [DISTWIDTH] IN BLOOD BY AUTOMATED COUNT: 15.2 % (ref 11.5–14.5)
GLUCOSE BLD MANUAL STRIP-MCNC: 140 MG/DL (ref 74–99)
GLUCOSE BLD MANUAL STRIP-MCNC: 159 MG/DL (ref 74–99)
GLUCOSE BLD MANUAL STRIP-MCNC: 191 MG/DL (ref 74–99)
GLUCOSE BLD MANUAL STRIP-MCNC: 233 MG/DL (ref 74–99)
GLUCOSE SERPL-MCNC: 165 MG/DL (ref 74–99)
HCT VFR BLD AUTO: 22.6 % (ref 36–46)
HCT VFR BLD AUTO: 27.4 % (ref 36–46)
HGB BLD-MCNC: 7.3 G/DL (ref 12–16)
HGB BLD-MCNC: 8.5 G/DL (ref 12–16)
HOLD SPECIMEN: NORMAL
INR PPP: 1.2 (ref 0.9–1.1)
MAGNESIUM SERPL-MCNC: 2.09 MG/DL (ref 1.6–2.4)
MCH RBC QN AUTO: 29 PG (ref 26–34)
MCH RBC QN AUTO: 29.2 PG (ref 26–34)
MCHC RBC AUTO-ENTMCNC: 31 G/DL (ref 32–36)
MCHC RBC AUTO-ENTMCNC: 32.3 G/DL (ref 32–36)
MCV RBC AUTO: 90 FL (ref 80–100)
MCV RBC AUTO: 94 FL (ref 80–100)
NRBC BLD-RTO: 0.3 /100 WBCS (ref 0–0)
NRBC BLD-RTO: 0.4 /100 WBCS (ref 0–0)
PHOSPHATE SERPL-MCNC: 3.7 MG/DL (ref 2.5–4.9)
PLATELET # BLD AUTO: 299 X10*3/UL (ref 150–450)
PLATELET # BLD AUTO: 401 X10*3/UL (ref 150–450)
POTASSIUM SERPL-SCNC: 4 MMOL/L (ref 3.5–5.3)
PROT SERPL-MCNC: 5.8 G/DL (ref 6.4–8.2)
PROTHROMBIN TIME: 12.8 SECONDS (ref 9.8–12.4)
RBC # BLD AUTO: 2.52 X10*6/UL (ref 4–5.2)
RBC # BLD AUTO: 2.91 X10*6/UL (ref 4–5.2)
SODIUM SERPL-SCNC: 141 MMOL/L (ref 136–145)
UFH PPP CHRO-ACNC: 1 IU/ML
WBC # BLD AUTO: 10 X10*3/UL (ref 4.4–11.3)
WBC # BLD AUTO: 7.7 X10*3/UL (ref 4.4–11.3)

## 2025-03-01 PROCEDURE — 85027 COMPLETE CBC AUTOMATED: CPT

## 2025-03-01 PROCEDURE — 99233 SBSQ HOSP IP/OBS HIGH 50: CPT

## 2025-03-01 PROCEDURE — 85610 PROTHROMBIN TIME: CPT

## 2025-03-01 PROCEDURE — 36415 COLL VENOUS BLD VENIPUNCTURE: CPT

## 2025-03-01 PROCEDURE — 1210000001 HC SEMI-PRIVATE ROOM DAILY

## 2025-03-01 PROCEDURE — 84075 ASSAY ALKALINE PHOSPHATASE: CPT

## 2025-03-01 PROCEDURE — 2500000002 HC RX 250 W HCPCS SELF ADMINISTERED DRUGS (ALT 637 FOR MEDICARE OP, ALT 636 FOR OP/ED)

## 2025-03-01 PROCEDURE — 80069 RENAL FUNCTION PANEL: CPT

## 2025-03-01 PROCEDURE — 83735 ASSAY OF MAGNESIUM: CPT

## 2025-03-01 PROCEDURE — 82947 ASSAY GLUCOSE BLOOD QUANT: CPT

## 2025-03-01 PROCEDURE — 2500000004 HC RX 250 GENERAL PHARMACY W/ HCPCS (ALT 636 FOR OP/ED)

## 2025-03-01 PROCEDURE — 85520 HEPARIN ASSAY: CPT

## 2025-03-01 PROCEDURE — 84100 ASSAY OF PHOSPHORUS: CPT

## 2025-03-01 PROCEDURE — 2500000001 HC RX 250 WO HCPCS SELF ADMINISTERED DRUGS (ALT 637 FOR MEDICARE OP)

## 2025-03-01 RX ORDER — LATANOPROST 50 UG/ML
1 SOLUTION/ DROPS OPHTHALMIC EVERY MORNING
Status: DISCONTINUED | OUTPATIENT
Start: 2025-03-02 | End: 2025-03-02

## 2025-03-01 RX ORDER — BRIMONIDINE TARTRATE AND TIMOLOL MALEATE 2; 5 MG/ML; MG/ML
1 SOLUTION OPHTHALMIC 2 TIMES DAILY
Status: ACTIVE | OUTPATIENT
Start: 2025-03-01

## 2025-03-01 RX ADMIN — PANTOPRAZOLE SODIUM 40 MG: 40 TABLET, DELAYED RELEASE ORAL at 20:15

## 2025-03-01 RX ADMIN — FAMOTIDINE 20 MG: 20 TABLET ORAL at 20:15

## 2025-03-01 RX ADMIN — TIOTROPIUM BROMIDE INHALATION SPRAY 2 PUFF: 3.12 SPRAY, METERED RESPIRATORY (INHALATION) at 09:00

## 2025-03-01 RX ADMIN — INSULIN LISPRO 1 UNITS: 100 INJECTION, SOLUTION INTRAVENOUS; SUBCUTANEOUS at 19:19

## 2025-03-01 RX ADMIN — BRIMONIDINE TARTRATE 1 DROP: 2 SOLUTION/ DROPS OPHTHALMIC at 09:27

## 2025-03-01 RX ADMIN — ENOXAPARIN SODIUM 90 MG: 100 INJECTION SUBCUTANEOUS at 20:17

## 2025-03-01 RX ADMIN — GABAPENTIN 300 MG: 300 CAPSULE ORAL at 20:15

## 2025-03-01 RX ADMIN — METOPROLOL SUCCINATE 200 MG: 50 TABLET, EXTENDED RELEASE ORAL at 09:22

## 2025-03-01 RX ADMIN — INSULIN LISPRO 1 UNITS: 100 INJECTION, SOLUTION INTRAVENOUS; SUBCUTANEOUS at 09:35

## 2025-03-01 RX ADMIN — FLUTICASONE PROPIONATE 2 SPRAY: 50 SPRAY, METERED NASAL at 09:22

## 2025-03-01 RX ADMIN — ACETAMINOPHEN 650 MG: 325 TABLET ORAL at 09:21

## 2025-03-01 RX ADMIN — ROSUVASTATIN CALCIUM 20 MG: 20 TABLET, FILM COATED ORAL at 09:26

## 2025-03-01 RX ADMIN — PANTOPRAZOLE SODIUM 40 MG: 40 TABLET, DELAYED RELEASE ORAL at 09:22

## 2025-03-01 RX ADMIN — ENOXAPARIN SODIUM 90 MG: 100 INJECTION SUBCUTANEOUS at 09:22

## 2025-03-01 RX ADMIN — AMLODIPINE BESYLATE 10 MG: 10 TABLET ORAL at 09:22

## 2025-03-01 RX ADMIN — ACETAMINOPHEN 650 MG: 325 TABLET ORAL at 20:21

## 2025-03-01 RX ADMIN — TIMOLOL MALEATE 1 DROP: 5 SOLUTION/ DROPS OPHTHALMIC at 09:27

## 2025-03-01 ASSESSMENT — COGNITIVE AND FUNCTIONAL STATUS - GENERAL
HELP NEEDED FOR BATHING: A LITTLE
MOBILITY SCORE: 24
MOBILITY SCORE: 23
CLIMB 3 TO 5 STEPS WITH RAILING: A LITTLE
CLIMB 3 TO 5 STEPS WITH RAILING: A LITTLE
DRESSING REGULAR LOWER BODY CLOTHING: A LITTLE
DAILY ACTIVITIY SCORE: 22
DAILY ACTIVITIY SCORE: 22
DAILY ACTIVITIY SCORE: 23
DRESSING REGULAR LOWER BODY CLOTHING: A LITTLE
HELP NEEDED FOR BATHING: A LITTLE
MOBILITY SCORE: 23
HELP NEEDED FOR BATHING: A LITTLE

## 2025-03-01 ASSESSMENT — PAIN SCALES - GENERAL
PAINLEVEL_OUTOF10: 4
PAINLEVEL_OUTOF10: 6

## 2025-03-01 ASSESSMENT — ACTIVITIES OF DAILY LIVING (ADL): LACK_OF_TRANSPORTATION: NO

## 2025-03-01 ASSESSMENT — PAIN - FUNCTIONAL ASSESSMENT: PAIN_FUNCTIONAL_ASSESSMENT: 0-10

## 2025-03-01 NOTE — PROGRESS NOTES
Vania Andrews is a 70 y.o. female on day 1 of admission presenting with Black tarry stools.      Subjective   No acute events overnight. Pt reports some sharp lower abdominal pains, feels like she might need to have a bowel movements. Denies having any BM's since her admission. No issues with eating or drinking, no n/v. Denies SOB, dizziness. ROS otherwise negative.        Objective     Last Recorded Vitals  /61   Pulse 73   Temp 36.3 °C (97.3 °F)   Resp 18   Wt 92.1 kg (203 lb)   SpO2 100%   Intake/Output last 3 Shifts:    Intake/Output Summary (Last 24 hours) at 3/1/2025 1408  Last data filed at 3/1/2025 0900  Gross per 24 hour   Intake 480 ml   Output --   Net 480 ml       Admission Weight  Weight: 92.1 kg (203 lb) (02/28/25 0651)    Daily Weight  02/28/25 : 92.1 kg (203 lb)    Image Results  CT A/P with IV contrast 2/28/25:   IMPRESSION:  1.  Interval development of new pelvic ureteric junction obstruction  with mild perinephric stranding. This may be due to underlying  pyelitis, obstruction due to accessory renal vein, or underlying  ureteric malignancy. Recommend further evaluation and correlation  with urinalysis. A dedicated multiphase CT urogram in 4-6 weeks on an  nonemergent basis is advised to rule out underlying malignancy.  2. Large size hiatal hernia with gastric fundus and portions of the  gastric body herniating above the level of the diaphragm, this is not  significantly changed compared to prior. There is also associated  distal esophageal thickening, which may be be seen with reflux  esophagitis.  3. Remaining findings are without significant change compared to  prior as described above.    GI Hx:     --- 7/8/24 ---     Capsule Endoscopy   Quality of preparation was excellent   The visualized stomach was normal   The visualized small bowel was normal. No blood, angioectasia, erosions, ulcerations, strictures or masses were seen  Green stool was noted in the colon      --- 2/15/24  ---     Colonoscopy   Findings  Normal terminal ileum, ileocecal valve, and appendiceal orifice (photodocumented).  There was significant amount of thick liquid stool and solid stool which obscured much of the underlying mucosa requiring extensive water lavage.  Multiple small, medium and large, extensive diverticula with no inflammation in the ascending colon, transverse colon, descending colon and sigmoid colon; no bleeding was identified  External medium hemorrhoids observed during retroflexion; no bleeding was identified  No polyps were visualized on this procedure.  No blood was seen in the terminal ileum or in the colon on this procedure.     --- 2/9/24 ---     EGD   Findings  Tortuous esophagus  Regular Z-line 29 cm from the incisors  Large 11 cm hiatal hernia  Multiple sessile polyps measuring smaller than 5 mm in the hiatal hernia; no bleeding was identified. Appearances consistent with fundic gland polyps as noted on recent EGD.  Linear erosion in the body of the stomach; no bleeding was identified;  Scattered patchy erythema seen in body of stomach. No evidence of bleeding  The duodenal bulb and 2nd part of the duodenum appeared normal.      Physical Exam  Constitutional:       General: She is not in acute distress.     Appearance: Normal appearance. She is not ill-appearing.   HENT:      Head: Normocephalic and atraumatic.      Mouth/Throat:      Mouth: Mucous membranes are dry.      Pharynx: Oropharynx is clear.   Cardiovascular:      Rate and Rhythm: Normal rate and regular rhythm.      Pulses: Normal pulses.      Heart sounds: Normal heart sounds. No murmur heard.     No gallop.   Pulmonary:      Effort: Pulmonary effort is normal. No respiratory distress.      Breath sounds: Normal breath sounds. No wheezing or rales.   Abdominal:      General: Bowel sounds are normal. There is no distension.      Palpations: Abdomen is soft.      Tenderness: There is abdominal tenderness. There is no guarding.    Musculoskeletal:         General: No swelling or tenderness.   Skin:     Coloration: Skin is not jaundiced.      Findings: No bruising, erythema or lesion.   Neurological:      General: No focal deficit present.      Mental Status: She is alert and oriented to person, place, and time.   Psychiatric:         Mood and Affect: Mood normal.         Behavior: Behavior normal.         Relevant Results  Results for orders placed or performed during the hospital encounter of 02/28/25 (from the past 24 hours)   POCT GLUCOSE   Result Value Ref Range    POCT Glucose 182 (H) 74 - 99 mg/dL   Urinalysis with Reflex Culture and Microscopic   Result Value Ref Range    Color, Urine Light-Yellow Light-Yellow, Yellow, Dark-Yellow    Appearance, Urine Clear Clear    Specific Gravity, Urine 1.022 1.005 - 1.035    pH, Urine 5.0 5.0, 5.5, 6.0, 6.5, 7.0, 7.5, 8.0    Protein, Urine NEGATIVE NEGATIVE, 10 (TRACE), 20 (TRACE) mg/dL    Glucose, Urine Normal Normal mg/dL    Blood, Urine 0.1 (1+) (A) NEGATIVE mg/dL    Ketones, Urine NEGATIVE NEGATIVE mg/dL    Bilirubin, Urine NEGATIVE NEGATIVE mg/dL    Urobilinogen, Urine Normal Normal mg/dL    Nitrite, Urine NEGATIVE NEGATIVE    Leukocyte Esterase, Urine NEGATIVE NEGATIVE   Extra Urine Gray Tube   Result Value Ref Range    Extra Tube Hold for add-ons.    Urinalysis Microscopic   Result Value Ref Range    WBC, Urine NONE 1-5, NONE /HPF    RBC, Urine NONE NONE, 1-2, 3-5 /HPF    Squamous Epithelial Cells, Urine 1-9 (SPARSE) Reference range not established. /HPF    Bacteria, Urine 1+ (A) NONE SEEN /HPF    Mucus, Urine FEW Reference range not established. /LPF   CBC and Auto Differential   Result Value Ref Range    WBC 10.1 4.4 - 11.3 x10*3/uL    nRBC 0.0 0.0 - 0.0 /100 WBCs    RBC 2.61 (L) 4.00 - 5.20 x10*6/uL    Hemoglobin 7.4 (L) 12.0 - 16.0 g/dL    Hematocrit 23.8 (L) 36.0 - 46.0 %    MCV 91 80 - 100 fL    MCH 28.4 26.0 - 34.0 pg    MCHC 31.1 (L) 32.0 - 36.0 g/dL    RDW 15.1 (H) 11.5 - 14.5 %     Platelets 275 150 - 450 x10*3/uL    Neutrophils % 71.8 40.0 - 80.0 %    Immature Granulocytes %, Automated 0.6 0.0 - 0.9 %    Lymphocytes % 19.6 13.0 - 44.0 %    Monocytes % 5.4 2.0 - 10.0 %    Eosinophils % 2.1 0.0 - 6.0 %    Basophils % 0.5 0.0 - 2.0 %    Neutrophils Absolute 7.24 1.20 - 7.70 x10*3/uL    Immature Granulocytes Absolute, Automated 0.06 0.00 - 0.70 x10*3/uL    Lymphocytes Absolute 1.97 1.20 - 4.80 x10*3/uL    Monocytes Absolute 0.54 0.10 - 1.00 x10*3/uL    Eosinophils Absolute 0.21 0.00 - 0.70 x10*3/uL    Basophils Absolute 0.05 0.00 - 0.10 x10*3/uL   POCT GLUCOSE   Result Value Ref Range    POCT Glucose 183 (H) 74 - 99 mg/dL   CBC   Result Value Ref Range    WBC 7.7 4.4 - 11.3 x10*3/uL    nRBC 0.3 (H) 0.0 - 0.0 /100 WBCs    RBC 2.52 (L) 4.00 - 5.20 x10*6/uL    Hemoglobin 7.3 (L) 12.0 - 16.0 g/dL    Hematocrit 22.6 (L) 36.0 - 46.0 %    MCV 90 80 - 100 fL    MCH 29.0 26.0 - 34.0 pg    MCHC 32.3 32.0 - 36.0 g/dL    RDW 15.1 (H) 11.5 - 14.5 %    Platelets 299 150 - 450 x10*3/uL   Protime-INR   Result Value Ref Range    Protime 12.8 (H) 9.8 - 12.4 seconds    INR 1.2 (H) 0.9 - 1.1   Heparin Level   Result Value Ref Range    Heparin Unfractionated 1.0 See Comment Below for Therapeutic Ranges IU/mL   Magnesium   Result Value Ref Range    Magnesium 2.09 1.60 - 2.40 mg/dL   Hepatic function panel   Result Value Ref Range    Albumin 3.1 (L) 3.4 - 5.0 g/dL    Bilirubin, Total 0.2 0.0 - 1.2 mg/dL    Bilirubin, Direct 0.0 0.0 - 0.3 mg/dL    Alkaline Phosphatase 111 33 - 136 U/L    ALT 25 7 - 45 U/L    AST 18 9 - 39 U/L    Total Protein 5.8 (L) 6.4 - 8.2 g/dL   Phosphorus   Result Value Ref Range    Phosphorus 3.7 2.5 - 4.9 mg/dL   Basic Metabolic Panel   Result Value Ref Range    Glucose 165 (H) 74 - 99 mg/dL    Sodium 141 136 - 145 mmol/L    Potassium 4.0 3.5 - 5.3 mmol/L    Chloride 111 (H) 98 - 107 mmol/L    Bicarbonate 21 21 - 32 mmol/L    Anion Gap 13 10 - 20 mmol/L    Urea Nitrogen 22 6 - 23 mg/dL     Creatinine 1.18 (H) 0.50 - 1.05 mg/dL    eGFR 50 (L) >60 mL/min/1.73m*2    Calcium 8.7 8.6 - 10.6 mg/dL   POCT GLUCOSE   Result Value Ref Range    POCT Glucose 140 (H) 74 - 99 mg/dL   POCT GLUCOSE   Result Value Ref Range    POCT Glucose 233 (H) 74 - 99 mg/dL     *Note: Due to a large number of results and/or encounters for the requested time period, some results have not been displayed. A complete set of results can be found in Results Review.           Assessment/Plan    70 year old female with h/o HTN, DLD, CAD c/b NSTEMI in 2019, left RCC w/partial nephrectomy and adrenalectomy (2007), CKD3, MARYLU not on CPAP, DM2, VEDA, provoked DVT/PE in 2/2024 on apixiban admitted to [facility] on 2/28/25 with reported melena and acute drop in hemoglobin.     Updates 3/1/25:   -Hgb dropped from 8.4 -> 7.3 today. No evidence of melena/hematochezia since admission.   -Will start miralax PRN   -Continue current plan for EGD Monday 3/3     #melena   #suspected GI bleed  #acute blood loss anemia  #chronic iron deficiency anemia d/t GI losses  : :pt with x3 melenic bowel movements, acute symptomatic anemia   : :known h/o iron deficiency anemia requiring outpatient infusions  : :has undergone extensive outpatient heme workup, all -ve  : :bl hemoglobin 10-11 mg/dL, 8.4 mg/dL on admit  : :hemodynamically stbl   plan  -trend CBC, goal hemoglobin >7  -maintain active T&S  -consented for blood  -hold home apixiban  -start therapeutic lovenox BID  -continue pantoprazole 40 mg PO BID   -continue home famotidine   -pending EGD on monday      #HTN  #DLD  #CAD c/b NSTEMI 2019  : :home meds include amlodipine 10 mg, losartan 25 mg, furosemide 20 mg, metoprolol succinate 200 mg, rosuvastatin 20 mg, nitroglycerin 0.4 prn   : :bp lower than usual on admit, 110s systolic  plan  -continue home amlodipine, metop succ, rosuvastatin  -hold losartan and furosemide; resume when bp at baseline      #hx DVT/PE   : :saddle PE in 2/2024   : :on apixiban 2.5 mg BID  at home  plan  -hold home apixiban iso GI bleed, symptomatic anemia  -therapeutic lovenox BID while umm-procedure   -SCDs     #DM2  #neuropathy  -ACHS glucose  -sliding scale insulin  -continue gabapentin      #CKD3  -avoid nephrotoxins, IV contrast as able  -strict I&O   -trend RFP     #COPD  -continue home spiriva  -albuterol nebs prn      #MARYLU  : :per pt not using CPAP, returned her machine but issue getting another  plan  -RT consult for nocturnal cpap  -TCC to help arrange homegoing cpap     #glaucoma  -continue eye drops        Fluids: Replete PRN  Electrolytes: Keep mg >2, phos >3  and K >4  Nutrition:  NPO Diet; Effective now   Antimicrobials: none  DVT PPX: DVT: Therapeutic Lovenox  GI ppx: pantoprazole 40 mg BID  Bowel care: Miralax  Catheter: None  Lines: PIV  Oxygen: Room Air  Drips: none        Disposition: pending PT/OT eval     Code Status: Full Code (confirmed on admission)   NOK:  Primary Emergency Contact: Malick Corea, Home Phone: [phone]                 Patient seen and discussed with attending        Dinoar Parrish MD  Internal Medicine PGY-2        Assessment & Plan  Black tarry stools

## 2025-03-01 NOTE — PROGRESS NOTES
03/01/25 1330   Discharge Planning   Living Arrangements Spouse/significant other   Support Systems Spouse/significant other   Assistance Needed Patient independent with ADL's   Type of Residence Private residence   Number of Stairs to Enter Residence 5   Number of Stairs Within Residence 11   Do you have animals or pets at home? No   Who is requesting discharge planning? Provider   Home or Post Acute Services None   Expected Discharge Disposition Home   Does the patient need discharge transport arranged? No   Financial Resource Strain   How hard is it for you to pay for the very basics like food, housing, medical care, and heating? Not very   Housing Stability   In the last 12 months, was there a time when you were not able to pay the mortgage or rent on time? N   At any time in the past 12 months, were you homeless or living in a shelter (including now)? N   Transportation Needs   In the past 12 months, has lack of transportation kept you from medical appointments or from getting medications? no   In the past 12 months, has lack of transportation kept you from meetings, work, or from getting things needed for daily living? No   Patient Choice   Patient / Family choosing to utilize agency / facility established prior to hospitalization No     Assessment Note:  Met with patient and Introduced myself as care coordinator and member of the Care Transitions team for discharge planning. Patient lives with spouse is independent with Adl's. Pt feels safe at home.     Transportation: spouse takes her to appt   Pharmacy: [facility]  DME: None  Previous home care: None  Falls: No recent falls  PCP: Laura Mata MD last seen 2 months agian next appt 3/12 will use a follow up appt  Dialysis: None  Diabetic: yes, have supplies/medications needed    TCC will continue to follow and update the plan as warranted.    PEDRO LiN-RN  Transitional Care Coordinator (TCC)  [phone] ext [phone]

## 2025-03-01 NOTE — CARE PLAN
The patient's goals for the shift include      The clinical goals for the shift include pt will remain safe

## 2025-03-02 VITALS
HEART RATE: 81 BPM | RESPIRATION RATE: 18 BRPM | SYSTOLIC BLOOD PRESSURE: 92 MMHG | BODY MASS INDEX: 42.61 KG/M2 | OXYGEN SATURATION: 98 % | WEIGHT: 203 LBS | DIASTOLIC BLOOD PRESSURE: 63 MMHG | HEIGHT: 58 IN | TEMPERATURE: 98.4 F

## 2025-03-02 LAB
ALBUMIN SERPL BCP-MCNC: 3.2 G/DL (ref 3.4–5)
ALP SERPL-CCNC: 122 U/L (ref 33–136)
ALT SERPL W P-5'-P-CCNC: 25 U/L (ref 7–45)
ANION GAP SERPL CALC-SCNC: 10 MMOL/L (ref 10–20)
AST SERPL W P-5'-P-CCNC: 32 U/L (ref 9–39)
BILIRUB DIRECT SERPL-MCNC: 0 MG/DL (ref 0–0.3)
BILIRUB SERPL-MCNC: 0.2 MG/DL (ref 0–1.2)
BUN SERPL-MCNC: 21 MG/DL (ref 6–23)
CALCIUM SERPL-MCNC: 8.8 MG/DL (ref 8.6–10.6)
CHLORIDE SERPL-SCNC: 109 MMOL/L (ref 98–107)
CO2 SERPL-SCNC: 24 MMOL/L (ref 21–32)
CREAT SERPL-MCNC: 1.3 MG/DL (ref 0.5–1.05)
EGFRCR SERPLBLD CKD-EPI 2021: 45 ML/MIN/1.73M*2
ERYTHROCYTE [DISTWIDTH] IN BLOOD BY AUTOMATED COUNT: 15.4 % (ref 11.5–14.5)
GLUCOSE BLD MANUAL STRIP-MCNC: 150 MG/DL (ref 74–99)
GLUCOSE BLD MANUAL STRIP-MCNC: 161 MG/DL (ref 74–99)
GLUCOSE BLD MANUAL STRIP-MCNC: 224 MG/DL (ref 74–99)
GLUCOSE SERPL-MCNC: 263 MG/DL (ref 74–99)
HCT VFR BLD AUTO: 24.8 % (ref 36–46)
HGB BLD-MCNC: 7.6 G/DL (ref 12–16)
MAGNESIUM SERPL-MCNC: 2.1 MG/DL (ref 1.6–2.4)
MCH RBC QN AUTO: 29.5 PG (ref 26–34)
MCHC RBC AUTO-ENTMCNC: 30.6 G/DL (ref 32–36)
MCV RBC AUTO: 96 FL (ref 80–100)
NRBC BLD-RTO: 0.5 /100 WBCS (ref 0–0)
PHOSPHATE SERPL-MCNC: 3 MG/DL (ref 2.5–4.9)
PLATELET # BLD AUTO: 311 X10*3/UL (ref 150–450)
POTASSIUM SERPL-SCNC: 4.1 MMOL/L (ref 3.5–5.3)
PROT SERPL-MCNC: 5.9 G/DL (ref 6.4–8.2)
RBC # BLD AUTO: 2.58 X10*6/UL (ref 4–5.2)
SODIUM SERPL-SCNC: 139 MMOL/L (ref 136–145)
WBC # BLD AUTO: 8.5 X10*3/UL (ref 4.4–11.3)

## 2025-03-02 PROCEDURE — 1210000001 HC SEMI-PRIVATE ROOM DAILY

## 2025-03-02 PROCEDURE — 84155 ASSAY OF PROTEIN SERUM: CPT

## 2025-03-02 PROCEDURE — 99233 SBSQ HOSP IP/OBS HIGH 50: CPT

## 2025-03-02 PROCEDURE — 82947 ASSAY GLUCOSE BLOOD QUANT: CPT

## 2025-03-02 PROCEDURE — 84100 ASSAY OF PHOSPHORUS: CPT

## 2025-03-02 PROCEDURE — 80069 RENAL FUNCTION PANEL: CPT

## 2025-03-02 PROCEDURE — 80053 COMPREHEN METABOLIC PANEL: CPT

## 2025-03-02 PROCEDURE — 94640 AIRWAY INHALATION TREATMENT: CPT

## 2025-03-02 PROCEDURE — 2500000001 HC RX 250 WO HCPCS SELF ADMINISTERED DRUGS (ALT 637 FOR MEDICARE OP)

## 2025-03-02 PROCEDURE — 2500000004 HC RX 250 GENERAL PHARMACY W/ HCPCS (ALT 636 FOR OP/ED)

## 2025-03-02 PROCEDURE — 85027 COMPLETE CBC AUTOMATED: CPT

## 2025-03-02 PROCEDURE — 36415 COLL VENOUS BLD VENIPUNCTURE: CPT

## 2025-03-02 PROCEDURE — 83735 ASSAY OF MAGNESIUM: CPT

## 2025-03-02 PROCEDURE — 2500000002 HC RX 250 W HCPCS SELF ADMINISTERED DRUGS (ALT 637 FOR MEDICARE OP, ALT 636 FOR OP/ED)

## 2025-03-02 RX ORDER — DIPHENHYDRAMINE HCL 25 MG
50 CAPSULE ORAL ONCE
Status: COMPLETED | OUTPATIENT
Start: 2025-03-03 | End: 2025-03-02

## 2025-03-02 RX ORDER — GUAIFENESIN 600 MG/1
600 TABLET, EXTENDED RELEASE ORAL 2 TIMES DAILY PRN
Status: DISPENSED | OUTPATIENT
Start: 2025-03-02

## 2025-03-02 RX ORDER — LATANOPROST 50 UG/ML
1 SOLUTION/ DROPS OPHTHALMIC NIGHTLY
Status: ACTIVE | OUTPATIENT
Start: 2025-03-02

## 2025-03-02 RX ORDER — INSULIN LISPRO 100 [IU]/ML
0-5 INJECTION, SOLUTION INTRAVENOUS; SUBCUTANEOUS
Status: CANCELLED | OUTPATIENT
Start: 2025-03-03

## 2025-03-02 RX ADMIN — POLYETHYLENE GLYCOL 3350 17 G: 17 POWDER, FOR SOLUTION ORAL at 08:53

## 2025-03-02 RX ADMIN — METOPROLOL SUCCINATE 200 MG: 50 TABLET, EXTENDED RELEASE ORAL at 08:53

## 2025-03-02 RX ADMIN — Medication 50 MG: at 23:41

## 2025-03-02 RX ADMIN — ROSUVASTATIN CALCIUM 20 MG: 20 TABLET, FILM COATED ORAL at 08:53

## 2025-03-02 RX ADMIN — ENOXAPARIN SODIUM 90 MG: 100 INJECTION SUBCUTANEOUS at 08:53

## 2025-03-02 RX ADMIN — FAMOTIDINE 20 MG: 20 TABLET ORAL at 21:18

## 2025-03-02 RX ADMIN — PANTOPRAZOLE SODIUM 40 MG: 40 TABLET, DELAYED RELEASE ORAL at 08:53

## 2025-03-02 RX ADMIN — GUAIFENESIN 600 MG: 600 TABLET ORAL at 21:18

## 2025-03-02 RX ADMIN — GABAPENTIN 300 MG: 300 CAPSULE ORAL at 21:18

## 2025-03-02 RX ADMIN — BRIMONIDINE TARTRATE AND TIMOLOL MALEATE 1 DROP: 2; 5 SOLUTION/ DROPS OPHTHALMIC at 08:56

## 2025-03-02 RX ADMIN — FLUTICASONE PROPIONATE 2 SPRAY: 50 SPRAY, METERED NASAL at 08:53

## 2025-03-02 RX ADMIN — ALBUTEROL SULFATE 2.5 MG: 2.5 SOLUTION RESPIRATORY (INHALATION) at 13:38

## 2025-03-02 RX ADMIN — PANTOPRAZOLE SODIUM 40 MG: 40 TABLET, DELAYED RELEASE ORAL at 21:18

## 2025-03-02 RX ADMIN — TIOTROPIUM BROMIDE INHALATION SPRAY 2 PUFF: 3.12 SPRAY, METERED RESPIRATORY (INHALATION) at 08:54

## 2025-03-02 RX ADMIN — LATANOPROST 1 DROP: 50 SOLUTION OPHTHALMIC at 21:17

## 2025-03-02 RX ADMIN — AMLODIPINE BESYLATE 10 MG: 10 TABLET ORAL at 08:53

## 2025-03-02 ASSESSMENT — COGNITIVE AND FUNCTIONAL STATUS - GENERAL
HELP NEEDED FOR BATHING: A LITTLE
MOBILITY SCORE: 22
WALKING IN HOSPITAL ROOM: A LITTLE
MOBILITY SCORE: 23
CLIMB 3 TO 5 STEPS WITH RAILING: A LITTLE
CLIMB 3 TO 5 STEPS WITH RAILING: A LITTLE
DAILY ACTIVITIY SCORE: 22
DRESSING REGULAR LOWER BODY CLOTHING: A LITTLE
DRESSING REGULAR LOWER BODY CLOTHING: A LITTLE
DAILY ACTIVITIY SCORE: 23

## 2025-03-02 ASSESSMENT — PAIN SCALES - GENERAL
PAINLEVEL_OUTOF10: 0 - NO PAIN
PAINLEVEL_OUTOF10: 0 - NO PAIN

## 2025-03-02 NOTE — CARE PLAN
The patient's goals for the shift include      The clinical goals for the shift include Pt will remain safe and free from harm throughout the shift 3/2/25 0700.      Problem: Pain - Adult  Goal: Verbalizes/displays adequate comfort level or baseline comfort level  Outcome: Progressing     Problem: Safety - Adult  Goal: Free from fall injury  Outcome: Progressing     Problem: Discharge Planning  Goal: Discharge to home or other facility with appropriate resources  Outcome: Progressing     Problem: Chronic Conditions and Co-morbidities  Goal: Patient's chronic conditions and co-morbidity symptoms are monitored and maintained or improved  Outcome: Progressing     Problem: Nutrition  Goal: Nutrient intake appropriate for maintaining nutritional needs  Outcome: Progressing     Problem: Fall/Injury  Goal: Not fall by end of shift  Outcome: Progressing  Goal: Be free from injury by end of the shift  Outcome: Progressing  Goal: Verbalize understanding of personal risk factors for fall in the hospital  Outcome: Progressing  Goal: Verbalize understanding of risk factor reduction measures to prevent injury from fall in the home  Outcome: Progressing  Goal: Use assistive devices by end of the shift  Outcome: Progressing  Goal: Pace activities to prevent fatigue by end of the shift  Outcome: Progressing     Problem: Pain  Goal: Takes deep breaths with improved pain control throughout the shift  Outcome: Progressing  Goal: Turns in bed with improved pain control throughout the shift  Outcome: Progressing  Goal: Walks with improved pain control throughout the shift  Outcome: Progressing  Goal: Performs ADL's with improved pain control throughout shift  Outcome: Progressing  Goal: Participates in PT with improved pain control throughout the shift  Outcome: Progressing  Goal: Free from opioid side effects throughout the shift  Outcome: Progressing  Goal: Free from acute confusion related to pain meds throughout the shift  Outcome:  Progressing

## 2025-03-02 NOTE — CARE PLAN
The clinical goals for the shift include Patient to remain free of falls and injuries by end of shift  Problem: Safety - Adult  Goal: Free from fall injury  Outcome: Progressing     Problem: Pain - Adult  Goal: Verbalizes/displays adequate comfort level or baseline comfort level  Outcome: Progressing     Problem: Nutrition  Goal: Nutrient intake appropriate for maintaining nutritional needs  Outcome: Progressing

## 2025-03-02 NOTE — PROGRESS NOTES
Vania Andrews is a 70 y.o. female on day 2 of admission presenting with Black tarry stools.      Subjective   No acute events overnight. Pt reports a bowel movement last night that was partially black and brown/formed. Had improvement in lower abdominal pain with this. Reporting some nasal congestion and cough and feeling like she can't clear her secretions. Was prescribed abx/steroids for this the day prior to admission.     Expressing a lot of concern around her EGD tomorrow and possible plans for surgery to repair her hiatal hernia.     ROS otherwise negative.        Objective     Last Recorded Vitals  /70   Pulse 76   Temp 36.7 °C (98.1 °F)   Resp 18   Wt 92.1 kg (203 lb)   SpO2 97%   Intake/Output last 3 Shifts:    Intake/Output Summary (Last 24 hours) at 3/2/2025 1041  Last data filed at 3/2/2025 0400  Gross per 24 hour   Intake 720 ml   Output 1 ml   Net 719 ml       Admission Weight  Weight: 92.1 kg (203 lb) (02/28/25 0651)    Daily Weight  02/28/25 : 92.1 kg (203 lb)        Physical Exam  Constitutional:       General: She is not in acute distress.  HENT:      Head: Normocephalic and atraumatic.      Mouth/Throat:      Mouth: Mucous membranes are moist.      Pharynx: No oropharyngeal exudate or posterior oropharyngeal erythema.   Eyes:      General: No scleral icterus.     Pupils: Pupils are equal, round, and reactive to light.   Cardiovascular:      Rate and Rhythm: Normal rate and regular rhythm.      Heart sounds: No murmur heard.     No friction rub. No gallop.   Pulmonary:      Effort: Pulmonary effort is normal. No respiratory distress.      Breath sounds: Normal breath sounds.   Abdominal:      General: Abdomen is flat. Bowel sounds are normal. There is no distension.      Palpations: Abdomen is soft.      Tenderness: There is no abdominal tenderness.   Musculoskeletal:         General: No swelling or deformity.      Right lower leg: No edema.      Left lower leg: No edema.   Skin:      General: Skin is warm and dry.      Coloration: Skin is not jaundiced.      Findings: No erythema or rash.   Neurological:      General: No focal deficit present.   Psychiatric:         Mood and Affect: Mood normal.         Judgment: Judgment normal.         Relevant Results  Results for orders placed or performed during the hospital encounter of 02/28/25 (from the past 24 hours)   POCT GLUCOSE   Result Value Ref Range    POCT Glucose 233 (H) 74 - 99 mg/dL   CBC   Result Value Ref Range    WBC 10.0 4.4 - 11.3 x10*3/uL    nRBC 0.4 (H) 0.0 - 0.0 /100 WBCs    RBC 2.91 (L) 4.00 - 5.20 x10*6/uL    Hemoglobin 8.5 (L) 12.0 - 16.0 g/dL    Hematocrit 27.4 (L) 36.0 - 46.0 %    MCV 94 80 - 100 fL    MCH 29.2 26.0 - 34.0 pg    MCHC 31.0 (L) 32.0 - 36.0 g/dL    RDW 15.2 (H) 11.5 - 14.5 %    Platelets 401 150 - 450 x10*3/uL   POCT GLUCOSE   Result Value Ref Range    POCT Glucose 191 (H) 74 - 99 mg/dL   POCT GLUCOSE   Result Value Ref Range    POCT Glucose 159 (H) 74 - 99 mg/dL   POCT GLUCOSE   Result Value Ref Range    POCT Glucose 150 (H) 74 - 99 mg/dL     *Note: Due to a large number of results and/or encounters for the requested time period, some results have not been displayed. A complete set of results can be found in Results Review.            Assessment/Plan    70 year old female with h/o HTN, DLD, CAD c/b NSTEMI in 2019, left RCC w/partial nephrectomy and adrenalectomy (2007), CKD3, MARYLU not on CPAP, DM2, VEDA, provoked DVT/PE in 2/2024 on apixiban admitted to [facility] on 2/28/25 with reported melena and acute drop in hemoglobin.     Updates 3/2/25  -Hgb improved from 7.3 -> 8.5 and no objective evidence of continued melena since admission. Awaiting today's labs   -Continue nebulized albuterol and add mucolytics   -EGD planned for tomorrow  -Pt education provided on Nissen fundoplication details     Assessment & Plan  Black tarry stools    #melena   #suspected GI bleed  #acute blood loss anemia  #chronic iron deficiency  anemia d/t GI losses  : :pt with x3 melenic bowel movements, acute symptomatic anemia   : :known h/o iron deficiency anemia requiring outpatient infusions  : :has undergone extensive outpatient heme workup, all -ve  : :bl hemoglobin 10-11 mg/dL, 8.4 mg/dL on admit  : :hemodynamically stbl   plan  -trend CBC, goal hemoglobin >7  -maintain active T&S  -consented for blood  -hold home apixiban  -start therapeutic lovenox BID, holding pending procedure   -continue pantoprazole 40 mg PO BID   -continue home famotidine   -pending EGD on Monday, 3/3     #HTN  #DLD  #CAD c/b NSTEMI 2019  : :home meds include amlodipine 10 mg, losartan 25 mg, furosemide 20 mg, metoprolol succinate 200 mg, rosuvastatin 20 mg, nitroglycerin 0.4 prn   : :bp lower than usual on admit, 110s systolic  plan  -continue home amlodipine, metop succ, rosuvastatin  -hold losartan and furosemide; resume when bp at baseline      #hx DVT/PE   : :saddle PE in 2/2024   : :on apixiban 2.5 mg BID at home  plan  -hold home apixiban iso GI bleed, symptomatic anemia  -therapeutic lovenox BID while umm-procedure   -SCDs     #DM2  #neuropathy  -ACHS glucose  -sliding scale insulin  -continue gabapentin      #CKD3  -avoid nephrotoxins, IV contrast as able  -strict I&O   -trend RFP     #COPD  -continue home spiriva  -albuterol nebs prn   -Guaifenesin for congestion; will consider sending home with course of pred if cough not improved      #MARYLU  : :per pt not using CPAP, returned her machine but issue getting another  plan  -RT consult for nocturnal cpap  -TCC to help arrange homegoing cpap     #glaucoma  -continue eye drops        Fluids: Replete PRN  Electrolytes: Keep mg >2, phos >3  and K >4  Nutrition:  NPO Diet; Effective now   Antimicrobials: none  DVT PPX: DVT: Therapeutic Lovenox  GI ppx: pantoprazole 40 mg BID  Bowel care: Miralax  Catheter: None  Lines: PIV  Oxygen: Room Air  Drips: none        Disposition: pending PT/OT eval     Code Status: Full Code  (confirmed on admission)   NOK:  Primary Emergency Contact: AnmolMohinderMalick L, Home Phone: [phone]                 Patient seen and discussed with attending           Dinora Parrish MD  Internal Medicine PGY-3

## 2025-03-03 ENCOUNTER — HOSPITAL ENCOUNTER (OUTPATIENT)
Dept: GASTROENTEROLOGY | Facility: HOSPITAL | Age: 70
Discharge: HOME | End: 2025-03-03
Payer: MEDICARE

## 2025-03-03 VITALS
RESPIRATION RATE: 18 BRPM | OXYGEN SATURATION: 94 % | SYSTOLIC BLOOD PRESSURE: 110 MMHG | DIASTOLIC BLOOD PRESSURE: 61 MMHG | TEMPERATURE: 98.4 F | HEART RATE: 73 BPM

## 2025-03-03 DIAGNOSIS — D50.0 IRON DEFICIENCY ANEMIA DUE TO CHRONIC BLOOD LOSS: ICD-10-CM

## 2025-03-03 DIAGNOSIS — H40.10X2 OPEN-ANGLE GLAUCOMA OF BOTH EYES, MODERATE STAGE, UNSPECIFIED OPEN-ANGLE GLAUCOMA TYPE: ICD-10-CM

## 2025-03-03 LAB
ALBUMIN SERPL BCP-MCNC: 3.3 G/DL (ref 3.4–5)
ALP SERPL-CCNC: 118 U/L (ref 33–136)
ALT SERPL W P-5'-P-CCNC: 22 U/L (ref 7–45)
ANION GAP SERPL CALC-SCNC: 12 MMOL/L (ref 10–20)
AST SERPL W P-5'-P-CCNC: 21 U/L (ref 9–39)
BILIRUB DIRECT SERPL-MCNC: 0.1 MG/DL (ref 0–0.3)
BILIRUB SERPL-MCNC: 0.3 MG/DL (ref 0–1.2)
BUN SERPL-MCNC: 17 MG/DL (ref 6–23)
CALCIUM SERPL-MCNC: 9.2 MG/DL (ref 8.6–10.6)
CHLORIDE SERPL-SCNC: 111 MMOL/L (ref 98–107)
CO2 SERPL-SCNC: 23 MMOL/L (ref 21–32)
CREAT SERPL-MCNC: 1.34 MG/DL (ref 0.5–1.05)
EGFRCR SERPLBLD CKD-EPI 2021: 43 ML/MIN/1.73M*2
ERYTHROCYTE [DISTWIDTH] IN BLOOD BY AUTOMATED COUNT: 15.4 % (ref 11.5–14.5)
GLUCOSE BLD MANUAL STRIP-MCNC: 123 MG/DL (ref 74–99)
GLUCOSE BLD MANUAL STRIP-MCNC: 124 MG/DL (ref 74–99)
GLUCOSE BLD MANUAL STRIP-MCNC: 279 MG/DL (ref 74–99)
GLUCOSE BLD MANUAL STRIP-MCNC: 91 MG/DL (ref 74–99)
GLUCOSE SERPL-MCNC: 119 MG/DL (ref 74–99)
HCT VFR BLD AUTO: 26.8 % (ref 36–46)
HGB BLD-MCNC: 8 G/DL (ref 12–16)
MAGNESIUM SERPL-MCNC: 2.14 MG/DL (ref 1.6–2.4)
MCH RBC QN AUTO: 28.6 PG (ref 26–34)
MCHC RBC AUTO-ENTMCNC: 29.9 G/DL (ref 32–36)
MCV RBC AUTO: 96 FL (ref 80–100)
NRBC BLD-RTO: 1 /100 WBCS (ref 0–0)
PHOSPHATE SERPL-MCNC: 3 MG/DL (ref 2.5–4.9)
PLATELET # BLD AUTO: 334 X10*3/UL (ref 150–450)
POTASSIUM SERPL-SCNC: 4.3 MMOL/L (ref 3.5–5.3)
PROT SERPL-MCNC: 6.1 G/DL (ref 6.4–8.2)
RBC # BLD AUTO: 2.8 X10*6/UL (ref 4–5.2)
SODIUM SERPL-SCNC: 142 MMOL/L (ref 136–145)
WBC # BLD AUTO: 8.2 X10*3/UL (ref 4.4–11.3)

## 2025-03-03 PROCEDURE — 80053 COMPREHEN METABOLIC PANEL: CPT

## 2025-03-03 PROCEDURE — 99153 MOD SED SAME PHYS/QHP EA: CPT | Performed by: INTERNAL MEDICINE

## 2025-03-03 PROCEDURE — 84100 ASSAY OF PHOSPHORUS: CPT

## 2025-03-03 PROCEDURE — 83735 ASSAY OF MAGNESIUM: CPT

## 2025-03-03 PROCEDURE — 2500000004 HC RX 250 GENERAL PHARMACY W/ HCPCS (ALT 636 FOR OP/ED)

## 2025-03-03 PROCEDURE — 99233 SBSQ HOSP IP/OBS HIGH 50: CPT

## 2025-03-03 PROCEDURE — 36415 COLL VENOUS BLD VENIPUNCTURE: CPT

## 2025-03-03 PROCEDURE — 82947 ASSAY GLUCOSE BLOOD QUANT: CPT

## 2025-03-03 PROCEDURE — 7100000009 HC PHASE TWO TIME - INITIAL BASE CHARGE

## 2025-03-03 PROCEDURE — 7100000010 HC PHASE TWO TIME - EACH INCREMENTAL 1 MINUTE

## 2025-03-03 PROCEDURE — 2500000001 HC RX 250 WO HCPCS SELF ADMINISTERED DRUGS (ALT 637 FOR MEDICARE OP)

## 2025-03-03 PROCEDURE — 85027 COMPLETE CBC AUTOMATED: CPT

## 2025-03-03 PROCEDURE — G0500 MOD SEDAT ENDO SERVICE >5YRS: HCPCS | Performed by: INTERNAL MEDICINE

## 2025-03-03 PROCEDURE — 1210000001 HC SEMI-PRIVATE ROOM DAILY

## 2025-03-03 PROCEDURE — 2500000002 HC RX 250 W HCPCS SELF ADMINISTERED DRUGS (ALT 637 FOR MEDICARE OP, ALT 636 FOR OP/ED)

## 2025-03-03 PROCEDURE — 43235 EGD DIAGNOSTIC BRUSH WASH: CPT | Performed by: INTERNAL MEDICINE

## 2025-03-03 PROCEDURE — 0DJ08ZZ INSPECTION OF UPPER INTESTINAL TRACT, VIA NATURAL OR ARTIFICIAL OPENING ENDOSCOPIC: ICD-10-PCS | Performed by: STUDENT IN AN ORGANIZED HEALTH CARE EDUCATION/TRAINING PROGRAM

## 2025-03-03 PROCEDURE — 3700000012 HC SEDATION LEVEL 5+ TIME - INITIAL 15 MINUTES 5/> YEARS

## 2025-03-03 PROCEDURE — 82248 BILIRUBIN DIRECT: CPT

## 2025-03-03 PROCEDURE — 2500000004 HC RX 250 GENERAL PHARMACY W/ HCPCS (ALT 636 FOR OP/ED): Performed by: INTERNAL MEDICINE

## 2025-03-03 RX ORDER — MIDAZOLAM HYDROCHLORIDE 1 MG/ML
INJECTION, SOLUTION INTRAMUSCULAR; INTRAVENOUS AS NEEDED
Status: COMPLETED | OUTPATIENT
Start: 2025-03-03 | End: 2025-03-03

## 2025-03-03 RX ORDER — FENTANYL CITRATE 50 UG/ML
INJECTION, SOLUTION INTRAMUSCULAR; INTRAVENOUS AS NEEDED
Status: COMPLETED | OUTPATIENT
Start: 2025-03-03 | End: 2025-03-03

## 2025-03-03 RX ORDER — MOMETASONE FUROATE MONOHYDRATE 50 UG/1
SPRAY, METERED NASAL
COMMUNITY

## 2025-03-03 RX ADMIN — MIDAZOLAM HYDROCHLORIDE 2 MG: 1 INJECTION, SOLUTION INTRAMUSCULAR; INTRAVENOUS at 15:10

## 2025-03-03 RX ADMIN — LATANOPROST 1 DROP: 50 SOLUTION OPHTHALMIC at 20:39

## 2025-03-03 RX ADMIN — BRIMONIDINE TARTRATE AND TIMOLOL MALEATE 1 DROP: 2; 5 SOLUTION/ DROPS OPHTHALMIC at 10:20

## 2025-03-03 RX ADMIN — PANTOPRAZOLE SODIUM 40 MG: 40 TABLET, DELAYED RELEASE ORAL at 10:10

## 2025-03-03 RX ADMIN — FENTANYL CITRATE 25 MCG: 50 INJECTION, SOLUTION INTRAMUSCULAR; INTRAVENOUS at 15:20

## 2025-03-03 RX ADMIN — PANTOPRAZOLE SODIUM 40 MG: 40 TABLET, DELAYED RELEASE ORAL at 20:39

## 2025-03-03 RX ADMIN — FAMOTIDINE 20 MG: 20 TABLET ORAL at 20:39

## 2025-03-03 RX ADMIN — MIDAZOLAM HYDROCHLORIDE 1 MG: 1 INJECTION, SOLUTION INTRAMUSCULAR; INTRAVENOUS at 15:22

## 2025-03-03 RX ADMIN — ROSUVASTATIN CALCIUM 20 MG: 20 TABLET, FILM COATED ORAL at 10:11

## 2025-03-03 RX ADMIN — METOPROLOL SUCCINATE 200 MG: 50 TABLET, EXTENDED RELEASE ORAL at 10:10

## 2025-03-03 RX ADMIN — AMLODIPINE BESYLATE 10 MG: 10 TABLET ORAL at 10:11

## 2025-03-03 RX ADMIN — MIDAZOLAM HYDROCHLORIDE 1 MG: 1 INJECTION, SOLUTION INTRAMUSCULAR; INTRAVENOUS at 15:20

## 2025-03-03 RX ADMIN — FENTANYL CITRATE 25 MCG: 50 INJECTION, SOLUTION INTRAMUSCULAR; INTRAVENOUS at 15:22

## 2025-03-03 RX ADMIN — FLUTICASONE PROPIONATE 2 SPRAY: 50 SPRAY, METERED NASAL at 10:12

## 2025-03-03 RX ADMIN — ENOXAPARIN SODIUM 90 MG: 100 INJECTION SUBCUTANEOUS at 20:39

## 2025-03-03 RX ADMIN — BENZOCAINE AND MENTHOL 1 LOZENGE: 15; 3.6 LOZENGE ORAL at 20:39

## 2025-03-03 RX ADMIN — ACETAMINOPHEN 650 MG: 325 TABLET ORAL at 18:59

## 2025-03-03 RX ADMIN — GUAIFENESIN 600 MG: 600 TABLET, EXTENDED RELEASE ORAL at 20:39

## 2025-03-03 RX ADMIN — MIDAZOLAM HYDROCHLORIDE 1 MG: 1 INJECTION, SOLUTION INTRAMUSCULAR; INTRAVENOUS at 15:24

## 2025-03-03 RX ADMIN — FENTANYL CITRATE 50 MCG: 50 INJECTION, SOLUTION INTRAMUSCULAR; INTRAVENOUS at 15:10

## 2025-03-03 ASSESSMENT — ACTIVITIES OF DAILY LIVING (ADL): EFFECT OF PAIN ON DAILY ACTIVITIES: 0

## 2025-03-03 ASSESSMENT — PAIN - FUNCTIONAL ASSESSMENT
PAIN_FUNCTIONAL_ASSESSMENT: 0-10
PAIN_FUNCTIONAL_ASSESSMENT: 0-10
PAIN_FUNCTIONAL_ASSESSMENT: WONG-BAKER FACES
PAIN_FUNCTIONAL_ASSESSMENT: 0-10
PAIN_FUNCTIONAL_ASSESSMENT: WONG-BAKER FACES
PAIN_FUNCTIONAL_ASSESSMENT: WONG-BAKER FACES
PAIN_FUNCTIONAL_ASSESSMENT: 0-10

## 2025-03-03 ASSESSMENT — COGNITIVE AND FUNCTIONAL STATUS - GENERAL
CLIMB 3 TO 5 STEPS WITH RAILING: A LITTLE
MOVING TO AND FROM BED TO CHAIR: A LITTLE
STANDING UP FROM CHAIR USING ARMS: A LITTLE
WALKING IN HOSPITAL ROOM: A LITTLE
WALKING IN HOSPITAL ROOM: A LITTLE
MOBILITY SCORE: 22
DAILY ACTIVITIY SCORE: 24
DAILY ACTIVITIY SCORE: 24
CLIMB 3 TO 5 STEPS WITH RAILING: A LITTLE
MOBILITY SCORE: 20

## 2025-03-03 ASSESSMENT — PAIN SCALES - WONG BAKER
WONGBAKER_NUMERICALRESPONSE: NO HURT

## 2025-03-03 ASSESSMENT — PAIN SCALES - GENERAL
PAINLEVEL_OUTOF10: 0 - NO PAIN
PAINLEVEL_OUTOF10: 3
PAINLEVEL_OUTOF10: 0 - NO PAIN
PAINLEVEL_OUTOF10: 1

## 2025-03-03 ASSESSMENT — PAIN DESCRIPTION - DESCRIPTORS: DESCRIPTORS: ACHING

## 2025-03-03 NOTE — INTERVAL H&P NOTE
H&P reviewed. The patient was examined and there are no changes to the H&P. Plan for EGD with moderate sedation. ASA III. Mallampati 2. Otherwise, no changes to H+P.

## 2025-03-03 NOTE — CARE PLAN
The patient's goals for the shift include      The clinical goals for the shift include Pt will remain safe and free from falls and injury through shift    Over the shift, the patient did not make progress toward the following goals. Barriers to progression include weakness. Recommendations to address these barriers include educate and encourage patient to use bed alarm and call light for assistance.

## 2025-03-03 NOTE — PROGRESS NOTES
"  Internal Medicine Progress Note     70 year old female with h/o HTN, DLD, CAD c/b NSTEMI in 2019, left RCC w/partial nephrectomy and adrenalectomy (2007), CKD3, MARYLU not on CPAP, DM2, VEDA, provoked DVT/PE in 2/2024 on apixiban admitted to [facility] on 2/28/25 with melena and GI bleed.     Subjective     No acute events overnight and no new concerns this morning. ROS -ve unless stated otherwise.    Medications     Medications:   Scheduled medications  amLODIPine, 10 mg, oral, Daily  brimonidine-timoloL, 1 drop, Both Eyes, BID  [Held by provider] colchicine, 0.6 mg, oral, Daily  [Held by provider] enoxaparin, 1 mg/kg, subcutaneous, q12h  famotidine, 20 mg, oral, Nightly  fluticasone, 2 spray, Each Nostril, Daily  [Held by provider] furosemide, 20 mg, oral, Daily  gabapentin, 300 mg, oral, Nightly  insulin lispro, 0-5 Units, subcutaneous, TID AC  latanoprost, 1 drop, Both Eyes, Nightly  [Held by provider] losartan, 25 mg, oral, Daily  metoprolol succinate XL, 200 mg, oral, Daily  ondansetron, 4 mg, intravenous, Once  pantoprazole, 40 mg, oral, BID  polyethylene glycol, 17 g, oral, Daily  rosuvastatin, 20 mg, oral, Daily  tiotropium, 2 puff, inhalation, Daily      Continuous medications     PRN medications  PRN medications: acetaminophen **OR** acetaminophen **OR** acetaminophen, albuterol, ammonium lactate, benzocaine-menthol, dextrose, dextrose, glucagon, glucagon, guaiFENesin, melatonin     Objective     Vitals  Visit Vitals  /83 (BP Location: Right arm, Patient Position: Lying)   Pulse 76   Temp 36.1 °C (97 °F) (Temporal)   Resp 18   Ht 1.473 m (4' 10\")   Wt 92.1 kg (203 lb)   LMP  (LMP Unknown)   SpO2 98%   BMI 42.43 kg/m²   OB Status Postmenopausal   Smoking Status Former   BSA 1.94 m²       Intake/Output Summary (Last 24 hours) at 3/3/2025 1309  Last data filed at 3/3/2025 0600  Gross per 24 hour   Intake 10 ml   Output --   Net 10 ml       Physical Exam  General: A&Ox3, NAD.   HEENT: NC/AT. EOMI. MMM. "   Respiratory: CTA bilaterally. No wheezes, crackles, or rhonchi. Normal respiratory effort.  Cardiovascular: Regular rate, rhythm. No m/g/r.  Abdominal: Soft, distended d/t body habitus, slightly tender to deep palpation (L>R).   Ext: Non-pitting edema bilateral LE.  Skin: Warm, dry. No rashes or wounds.  Neuro: No focal neuro deficits.  Psych: Appropriate mood and affect.    Labs  Lab Results   Component Value Date    WBC 8.2 03/03/2025    HGB 8.0 (L) 03/03/2025    HCT 26.8 (L) 03/03/2025    MCV 96 03/03/2025     03/03/2025      Lab Results   Component Value Date    GLUCOSE 119 (H) 03/03/2025    CALCIUM 9.2 03/03/2025     03/03/2025    K 4.3 03/03/2025    CO2 23 03/03/2025     (H) 03/03/2025    BUN 17 03/03/2025    CREATININE 1.34 (H) 03/03/2025      Lab Results   Component Value Date    ALT 22 03/03/2025    AST 21 03/03/2025    GGT 33 12/23/2020    ALKPHOS 118 03/03/2025    BILITOT 0.3 03/03/2025        Recent Imaging    === 02/28/25 ===    CT ABDOMEN PELVIS WO IV CONTRAST    - Impression -  1.  Interval development of new pelvic ureteric junction obstruction  with mild perinephric stranding. This may be due to underlying  pyelitis, obstruction due to accessory renal vein, or underlying  ureteric malignancy. Recommend further evaluation and correlation  with urinalysis. A dedicated multiphase CT urogram in 4-6 weeks on an  nonemergent basis is advised to rule out underlying malignancy.  2. Large size hiatal hernia with gastric fundus and portions of the  gastric body herniating above the level of the diaphragm, this is not  significantly changed compared to prior. There is also associated  distal esophageal thickening, which may be be seen with reflux  esophagitis.  3. Remaining findings are without significant change compared to  prior as described above.       Assessment/Plan     70 year old female with h/o HTN, DLD, CAD c/b NSTEMI in 2019, left RCC w/partial nephrectomy and adrenalectomy  "(2007), CKD3, MARYLU not on CPAP, DM2, VEDA, provoked DVT/PE in 2/2024 on apixiban admitted to [facility] on 2/28/25 with melena and GI bleed.      #melena   #suspected GI bleed  #acute blood loss anemia  #chronic iron deficiency anemia d/t GI losses  : :pt with x3 melenic bowel movements, acute symptomatic anemia   : :known h/o iron deficiency anemia requiring outpatient infusions  : :has undergone extensive outpatient heme workup, all -ve  : :large hiatal hernia, suspect bleeding is d/t linda's lesions  : :bl hemoglobin 10-11 mg/dL, 8.4 mg/dL on admit  : :hemodynamically stbl   plan  -trend CBC, goal hemoglobin >7  -maintain active T&S  -consent for blood  -hold home apixiban  -start therapeutic lovenox BID  -continue pantoprazole 40 mg PO BID   -continue home famotidine   -pending EGD    #pelvic ureteric junction obstruction  : :incidental finding on CT abd/pelvis 2/28/25   : :\"new pelvic ureteric junction obstruction w/ mild perinephric stranding; may be d/t pyelitis, obstruction d/t accessory renal vein, or underlying ureteric malignancy\"  : :urinalysis bland  plan  -dedicated multiphase CT urogram in 4-6 weeks on a nonemergent to rule out underlying malignancy  -will order imaging outpatient      #HTN  #DLD  #CAD c/b NSTEMI 2019  : :home meds include amlodipine 10 mg, losartan 25 mg, furosemide 20 mg, metoprolol succinate 200 mg, rosuvastatin 20 mg, nitroglycerin 0.4 prn   : :bp lower than usual on admit, 110s systolic  plan  -continue home amlodipine, metop succ, rosuvastatin  -hold losartan and furosemide; resume when bp at baseline      #hx DVT/PE   : :saddle PE in 2/2024   : :on apixiban 2.5 mg BID at home  plan  -hold home apixiban iso GI bleed, symptomatic anemia  -therapeutic lovenox BID while umm-procedure   -SCDs     #DM2  #neuropathy  -ACHS glucose  -sliding scale insulin  -continue gabapentin      #CKD3  -avoid nephrotoxins, IV contrast as able  -strict I&O   -trend RFP     #COPD  -continue home " spiriva  -albuterol nebs prn   -guaifenesin for congestion; will consider sending home with course of pred if cough not improved      #MARYLU  : :per pt not using CPAP, returned her machine but issue getting another  plan  -RT consult for nocturnal cpap  -TCC to help arrange homegoing cpap     #glaucoma  -continue eye drops        Fluids: Replete PRN  Electrolytes: Keep mg >2, phos >3  and K >4  Nutrition:  NPO Diet; Effective now   Antimicrobials: none  DVT PPX: DVT: Therapeutic Lovenox  GI ppx: pantoprazole 40 mg BID  Bowel care: Miralax  Catheter: None  Lines: PIV  Oxygen: Room Air  Drips: none        Disposition: pending PT/OT eval     Code Status: Full Code (confirmed on admission)   NOK:  Primary Emergency Contact: Malick Corea, Home Phone: [phone]                 Patient seen and discussed with attending     ---  Isabella Azul MD  PGY1, Internal Medicine

## 2025-03-03 NOTE — CARE PLAN
The patient's goals for the shift include    Problem: Pain - Adult  Goal: Verbalizes/displays adequate comfort level or baseline comfort level  Outcome: Progressing     Problem: Safety - Adult  Goal: Free from fall injury  Outcome: Progressing     Problem: Discharge Planning  Goal: Discharge to home or other facility with appropriate resources  Outcome: Progressing     Problem: Chronic Conditions and Co-morbidities  Goal: Patient's chronic conditions and co-morbidity symptoms are monitored and maintained or improved  Outcome: Progressing     Problem: Nutrition  Goal: Nutrient intake appropriate for maintaining nutritional needs  Outcome: Progressing     Problem: Fall/Injury  Goal: Not fall by end of shift  Outcome: Progressing  Goal: Be free from injury by end of the shift  Outcome: Progressing  Goal: Verbalize understanding of personal risk factors for fall in the hospital  Outcome: Progressing  Goal: Verbalize understanding of risk factor reduction measures to prevent injury from fall in the home  Outcome: Progressing  Goal: Use assistive devices by end of the shift  Outcome: Progressing  Goal: Pace activities to prevent fatigue by end of the shift  Outcome: Progressing     Problem: Pain  Goal: Takes deep breaths with improved pain control throughout the shift  Outcome: Progressing  Goal: Turns in bed with improved pain control throughout the shift  Outcome: Progressing  Goal: Walks with improved pain control throughout the shift  Outcome: Progressing  Goal: Performs ADL's with improved pain control throughout shift  Outcome: Progressing  Goal: Participates in PT with improved pain control throughout the shift  Outcome: Progressing  Goal: Free from opioid side effects throughout the shift  Outcome: Progressing  Goal: Free from acute confusion related to pain meds throughout the shift  Outcome: Progressing       The clinical goals for the shift include Pt will remain safe, HDS and free from injury and falls this  shift.

## 2025-03-03 NOTE — PROGRESS NOTES
Vania Andrews is a 70 y.o. female on day 3 of admission presenting with Black tarry stools.    Transitional Care Coordination Progress Note:  Patient discussed during interdisciplinary rounds.   Team members present: MD & TCC  Plan per Medical/Surgical team: patient having a scope today  Payor: Medicare/[employer]  Discharge disposition: Home no need  Potential Barriers: None  ADOD: 3/4    TCC will continue to follow and update the plan as warranted.    PEDRO LiN-RN  Transitional Care Coordinator (TCC)  [phone] ext [phone]

## 2025-03-04 ENCOUNTER — APPOINTMENT (OUTPATIENT)
Dept: CARDIOLOGY | Facility: HOSPITAL | Age: 70
End: 2025-03-04
Payer: MEDICARE

## 2025-03-04 ENCOUNTER — PHARMACY VISIT (OUTPATIENT)
Dept: PHARMACY | Facility: CLINIC | Age: 70
End: 2025-03-04
Payer: COMMERCIAL

## 2025-03-04 VITALS
DIASTOLIC BLOOD PRESSURE: 57 MMHG | SYSTOLIC BLOOD PRESSURE: 97 MMHG | BODY MASS INDEX: 42.61 KG/M2 | WEIGHT: 203 LBS | HEIGHT: 58 IN | TEMPERATURE: 97.2 F | HEART RATE: 71 BPM | OXYGEN SATURATION: 100 % | RESPIRATION RATE: 18 BRPM

## 2025-03-04 LAB
ALBUMIN SERPL BCP-MCNC: 3.7 G/DL (ref 3.4–5)
ALP SERPL-CCNC: 128 U/L (ref 33–136)
ALT SERPL W P-5'-P-CCNC: 33 U/L (ref 7–45)
ANION GAP SERPL CALC-SCNC: 11 MMOL/L (ref 10–20)
AST SERPL W P-5'-P-CCNC: 30 U/L (ref 9–39)
BILIRUB DIRECT SERPL-MCNC: 0 MG/DL (ref 0–0.3)
BILIRUB SERPL-MCNC: 0.3 MG/DL (ref 0–1.2)
BUN SERPL-MCNC: 15 MG/DL (ref 6–23)
CALCIUM SERPL-MCNC: 9.1 MG/DL (ref 8.6–10.6)
CHLORIDE SERPL-SCNC: 110 MMOL/L (ref 98–107)
CO2 SERPL-SCNC: 25 MMOL/L (ref 21–32)
CREAT SERPL-MCNC: 1.38 MG/DL (ref 0.5–1.05)
EGFRCR SERPLBLD CKD-EPI 2021: 42 ML/MIN/1.73M*2
GLUCOSE BLD MANUAL STRIP-MCNC: 154 MG/DL (ref 74–99)
GLUCOSE BLD MANUAL STRIP-MCNC: 284 MG/DL (ref 74–99)
GLUCOSE SERPL-MCNC: 150 MG/DL (ref 74–99)
MAGNESIUM SERPL-MCNC: 2.03 MG/DL (ref 1.6–2.4)
PHOSPHATE SERPL-MCNC: 3.3 MG/DL (ref 2.5–4.9)
POTASSIUM SERPL-SCNC: 4.1 MMOL/L (ref 3.5–5.3)
PROT SERPL-MCNC: 6.6 G/DL (ref 6.4–8.2)
SODIUM SERPL-SCNC: 142 MMOL/L (ref 136–145)

## 2025-03-04 PROCEDURE — 2500000001 HC RX 250 WO HCPCS SELF ADMINISTERED DRUGS (ALT 637 FOR MEDICARE OP)

## 2025-03-04 PROCEDURE — 99239 HOSP IP/OBS DSCHRG MGMT >30: CPT

## 2025-03-04 PROCEDURE — 2500000004 HC RX 250 GENERAL PHARMACY W/ HCPCS (ALT 636 FOR OP/ED)

## 2025-03-04 PROCEDURE — 36415 COLL VENOUS BLD VENIPUNCTURE: CPT

## 2025-03-04 PROCEDURE — 83735 ASSAY OF MAGNESIUM: CPT

## 2025-03-04 PROCEDURE — 99233 SBSQ HOSP IP/OBS HIGH 50: CPT | Performed by: STUDENT IN AN ORGANIZED HEALTH CARE EDUCATION/TRAINING PROGRAM

## 2025-03-04 PROCEDURE — 84520 ASSAY OF UREA NITROGEN: CPT

## 2025-03-04 PROCEDURE — 84075 ASSAY ALKALINE PHOSPHATASE: CPT

## 2025-03-04 PROCEDURE — 97161 PT EVAL LOW COMPLEX 20 MIN: CPT | Mod: GP

## 2025-03-04 PROCEDURE — RXMED WILLOW AMBULATORY MEDICATION CHARGE

## 2025-03-04 PROCEDURE — 82947 ASSAY GLUCOSE BLOOD QUANT: CPT

## 2025-03-04 PROCEDURE — 84100 ASSAY OF PHOSPHORUS: CPT

## 2025-03-04 PROCEDURE — 2500000002 HC RX 250 W HCPCS SELF ADMINISTERED DRUGS (ALT 637 FOR MEDICARE OP, ALT 636 FOR OP/ED)

## 2025-03-04 RX ORDER — OMEPRAZOLE 40 MG/1
40 CAPSULE, DELAYED RELEASE ORAL DAILY
Qty: 30 CAPSULE | Refills: 0 | Status: SHIPPED | OUTPATIENT
Start: 2025-03-04

## 2025-03-04 RX ORDER — GUAIFENESIN/DEXTROMETHORPHAN 100-10MG/5
5 SYRUP ORAL EVERY 4 HOURS PRN
Qty: 118 ML | Refills: 0 | OUTPATIENT
Start: 2025-03-04

## 2025-03-04 RX ORDER — TIOTROPIUM BROMIDE INHALATION SPRAY 3.12 UG/1
2 SPRAY, METERED RESPIRATORY (INHALATION) DAILY
Qty: 10 G | Refills: 0 | OUTPATIENT
Start: 2025-03-04 | End: 2025-04-03

## 2025-03-04 RX ORDER — PREDNISOLONE ACETATE 10 MG/ML
1 SUSPENSION/ DROPS OPHTHALMIC 4 TIMES DAILY
Qty: 5 ML | Refills: 0 | Status: SHIPPED | OUTPATIENT
Start: 2025-03-04 | End: 2025-03-08

## 2025-03-04 RX ORDER — GUAIFENESIN/DEXTROMETHORPHAN 100-10MG/5
5 SYRUP ORAL EVERY 4 HOURS PRN
Status: DISCONTINUED | OUTPATIENT
Start: 2025-03-04 | End: 2025-03-04 | Stop reason: HOSPADM

## 2025-03-04 RX ORDER — FUROSEMIDE 20 MG/1
20 TABLET ORAL DAILY PRN
Qty: 30 TABLET | Refills: 0 | Status: SHIPPED | OUTPATIENT
Start: 2025-03-04

## 2025-03-04 RX ORDER — GUAIFENESIN/DEXTROMETHORPHAN 100-10MG/5
5 SYRUP ORAL EVERY 4 HOURS PRN
Qty: 118 ML | Refills: 0 | Status: SHIPPED | OUTPATIENT
Start: 2025-03-04

## 2025-03-04 RX ORDER — FUROSEMIDE 20 MG/1
20 TABLET ORAL DAILY PRN
Qty: 30 TABLET | Refills: 0 | OUTPATIENT
Start: 2025-03-04 | End: 2025-04-03

## 2025-03-04 RX ORDER — FLUTICASONE PROPIONATE 50 MCG
2 SPRAY, SUSPENSION (ML) NASAL DAILY PRN
Qty: 10 ML | Refills: 0 | OUTPATIENT
Start: 2025-03-04 | End: 2025-04-03

## 2025-03-04 RX ORDER — OMEPRAZOLE 40 MG/1
40 CAPSULE, DELAYED RELEASE ORAL DAILY
Qty: 30 CAPSULE | Refills: 0 | OUTPATIENT
Start: 2025-03-04 | End: 2025-04-03

## 2025-03-04 RX ORDER — BRIMONIDINE TARTRATE, TIMOLOL MALEATE 2; 5 MG/ML; MG/ML
1 SOLUTION/ DROPS OPHTHALMIC DAILY
Qty: 5 ML | Refills: 0 | OUTPATIENT
Start: 2025-03-04 | End: 2025-04-03

## 2025-03-04 RX ORDER — AMMONIUM LACTATE 12 G/100G
1 LOTION TOPICAL AS NEEDED
Qty: 20 ML | Refills: 0 | OUTPATIENT
Start: 2025-03-04 | End: 2025-04-03

## 2025-03-04 RX ADMIN — ROSUVASTATIN CALCIUM 20 MG: 20 TABLET, FILM COATED ORAL at 09:18

## 2025-03-04 RX ADMIN — INSULIN LISPRO 3 UNITS: 100 INJECTION, SOLUTION INTRAVENOUS; SUBCUTANEOUS at 13:43

## 2025-03-04 RX ADMIN — BENZOCAINE AND MENTHOL 1 LOZENGE: 15; 3.6 LOZENGE ORAL at 09:18

## 2025-03-04 RX ADMIN — METOPROLOL SUCCINATE 200 MG: 50 TABLET, EXTENDED RELEASE ORAL at 09:17

## 2025-03-04 RX ADMIN — ENOXAPARIN SODIUM 90 MG: 100 INJECTION SUBCUTANEOUS at 09:18

## 2025-03-04 RX ADMIN — GUAIFENESIN 600 MG: 600 TABLET, EXTENDED RELEASE ORAL at 09:00

## 2025-03-04 RX ADMIN — GUAIFENESIN AND DEXTROMETHORPHAN 5 ML: 100; 10 SYRUP ORAL at 13:43

## 2025-03-04 RX ADMIN — PANTOPRAZOLE SODIUM 40 MG: 40 TABLET, DELAYED RELEASE ORAL at 09:17

## 2025-03-04 RX ADMIN — AMLODIPINE BESYLATE 10 MG: 10 TABLET ORAL at 09:17

## 2025-03-04 RX ADMIN — INSULIN LISPRO 1 UNITS: 100 INJECTION, SOLUTION INTRAVENOUS; SUBCUTANEOUS at 09:18

## 2025-03-04 RX ADMIN — FLUTICASONE PROPIONATE 2 SPRAY: 50 SPRAY, METERED NASAL at 09:17

## 2025-03-04 RX ADMIN — LOSARTAN POTASSIUM 25 MG: 25 TABLET, FILM COATED ORAL at 09:17

## 2025-03-04 RX ADMIN — BRIMONIDINE TARTRATE AND TIMOLOL MALEATE 1 DROP: 2; 5 SOLUTION/ DROPS OPHTHALMIC at 09:17

## 2025-03-04 ASSESSMENT — ENCOUNTER SYMPTOMS
SORE THROAT: 0
SHORTNESS OF BREATH: 1
FEVER: 0
RHINORRHEA: 1
COUGH: 1
DIAPHORESIS: 0
CHILLS: 0
WHEEZING: 1

## 2025-03-04 ASSESSMENT — COGNITIVE AND FUNCTIONAL STATUS - GENERAL
CLIMB 3 TO 5 STEPS WITH RAILING: TOTAL
MOBILITY SCORE: 16
MOVING FROM LYING ON BACK TO SITTING ON SIDE OF FLAT BED WITH BEDRAILS: A LITTLE
WALKING IN HOSPITAL ROOM: A LITTLE
STANDING UP FROM CHAIR USING ARMS: A LITTLE
MOVING TO AND FROM BED TO CHAIR: A LITTLE
TURNING FROM BACK TO SIDE WHILE IN FLAT BAD: A LITTLE

## 2025-03-04 ASSESSMENT — PAIN SCALES - GENERAL
PAINLEVEL_OUTOF10: 8
PAINLEVEL_OUTOF10: 0 - NO PAIN

## 2025-03-04 ASSESSMENT — PAIN - FUNCTIONAL ASSESSMENT: PAIN_FUNCTIONAL_ASSESSMENT: 0-10

## 2025-03-04 NOTE — CARE PLAN
The patient's goals for the shift include      The clinical goals for the shift include Pt will remain safe and free from falls and injury through shift    Over the shift, the patient did not make progress toward the following goals. Barriers to progression include unsteady gait. Recommendations to address these barriers include encourage pt to call for assistance educate on falls risk.

## 2025-03-04 NOTE — PROGRESS NOTES
Physical Therapy    Physical Therapy Evaluation    Patient Name: Vania Andrews  MRN: [id number]  Department: [facility]  Room: 5559/5559-B  Today's Date: 3/4/2025   Time Calculation  Start Time: 0943  Stop Time: 1006  Time Calculation (min): 23 min    Assessment/Plan   PT Assessment  PT Assessment Results: Decreased strength, Decreased endurance, Impaired balance, Decreased mobility, Decreased safety awareness, Pain  Rehab Prognosis: Good  Barriers to Discharge Home: Caregiver assistance, Physical needs  Caregiver Assistance: Caregiver assistance needed per identified barriers - however, level of patient's required assistance exceeds assistance available at home  Physical Needs: Stair navigation into home limited by function/safety, Stair navigation to access bed limited by function/safety, Stair navigation to access bath limited by function/safety, Ambulating household distances limited by function/safety  End of Session Communication: Bedside nurse  Assessment Comment: 71yo female admitted with melena and GI bleed presents with dec strength, balance, & endurance with inc generalized R sided pain limiting functional mobility.  Pt would benefit from continued therapy to address these deficits and improve safety and indep  End of Session Patient Position: Bed, 2 rail up, Alarm on  IP OR SWING BED PT PLAN  Inpatient or Swing Bed: Inpatient  PT Plan  Treatment/Interventions: Bed mobility, Transfer training, Gait training, Stair training, Balance training, Strengthening, Endurance training, Therapeutic exercise, Therapeutic activity  PT Plan: Ongoing PT  PT Frequency: 3 times per week  PT Discharge Recommendations: Moderate intensity level of continued care  Equipment Recommended upon Discharge: Wheeled walker ((if pt does not already own))  PT Recommended Transfer Status: Assist x1  PT - OK to Discharge: Yes (PT eval completed & recs made)    Subjective   General Visit Information:  General  Reason for Referral: melena  and GI bleed  Past Medical History Relevant to Rehab: HTN, DLD, CAD c/b NSTEMI in 2019, left RCC w/partial nephrectomy and adrenalectomy (2007), CKD3, MARYLU not on CPAP, DM2, VEDA, provoked DVT/Saddle PE in 2/2024 on apixiban  Family/Caregiver Present: No  Prior to Session Communication: Bedside nurse  Patient Position Received: Bed, 2 rail up, Alarm off, not on at start of session (sitting EOB)  Preferred Learning Style: auditory, verbal, visual  General Comment: Pt sitting up on EOB upon PT arrival, cooperative, willing to participate in therapy assessment  Home Living:  Home Living  Type of Home: House  Lives With: Spouse (who works)  Home Adaptive Equipment: Walker rolling or standard (pt states she may have a WW at home)  Home Layout: Two level, Bed/bath upstairs (+ basement)  Home Access: Stairs to enter with rails  Entrance Stairs-Number of Steps: 6  Prior Level of Function:  Prior Function Per Pt/Caregiver Report  Level of [place]: Independent with ADLs and functional transfers  Receives Help From: Family  ADL Assistance:  (pt states mostly indep, but  assists as needed recently)  Ambulatory Assistance: Independent  Vocational: Retired (nursing)  Prior Function Comments: denies recent falls; also states primarily states up on 2nd floor to avoid stairs  Precautions:  Precautions  Medical Precautions: Fall precautions     Objective   Pain:  Pain Assessment  Pain Assessment: 0-10  0-10 (Numeric) Pain Score: 8  Pain Type: Chronic pain  Pain Location: Generalized  Pain Orientation: Right  Pain Interventions: Ambulation/increased activity  Response to Interventions: Resting quietly  Cognition:  Cognition  Overall Cognitive Status: Within Functional Limits  Insight: Mild  Impulsive: Mildly    General Assessments:     Activity Tolerance  Endurance: Tolerates 10 - 20 min exercise with multiple rests    Sensation  Light Touch:  (RLE>LLE numbness/tingling hip to feet)    Strength  Strength Comments:  generalized deconditioning  Strength  Strength Comments: generalized deconditioning    Perception  Inattention/Neglect: Appears intact  Initiation: Appears intact  Motor Planning: Appears intact  Perseveration: Not present    Coordination  Movements are Fluid and Coordinated: Yes    Postural Control  Postural Control: Within Functional Limits    Static Sitting Balance  Static Sitting-Balance Support: Feet supported, Bilateral upper extremity supported  Static Sitting-Level of Assistance: Distant supervision    Static Standing Balance  Static Standing-Balance Support: Bilateral upper extremity supported  Static Standing-Level of Assistance: Contact guard  Dynamic Standing Balance  Dynamic Standing-Balance Support: Bilateral upper extremity supported  Dynamic Standing-Level of Assistance: Contact guard  Dynamic Standing-Balance: Turning  Functional Assessments:  Bed Mobility  Bed Mobility: No    Transfers  Transfer: Yes  Transfer 1  Technique 1: Sit to stand, Stand to sit  Transfer Device 1: Walker  Transfer Level of Assistance 1: Contact guard    Ambulation/Gait Training  Ambulation/Gait Training Performed: Yes  Ambulation/Gait Training 1  Surface 1: Level tile  Device 1: Rolling walker  Assistance 1: Contact guard, Minimum assistance  Quality of Gait 1: Wide base of support, Decreased step length, Forward flexed posture, Antalgic, Soft knee(s) (dec alan)  Comments/Distance (ft) 1: 75' (cueing for safe walker placement)     Extremity/Trunk Assessments:  RUE   RUE : Within Functional Limits ( 4-/5)  LUE   LUE: Within Functional Limits ( 4/5)  RLE   RLE : Within Functional Limits  LLE   LLE : Within Functional Limits  Outcome Measures:  [facility] Basic Mobility  Turning from your back to your side while in a flat bed without using bedrails: A little  Moving from lying on your back to sitting on the side of a flat bed without using bedrails: A little  Moving to and from bed to chair (including a wheelchair): A  little  Standing up from a chair using your arms (e.g. wheelchair or bedside chair): A little  To walk in hospital room: A little  Climbing 3-5 steps with railing: Total  Basic Mobility - Total Score: 16    Encounter Problems       Encounter Problems (Active)       Balance       Pt will score >23 on Tinetti assessment to indicate low falls risk  (Progressing)       Start:  03/04/25    Expected End:  03/18/25               Mobility       STG - Patient will ambulate >100' with LRD indep (Progressing)       Start:  03/04/25    Expected End:  03/18/25            STG - Patient will ascend and descend a flight of stairs with HR and CGA to simulate home environment  (Not Progressing)       Start:  03/04/25    Expected End:  03/18/25               PT Transfers       STG - Patient to transfer to and from sit to supine indep from flat bed, no rails  (Progressing)       Start:  03/04/25    Expected End:  03/18/25            STG - Patient will transfer sit to and from stand indep with LRD (Progressing)       Start:  03/04/25    Expected End:  03/18/25               Pain - Adult              Education Documentation  Precautions, taught by Ashley Mosher, PT at 3/4/2025  1:29 PM.  Learner: Patient  Readiness: Acceptance  Method: Explanation  Response: Verbalizes Understanding, Needs Reinforcement  Comment: safety with mobility, use of AD, dc planning    Mobility Training, taught by Ashley Mosher, PT at 3/4/2025  1:29 PM.  Learner: Patient  Readiness: Acceptance  Method: Explanation  Response: Verbalizes Understanding, Needs Reinforcement  Comment: safety with mobility, use of AD, dc planning    Education Comments  No comments found.        03/04/25 at 1:30 PM - Ashley Mosher, PT

## 2025-03-04 NOTE — PROGRESS NOTES
"  Internal Medicine Progress Note     70 year old female with h/o HTN, DLD, CAD c/b NSTEMI in 2019, left RCC w/partial nephrectomy and adrenalectomy (2007), CKD3, MARYLU not on CPAP, DM2, VEDA, provoked DVT/PE in 2/2024 on apixiban admitted to [facility] on 2/28/25 with melena and GI bleed.     Subjective     No acute events overnight and no new concerns this morning. She continues to express anxiety and is very preoccupied with her prior PE. She is very worried about her health despite reassurance that her body has dissolved the clot and she is on medication to prevent future clots. ROS -ve unless stated otherwise.    Medications     Medications:   Scheduled medications  amLODIPine, 10 mg, oral, Daily  brimonidine-timoloL, 1 drop, Both Eyes, BID  [Held by provider] colchicine, 0.6 mg, oral, Daily  enoxaparin, 1 mg/kg, subcutaneous, q12h  famotidine, 20 mg, oral, Nightly  fluticasone, 2 spray, Each Nostril, Daily  [Held by provider] furosemide, 20 mg, oral, Daily  gabapentin, 300 mg, oral, Nightly  insulin lispro, 0-5 Units, subcutaneous, TID AC  latanoprost, 1 drop, Both Eyes, Nightly  losartan, 25 mg, oral, Daily  metoprolol succinate XL, 200 mg, oral, Daily  ondansetron, 4 mg, intravenous, Once  pantoprazole, 40 mg, oral, BID  polyethylene glycol, 17 g, oral, Daily  rosuvastatin, 20 mg, oral, Daily  tiotropium, 2 puff, inhalation, Daily      Continuous medications     PRN medications  PRN medications: acetaminophen **OR** acetaminophen **OR** acetaminophen, albuterol, ammonium lactate, benzocaine-menthol, dextrose, dextrose, glucagon, glucagon, guaiFENesin, melatonin     Objective     Vitals  Visit Vitals  /69   Pulse 71   Temp 36.3 °C (97.3 °F)   Resp 16   Ht 1.473 m (4' 10\")   Wt 92.1 kg (203 lb)   LMP  (LMP Unknown)   SpO2 94%   BMI 42.43 kg/m²   OB Status Postmenopausal   Smoking Status Former   BSA 1.94 m²       Intake/Output Summary (Last 24 hours) at 3/4/2025 0786  Last data filed at 3/3/2025 2200  Gross per " 24 hour   Intake 10 ml   Output --   Net 10 ml       Physical Exam  General: A&Ox3, NAD.   HEENT: NC/AT. EOMI. MMM.   Cardiovascular: Regular rate, rhythm. No m/g/r.  Respiratory: CTA bilaterally. No wheezes, crackles, or rhonchi. Normal respiratory effort. Intermittent cough.   Abdominal: Soft, distended d/t body habitus, slightly tender to deep palpation (L>R).   Ext: Non-pitting edema bilateral LE.  Skin: Warm, dry. No rashes or wounds.  Neuro: No focal neuro deficits.  Psych: Extremely anxious.     Labs  Lab Results   Component Value Date    WBC 8.2 03/03/2025    HGB 8.0 (L) 03/03/2025    HCT 26.8 (L) 03/03/2025    MCV 96 03/03/2025     03/03/2025      Lab Results   Component Value Date    GLUCOSE 119 (H) 03/03/2025    CALCIUM 9.2 03/03/2025     03/03/2025    K 4.3 03/03/2025    CO2 23 03/03/2025     (H) 03/03/2025    BUN 17 03/03/2025    CREATININE 1.34 (H) 03/03/2025      Lab Results   Component Value Date    ALT 22 03/03/2025    AST 21 03/03/2025    GGT 33 12/23/2020    ALKPHOS 118 03/03/2025    BILITOT 0.3 03/03/2025        Procedures    === 3/3/25 ===    EGD  Impression  Tortuous esophagus  Large type III hiatal hernia with Burke lesions present  The body of the stomach, antrum and prepyloric region appeared normal.  The duodenal bulb and 2nd part of the duodenum appeared normal.  Findings  Tortuous esophagus  Large herniation of both GE junction and stomach (type III hiatal hernia) with Burke lesions present - GE junction 32 cm from the incisors, diaphragmatic impression 40 cm from the incisors, confirmed by retroflexion. Several burke lesions were present on examination, no evidence of active bleeding, no target for intervention.. Hill grade IV hiatal hernia  The body of the stomach, antrum and prepyloric region appeared normal.  The duodenal bulb and 2nd part of the duodenum appeared normal.    Recent Imaging    === 02/28/25 ===    CT ABDOMEN PELVIS WO IV CONTRAST    - Impression  "-  1.  Interval development of new pelvic ureteric junction obstruction  with mild perinephric stranding. This may be due to underlying  pyelitis, obstruction due to accessory renal vein, or underlying  ureteric malignancy. Recommend further evaluation and correlation  with urinalysis. A dedicated multiphase CT urogram in 4-6 weeks on an  nonemergent basis is advised to rule out underlying malignancy.  2. Large size hiatal hernia with gastric fundus and portions of the  gastric body herniating above the level of the diaphragm, this is not  significantly changed compared to prior. There is also associated  distal esophageal thickening, which may be be seen with reflux  esophagitis.  3. Remaining findings are without significant change compared to  prior as described above.       Assessment/Plan     70 year old female with h/o HTN, DLD, CAD c/b NSTEMI in 2019, left RCC w/partial nephrectomy and adrenalectomy (2007), CKD3, MARYLU not on CPAP, DM2, VEDA, provoked DVT/PE in 2/2024 on apixiban admitted to [facility] on 2/28/25 with melena and GI bleed.      3/4/25 updates  -linda's lesions on EGD    #melena   #suspected GI bleed  #acute blood loss anemia  #chronic iron deficiency anemia d/t GI losses  : :pt with x3 melenic bowel movements, acute symptomatic anemia   : :known h/o iron deficiency anemia requiring outpatient infusions  : :has undergone extensive outpatient heme workup, all -ve  : :large hiatal hernia, suspect bleeding is d/t linda's lesions  : :bl hemoglobin 10-11 mg/dL, 8.4 mg/dL on admit  : :hemodynamically stbl   : :linda's lesions noted on EGD  plan  -trend CBC, goal hemoglobin >7  -maintain active T&S  -consented for blood  -continue pantoprazole 40 mg PO BID   -continue home famotidine   [  ] switch from therapeutic lovenox to home apixiban on discharge    #pelvic ureteric junction obstruction  : :incidental finding on CT abd/pelvis 2/28/25   : :\"new pelvic ureteric junction obstruction w/ mild " "perinephric stranding; may be d/t pyelitis, obstruction d/t accessory renal vein, or underlying ureteric malignancy\"  : :urinalysis bland  plan  -dedicated multiphase CT urogram in 4 weeks on a nonemergent to rule out underlying malignancy  -will order imaging outpatient   -follow up w/urology     #HTN  #DLD  #CAD c/b NSTEMI 2019  : :home meds include amlodipine 10 mg, losartan 25 mg, furosemide 20 mg, metoprolol succinate 200 mg, rosuvastatin 20 mg, nitroglycerin 0.4 prn   : :bp lower than usual on admit, 110s systolic  plan  -continue home amlodipine, metop succ, rosuvastatin  -hold losartan and furosemide; resume when bp at baseline      #hx DVT/PE   : :saddle PE in 2/2024   : :on apixiban 2.5 mg BID at home  plan  -hold home apixiban iso GI bleed, symptomatic anemia  -therapeutic lovenox BID while umm-procedure   -SCDs     #DM2  #neuropathy  -ACHS glucose  -sliding scale insulin  -continue gabapentin      #CKD3  -avoid nephrotoxins, IV contrast as able  -strict I&O   -trend RFP     #COPD  -continue home spiriva  -albuterol nebs prn   -guaifenesin for congestion; will consider sending home with course of pred if cough not improved      #MARYLU  : :per pt not using CPAP, returned her machine but issue getting another  plan  -RT consult for nocturnal cpap  -TCC to help arrange homegoing cpap     #glaucoma  -continue eye drops        Fluids: Replete PRN  Electrolytes: Keep mg >2, phos >3  and K >4  Nutrition:  NPO Diet; Effective now   Antimicrobials: none  DVT PPX: DVT: Therapeutic Lovenox  GI ppx: pantoprazole 40 mg BID  Bowel care: Miralax  Catheter: None  Lines: PIV  Oxygen: Room Air  Drips: none        Disposition: pending PT/OT eval     Code Status: Full Code (confirmed on admission)   NOK:  Primary Emergency Contact: Malick Corea, Home Phone: [phone]                 Patient seen and discussed with attending     ---  Isabella Azul MD  PGY1, Internal Medicine  "

## 2025-03-04 NOTE — PROGRESS NOTES
Subjective   Vania Andrews is a 70 y.o. 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     Investigations:  LHC (5/6/2024) - LVEDP 20 mmHg, NOCAD - mid LAD 40% stenosis.   CTPE (2/6/2024) -saddle type pulmonary embolus extending to the bilateral upper and right lower lobar segmental branches.    Lower extremity duplex (11/2023) - chronic DVT of left popliteal and calf veins.   Nuclear stress test (11/2023) - small perfusion defect of the apical inferolateral wall (prior infarct with some umm-infarct ischemia).   TTE (11/2023) - LVEF 55-60%, distal septal/apical hypokinesis.   CACS (12/2021) - 124.  EKG (10/2022) - NSR.  Nuclear stress test (1/2021) - attenuation artifact (fixed defect), no ischemia.  TTE (2020) - LVEF 55-60%, distal apical hypokinesis.   CMR (2019) - LVEF 70%, severe hypokinesis of the mid inferior wall with associated transmural enhancement consistent with a prior infarction. Dilated MPA 3.5 cm    Chief Complaint:  Exertional dyspnea, PE/HFpEF follow-up     HPI  Feels fair.   Ongoing exertional dyspnea, some improvement.   Can walk 10 ft before stopping due to shortness of breath.   Intermittent ankle swelling, some improvement after commencing furosemide 20mg daily.   No orthopnea or PND.   Palpitations have reduced since last review.  Does occasionally have sciatica.   Remains anticoagulated for management of DVT/PE.   Noted to be anemic, no clear sources of bleeding, has been reviewed by gastroenterology, scopes negative. Currently taking an iron replacement.      CV risk:  HTN - well controlled on losartan 25mg daily and amlodipine 10mg daily.  LDL 96 mg/dL      ROS  A 10-system review was performed and was unremarkable apart from what is presented  in the HPI.     Objective   Physical Exam  Alert and orientated.   Appropriate responses, normal affect.  No respiratory distress at rest.   Skin warm and dry.   Normal radial pulse character and volume. Clinically SR.   Anicteric sclera, no conjunctival pallor.   No definite JVD at 90 degrees or carotid bruits.  Heart sounds dual, no added heart sounds or audible murmurs.   Chest clear on auscultation.   Calves soft, non-tender.  Bilateral pitting pedal edema to the mid-shins    Lab Review:   Lab Results   Component Value Date     03/03/2025     01/27/2025    K 4.3 03/03/2025    K 4.4 01/27/2025     (H) 03/03/2025     01/27/2025    CO2 23 03/03/2025    CO2 22 01/27/2025    BUN 17 03/03/2025    BUN 13 01/27/2025    CREATININE 1.34 (H) 03/03/2025    CREATININE 1.18 (H) 01/27/2025    GLUCOSE 119 (H) 03/03/2025    GLUCOSE 215 (H) 01/27/2025    CALCIUM 9.2 03/03/2025    CALCIUM 9.0 01/27/2025     Lab Results   Component Value Date    CKTOTAL 112 12/23/2020    TROPONINI <0.02 02/05/2022     Lab Results   Component Value Date    WBC 8.2 03/03/2025    HGB 8.0 (L) 03/03/2025    HCT 26.8 (L) 03/03/2025    MCV 96 03/03/2025     03/03/2025     Lab Results   Component Value Date    CHOL 195 09/06/2024    TRIG 145 09/06/2024    HDL 58.8 09/06/2024       Assessment/Plan   In summary, Vania Andrews is a 69 y.o. 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  She has been stable since last review but continues to experience exertional dyspnea. Her lower limb edema has improved after initiating furosemide, however, she has been noted to be anemic without any culprit sources of bleeding. She remains of apixaban for management of saddle embolism and has  undergone colonoscopy/endoscopy that were unremarkable. On examination today, she was normotensive with mild pitting edema to the mid shins. I have advised her to continue her current medications (will enquire with  specialty pharmacy as to whether more cost-effective options for Apixaban are available). She will remain on lasix for her LE edema/HFpEF. As previous, Jardiance is not currently an option due to prior UTIs. I will follow-up with Mrs Andrews in 6-months to assess her progress.

## 2025-03-04 NOTE — PROGRESS NOTES
Gastroenterology Consult Service Progress Note  Department of Gastroenterology & Hepatology  [facility]    [facility]  March 4, 2025   Patient: Vania Andrews    Medical Record: [id number]  Reason for Initial Consult: tarry stools  Requesting Service: Frannie Mireles History: pt is s/p EGD yesterday afternoon w large, 8 cm Hiatal hernia w/ evience of Burke lesions. These were not bleeding at the time. Hb stable yesterday, still pending from this AM.     Physical Exam:    Vitals:    03/03/25 0800 03/03/25 1705 03/03/25 2020 03/04/25 0507   BP: 123/83 137/65 118/60 120/69   BP Location: Right arm      Patient Position: Lying      Pulse: 76 68 73 71   Resp: 18  18 16   Temp: 36.1 °C (97 °F) 36.4 °C (97.5 °F) 36.6 °C (97.9 °F) 36.3 °C (97.3 °F)   TempSrc: Temporal      SpO2: 98% 99% 97% 94%   Weight:       Height:             Intake/Output Summary (Last 24 hours) at 3/4/2025 1220  Last data filed at 3/3/2025 2200  Gross per 24 hour   Intake 10 ml   Output --   Net 10 ml       General: well-nourished, no acute distress  HEENT: PERRLA, EOM intact, no scleral icterus, moist MM  Respiratory: CTA bilaterally, normal work of breathing on RA  Cardiovascular: RRR, no murmurs/rubs/gallops  Abdomen: Soft, nontender, nondistended, bowel sounds present  Extremities: no edema, no asterixis  Neuro: alert and oriented, CNII-XII grossly intact, moves all 4 extremities with no focal deficits    Labs:  Lab Results   Component Value Date    WBC 8.2 03/03/2025    HGB 8.0 (L) 03/03/2025    HCT 26.8 (L) 03/03/2025    MCV 96 03/03/2025     03/03/2025     Lab Results   Component Value Date    GLUCOSE 150 (H) 03/04/2025    CALCIUM 9.1 03/04/2025     03/04/2025    K 4.1 03/04/2025    CO2 25 03/04/2025     (H) 03/04/2025    BUN 15 03/04/2025    CREATININE 1.38 (H) 03/04/2025     Lab Results   Component Value Date    ALT 33 03/04/2025    AST 30 03/04/2025    GGT 33 12/23/2020     ALKPHOS 128 03/04/2025    BILITOT 0.3 03/04/2025     Lab Results   Component Value Date    INR 1.2 (H) 03/01/2025    INR 1.2 (H) 02/28/2025    INR 1.1 02/15/2024    PROTIME 12.8 (H) 03/01/2025    PROTIME 13.4 (H) 02/28/2025    PROTIME 12.9 (H) 02/15/2024     Imaging:    CT A/P without IV contrast (2/28/2025):  IMPRESSION:  1.  Interval development of new pelvic ureteric junction obstruction  with mild perinephric stranding. This may be due to underlying  pyelitis, obstruction due to accessory renal vein, or underlying  ureteric malignancy. Recommend further evaluation and correlation  with urinalysis. A dedicated multiphase CT urogram in 4-6 weeks on an  nonemergent basis is advised to rule out underlying malignancy.  2. Large size hiatal hernia with gastric fundus and portions of the  gastric body herniating above the level of the diaphragm, this is not  significantly changed compared to prior. There is also associated  distal esophageal thickening, which may be be seen with reflux  esophagitis.  3. Remaining findings are without significant change compared to  prior as described above.    GI procedures:    EGD 3/3/2025:  Impression  Tortuous esophagus  Large type III hiatal hernia with Burke lesions present  The body of the stomach, antrum and prepyloric region appeared normal.  The duodenal bulb and 2nd part of the duodenum appeared normal.        Findings  Tortuous esophagus  Large herniation of both GE junction and stomach (type III hiatal hernia) with Burke lesions present - GE junction 32 cm from the incisors, diaphragmatic impression 40 cm from the incisors, confirmed by retroflexion. Several burke lesions were present on examination, no evidence of active bleeding, no target for intervention.. Hill grade IV hiatal hernia  The body of the stomach, antrum and prepyloric region appeared normal.  The duodenal bulb and 2nd part of the duodenum appeared normal.    Capsule endoscopy  (7/8/2024):      Colonoscopy 2/15/2024:  Findings  Normal terminal ileum, ileocecal valve, and appendiceal orifice (photodocumented).  There was significant amount of thick liquid stool and solid stool which obscured much of the underlying mucosa requiring extensive water lavage.  Multiple small, medium and large, extensive diverticula with no inflammation in the ascending colon, transverse colon, descending colon and sigmoid colon; no bleeding was identified  External medium hemorrhoids observed during retroflexion; no bleeding was identified  No polyps were visualized on this procedure.  No blood was seen in the terminal ileum or in the colon on this procedure.     EGD 2/9/2024:  Findings  Tortuous esophagus  Regular Z-line 29 cm from the incisors  Large 11 cm hiatal hernia  Multiple sessile polyps measuring smaller than 5 mm in the hiatal hernia; no bleeding was identified. Appearances consistent with fundic gland polyps as noted on recent EGD.  Linear erosion in the body of the stomach; no bleeding was identified;  Scattered patchy erythema seen in body of stomach. No evidence of bleeding  The duodenal bulb and 2nd part of the duodenum appeared normal.    EGD 3/27/2023:  Indications: Iron deficiency anemia   Impression:            - 6 cm hiatal hernia.                         - Multiple fundic gland polyps. Biopsied.                         - A medium amount of food (residue) in the stomach.                         - The examination was otherwise normal. Biopsies were                          taken with a cold forceps from stomach and duodenum.    FINAL DIAGNOSIS   A.  SECOND PORTION DUODENUM COLD BIOPSY:   -- UNREMARKABLE DUODENAL MUCOSA, NO FEATURES OF CELIAC DISEASE.     B.  GASTRIC COLD BIOPSY:   -- OXYNTIC GASTRIC MUCOSA WITHOUT ABNORMALITY, HELICOBACTER PYLORI STAIN NOT   INDICATED.     C. GASTRIC POLYP COLD BIOPSY:   -- FUNDIC GLAND POLYPS.     Assessment and Plan:        70 year old female with h/o HTN,  DLD, CAD c/b NSTEMI in 2019, left RCC w/partial nephrectomy and adrenalectomy (2007), CKD3, MARYLU not on CPAP, DM2, VEDA, provoked DVT/Saddle PE in 2/2024 on apixiban admitted to [facility] on 2/28/25 with melena and GI bleed.     Pt has history of VEDA, underwent EGD/colon in 2019 w/o source and again in 2023 for such. She was diagnosed w/ saddle PE in Feb 2024 and developed melena w/ AC. EGD at the time w/ 11 cm HH and gastritis. Colonoscopy w/ diverticula but no source of bleeding. Small bowel capsule endoscopy was obtained in July 2024 w/ no sources of bleeding with green stool throughout. Pt has been following w/ Hematology for VEDA and has been getting IV iron due to lack of response to PO. She saw Dr. Mandel in clinic in Jan, 2025  and was ordered for repeat EGD for re-evaluation of HH. In interim, she was hospitalized w/ purported melena as well as acute on chronic anemia. Anemia was thought to be multifactorial. She underwent EGD on 3/3 w 8 cm hiatal hernia w/ evidence of burke lesions. This is likely the source of ongoing iron deficiency anemia. Surgery was discussed with patient, however she is reluctant to consider surgery at this time.     #acute on chronic anemia   #large hiatal hernia w/ Burke lesions   - continue PPI daily   - OK to transition from therapeutic Lovenox back to home DOAC  - pt to follow with Dr. Venus Mandel in OP setting, plan to continue discussion regarding definitive surgery for large HH   - pt to continue following w. Dr. Spears and hematology team for chronic IV iron infusions and PRN blood transfusions if needed     #CRC surveillance:  - due in 2029, if clinically appropriate at the time    Patient seen and discussed with attending, Dr. Bender.     Denisha Cox, GI fellow    Thank you for the consultation. Gastroenterology will sign off.  Please do not hesitate to contact me on DocHalo or page [phone] if there are any further questions between the weekday hours of 7 AM - 5 PM.    If there is an urgent concern during the weekend, after-hours, or holidays; then please page the [place] GI fellow [place] [phone]. Thank you.    SIGNATURE: Denisha Cox MD PATIENT NAME: Vania Andrews   DATE: March 4, 2025 MRN: [id number]

## 2025-03-04 NOTE — DISCHARGE INSTRUCTIONS
Dear Vania Andrews,     It was a pleasure caring for you! You were admitted to [facility] ([facility]) on 2/28/2025 for a GI bleed.  While you were here, we had you undergo an EGD procedure which demonstrated 'Burke's lesions' without any active bleeding. These lesions are ulcers in your stomach from your hiatal hernia and although they were not actively bleeding at the time of the EGD, they are the most likely cause of the bleeding you had that brought you into the ED. You should continue to receive iron infusions at the instruction of your hematologist. Definitive treatment may involve surgical repair of your hiatal hernia so we have referred you to see the thoracic surgeons in clinic. In the ED you had a CT scan that showed a new pelvic ureteric junction obstruction. Given your history of kidney cancer we would like you to have a repeat CT scan done to better visualize the urinary system. You should also schedule follow up with the urologist Dr. Zacarias after the CT scan is done.  Please come to the emergency department if you develop any worsening symptoms.     For you to do:  Please take all of your medications as prescribed.  Please follow up with your specialists: Cardiologist , Gastroenterologist, Urologist, and Thoracic Surgery      Thank you for letting us take part in your care,   Your  Medical Team

## 2025-03-04 NOTE — HOSPITAL COURSE
70 year old female with h/o HTN, DLD, CAD c/b NSTEMI in 2019, left RCC w/partial nephrectomy and adrenalectomy (2007), CKD3, MARYLU not on CPAP, DM2, VEDA, provoked DVT/PE in 2/2024 on apixiban admitted to [facility] on 2/28/25 with melena and symptomatic anemia c/f GI bleed. This pt is known to GI and has been seen in clinic, initially referred after an admit in 1/2024 where she was found to have acute on chronic microcytic anemia. She was scheduled to undergo outpatient endoscopy then was re-admitted in 2/2024 for saddle PE. After starting heparin she developed melena and worsening anemia, prompting inpatient EGD/colonoscopy. EGD at the time showed no signs of bleeding but was notable for an 11 cm hiatal hernia and gastritis. Colonoscopy showed extensive diverticula but no bleeding. She was discharged on apixiban. She then had a capsule endoscopy in 7/2024 which showed no source of her anemia. She also follows with Dr. Spears in heme clinic and has undergone extensive workup for her chronic anemia all of which has been -ve, raising suspicion for chronic GI losses despite there being no source seen on endoscopy. On current presentation she was hemodynamically stable with labs notable for hemoglobin 8.4 mg/dL (baseline of 10-11 mg). Her apixiban was held and she was starting on therapeutic lovenox pending EGD on 3/3/25, which showed large type III hiatal hernia with linda lesions present. There was no active bleeding which was consistent with the uptrend in her hemoglobin. She was discharged home in stable condition with plans to continue outpatient iron infusions per hematology. She was also referred to thoracic surgery to discuss options for hiatal hernia repair.     Of note, a new pelvic ureteric junction obstruction with mild perinephric stranding was found incidentally on CT A/P w/out done in the ED on 2/28/25. UA was bland. She is scheduled for repeat CT urogram and follow up with urology in 2-4 weeks.

## 2025-03-04 NOTE — DISCHARGE SUMMARY
Discharge Diagnosis  Upper GI bleed    Issues Requiring Follow-Up  PRIMARY CARE  [  ] post-admission follow up  [  ] repeat RFP ordered 1-week post discharge to ensure renal function stbl  [  ] resolution of cough/congestion  [  ] pt with significant anxiety, interested in discussing medication/therapy     GASTROENTEROLOGY  [  ] hiatal hernia w/ linda lesions on EGD 3/3/25   [  ] anemia d/t GI losses requiring outpatient iron infusions     UROLOGY  [  ] new pelvic ureteral junction obstruction found incidentally on CT 2/28  [  ] h/o L RCC s/p partial nephrectomy/adrenalectomy in 2007  [  ] urinary incontinence     THORACIC SURGERY  [  ] discuss possible hiatal hernia repair     CARDIOLOGY  [  ] regular outpatient follow up, missed scheduled appt d/t being inpatient    Discharge Meds     Medication List      START taking these medications     Tish-Tussin DM  mg/5 mL oral liquid; Generic drug:   dextromethorphan-guaifenesin; Take 5 mL by mouth every 4 hours if needed   for cough.   Sore Throat (benzocaine-menth) 15-3.6 mg lozenge; Generic drug:   benzocaine-menthol; Dissolve 1 lozenge in the mouth every 2 hours if   needed for sore throat.     CHANGE how you take these medications     ammonium lactate 12 % lotion; Commonly known as: Lac-Hydrin; Apply   topically if needed for dry skin.; What changed: how much to take   Combigan 0.2-0.5 % ophthalmic solution; Generic drug:   brimonidine-timoloL; Administer 1 drop into both eyes 2 times a day.; What   changed: when to take this   fluticasone 50 mcg/actuation nasal spray; Commonly known as: Flonase;   Administer 2 sprays into each nostril once daily.   latanoprost 0.005 % ophthalmic solution; Commonly known as: Xalatan;   Administer 1 drop into both eyes once daily in the morning.; What changed:   when to take this   polyethylene glycol 17 gram/dose powder; Commonly known as: Glycolax,   Miralax; Take 17 g by mouth 2 times a day.   Spiriva Respimat 2.5  "mcg/actuation inhaler; Generic drug: tiotropium;   Inhale 2 puffs once daily.   Trulicity 0.75 mg/0.5 mL pen injector; Generic drug: dulaglutide; Inject   0.75 mg under the skin 1 (one) time per week.     CONTINUE taking these medications     Accu-Chek Guide test strips strip; Generic drug: blood sugar diagnostic;   Use 1 test strip to test blood sugar twice daily.   Accu-Chek Softclix Lancets misc; Generic drug: lancets; Use to check   blood sugar twice daily   acetaminophen 650 mg ER tablet; Commonly known as: Tylenol 8 HOUR   albuterol 90 mcg/actuation inhaler; Inhale 2 puffs every 4 hours if   needed for wheezing or shortness of breath (You may also use this 10-15   minutes prior to exertional activity as needed).   amLODIPine 10 mg tablet; Commonly known as: Norvasc; Take 1 tablet (10   mg) by mouth once daily.   BD Ultra-Fine Short Pen Needle 31 gauge x 5/16\" needle; Generic drug:   pen needle, diabetic; Use to inject 1-4 times daily as directed.   colchicine 0.6 mg tablet; Take 1 tablet (0.6 mg) by mouth once daily.   diclofenac sodium 1 % kit   Eliquis 2.5 mg tablet; Generic drug: apixaban; Take 1 tablet (2.5 mg) by   mouth every 12 hours.   famotidine 20 mg tablet; Commonly known as: Pepcid   furosemide 20 mg tablet; Commonly known as: Lasix; Take 1 tablet (20 mg)   by mouth once daily as needed (For Swelling).   gabapentin 100 mg capsule; Commonly known as: Neurontin; Take 3 capsules   (300 mg) by mouth once daily at bedtime.; Start taking on: February 11, 2025   glipiZIDE 2.5 mg tablet; Take 2.5 mg by mouth once daily.   hydrocortisone 1 % cream; Apply as needed to the injection site   insulin lispro 100 unit/mL pen; Commonly known as: HumaLOG KwikPen   Insulin; Use with sliding scale 3 times a day, up to 30 units daily   losartan 25 mg tablet; Commonly known as: Cozaar; Take 1 tablet (25 mg)   by mouth once daily.   metoprolol succinate  mg 24 hr tablet; Commonly known as:   Toprol-XL; Take 1 " tablet (200 mg) by mouth once daily.   Nasonex 24hr Allergy 50 mcg/actuation nasal spray; Generic drug:   mometasone   omeprazole 40 mg DR capsule; Commonly known as: PriLOSEC; Take 1 capsule   (40 mg) by mouth once daily.   prednisoLONE acetate 1 % ophthalmic suspension; Commonly known as:   Pred-Forte; Administer 1 drop into the right eye 4 times a day for 4 days.   rosuvastatin 20 mg tablet; Commonly known as: Crestor; Take 1 tablet (20   mg) by mouth once daily.       Test Results Pending At Discharge  Pending Labs       No current pending labs.            Hospital Course  70 year old female with h/o HTN, DLD, CAD c/b NSTEMI in 2019, left RCC w/partial nephrectomy and adrenalectomy (2007), CKD3, MARYLU not on CPAP, DM2, VEDA, provoked DVT/PE in 2/2024 on apixiban [facility] with melena and symptomatic anemia c/f GI bleed. This pt is known to GI and has been seen in clinic, initially referred after an admit in 1/2024 where she was found to have acute on chronic microcytic anemia. She was scheduled to undergo outpatient endoscopy then was re-admitted in 2/2024 for saddle PE. After starting heparin she developed melena and worsening anemia, prompting inpatient EGD/colonoscopy. EGD at the time showed no signs of bleeding but was notable for an 11 cm hiatal hernia and gastritis. Colonoscopy showed extensive diverticula but no bleeding. She was discharged on apixiban. She then had a capsule endoscopy in 7/2024 which showed no source of her anemia. She also follows with Dr. Spears in heme clinic and has undergone extensive workup for her chronic anemia all of which has been -ve, raising suspicion for chronic GI losses despite there being no source seen on endoscopy. On current presentation she was hemodynamically stable with labs notable for hemoglobin 8.4 mg/dL (baseline of 10-11 mg). Her apixiban was held and she was starting on therapeutic lovenox pending EGD on 3/3/25, which showed large type III  hiatal hernia with linda lesions present. There was no active bleeding which was consistent with the uptrend in her hemoglobin. She was discharged home in stable condition with plans to continue outpatient iron infusions per hematology. She was also referred to thoracic surgery to discuss options for hiatal hernia repair.     Of note, a new pelvic ureteric junction obstruction with mild perinephric stranding was found incidentally on CT A/P w/out done in the ED on 2/28/25. UA was bland. She is scheduled for repeat CT urogram and follow up with urology in 2-4 weeks.     Pertinent Physical Exam At Time of Discharge  Visit Vitals  BP 97/57   Pulse 71   Temp 36.2 °C (97.2 °F)   Resp 18   Physical Exam  General: A&Ox3, NAD.   HEENT: NC/AT. EOMI. MMM.   Cardiovascular: Regular rate, rhythm. No m/g/r.  Respiratory: CTA bilaterally. No wheezes, crackles, or rhonchi. Normal respiratory effort. Intermittent cough.   Abdominal: Soft, distended d/t body habitus, slightly tender to deep palpation (L>R).   Ext: Non-pitting edema bilateral LE.  Skin: Warm, dry. No rashes or wounds.  Neuro: No focal neuro deficits.  Psych: Extremely anxious.     Outpatient Follow-Up  Future Appointments   Date Time Provider Department Center   3/10/2025 10:00 AM Erendira Atwood RD, VANESSA [id number] [facility]   3/12/2025 10:00 AM Laura Mata MD [id number] [facility]   3/17/2025 10:30 AM Sharmin Wall DO [id number] [facility]   4/1/2025  9:00 AM Perez Zacarias MD [id number] [facility]   4/10/2025  3:30 PM Venus Mandel MD [id number] [facility]   4/16/2025  9:40 AM Karan Simons MD [facility]   5/29/2025  8:00 AM Mingo Leigh MD [id number] [facility]   6/2/2025 12:30 PM Carter Ayers MD [id number] [facility]   7/10/2025 10:00 AM Garland Spears MD [id number] [facility]           Patient seen and discussed with attending    ---  Sangeetha Azul MD  PGY1, Internal Medicine

## 2025-03-05 ENCOUNTER — DOCUMENTATION (OUTPATIENT)
Dept: PRIMARY CARE | Facility: CLINIC | Age: 70
End: 2025-03-05
Payer: MEDICARE

## 2025-03-05 ENCOUNTER — TELEPHONE (OUTPATIENT)
Dept: PRIMARY CARE | Facility: CLINIC | Age: 70
End: 2025-03-05
Payer: MEDICARE

## 2025-03-05 ENCOUNTER — PATIENT OUTREACH (OUTPATIENT)
Dept: PRIMARY CARE | Facility: CLINIC | Age: 70
End: 2025-03-05
Payer: MEDICARE

## 2025-03-05 DIAGNOSIS — K92.2 UPPER GI BLEED: ICD-10-CM

## 2025-03-05 NOTE — PROGRESS NOTES
Discharge facility: [facility]  Discharge diagnosis: Upper GI bleed  Admission date: 2/28/25  Discharge date: 3/4/25    PCP Appointment Date: 3/12/25 at 10am  Specialist Appointment Date:   Cardiology 3/11/25 11:40 am: Dr. Marrufo  Urology 4/1/25 9am: Dr. Zacarias  Gastro 4/10/25 3:30pm: Dr. Mandel  Urology 4/16/25 9:40am : Dr. Simons  Hem/Onc 7/10/25 10am : Dr. Spears  (Patient will call to move up appointment post discharge)     Hospital Encounter and Summary: Linked   Transitional Care Management Enrollment with Kavya CUETO Parsons (03/05/2025)   See Discharge assessment below for further details       Wrap Up  Call End Time: 0950 (3/5/2025  9:25 AM)    Engagement  Call Start Time: 0926 (3/5/2025  9:25 AM)    Medications  Medications reviewed with patient/caregiver?: Yes (3/5/2025  9:25 AM)  Is the patient having any side effects they believe may be caused by any medication additions or changes?: No (3/5/2025  9:25 AM)  Does the patient have all medications ordered at discharge?: Yes (3/5/2025  9:25 AM)  Prescription Comments: n/a (3/5/2025  9:25 AM)  Is the patient taking all medications as directed (includes completed medication regime)?: Yes (3/5/2025  9:25 AM)  Care Management Interventions: Provided patient education; Advised patient to call provider (adviced patient to call urology, GI, hem/onc to move up appointments after being discharged) (3/5/2025  9:25 AM)  Medication Comments: n/a (3/5/2025  9:25 AM)    Appointments  Does the patient have a primary care provider?: Yes (3/5/2025  9:25 AM)  Care Management Interventions: Verified appointment date/time/provider (3/5/2025  9:25 AM)  Has the patient kept scheduled appointments due by today?: Yes (3/5/2025  9:25 AM)  Care Management Interventions: Advised to schedule with specialist; Advised to reschedule appointment (3/5/2025  9:25 AM)    Self Management  What is the home health agency?: n/a (3/5/2025  9:25 AM)  What Durable Medical Equipment  (DME) was ordered?: n/a (3/5/2025  9:25 AM)    Patient Teaching  Does the patient have access to their discharge instructions?: Yes (3/5/2025  9:25 AM)  Care Management Interventions: Reviewed instructions with patient (3/5/2025  9:25 AM)  What is the patient's perception of their health status since discharge?: Improving (3/5/2025  9:25 AM)  Is the patient/caregiver able to teach back the hierarchy of who to call/visit for symptoms/problems? PCP, Specialist, Home Health nurse, Urgent Care, ED, 911: Yes (3/5/2025  9:25 AM)

## 2025-03-06 LAB
ALBUMIN SERPL-MCNC: 3.8 G/DL (ref 3.6–5.1)
BUN SERPL-MCNC: 15 MG/DL (ref 7–25)
BUN/CREAT SERPL: 13 (CALC) (ref 6–22)
CALCIUM SERPL-MCNC: 9.2 MG/DL (ref 8.6–10.4)
CHLORIDE SERPL-SCNC: 109 MMOL/L (ref 98–110)
CO2 SERPL-SCNC: 25 MMOL/L (ref 20–32)
CREAT SERPL-MCNC: 1.18 MG/DL (ref 0.5–1.05)
EGFRCR SERPLBLD CKD-EPI 2021: 50 ML/MIN/1.73M2
GLUCOSE SERPL-MCNC: 155 MG/DL (ref 65–99)
PHOSPHATE SERPL-MCNC: 3.3 MG/DL (ref 2.1–4.3)
POTASSIUM SERPL-SCNC: 4.6 MMOL/L (ref 3.5–5.3)
SODIUM SERPL-SCNC: 141 MMOL/L (ref 135–146)

## 2025-03-10 ENCOUNTER — APPOINTMENT (OUTPATIENT)
Dept: NUTRITION | Facility: HOSPITAL | Age: 70
End: 2025-03-10
Payer: MEDICARE

## 2025-03-11 ENCOUNTER — OFFICE VISIT (OUTPATIENT)
Dept: CARDIOLOGY | Facility: HOSPITAL | Age: 70
End: 2025-03-11
Payer: MEDICARE

## 2025-03-11 VITALS
SYSTOLIC BLOOD PRESSURE: 100 MMHG | WEIGHT: 197.8 LBS | BODY MASS INDEX: 41.52 KG/M2 | HEIGHT: 58 IN | OXYGEN SATURATION: 96 % | HEART RATE: 79 BPM | DIASTOLIC BLOOD PRESSURE: 69 MMHG

## 2025-03-11 DIAGNOSIS — R00.0 TACHYCARDIA: ICD-10-CM

## 2025-03-11 DIAGNOSIS — N18.30 STAGE 3 CHRONIC KIDNEY DISEASE, UNSPECIFIED WHETHER STAGE 3A OR 3B CKD (MULTI): ICD-10-CM

## 2025-03-11 PROCEDURE — 1125F AMNT PAIN NOTED PAIN PRSNT: CPT | Performed by: INTERNAL MEDICINE

## 2025-03-11 PROCEDURE — 1036F TOBACCO NON-USER: CPT | Performed by: INTERNAL MEDICINE

## 2025-03-11 PROCEDURE — 3074F SYST BP LT 130 MM HG: CPT | Performed by: INTERNAL MEDICINE

## 2025-03-11 PROCEDURE — 1160F RVW MEDS BY RX/DR IN RCRD: CPT | Performed by: INTERNAL MEDICINE

## 2025-03-11 PROCEDURE — 3008F BODY MASS INDEX DOCD: CPT | Performed by: INTERNAL MEDICINE

## 2025-03-11 PROCEDURE — 1159F MED LIST DOCD IN RCRD: CPT | Performed by: INTERNAL MEDICINE

## 2025-03-11 PROCEDURE — 4010F ACE/ARB THERAPY RXD/TAKEN: CPT | Performed by: INTERNAL MEDICINE

## 2025-03-11 PROCEDURE — 1111F DSCHRG MED/CURRENT MED MERGE: CPT | Performed by: INTERNAL MEDICINE

## 2025-03-11 PROCEDURE — 99214 OFFICE O/P EST MOD 30 MIN: CPT | Performed by: INTERNAL MEDICINE

## 2025-03-11 PROCEDURE — 3078F DIAST BP <80 MM HG: CPT | Performed by: INTERNAL MEDICINE

## 2025-03-11 PROCEDURE — G2211 COMPLEX E/M VISIT ADD ON: HCPCS | Performed by: INTERNAL MEDICINE

## 2025-03-11 ASSESSMENT — PATIENT HEALTH QUESTIONNAIRE - PHQ9
SUM OF ALL RESPONSES TO PHQ9 QUESTIONS 1 AND 2: 0
1. LITTLE INTEREST OR PLEASURE IN DOING THINGS: NOT AT ALL
2. FEELING DOWN, DEPRESSED OR HOPELESS: NOT AT ALL

## 2025-03-11 ASSESSMENT — PAIN SCALES - GENERAL: PAINLEVEL_OUTOF10: 7

## 2025-03-11 ASSESSMENT — ENCOUNTER SYMPTOMS
OCCASIONAL FEELINGS OF UNSTEADINESS: 1
DEPRESSION: 1

## 2025-03-11 NOTE — PATIENT INSTRUCTIONS
Thank you for attending the Cardiology clinic at [facility] today.     Your cardiovascular examination was notable for low-normal blood pressure. Your last ECG showed a normal heart rhythm.    You may continue your current medications including apixaban 2.5 mg bid.     Monitor your blood pressure. If it's persistently lower than 110 mmHg we can lower your blood pressure medications.    I will follow-up with you in clinic in 6-months to assess your progress.

## 2025-03-11 NOTE — PROGRESS NOTES
Subjective   Vania Andrews is a 70 y.o. 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     Investigations:  LHC (5/6/2024) - LVEDP 20 mmHg, NOCAD - mid LAD 40% stenosis.   CTPE (2/6/2024) -saddle type pulmonary embolus extending to the bilateral upper and right lower lobar segmental branches.    Lower extremity duplex (11/2023) - chronic DVT of left popliteal and calf veins.   Nuclear stress test (11/2023) - small perfusion defect of the apical inferolateral wall (prior infarct with some umm-infarct ischemia).   TTE (11/2023) - LVEF 55-60%, distal septal/apical hypokinesis.   CACS (12/2021) - 124.  EKG (10/2022) - NSR.  Nuclear stress test (1/2021) - attenuation artifact (fixed defect), no ischemia.  TTE (2020) - LVEF 55-60%, distal apical hypokinesis.   CMR (2019) - LVEF 70%, severe hypokinesis of the mid inferior wall with associated transmural enhancement consistent with a prior infarction. Dilated MPA 3.5 cm    Chief Complaint:  Exertional dyspnea, PE/HFpEF follow-up     HPI  Recent admission with iron-deficiency anemia.   Scopes and capsule negative, however, notable for hiatal hernia and gastritis (?chronic losses). Being worked up for OR. Taking omeprazole.   Now on apixaban 2.5 mg bid (reduced dose).   No further episodes of dark stool.   No chest pain.   Stable leg edema.  Commenced on colchicine for pseudogout in neck. Also commenced on gabapentin for chronic gluteal pain.     CV risk:  HTN - well controlled on losartan 25mg daily and amlodipine 10mg daily.  LDL 96 mg/dL      ROS  A 10-system review was performed and was unremarkable apart from what is presented in the HPI.     Objective   Physical Exam  Alert and orientated.   Appropriate responses, normal  affect.  No respiratory distress at rest.   Skin warm and dry.   Normal radial pulse character and volume. Clinically SR.   Anicteric sclera, no conjunctival pallor.   No definite JVD at 90 degrees or carotid bruits.  Heart sounds dual, no added heart sounds or audible murmurs.   Chest clear on auscultation.   Calves soft, non-tender.  Bilateral pitting pedal edema to the mid-shins    Lab Review:   Lab Results   Component Value Date     03/05/2025    K 4.6 03/05/2025     03/05/2025    CO2 25 03/05/2025    BUN 15 03/05/2025    CREATININE 1.18 (H) 03/05/2025    GLUCOSE 155 (H) 03/05/2025    CALCIUM 9.2 03/05/2025     Lab Results   Component Value Date    CKTOTAL 112 12/23/2020    TROPONINI <0.02 02/05/2022     Lab Results   Component Value Date    WBC 8.2 03/03/2025    HGB 8.0 (L) 03/03/2025    HCT 26.8 (L) 03/03/2025    MCV 96 03/03/2025     03/03/2025     Lab Results   Component Value Date    CHOL 195 09/06/2024    TRIG 145 09/06/2024    HDL 58.8 09/06/2024       Assessment/Plan   In summary, Vania Andrews is a 69 y.o. 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      Appears euvolemic    Low normal BP  - for PCP review tomorrow. If BP<110 can reduced antihypertensives.  - cont other medications  No further dark stools on apixaban, cont 2.5 mg bid.  F/U with thoracic and renal   RTC 6 months.     appeared euvolemic on examination today with low-normal blood pressure.  At this point have advised her to continue her current medications.  She will monitor her blood pressure and if the SBP remains less than 110 mmHg on current therapy we we will reduce her antihypertensive regimen.  I will follow-up with Mrs. Anderws in 6 months to assess her progress

## 2025-03-12 ENCOUNTER — DOCUMENTATION (OUTPATIENT)
Dept: PRIMARY CARE | Facility: CLINIC | Age: 70
End: 2025-03-12

## 2025-03-12 ENCOUNTER — APPOINTMENT (OUTPATIENT)
Dept: PRIMARY CARE | Facility: CLINIC | Age: 70
End: 2025-03-12
Payer: MEDICARE

## 2025-03-12 ENCOUNTER — DOCUMENTATION (OUTPATIENT)
Dept: UROLOGY | Facility: CLINIC | Age: 70
End: 2025-03-12

## 2025-03-12 VITALS
WEIGHT: 198.2 LBS | SYSTOLIC BLOOD PRESSURE: 108 MMHG | OXYGEN SATURATION: 91 % | HEIGHT: 58 IN | HEART RATE: 85 BPM | BODY MASS INDEX: 41.6 KG/M2 | DIASTOLIC BLOOD PRESSURE: 74 MMHG

## 2025-03-12 DIAGNOSIS — J20.9 ACUTE BRONCHITIS, UNSPECIFIED ORGANISM: ICD-10-CM

## 2025-03-12 DIAGNOSIS — Z91.041 CONTRAST MEDIA ALLERGY: ICD-10-CM

## 2025-03-12 DIAGNOSIS — Z87.19 H/O: UPPER GI BLEED: ICD-10-CM

## 2025-03-12 DIAGNOSIS — Z09 HOSPITAL DISCHARGE FOLLOW-UP: Primary | ICD-10-CM

## 2025-03-12 DIAGNOSIS — Z91.041 ALLERGIC TO IV CONTRAST: ICD-10-CM

## 2025-03-12 DIAGNOSIS — N18.30 STAGE 3 CHRONIC KIDNEY DISEASE, UNSPECIFIED WHETHER STAGE 3A OR 3B CKD (MULTI): ICD-10-CM

## 2025-03-12 DIAGNOSIS — E11.51 TYPE 2 DIABETES MELLITUS WITH DIABETIC PERIPHERAL ANGIOPATHY WITHOUT GANGRENE, WITHOUT LONG-TERM CURRENT USE OF INSULIN (MULTI): ICD-10-CM

## 2025-03-12 PROBLEM — C64.9 RENAL CELL CARCINOMA, UNSPECIFIED LATERALITY: Status: RESOLVED | Noted: 2023-07-21 | Resolved: 2025-03-12

## 2025-03-12 PROCEDURE — 99496 TRANSJ CARE MGMT HIGH F2F 7D: CPT | Performed by: STUDENT IN AN ORGANIZED HEALTH CARE EDUCATION/TRAINING PROGRAM

## 2025-03-12 PROCEDURE — 3078F DIAST BP <80 MM HG: CPT | Performed by: STUDENT IN AN ORGANIZED HEALTH CARE EDUCATION/TRAINING PROGRAM

## 2025-03-12 PROCEDURE — 1160F RVW MEDS BY RX/DR IN RCRD: CPT | Performed by: STUDENT IN AN ORGANIZED HEALTH CARE EDUCATION/TRAINING PROGRAM

## 2025-03-12 PROCEDURE — 4010F ACE/ARB THERAPY RXD/TAKEN: CPT | Performed by: STUDENT IN AN ORGANIZED HEALTH CARE EDUCATION/TRAINING PROGRAM

## 2025-03-12 PROCEDURE — 3074F SYST BP LT 130 MM HG: CPT | Performed by: STUDENT IN AN ORGANIZED HEALTH CARE EDUCATION/TRAINING PROGRAM

## 2025-03-12 PROCEDURE — 1159F MED LIST DOCD IN RCRD: CPT | Performed by: STUDENT IN AN ORGANIZED HEALTH CARE EDUCATION/TRAINING PROGRAM

## 2025-03-12 PROCEDURE — 3008F BODY MASS INDEX DOCD: CPT | Performed by: STUDENT IN AN ORGANIZED HEALTH CARE EDUCATION/TRAINING PROGRAM

## 2025-03-12 PROCEDURE — 1036F TOBACCO NON-USER: CPT | Performed by: STUDENT IN AN ORGANIZED HEALTH CARE EDUCATION/TRAINING PROGRAM

## 2025-03-12 PROCEDURE — 1111F DSCHRG MED/CURRENT MED MERGE: CPT | Performed by: STUDENT IN AN ORGANIZED HEALTH CARE EDUCATION/TRAINING PROGRAM

## 2025-03-12 RX ORDER — AZITHROMYCIN 500 MG/1
500 TABLET, FILM COATED ORAL DAILY
Qty: 5 TABLET | Refills: 0 | Status: SHIPPED | OUTPATIENT
Start: 2025-03-12 | End: 2025-03-17

## 2025-03-12 RX ORDER — PREDNISONE 50 MG/1
TABLET ORAL
Qty: 3 TABLET | Refills: 0 | Status: SHIPPED | OUTPATIENT
Start: 2025-03-12

## 2025-03-12 RX ORDER — PREDNISONE 50 MG/1
50 TABLET ORAL SEE ADMIN INSTRUCTIONS
Qty: 3 TABLET | Refills: 0 | Status: SHIPPED | OUTPATIENT
Start: 2025-03-12

## 2025-03-12 NOTE — PROGRESS NOTES
Received a call from Ms. Andrews seeking assistance with getting an order to be premedicated prior to her CT on 3/18 because she has an allergy to contrast. She was told by the CT department to get an order from hr pcp. I called the CT department and they confirmed that being that the discharged physician did not place the oders for the patient to be premedicated, she will have to get an order from her pcp. Will route message to pcp.

## 2025-03-12 NOTE — PROGRESS NOTES
Nurse attempted to contact pt regarding her IV contrast prep.  Left detailed message to take 50 mg pred at 13, 7 and 1 hour prior to CT  also will take 50mg benadryl 1 hour prior .

## 2025-03-12 NOTE — PROGRESS NOTES
Called and informed Ms. Andrews that her order for her medications needed prior to her CT are placed. Patient is aware. No other questions or concerns at this time.

## 2025-03-12 NOTE — PROGRESS NOTES
"Subjective   Patient ID: Vania Andrews is a 70 y.o. female who presents for Follow-up (7 month follow-up. ).        HPI    68yo F with Hx of L RCC, papillary, type 1 and L adrenal cortical adenoma s/p L partial nephrectomy and L adrenalectomy (2007), CKD stage 3 d/t loss of nephron mass and HTN, asthma, HLD, T2DM (est with endo ), MARYLU not using CPAP, s/p hysterectomy and b/l oophorectomy, inferior NSTEMI (1/2019) complicated by chronic Fe deficiency anemia  with h/o iron infusions, partially obstructed Ross's hernia with sx ( Feb 2022 with repair in June 2022  ), H/o provoked DVT in 2020 , with cessation of eliquis after 6m  , now resumed after saddle PE In Feb 2024 with recent upper GI bleed  on 2/28/25     EGD - large hiatal hernia with linda lesions which are very likely the reason for chronic anemia     On a lower dose of DOAC 2.5mg bid     She has productive cough for more than 2 weeks , went to an UC , prior to hospitalization           Visit Vitals  /74   Pulse 85   Ht 1.473 m (4' 10\")   Wt 89.9 kg (198 lb 3.2 oz)   LMP  (LMP Unknown)   SpO2 91%   BMI 41.42 kg/m²   OB Status Postmenopausal   Smoking Status Former   BSA 1.92 m²      No LMP recorded (lmp unknown). Patient is postmenopausal.   Current Outpatient Medications   Medication Instructions    Accu-Chek Guide test strips strip Use 1 test strip to test blood sugar twice daily.    acetaminophen (Tylenol 8 HOUR) 650 mg ER tablet 1-2 tablets, Every 8 hours PRN    albuterol 90 mcg/actuation inhaler 2 puffs, inhalation, Every 4 hours PRN    amLODIPine (NORVASC) 10 mg, oral, Daily    ammonium lactate (Lac-Hydrin) 12 % lotion Topical, As needed    azithromycin (ZITHROMAX) 500 mg, oral, Daily    benzocaine-menthol (Cepastat Sore Throat) lozenge 1 lozenge, Mouth/Throat, Every 2 hour PRN    colchicine 0.6 mg, oral, Daily    Combigan 0.2-0.5 % ophthalmic solution 1 drop, Both Eyes, 2 times daily    dextromethorphan-guaifenesin (Robitussin DM)  " "mg/5 mL oral liquid 5 mL, oral, Every 4 hours PRN    diclofenac sodium 1 % kit 1 Application, As needed    Eliquis 2.5 mg, oral, Every 12 hours    famotidine (Pepcid) 20 mg tablet 1 tablet, Nightly    fluticasone (Flonase) 50 mcg/actuation nasal spray 2 sprays, Each Nostril, Daily    furosemide (LASIX) 20 mg, oral, Daily PRN    gabapentin (Neurontin) 100 mg capsule Take 3 capsules (300 mg) by mouth once daily at bedtime.    glipiZIDE 2.5 mg, oral, Daily    hydrocortisone 1 % cream Apply as needed to the injection site    insulin lispro (HumaLOG KwikPen Insulin) 100 unit/mL injection Use with sliding scale 3 times a day, up to 30 units daily    lancets (Accu-Chek Softclix Lancets) misc Use to check blood sugar twice daily    latanoprost (Xalatan) 0.005 % ophthalmic solution 1 drop, Both Eyes, Every morning    losartan (COZAAR) 25 mg, oral, Daily    metoprolol succinate XL (TOPROL-XL) 200 mg, oral, Daily    mometasone (Nasonex 24hr Allergy) 50 mcg/actuation nasal spray     omeprazole (PRILOSEC) 40 mg, oral, Daily    pen needle, diabetic 31 gauge x 5/16\" needle Use to inject 1-4 times daily as directed.    polyethylene glycol (GLYCOLAX, MIRALAX) 17 g, oral, 2 times daily    rosuvastatin (CRESTOR) 20 mg, oral, Daily    tiotropium (Spiriva Respimat) 2.5 mcg/actuation inhaler 2 puffs, inhalation, Daily    Trulicity 0.75 mg, subcutaneous, Weekly      Social History     Tobacco Use    Smoking status: Former     Types: Cigarettes     Passive exposure: Never    Smokeless tobacco: Never   Substance Use Topics    Alcohol use: Yes     Comment: 1-2 x per month        Review of Systems    Constitutional : No feeling poorly / fevers/ chills / night sweats/ fatigue   Cardiovascular : No CP /Palpitations/ lower extremity edema / syncope   Respiratory : No Cough /ORTIZ/Dyspnea at rest   Gastrointestinal : No abd pain / N/V  No bloody stools/ melena / constipation  Endo : No polyuria/polydipsia/ muscle weakness / sluggishness   CNS: No " confusion / HA/ tingling/ numbness/ weakness of extremities  Psychiatric: No anxiety/ depression/ SI/HI    All other systems have been reviewed and are negative for complaint       Physical Exam    Constitutional : Vitals reviewed. Alert and in no distress  Cardiovascular : RRR, Normal S1, S2, No pericardial rub/ gallop, no peripheral edema   Pulmonary: No respiratory distress, CTAB   MSK : Normal gait and station , strength and tone   Skin: Warm to touch ,  normal skin turgor   Neurologic : CNs 2-12 grossly intact , no obvious FNDs  Psych : A,Ox3, normal mood and affect      Assessment/Plan   Diagnoses and all orders for this visit:  Hospital discharge follow-up  Stage 3 chronic kidney disease, unspecified whether stage 3a or 3b CKD (Multi)  -     Referral to Primary Care  -     Basic Metabolic Panel; Future  Type 2 diabetes mellitus with diabetic peripheral angiopathy without gangrene, without long-term current use of insulin (Multi)  -     Hemoglobin A1C; Future  H/O: upper GI bleed  -     CBC; Future  Acute bronchitis, unspecified organism  -     azithromycin (Zithromax) 500 mg tablet; Take 1 tablet (500 mg) by mouth once daily for 5 days.      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 s/p L partial nephrectomy and L adrenalectomy (2007), CKD stage 3 d/t loss of nephron mass and HTN, asthma, HLD, T2DM (est with endo ), MARYLU not using CPAP, s/p hysterectomy and b/l oophorectomy, inferior NSTEMI (1/2019) complicated by chronic Fe deficiency anemia  with h/o iron infusions, partially obstructed Ross's hernia with sx ( Feb 2022 with repair in June 2022  ), H/o provoked DVT in 2020 , with cessation of eliquis after 6m  , now resumed after saddle PE In Feb 2024 with recent upper GI bleed  on 2/28/25      Rpt labs in 2- 3 weeks to monitor for improvement in hb   Rx for acute bronchitis , > 2 weeks of sx   Pt's qs abt GFR answered   Rx as above for contrast  allergy              Conditions addressed and mgmt as noted above.  Pertinent labs, images/ imaging reports , chart review was done .   Age appropriate labs / labs for mgmt of chronic medical conditions ordered, further mgmt pending the results.         RTO in  m or sooner if needed . Labs to be done few days prior to the next visit.        This note is intended for the physician writing it, as well as to communicate findings to other healthcare professionals. These notes use medical lexicon that may be misunderstood by non medical persons. Therefore, interpretations of medical notes and terminology should be approached with caution.

## 2025-03-17 ENCOUNTER — APPOINTMENT (OUTPATIENT)
Dept: RHEUMATOLOGY | Facility: CLINIC | Age: 70
End: 2025-03-17
Payer: MEDICARE

## 2025-03-17 VITALS
TEMPERATURE: 97.6 F | WEIGHT: 198 LBS | BODY MASS INDEX: 41.56 KG/M2 | SYSTOLIC BLOOD PRESSURE: 119 MMHG | HEIGHT: 58 IN | DIASTOLIC BLOOD PRESSURE: 77 MMHG | HEART RATE: 68 BPM

## 2025-03-17 DIAGNOSIS — M11.20 CALCIUM PYROPHOSPHATE DEPOSITION DISEASE: Primary | ICD-10-CM

## 2025-03-17 DIAGNOSIS — M54.2 NECK PAIN: ICD-10-CM

## 2025-03-17 DIAGNOSIS — R93.89 IMAGING ABNORMALITY: ICD-10-CM

## 2025-03-17 DIAGNOSIS — M47.22 OSTEOARTHRITIS OF SPINE WITH RADICULOPATHY, CERVICAL REGION: ICD-10-CM

## 2025-03-17 DIAGNOSIS — M85.80 OSTEOPENIA, UNSPECIFIED LOCATION: ICD-10-CM

## 2025-03-17 PROCEDURE — 1159F MED LIST DOCD IN RCRD: CPT

## 2025-03-17 PROCEDURE — 99214 OFFICE O/P EST MOD 30 MIN: CPT

## 2025-03-17 PROCEDURE — 3008F BODY MASS INDEX DOCD: CPT

## 2025-03-17 PROCEDURE — 3078F DIAST BP <80 MM HG: CPT

## 2025-03-17 PROCEDURE — 1125F AMNT PAIN NOTED PAIN PRSNT: CPT

## 2025-03-17 PROCEDURE — 1036F TOBACCO NON-USER: CPT

## 2025-03-17 PROCEDURE — 3074F SYST BP LT 130 MM HG: CPT

## 2025-03-17 PROCEDURE — 1111F DSCHRG MED/CURRENT MED MERGE: CPT

## 2025-03-17 PROCEDURE — 4010F ACE/ARB THERAPY RXD/TAKEN: CPT

## 2025-03-17 PROCEDURE — RXMED WILLOW AMBULATORY MEDICATION CHARGE

## 2025-03-17 RX ORDER — COLCHICINE 0.6 MG/1
0.6 TABLET ORAL DAILY
Qty: 30 TABLET | Refills: 5 | Status: SHIPPED | OUTPATIENT
Start: 2025-03-17 | End: 2025-09-13

## 2025-03-17 RX ORDER — GABAPENTIN 300 MG/1
300 CAPSULE ORAL NIGHTLY
Qty: 1 CAPSULE | Refills: 0 | Status: SHIPPED | OUTPATIENT
Start: 2025-03-17 | End: 2025-03-20

## 2025-03-17 ASSESSMENT — PAIN SCALES - GENERAL: PAINLEVEL_OUTOF10: 6

## 2025-03-17 NOTE — PATIENT INSTRUCTIONS
- Schedule an appointment with neurosurgery for evaluation of your neck  - Refills for colchicine and gabapentin provided today  - Lab work  - Make an appointment with pain management. Phone number to schedule appointment: [phone]    Return in 3 months, try to see neurosurgery before

## 2025-03-17 NOTE — PROGRESS NOTES
HPI:   Vania Andrews is a 70 y.o. 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 She is taking omeprazole 40mg BID for her GERD. Clinically her GERD symptoms is well controlled with medications. She denied dysphagia or food regurgitation. She endorsed food stuck in the chest about once per month. She has avoided spicy food. She lost about 40lb over 2 year. Her main concern today is actually her back pain which she did have herniated disc and planned to see NSGY. She had a history of NSTEMI for which she is following cardiology. She denied SOB, ORTIZ, chest pain or chest pressure. She was a former smoker for 30 year with about 3 pack per day. Of note, she had a history of RCC and she is following with urology for possible recurrence.     PMHx: per HPI  PSHx: L partial nephrectomy and L adrenalectomy (2007), JOANIE and BSO, multiple ventral hernia repair with mesh,   SHx: former smoker (90ppy quit 30 year ago), deny ETOH, or illicit drugs. She was a retired RN.   FMHx: mother has pancreatic cancer and heart disease    Current Outpatient Medications:     Accu-Chek Guide test strips strip, Use 1 test strip to test blood sugar twice daily., Disp: 200 strip, Rfl: 1    acetaminophen (Tylenol 8 HOUR) 650 mg ER tablet, Take 1-2 tablets (650-1,300 mg) by mouth every 8 hours if needed for mild pain (1 - 3). Do not crush, chew, or split., Disp: , Rfl:     albuterol 90 mcg/actuation inhaler, Inhale 2 puffs every 4 hours if needed for wheezing or shortness of breath (You may also use this 10-15 minutes prior to exertional activity as needed)., Disp: 18 g, Rfl: 5    amLODIPine (Norvasc) 10 mg tablet, Take 1 tablet (10 mg) by mouth once  daily., Disp: 90 tablet, Rfl: 1    apixaban (Eliquis) 2.5 mg tablet, Take 1 tablet (2.5 mg) by mouth every 12 hours., Disp: 180 tablet, Rfl: 1    benzocaine-menthol (Cepastat Sore Throat) lozenge, Dissolve 1 lozenge in the mouth every 2 hours if needed for sore throat. (Patient not taking: Reported on 3/11/2025), Disp: 30 lozenge, Rfl: 0    colchicine 0.6 mg tablet, Take 1 tablet (0.6 mg) by mouth once daily., Disp: 30 tablet, Rfl: 5    Combigan 0.2-0.5 % ophthalmic solution, Administer 1 drop into both eyes 2 times a day., Disp: 30 mL, Rfl: 11    dextromethorphan-guaifenesin (Robitussin DM)  mg/5 mL oral liquid, Take 5 mL by mouth every 4 hours if needed for cough., Disp: 118 mL, Rfl: 0    diclofenac sodium 1 % kit, Apply 1 Application topically if needed.  APPLY TO LOWER EXTREMITIES, 4 GM OF GEL TO AFFECTED AREA 4 TIMES DAILY. DO NOT APPLY MORE THAN 16 GM DAILY TO ANY ONE AFFECTED JOINT., Disp: , Rfl:     diphenhydrAMINE (Benadryl Allergy) 50 mg tablet, Take 1 tablet (50 mg) by mouth 1 time for 1 dose. Take 1 hour before your scan, Disp: 1 tablet, Rfl: 0    diphenhydrAMINE (BENADryl) 50 mg tablet, Take 1 tablet 1 hr prior to contrast administration, Disp: 1 tablet, Rfl: 0    dulaglutide (Trulicity) 0.75 mg/0.5 mL pen injector, Inject 0.75 mg under the skin 1 (one) time per week., Disp: 12 each, Rfl: 3    famotidine (Pepcid) 20 mg tablet, Take 1 tablet (20 mg) by mouth once daily at bedtime., Disp: , Rfl:     fluticasone (Flonase) 50 mcg/actuation nasal spray, Administer 2 sprays into each nostril once daily., Disp: 16 g, Rfl: 3    furosemide (Lasix) 20 mg tablet, Take 1 tablet (20 mg) by mouth once daily as needed (For Swelling)., Disp: 30 tablet, Rfl: 0    gabapentin (Neurontin) 300 mg capsule, Take 1 capsule (300 mg) by mouth once daily at bedtime for 1 day., Disp: 1 capsule, Rfl: 0    glipiZIDE 2.5 mg tablet, Take 2.5 mg by mouth once daily., Disp: 90 tablet, Rfl: 1    hydrocortisone 1 % cream, Apply as  "needed to the injection site, Disp: 30 g, Rfl: 1    insulin lispro (HumaLOG KwikPen Insulin) 100 unit/mL pen, Use with sliding scale 3 times a day, up to 30 units daily, Disp: 15 mL, Rfl: 2    lancets (Accu-Chek Softclix Lancets) misc, Use to check blood sugar twice daily, Disp: 200 each, Rfl: 1    latanoprost (Xalatan) 0.005 % ophthalmic solution, Administer 1 drop into both eyes once daily in the morning., Disp: 2.5 mL, Rfl: 11    losartan (Cozaar) 25 mg tablet, Take 1 tablet (25 mg) by mouth once daily., Disp: 90 tablet, Rfl: 1    metoprolol succinate XL (Toprol-XL) 200 mg 24 hr tablet, Take 1 tablet (200 mg) by mouth once daily., Disp: 90 tablet, Rfl: 3    mometasone (Nasonex 24hr Allergy) 50 mcg/actuation nasal spray, , Disp: , Rfl:     omeprazole (PriLOSEC) 40 mg DR capsule, Take 1 capsule (40 mg) by mouth once daily., Disp: 30 capsule, Rfl: 0    pen needle, diabetic 31 gauge x 5/16\" needle, Use to inject 1-4 times daily as directed., Disp: 100 each, Rfl: 11    polyethylene glycol (Glycolax, Miralax) 17 gram/dose powder, Take 17 g by mouth 2 times a day., Disp: 3060 g, Rfl: 3    prednisoLONE acetate (Pred-Forte) 1 % ophthalmic suspension, Administer 1 drop into the right eye 4 times a day for 4 days., Disp: 5 mL, Rfl: 0    predniSONE (Deltasone) 50 mg tablet, Take 1 tablet (50 mg) by mouth see administration instructions. Take 50 mg at  13 hours , 7 hours and 1 hour before your scan, Disp: 3 tablet, Rfl: 0    predniSONE (Deltasone) 50 mg tablet, Take 1 tablet at 13 hrs, 7 hrs and 1 hr prior to contrast administration, Disp: 3 tablet, Rfl: 0    rosuvastatin (Crestor) 20 mg tablet, Take 1 tablet (20 mg) by mouth once daily., Disp: 90 tablet, Rfl: 3    tiotropium (Spiriva Respimat) 2.5 mcg/actuation inhaler, Inhale 2 puffs once daily., Disp: 1 each, Rfl: 3    Current Facility-Administered Medications:     nitroglycerin (Nitrostat) SL tablet 0.4 mg, 0.4 mg, sublingual, Once, Vianney Marrufo MD   Allergies   Allergen " Reactions    Adhesive Itching    Amoxicillin Hives and Itching    Bee Sting Kit Swelling    Cephalexin Itching and Nausea Only    Gadolinium-Containing Contrast Media Hives    Iodine Unknown    Latex Other    Pioglitazone Swelling    Rubella Virus Live Vaccine Unknown    Salicylates Other    Shellfish Derived Swelling    Sulfa (Sulfonamide Antibiotics) Unknown    Sulfamethoxazole-Trimethoprim Hives and Itching    Iodinated Contrast Media Itching and Rash          ROS  General: weight loss, negative for fever, chills, night sweat  Head: negative for severe headache, vision change, blurred vision,   CV: negative for chest pain, dizziness, lightheadedness   Pulm: negative for shortness of breath, dyspnea on exertion, hemoptysis  GI: negative for diarrhea, constipation, abdominal pain, nausea or vomiting, BRBPR  : negative for dysuria, hematuria, incontinence  Skin: negative for rash  Heme: negative for blood thinner, bleeding disorder or clotting disorder  Endo: negative for heat or cold intolerance, weight gain or weight loss  MSK: negative for rash, edema, weakness    PHYSICAL EXAM  Visit Vitals  /72 (BP Location: Left arm, Patient Position: Sitting, BP Cuff Size: Large adult)   Pulse 77   Temp 36.3 °C (97.3 °F) (Temporal)   Resp 20     Constitution: well-developed well-nourished female sitting in chair in no acute distress  HEENT: NCAT, moist mucosal membrane, neck supple, no crepitus, sclera anicteric  Lymph nodes: no cervical or supraclavicular lymphadenopathy  Cardiac: RRR, normal S1, S2, no mrg  Pulmonary: normal air movement, CTAP, no wcr  Abdomen: soft, non-distended, non-tender, no rigidity, guarding or rebound tenderness, no splenohepatomegaly  Neuro: AOx3, CNII-XII grossly normal  Ext: warm, dry, no edema noted  Skin: dry, clean and intact  Psych: mood and affect wnl    Assessment and Plan:  This is a 70 y.o. female with large PEH.  I reviewed the CT scan from 2/28/25. It showed a large  paraesophageal hernia with entire stomach herniated into the chest. There was some left lower lobe atelectasis.  I reviewed the EGD from 3/3/25. It showed large type III PEH with Burke lesions.  I reviewed the ECHO from 2/7/24. It showed EF of 60%, RV function is normal  I reviewed her LHC from 5/6/24. It showed mild non-obstructive CAD.   I reviewed the labs from 3/3/25. It showed anemia with hgb at 8. Creatinine is elevated to 1.3.     In my opinion, this patient with multiple comorbidities who presented with a large paraesophageal hernia.  Her hernia is asymptomatic currently.  She is obese with BMI of 41 which significant increased the risk of recurrence after repair. Also she is in the workup of recurrent RCC and back pain.  I do not recommend repair of the hernia right now. I recommend continue weight loss to have BMI less than 40 prior to elective repair. The workup of her other co-morbidities took priority. I will see her back in 6 month to follow the hernia.     I had a candid discussion with the patient. I outlined the treatment plan as above. The patient consented to proceed.     Rohith Frey MD  Thoracic Surgeon  [facility]   of Medicine  [facility]  Office phone: [phone]  Fax: [phone]  Pager: [phone]    Amount of time spent:  -Prep time on date of patient encounter: 30 min  -Time spend with patient/family/caregiver: 30 min  -Additional time spent on patient care activities: 15 min  -Documentation time in medical record: 15 min  -Other time spent on date of patient encounter: 0 min  Total time: 90 min

## 2025-03-17 NOTE — PROGRESS NOTES
"Rheumatology Note      Patient Vania Andrews  MRN [id number]     CC: Ms. Vania Andrews is a 70 y.o. female with history of RCC and adrenal cortical adenoma s/p L partial nephrectomy and adrenalectomy (2007), CKD stage 3 d/t loss of nephron mass and HTN, asthma, HLD, T2DM, MARYLU not using CPAP, inferior NSTEMI, VEDA with history of iron infusions, partially obstructed Ross's hernia with sx (Feb 2022 with repair in June 2022 ), history of provoked DVT in 2020, saddle PE in 2/2024 on anticoagulation who presents to the clinic for follow up of pannus and concern for CPPD.    Recall:  Patient initially evaluated 1/20/2025. Patient was referred to rheumatology for evaluation of neck pain and abnormal findings on imaging concerning for CPPD involving the cervical spine. Patient had gone to the ED on 12/19 for severe neck pain.     Overall, patient has many areas of pain including the low back radiating down the right leg.  Right hip pain, right knee pain, intermittent left knee pain.  Has needlelike sensation in the right foot.  Sometimes pain is \"past the 20 \".  Her right wrist is painful and has had prior surgery.  Her left first finger intermittently gets trigger finger.  Most of these pains started around a year ago.  Usually worse at night.  Usually improved with rest unless she is having a spasm.  Does have stiffness that lasts up to 30 minutes. Regarding her neck pain, she did have sudden onset neck pain that began a few days prior to her presentation to the ED.  The rest of her pain seem to be more progressive over the past year.  She has had neck pain in the past but it has been several years and was never this painful.  She does not have acute onset of swelling, throbbing, warmth in any of her peripheral joints. Regarding her history of lumbar radiculopathy affecting the RLE and bursitis of the right hip - saw orthopedics in the past and recommended conservative treatment, felt she was too high risk for injection " while on anticoagulation. She was seen again in orthopedics for right hip pain with osteoarthritis, needs to work on weight loss in order to discuss possible hip replacement.     Subjective    Subjective   Interval history:  At last appointment, MRI cervical spine ordered for further characteristic of circumdental pannus showing extensive degenerative changes with pannus formation. Referred to neurosurgery for surgical evaluation giving imaging findings aand neuropathic symptoms. She was started on colchicine for possible CPPD and gabapentin.    Patient admitted from 2/28-3/4 with melena and symptomatic anemia concerning for GI bleed. labs notable for hemoglobin 8.4 mg/dL (baseline of 10-11 mg). Her apixiban was held and she was starting on therapeutic lovenox pending EGD on 3/3/25, which showed large type III hiatal hernia with linda lesions present. There was no active bleeding which was consistent with the uptrend in her hemoglobin. She was discharged home in stable condition with plans to continue outpatient iron infusions per hematology. She was also referred to thoracic surgery to discuss options for hiatal hernia repair. Of note, a new pelvic ureteric junction obstruction with mild perinephric stranding was found incidentally on CT A/P w/out done in the ED on 2/28/25. UA was bland. She is scheduled for repeat CT urogram and follow up with urology in 2-4 weeks.     Patient is having significant pain in the back. Not tolerable for the past year. Shooting down the right leg to the toes with needles. She also having right hip and right knee pain. Planning for CT urogram tomorrow.    ROS:  Per HPI    Past Medical History:   Diagnosis Date    Abdominal distension (gaseous) 07/31/2019    Abdominal bloating    Asthma     Cancer (Multi)     Cataract     Coronary artery disease     Diabetes mellitus (Multi)     Diverticulosis of intestine, part unspecified, without perforation or abscess without bleeding 06/09/2013     Diverticulosis    Elevation of levels of liver transaminase levels 11/13/2020    Transaminitis    Encounter for follow-up examination after completed treatment for conditions other than malignant neoplasm 01/26/2019    Hospital discharge follow-up    Glaucoma     Hypertension     Long term (current) use of antibiotics 10/07/2016    Need for prophylactic antibiotic    Other amnesia 10/28/2014    Memory loss    Other conditions influencing health status 02/06/2019    History of cough    Other specified health status 02/07/2019    Hepatitis C antibody test negative    Other specified soft tissue disorders 06/14/2017    Leg swelling    Pain in left toe(s) 05/15/2017    Pain of toe of left foot    Pain in right foot 10/12/2016    Pain of right heel    Pain in right shoulder 06/22/2016    Acute pain of right shoulder    Personal history of diseases of the blood and blood-forming organs and certain disorders involving the immune mechanism 04/09/2018    History of anemia    Personal history of other diseases of the respiratory system 04/02/2018    History of sinusitis    Personal history of other diseases of the respiratory system 03/19/2014    History of upper respiratory infection    Personal history of other malignant neoplasm of kidney 01/14/2021    Personal history of malignant neoplasm of kidney    Personal history of other medical treatment 08/08/2017    History of screening mammography    Personal history of other specified conditions 11/17/2014    History of abdominal pain    Personal history of other specified conditions 06/14/2017    History of urinary frequency    Personal history of other specified conditions 06/09/2021    History of dysuria    Personal history of other specified conditions 11/28/2014    History of breast lump    Unspecified abdominal hernia without obstruction or gangrene 04/21/2014    Hernia        Allergies   Allergen Reactions    Adhesive Itching    Amoxicillin Hives and Itching    Bee  "Sting Kit Swelling    Cephalexin Itching and Nausea Only    Gadolinium-Containing Contrast Media Hives    Iodine Unknown    Latex Other    Pioglitazone Swelling    Rubella Virus Live Vaccine Unknown    Salicylates Other    Shellfish Derived Swelling    Sulfa (Sulfonamide Antibiotics) Unknown    Sulfamethoxazole-Trimethoprim Hives and Itching    Iodinated Contrast Media Itching and Rash        Objective   Objective     /77   Pulse 68   Temp 36.4 °C (97.6 °F)   Ht 1.473 m (4' 10\")   Wt 89.8 kg (198 lb)   LMP  (LMP Unknown)   BMI 41.38 kg/m²      PHYSICAL EXAM:  General - NAD, pleasant, AAOx3  Head: Normocephalic, atraumatic  Eyes - PERRLA, EOMI. No conjunctiva injection.   Mouth/ENT - Moist oral and nasal mucosa. No facial rash.   Skin - No rashes or ulcers    Musculoskeletal  Shoulders: Full ROM however painful, no swelling, warmth or tenderness.  Elbows: Full ROM, without pain, no swelling, warmth or tenderness.  Wrists/Hands: Full ROM, without pain, no swelling, warmth or tenderness.  Hips: Limited ROM due to pain  Knees:  Full ROM, crepitus with ROM bilaterally with pain in right knee with flexion and extension  Ankles/Feet: Full ROM, without pain, swelling, warmth or tenderness.  Cervical spine: Limitation of movement with extension and rotation (more right sided rotation) but improved  Lumbar spine: Tenderness all along spine     LABS:  Lab Results   Component Value Date    WBC 8.2 03/03/2025    HGB 8.0 (L) 03/03/2025    HCT 26.8 (L) 03/03/2025    MCV 96 03/03/2025     03/03/2025      Lab Results   Component Value Date    GLUCOSE 155 (H) 03/05/2025    CO2 25 03/05/2025    BUN 15 03/05/2025    EGFR 50 (L) 03/05/2025    CALCIUM 9.2 03/05/2025    ALBUMIN 3.8 03/05/2025      Lab Results   Component Value Date    CRP 7.3 01/27/2025    CRP 5.20 (H) 12/19/2024    CRP 1.03 (H) 11/26/2023    CRP 7.55 (A) 10/16/2021     Lab Results   Component Value Date    SEDRATE 25 01/27/2025    SEDRATE 59 (H) " "12/19/2024    SEDRATE 37 (H) 11/26/2023     No results found for: \"C3\", \"C4\"  Lab Results   Component Value Date    CITAB <16 01/27/2025     No results found for: \"ANATITER\", \"ARNP\", \"ASMRN\", \"ASSA\", \"ASSB\", \"ASCL\", \"JO1\", \"ACHR\", \"ACEN\", \"RIBPP\", \"DNADS\", \"ANCPA\", \"ANCTI\", \"PR3\", \"MPO\"  Lab Results   Component Value Date    PROTUR NEGATIVE 02/28/2025    GLUCOSEU Normal 02/28/2025    BLOODU 0.1 (1+) (A) 02/28/2025    KETONESU NEGATIVE 02/28/2025    NITRITEU NEGATIVE 02/28/2025    LEUKOCYTESU NEGATIVE 02/28/2025     No results found for: \"UTPCR\"     Lab Results   Component Value Date    URICACID 5.2 11/26/2023     No results found for: \"COLORFL\", \"CLARITYFLUID\", \"WBCFL\", \"NEUTROBFREL\", \"LYMPHSBFREL\", \"EOSBFREL\", \"BASOBFREL\", \"PLASMACFLD\", \"RBCFL\", \"CRYSFL\"    IMAGING:  EMG 2/24/2025  This is a borderline study. Two comparison studies were done to evaluate for a mild median neuropathy across bilateral wrists. One of the comparison studies on both the right and left sides showed a mildly prolonged median latency. The other comparison   study did not show a significantly prolonged median latency on either side. However, the comparison studies can still be abnormal following carpal tunnel release surgery. There was no electrophysiologic evidence of a moderate or severe median neuropathy   across the wrist. This study cannot determine if there is a very mild median neuropathy across bilateral wrists due to the below.    MRI cervical spine 2/3/2025  1. Degenerative pannus formation at the level of C1-C2 with edema within the dens. Pannus causes partial effacement of the ventral  thecal sac but no striking spinal canal stenosis.  2. Multilevel degenerative changes of the cervical spine including  mild spinal canal stenosis at C3-C4 and C6-C7, moderate spinal canal  stenosis at C4-C5, and moderate-to-marked spinal canal stenosis at  C5-C6.  3. Type 1 Modic changes at the level of C5-C6.    XR bilateral hand 2/12/2025  Left " hand: Remote healed distal radius fracture unchanged appearance  from prior. Corticated ossicle associated with the ulna styloid  unchanged. Mild arthrosis in the interphalangeal joints of the  fingers and 1st carpometacarpal joint similar to prior. No evidence  for erosive arthropathy or acute fracture or dislocation.  Right hand: No acute fracture or dislocation. Postsurgical changes in  the soft tissues volar to the wrist. No erosive arthropathy. Mild  degenerative changes    XR right hip with pelvis 1/8/2025:  Severe right and moderate left hip osteoarthrosis    CT cervical spine 12/19/2024:  1. No acute fracture or traumatic malalignment of the cervical spine.  2. Multilevel degenerative changes of the cervical spine.  3. There is erosive change, sclerosis of the dens and calcified circumdental pannus. The appearance is suggestive of CPPD.    XR lumbar spine 9/24/2024:  Numerous metallic coils and surgical clips are seen in the abdomen.  Osteopenia is present.  End-plate sclerosis and spur formation is seen throughout the lumbar spine. Facet hypertrophy is identified. Narrowing of L3-4, L4-5 and L5-S1 disc space is seen. Grade 1 anterolisthesis of L4 on L5 is seen. No significant change in alignment is seen with flexion or extension.    XR right knee 3/24/2024:  No acute fracture or dislocation. Mild arthritic changes of the right knee likely due to CPPD arthropathy given chondrocalcinosis.    DEXA 10/24/2023  SPINE L1-L4 Bone Mineral Density: 0.969  T-Score -0.7  Z-Score 0.5  LEFT FEMUR -TOTAL Bone Mineral Density: 0.776  T-Score -1.4   Z-Score  -0.6  LEFT FEMUR -NECK Bone Mineral Density: 0.599  T-Score -2.3  Z-Score -1.1    10-year Fracture Risk:  Major Osteoporotic Fracture  5.0  Hip Fracture                        0.9    Assessment    Assessment & Plan   Ms. Vania Andrews is a 70 y.o. female with history of RCC and adrenal cortical adenoma s/p L partial nephrectomy and adrenalectomy (2007), CKD stage 3  d/t loss of nephron mass and HTN, asthma, HLD, T2DM, MARYLU not using CPAP, inferior NSTEMI, VEDA with history of iron infusions, partially obstructed Ross's hernia with sx (Feb 2022 with repair in June 2022 ), history of provoked DVT in 2020, saddle PE in 2/2024 on anticoagulation, bilateral CTS surgery who presents to the clinic follow up of circumdental pannus with underlying CPPD    Patient was in the ED on 12/19 with acute episode of severe neck pain. Imaging findings of the cervical spine notable for erosive change, sclerosis of the dens and calcified circumdental pannus. Patient had elevated inflammatory markers with CRP 5.2, ESR 59. RF + anti-CCP negative. Has not had other flares ups that sound like pseudogout. Most her of her pain is mechanical, related to degenerative changes.    #Circumdental pannus  #CPPD  - Instructed to schedule neurosurgery appointment for further evaluation of imaging findings and symptoms to determine if need for surgical intervention  - Colchicine 0.6 mg daily  - Needs PTH check (PTH in the 200s in 2023) as hyperparathyroidism can contribute to CPPD, incorrect lab was drawn last appointment    #Osteoarthritis of cervical spine, lumbar spine, right hip  #Radiculopathy  - Tylenol PRN  - Resume gabapentin  - Re-establish care with pain medicine, referral placed    #Osteopenia  - Lowest T-score -2.3 in left femoral neck with FRAX 5% major, 0.9% in hip  - Prior radial fracture secondary to fall  - No indication for treatment at this time. Repeat DEXA 10/2025    RTC in 3 months    Case seen and discussed with Dr. Lara  Signature: Sharmin Wall,   Date: March 17, 2025

## 2025-03-18 ENCOUNTER — HOSPITAL ENCOUNTER (OUTPATIENT)
Dept: RADIOLOGY | Facility: HOSPITAL | Age: 70
Discharge: HOME | End: 2025-03-18
Payer: MEDICARE

## 2025-03-18 DIAGNOSIS — Z85.831: ICD-10-CM

## 2025-03-19 ENCOUNTER — OFFICE VISIT (OUTPATIENT)
Dept: SURGERY | Facility: HOSPITAL | Age: 70
End: 2025-03-19
Payer: MEDICARE

## 2025-03-19 VITALS
RESPIRATION RATE: 20 BRPM | DIASTOLIC BLOOD PRESSURE: 72 MMHG | OXYGEN SATURATION: 100 % | TEMPERATURE: 97.3 F | SYSTOLIC BLOOD PRESSURE: 136 MMHG | BODY MASS INDEX: 41.28 KG/M2 | HEART RATE: 77 BPM | WEIGHT: 197.53 LBS

## 2025-03-19 DIAGNOSIS — K21.9 GASTROESOPHAGEAL REFLUX DISEASE WITHOUT ESOPHAGITIS: ICD-10-CM

## 2025-03-19 PROCEDURE — 3078F DIAST BP <80 MM HG: CPT | Performed by: STUDENT IN AN ORGANIZED HEALTH CARE EDUCATION/TRAINING PROGRAM

## 2025-03-19 PROCEDURE — 99205 OFFICE O/P NEW HI 60 MIN: CPT | Performed by: STUDENT IN AN ORGANIZED HEALTH CARE EDUCATION/TRAINING PROGRAM

## 2025-03-19 PROCEDURE — 1159F MED LIST DOCD IN RCRD: CPT | Performed by: STUDENT IN AN ORGANIZED HEALTH CARE EDUCATION/TRAINING PROGRAM

## 2025-03-19 PROCEDURE — 4010F ACE/ARB THERAPY RXD/TAKEN: CPT | Performed by: STUDENT IN AN ORGANIZED HEALTH CARE EDUCATION/TRAINING PROGRAM

## 2025-03-19 PROCEDURE — 1160F RVW MEDS BY RX/DR IN RCRD: CPT | Performed by: STUDENT IN AN ORGANIZED HEALTH CARE EDUCATION/TRAINING PROGRAM

## 2025-03-19 PROCEDURE — 1125F AMNT PAIN NOTED PAIN PRSNT: CPT | Performed by: STUDENT IN AN ORGANIZED HEALTH CARE EDUCATION/TRAINING PROGRAM

## 2025-03-19 PROCEDURE — G2211 COMPLEX E/M VISIT ADD ON: HCPCS | Performed by: STUDENT IN AN ORGANIZED HEALTH CARE EDUCATION/TRAINING PROGRAM

## 2025-03-19 PROCEDURE — 99215 OFFICE O/P EST HI 40 MIN: CPT | Performed by: STUDENT IN AN ORGANIZED HEALTH CARE EDUCATION/TRAINING PROGRAM

## 2025-03-19 PROCEDURE — 1111F DSCHRG MED/CURRENT MED MERGE: CPT | Performed by: STUDENT IN AN ORGANIZED HEALTH CARE EDUCATION/TRAINING PROGRAM

## 2025-03-19 PROCEDURE — 3075F SYST BP GE 130 - 139MM HG: CPT | Performed by: STUDENT IN AN ORGANIZED HEALTH CARE EDUCATION/TRAINING PROGRAM

## 2025-03-19 PROCEDURE — 1036F TOBACCO NON-USER: CPT | Performed by: STUDENT IN AN ORGANIZED HEALTH CARE EDUCATION/TRAINING PROGRAM

## 2025-03-19 ASSESSMENT — PAIN SCALES - GENERAL: PAINLEVEL_OUTOF10: 8

## 2025-03-19 NOTE — LETTER
March 19, 2025     Eileen Telles MD  [id number]  Department Of Medicine-General Internal  [facility] [address]    Patient: Vania Andrews   YOB: 1955   Date of Visit: 3/19/2025       Dear Dr. Eileen Telles MD:    Thank you for referring Vania Andrews to me for evaluation. Below are my notes for this consultation.  If you have questions, please do not hesitate to call me. Thank you for involving me in the care of this patient.       Sincerely,     Rohith Frey MD  [phone]    CC: No Recipients  ______________________________________________________________________________________    HPI:   Vania Andrews is a 70 y.o. 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 She is taking omeprazole 40mg BID for her GERD. Clinically her GERD symptoms is well controlled with medications. She denied dysphagia or food regurgitation. She endorsed food stuck in the chest about once per month. She has avoided spicy food. She lost about 40lb over 2 year. Her main concern today is actually her back pain which she did have herniated disc and planned to see NSGY. She had a history of NSTEMI for which she is following cardiology. She denied SOB, ORTIZ, chest pain or chest pressure. She was a former smoker for 30 year with about 3 pack per day. Of note, she had a history of RCC and she is following with urology for possible recurrence.     PMHx: per HPI  PSHx: L partial nephrectomy and L adrenalectomy (2007), JOANIE and BSO, multiple ventral hernia repair with mesh,   SHx: former smoker (90ppy quit 30 year ago), deny ETOH, or illicit drugs. She was a retired RN.   FMHx: mother has pancreatic cancer and  heart disease    Current Outpatient Medications:   •  Accu-Chek Guide test strips strip, Use 1 test strip to test blood sugar twice daily., Disp: 200 strip, Rfl: 1  •  acetaminophen (Tylenol 8 HOUR) 650 mg ER tablet, Take 1-2 tablets (650-1,300 mg) by mouth every 8 hours if needed for mild pain (1 - 3). Do not crush, chew, or split., Disp: , Rfl:   •  albuterol 90 mcg/actuation inhaler, Inhale 2 puffs every 4 hours if needed for wheezing or shortness of breath (You may also use this 10-15 minutes prior to exertional activity as needed)., Disp: 18 g, Rfl: 5  •  amLODIPine (Norvasc) 10 mg tablet, Take 1 tablet (10 mg) by mouth once daily., Disp: 90 tablet, Rfl: 1  •  apixaban (Eliquis) 2.5 mg tablet, Take 1 tablet (2.5 mg) by mouth every 12 hours., Disp: 180 tablet, Rfl: 1  •  benzocaine-menthol (Cepastat Sore Throat) lozenge, Dissolve 1 lozenge in the mouth every 2 hours if needed for sore throat. (Patient not taking: Reported on 3/11/2025), Disp: 30 lozenge, Rfl: 0  •  colchicine 0.6 mg tablet, Take 1 tablet (0.6 mg) by mouth once daily., Disp: 30 tablet, Rfl: 5  •  Combigan 0.2-0.5 % ophthalmic solution, Administer 1 drop into both eyes 2 times a day., Disp: 30 mL, Rfl: 11  •  dextromethorphan-guaifenesin (Robitussin DM)  mg/5 mL oral liquid, Take 5 mL by mouth every 4 hours if needed for cough., Disp: 118 mL, Rfl: 0  •  diclofenac sodium 1 % kit, Apply 1 Application topically if needed.  APPLY TO LOWER EXTREMITIES, 4 GM OF GEL TO AFFECTED AREA 4 TIMES DAILY. DO NOT APPLY MORE THAN 16 GM DAILY TO ANY ONE AFFECTED JOINT., Disp: , Rfl:   •  diphenhydrAMINE (Benadryl Allergy) 50 mg tablet, Take 1 tablet (50 mg) by mouth 1 time for 1 dose. Take 1 hour before your scan, Disp: 1 tablet, Rfl: 0  •  diphenhydrAMINE (BENADryl) 50 mg tablet, Take 1 tablet 1 hr prior to contrast administration, Disp: 1 tablet, Rfl: 0  •  dulaglutide (Trulicity) 0.75 mg/0.5 mL pen injector, Inject 0.75 mg under the skin 1 (one) time per  "week., Disp: 12 each, Rfl: 3  •  famotidine (Pepcid) 20 mg tablet, Take 1 tablet (20 mg) by mouth once daily at bedtime., Disp: , Rfl:   •  fluticasone (Flonase) 50 mcg/actuation nasal spray, Administer 2 sprays into each nostril once daily., Disp: 16 g, Rfl: 3  •  furosemide (Lasix) 20 mg tablet, Take 1 tablet (20 mg) by mouth once daily as needed (For Swelling)., Disp: 30 tablet, Rfl: 0  •  gabapentin (Neurontin) 300 mg capsule, Take 1 capsule (300 mg) by mouth once daily at bedtime for 1 day., Disp: 1 capsule, Rfl: 0  •  glipiZIDE 2.5 mg tablet, Take 2.5 mg by mouth once daily., Disp: 90 tablet, Rfl: 1  •  hydrocortisone 1 % cream, Apply as needed to the injection site, Disp: 30 g, Rfl: 1  •  insulin lispro (HumaLOG KwikPen Insulin) 100 unit/mL pen, Use with sliding scale 3 times a day, up to 30 units daily, Disp: 15 mL, Rfl: 2  •  lancets (Accu-Chek Softclix Lancets) misc, Use to check blood sugar twice daily, Disp: 200 each, Rfl: 1  •  latanoprost (Xalatan) 0.005 % ophthalmic solution, Administer 1 drop into both eyes once daily in the morning., Disp: 2.5 mL, Rfl: 11  •  losartan (Cozaar) 25 mg tablet, Take 1 tablet (25 mg) by mouth once daily., Disp: 90 tablet, Rfl: 1  •  metoprolol succinate XL (Toprol-XL) 200 mg 24 hr tablet, Take 1 tablet (200 mg) by mouth once daily., Disp: 90 tablet, Rfl: 3  •  mometasone (Nasonex 24hr Allergy) 50 mcg/actuation nasal spray, , Disp: , Rfl:   •  omeprazole (PriLOSEC) 40 mg DR capsule, Take 1 capsule (40 mg) by mouth once daily., Disp: 30 capsule, Rfl: 0  •  pen needle, diabetic 31 gauge x 5/16\" needle, Use to inject 1-4 times daily as directed., Disp: 100 each, Rfl: 11  •  polyethylene glycol (Glycolax, Miralax) 17 gram/dose powder, Take 17 g by mouth 2 times a day., Disp: 3060 g, Rfl: 3  •  prednisoLONE acetate (Pred-Forte) 1 % ophthalmic suspension, Administer 1 drop into the right eye 4 times a day for 4 days., Disp: 5 mL, Rfl: 0  •  predniSONE (Deltasone) 50 mg tablet, " Take 1 tablet (50 mg) by mouth see administration instructions. Take 50 mg at  13 hours , 7 hours and 1 hour before your scan, Disp: 3 tablet, Rfl: 0  •  predniSONE (Deltasone) 50 mg tablet, Take 1 tablet at 13 hrs, 7 hrs and 1 hr prior to contrast administration, Disp: 3 tablet, Rfl: 0  •  rosuvastatin (Crestor) 20 mg tablet, Take 1 tablet (20 mg) by mouth once daily., Disp: 90 tablet, Rfl: 3  •  tiotropium (Spiriva Respimat) 2.5 mcg/actuation inhaler, Inhale 2 puffs once daily., Disp: 1 each, Rfl: 3    Current Facility-Administered Medications:   •  nitroglycerin (Nitrostat) SL tablet 0.4 mg, 0.4 mg, sublingual, Once, Vianney Marrufo MD   Allergies   Allergen Reactions   • Adhesive Itching   • Amoxicillin Hives and Itching   • Bee Sting Kit Swelling   • Cephalexin Itching and Nausea Only   • Gadolinium-Containing Contrast Media Hives   • Iodine Unknown   • Latex Other   • Pioglitazone Swelling   • Rubella Virus Live Vaccine Unknown   • Salicylates Other   • Shellfish Derived Swelling   • Sulfa (Sulfonamide Antibiotics) Unknown   • Sulfamethoxazole-Trimethoprim Hives and Itching   • Iodinated Contrast Media Itching and Rash          ROS  General: weight loss, negative for fever, chills, night sweat  Head: negative for severe headache, vision change, blurred vision,   CV: negative for chest pain, dizziness, lightheadedness   Pulm: negative for shortness of breath, dyspnea on exertion, hemoptysis  GI: negative for diarrhea, constipation, abdominal pain, nausea or vomiting, BRBPR  : negative for dysuria, hematuria, incontinence  Skin: negative for rash  Heme: negative for blood thinner, bleeding disorder or clotting disorder  Endo: negative for heat or cold intolerance, weight gain or weight loss  MSK: negative for rash, edema, weakness    PHYSICAL EXAM  Visit Vitals  /72 (BP Location: Left arm, Patient Position: Sitting, BP Cuff Size: Large adult)   Pulse 77   Temp 36.3 °C (97.3 °F) (Temporal)   Resp 20      Constitution: well-developed well-nourished female sitting in chair in no acute distress  HEENT: NCAT, moist mucosal membrane, neck supple, no crepitus, sclera anicteric  Lymph nodes: no cervical or supraclavicular lymphadenopathy  Cardiac: RRR, normal S1, S2, no mrg  Pulmonary: normal air movement, CTAP, no wcr  Abdomen: soft, non-distended, non-tender, no rigidity, guarding or rebound tenderness, no splenohepatomegaly  Neuro: AOx3, CNII-XII grossly normal  Ext: warm, dry, no edema noted  Skin: dry, clean and intact  Psych: mood and affect wnl    Assessment and Plan:  This is a 70 y.o. female with large PEH.  I reviewed the CT scan from 2/28/25. It showed a large paraesophageal hernia with entire stomach herniated into the chest. There was some left lower lobe atelectasis.  I reviewed the EGD from 3/3/25. It showed large type III PEH with Burke lesions.  I reviewed the ECHO from 2/7/24. It showed EF of 60%, RV function is normal  I reviewed her LHC from 5/6/24. It showed mild non-obstructive CAD.   I reviewed the labs from 3/3/25. It showed anemia with hgb at 8. Creatinine is elevated to 1.3.     In my opinion, this patient with multiple comorbidities who presented with a large paraesophageal hernia.  Her hernia is asymptomatic currently.  She is obese with BMI of 41 which significant increased the risk of recurrence after repair. Also she is in the workup of recurrent RCC and back pain.  I do not recommend repair of the hernia right now. I recommend continue weight loss to have BMI less than 40 prior to elective repair. The workup of her other co-morbidities took priority. I will see her back in 6 month to follow the hernia.     I had a candid discussion with the patient. I outlined the treatment plan as above. The patient consented to proceed.     Rohith Frey MD  Thoracic Surgeon  [facility]   of  Medicine  [facility]  Office phone: [phone]  Fax: [phone]  Pager: [phone]    Amount of time spent:  -Prep time on date of patient encounter: 30 min  -Time spend with patient/family/caregiver: 30 min  -Additional time spent on patient care activities: 15 min  -Documentation time in medical record: 15 min  -Other time spent on date of patient encounter: 0 min  Total time: 90 min

## 2025-03-20 RX ORDER — GABAPENTIN 300 MG/1
300 CAPSULE ORAL NIGHTLY
Qty: 30 CAPSULE | Refills: 5 | Status: SHIPPED | OUTPATIENT
Start: 2025-03-20

## 2025-03-21 ENCOUNTER — SPECIALTY PHARMACY (OUTPATIENT)
Dept: PHARMACY | Facility: CLINIC | Age: 70
End: 2025-03-21

## 2025-03-21 ENCOUNTER — HOSPITAL ENCOUNTER (OUTPATIENT)
Dept: RADIOLOGY | Facility: HOSPITAL | Age: 70
Discharge: HOME | End: 2025-03-21
Payer: MEDICARE

## 2025-03-21 DIAGNOSIS — Z85.831 PERSONAL HISTORY OF MALIGNANT NEOPLASM OF SOFT TISSUE: ICD-10-CM

## 2025-03-21 DIAGNOSIS — N18.31 TYPE 2 DIABETES MELLITUS WITH STAGE 3A CHRONIC KIDNEY DISEASE, WITHOUT LONG-TERM CURRENT USE OF INSULIN (MULTI): ICD-10-CM

## 2025-03-21 DIAGNOSIS — E11.65 TYPE 2 DIABETES MELLITUS WITH HYPERGLYCEMIA, WITHOUT LONG-TERM CURRENT USE OF INSULIN: ICD-10-CM

## 2025-03-21 DIAGNOSIS — E11.22 TYPE 2 DIABETES MELLITUS WITH STAGE 3A CHRONIC KIDNEY DISEASE, WITHOUT LONG-TERM CURRENT USE OF INSULIN (MULTI): ICD-10-CM

## 2025-03-21 PROCEDURE — RXMED WILLOW AMBULATORY MEDICATION CHARGE

## 2025-03-21 PROCEDURE — 36410 VNPNXR 3YR/> PHY/QHP DX/THER: CPT

## 2025-03-21 PROCEDURE — 76377 3D RENDER W/INTRP POSTPROCES: CPT

## 2025-03-21 PROCEDURE — 2550000001 HC RX 255 CONTRASTS

## 2025-03-21 PROCEDURE — 2500000004 HC RX 250 GENERAL PHARMACY W/ HCPCS (ALT 636 FOR OP/ED): Performed by: STUDENT IN AN ORGANIZED HEALTH CARE EDUCATION/TRAINING PROGRAM

## 2025-03-21 PROCEDURE — 2780000003 HC OR 278 NO HCPCS

## 2025-03-21 PROCEDURE — C1751 CATH, INF, PER/CENT/MIDLINE: HCPCS

## 2025-03-21 RX ORDER — DIPHENHYDRAMINE HYDROCHLORIDE 50 MG/ML
25 INJECTION, SOLUTION INTRAMUSCULAR; INTRAVENOUS ONCE
Status: COMPLETED | OUTPATIENT
Start: 2025-03-21 | End: 2025-03-21

## 2025-03-21 RX ADMIN — DIPHENHYDRAMINE HYDROCHLORIDE 25 MG: 50 INJECTION, SOLUTION INTRAMUSCULAR; INTRAVENOUS at 11:18

## 2025-03-21 RX ADMIN — IOHEXOL 75 ML: 350 INJECTION, SOLUTION INTRAVENOUS at 10:54

## 2025-03-21 NOTE — NURSING NOTE
Midline Insertion: Difficult IntraVenous Access (DIVA)  Pre-Procedure Checklist:  Emergent Line Insertion: No  Type of Line to be Placed: Midline  Consent Obtained: Yes  Emergency Medication Necessary: No  Patient Identified with 2 Independent Identifiers: Yes  Review of Allergies, Anticoagulation, Relevant Labs, ECG/Telemetry: Yes  Risks/Benefits/Alternatives Discussed with Patient/POA/Legal Representative: Peripheral Access  Stop Sign on Door: Yes  Time Out Performed: Yes  Catheter Exchange: No    Positioning Checklist:  All People, Including Patient, in the Room with Cap and Mask: Yes  Fluoroscopy Used to Identify Vessel and Guide Insertion: No   Sterile Cover Used: Yes  Full Barrier Precautions Followed (Mask, Cap, Gown, Gloves): Yes  Hands Washed: Yes  Monitors Attached with Sound Alarms On: No  Full Body Sterile Drape (Head-to-Toe) Used to Cover Patient: Yes  Trendelenburg Position (For IJ and Subclavian): No  CHG Skin Prep Used and Allowed to Air Dry to Skin Procedure: Yes    Procedure Checklist:  Blood Aspirated From All Lumens, All Ports Subsequently Flushed: Yes  Catheter Caps Placed on All Lumens; Lumens Clamped: Yes  Maintain Guidewire Control Throughout, Ensuring Guidewire Removal: Yes  Maintain Sterile Field Throughout Insertion: Yes  Catheter Secured: Yes  Confirmatory Test of Venous Placement: Non-Pulsatile Blood    Post Procedure Checklist:  Date and Time Written on Dressing: Yes  Sharp and Wire Count and Safe Disposal of all Sharps/Wires: Yes  Sterile Dressing Applied Per Protocol: Yes  X-ray Ordered or ECG Image: N/A    Insertion Details:  Size (Fr): 3  Lumen Type: Single  Catheter to Vein Ratio Less Than 50%: Yes  Total Length (cm): 15  External Length (cm): 0  Orientation: Left  Location: Brachial  Site Prep: Chlorohexidine; Usual sterile procedure followed  Local Anesthetic: Injectable/Subcutaneous  Indication: CT contrast  Insertion Team Members in the Room: Nurse, LPN  Initial Extremity  Circumference (cm): 38  Insertion Attempts: 3  Patient Tolerance: Tolerated Well, Age Appropriate  Comfort Measures: Subcutaneous anesthetic; Verbal  Procedure Location: Bedside  Safety Measures: Patient specific safety measures addressed with RN  Estimated Blood Loss (mL): 1  Vessel Fully Compressible Proximally and Distally to Insertion Site: Yes  Brisk Blood Return Obtained and Line Draws Easily: Yes  Tip Location: Left axilla  Line Confirmation: Venous return  Lot #: [id number]  : Bard  Line Exp Date: 09/30/2025  Securement: Stat Lock  Post Procedure Checklist: Handoff with RN; Obtain all new IV tubing prior to use; Bed at lowest level and wheels locked; Line discharge information at bedside.  Additional Details: Initial 2 attempts to right basilic/brachial. Vessels are of small diameter (will be inadequate for larger bore device) and venospasm occurred on access with finder needle on all attempts. Gentle rotation of guidewire during advancement to prevent binding required. Final positioning required rotation of catheter during advancement to prevent subcutaneous tissue from binding and providing ejecting force to catheter. Total time spent with patient exceeded 90 minutes. Future peripheral device placement attempts may not be possible or practical. Peripheral devices in DIVA patients are expected to carry below average patency/dwell times. Assess judiciously for device failure. Line was inserted using Modified Seldinger Technique.   Placed by: Jaquan Pitt RN

## 2025-03-22 ENCOUNTER — HOSPITAL ENCOUNTER (EMERGENCY)
Facility: HOSPITAL | Age: 70
Discharge: HOME | End: 2025-03-22
Attending: STUDENT IN AN ORGANIZED HEALTH CARE EDUCATION/TRAINING PROGRAM
Payer: MEDICARE

## 2025-03-22 VITALS
SYSTOLIC BLOOD PRESSURE: 155 MMHG | TEMPERATURE: 98 F | HEART RATE: 75 BPM | DIASTOLIC BLOOD PRESSURE: 88 MMHG | RESPIRATION RATE: 16 BRPM | OXYGEN SATURATION: 98 %

## 2025-03-22 DIAGNOSIS — G89.29 ACUTE EXACERBATION OF CHRONIC LOW BACK PAIN: Primary | ICD-10-CM

## 2025-03-22 DIAGNOSIS — M54.50 ACUTE EXACERBATION OF CHRONIC LOW BACK PAIN: Primary | ICD-10-CM

## 2025-03-22 LAB
APPEARANCE UR: CLEAR
BACTERIA #/AREA URNS AUTO: ABNORMAL /HPF
BILIRUB UR STRIP.AUTO-MCNC: NEGATIVE MG/DL
COLOR UR: ABNORMAL
GLUCOSE UR STRIP.AUTO-MCNC: ABNORMAL MG/DL
KETONES UR STRIP.AUTO-MCNC: NEGATIVE MG/DL
LEUKOCYTE ESTERASE UR QL STRIP.AUTO: NEGATIVE
MUCOUS THREADS #/AREA URNS AUTO: ABNORMAL /LPF
NITRITE UR QL STRIP.AUTO: NEGATIVE
PH UR STRIP.AUTO: 6 [PH]
PROT UR STRIP.AUTO-MCNC: ABNORMAL MG/DL
RBC # UR STRIP.AUTO: NEGATIVE MG/DL
RBC #/AREA URNS AUTO: ABNORMAL /HPF
SP GR UR STRIP.AUTO: 1.03
SQUAMOUS #/AREA URNS AUTO: ABNORMAL /HPF
UROBILINOGEN UR STRIP.AUTO-MCNC: NORMAL MG/DL
WBC #/AREA URNS AUTO: ABNORMAL /HPF

## 2025-03-22 PROCEDURE — 2500000001 HC RX 250 WO HCPCS SELF ADMINISTERED DRUGS (ALT 637 FOR MEDICARE OP)

## 2025-03-22 PROCEDURE — 99284 EMERGENCY DEPT VISIT MOD MDM: CPT | Performed by: STUDENT IN AN ORGANIZED HEALTH CARE EDUCATION/TRAINING PROGRAM

## 2025-03-22 PROCEDURE — 81001 URINALYSIS AUTO W/SCOPE: CPT

## 2025-03-22 PROCEDURE — 99283 EMERGENCY DEPT VISIT LOW MDM: CPT | Performed by: STUDENT IN AN ORGANIZED HEALTH CARE EDUCATION/TRAINING PROGRAM

## 2025-03-22 RX ORDER — METHOCARBAMOL 500 MG/1
1000 TABLET, FILM COATED ORAL ONCE
Status: COMPLETED | OUTPATIENT
Start: 2025-03-22 | End: 2025-03-22

## 2025-03-22 RX ORDER — OXYCODONE AND ACETAMINOPHEN 5; 325 MG/1; MG/1
1 TABLET ORAL ONCE
Status: COMPLETED | OUTPATIENT
Start: 2025-03-22 | End: 2025-03-22

## 2025-03-22 RX ORDER — LIDOCAINE 50 MG/G
1 PATCH TOPICAL DAILY
Qty: 15 PATCH | Refills: 0 | Status: SHIPPED | OUTPATIENT
Start: 2025-03-22

## 2025-03-22 RX ORDER — LIDOCAINE 560 MG/1
2 PATCH PERCUTANEOUS; TOPICAL; TRANSDERMAL DAILY
Status: DISCONTINUED | OUTPATIENT
Start: 2025-03-22 | End: 2025-03-22 | Stop reason: HOSPADM

## 2025-03-22 RX ORDER — METHOCARBAMOL 500 MG/1
500 TABLET, FILM COATED ORAL EVERY 8 HOURS PRN
Qty: 30 TABLET | Refills: 0 | Status: SHIPPED | OUTPATIENT
Start: 2025-03-22 | End: 2025-04-01

## 2025-03-22 RX ADMIN — OXYCODONE HYDROCHLORIDE AND ACETAMINOPHEN 1 TABLET: 5; 325 TABLET ORAL at 12:38

## 2025-03-22 RX ADMIN — METHOCARBAMOL 1000 MG: 500 TABLET ORAL at 12:37

## 2025-03-22 ASSESSMENT — PAIN DESCRIPTION - ORIENTATION
ORIENTATION: UPPER
ORIENTATION: UPPER

## 2025-03-22 ASSESSMENT — LIFESTYLE VARIABLES
HAVE PEOPLE ANNOYED YOU BY CRITICIZING YOUR DRINKING: NO
HAVE YOU EVER FELT YOU SHOULD CUT DOWN ON YOUR DRINKING: NO
TOTAL SCORE: 0
EVER FELT BAD OR GUILTY ABOUT YOUR DRINKING: NO
EVER HAD A DRINK FIRST THING IN THE MORNING TO STEADY YOUR NERVES TO GET RID OF A HANGOVER: NO

## 2025-03-22 ASSESSMENT — PAIN DESCRIPTION - LOCATION
LOCATION: BACK
LOCATION: BACK

## 2025-03-22 ASSESSMENT — PAIN DESCRIPTION - PAIN TYPE: TYPE: ACUTE PAIN;CHRONIC PAIN

## 2025-03-22 ASSESSMENT — PAIN SCALES - GENERAL
PAINLEVEL_OUTOF10: 10 - WORST POSSIBLE PAIN
PAINLEVEL_OUTOF10: 5 - MODERATE PAIN

## 2025-03-22 ASSESSMENT — PAIN DESCRIPTION - DESCRIPTORS: DESCRIPTORS: ACHING

## 2025-03-22 ASSESSMENT — PAIN - FUNCTIONAL ASSESSMENT: PAIN_FUNCTIONAL_ASSESSMENT: 0-10

## 2025-03-22 NOTE — ED PROVIDER NOTES
Emergency Department Provider Note        History of Present Illness     History provided by: Patient  Limitations to History: None  External Records Reviewed with Brief Summary: Outpatient progress note from 3/17/2025 which showed patient's history and chronic pain    HPI:  Vania Andrews is a 70 y.o. female with past medical history significant for RCC and adrenal cortical adenoma s/p L partial nephrectomy and adrenalectomy (2007), HFpEF, stage III CKD, HTN, asthma, HLD, T2DM, MARYLU not using CPAP, inferior NSTEMI, VEDA with history of iron infusions, partially obstructed Ross's hernia with sx (Feb 2022 with repair in June 2022 ), history of provoked DVT in 2020, saddle PE in 2/2024 on Eliquis 2.5 mg twice daily with recent GI bleed from [facility] with Burke's ulcer, lumbar radiculopathy presenting from home with severe lower right sided back pain that has been progressing over the last week.  Of note she recently had a CT urogram there was significant trouble getting an IV, she had to get a midline for this.  Patient does not want to have an IV placed today.  She states she has had no significant weakness, she is incontinent at baseline.  She denies any decree sensation in the perineal or buttock region.  Denies stool continence, lower extremity weakness or numbness. Patient otherwise denying any headache, chest pain, shortness of breath, abdominal pain, constipation/diarrhea, nausea/vomiting.  She does have increased urinary frequency as well as tingling and pain from the back down the legs worse on the right than the left.  Physical Exam   Triage vitals:  T 36.7 °C (98 °F)  HR 75  /88  RR 16  O2 98 % None (Room air)    Physical Exam  Constitutional:       General: She is not in acute distress.     Appearance: Normal appearance. She is not ill-appearing.   HENT:      Head: Normocephalic and atraumatic.      Mouth/Throat:      Mouth: Mucous membranes are moist.   Eyes:      Pupils: Pupils are equal, round,  and reactive to light.   Cardiovascular:      Rate and Rhythm: Normal rate and regular rhythm.      Pulses: Normal pulses.           Dorsalis pedis pulses are 2+ on the right side and 2+ on the left side.      Heart sounds: Normal heart sounds.   Pulmonary:      Effort: Pulmonary effort is normal.      Breath sounds: Normal breath sounds.   Abdominal:      General: Abdomen is flat and protuberant. Bowel sounds are normal.      Palpations: Abdomen is soft.   Musculoskeletal:      Cervical back: Normal.      Thoracic back: Normal.      Lumbar back: Tenderness (R paraspinal musculature) present. No swelling or bony tenderness. Decreased range of motion. Positive right straight leg raise test. Negative left straight leg raise test.      Comments: No ecchymosis, no wounds, no scars. No midline tenderness.        Skin:     General: Skin is warm.   Neurological:      Mental Status: She is alert and oriented to person, place, and time.      Cranial Nerves: Cranial nerves 2-12 are intact.      Sensory: Sensation is intact.      Motor: Motor function is intact. No weakness.      Coordination: Coordination is intact.      Comments: Sensation is intact to light touch throughout BLE. Strength 5/5 in hip flexion, knee extension, dorsiflexion and plantar flexion bilaterally. Negative Babinski bilaterally. Gait is normal.           Medical Decision Making & ED Course   Medical Decision Makin y.o. female with multiple medical comorbidities as described above in HPI presenting today with acute on chronic back pain.   Patient does not have any obvious neurological deficits or concern for spinal cord compression, no saddle anesthesia, no fecal incontinence no motor or sensory deficits, weakness or ataxia.  There is low clinical suspicion for spinal infection, no recent spinal procedures, denies IV drug use, and denies recent infection.  Of note she does have history of renal cell carcinoma and has frequent incontinence at  baseline.  Shared decision making had to obtain dedicated imaging of lumbar spine.  Patient does not want undergo further imaging at this time as CT urogram yesterday showed no suspicious osseous lesions noted, there is degenerative disease in the lower thoracic and lumbar spine with osteophytes compatible with Diffuse idiopathic skeletal hyperostosis and grade 1 anterior listhesis and L4-L5 worse in the right than the left which likely explains patient's significant pain in that area.  Given patient's increased urinary frequency and incontinence we will obtain a UA.  Patient to receive Percocet as well as Robaxin for her pain.  On reassessment patient is feeling significantly improved, she is now lying in bed comfortably.  Urinalysis unremarkable.  Patient is appropriate for discharge home with Robaxin, Tylenol and lidocaine patches.  Patient states she has follow-up with neurosurgery and pain medicine, to continue to keep this appointment several other her to follow-up about her CT urography done yesterday.  Patient given strict return precautions regarding warning signs for low back pain including saddle anesthesia, worsening incontinence, new onset weakness, or signs of systemic infection.  Patient remained hemodynamically stable here during length of stay.  Patient ambulated and tolerated p.o. All questions were answered, patient discharged safely.      Differential diagnoses considered include but are not limited to: Lumbar radiculopathy, DISH, cauda equina, cord compression, bony mets     Social Determinants of Health which Significantly Impact Care: None identified     EKG Independent Interpretation: EKG not obtained    Independent Result Review and Interpretation: Relevant laboratory and radiographic results were reviewed and independently interpreted by myself.  As necessary, they are commented on in the ED Course.    Chronic conditions affecting the patient's care: As documented above in MDM    The  patient was discussed with the following consultants/services: None    Care Considerations: As documented above in MDM    ED Course:  Diagnoses as of 03/22/25 1548   Acute exacerbation of chronic low back pain     Disposition   Discharge    Procedures   Procedures    Patient seen and discussed with ED attending physician.    Hernandez Rios MD  Emergency Medicine     Hernandez Rios MD  Resident  03/22/25 1549    Emergency Medicine Attending Attestation:     The patient was seen by the resident/fellow.  I have personally performed a substantive portion of the encounter.  I have seen and examined the patient; agree with the workup, evaluation, MDM, management and diagnosis.  The care plan has been discussed with the resident; I have reviewed the resident’s note and agree with the documented findings.      External Chart Review:   CT urography yesterday:  BONE AND SOFT TISSUE:  No suspicious osseous lesions are present. Degenerative discogenic  disease is noted in the lower thoracic and lumbar spine with  multilevel flowing anterior osteophytes compatible with DISH and  grade 1 L4-5 anterolisthesis. Degenerative changes are also noted in  the right-greater-than-left hips.    CT lumbar spine considered but not performed due to CT urography completed yesterday and captured area that would be repeated today. Offered to pt and she declined    MRI lumbar spine considered but not performed due to pt has no saddle anesthesia, numbness, weakness of BLE. Neg Babinski. Has urinary incontinence at baseline that is unchanged    Final diagnoses:   [M54.50, G89.29] Acute exacerbation of chronic low back pain       Steffen Simerlink, MD Steffen Simerlink, MD  03/22/25 [id number]

## 2025-03-22 NOTE — DISCHARGE INSTRUCTIONS
Take your Robaxin, use lidocaine patches, and Tylenol for your back pain.  Please follow-up with your neurosurgery and pain management appointments.    Please return to the ER or seek immediate medical attention if you experience numbness in the pelvic region, increasing pain, numbness, tingling, weakness, loss of motion in your arms or legs, loss of control of your urine or stool, fever, abdominal pain, chest pain, shortness of breath, or any new or worsening symptoms.    You are welcome back any time. Thank you for entrusting your care to us, I hope we made your visit as pleasant as possible. Wishing you well!    Dr. Rios

## 2025-03-23 ENCOUNTER — CLINICAL SUPPORT (OUTPATIENT)
Dept: EMERGENCY MEDICINE | Facility: HOSPITAL | Age: 70
End: 2025-03-23
Payer: MEDICARE

## 2025-03-23 ENCOUNTER — HOSPITAL ENCOUNTER (OUTPATIENT)
Facility: HOSPITAL | Age: 70
Setting detail: OBSERVATION
Discharge: HOME | End: 2025-03-24
Attending: EMERGENCY MEDICINE
Payer: MEDICARE

## 2025-03-23 DIAGNOSIS — M54.50 ACUTE MIDLINE LOW BACK PAIN, UNSPECIFIED WHETHER SCIATICA PRESENT: Primary | ICD-10-CM

## 2025-03-23 DIAGNOSIS — R05.3 CHRONIC COUGH: ICD-10-CM

## 2025-03-23 LAB
ALBUMIN SERPL BCP-MCNC: 3.8 G/DL (ref 3.4–5)
ALP SERPL-CCNC: 141 U/L (ref 33–136)
ALT SERPL W P-5'-P-CCNC: 34 U/L (ref 7–45)
ANION GAP SERPL CALC-SCNC: 11 MMOL/L (ref 10–20)
AST SERPL W P-5'-P-CCNC: 22 U/L (ref 9–39)
ATRIAL RATE: 60 BPM
BASOPHILS # BLD AUTO: 0.03 X10*3/UL (ref 0–0.1)
BASOPHILS NFR BLD AUTO: 0.3 %
BILIRUB SERPL-MCNC: 0.3 MG/DL (ref 0–1.2)
BUN SERPL-MCNC: 18 MG/DL (ref 6–23)
CALCIUM SERPL-MCNC: 9.1 MG/DL (ref 8.6–10.6)
CHLORIDE SERPL-SCNC: 106 MMOL/L (ref 98–107)
CO2 SERPL-SCNC: 26 MMOL/L (ref 21–32)
CREAT SERPL-MCNC: 1.28 MG/DL (ref 0.5–1.05)
EGFRCR SERPLBLD CKD-EPI 2021: 45 ML/MIN/1.73M*2
EOSINOPHIL # BLD AUTO: 0.17 X10*3/UL (ref 0–0.7)
EOSINOPHIL NFR BLD AUTO: 1.9 %
ERYTHROCYTE [DISTWIDTH] IN BLOOD BY AUTOMATED COUNT: 16 % (ref 11.5–14.5)
GLUCOSE BLD MANUAL STRIP-MCNC: 269 MG/DL (ref 74–99)
GLUCOSE BLD MANUAL STRIP-MCNC: 275 MG/DL (ref 74–99)
GLUCOSE SERPL-MCNC: 160 MG/DL (ref 74–99)
HCT VFR BLD AUTO: 28.3 % (ref 36–46)
HGB BLD-MCNC: 8.7 G/DL (ref 12–16)
HOLD SPECIMEN: NORMAL
IMM GRANULOCYTES # BLD AUTO: 0.04 X10*3/UL (ref 0–0.7)
IMM GRANULOCYTES NFR BLD AUTO: 0.4 % (ref 0–0.9)
LYMPHOCYTES # BLD AUTO: 1.66 X10*3/UL (ref 1.2–4.8)
LYMPHOCYTES NFR BLD AUTO: 18.3 %
MCH RBC QN AUTO: 25.2 PG (ref 26–34)
MCHC RBC AUTO-ENTMCNC: 30.7 G/DL (ref 32–36)
MCV RBC AUTO: 82 FL (ref 80–100)
MONOCYTES # BLD AUTO: 0.49 X10*3/UL (ref 0.1–1)
MONOCYTES NFR BLD AUTO: 5.4 %
NEUTROPHILS # BLD AUTO: 6.66 X10*3/UL (ref 1.2–7.7)
NEUTROPHILS NFR BLD AUTO: 73.7 %
NRBC BLD-RTO: 0 /100 WBCS (ref 0–0)
P AXIS: 58 DEGREES
P OFFSET: 181 MS
P ONSET: 140 MS
PLATELET # BLD AUTO: 419 X10*3/UL (ref 150–450)
POTASSIUM SERPL-SCNC: 3.9 MMOL/L (ref 3.5–5.3)
PR INTERVAL: 168 MS
PROT SERPL-MCNC: 6.6 G/DL (ref 6.4–8.2)
Q ONSET: 224 MS
QRS COUNT: 10 BEATS
QRS DURATION: 78 MS
QT INTERVAL: 390 MS
QTC CALCULATION(BAZETT): 390 MS
QTC FREDERICIA: 390 MS
R AXIS: 11 DEGREES
RBC # BLD AUTO: 3.45 X10*6/UL (ref 4–5.2)
SODIUM SERPL-SCNC: 139 MMOL/L (ref 136–145)
T AXIS: 33 DEGREES
T OFFSET: 419 MS
VENTRICULAR RATE: 60 BPM
WBC # BLD AUTO: 9.1 X10*3/UL (ref 4.4–11.3)

## 2025-03-23 PROCEDURE — 96375 TX/PRO/DX INJ NEW DRUG ADDON: CPT

## 2025-03-23 PROCEDURE — 2500000001 HC RX 250 WO HCPCS SELF ADMINISTERED DRUGS (ALT 637 FOR MEDICARE OP): Performed by: EMERGENCY MEDICINE

## 2025-03-23 PROCEDURE — 99285 EMERGENCY DEPT VISIT HI MDM: CPT | Mod: 25 | Performed by: EMERGENCY MEDICINE

## 2025-03-23 PROCEDURE — 2500000005 HC RX 250 GENERAL PHARMACY W/O HCPCS: Performed by: EMERGENCY MEDICINE

## 2025-03-23 PROCEDURE — 82947 ASSAY GLUCOSE BLOOD QUANT: CPT | Mod: 59

## 2025-03-23 PROCEDURE — G0378 HOSPITAL OBSERVATION PER HR: HCPCS

## 2025-03-23 PROCEDURE — 2500000001 HC RX 250 WO HCPCS SELF ADMINISTERED DRUGS (ALT 637 FOR MEDICARE OP)

## 2025-03-23 PROCEDURE — 93005 ELECTROCARDIOGRAM TRACING: CPT

## 2025-03-23 PROCEDURE — 2500000002 HC RX 250 W HCPCS SELF ADMINISTERED DRUGS (ALT 637 FOR MEDICARE OP, ALT 636 FOR OP/ED)

## 2025-03-23 PROCEDURE — 82947 ASSAY GLUCOSE BLOOD QUANT: CPT

## 2025-03-23 PROCEDURE — 84075 ASSAY ALKALINE PHOSPHATASE: CPT

## 2025-03-23 PROCEDURE — 36415 COLL VENOUS BLD VENIPUNCTURE: CPT

## 2025-03-23 PROCEDURE — 2500000004 HC RX 250 GENERAL PHARMACY W/ HCPCS (ALT 636 FOR OP/ED): Mod: JZ,TB

## 2025-03-23 PROCEDURE — 99223 1ST HOSP IP/OBS HIGH 75: CPT

## 2025-03-23 PROCEDURE — 85025 COMPLETE CBC W/AUTO DIFF WBC: CPT

## 2025-03-23 PROCEDURE — 96374 THER/PROPH/DIAG INJ IV PUSH: CPT

## 2025-03-23 RX ORDER — ACETAMINOPHEN 650 MG/1
650 SUPPOSITORY RECTAL EVERY 4 HOURS PRN
Status: DISCONTINUED | OUTPATIENT
Start: 2025-03-23 | End: 2025-03-24 | Stop reason: HOSPADM

## 2025-03-23 RX ORDER — GABAPENTIN 300 MG/1
300 CAPSULE ORAL NIGHTLY
Status: DISCONTINUED | OUTPATIENT
Start: 2025-03-23 | End: 2025-03-24 | Stop reason: HOSPADM

## 2025-03-23 RX ORDER — INSULIN LISPRO 100 [IU]/ML
3 INJECTION, SOLUTION INTRAVENOUS; SUBCUTANEOUS ONCE
Status: COMPLETED | OUTPATIENT
Start: 2025-03-23 | End: 2025-03-23

## 2025-03-23 RX ORDER — OXYCODONE HYDROCHLORIDE 5 MG/1
5 TABLET ORAL EVERY 6 HOURS PRN
Status: DISCONTINUED | OUTPATIENT
Start: 2025-03-23 | End: 2025-03-24 | Stop reason: HOSPADM

## 2025-03-23 RX ORDER — LORAZEPAM 1 MG/1
1 TABLET ORAL ONCE
Status: COMPLETED | OUTPATIENT
Start: 2025-03-24 | End: 2025-03-24

## 2025-03-23 RX ORDER — DEXTROSE 50 % IN WATER (D50W) INTRAVENOUS SYRINGE
12.5
Status: DISCONTINUED | OUTPATIENT
Start: 2025-03-23 | End: 2025-03-24 | Stop reason: HOSPADM

## 2025-03-23 RX ORDER — OXYCODONE AND ACETAMINOPHEN 5; 325 MG/1; MG/1
1 TABLET ORAL ONCE
Status: COMPLETED | OUTPATIENT
Start: 2025-03-23 | End: 2025-03-23

## 2025-03-23 RX ORDER — FUROSEMIDE 20 MG/1
20 TABLET ORAL DAILY PRN
Status: DISCONTINUED | OUTPATIENT
Start: 2025-03-23 | End: 2025-03-24 | Stop reason: HOSPADM

## 2025-03-23 RX ORDER — DIPHENHYDRAMINE HCL 25 MG
50 CAPSULE ORAL ONCE
Status: COMPLETED | OUTPATIENT
Start: 2025-03-24 | End: 2025-03-24

## 2025-03-23 RX ORDER — PANTOPRAZOLE SODIUM 40 MG/1
40 TABLET, DELAYED RELEASE ORAL
Status: DISCONTINUED | OUTPATIENT
Start: 2025-03-24 | End: 2025-03-24 | Stop reason: HOSPADM

## 2025-03-23 RX ORDER — LIDOCAINE 560 MG/1
1 PATCH PERCUTANEOUS; TOPICAL; TRANSDERMAL ONCE
Status: COMPLETED | OUTPATIENT
Start: 2025-03-23 | End: 2025-03-23

## 2025-03-23 RX ORDER — DIPHENHYDRAMINE HYDROCHLORIDE 50 MG/ML
50 INJECTION, SOLUTION INTRAMUSCULAR; INTRAVENOUS ONCE
Status: COMPLETED | OUTPATIENT
Start: 2025-03-23 | End: 2025-03-24

## 2025-03-23 RX ORDER — ONDANSETRON HYDROCHLORIDE 2 MG/ML
4 INJECTION, SOLUTION INTRAVENOUS EVERY 8 HOURS PRN
Status: DISCONTINUED | OUTPATIENT
Start: 2025-03-23 | End: 2025-03-24 | Stop reason: HOSPADM

## 2025-03-23 RX ORDER — ACETAMINOPHEN 160 MG/5ML
650 SOLUTION ORAL EVERY 4 HOURS PRN
Status: DISCONTINUED | OUTPATIENT
Start: 2025-03-23 | End: 2025-03-24 | Stop reason: HOSPADM

## 2025-03-23 RX ORDER — DEXTROSE 50 % IN WATER (D50W) INTRAVENOUS SYRINGE
25
Status: DISCONTINUED | OUTPATIENT
Start: 2025-03-23 | End: 2025-03-24 | Stop reason: HOSPADM

## 2025-03-23 RX ORDER — MORPHINE SULFATE 4 MG/ML
4 INJECTION INTRAVENOUS ONCE
Status: COMPLETED | OUTPATIENT
Start: 2025-03-23 | End: 2025-03-23

## 2025-03-23 RX ORDER — ACETAMINOPHEN 325 MG/1
650 TABLET ORAL EVERY 4 HOURS PRN
Status: DISCONTINUED | OUTPATIENT
Start: 2025-03-23 | End: 2025-03-24 | Stop reason: HOSPADM

## 2025-03-23 RX ORDER — POLYETHYLENE GLYCOL 3350 17 G/17G
17 POWDER, FOR SOLUTION ORAL DAILY
Status: DISCONTINUED | OUTPATIENT
Start: 2025-03-24 | End: 2025-03-24 | Stop reason: HOSPADM

## 2025-03-23 RX ORDER — METHOCARBAMOL 500 MG/1
1000 TABLET, FILM COATED ORAL ONCE
Status: COMPLETED | OUTPATIENT
Start: 2025-03-23 | End: 2025-03-23

## 2025-03-23 RX ORDER — AMLODIPINE BESYLATE 10 MG/1
10 TABLET ORAL DAILY
Status: DISCONTINUED | OUTPATIENT
Start: 2025-03-24 | End: 2025-03-24 | Stop reason: HOSPADM

## 2025-03-23 RX ORDER — ACETAMINOPHEN 325 MG/1
650 TABLET ORAL ONCE
Status: COMPLETED | OUTPATIENT
Start: 2025-03-23 | End: 2025-03-23

## 2025-03-23 RX ORDER — INSULIN LISPRO 100 [IU]/ML
0-5 INJECTION, SOLUTION INTRAVENOUS; SUBCUTANEOUS
Status: DISCONTINUED | OUTPATIENT
Start: 2025-03-24 | End: 2025-03-24 | Stop reason: HOSPADM

## 2025-03-23 RX ORDER — METHOCARBAMOL 500 MG/1
1000 TABLET, FILM COATED ORAL EVERY 8 HOURS PRN
Status: DISCONTINUED | OUTPATIENT
Start: 2025-03-23 | End: 2025-03-24 | Stop reason: HOSPADM

## 2025-03-23 RX ADMIN — METHYLPREDNISOLONE SODIUM SUCCINATE 40 MG: 40 INJECTION, POWDER, FOR SOLUTION INTRAMUSCULAR; INTRAVENOUS at 12:17

## 2025-03-23 RX ADMIN — MORPHINE SULFATE 4 MG: 4 INJECTION, SOLUTION INTRAMUSCULAR; INTRAVENOUS at 16:53

## 2025-03-23 RX ADMIN — OXYCODONE HYDROCHLORIDE AND ACETAMINOPHEN 1 TABLET: 5; 325 TABLET ORAL at 11:01

## 2025-03-23 RX ADMIN — PREDNISONE 50 MG: 10 TABLET ORAL at 21:03

## 2025-03-23 RX ADMIN — OXYCODONE 5 MG: 5 TABLET ORAL at 22:29

## 2025-03-23 RX ADMIN — ACETAMINOPHEN 650 MG: 325 TABLET ORAL at 23:46

## 2025-03-23 RX ADMIN — APIXABAN 2.5 MG: 5 TABLET, FILM COATED ORAL at 22:27

## 2025-03-23 RX ADMIN — METHOCARBAMOL 1000 MG: 500 TABLET ORAL at 11:27

## 2025-03-23 RX ADMIN — METHOCARBAMOL 1000 MG: 500 TABLET ORAL at 23:46

## 2025-03-23 RX ADMIN — ACETAMINOPHEN 650 MG: 325 TABLET ORAL at 11:26

## 2025-03-23 RX ADMIN — INSULIN LISPRO 3 UNITS: 100 INJECTION, SOLUTION INTRAVENOUS; SUBCUTANEOUS at 22:27

## 2025-03-23 RX ADMIN — LIDOCAINE 1 PATCH: 4 PATCH TOPICAL at 11:01

## 2025-03-23 RX ADMIN — GABAPENTIN 300 MG: 300 CAPSULE ORAL at 22:27

## 2025-03-23 ASSESSMENT — PAIN DESCRIPTION - ORIENTATION
ORIENTATION: MID;LOWER
ORIENTATION: POSTERIOR

## 2025-03-23 ASSESSMENT — PAIN DESCRIPTION - LOCATION
LOCATION: BACK

## 2025-03-23 ASSESSMENT — PAIN SCALES - GENERAL
PAINLEVEL_OUTOF10: 10 - WORST POSSIBLE PAIN
PAINLEVEL_OUTOF10: 8
PAINLEVEL_OUTOF10: 8
PAINLEVEL_OUTOF10: 10 - WORST POSSIBLE PAIN
PAINLEVEL_OUTOF10: 10 - WORST POSSIBLE PAIN

## 2025-03-23 ASSESSMENT — PAIN SCALES - WONG BAKER
WONGBAKER_NUMERICALRESPONSE: HURTS WORST
WONGBAKER_NUMERICALRESPONSE: HURTS WHOLE LOT

## 2025-03-23 ASSESSMENT — LIFESTYLE VARIABLES
HAVE PEOPLE ANNOYED YOU BY CRITICIZING YOUR DRINKING: NO
TOTAL SCORE: 0
EVER FELT BAD OR GUILTY ABOUT YOUR DRINKING: NO
HAVE YOU EVER FELT YOU SHOULD CUT DOWN ON YOUR DRINKING: NO
EVER HAD A DRINK FIRST THING IN THE MORNING TO STEADY YOUR NERVES TO GET RID OF A HANGOVER: NO

## 2025-03-23 ASSESSMENT — PAIN - FUNCTIONAL ASSESSMENT: PAIN_FUNCTIONAL_ASSESSMENT: 0-10

## 2025-03-23 ASSESSMENT — PAIN DESCRIPTION - DESCRIPTORS: DESCRIPTORS: ACHING

## 2025-03-23 ASSESSMENT — PAIN DESCRIPTION - PAIN TYPE: TYPE: CHRONIC PAIN

## 2025-03-23 NOTE — ED TRIAGE NOTES
Pt presents to the ED with complaints of chronic lower back. Pt states she was just here yesterday for the same complaint but states it didn't get any better. Pt has a PMHX of renal cell carcinoma. Pt rates her pain at a 10/10

## 2025-03-23 NOTE — ED PROVIDER NOTES
HPI   Chief Complaint   Patient presents with    Back Pain       Patient is  a 70-year-old female with past medical history of RCC and adrenal cortical adenoma s/p L partial nephrectomy and adrenalectomy (2007), HFpEF, stage III CKD, HTN, asthma, HLD, T2DM, MARYLU not using CPAP, inferior NSTEMI, VEDA with history of iron infusions, partially obstructed Ross's hernia with sx (Feb 2022 with repair in June 2022 ), history of provoked DVT in 2020, saddle PE in 2/2024 on Eliquis 2.5 mg twice daily with recent GI bleed from [facility] with Burke's ulcer, lumbar radiculopathy who presented with low back pain.  Reports not markedly changed recently denies traumatic injury but reports it has not been adequately controlled on medications patient was discharged with.            Patient History   Past Medical History:   Diagnosis Date    Abdominal distension (gaseous) 07/31/2019    Abdominal bloating    Asthma     Cancer (Multi)     Cataract     Coronary artery disease     Diabetes mellitus (Multi)     Diverticulosis of intestine, part unspecified, without perforation or abscess without bleeding 06/09/2013    Diverticulosis    Elevation of levels of liver transaminase levels 11/13/2020    Transaminitis    Encounter for follow-up examination after completed treatment for conditions other than malignant neoplasm 01/26/2019    Hospital discharge follow-up    Glaucoma     Hypertension     Long term (current) use of antibiotics 10/07/2016    Need for prophylactic antibiotic    Other amnesia 10/28/2014    Memory loss    Other conditions influencing health status 02/06/2019    History of cough    Other specified health status 02/07/2019    Hepatitis C antibody test negative    Other specified soft tissue disorders 06/14/2017    Leg swelling    Pain in left toe(s) 05/15/2017    Pain of toe of left foot    Pain in right foot 10/12/2016    Pain of right heel    Pain in right shoulder 06/22/2016    Acute pain of right shoulder    Personal  history of diseases of the blood and blood-forming organs and certain disorders involving the immune mechanism 2018    History of anemia    Personal history of other diseases of the respiratory system 2018    History of sinusitis    Personal history of other diseases of the respiratory system 2014    History of upper respiratory infection    Personal history of other malignant neoplasm of kidney 2021    Personal history of malignant neoplasm of kidney    Personal history of other medical treatment 2017    History of screening mammography    Personal history of other specified conditions 2014    History of abdominal pain    Personal history of other specified conditions 2017    History of urinary frequency    Personal history of other specified conditions 2021    History of dysuria    Personal history of other specified conditions 2014    History of breast lump    Unspecified abdominal hernia without obstruction or gangrene 2014    Hernia     Past Surgical History:   Procedure Laterality Date    BREAST BIOPSY Right 2014    Biopsy Breast Percutaneous Needle Core    BREAST BIOPSY Right 2018    CARDIAC CATHETERIZATION N/A 2024    Procedure: Left Heart Cath;  Surgeon: Donato Cespedes MD;  Location: [place] [address] Cardiac Cath Lab;  Service: Cardiovascular;  Laterality: N/A;     SECTION, CLASSIC  2014     Section    CHOLECYSTECTOMY  2017    Cholecystectomy Laparoscopic    HAND SURGERY  2020    Hand Surgery                                                                                                                                                          HERNIA REPAIR  2014    Hernia Repair    MR HEAD ANGIO WO IV CONTRAST  2014    MR HEAD ANGIO WO IV CONTRAST 2014 [facility]    OTHER SURGICAL HISTORY  2021    Nephrectomy    TOTAL ABDOMINAL HYSTERECTOMY W/ BILATERAL  SALPINGOOPHORECTOMY  04/21/2014    Total Abdominal Hysterectomy With Removal Of Both Ovaries     Family History   Problem Relation Name Age of Onset    Aneurysm Mother      Hypertension Mother      Pancreatic cancer Mother      Diabetes Mother      Other (laryngeal Cancer) Mother      Heart disease Father      Pulmonary embolism Brother      Breast cancer Father's Sister      Breast cancer Maternal Cousin       Social History     Tobacco Use    Smoking status: Former     Types: Cigarettes     Passive exposure: Never    Smokeless tobacco: Never   Vaping Use    Vaping status: Never Used   Substance Use Topics    Alcohol use: Yes     Comment: 1-2 x per month    Drug use: Never       Physical Exam   ED Triage Vitals [03/23/25 0949]   Temperature Heart Rate Respirations BP   36.6 °C (97.8 °F) 72 20 142/78      Pulse Ox Temp Source Heart Rate Source Patient Position   99 % Tympanic Monitor Sitting      BP Location FiO2 (%)     Left arm --       Physical Exam  Constitutional:       Appearance: Normal appearance.   HENT:      Head: Normocephalic and atraumatic.   Eyes:      Extraocular Movements: Extraocular movements intact.   Cardiovascular:      Rate and Rhythm: Normal rate.   Pulmonary:      Effort: Pulmonary effort is normal.   Abdominal:      General: There is no distension.      Palpations: Abdomen is soft.   Musculoskeletal:      Cervical back: Normal range of motion.      Comments: Lower thoracic/upper lumbar tenderness to palpation in midline.     Skin:     General: Skin is warm and dry.   Neurological:      Mental Status: She is alert.      Comments: Sensation intact in bilateral upper and lower extremities to light touch including in saddle region in all dermatomes of lower extremities.  Reported tingling that feels different in the right lower extremity but not clearly in dermatome distribution.  Able to stand without assistance, 5/5 bilateral hip flexion, knee flexion/extension plantarflexion/dorsiflexion.   5/5 strength in bilateral upper extremities.  Alert and oriented x 4.  Coordination intact.   Psychiatric:         Mood and Affect: Mood normal.         Behavior: Behavior normal.           ED Course & MDM   Diagnoses as of 03/24/25 0733   Acute midline low back pain, unspecified whether sciatica present                 No data recorded     Dewey Coma Scale Score: 15 (03/23/25 0950 : Case Trotter IV, RN)                           Medical Decision Making  Patient 70-year-old female with past medical history of RCC and adrenal cortical adenoma s/p L partial nephrectomy and adrenalectomy (2007), HFpEF, stage III CKD, HTN, asthma, HLD, T2DM, MARYLU not using CPAP, inferior NSTEMI, VEDA with history of iron infusions, partially obstructed Ross's hernia with sx (Feb 2022 with repair in June 2022 ), history of provoked DVT in 2020, saddle PE in 2/2024 on Eliquis 2.5 mg twice daily with recent GI bleed from [facility] with Burke's ulcer, lumbar radiculopathy who presented with low back pain.  Patient largely neuro intact other than chronic incontinence, tingling in the right lower extremity.  Patient does have history of renal cell carcinoma and renal findings on recent imaging possibly representing recurrence significantly raising concern for malignancy as cause of pain.  Diagnostic plans discussed with patient and in shared decision making, decision made to obtain MRIs of the T and L-spine today to further evaluate for spinal malignancy that may be contributing to pain, nonspecific neurological symptoms.  Per chart review, patient has had MRI of the C-spine in the past as well as CT urograms in the past but never had dedicated imaging of the lower spine where pain is currently present.  Patient does report itching reaction to gadolinium in the past but increased sensitivity with contrast felt to outweigh risks given primary concern for malignancy.  Initial plan to prep with 5-hour prep given minimal reaction  previously but radiology unwilling to proceed without 13-hour oral prep.  Patient handed off pending completion of contrast allergy prep and MRI imaging.    Patient seen and discussed with Dr. Patrizia Quintana MD, PhD  Emergency Medicine PGY3          Procedure  Procedures     Zenon Quintana MD  Resident  03/24/25 2050

## 2025-03-24 ENCOUNTER — APPOINTMENT (OUTPATIENT)
Dept: RADIOLOGY | Facility: HOSPITAL | Age: 70
End: 2025-03-24
Payer: MEDICARE

## 2025-03-24 ENCOUNTER — PHARMACY VISIT (OUTPATIENT)
Dept: PHARMACY | Facility: CLINIC | Age: 70
End: 2025-03-24
Payer: MEDICARE

## 2025-03-24 VITALS
RESPIRATION RATE: 14 BRPM | SYSTOLIC BLOOD PRESSURE: 147 MMHG | HEART RATE: 83 BPM | TEMPERATURE: 97.8 F | BODY MASS INDEX: 39.72 KG/M2 | DIASTOLIC BLOOD PRESSURE: 81 MMHG | HEIGHT: 59 IN | WEIGHT: 197 LBS | OXYGEN SATURATION: 98 %

## 2025-03-24 LAB
ANION GAP SERPL CALC-SCNC: 12 MMOL/L (ref 10–20)
BUN SERPL-MCNC: 20 MG/DL (ref 6–23)
CALCIUM SERPL-MCNC: 9 MG/DL (ref 8.6–10.6)
CHLORIDE SERPL-SCNC: 106 MMOL/L (ref 98–107)
CO2 SERPL-SCNC: 23 MMOL/L (ref 21–32)
CREAT SERPL-MCNC: 1.18 MG/DL (ref 0.5–1.05)
EGFRCR SERPLBLD CKD-EPI 2021: 50 ML/MIN/1.73M*2
ERYTHROCYTE [DISTWIDTH] IN BLOOD BY AUTOMATED COUNT: 15.9 % (ref 11.5–14.5)
GLUCOSE BLD MANUAL STRIP-MCNC: 241 MG/DL (ref 74–99)
GLUCOSE BLD MANUAL STRIP-MCNC: 285 MG/DL (ref 74–99)
GLUCOSE BLD MANUAL STRIP-MCNC: 333 MG/DL (ref 74–99)
GLUCOSE SERPL-MCNC: 327 MG/DL (ref 74–99)
HCT VFR BLD AUTO: 25.8 % (ref 36–46)
HGB BLD-MCNC: 8.4 G/DL (ref 12–16)
MCH RBC QN AUTO: 25.3 PG (ref 26–34)
MCHC RBC AUTO-ENTMCNC: 32.6 G/DL (ref 32–36)
MCV RBC AUTO: 78 FL (ref 80–100)
NRBC BLD-RTO: 0 /100 WBCS (ref 0–0)
PLATELET # BLD AUTO: 382 X10*3/UL (ref 150–450)
POTASSIUM SERPL-SCNC: 4.6 MMOL/L (ref 3.5–5.3)
RBC # BLD AUTO: 3.32 X10*6/UL (ref 4–5.2)
SODIUM SERPL-SCNC: 136 MMOL/L (ref 136–145)
WBC # BLD AUTO: 9.7 X10*3/UL (ref 4.4–11.3)

## 2025-03-24 PROCEDURE — 85027 COMPLETE CBC AUTOMATED: CPT

## 2025-03-24 PROCEDURE — A9575 INJ GADOTERATE MEGLUMI 0.1ML: HCPCS | Performed by: EMERGENCY MEDICINE

## 2025-03-24 PROCEDURE — 2500000001 HC RX 250 WO HCPCS SELF ADMINISTERED DRUGS (ALT 637 FOR MEDICARE OP)

## 2025-03-24 PROCEDURE — 2500000002 HC RX 250 W HCPCS SELF ADMINISTERED DRUGS (ALT 637 FOR MEDICARE OP, ALT 636 FOR OP/ED)

## 2025-03-24 PROCEDURE — 82947 ASSAY GLUCOSE BLOOD QUANT: CPT | Mod: 59

## 2025-03-24 PROCEDURE — 72158 MRI LUMBAR SPINE W/O & W/DYE: CPT | Performed by: RADIOLOGY

## 2025-03-24 PROCEDURE — 99238 HOSP IP/OBS DSCHRG MGMT 30/<: CPT | Performed by: PHYSICIAN ASSISTANT

## 2025-03-24 PROCEDURE — 72157 MRI CHEST SPINE W/O & W/DYE: CPT | Performed by: RADIOLOGY

## 2025-03-24 PROCEDURE — 96375 TX/PRO/DX INJ NEW DRUG ADDON: CPT | Mod: 59

## 2025-03-24 PROCEDURE — G0378 HOSPITAL OBSERVATION PER HR: HCPCS

## 2025-03-24 PROCEDURE — 96374 THER/PROPH/DIAG INJ IV PUSH: CPT

## 2025-03-24 PROCEDURE — 2550000001 HC RX 255 CONTRASTS: Performed by: EMERGENCY MEDICINE

## 2025-03-24 PROCEDURE — 80048 BASIC METABOLIC PNL TOTAL CA: CPT

## 2025-03-24 PROCEDURE — 2500000004 HC RX 250 GENERAL PHARMACY W/ HCPCS (ALT 636 FOR OP/ED)

## 2025-03-24 PROCEDURE — 36415 COLL VENOUS BLD VENIPUNCTURE: CPT

## 2025-03-24 PROCEDURE — 72157 MRI CHEST SPINE W/O & W/DYE: CPT

## 2025-03-24 PROCEDURE — 72158 MRI LUMBAR SPINE W/O & W/DYE: CPT

## 2025-03-24 RX ORDER — GADOTERATE MEGLUMINE 376.9 MG/ML
20 INJECTION INTRAVENOUS
Status: COMPLETED | OUTPATIENT
Start: 2025-03-24 | End: 2025-03-24

## 2025-03-24 RX ORDER — METHOCARBAMOL 500 MG/1
500 TABLET, FILM COATED ORAL 2 TIMES DAILY PRN
Qty: 20 TABLET | Refills: 0 | Status: SHIPPED | OUTPATIENT
Start: 2025-03-24 | End: 2025-04-03

## 2025-03-24 RX ORDER — LIDOCAINE 50 MG/G
2 PATCH TOPICAL DAILY
Qty: 14 PATCH | Refills: 0 | Status: SHIPPED | OUTPATIENT
Start: 2025-03-24

## 2025-03-24 RX ORDER — GUAIFENESIN 100 MG/5ML
200 LIQUID ORAL 3 TIMES DAILY PRN
Qty: 120 ML | Refills: 0 | Status: SHIPPED | OUTPATIENT
Start: 2025-03-24 | End: 2025-03-29

## 2025-03-24 RX ADMIN — INSULIN LISPRO 4 UNITS: 100 INJECTION, SOLUTION INTRAVENOUS; SUBCUTANEOUS at 16:27

## 2025-03-24 RX ADMIN — PANTOPRAZOLE SODIUM 40 MG: 40 TABLET, DELAYED RELEASE ORAL at 09:06

## 2025-03-24 RX ADMIN — AMLODIPINE BESYLATE 10 MG: 10 TABLET ORAL at 09:00

## 2025-03-24 RX ADMIN — APIXABAN 2.5 MG: 5 TABLET, FILM COATED ORAL at 09:10

## 2025-03-24 RX ADMIN — LORAZEPAM 1 MG: 1 TABLET ORAL at 09:07

## 2025-03-24 RX ADMIN — INSULIN LISPRO 2 UNITS: 100 INJECTION, SOLUTION INTRAVENOUS; SUBCUTANEOUS at 14:03

## 2025-03-24 RX ADMIN — DIPHENHYDRAMINE HYDROCHLORIDE 50 MG: 50 INJECTION INTRAMUSCULAR; INTRAVENOUS at 12:35

## 2025-03-24 RX ADMIN — DIPHENHYDRAMINE HYDROCHLORIDE 50 MG: 25 CAPSULE ORAL at 09:06

## 2025-03-24 RX ADMIN — INSULIN LISPRO 3 UNITS: 100 INJECTION, SOLUTION INTRAVENOUS; SUBCUTANEOUS at 08:57

## 2025-03-24 RX ADMIN — GADOTERATE MEGLUMINE 17 ML: 376.9 INJECTION INTRAVENOUS at 12:44

## 2025-03-24 RX ADMIN — PREDNISONE 50 MG: 10 TABLET ORAL at 08:59

## 2025-03-24 RX ADMIN — PREDNISONE 50 MG: 10 TABLET ORAL at 01:59

## 2025-03-24 ASSESSMENT — PAIN - FUNCTIONAL ASSESSMENT: PAIN_FUNCTIONAL_ASSESSMENT: 0-10

## 2025-03-24 ASSESSMENT — PAIN SCALES - GENERAL: PAINLEVEL_OUTOF10: 4

## 2025-03-24 ASSESSMENT — PAIN DESCRIPTION - LOCATION: LOCATION: BACK

## 2025-03-24 NOTE — PROGRESS NOTES
Physical Therapy                 Therapy Communication Note    Patient Name: Vania Andrews  MRN: [id number]  Department: [facility]  [facility]  Today's Date: 3/24/2025     Discipline: Physical Therapy          Missed Visit Reason: Missed Visit Reason:  (Spoke with provider in CDU - defer PT eval pending pt's MRI at this time; will follow as appropriate)    Missed Time: Attempt 0809 03/24/25 at 8:16 AM - Ashley Mosher, PT

## 2025-03-24 NOTE — DISCHARGE SUMMARY
Disposition Note  [facility] CLINICAL DECISION  Patient: Vania Andrews  MRN: [id number]  : 1955  Date of Evaluation: 3/23/2025  ED Provider: Sixto Ortiz PA-C      Limitations to history: None  Independent Historian: Yes  External Records Reviewed: Recent and relevant inpatient and outpatient notes in EMR      Subjective:    Vania Andrews is a 70 y.o. female has undergone comprehensive diagnostic evaluation and therapeutic management in accordance with the CDU guidelines for acute on chronic lower back pain. Based on {Mrs. Andrews's clinical response and diagnostic information during this period of observation, it has been determined that the patient will be discharged.    The acute evaluation included:  Orders Placed This Encounter   Procedures    MR lumbar spine w and wo IV contrast    MR thoracic spine w and wo IV contrast    CBC and Auto Differential    Comprehensive metabolic panel    CBC    Basic metabolic panel    Adult diet Renal, Consistent Carb; CCD 75 gm/meal; Potassium Restricted 2 gm (50mEq); 2 - 3 grams Sodium    Weight on admission    Height on admission    Activity (specify) No Restrictions    Vital Signs    No telemetry or cardiac monitoring required    Pain Assessment    Notify provider    Apply sequential compression device    Diet instructions to nursing: 15 grams oral carbohydrate for low blood glucose    Glucose 10-70 mg/dL & CONSCIOUS- Give 15 Grams of Carbohydrates and repeat until blood glucose level reaches 100 mg/dL or greater.    Notify provider (specify parameters)    Notify provider (specify parameters)    Notify provider (specify parameters)    Notify provider (specify parameters)    Notify provider (specify parameters)    Glucose 10-70 mg/dL & CONSCIOUS- Give 15 Grams of Carbohydrates and repeat until blood glucose level reaches 100 mg/dL or greater.    Notify provider (specify parameters)    Notify provider (specify parameters)    Notify provider (specify  parameters)    Neuro checks    OT eval and treat    PT eval and treat    POCT GLUCOSE    POCT GLUCOSE    POCT GLUCOSE    POCT GLUCOSE    POCT GLUCOSE    ECG 12 lead    Electrocardiogram, 12-lead PRN ACS symptoms    Initiate Observation Send to CDU         Placed in observation at: 2113       Past History     Past Medical History:   Diagnosis Date    Abdominal distension (gaseous) 07/31/2019    Abdominal bloating    Asthma     Cancer (Multi)     Cataract     Coronary artery disease     Diabetes mellitus (Multi)     Diverticulosis of intestine, part unspecified, without perforation or abscess without bleeding 06/09/2013    Diverticulosis    Elevation of levels of liver transaminase levels 11/13/2020    Transaminitis    Encounter for follow-up examination after completed treatment for conditions other than malignant neoplasm 01/26/2019    Hospital discharge follow-up    Glaucoma     Hypertension     Long term (current) use of antibiotics 10/07/2016    Need for prophylactic antibiotic    Other amnesia 10/28/2014    Memory loss    Other conditions influencing health status 02/06/2019    History of cough    Other specified health status 02/07/2019    Hepatitis C antibody test negative    Other specified soft tissue disorders 06/14/2017    Leg swelling    Pain in left toe(s) 05/15/2017    Pain of toe of left foot    Pain in right foot 10/12/2016    Pain of right heel    Pain in right shoulder 06/22/2016    Acute pain of right shoulder    Personal history of diseases of the blood and blood-forming organs and certain disorders involving the immune mechanism 04/09/2018    History of anemia    Personal history of other diseases of the respiratory system 04/02/2018    History of sinusitis    Personal history of other diseases of the respiratory system 03/19/2014    History of upper respiratory infection    Personal history of other malignant neoplasm of kidney 01/14/2021    Personal history of malignant neoplasm of kidney     Personal history of other medical treatment 2017    History of screening mammography    Personal history of other specified conditions 2014    History of abdominal pain    Personal history of other specified conditions 2017    History of urinary frequency    Personal history of other specified conditions 2021    History of dysuria    Personal history of other specified conditions 2014    History of breast lump    Unspecified abdominal hernia without obstruction or gangrene 2014    Hernia     Past Surgical History:   Procedure Laterality Date    BREAST BIOPSY Right 2014    Biopsy Breast Percutaneous Needle Core    BREAST BIOPSY Right 2018    CARDIAC CATHETERIZATION N/A 2024    Procedure: Left Heart Cath;  Surgeon: Donato Cespedes MD;  Location: [place] [address] [facility];  Service: Cardiovascular;  Laterality: N/A;     SECTION, CLASSIC  2014     Section    CHOLECYSTECTOMY  2017    Cholecystectomy Laparoscopic    HAND SURGERY  2020    Hand Surgery                                                                                                                                                          HERNIA REPAIR  2014    Hernia Repair    MR HEAD ANGIO WO IV CONTRAST  2014    MR HEAD ANGIO WO IV CONTRAST 2014 [facility] ANCILLARY LEGACY    OTHER SURGICAL HISTORY  2021    Nephrectomy    TOTAL ABDOMINAL HYSTERECTOMY W/ BILATERAL SALPINGOOPHORECTOMY  2014    Total Abdominal Hysterectomy With Removal Of Both Ovaries     Social History     Socioeconomic History    Marital status:    Tobacco Use    Smoking status: Former     Types: Cigarettes     Passive exposure: Never    Smokeless tobacco: Never   Vaping Use    Vaping status: Never Used   Substance and Sexual Activity    Alcohol use: Yes     Comment: 1-2 x per month    Drug use: Never     Social Drivers of Health     Financial Resource Strain: Low Risk   (3/1/2025)    Overall Financial Resource Strain (CARDIA)     Difficulty of Paying Living Expenses: Not very hard   Food Insecurity: No Food Insecurity (2/28/2025)    Hunger Vital Sign     Worried About Running Out of Food in the Last Year: Never true     Ran Out of Food in the Last Year: Never true   Transportation Needs: No Transportation Needs (3/1/2025)    PRAPARE - Transportation     Lack of Transportation (Medical): No     Lack of Transportation (Non-Medical): No   Intimate Partner Violence: Not At Risk (2/28/2025)    Humiliation, Afraid, Rape, and Kick questionnaire     Fear of Current or Ex-Partner: No     Emotionally Abused: No     Physically Abused: No     Sexually Abused: No   Housing Stability: Low Risk  (3/1/2025)    Housing Stability Vital Sign     Unable to Pay for Housing in the Last Year: No     Number of Times Moved in the Last Year: 0     Homeless in the Last Year: No         Medications/Allergies     Previous Medications    ACCU-CHEK GUIDE TEST STRIPS STRIP    Use 1 test strip to test blood sugar twice daily.    ACETAMINOPHEN (TYLENOL 8 HOUR) 650 MG ER TABLET    Take 1-2 tablets (650-1,300 mg) by mouth every 8 hours if needed for mild pain (1 - 3). Do not crush, chew, or split.    ALBUTEROL 90 MCG/ACTUATION INHALER    Inhale 2 puffs every 4 hours if needed for wheezing or shortness of breath (You may also use this 10-15 minutes prior to exertional activity as needed).    AMLODIPINE (NORVASC) 10 MG TABLET    Take 1 tablet (10 mg) by mouth once daily.    APIXABAN (ELIQUIS) 2.5 MG TABLET    Take 1 tablet (2.5 mg) by mouth every 12 hours.    BENZOCAINE-MENTHOL (CEPASTAT SORE THROAT) LOZENGE    Dissolve 1 lozenge in the mouth every 2 hours if needed for sore throat.    COLCHICINE 0.6 MG TABLET    Take 1 tablet (0.6 mg) by mouth once daily.    COMBIGAN 0.2-0.5 % OPHTHALMIC SOLUTION    Administer 1 drop into both eyes 2 times a day.    DEXTROMETHORPHAN-GUAIFENESIN (ROBITUSSIN DM)  MG/5 ML ORAL  LIQUID    Take 5 mL by mouth every 4 hours if needed for cough.    DICLOFENAC SODIUM 1 % KIT    Apply 1 Application topically if needed.  APPLY TO LOWER EXTREMITIES, 4 GM OF GEL TO AFFECTED AREA 4 TIMES DAILY. DO NOT APPLY MORE THAN 16 GM DAILY TO ANY ONE AFFECTED JOINT.    DIPHENHYDRAMINE (BENADRYL ALLERGY) 50 MG TABLET    Take 1 tablet (50 mg) by mouth 1 time for 1 dose. Take 1 hour before your scan    DIPHENHYDRAMINE (BENADRYL) 50 MG TABLET    Take 1 tablet 1 hr prior to contrast administration    DULAGLUTIDE (TRULICITY) 0.75 MG/0.5 ML PEN INJECTOR    Inject 0.75 mg under the skin 1 (one) time per week.    FAMOTIDINE (PEPCID) 20 MG TABLET    Take 1 tablet (20 mg) by mouth once daily at bedtime.    FLUTICASONE (FLONASE) 50 MCG/ACTUATION NASAL SPRAY    Administer 2 sprays into each nostril once daily.    FUROSEMIDE (LASIX) 20 MG TABLET    Take 1 tablet (20 mg) by mouth once daily as needed (For Swelling).    GABAPENTIN (NEURONTIN) 300 MG CAPSULE    Take 1 capsule (300 mg) by mouth once daily at bedtime.    GLIPIZIDE 2.5 MG TABLET    Take 2.5 mg by mouth once daily.    HYDROCORTISONE 1 % CREAM    Apply as needed to the injection site    INSULIN LISPRO (HUMALOG KWIKPEN INSULIN) 100 UNIT/ML PEN    Use with sliding scale 3 times a day, up to 30 units daily    LANCETS (ACCU-CHEK SOFTCLIX LANCETS) MISC    Use to check blood sugar twice daily    LATANOPROST (XALATAN) 0.005 % OPHTHALMIC SOLUTION    Administer 1 drop into both eyes once daily in the morning.    LIDOCAINE (LIDODERM) 5 % PATCH    Place 1 patch over 12 hours on the skin once daily. Remove & discard patch within 12 hours or as directed by MD.    LOSARTAN (COZAAR) 25 MG TABLET    Take 1 tablet (25 mg) by mouth once daily.    METHOCARBAMOL (ROBAXIN) 500 MG TABLET    Take 1 tablet (500 mg) by mouth every 8 hours if needed for muscle spasms (Back pain) for up to 10 days.    METOPROLOL SUCCINATE XL (TOPROL-XL) 200 MG 24 HR TABLET    Take 1 tablet (200 mg) by mouth  "once daily.    MOMETASONE (NASONEX 24HR ALLERGY) 50 MCG/ACTUATION NASAL SPRAY        OMEPRAZOLE (PRILOSEC) 40 MG DR CAPSULE    Take 1 capsule (40 mg) by mouth once daily.    PEN NEEDLE, DIABETIC 31 GAUGE X 5/16\" NEEDLE    Use to inject 1-4 times daily as directed.    POLYETHYLENE GLYCOL (GLYCOLAX, MIRALAX) 17 GRAM/DOSE POWDER    Take 17 g by mouth 2 times a day.    PREDNISONE (DELTASONE) 50 MG TABLET    Take 1 tablet (50 mg) by mouth see administration instructions. Take 50 mg at  13 hours , 7 hours and 1 hour before your scan    PREDNISONE (DELTASONE) 50 MG TABLET    Take 1 tablet at 13 hrs, 7 hrs and 1 hr prior to contrast administration    ROSUVASTATIN (CRESTOR) 20 MG TABLET    Take 1 tablet (20 mg) by mouth once daily.    TIOTROPIUM (SPIRIVA RESPIMAT) 2.5 MCG/ACTUATION INHALER    Inhale 2 puffs once daily.     Allergies   Allergen Reactions    Adhesive Itching    Amoxicillin Hives and Itching    Bee Sting Kit Swelling    Cephalexin Itching and Nausea Only    Gadolinium-Containing Contrast Media Hives    Iodine Unknown    Latex Other    Pioglitazone Swelling    Rubella Virus Live Vaccine Unknown    Salicylates Other    Shellfish Derived Swelling    Sulfa (Sulfonamide Antibiotics) Unknown    Sulfamethoxazole-Trimethoprim Hives and Itching    Iodinated Contrast Media Itching and Rash         Review of Systems  All systems reviewed and otherwise negative, except as stated above in HPI.    Diagnostics reviewed by Sixto Ortiz PA-C     Labs:  Results for orders placed or performed during the hospital encounter of 03/23/25   ECG 12 lead    Collection Time: 03/23/25 10:42 AM   Result Value Ref Range    Ventricular Rate 60 BPM    Atrial Rate 60 BPM    OK Interval 168 ms    QRS Duration 78 ms    QT Interval 390 ms    QTC Calculation(Bazett) 390 ms    P Axis 58 degrees    R Axis 11 degrees    T Axis 33 degrees    QRS Count 10 beats    Q Onset 224 ms    P Onset 140 ms    P Offset 181 ms    T Offset 419 ms    QTC " Lidia Tolentino ms   CBC and Auto Differential    Collection Time: 03/23/25 12:11 PM   Result Value Ref Range    WBC 9.1 4.4 - 11.3 x10*3/uL    nRBC 0.0 0.0 - 0.0 /100 WBCs    RBC 3.45 (L) 4.00 - 5.20 x10*6/uL    Hemoglobin 8.7 (L) 12.0 - 16.0 g/dL    Hematocrit 28.3 (L) 36.0 - 46.0 %    MCV 82 80 - 100 fL    MCH 25.2 (L) 26.0 - 34.0 pg    MCHC 30.7 (L) 32.0 - 36.0 g/dL    RDW 16.0 (H) 11.5 - 14.5 %    Platelets 419 150 - 450 x10*3/uL    Neutrophils % 73.7 40.0 - 80.0 %    Immature Granulocytes %, Automated 0.4 0.0 - 0.9 %    Lymphocytes % 18.3 13.0 - 44.0 %    Monocytes % 5.4 2.0 - 10.0 %    Eosinophils % 1.9 0.0 - 6.0 %    Basophils % 0.3 0.0 - 2.0 %    Neutrophils Absolute 6.66 1.20 - 7.70 x10*3/uL    Immature Granulocytes Absolute, Automated 0.04 0.00 - 0.70 x10*3/uL    Lymphocytes Absolute 1.66 1.20 - 4.80 x10*3/uL    Monocytes Absolute 0.49 0.10 - 1.00 x10*3/uL    Eosinophils Absolute 0.17 0.00 - 0.70 x10*3/uL    Basophils Absolute 0.03 0.00 - 0.10 x10*3/uL   Comprehensive metabolic panel    Collection Time: 03/23/25 12:11 PM   Result Value Ref Range    Glucose 160 (H) 74 - 99 mg/dL    Sodium 139 136 - 145 mmol/L    Potassium 3.9 3.5 - 5.3 mmol/L    Chloride 106 98 - 107 mmol/L    Bicarbonate 26 21 - 32 mmol/L    Anion Gap 11 10 - 20 mmol/L    Urea Nitrogen 18 6 - 23 mg/dL    Creatinine 1.28 (H) 0.50 - 1.05 mg/dL    eGFR 45 (L) >60 mL/min/1.73m*2    Calcium 9.1 8.6 - 10.6 mg/dL    Albumin 3.8 3.4 - 5.0 g/dL    Alkaline Phosphatase 141 (H) 33 - 136 U/L    Total Protein 6.6 6.4 - 8.2 g/dL    AST 22 9 - 39 U/L    Bilirubin, Total 0.3 0.0 - 1.2 mg/dL    ALT 34 7 - 45 U/L   POCT GLUCOSE    Collection Time: 03/23/25  9:58 PM   Result Value Ref Range    POCT Glucose 269 (H) 74 - 99 mg/dL   POCT GLUCOSE    Collection Time: 03/23/25 11:36 PM   Result Value Ref Range    POCT Glucose 275 (H) 74 - 99 mg/dL   CBC    Collection Time: 03/24/25  4:48 AM   Result Value Ref Range    WBC 9.7 4.4 - 11.3 x10*3/uL    nRBC 0.0 0.0 -  0.0 /100 WBCs    RBC 3.32 (L) 4.00 - 5.20 x10*6/uL    Hemoglobin 8.4 (L) 12.0 - 16.0 g/dL    Hematocrit 25.8 (L) 36.0 - 46.0 %    MCV 78 (L) 80 - 100 fL    MCH 25.3 (L) 26.0 - 34.0 pg    MCHC 32.6 32.0 - 36.0 g/dL    RDW 15.9 (H) 11.5 - 14.5 %    Platelets 382 150 - 450 x10*3/uL   Basic metabolic panel    Collection Time: 03/24/25  4:48 AM   Result Value Ref Range    Glucose 327 (H) 74 - 99 mg/dL    Sodium 136 136 - 145 mmol/L    Potassium 4.6 3.5 - 5.3 mmol/L    Chloride 106 98 - 107 mmol/L    Bicarbonate 23 21 - 32 mmol/L    Anion Gap 12 10 - 20 mmol/L    Urea Nitrogen 20 6 - 23 mg/dL    Creatinine 1.18 (H) 0.50 - 1.05 mg/dL    eGFR 50 (L) >60 mL/min/1.73m*2    Calcium 9.0 8.6 - 10.6 mg/dL   POCT GLUCOSE    Collection Time: 03/24/25  8:31 AM   Result Value Ref Range    POCT Glucose 285 (H) 74 - 99 mg/dL   POCT GLUCOSE    Collection Time: 03/24/25  1:34 PM   Result Value Ref Range    POCT Glucose 241 (H) 74 - 99 mg/dL   POCT GLUCOSE    Collection Time: 03/24/25  4:24 PM   Result Value Ref Range    POCT Glucose 333 (H) 74 - 99 mg/dL     *Note: Due to a large number of results and/or encounters for the requested time period, some results have not been displayed. A complete set of results can be found in Results Review.     Radiographs:  MR thoracic spine w and wo IV contrast   Final Result   MR THORACIC SPINE:        Multilevel degenerative disease as detailed above without evidence of   high-grade spinal stenosis or cord compression. No suspicious   enhancement to suggest metastatic disease.             MR LUMBAR SPINE:        Multilevel degenerative disease as detailed above, most significant   at L4-L5.        At L5-S1, dorsal disc protrusion creates moderate right lateral   recess stenosis and some dorsal displacement of the descending right   S1 nerve root. Correlate clinically for the appropriate radiculopathy.        No evidence of high-grade spinal canal stenosis or cord compression.   No suspicious  "enhancement to suggest metastatic disease.        Stable appearance of incompletely characterized right parapelvic   septated mass, scattered simple cortical cysts and cystic structure   within left nephrectomy bed.        I personally reviewed the images/study and I agree with the findings   as stated. This study was interpreted at [place], [place].        Signed by: Wilmer Ramirez 3/24/2025 2:39 PM   Dictation workstation:   [id number]      MR lumbar spine w and wo IV contrast   Final Result   MR THORACIC SPINE:        Multilevel degenerative disease as detailed above without evidence of   high-grade spinal stenosis or cord compression. No suspicious   enhancement to suggest metastatic disease.             MR LUMBAR SPINE:        Multilevel degenerative disease as detailed above, most significant   at L4-L5.        At L5-S1, dorsal disc protrusion creates moderate right lateral   recess stenosis and some dorsal displacement of the descending right   S1 nerve root. Correlate clinically for the appropriate radiculopathy.        No evidence of high-grade spinal canal stenosis or cord compression.   No suspicious enhancement to suggest metastatic disease.        Stable appearance of incompletely characterized right parapelvic   septated mass, scattered simple cortical cysts and cystic structure   within left nephrectomy bed.        I personally reviewed the images/study and I agree with the findings   as stated. This study was interpreted at [place], [place].        Signed by: Wilmer Ramirez 3/24/2025 2:39 PM   Dictation workstation:   [id number]              Physical Exam     Visit Vitals  /81 (BP Location: Right arm, Patient Position: Lying)   Pulse 83   Temp 36.6 °C (97.8 °F) (Temporal)   Resp 14   Ht 1.499 m (4' 11\")   Wt 89.4 kg (197 lb)   LMP  (LMP Unknown)   SpO2 98%   BMI 39.79 kg/m²   OB Status " Postmenopausal   Smoking Status Former   BSA 1.93 m²       Physical exam  VS: As documented in the triage note and EMR flowsheet from this visit were reviewed.    General: Patient is AAOx3, appears well developed, well nourished, is a good historian, answers questions appropriately    HEENT: head normocephalic, atraumatic, PERRLA, EOMs intact, oropharynx without erythema or exudate, buccal mucosa intact without lesions, nose is patent bilateral    Neck: supple, full ROM, negative for lymphadenopathy, JVD, thyromegaly, tracheal deviation, nuchal rigidity    Pulmonary: Clear to auscultation bilaterally, No wheezing, rales, or rhonchi, no accessory muscle use, able to speak full clear sentences    Cardiac: Normal rate and rhythm, no murmurs, rubs or gallops    GI: soft, non-tender, non-distended, normoactive bowelsounds in all four quadrants, no masses or organomegaly, no guarding or CVA tenderness noted    Musculoskeletal: full weight bearing, DOWD, no joint effusions, clubbing or edema noted. Right paraspinal tenderness without any crepitus or deformity.  No direct midline tenderness noted on my exam.  She does have positive straight leg raise test on the right side with decreased range of motion due to pain.  MSPs intact currently in all extremities.  Able to ambulate,      Skin: Warm, dry, intact, no lesions or rashes noted, turgor is good.    Neuro: patient follow commands, cranial nerves 2-12 grossly intact, motor strengths 5/5 upper and lower extremities, sensation are symmetrical. No focal deficits.    Psych: Appropriate mood and affect for situation      Consultants  1) N/A      Impression and Plan    In summary, Vania Andrews has been cared for according to the standard [facility] Clinical Decision Unit observation protocol for Lumbar back pain. This extended period of observation was specifically required to determine the need for hospitalization. Prior to discharge from  "observation, the final physical exam is documented above.     Significant events during the course of observation based on the goals of the clinical problem list include:   1) Remained stable  2) Patient states pain is improved to 3/10 pain scale    Based on the patient's condition and test results, the patient will be discharged    Total length of observation was 31 hours. Dr. Win is the CDU disposition attending.      Discharge Diagnosis  Lumbar back pain    Issues Requiring Follow-Up  See below    Discharge Meds     Your medication list        CONTINUE taking these medications        Instructions Last Dose Given Next Dose Due   Accu-Chek Softclix Lancets misc  Generic drug: lancets      Use to check blood sugar twice daily       BD Ultra-Fine Short Pen Needle 31 gauge x 5/16\" needle  Generic drug: pen needle, diabetic      Use to inject 1-4 times daily as directed.              ASK your doctor about these medications        Instructions Last Dose Given Next Dose Due   Accu-Chek Guide test strips strip  Generic drug: blood sugar diagnostic      Use 1 test strip to test blood sugar twice daily.       acetaminophen 650 mg ER tablet  Commonly known as: Tylenol 8 HOUR           albuterol 90 mcg/actuation inhaler      Inhale 2 puffs every 4 hours if needed for wheezing or shortness of breath (You may also use this 10-15 minutes prior to exertional activity as needed).       amLODIPine 10 mg tablet  Commonly known as: Norvasc      Take 1 tablet (10 mg) by mouth once daily.       ammonium lactate 12 % lotion  Commonly known as: Lac-Hydrin  Ask about: Should I take this medication?      Apply topically if needed for dry skin.       azithromycin 500 mg tablet  Commonly known as: Zithromax  Ask about: Should I take this medication?      Take 1 tablet (500 mg) by mouth once daily for 5 days.       colchicine 0.6 mg tablet      Take 1 tablet (0.6 mg) by mouth once daily.       Combigan 0.2-0.5 % ophthalmic " solution  Generic drug: brimonidine-timoloL      Administer 1 drop into both eyes 2 times a day.       diclofenac sodium 1 % kit           diphenhydrAMINE 50 mg tablet  Commonly known as: Benadryl Allergy      Take 1 tablet (50 mg) by mouth 1 time for 1 dose. Take 1 hour before your scan       diphenhydrAMINE 50 mg tablet  Commonly known as: BENADryl      Take 1 tablet 1 hr prior to contrast administration       Eliquis 2.5 mg tablet  Generic drug: apixaban      Take 1 tablet (2.5 mg) by mouth every 12 hours.       famotidine 20 mg tablet  Commonly known as: Pepcid           fluticasone 50 mcg/actuation nasal spray  Commonly known as: Flonase      Administer 2 sprays into each nostril once daily.       furosemide 20 mg tablet  Commonly known as: Lasix      Take 1 tablet (20 mg) by mouth once daily as needed (For Swelling).       gabapentin 300 mg capsule  Commonly known as: Neurontin      Take 1 capsule (300 mg) by mouth once daily at bedtime.       Tish-Tussin DM  mg/5 mL oral liquid  Generic drug: dextromethorphan-guaifenesin      Take 5 mL by mouth every 4 hours if needed for cough.       glipiZIDE 2.5 mg tablet      Take 2.5 mg by mouth once daily.       hydrocortisone 1 % cream      Apply as needed to the injection site       insulin lispro 100 unit/mL pen  Commonly known as: HumaLOG KwikPen Insulin      Use with sliding scale 3 times a day, up to 30 units daily       latanoprost 0.005 % ophthalmic solution  Commonly known as: Xalatan      Administer 1 drop into both eyes once daily in the morning.       lidocaine 5 % patch  Commonly known as: Lidoderm      Place 1 patch over 12 hours on the skin once daily. Remove & discard patch within 12 hours or as directed by MD.       losartan 25 mg tablet  Commonly known as: Cozaar      Take 1 tablet (25 mg) by mouth once daily.       methocarbamol 500 mg tablet  Commonly known as: Robaxin      Take 1 tablet (500 mg) by mouth every 8 hours if needed for muscle  spasms (Back pain) for up to 10 days.       metoprolol succinate  mg 24 hr tablet  Commonly known as: Toprol-XL      Take 1 tablet (200 mg) by mouth once daily.       Nasonex 24hr Allergy 50 mcg/actuation nasal spray  Generic drug: mometasone           omeprazole 40 mg DR capsule  Commonly known as: PriLOSEC      Take 1 capsule (40 mg) by mouth once daily.       polyethylene glycol 17 gram/dose powder  Commonly known as: Glycolax, Miralax      Take 17 g by mouth 2 times a day.       predniSONE 50 mg tablet  Commonly known as: Deltasone      Take 1 tablet (50 mg) by mouth see administration instructions. Take 50 mg at  13 hours , 7 hours and 1 hour before your scan       predniSONE 50 mg tablet  Commonly known as: Deltasone      Take 1 tablet at 13 hrs, 7 hrs and 1 hr prior to contrast administration       rosuvastatin 20 mg tablet  Commonly known as: Crestor      Take 1 tablet (20 mg) by mouth once daily.       Sore Throat (benzocaine-menth) 15-3.6 mg lozenge  Generic drug: benzocaine-menthol      Dissolve 1 lozenge in the mouth every 2 hours if needed for sore throat.       Spiriva Respimat 2.5 mcg/actuation inhaler  Generic drug: tiotropium      Inhale 2 puffs once daily.       Trulicity 0.75 mg/0.5 mL pen injector  Generic drug: dulaglutide      Inject 0.75 mg under the skin 1 (one) time per week.                Test Results Pending At Discharge  Pending Labs       No current pending labs.            Hospital Course   Mrs. Andrews was admitted to the clinical decision unit for acute on chronic atraumatic lower back pain.  Medical workup included EKG, laboratory studies and imaging.  Diagnostic information was obtained, reviewed and discussed with the patient. Diagnostic testing performed in the ED showed renal and hepatic function within normal ranges for the patient, with slightly elevated alkaline phosphatase at 141, up from 128 2 weeks ago. CBC with differential shows anemia that is baseline for the  patient with no leukocytosis or leukopenia. With the worsening symptoms, ED staff felt MRI necessary of the T and L-spine with and without contrast. Patient does have gadolinium allergy causing hives, and so 13-hour prep was recommended. Decision made to admit to the CDU for the prep. She was also given multiple doses of pain medication including with lidocaine patch, Tylenol, Robaxin, Percocet, and morphine. She did receive her first dose of prednisone/Solu-Medrol in the ED. MRI of the thoracic and lumbar spine revealed stable nonacute findings.  Patient is ambulatory in the unit without assistance.  She reports that her pain has significantly improved down to 3/10 pain scale.  Patient is scheduled to see pain management clinic on 3/28/2025.  Additionally, she is scheduled to follow-up with outpatient physical therapy in the next coming weeks.  She has remained clinically, hemodynamically and neurologically intact was informed of her clinical findings.  Plan to discharge home with instructions to follow-up with her treating providers as scheduled.  She will be instructed to return to the emergency department with new or worsening symptoms or for any other concerns.  I discussed this plan of care with Mrs. Andrews and she verbalized understanding and is in agreement will be discharged in stable condition.      Assessment/plan    Acute on chronic lower back pain  -Robaxin  -Lidocaine patch  -Acetaminophen OTC as needed and as instructed  -Heat therapy x 20 minutes or less every 2-4 hours    Outpatient Follow-Up  Future Appointments   Date Time Provider Department Center   3/28/2025  8:15 AM Raoul Fung MD [id number] [facility]   4/1/2025  9:00 AM Perez Zacarias MD [id number] [facility]   4/10/2025  2:00 PM Aura Jack MD [id number] [facility]   4/10/2025  3:30 PM Venus Mandel MD [id number] [facility]   4/16/2025  9:40 AM Karan Simons MD [facility]   4/22/2025 10:15 AM Laura Mata MD  [id number] [facility]   5/29/2025  8:00 AM Mingo Leigh MD [id number] [facility]   6/2/2025 12:30 PM Carter Ayers MD [id number] [facility]   6/30/2025  8:00 AM Sharmin Wall DO [id number] [facility]   7/10/2025 10:00 AM Garland Spears MD [id number] [facility]   8/20/2025 11:00 AM Monique Gross MD [id number] [facility]   9/16/2025  9:40 AM Vianney Marrufo MD [id number] [facility]   9/24/2025  9:30 AM Rohith Frey MD [id number] [facility]         Sixto Ortiz PA-C

## 2025-03-24 NOTE — PROGRESS NOTES
Occupational Therapy                 Therapy Communication Note    Patient Name: Vania Andrews  MRN: [id number]  Department: [facility]  Room: [facility]  Today's Date: 3/24/2025     Discipline: Occupational Therapy    OT Missed Visit: Yes     Missed Visit Reason: Missed Visit Reason:  (awaiting MRI results)    Missed Time: Attempt

## 2025-03-24 NOTE — PROGRESS NOTES
Emergency Medicine Transition of Care Note.    I received Vania Andrews in signout from Dr. Quintana.  Please see the previous ED provider note for all HPI, PE and MDM up to the time of signout at 1900. This is in addition to the primary record.    In brief Vania Andrews is an 70 y.o. female presenting for with past medical history of RCC and adrenal cortical adenoma s/p L partial nephrectomy and adrenalectomy (2007), HFpEF, stage III CKD, HTN, asthma, HLD, T2DM, MARYLU not using CPAP, inferior NSTEMI, VEDA with history of iron infusions, partially obstructed Ross's hernia with sx (Feb 2022 with repair in June 2022 ), history of provoked DVT in 2020, saddle PE in 2/2024 on Eliquis 2.5 mg twice daily with recent GI bleed from [facility] with Burke's ulcer, lumbar radiculopathy who presented with low back pain.  MRI of the T and L-spine ordered.  Patient noted of a contrast allergy and was initiated on 13-hour oral prep.  MRIs of the T and L-spine ordered to evaluate for cord compression as well as metastasis.    Chief Complaint   Patient presents with    Back Pain     At the time of signout we were awaiting: Contrast prep and MRIs of the spine    Diagnoses as of 03/26/25 1014   Acute midline low back pain, unspecified whether sciatica present   Chronic cough       Medical Decision Making  Upon discussion with radiology, patient will undergo MRIs at approximately 10 AM tomorrow morning.  Discussed admission to medicine for PT/OT and patient declined.  Patient did state that she be agreeable to admission to CDU.  Discussed patient presentation with CDU provider.  Patient admitted to the CDU in stable condition.    Final diagnoses:   [M54.50] Acute midline low back pain, unspecified whether sciatica present   [R05.3] Chronic cough           Procedure  Procedures    Patient presentation and plan discussed with attending physician.    Gurpreet Jovel MD

## 2025-03-24 NOTE — H&P
History and Physical  [facility] CLINICAL DECISION  Patient: Vania Andrews  MRN: [id number]  : 1955  Date of Evaluation: 3/23/2025  ED Provider: JESSICA Lema      Limitations to history: None  Independent Historian: Self  External Records Reviewed: ED visit from yesterday      Patient History:  Vania Andrews is a 70-year-old female with history of renal cell carcinoma and adrenal cortical adenoma s/p left partial nephrectomy and adrenalectomy in , HFpEF, CKD 3, HTN, DM2, CAD, VEDA, asthma, MARYLU, DVT/saddle PE on Eliquis, chronic neck and back pain, baseline incontinence x 1 year, recent GI bleed, admitted to the CDU to obtain MRIs and to get gadolinium contrast prep prophylaxis, regarding her worsening lower back pain radiating to the right lower extremity with worsening paresthesias.    Presented to the ED yesterday for chief complaint of right-sided low back pain.  Worsening over the last week.  Urinalysis yesterday was unremarkable and she was sent home after pain control was provided with Robaxin, lidocaine patch, Percocet.  Today she comes back for persistent pain with worsening tingling and intermittent numbness sensation down the right lower extremity.  This has been intermittent she says over the last 2 months.  Denies any injury.  She has been incontinent at baseline for at least the last year she says.  Denies any changes in urination or bowel movement.  Denies chest pain or dyspnea.  No fevers, chills, myalgia.  States that she was told she might need hip replacement, and she sees orthopedics regularly.  Diagnostic testing performed in the ED showed renal and hepatic function within normal ranges for the patient, with slightly elevated alkaline phosphatase at 141, up from 128 2 weeks ago.  CBC with differential shows anemia that is baseline for the patient with no leukocytosis or leukopenia.  With the worsening symptoms, ED staff felt MRI necessary of the T and  L-spine with and without contrast.  Patient does have gadolinium allergy causing hives, and so 13-hour prep was recommended.  Decision made to admit to the CDU for the prep.  She was also given multiple doses of pain medication including with lidocaine patch, Tylenol, Robaxin, Percocet, and morphine.  She did receive her first dose of prednisone/Solu-Medrol in the ED.    In the ED the patient endorses overall feeling improved since arrival.  Denies any other complaints and states that she is on board with the plan for MRI/prep.  States that with her diabetes, she typically switches to a sliding scale for lispro insulin when getting steroids due to hyperglycemia.  I was able to put in the sliding scale as well as other home meds, including her Eliquis.  Denies any other complaints or concerns.    The acute evaluation included:  Orders Placed This Encounter   Procedures    MR lumbar spine w and wo IV contrast    MR thoracic spine w and wo IV contrast    CBC and Auto Differential    Comprehensive metabolic panel    CBC    Basic metabolic panel    Adult diet Renal, Consistent Carb; CCD 75 gm/meal; Potassium Restricted 2 gm (50mEq); 2 - 3 grams Sodium    Weight on admission    Height on admission    Activity (specify) No Restrictions    Vital Signs    No telemetry or cardiac monitoring required    Pain Assessment    Notify provider    Apply sequential compression device    Diet instructions to nursing: 15 grams oral carbohydrate for low blood glucose    Glucose 10-70 mg/dL & CONSCIOUS- Give 15 Grams of Carbohydrates and repeat until blood glucose level reaches 100 mg/dL or greater.    Notify provider (specify parameters)    Notify provider (specify parameters)    Notify provider (specify parameters)    Notify provider (specify parameters)    Notify provider (specify parameters)    Glucose 10-70 mg/dL & CONSCIOUS- Give 15 Grams of Carbohydrates and repeat until blood glucose level reaches 100 mg/dL or greater.    Notify  provider (specify parameters)    Notify provider (specify parameters)    Notify provider (specify parameters)    Pharmacy general consult    OT eval and treat    PT eval and treat    POCT GLUCOSE    ECG 12 lead    Electrocardiogram, 12-lead PRN ACS symptoms    Initiate Observation Send to CDU         Past History     Past Medical History:   Diagnosis Date    Abdominal distension (gaseous) 07/31/2019    Abdominal bloating    Asthma     Cancer (Multi)     Cataract     Coronary artery disease     Diabetes mellitus (Multi)     Diverticulosis of intestine, part unspecified, without perforation or abscess without bleeding 06/09/2013    Diverticulosis    Elevation of levels of liver transaminase levels 11/13/2020    Transaminitis    Encounter for follow-up examination after completed treatment for conditions other than malignant neoplasm 01/26/2019    Hospital discharge follow-up    Glaucoma     Hypertension     Long term (current) use of antibiotics 10/07/2016    Need for prophylactic antibiotic    Other amnesia 10/28/2014    Memory loss    Other conditions influencing health status 02/06/2019    History of cough    Other specified health status 02/07/2019    Hepatitis C antibody test negative    Other specified soft tissue disorders 06/14/2017    Leg swelling    Pain in left toe(s) 05/15/2017    Pain of toe of left foot    Pain in right foot 10/12/2016    Pain of right heel    Pain in right shoulder 06/22/2016    Acute pain of right shoulder    Personal history of diseases of the blood and blood-forming organs and certain disorders involving the immune mechanism 04/09/2018    History of anemia    Personal history of other diseases of the respiratory system 04/02/2018    History of sinusitis    Personal history of other diseases of the respiratory system 03/19/2014    History of upper respiratory infection    Personal history of other malignant neoplasm of kidney 01/14/2021    Personal history of malignant neoplasm of  kidney    Personal history of other medical treatment 2017    History of screening mammography    Personal history of other specified conditions 2014    History of abdominal pain    Personal history of other specified conditions 2017    History of urinary frequency    Personal history of other specified conditions 2021    History of dysuria    Personal history of other specified conditions 2014    History of breast lump    Unspecified abdominal hernia without obstruction or gangrene 2014    Hernia     Past Surgical History:   Procedure Laterality Date    BREAST BIOPSY Right 2014    Biopsy Breast Percutaneous Needle Core    BREAST BIOPSY Right 2018    CARDIAC CATHETERIZATION N/A 2024    Procedure: Left Heart Cath;  Surgeon: Donato Cespedes MD;  Location: [place] [address] Cardiac Cath Lab;  Service: Cardiovascular;  Laterality: N/A;     SECTION, CLASSIC  2014     Section    CHOLECYSTECTOMY  2017    Cholecystectomy Laparoscopic    HAND SURGERY  2020    Hand Surgery                                                                                                                                                          HERNIA REPAIR  2014    Hernia Repair    MR HEAD ANGIO WO IV CONTRAST  2014    MR HEAD ANGIO WO IV CONTRAST 2014 [facility] ANCILLARY LEGACY    OTHER SURGICAL HISTORY  2021    Nephrectomy    TOTAL ABDOMINAL HYSTERECTOMY W/ BILATERAL SALPINGOOPHORECTOMY  2014    Total Abdominal Hysterectomy With Removal Of Both Ovaries     Social History     Socioeconomic History    Marital status:    Tobacco Use    Smoking status: Former     Types: Cigarettes     Passive exposure: Never    Smokeless tobacco: Never   Vaping Use    Vaping status: Never Used   Substance and Sexual Activity    Alcohol use: Yes     Comment: 1-2 x per month    Drug use: Never     Social Drivers of Health     Financial Resource Strain: Low  Risk  (3/1/2025)    Overall Financial Resource Strain (CARDIA)     Difficulty of Paying Living Expenses: Not very hard   Food Insecurity: No Food Insecurity (2/28/2025)    Hunger Vital Sign     Worried About Running Out of Food in the Last Year: Never true     Ran Out of Food in the Last Year: Never true   Transportation Needs: No Transportation Needs (3/1/2025)    PRAPARE - Transportation     Lack of Transportation (Medical): No     Lack of Transportation (Non-Medical): No   Intimate Partner Violence: Not At Risk (2/28/2025)    Humiliation, Afraid, Rape, and Kick questionnaire     Fear of Current or Ex-Partner: No     Emotionally Abused: No     Physically Abused: No     Sexually Abused: No   Housing Stability: Low Risk  (3/1/2025)    Housing Stability Vital Sign     Unable to Pay for Housing in the Last Year: No     Number of Times Moved in the Last Year: 0     Homeless in the Last Year: No         Medications/Allergies     Previous Medications    ACCU-CHEK GUIDE TEST STRIPS STRIP    Use 1 test strip to test blood sugar twice daily.    ACETAMINOPHEN (TYLENOL 8 HOUR) 650 MG ER TABLET    Take 1-2 tablets (650-1,300 mg) by mouth every 8 hours if needed for mild pain (1 - 3). Do not crush, chew, or split.    ALBUTEROL 90 MCG/ACTUATION INHALER    Inhale 2 puffs every 4 hours if needed for wheezing or shortness of breath (You may also use this 10-15 minutes prior to exertional activity as needed).    AMLODIPINE (NORVASC) 10 MG TABLET    Take 1 tablet (10 mg) by mouth once daily.    APIXABAN (ELIQUIS) 2.5 MG TABLET    Take 1 tablet (2.5 mg) by mouth every 12 hours.    BENZOCAINE-MENTHOL (CEPASTAT SORE THROAT) LOZENGE    Dissolve 1 lozenge in the mouth every 2 hours if needed for sore throat.    COLCHICINE 0.6 MG TABLET    Take 1 tablet (0.6 mg) by mouth once daily.    COMBIGAN 0.2-0.5 % OPHTHALMIC SOLUTION    Administer 1 drop into both eyes 2 times a day.    DEXTROMETHORPHAN-GUAIFENESIN (ROBITUSSIN DM)  MG/5 ML  ORAL LIQUID    Take 5 mL by mouth every 4 hours if needed for cough.    DICLOFENAC SODIUM 1 % KIT    Apply 1 Application topically if needed.  APPLY TO LOWER EXTREMITIES, 4 GM OF GEL TO AFFECTED AREA 4 TIMES DAILY. DO NOT APPLY MORE THAN 16 GM DAILY TO ANY ONE AFFECTED JOINT.    DIPHENHYDRAMINE (BENADRYL ALLERGY) 50 MG TABLET    Take 1 tablet (50 mg) by mouth 1 time for 1 dose. Take 1 hour before your scan    DIPHENHYDRAMINE (BENADRYL) 50 MG TABLET    Take 1 tablet 1 hr prior to contrast administration    DULAGLUTIDE (TRULICITY) 0.75 MG/0.5 ML PEN INJECTOR    Inject 0.75 mg under the skin 1 (one) time per week.    FAMOTIDINE (PEPCID) 20 MG TABLET    Take 1 tablet (20 mg) by mouth once daily at bedtime.    FLUTICASONE (FLONASE) 50 MCG/ACTUATION NASAL SPRAY    Administer 2 sprays into each nostril once daily.    FUROSEMIDE (LASIX) 20 MG TABLET    Take 1 tablet (20 mg) by mouth once daily as needed (For Swelling).    GABAPENTIN (NEURONTIN) 300 MG CAPSULE    Take 1 capsule (300 mg) by mouth once daily at bedtime.    GLIPIZIDE 2.5 MG TABLET    Take 2.5 mg by mouth once daily.    HYDROCORTISONE 1 % CREAM    Apply as needed to the injection site    INSULIN LISPRO (HUMALOG KWIKPEN INSULIN) 100 UNIT/ML PEN    Use with sliding scale 3 times a day, up to 30 units daily    LANCETS (ACCU-CHEK SOFTCLIX LANCETS) MISC    Use to check blood sugar twice daily    LATANOPROST (XALATAN) 0.005 % OPHTHALMIC SOLUTION    Administer 1 drop into both eyes once daily in the morning.    LIDOCAINE (LIDODERM) 5 % PATCH    Place 1 patch over 12 hours on the skin once daily. Remove & discard patch within 12 hours or as directed by MD.    LOSARTAN (COZAAR) 25 MG TABLET    Take 1 tablet (25 mg) by mouth once daily.    METHOCARBAMOL (ROBAXIN) 500 MG TABLET    Take 1 tablet (500 mg) by mouth every 8 hours if needed for muscle spasms (Back pain) for up to 10 days.    METOPROLOL SUCCINATE XL (TOPROL-XL) 200 MG 24 HR TABLET    Take 1 tablet (200 mg) by  "mouth once daily.    MOMETASONE (NASONEX 24HR ALLERGY) 50 MCG/ACTUATION NASAL SPRAY        OMEPRAZOLE (PRILOSEC) 40 MG DR CAPSULE    Take 1 capsule (40 mg) by mouth once daily.    PEN NEEDLE, DIABETIC 31 GAUGE X 5/16\" NEEDLE    Use to inject 1-4 times daily as directed.    POLYETHYLENE GLYCOL (GLYCOLAX, MIRALAX) 17 GRAM/DOSE POWDER    Take 17 g by mouth 2 times a day.    PREDNISONE (DELTASONE) 50 MG TABLET    Take 1 tablet (50 mg) by mouth see administration instructions. Take 50 mg at  13 hours , 7 hours and 1 hour before your scan    PREDNISONE (DELTASONE) 50 MG TABLET    Take 1 tablet at 13 hrs, 7 hrs and 1 hr prior to contrast administration    ROSUVASTATIN (CRESTOR) 20 MG TABLET    Take 1 tablet (20 mg) by mouth once daily.    TIOTROPIUM (SPIRIVA RESPIMAT) 2.5 MCG/ACTUATION INHALER    Inhale 2 puffs once daily.     Allergies   Allergen Reactions    Adhesive Itching    Amoxicillin Hives and Itching    Bee Sting Kit Swelling    Cephalexin Itching and Nausea Only    Gadolinium-Containing Contrast Media Hives    Iodine Unknown    Latex Other    Pioglitazone Swelling    Rubella Virus Live Vaccine Unknown    Salicylates Other    Shellfish Derived Swelling    Sulfa (Sulfonamide Antibiotics) Unknown    Sulfamethoxazole-Trimethoprim Hives and Itching    Iodinated Contrast Media Itching and Rash           Review of Systems  All systems reviewed and otherwise negative, except as stated above in HPI.      Physical Exam     Visit Vitals  /81   Pulse 64   Temp 36.6 °C (97.8 °F) (Tympanic)   Resp 16   Ht 1.499 m (4' 11\")   Wt 89.4 kg (197 lb)   LMP  (LMP Unknown)   SpO2 100%   BMI 39.79 kg/m²   OB Status Postmenopausal   Smoking Status Former   BSA 1.93 m²       GENERAL:  The patient appears nourished and normally developed. Vital signs as documented.     HEENT:  Head normocephalic, atraumatic, EOMs intact, PERRLA, Mucous membranes moist. Nares patent without copious rhinorrhea.  No lymphadenopathy.    PULMONARY:  Lungs " are clear to auscultation, without any respiratory distress. Able to speak full sentences, no accessory muscle use    CARDIAC:   Normal rate. No murmurs, rubs or gallops    ABDOMEN:  Soft, non-distended, non-tender, BS positive x 4 quadrants, No rebound or guarding, no peritoneal signs, no CVA tenderness, no masses or organomegaly    : External exam unremarkable, speculum exam with no discharge, no bleeding, no adnexal tenderness or masses    MUSCULOSKELETAL: Right paraspinal tenderness without any crepitus or deformity.  No direct midline tenderness noted on my exam.  She does have positive straight leg raise test on the right side with decreased range of motion due to pain.  MSPs intact currently in all extremities.  Able to ambulate, Non edematous, with no obvious deformities. Pulses intact distal    SKIN:   Good color, with no significant rashes.  No pallor.    NEURO: Upper and lower extremity sensation equal with intact motor as well.  No obvious neurological deficits, normal sensation and strength bilaterally.  Able to follow commands, NIH 0, CN 2-12 intact.    Psych: Appropriate mood and affect    Consultants  1) none at this time      Impression and Plan  In summary, Vania Andrews is admitted to the [facility] Clinical Decision Unit for MRI of the T and L-spine with contrast prep prophylaxis. Dr. Muir is the CDU admission attending.    This patient has been risk-stratified based on available history, physical exam, and related study findings. Admission to the observation status for further diagnosis/treatment/monitoring of back pain/neurodeficits is warranted clinically. This extended period of observation is specifically required to determine the need for hospitalization.     The goals of this admission based on the patient’s clinical problem list are:  1) serial musculoskeletal/neuro exams to monitor symptoms  2) contrast prophylaxis prep for gadolinium underway  3) pain  control/home meds      When met, appropriate disposition will be arranged.        Assessment and Plan:    # Right low back pain with numbness/tingling intermittent down the right leg  -MSPs intact on my exam in all extremities.  Positive straight leg raise test on the right side with no midline C, T, L-spine tenderness.  She does have paraspinal tenderness on the right side.  -Pain regimen ordered  -Home meds ordered  -MRI gadolinium prophylaxis prep ordered and MRIs were also ordered of the T and L-spine.    -Differential diagnosis includes but is not limited to musculoskeletal pain, spinal cord lesion/metastasis from previous cancer, or possible hematoma  -Will monitor vital signs per unit protocol  -Serial neuroexams

## 2025-03-24 NOTE — PROGRESS NOTES
Vania Andrews is a 70 y.o. female on day 0 of admission presenting with Lumbar back pain.    SW met with patient spouse at bedside. Patient NICOLA in MRI. He stated patient is currently active with [facility]. They are satisfied with home care services. Discussed pending PT/OT recs. He is unsure if patient would enter AR/SNF if recommended at time of discharge. SW will continue to follow for assistance with discharge planning needs.

## 2025-03-25 ENCOUNTER — DOCUMENTATION (OUTPATIENT)
Dept: PRIMARY CARE | Facility: CLINIC | Age: 70
End: 2025-03-25
Payer: MEDICARE

## 2025-03-25 ENCOUNTER — PATIENT OUTREACH (OUTPATIENT)
Dept: PRIMARY CARE | Facility: CLINIC | Age: 70
End: 2025-03-25
Payer: MEDICARE

## 2025-03-25 DIAGNOSIS — M54.50 ACUTE MIDLINE LOW BACK PAIN, UNSPECIFIED WHETHER SCIATICA PRESENT: ICD-10-CM

## 2025-03-25 DIAGNOSIS — R05.3 CHRONIC COUGH: ICD-10-CM

## 2025-03-25 PROCEDURE — RXMED WILLOW AMBULATORY MEDICATION CHARGE

## 2025-03-25 RX ORDER — BLOOD SUGAR DIAGNOSTIC
STRIP MISCELLANEOUS
Qty: 200 STRIP | Refills: 1 | Status: SHIPPED | OUTPATIENT
Start: 2025-03-25

## 2025-03-25 RX ORDER — LANCETS
EACH MISCELLANEOUS
Qty: 200 EACH | Refills: 1 | Status: SHIPPED | OUTPATIENT
Start: 2025-03-25

## 2025-03-25 RX ORDER — INSULIN LISPRO 100 [IU]/ML
INJECTION, SOLUTION INTRAVENOUS; SUBCUTANEOUS
Qty: 15 ML | Refills: 2 | Status: SHIPPED | OUTPATIENT
Start: 2025-03-25

## 2025-03-25 NOTE — PROGRESS NOTES
Discharge facility: [facility] Clinical Decision  Discharge diagnosis: Acute midline low back pain, unspecified whether sciatica present, Chronic Cough  Admission date: 3/23/25   Discharge date: 3/24/25    PCP Appointment Date: No appointments available within the 14 day time frame. Message sent to clerical for assistance. Previously scheduled fuv 4/22/25  Specialist Appointment Date:   Pain Management: 3/28/25 8:15am   Spine New problem visit : 4/10/25 2:00pm   Hospital Encounter and Summary: Linked   Transitional Care Management Enrollment with Kavya NORY Parsons (03/25/2025)   See Discharge assessment below for further details    Wrap Up  Call End Time: 0942 (3/25/2025  9:24 AM)    Engagement  Call Start Time: 0924 (3/25/2025  9:24 AM)    Medications  Medications reviewed with patient/caregiver?: Yes (3/25/2025  9:24 AM)  Is the patient having any side effects they believe may be caused by any medication additions or changes?: No (3/25/2025  9:24 AM)  Does the patient have all medications ordered at discharge?: No (Patient is going to  medications from pharmacy today.) (3/25/2025  9:24 AM)  What is keeping the patient from filling the prescriptions?: -- (Patient is going to  medications from pharmacy today.) (3/25/2025  9:24 AM)  Prescription Comments: n/a (3/25/2025  9:24 AM)  Medication Comments: n/a (3/25/2025  9:24 AM)    Appointments  Does the patient have a primary care provider?: Yes (3/25/2025  9:24 AM)  Care Management Interventions: -- (no appointments available. sending message to clerical) (3/25/2025  9:24 AM)  Has the patient kept scheduled appointments due by today?: Yes (3/25/2025  9:24 AM)    Self Management  What is the home health agency?: n/a (3/25/2025  9:24 AM)  What Durable Medical Equipment (DME) was ordered?: n/a (3/25/2025  9:24 AM)  Has all Durable Medical Equipment (DME) been delivered?: No (3/25/2025  9:24 AM)    Patient Teaching  Does the patient have access  to their discharge instructions?: Yes (3/25/2025  9:24 AM)  Care Management Interventions: Reviewed instructions with patient (3/25/2025  9:24 AM)  What is the patient's perception of their health status since discharge?: Improving (3/25/2025  9:24 AM)  Is the patient/caregiver able to teach back the hierarchy of who to call/visit for symptoms/problems? PCP, Specialist, Home Health nurse, Urgent Care, ED, 911: Yes (3/25/2025  9:24 AM)  Patient/Caregiver Education Comments: n/a (3/25/2025  9:24 AM)

## 2025-03-26 ENCOUNTER — APPOINTMENT (OUTPATIENT)
Dept: RADIOLOGY | Facility: HOSPITAL | Age: 70
End: 2025-03-26
Payer: MEDICARE

## 2025-03-27 ENCOUNTER — OFFICE VISIT (OUTPATIENT)
Dept: PULMONOLOGY | Facility: CLINIC | Age: 70
End: 2025-03-27
Payer: MEDICARE

## 2025-03-27 VITALS
SYSTOLIC BLOOD PRESSURE: 156 MMHG | HEART RATE: 107 BPM | OXYGEN SATURATION: 95 % | DIASTOLIC BLOOD PRESSURE: 95 MMHG | TEMPERATURE: 96.3 F | HEIGHT: 59 IN | BODY MASS INDEX: 38.63 KG/M2 | WEIGHT: 191.6 LBS

## 2025-03-27 DIAGNOSIS — Z86.711 HISTORY OF PULMONARY EMBOLISM: ICD-10-CM

## 2025-03-27 DIAGNOSIS — J45.991 COUGH VARIANT ASTHMA (HHS-HCC): Primary | ICD-10-CM

## 2025-03-27 DIAGNOSIS — I82.512 CHRONIC DEEP VEIN THROMBOSIS (DVT) OF FEMORAL VEIN OF LEFT LOWER EXTREMITY: ICD-10-CM

## 2025-03-27 DIAGNOSIS — K44.9 PARAESOPHAGEAL HIATAL HERNIA: ICD-10-CM

## 2025-03-27 PROCEDURE — 1036F TOBACCO NON-USER: CPT | Performed by: HOSPITALIST

## 2025-03-27 PROCEDURE — 3077F SYST BP >= 140 MM HG: CPT | Performed by: HOSPITALIST

## 2025-03-27 PROCEDURE — 1125F AMNT PAIN NOTED PAIN PRSNT: CPT | Performed by: HOSPITALIST

## 2025-03-27 PROCEDURE — 3008F BODY MASS INDEX DOCD: CPT | Performed by: HOSPITALIST

## 2025-03-27 PROCEDURE — 99214 OFFICE O/P EST MOD 30 MIN: CPT | Performed by: HOSPITALIST

## 2025-03-27 PROCEDURE — G2211 COMPLEX E/M VISIT ADD ON: HCPCS | Performed by: HOSPITALIST

## 2025-03-27 PROCEDURE — 3080F DIAST BP >= 90 MM HG: CPT | Performed by: HOSPITALIST

## 2025-03-27 PROCEDURE — 1111F DSCHRG MED/CURRENT MED MERGE: CPT | Performed by: HOSPITALIST

## 2025-03-27 PROCEDURE — 4010F ACE/ARB THERAPY RXD/TAKEN: CPT | Performed by: HOSPITALIST

## 2025-03-27 RX ORDER — TIOTROPIUM BROMIDE AND OLODATEROL 3.124; 2.736 UG/1; UG/1
2 SPRAY, METERED RESPIRATORY (INHALATION) DAILY
Qty: 4 G | Refills: 3 | Status: SHIPPED | OUTPATIENT
Start: 2025-03-27

## 2025-03-27 RX ORDER — ALBUTEROL SULFATE 90 UG/1
2 INHALANT RESPIRATORY (INHALATION) EVERY 4 HOURS PRN
Qty: 18 G | Refills: 5 | Status: SHIPPED | OUTPATIENT
Start: 2025-03-27

## 2025-03-27 ASSESSMENT — ASTHMA QUESTIONNAIRES
QUESTION_2 LAST FOUR WEEKS HOW OFTEN HAVE YOU HAD SHORTNESS OF BREATH: 3 TO 6 TIMES A WEEK
ACT_TOTALSCORE: 14
QUESTION_5 LAST FOUR WEEKS HOW WOULD YOU RATE YOUR ASTHMA CONTROL: SOMEWHAT CONTROLLED
QUESTION_4 LAST FOUR WEEKS HOW OFTEN HAVE YOU USED YOUR RESCUE INHALER OR NEBULIZER MEDICATION (SUCH AS ALBUTEROL): 1 OR TWO TIMES PER DAY
QUESTION_1 LAST FOUR WEEKS HOW MUCH OF THE TIME DID YOUR ASTHMA KEEP YOU FROM GETTING AS MUCH DONE AT WORK, SCHOOL OR AT HOME: A LITTLE OF THE TIME
QUESTION_3 LAST FOUR WEEKS HOW OFTEN DID YOUR ASTHMA SYMPTOMS (WHEEZING, COUGHING, SHORTNESS OF BREATH, CHEST TIGHTNESS OR PAIN) WAKE YOU UP AT NIGHT OR EARLIER THAN USUAL IN THE MORNING: 2-3 NIGHTS A WEEK

## 2025-03-27 ASSESSMENT — PAIN SCALES - GENERAL: PAINLEVEL_OUTOF10: 8

## 2025-03-27 NOTE — PATIENT INSTRUCTIONS
[facility]  [facility]   [facility]  [address]   [place] [address]       NAME: Vania Andrews   DATE: 3/27/2025     Your Pulmonary Physician Today: Josesito Magana MD  Your Primary Care Provider: Laura Mata MD     DIAGNOSIS:  1. Cough variant asthma (HHS-HCC)  tiotropium-olodateroL (Stiolto Respimat) 2.5-2.5 mcg/actuation mist inhaler    albuterol 90 mcg/actuation inhaler    Complete Pulmonary Function Test Pre/Post Bronchodilator (Spirometry Pre/Post/DLCO/Lung Volumes)    Exhaled Nitric Oxide (FeNO)    Follow Up In Pulmonology          Thank you for coming to the Pulmonary Medicine Clinic today! Below is a summary of your treatment plan, other important information, and our contact numbers:    TREATMENT PLAN     We discussed the followin.) We will start a  new inhaler to help your cough.  2.) We need to get breathing tests to make sure you are on the right treatments.    Tests that need to be scheduled:  1.) Pulmonary function tests    Medications/inhalers prescribed:  1.) Stiolto (daily inhaler)  2.) Albuterol (rescue inhaler)    Follow-up Appointment: 3 months    IMPORTANT INFORMATION     Call 911 for medical emergencies.  Our offices are generally open from Monday-Friday, 9 am - 5 pm.  If you need to get in touch with me, you may either call me and my team(number is below) or you can use [employer].    IMPORTANT PHONE NUMBERS (FOR SCHEDULING/QUESTIONS)     Pulmonary Department Main Line: [phone]   Pulmonary Nurse (Peggy Art RN): [phone]    For scheduling purposes:  Breathing (pulmonary function) or a walking test: [phone]   EKG's, Echocardiograms and Cardiopulmonary Stress Tests: [phone]   Radiology tests such as Nuclear Medicine, CT Scans, and MRI's: [phone]  Pulmonary clinic follow-up: [phone]      For sleep studies, Our team will contact you, however, you can also contact us as follows:  Main  "Phone Line (scheduling only): [phone]), option 3  Adult and Pediatric Locations   [place] (6 years and older): [facility] - 4th floor ([address] [facility], [facility]) After hours line: [phone]  [facility] (Main campus: All ages): [facility], 6th floor. After hours line: [phone]   [place] (5 years and older; younger considered on case-by-case basis): [address]; [address], [address]. Scheduling  After hours line: [phone]   [place] (6 years and older): [address]; [address]; [address]   [place] (6 years and older): [address] [facility], [address]  After hours line: [phone]   [place] (13 years and older) in [place]: [address], 2nd floor  After hours line: [phone]   [place] (13 year and older): [address]  After hours line: [phone]     Adult Only Locations:   [place] (18 years and older): [address] [facility], 2nd floor   [place] (18 years and older): [address] [facility]; 4th floor  After hours line: [phone]   [place] (18 years and older) at [place]: [address] [place] [address]  After hours line: [phone]     OUR ADULT PULMONARY MEDICINE TEAM   Please do not hesitate to call the office or sleep nurse with any questions between appointments:    Adult Pulmonary Nurse (Peggy Art RN):  For questions about your diagnosis, care plan and refilling prescriptions: [phone]    Adult Pulmonary Medicine  (Elizabeth Jj):  Please contact for scheduling issues: P: [phone]  F: [phone]    CONTACTING YOUR PULMONARY PHYSICIAN     Send a message directly to your physician through \"My Chart\", which is the email service through your  Records Account: [email or web address]   Call [phone] and leave a message. One of the administrative assistants will forward the message to your " "physician through \"My Chart\" and/or email.     PRESCRIPTIONS     We require 7 days advanced notice for prescription refills. If we do not receive the request in this time, we cannot guarantee that your medication will be refilled in time.    Josesito Magana MD   of Medicine, [employer]  [facility] Division of Pulmonary, Critical Care and Sleep Medicine  "

## 2025-03-27 NOTE — PROGRESS NOTES
Patient: Vania Andrews    [id number]  : 1955 -- AGE 70 y.o.    Provider: Josesito Magana MD     Location  [facility]   Service Date: 3/28/2025          [facility]  Follow-Up Visit Note    I independently reviewed available labs, imaging, provider notes and outside records pertinent to today's visit.    History of Present Illness   Background:  Vania Andrews is a 70 y.o. female presenting for follow-up of asthma.    Today's (3/28/2025) HPI:   Patient presents back after not being seen for more than 1 year.  Since last visit, his unfortunately been hospitalized multiple times due to various issues.  She had DVT and saddle PE, now chronically anticoagulated.  This was complicated by GI bleeding.  Most recently, was admitted and discharged from the hospital within the last month for another GI bleed episode.  She was discharged home on LAMA to use, has been using it off and on, has not consistently used over the past year.  She reports ongoing cough, dyspnea, fatigue, malaise.  Denies chronic sputum production.  Denies chest pain, fever/chills.    Review of Systems:  Review of Systems   Constitutional:  Positive for fatigue. Negative for chills, fever and unexpected weight change.   HENT:  Negative for congestion.    Respiratory:  Positive for cough and shortness of breath. Negative for wheezing.    Cardiovascular:  Negative for chest pain and leg swelling.   Psychiatric/Behavioral:  Negative for sleep disturbance.    All other systems reviewed and are negative.      Past Medical History   She has a past medical history of Abdominal distension (gaseous) (2019), Asthma, Cancer (Multi), Cataract, Coronary artery disease, Diabetes mellitus (Multi), Diverticulosis of intestine, part unspecified, without perforation or abscess without bleeding (2013), Elevation of levels of liver transaminase levels (2020), Encounter for follow-up examination after  completed treatment for conditions other than malignant neoplasm (01/26/2019), Glaucoma, Hypertension, Long term (current) use of antibiotics (10/07/2016), Other amnesia (10/28/2014), Other conditions influencing health status (02/06/2019), Other specified health status (02/07/2019), Other specified soft tissue disorders (06/14/2017), Pain in left toe(s) (05/15/2017), Pain in right foot (10/12/2016), Pain in right shoulder (06/22/2016), Personal history of diseases of the blood and blood-forming organs and certain disorders involving the immune mechanism (04/09/2018), Personal history of other diseases of the respiratory system (04/02/2018), Personal history of other diseases of the respiratory system (03/19/2014), Personal history of other malignant neoplasm of kidney (01/14/2021), Personal history of other medical treatment (08/08/2017), Personal history of other specified conditions (11/17/2014), Personal history of other specified conditions (06/14/2017), Personal history of other specified conditions (06/09/2021), Personal history of other specified conditions (11/28/2014), and Unspecified abdominal hernia without obstruction or gangrene (04/21/2014).    Immunizations     Immunization History   Administered Date(s) Administered    Moderna SARS-CoV-2 Vaccination 02/24/2022, 03/24/2022    Pneumococcal conjugate vaccine, 13-valent (PREVNAR 13) 07/13/2021    Tdap vaccine, age 7 year and older (BOOSTRIX, ADACEL) 09/09/2014       Medications and Allergies   Medications:  Current Outpatient Medications   Medication Instructions    Accu-Chek Guide test strips strip Use 1 test strip to test blood sugar twice daily.    acetaminophen (Tylenol 8 HOUR) 650 mg ER tablet 1-2 tablets, Every 8 hours PRN    albuterol 90 mcg/actuation inhaler 2 puffs, inhalation, Every 4 hours PRN    amLODIPine (NORVASC) 10 mg, oral, Daily    benzocaine-menthol (Cepastat Sore Throat) lozenge 1 lozenge, Mouth/Throat, Every 2 hour PRN    colchicine  "0.6 mg, oral, Daily    Combigan 0.2-0.5 % ophthalmic solution 1 drop, Both Eyes, 2 times daily    dextromethorphan-guaifenesin (Robitussin DM)  mg/5 mL oral liquid 5 mL, oral, Every 4 hours PRN    diclofenac sodium 1 % kit 1 Application, As needed    diphenhydrAMINE (BENADRYL ALLERGY) 50 mg, oral, Once, Take 1 hour before your scan    diphenhydrAMINE (BENADryl) 50 mg tablet Take 1 tablet 1 hr prior to contrast administration    Eliquis 2.5 mg, oral, Every 12 hours    famotidine (Pepcid) 20 mg tablet 1 tablet, Nightly    fluticasone (Flonase) 50 mcg/actuation nasal spray 2 sprays, Each Nostril, Daily    furosemide (LASIX) 20 mg, oral, Daily PRN    gabapentin (NEURONTIN) 300 mg, oral, Nightly    glipiZIDE 2.5 mg, oral, Daily    guaiFENesin (ROBITUSSIN) 200 mg, oral, 3 times daily PRN    hydrocortisone 1 % cream Apply as needed to the injection site    insulin lispro (HumaLOG KwikPen Insulin) 100 unit/mL pen Use with sliding scale 3 times a day, up to 30 units daily    lancets (Accu-Chek Softclix Lancets) misc Use to check blood sugar twice daily    latanoprost (Xalatan) 0.005 % ophthalmic solution 1 drop, Both Eyes, Every morning    lidocaine (Lidoderm) 5 % patch 1 patch, transdermal, Daily, Remove & discard patch within 12 hours or as directed by MD.    lidocaine (Lidoderm) 5 % patch 2 patches, transdermal, Daily, Remove & discard patch within 12 hours or as directed by MD.    losartan (COZAAR) 25 mg, oral, Daily    methocarbamol (ROBAXIN) 500 mg, oral, Every 8 hours PRN    methocarbamol (ROBAXIN) 500 mg, oral, 2 times daily PRN    metoprolol succinate XL (TOPROL-XL) 200 mg, oral, Daily    mometasone (Nasonex 24hr Allergy) 50 mcg/actuation nasal spray     omeprazole (PRILOSEC) 40 mg, oral, Daily    pen needle, diabetic 31 gauge x 5/16\" needle Use to inject 1-4 times daily as directed.    polyethylene glycol (GLYCOLAX, MIRALAX) 17 g, oral, 2 times daily    prednisoLONE acetate (Pred-Forte) 1 % ophthalmic " "suspension 1 drop, Right Eye, 4 times daily    predniSONE (Deltasone) 50 mg tablet Take 1 tablet at 13 hrs, 7 hrs and 1 hr prior to contrast administration    predniSONE (DELTASONE) 50 mg, oral, See admin instructions, Take 50 mg at  13 hours , 7 hours and 1 hour before your scan    rosuvastatin (CRESTOR) 20 mg, oral, Daily    tiotropium (Spiriva Respimat) 2.5 mcg/actuation inhaler 2 puffs, inhalation, Daily    tiotropium-olodateroL (Stiolto Respimat) 2.5-2.5 mcg/actuation mist inhaler 2 Inhalations, inhalation, Daily    Trulicity 0.75 mg, subcutaneous, Weekly        Allergies:  Adhesive, Amoxicillin, Bee sting kit, Cephalexin, Gadolinium-containing contrast media, Iodine, Latex, Pioglitazone, Rubella virus live vaccine, Salicylates, Shellfish derived, Sulfa (sulfonamide antibiotics), Sulfamethoxazole-trimethoprim, and Iodinated contrast media    Social History   She reports that she has quit smoking. Her smoking use included cigarettes. She has never been exposed to tobacco smoke. She has never used smokeless tobacco. She reports current alcohol use. She reports that she does not use drugs.    Smoking History: 10-20 pack-years, quit within last 5 years  Exposure/Job History: Unremarkable    Family History     Family History   Problem Relation Name Age of Onset    Aneurysm Mother      Hypertension Mother      Pancreatic cancer Mother      Diabetes Mother      Other (laryngeal Cancer) Mother      Heart disease Father      Pulmonary embolism Brother      Breast cancer Father's Sister      Breast cancer Maternal Cousin         Physical Exam   VITAL SIGNS: BP (!) 156/95   Pulse 107   Temp 35.7 °C (96.3 °F)   Ht 1.499 m (4' 11\")   Wt 86.9 kg (191 lb 9.6 oz)   LMP  (LMP Unknown)   SpO2 95%   BMI 38.70 kg/m²      Physical Exam  Vitals reviewed.   Constitutional:       General: She is not in acute distress.     Appearance: She is not ill-appearing.   HENT:      Head: Normocephalic and atraumatic.      Right Ear: " External ear normal.      Left Ear: External ear normal.      Mouth/Throat:      Mouth: Mucous membranes are moist.      Pharynx: Oropharynx is clear.   Eyes:      General: No scleral icterus.     Extraocular Movements: Extraocular movements intact.      Conjunctiva/sclera: Conjunctivae normal.   Cardiovascular:      Rate and Rhythm: Normal rate and regular rhythm.      Heart sounds: Normal heart sounds.   Pulmonary:      Effort: Pulmonary effort is normal. No respiratory distress.      Breath sounds: Wheezing present.   Abdominal:      General: Abdomen is flat.      Palpations: Abdomen is soft.   Musculoskeletal:      Cervical back: Normal range of motion and neck supple.      Right lower leg: No edema.      Left lower leg: No edema.   Skin:     General: Skin is warm and dry.      Findings: No rash.   Neurological:      General: No focal deficit present.      Mental Status: She is alert. Mental status is at baseline.   Psychiatric:         Behavior: Behavior normal.         Thought Content: Thought content normal.         Pulmonary Function Test Results     None    Chest Imaging     XR chest 2 views 02/05/2024    Narrative  Interpreted By:  Elle Ceballos,  STUDY:  XR CHEST 2 VIEWS;  2/5/2024 7:07 pm    INDICATION:  Signs/Symptoms:sob.    COMPARISON:  Chest x-ray 01/25/2024    ACCESSION NUMBER(S):  [id number]    ORDERING CLINICIAN:  SHERRY DIANA    FINDINGS:      CARDIOMEDIASTINAL SILHOUETTE:  Cardiomediastinal silhouette is stable in size and configuration.    LUNGS:  Retrocardiac airspace opacities redemonstrated likely related to  large hiatal hernia with adjacent compressive atelectasis.  Superimposed infection not excluded. Right lung is clear. No effusion  or pneumothorax.    ABDOMEN:  Large hiatal hernia. Postsurgical changes with multiple clips noted  in the upper abdomen.    BONES:  Multilevel degenerative changes of the spine.    Impression  Redemonstration of large hiatal hernia with left basilar  airspace  opacity likely relate to compressive atelectasis. Superimposed  infection not excluded. Correlate clinically.    MACRO:  None    Signed by: Elle Ceballos 2/5/2024 7:15 PM  Dictation workstation:   [id number]      CT angio chest for pulmonary embolism 02/06/2024    Narrative  Interpreted By:  Elle Ceballos,  STUDY:  CT ANGIO CHEST FOR PULMONARY EMBOLISM;  2/6/2024 2:00 am    INDICATION:  Signs/Symptoms:elevated d-dimer, sob.    COMPARISON:  None.    ACCESSION NUMBER(S):  [id number]    ORDERING CLINICIAN:  SHERRY DIANA    TECHNIQUE:  Helical data acquisition of the chest was obtained after intravenous  administration of  64 mL of Omnipaque 350. Images were reformatted in  axial, coronal, and sagittal planes. Axial and coronal MIPS were  reconstructed and reviewed.    FINDINGS:  HEART AND VESSELS:  There is a saddle type filling defect which extends to bilateral  upper, right lower lobar and segmental branches consistent with acute  pulmonary embolus. Calculated RV to LV ratio of 1.0.    Main pulmonary artery is dilated up to 3.5 cm which can be seen with  pulmonary arterial hypertension.    The thoracic aorta is of normal course and caliber  without vascular  calcifications.    Mild coronary artery calcifications are seen.The study is not  optimized for evaluation of coronary arteries.    The cardiac chambers are not enlarged.    No evidence of pericardial effusion.    MEDIASTINUM AND JENNIFER, LOWER NECK AND AXILLA:  Slight heterogeneous thyroid gland.    No evidence of thoracic lymphadenopathy by CT criteria.    Large hiatal hernia with significant portion of the stomach  intrathoracic in location.    LUNGS AND AIRWAYS:  The trachea and central airways are patent. No endobronchial lesion.    Bibasilar atelectasis. No consolidation, effusion or pneumothorax.    UPPER ABDOMEN:  Status post cholecystectomy. Status post left adrenalectomy and left  partial nephrectomy. Partial visualization of a 3.8 cm  hypodense  lesion in the left upper quadrant likely arising from the left  kidney. This measures higher than simple fluid attenuation,  incompletely imaged. Subcentimeter hypodense focus in the right  hepatic lobe is too small to characterize but stable..    CHEST WALL AND OSSEOUS STRUCTURES:  Multilevel degenerative changes are present.    Impression  1. There is an acute saddle type pulmonary embolus extending to  bilateral upper and right lower lobar and segmental branches .  Calculated RV to LV ratio of 1.0.  2. Main pulmonary artery is dilated up to 3.5 cm which can be seen  with pulmonary arterial hypertension.  3. Large hiatal hernia with significant portion of the stomach  intrathoracic in location. There is adjacent compressive atelectasis.  4. 3.8 cm hypodense lesion in the left upper quadrant is incompletely  imaged, likely arising from the left kidney. This measures higher  than simple fluid attenuation. This can be further evaluated with  nonemergent renal ultrasound.  5. Additional findings as described above.      MACRO:  Elle Ceballos discussed the significance and urgency of this critical  finding by telephone with  Dr. Zamora On 2/6/2024 at 2:22 am.  (**-RCF-**) Findings:  See findings.    Signed by: Elle Ceballos 2/6/2024 2:26 AM  Dictation workstation:   [id number]      Echocardiogram     Echocardiogram     [facility], [address] [place] [address]  Tel [phone] and Fax [phone]    TRANSTHORACIC ECHOCARDIOGRAM REPORT      Patient Name:     LYNDSAY CASTANEDA Steph Physician:  [id number] Zenon Isbell MD  Study Date:       12/31/2020     Referring           Barbara Evans MD  Physician:  MRN/PID:          [id number]       PCP:  Accession/Order#: [id number]   [facility] Non  Location:           Invasive  YOB: 1955      Fellow:  Gender:           F              Nurse:              Carolynn Galindo RN  Admit  Date:                      Sonographer:        Martín Clark CS, [facility]  Admission Status: Outpatient     Additional Staff:   NAN Quach RDCS  Height:           149.86 cm      CC Report to:  Weight:           92.08 kg       Study Type:         Echocardiogram  BSA:              1.86 m2  Blood Pressure: 132 /68 mmHg    Diagnosis/ICD: R01.1-Cardiac murmur, unspecified  Indication:    Systolic ejection murmur  Procedure/CPT: Echo Complete w Full [id number]    Patient History:  Pertinent History: Palpitations. MI 2019, current DVT's L-leg on Eliquis,  previous smoker, hyperlipidemia, MARYLU.    Study Detail: The following Echo studies were performed: 2D, M-Mode, Doppler and  color flow. Agitated saline used as a contrast agent for  intraseptal flow evaluation.      PHYSICIAN INTERPRETATION:  Left Ventricle: The left ventricular systolic function is normal, with an estimated ejection fraction of 55-60%. Wall motion is abnormal. The left ventricular cavity size is decreased. Spectral Doppler shows a normal pattern of left ventricular diastolic filling. There is distal apical hypokinesia.  Left Atrium: The left atrium is normal in size.  Right Ventricle: The right ventricle is normal in size. There is low normal right ventricular systolic function.  Right Atrium: The right atrium is normal in size.  Aortic Valve: The aortic valve is trileaflet. There is no evidence of aortic valve regurgitation. The peak instantaneous gradient of the aortic valve is 7.6 mmHg.  Mitral Valve: The mitral valve is normal in structure. There is trace mitral valve regurgitation.  Tricuspid Valve: The tricuspid valve is structurally normal. There is trace to mild tricuspid regurgitation. The Doppler estimated RVSP is slightly elevated at 31.9 mmHg.  Pulmonic Valve: The pulmonic valve is not well visualized. There is physiologic pulmonic valve regurgitation.  Pericardium: There is no pericardial effusion noted.  Aorta: The aortic root  is normal.  Systemic Veins: The inferior vena cava size appears small.  In comparison to the previous echocardiogram(s): Compared with study from 11/14/2020, the apical wall motion abnormality was not mentioned, but in retrospect was probably present.      CONCLUSIONS:  1. The left ventricular systolic function is normal with a 55-60% estimated ejection fraction.  2. There is distal apical hypokinesia.  3. Slightly elevated RVSP.  4. The left ventricular cavity size is decreased.  5. The liver has a long (~ 8 cm), 1.5 cm thick, slightly hyperechoic area with thin hypoechoic borders. Additional imaging may be instructive.    QUANTITATIVE DATA SUMMARY:  2D MEASUREMENTS:  Normal Ranges:  Ao Root d:     3.00 cm   (2.0-3.7cm)  LAs:           3.37 cm   (2.7-4.0cm)  RVIDd:         1.87 cm   (0.9-3.6cm)  IVSd:          0.90 cm   (0.6-1.1cm)  LVPWd:         0.81 cm   (0.6-1.1cm)  LVIDd:         3.66 cm   (3.9-5.9cm)  LVIDs:         2.67 cm  LV Mass Index: 47.8 g/m2  LV % FS        27.1 %    LA VOLUME:  Normal Ranges:  LA Vol A4C:        57.1 ml    (22+/-6mL/m2)  LA Vol A2C:        40.0 ml  LA Vol BP:         49.2 ml  LA Vol Index A4C:  30.8 ml/m2  LA Vol Index A2C:  21.6 ml/m2  LA Vol Index BP:   26.5 ml/m2  LA Area A4C:       19.4 cm2  LA Area A2C:       15.8 cm2  LA Major Axis A4C: 5.6 cm  LA Major Axis A2C: 5.3 cm  LA Volume Index:   26.5 ml/m2  LA Vol A4C:        53.4 ml  LA Vol A2C:        39.2 ml    M-MODE MEASUREMENTS:  Normal Ranges:  Ao Root: 2.80 cm (2.0-3.7cm)  LAs:     4.21 cm (2.7-4.0cm)    AORTA MEASUREMENTS:  Normal Ranges:  Asc Ao, d: 2.80 cm (2.1-3.4cm)    LV SYSTOLIC FUNCTION BY 2D PLANIMETRY (MOD):  Normal Ranges:  EF-A4C View: 63.3 % (>55%)  EF-A2C View: 58.3 %  EF-Biplane:  60.5 %    LV DIASTOLIC FUNCTION:  Normal Ranges:  MV Peak E:        0.83 m/s    (0.7-1.2 m/s)  MV Peak A:        0.87 m/s    (0.42-0.7 m/s)  E/A Ratio:        0.95        (1.0-2.2)  MV e'             0.07 m/s    (>8.0)  MV lateral e'   "   0.08 m/s  MV medial e'      0.06 m/s  MV A Dur:         139.87 msec  E/e' Ratio:       11.85       (<8.0)  a'                0.08 m/s  PulmV Sys Pranay:    44.12 cm/s  PulmV Lambert Pranay:   18.60 cm/s  PulmV S/D Pranay:    2.37  PulmV A Revs Pranay: 21.97 cm/s  PulmV A Revs Dur: 102.76 msec    MITRAL VALVE:  Normal Ranges:  MV DT: 198 msec (150-240msec)    AORTIC VALVE:  Normal Ranges:  AoV Vmax:      1.38 m/s (<1.7m/s)  AoV Peak P.6 mmHg (<20mmHg)  LVOT Max Pranay:  0.95 m/s (<1.1m/s)  LVOT VTI:      21.75 cm  LVOT Diameter: 1.95 cm  (1.8-2.4cm)  AoV Area,Vmax: 2.05 cm2 (2.5-4.5cm2)    RIGHT VENTRICLE:  RV 1   3.3 cm  RV 2   1.9 cm  RV 3   5.5 cm  TAPSE: 17.3 mm  RV s'  0.11 m/s    TRICUSPID VALVE/RVSP:  Normal Ranges:  Peak TR Velocity: 2.69 m/s  RV Syst Pressure: 31.9 mmHg (< 30mmHg)  IVC Diam:         1.20 cm    PULMONIC VALVE:  Normal Ranges:  PV Accel Time: 100 msec (>120ms)  PV Max Pranay:    0.8 m/s  (0.6-0.9m/s)  PV Max P.6 mmHg    Pulmonary Veins:  PulmV A Revs Dur: 102.76 msec  PulmV A Revs Pranay: 21.97 cm/s  PulmV Lambert Pranay:   18.60 cm/s  PulmV S/D Pranay:    2.37  PulmV Sys Pranay:    44.12 cm/s    AORTA:  Asc Ao Diam 2.78 cm      [id number] Zenon Isbell MD  Electronically signed on 2020 at 1:57:42 PM    Labs/Cultures     Lab Results   Component Value Date    WBC 9.7 2025    HGB 8.4 (L) 2025    HCT 25.8 (L) 2025    MCV 78 (L) 2025     2025       Lab Results   Component Value Date    CREATININE 1.18 (H) 2025    BUN 20 2025     2025    K 4.6 2025     2025    CO2 23 2025        Lab Results   Component Value Date    ERIKA NEGATIVE 2020    SEDRATE 25 2025        IgE: No results found for: \"IGE\"     AEC:   Eosinophils Absolute (x10*3/uL)   Date Value   2025 0.17     Eosinophils Absolute, Manual (x10*3/uL)   Date Value   02/15/2024 0.19     Eosinophils %, Manual (%)   Date Value   02/15/2024 1.8     Eosinophils % (%) "   Date Value   03/23/2025 1.9       Assessment and Plan     Vania Andrews is a 70 y.o. female presenting for follow-up of asthma, PE.    Problem List Items Addressed This Visit       Cough variant asthma (HHS-HCC) - Primary    Current Assessment & Plan     Discussed at length with patient.  Need for complete workup including PFTs (ordered) to better understand her underlying diagnosis or diagnoses.  For now, will start LAMA/LABA given smoking history, possible asthma COPD overlap syndrome.         Relevant Medications    tiotropium-olodateroL (Stiolto Respimat) 2.5-2.5 mcg/actuation mist inhaler    albuterol 90 mcg/actuation inhaler    Other Relevant Orders    Complete Pulmonary Function Test Pre/Post Bronchodilator (Spirometry Pre/Post/DLCO/Lung Volumes)    Exhaled Nitric Oxide (FeNO)    Follow Up In Pulmonology    Paraesophageal hiatal hernia    Chronic deep vein thrombosis (DVT) of femoral vein of left lower extremity (Multi)    History of pulmonary embolism    Current Assessment & Plan     Currently anticoagulated, following with hematology.  Likely, TTE at time of diagnosis of saddle PE showed almost normal RV.  Discussed with patient, if pulmonary workup unremarkable, consider repeat TTE to evaluate for PH.           Follow-up: 3 months    Josesito Magana MD   of Medicine, [facility] [employer]  [facility] Division of Pulmonary, Critical Care and Sleep Medicine  10:51 AM  03/28/25

## 2025-03-28 ENCOUNTER — APPOINTMENT (OUTPATIENT)
Dept: PAIN MEDICINE | Facility: CLINIC | Age: 70
End: 2025-03-28
Payer: MEDICARE

## 2025-03-28 VITALS
WEIGHT: 192 LBS | SYSTOLIC BLOOD PRESSURE: 132 MMHG | HEART RATE: 85 BPM | DIASTOLIC BLOOD PRESSURE: 78 MMHG | HEIGHT: 58 IN | BODY MASS INDEX: 40.3 KG/M2 | RESPIRATION RATE: 17 BRPM

## 2025-03-28 DIAGNOSIS — M11.20 CALCIUM PYROPHOSPHATE DEPOSITION DISEASE: ICD-10-CM

## 2025-03-28 DIAGNOSIS — M25.551 PAIN OF RIGHT HIP: Primary | ICD-10-CM

## 2025-03-28 DIAGNOSIS — M54.2 NECK PAIN: ICD-10-CM

## 2025-03-28 PROBLEM — J45.991 COUGH VARIANT ASTHMA (HHS-HCC): Status: ACTIVE | Noted: 2023-08-29

## 2025-03-28 RX ORDER — BUPIVACAINE HYDROCHLORIDE 7.5 MG/ML
5 INJECTION, SOLUTION EPIDURAL; RETROBULBAR ONCE
Status: SHIPPED | OUTPATIENT
Start: 2025-03-28

## 2025-03-28 SDOH — SOCIAL STABILITY: SOCIAL NETWORK: SOCIAL ACTIVITY:: 9

## 2025-03-28 ASSESSMENT — PAIN SCALES - GENERAL
PAINLEVEL_OUTOF10: 7
PAINLEVEL_OUTOF10: 7

## 2025-03-28 ASSESSMENT — ENCOUNTER SYMPTOMS
COUGH: 1
FATIGUE: 1
SLEEP DISTURBANCE: 0
FEVER: 0
UNEXPECTED WEIGHT CHANGE: 0
SHORTNESS OF BREATH: 1
CHILLS: 0
WHEEZING: 0

## 2025-03-28 ASSESSMENT — PAIN - FUNCTIONAL ASSESSMENT: PAIN_FUNCTIONAL_ASSESSMENT: 0-10

## 2025-03-28 NOTE — ASSESSMENT & PLAN NOTE
Discussed at length with patient.  Need for complete workup including PFTs (ordered) to better understand her underlying diagnosis or diagnoses.  For now, will start LAMA/LABA given smoking history, possible asthma COPD overlap syndrome.

## 2025-03-28 NOTE — ASSESSMENT & PLAN NOTE
Currently anticoagulated, following with hematology.  Likely, TTE at time of diagnosis of saddle PE showed almost normal RV.  Discussed with patient, if pulmonary workup unremarkable, consider repeat TTE to evaluate for PH.

## 2025-03-28 NOTE — PROGRESS NOTES
Subjective   Patient ID: Vania Andrews is a 70 y.o. 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  Patient states that she has had low back pain that radiates to the bilateral lower extremities for several years.  The pain is described as sharp/aching with numbness and tingling, and courses anteriorly through the thighs and medially through the legs bilaterally.  This pain is typically worse with prolonged standing and walking, which she is unable to tolerate due to right hip pain.  This low back pain is typically alleviated by sitting.  Regarding her right hip pain, patient states that she notices this pain from the moment she wakes up, and any attempt to bear weight on the right lower extremity will make the right hip pain worse.  She believes the pain progresses and worsens throughout the day.  She is attempted several medications without any benefits. The pain causes significant stress in the patient's life, interfering with general activity, mood, ability to walk distances, ability to perform tasks at home and/or work. Patient participates in physical therapy, and continues to perform physician directed exercises at home. Patient denies any bowel or bladder incontinence, saddle anesthesia, weaknesses, or falls.        Review of Systems   13-point ROS done and negative except for HPI.     Current Outpatient Medications   Medication Instructions    Accu-Chek Guide test strips strip Use 1 test strip to test blood sugar twice daily.    acetaminophen (Tylenol 8 HOUR) 650 mg ER tablet 1-2 tablets, Every 8 hours PRN    albuterol 90 mcg/actuation inhaler 2 puffs, inhalation, Every 4 hours PRN    amLODIPine (NORVASC) 10 mg, oral, Daily    benzocaine-menthol (Cepastat Sore Throat) lozenge 1 lozenge, Mouth/Throat, Every 2 hour PRN     "colchicine 0.6 mg, oral, Daily    Combigan 0.2-0.5 % ophthalmic solution 1 drop, Both Eyes, 2 times daily    dextromethorphan-guaifenesin (Robitussin DM)  mg/5 mL oral liquid 5 mL, oral, Every 4 hours PRN    diclofenac sodium 1 % kit 1 Application, As needed    diphenhydrAMINE (BENADRYL ALLERGY) 50 mg, oral, Once, Take 1 hour before your scan    diphenhydrAMINE (BENADryl) 50 mg tablet Take 1 tablet 1 hr prior to contrast administration    Eliquis 2.5 mg, oral, Every 12 hours    famotidine (Pepcid) 20 mg tablet 1 tablet, Nightly    fluticasone (Flonase) 50 mcg/actuation nasal spray 2 sprays, Each Nostril, Daily    furosemide (LASIX) 20 mg, oral, Daily PRN    gabapentin (NEURONTIN) 300 mg, oral, Nightly    glipiZIDE 2.5 mg, oral, Daily    guaiFENesin (ROBITUSSIN) 200 mg, oral, 3 times daily PRN    hydrocortisone 1 % cream Apply as needed to the injection site    insulin lispro (HumaLOG KwikPen Insulin) 100 unit/mL pen Use with sliding scale 3 times a day, up to 30 units daily    lancets (Accu-Chek Softclix Lancets) misc Use to check blood sugar twice daily    latanoprost (Xalatan) 0.005 % ophthalmic solution 1 drop, Both Eyes, Every morning    lidocaine (Lidoderm) 5 % patch 1 patch, transdermal, Daily, Remove & discard patch within 12 hours or as directed by MD.    lidocaine (Lidoderm) 5 % patch 2 patches, transdermal, Daily, Remove & discard patch within 12 hours or as directed by MD.    losartan (COZAAR) 25 mg, oral, Daily    methocarbamol (ROBAXIN) 500 mg, oral, Every 8 hours PRN    methocarbamol (ROBAXIN) 500 mg, oral, 2 times daily PRN    metoprolol succinate XL (TOPROL-XL) 200 mg, oral, Daily    mometasone (Nasonex 24hr Allergy) 50 mcg/actuation nasal spray     omeprazole (PRILOSEC) 40 mg, oral, Daily    pen needle, diabetic 31 gauge x 5/16\" needle Use to inject 1-4 times daily as directed.    polyethylene glycol (GLYCOLAX, MIRALAX) 17 g, oral, 2 times daily    prednisoLONE acetate (Pred-Forte) 1 % " ophthalmic suspension 1 drop, Right Eye, 4 times daily    predniSONE (Deltasone) 50 mg tablet Take 1 tablet at 13 hrs, 7 hrs and 1 hr prior to contrast administration    predniSONE (DELTASONE) 50 mg, oral, See admin instructions, Take 50 mg at  13 hours , 7 hours and 1 hour before your scan    rosuvastatin (CRESTOR) 20 mg, oral, Daily    tiotropium (Spiriva Respimat) 2.5 mcg/actuation inhaler 2 puffs, inhalation, Daily    tiotropium-olodateroL (Stiolto Respimat) 2.5-2.5 mcg/actuation mist inhaler 2 Inhalations, inhalation, Daily    Trulicity 0.75 mg, subcutaneous, Weekly       Past Medical History:   Diagnosis Date    Abdominal distension (gaseous) 07/31/2019    Abdominal bloating    Asthma     Cancer (Multi)     Cataract     Coronary artery disease     Diabetes mellitus (Multi)     Diverticulosis of intestine, part unspecified, without perforation or abscess without bleeding 06/09/2013    Diverticulosis    Elevation of levels of liver transaminase levels 11/13/2020    Transaminitis    Encounter for follow-up examination after completed treatment for conditions other than malignant neoplasm 01/26/2019    Hospital discharge follow-up    Glaucoma     Hypertension     Long term (current) use of antibiotics 10/07/2016    Need for prophylactic antibiotic    Other amnesia 10/28/2014    Memory loss    Other conditions influencing health status 02/06/2019    History of cough    Other specified health status 02/07/2019    Hepatitis C antibody test negative    Other specified soft tissue disorders 06/14/2017    Leg swelling    Pain in left toe(s) 05/15/2017    Pain of toe of left foot    Pain in right foot 10/12/2016    Pain of right heel    Pain in right shoulder 06/22/2016    Acute pain of right shoulder    Personal history of diseases of the blood and blood-forming organs and certain disorders involving the immune mechanism 04/09/2018    History of anemia    Personal history of other diseases of the respiratory system  2018    History of sinusitis    Personal history of other diseases of the respiratory system 2014    History of upper respiratory infection    Personal history of other malignant neoplasm of kidney 2021    Personal history of malignant neoplasm of kidney    Personal history of other medical treatment 2017    History of screening mammography    Personal history of other specified conditions 2014    History of abdominal pain    Personal history of other specified conditions 2017    History of urinary frequency    Personal history of other specified conditions 2021    History of dysuria    Personal history of other specified conditions 2014    History of breast lump    Unspecified abdominal hernia without obstruction or gangrene 2014    Hernia        Past Surgical History:   Procedure Laterality Date    BREAST BIOPSY Right 2014    Biopsy Breast Percutaneous Needle Core    BREAST BIOPSY Right 2018    CARDIAC CATHETERIZATION N/A 2024    Procedure: Left Heart Cath;  Surgeon: Donato Cespedes MD;  Location: [place] [address] [facility];  Service: Cardiovascular;  Laterality: N/A;     SECTION, CLASSIC  2014     Section    CHOLECYSTECTOMY  2017    Cholecystectomy Laparoscopic    HAND SURGERY  2020    Hand Surgery                                                                                                                                                          HERNIA REPAIR  2014    Hernia Repair    MR HEAD ANGIO WO IV CONTRAST  2014    MR HEAD ANGIO WO IV CONTRAST 2014 [facility]    OTHER SURGICAL HISTORY  2021    Nephrectomy    TOTAL ABDOMINAL HYSTERECTOMY W/ BILATERAL SALPINGOOPHORECTOMY  2014    Total Abdominal Hysterectomy With Removal Of Both Ovaries        Family History   Problem Relation Name Age of Onset    Aneurysm Mother      Hypertension Mother      Pancreatic cancer  Mother      Diabetes Mother      Other (laryngeal Cancer) Mother      Heart disease Father      Pulmonary embolism Brother      Breast cancer Father's Sister      Breast cancer Maternal Cousin          Allergies   Allergen Reactions    Adhesive Itching    Amoxicillin Hives and Itching    Bee Sting Kit Swelling    Cephalexin Itching and Nausea Only    Gadolinium-Containing Contrast Media Hives    Iodine Unknown    Latex Other    Pioglitazone Swelling    Rubella Virus Live Vaccine Unknown    Salicylates Other    Shellfish Derived Swelling    Sulfa (Sulfonamide Antibiotics) Unknown    Sulfamethoxazole-Trimethoprim Hives and Itching    Iodinated Contrast Media Itching and Rash        Objective     Vitals:    03/28/25 0828   BP: 132/78   Pulse: 85   Resp: 17        Physical Exam  General: NAD, well groomed, well nourished  Eyes: Non-icteric sclera, EOMI  Ears, Nose, Mouth, and Throat: External ears and nose appear to be without deformity or rash. No lesions or masses noted. Hearing is grossly intact.   Neck: Supple, trachea midline, no appreciable lumps or lymph nodes  Respiratory: Nonlabored breathing   Cardiovascular: No peripheral edema observed  Skin: No rashes or open lesions/ulcers identified on skin.  Psychiatric: Alert, orientation to person, place, and time. Cooperative.    Neurologic:   Cranial nerves grossly intact.   Strength: 5/5 and symmetric plantar/dorsiflexion   Sensation: Normal to light touch throughout; pinprick intact throughout.   DTRs:normal and symmetric throughout       Back:   Palpation: Tenderness to palpation over lumbar paraspinous muscles.   Straight leg raise: positive at 30 degrees on the right   MEGAN Maneuver does reproduce pain on the right     Hip: Pain over right greater trochanter. and Pain reproduced with internal/external rotation.    Imaging personally reviewed and independently interpreted:   MR cervical spine wo IV contrast 02/03/2025    Narrative  Interpreted By:  Sheree  Robert,  STUDY:  MR CERVICAL SPINE WO IV CONTRAST; ;  2/3/2025 2:04 pm    INDICATION:  Signs/Symptoms:circumdental pannus with erosive changes seen on CT,  MRI for further characterization.    ,M54.2 Cervicalgia,R93.89 Abnormal findings on diagnostic imaging of  other specified body structures    COMPARISON:  CT cervical spine from 12/19/2024.    ACCESSION NUMBER(S):  [id number]    ORDERING CLINICIAN:  HARLEEN PAULINO    TECHNIQUE:  MRI of the cervical spine was performed with the acquisition of  sagittal T2, sagittal T1, sagittal STIR, axial T1, and axial T2  gradient echo sequences.    FINDINGS:  Evaluation is somewhat degraded due to patient motion.    There is straightening of the cervical spine. There is slight grade 1  anterolisthesis at C7-T1. Vertebral body heights are maintained.  There is mild intervertebral disc height narrowing at C5-C6 and  C6-C7. There are chronic degenerative endplate changes at multiple  levels including osteophytic spurring. There are type 1 Modic changes  (degenerative osseous edema versus reactive hypervascularity) at the  level of C5-C6. Marrow signal is overall heterogeneous in appearance.    The cervical spinal cord is normal in signal and caliber, noting that  evaluation on the axial T2 weighted gradient echo sequence is heavily  degraded due to patient motion.    Level by level evaluation on the axial T2 gradient echo sequence is  heavily degraded due to patient motion.    At C1-C2, there is STIR and T2 hyperintense signal located within the  dens, most consistent with osseous edema. In correlation with the  prior CT, there is pannus formation and there is partial effacement  of the ventral thecal sac. There is no striking spinal canal stenosis.    At C2-C3, there is a disc osteophyte complex which partially effaces  the ventral thecal sac. There is no spinal canal stenosis. It is  difficult to evaluate the neural foramina due to patient motion,  noting this, there is probable  moderate-to-marked left and moderate  right neural foraminal narrowing due to uncovertebral hypertrophy.  Some of the narrowing on the left is also related to facet  osteoarthropathy.    At C3-C4, there is a disc osteophyte complex which causes mild spinal  canal stenosis. There is moderate right neural foraminal narrowing  due to uncovertebral hypertrophy and marked left neural foraminal  narrowing due to uncovertebral hypertrophy and marked facet  osteoarthropathy.    At C4-C5, there is moderate spinal canal stenosis due to disc  osteophyte complex. There is moderate right and marked left neural  foraminal narrowing due to uncovertebral hypertrophy. Some of the  narrowing on the left is also due to facet osteoarthropathy. There is  overall moderate bilateral facet osteoarthropathy.    At C5-C6, there is a disc osteophyte complex which along with  ligamentum flavum buckling/thickening causes moderate-to-marked  spinal canal stenosis. There is marked right and moderate-to-marked  left neural foraminal narrowing due to uncovertebral hypertrophy.  There is bilateral facet osteoarthropathy.    At C6-C7, there is a disc osteophyte complex which causes mild spinal  canal stenosis. There is moderate-to-marked bilateral neural  foraminal narrowing due to uncovertebral hypertrophy. Narrowing of  the left is also related to facet osteoarthropathy.    At C7-T1, there is a small diffuse disc bulge. There is no spinal  canal stenosis. There is extension of disc into the left neural  foramen resulting in marked neural foraminal narrowing. There is no  narrowing of the right neural foramen. There is marked left facet  osteoarthropathy.    Impression  Evaluation is degraded due to patient motion.    1. Degenerative pannus formation at the level of C1-C2 with edema  within the dens. Pannus causes partial effacement of the ventral  thecal sac but no striking spinal canal stenosis.    2. Multilevel degenerative changes of the  cervical spine including  mild spinal canal stenosis at C3-C4 and C6-C7, moderate spinal canal  stenosis at C4-C5, and moderate-to-marked spinal canal stenosis at  C5-C6.    3. Type 1 Modic changes at the level of C5-C6.    This study was interpreted at [facility].      MACRO:  None    Signed by: Robert Bentley 2/4/2025 8:58 AM  Dictation workstation:   [id number]      XR hip right with pelvis when performed 2 or 3 views 01/08/2025    Narrative  Interpreted By:  Mateusz Genao,  STUDY:  XR HIP RIGHT WITH PELVIS WHEN PERFORMED 2 OR 3 VIEWS; ;  1/8/2025  8:33 am    INDICATION:  Signs/Symptoms:Rt. Hip Pain.    ,M25.551 Pain in right hip    COMPARISON:  None.    ACCESSION NUMBER(S):  [id number]    ORDERING CLINICIAN:  CHARLES GUERRA    FINDINGS:  AP pelvis along with one view of the right hip.    Severe right and moderate left hip arthrosis. No acute fracture. No  dislocation. The soft tissues are unremarkable.    Impression  Severe right and moderate left hip osteoarthrosis.    MACRO:  None    Signed by: Mateusz Genao 1/11/2025 11:36 AM  Dictation workstation:   [id number]      XR hip right with pelvis when performed 2 or 3 views 03/14/2024  XR femur right 2+ views 03/14/2024    Narrative  Interpreted By:  Ammon Genao,  STUDY:  XR HIP RIGHT WITH PELVIS WHEN PERFORMED 2 OR 3 VIEWS; XR FEMUR RIGHT  2+ VIEWS; 3/14/2024 10:48 am    INDICATION:  Signs/Symptoms:severe right leg pain with weight bearing and walking;  does have non-occlusive DVT but pain very atypical, rule out acute  injury.    COMPARISON:  08/19/2020    ACCESSION NUMBER(S):  [id number]; [id number]    ORDERING CLINICIAN:  QING BLOCK    FINDINGS:  AP view of the pelvis and two views of the right hip and right femur  are obtained. Severe right hip joint space loss with subchondral  sclerosis and osteophytes. No erosions. No acute  fracture-dislocation. Mild subchondral sclerosis and osteophyte  involving the  left hip. The left hip joint space appear preserved.  The SI joints are patent and symmetric.    Impression  No acute fracture or dislocation.    Advanced degenerative changes of the right hip.      Signed by: Ammon Genao 3/14/2024 11:52 AM  Dictation workstation:   [id number]     No image results found.     1. Neck pain  Referral to Pain Medicine      2. Calcium pyrophosphate deposition disease  Referral to Pain Medicine      3. Pain of right hip  FL pain management    Joint Injection/Aspiration    bupivacaine PF (Marcaine) injection 0.75 %           Assessment/Plan   Vania Andrews is a 70 y.o. 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 Patient likely has lumbar radiculopathy in the right lower extremity has a component of her pain, which is supported for positive straight leg raise in the right lower extremity corroborates history of low back radicular pain with numbness and tingling in the right lower extremity.  She also has right hip osteoarthritis as the more significant contributor to her pain at this time.  X-ray findings suggestive of advanced osteoarthritis in the right hip, as well as physical exam findings significant for right hip and groin pain with internal and external rotation of the right hip.    Plan:  - We will schedule patient for right hip intra-articular steroid injection.  Patient does not need to hold Eliquis for this injection.  The patient has already failed conservative therapies including medications such as acetaminophen, NSAIDs, and gabapentinoids; as well as physical therapy. Therefore, we discussed extensively the risks, benefits, and alternatives to the procedure. The patient's questions were addressed and answered in detail. The patient demonstrated understanding of the procedure, and is amenable  to proceeding with it. The Risks of the procedure that were discussed with the patient include but are not limited to the following: A lack of efficacy, transient worsening of pain, bleeding, infection, nerve injury, nerve damage, neuritis or sunburn sensation.  - In the future we may consider lumbar interlaminar epidural steroid injection.  Patient currently is on Eliquis (patient states history of PE) and will need to check with her prescribing physician to consider bridging to Lovenox or other form of heparin to mitigate the risk of blood clot formation or hematoma formation during this injection.    Joint Injection/Aspiration    Date/Time: 3/28/2025 10:40 AM    Performed by: Jame Mejia MD  Authorized by: Raoul Fung MD    Consent:     Consent obtained:  Verbal    Consent given by:  Patient    Risks, benefits, and alternatives were discussed: yes      Risks discussed:  Infection, bleeding and pain    Alternatives discussed:  Alternative treatment and observation  Universal protocol:     Procedure explained and questions answered to patient or proxy's satisfaction: yes      Relevant documents present and verified: yes      Required blood products, implants, devices, and special equipment available: no      Site/side marked: yes      Immediately prior to procedure, a time out was called: yes      Patient identity confirmed:  Verbally with patient  Location:     Location:  Hip    Hip:  R hip joint  Anesthesia:     Anesthesia method:  None  Procedure details:     Needle gauge:  22 G    Ultrasound guidance: no      Approach:  Lateral    Steroid injected: no      Specimen collected: no    Post-procedure details:     Procedure completion:  Tolerated well, no immediate complications  Comments:      5 mL of 0.75% bupivacaine was injected to the right greater trochanter bursa using landmark guidance.  Patient tolerated procedure well without any complications.  Patient denied any weakness or numbness  after injection.        Follow up: After procedure    The patient was invited to contact us back anytime with any questions or concerns and follow-up with us in the office as needed.     Diagnoses and all orders for this visit:  Neck pain  -     Referral to Pain Medicine  Calcium pyrophosphate deposition disease  -     Referral to Pain Medicine  Pain of right hip  -     FL pain management; Future  -     Joint Injection/Aspiration; Future  -     bupivacaine PF (Marcaine) injection 0.75 %      This note was generated with the aid of dictation software, there may be typos despite my attempts at proofreading.     Jame Mejia MD  Interventional Pain Medicine Fellow  [facility]

## 2025-03-31 ENCOUNTER — HOSPITAL ENCOUNTER (OUTPATIENT)
Dept: RESPIRATORY THERAPY | Facility: HOSPITAL | Age: 70
Discharge: HOME | End: 2025-03-31
Payer: MEDICARE

## 2025-03-31 ENCOUNTER — DOCUMENTATION (OUTPATIENT)
Dept: PRIMARY CARE | Facility: CLINIC | Age: 70
End: 2025-03-31
Payer: MEDICARE

## 2025-03-31 DIAGNOSIS — J45.991 COUGH VARIANT ASTHMA (HHS-HCC): ICD-10-CM

## 2025-03-31 PROCEDURE — 94060 EVALUATION OF WHEEZING: CPT | Performed by: INTERNAL MEDICINE

## 2025-03-31 PROCEDURE — 94729 DIFFUSING CAPACITY: CPT | Performed by: INTERNAL MEDICINE

## 2025-03-31 PROCEDURE — 2500000001 HC RX 250 WO HCPCS SELF ADMINISTERED DRUGS (ALT 637 FOR MEDICARE OP): Performed by: HOSPITALIST

## 2025-03-31 PROCEDURE — 94726 PLETHYSMOGRAPHY LUNG VOLUMES: CPT

## 2025-03-31 PROCEDURE — 94726 PLETHYSMOGRAPHY LUNG VOLUMES: CPT | Performed by: INTERNAL MEDICINE

## 2025-03-31 RX ORDER — ALBUTEROL SULFATE 90 UG/1
4 INHALANT RESPIRATORY (INHALATION) ONCE
Status: COMPLETED | OUTPATIENT
Start: 2025-03-31 | End: 2025-03-31

## 2025-03-31 RX ADMIN — ALBUTEROL SULFATE 4 PUFF: 90 AEROSOL, METERED RESPIRATORY (INHALATION) at 10:57

## 2025-04-01 ENCOUNTER — APPOINTMENT (OUTPATIENT)
Dept: UROLOGY | Facility: CLINIC | Age: 70
End: 2025-04-01
Payer: MEDICARE

## 2025-04-01 DIAGNOSIS — R32 URINARY INCONTINENCE, UNSPECIFIED TYPE: ICD-10-CM

## 2025-04-01 DIAGNOSIS — N28.1 RENAL CYST: ICD-10-CM

## 2025-04-01 DIAGNOSIS — Z85.831: ICD-10-CM

## 2025-04-01 DIAGNOSIS — Z91.041 ALLERGIC TO IV CONTRAST: ICD-10-CM

## 2025-04-01 LAB
POC APPEARANCE, URINE: CLEAR
POC BILIRUBIN, URINE: NEGATIVE
POC BLOOD, URINE: NEGATIVE
POC COLOR, URINE: YELLOW
POC GLUCOSE, URINE: NEGATIVE MG/DL
POC KETONES, URINE: NEGATIVE MG/DL
POC LEUKOCYTES, URINE: NEGATIVE
POC NITRITE,URINE: NEGATIVE
POC PH, URINE: 6 PH
POC PROTEIN, URINE: ABNORMAL MG/DL
POC SPECIFIC GRAVITY, URINE: >=1.03
POC UROBILINOGEN, URINE: 0.2 EU/DL

## 2025-04-01 PROCEDURE — 99204 OFFICE O/P NEW MOD 45 MIN: CPT | Performed by: STUDENT IN AN ORGANIZED HEALTH CARE EDUCATION/TRAINING PROGRAM

## 2025-04-01 PROCEDURE — 1036F TOBACCO NON-USER: CPT | Performed by: STUDENT IN AN ORGANIZED HEALTH CARE EDUCATION/TRAINING PROGRAM

## 2025-04-01 PROCEDURE — G2211 COMPLEX E/M VISIT ADD ON: HCPCS | Performed by: STUDENT IN AN ORGANIZED HEALTH CARE EDUCATION/TRAINING PROGRAM

## 2025-04-01 PROCEDURE — 4010F ACE/ARB THERAPY RXD/TAKEN: CPT | Performed by: STUDENT IN AN ORGANIZED HEALTH CARE EDUCATION/TRAINING PROGRAM

## 2025-04-01 PROCEDURE — 1160F RVW MEDS BY RX/DR IN RCRD: CPT | Performed by: STUDENT IN AN ORGANIZED HEALTH CARE EDUCATION/TRAINING PROGRAM

## 2025-04-01 PROCEDURE — 1111F DSCHRG MED/CURRENT MED MERGE: CPT | Performed by: STUDENT IN AN ORGANIZED HEALTH CARE EDUCATION/TRAINING PROGRAM

## 2025-04-01 PROCEDURE — 1159F MED LIST DOCD IN RCRD: CPT | Performed by: STUDENT IN AN ORGANIZED HEALTH CARE EDUCATION/TRAINING PROGRAM

## 2025-04-01 PROCEDURE — 81003 URINALYSIS AUTO W/O SCOPE: CPT | Performed by: STUDENT IN AN ORGANIZED HEALTH CARE EDUCATION/TRAINING PROGRAM

## 2025-04-01 RX ORDER — PREDNISONE 50 MG/1
50 TABLET ORAL SEE ADMIN INSTRUCTIONS
Qty: 3 TABLET | Refills: 0 | Status: SHIPPED | OUTPATIENT
Start: 2025-04-01

## 2025-04-01 NOTE — PROGRESS NOTES
Scribed for Dr. Perez Zacarias by Vern Owens. I, Dr. Perez Zacarias have personally reviewed and agreed with the information entered by the Virtual Scribe. 04/01/25.    ASSESSMENT:  Problem List Items Addressed This Visit       History of malignant neoplasm of retroperitoneum    Urinary incontinence    Relevant Orders    POCT UA Automated manually resulted (Completed)     Other Visit Diagnoses       Renal cyst        Relevant Orders    CT urography w 3D volume rendered imaging    Follow Up In Urology    Allergic to IV contrast        Relevant Medications    predniSONE (Deltasone) 50 mg tablet    diphenhydrAMINE (Benadryl Allergy) 50 mg tablet          RCC s/p partial left nephrectomy (2007)  Renal infarct, left   Solitary R kidney  Renal cysts  OAB symptoms  Urinary incontinence  Nocturia    PLAN:  #Parapelvic renal cysts, right  CTU (03/21/25) reviewed. Cysts appear benign, albeit she elects to proceed with repeat CTU in 6 months for reassurance. Follow up in 6 months to review results.     #Renal cysts  We discussed the natural history of simple renal cysts.  Reassured that these are very common and benign.   I would not recommend further workup or surveillance for this.  Patient reassured.     All questions were answered to the patient’s satisfaction.  Patient agrees with the plan and wishes to proceed.  Continue follow-up for ongoing care of her chronic medical conditions.       History of Present Illness (HPI):  Vania presents as a new patient for an evaluation.  The patient’s EMR has been reviewed.  Lives in [place], [place].    Hx of RCC s/p left nephrectomy (2007), left renal infarct with solitary functioning R kidney, BL renal cysts, OAB symptoms, urinary incontinence, and nocturia. Recently admitted and underwent CT imaging (03/21/25) which revealed a right renal lesion. Referred to me for concerns of potential malignancy.     TODAY: (04/01/25)  Reports she has been doing well overall.   C/o occasional R  flank vs R low back pain.   Denies any gross hematuria or Cordell's.     Follows up with Venus Busch for her urinary symptoms.   Hx of OAB symptoms, mixed urinary incontinence, NTF 4x  Wears pads, changes 4-5x per day.  C/o intermittent R flank pain.  Tried anticholinergics in the past,   discontinued 2/2 drying side-effects,   Has hx of gluacoma/dry eye.    Renal function appears stable.  GFR 50, Cr 1.18 (25)    CTU (25) personally reviewed and independently interpreted.  Noted simple appearing right renal cysts, RLP.   No hydronephrosis or filling defects.   Parapelvic renal cysts, appear simple and benign.    EMR records reviewed.   Previous patient of Dr. De Leon's, last seen 21.   Venus Busch's note reviewed. (02/10/25, 25)  Discharge summary reviewed. (25)    PMH: RCC s/p partial left nephrectomy (), left renal infarct with solitary functioning R kidney, BL renal cysts, OAB symptoms, urinary incontinence, nocturia, CAD, T2DM, diverticulosis, glaucoma, cataract, asthma, abdominal hernia.   PSH: Breast biopsy, cardiac cath, , hand surgery, hernia repair, nephrectomy, total hysterectomy,   FH: Denies FH of  malignancy.   SH: Former smoker    Past Medical History:   Diagnosis Date    Abdominal distension (gaseous) 2019    Abdominal bloating    Asthma     Cancer (Multi)     Cataract     Coronary artery disease     Diabetes mellitus (Multi)     Diverticulosis of intestine, part unspecified, without perforation or abscess without bleeding 2013    Diverticulosis    Elevation of levels of liver transaminase levels 2020    Transaminitis    Encounter for follow-up examination after completed treatment for conditions other than malignant neoplasm 2019    Hospital discharge follow-up    Glaucoma     Hypertension     Long term (current) use of antibiotics 10/07/2016    Need for prophylactic antibiotic    Other amnesia 10/28/2014    Memory loss    Other  conditions influencing health status 2019    History of cough    Other specified health status 2019    Hepatitis C antibody test negative    Other specified soft tissue disorders 2017    Leg swelling    Pain in left toe(s) 05/15/2017    Pain of toe of left foot    Pain in right foot 10/12/2016    Pain of right heel    Pain in right shoulder 2016    Acute pain of right shoulder    Personal history of diseases of the blood and blood-forming organs and certain disorders involving the immune mechanism 2018    History of anemia    Personal history of other diseases of the respiratory system 2018    History of sinusitis    Personal history of other diseases of the respiratory system 2014    History of upper respiratory infection    Personal history of other malignant neoplasm of kidney 2021    Personal history of malignant neoplasm of kidney    Personal history of other medical treatment 2017    History of screening mammography    Personal history of other specified conditions 2014    History of abdominal pain    Personal history of other specified conditions 2017    History of urinary frequency    Personal history of other specified conditions 2021    History of dysuria    Personal history of other specified conditions 2014    History of breast lump    Unspecified abdominal hernia without obstruction or gangrene 2014    Hernia     Past Surgical History:   Procedure Laterality Date    BREAST BIOPSY Right 2014    Biopsy Breast Percutaneous Needle Core    BREAST BIOPSY Right 2018    CARDIAC CATHETERIZATION N/A 2024    Procedure: Left Heart Cath;  Surgeon: Donato Cespedes MD;  Location: [place] [address] [facility];  Service: Cardiovascular;  Laterality: N/A;     SECTION, CLASSIC  2014     Section    CHOLECYSTECTOMY  2017    Cholecystectomy Laparoscopic    HAND SURGERY  2020    Hand Surgery                                                                                                                                                           HERNIA REPAIR  11/17/2014    Hernia Repair    MR HEAD ANGIO WO IV CONTRAST  11/17/2014    MR HEAD ANGIO WO IV CONTRAST 11/17/2014 [facility]    OTHER SURGICAL HISTORY  01/14/2021    Nephrectomy    TOTAL ABDOMINAL HYSTERECTOMY W/ BILATERAL SALPINGOOPHORECTOMY  04/21/2014    Total Abdominal Hysterectomy With Removal Of Both Ovaries     Family History   Problem Relation Name Age of Onset    Aneurysm Mother      Hypertension Mother      Pancreatic cancer Mother      Diabetes Mother      Other (laryngeal Cancer) Mother      Heart disease Father      Pulmonary embolism Brother      Breast cancer Father's Sister      Breast cancer Maternal Cousin       Social History     Tobacco Use   Smoking Status Former    Types: Cigarettes    Passive exposure: Never   Smokeless Tobacco Never     Current Outpatient Medications   Medication Sig Dispense Refill    Accu-Chek Guide test strips strip Use 1 test strip to test blood sugar twice daily. 200 strip 1    acetaminophen (Tylenol 8 HOUR) 650 mg ER tablet Take 1-2 tablets (650-1,300 mg) by mouth every 8 hours if needed for mild pain (1 - 3). Do not crush, chew, or split.      albuterol 90 mcg/actuation inhaler Inhale 2 puffs every 4 hours if needed for wheezing or shortness of breath (You may also use this 10-15 minutes prior to exertional activity as needed). 18 g 5    amLODIPine (Norvasc) 10 mg tablet Take 1 tablet (10 mg) by mouth once daily. 90 tablet 1    apixaban (Eliquis) 2.5 mg tablet Take 1 tablet (2.5 mg) by mouth every 12 hours. 180 tablet 1    benzocaine-menthol (Cepastat Sore Throat) lozenge Dissolve 1 lozenge in the mouth every 2 hours if needed for sore throat. 30 lozenge 0    colchicine 0.6 mg tablet Take 1 tablet (0.6 mg) by mouth once daily. 30 tablet 5    Combigan 0.2-0.5 % ophthalmic solution Administer 1  drop into both eyes 2 times a day. 30 mL 11    dextromethorphan-guaifenesin (Robitussin DM)  mg/5 mL oral liquid Take 5 mL by mouth every 4 hours if needed for cough. 118 mL 0    diclofenac sodium 1 % kit Apply 1 Application topically if needed.  APPLY TO LOWER EXTREMITIES, 4 GM OF GEL TO AFFECTED AREA 4 TIMES DAILY. DO NOT APPLY MORE THAN 16 GM DAILY TO ANY ONE AFFECTED JOINT.      diphenhydrAMINE (Benadryl Allergy) 50 mg tablet Take 1 tablet (50 mg) by mouth 1 time for 1 dose. Take 1 hour before your scan 1 tablet 0    diphenhydrAMINE (BENADryl) 50 mg tablet Take 1 tablet 1 hr prior to contrast administration 1 tablet 0    dulaglutide (Trulicity) 0.75 mg/0.5 mL pen injector Inject 0.75 mg under the skin 1 (one) time per week. 12 each 3    famotidine (Pepcid) 20 mg tablet Take 1 tablet (20 mg) by mouth once daily at bedtime.      fluticasone (Flonase) 50 mcg/actuation nasal spray Administer 2 sprays into each nostril once daily. 16 g 3    furosemide (Lasix) 20 mg tablet Take 1 tablet (20 mg) by mouth once daily as needed (For Swelling). 30 tablet 0    gabapentin (Neurontin) 300 mg capsule Take 1 capsule (300 mg) by mouth once daily at bedtime. 30 capsule 5    glipiZIDE 2.5 mg tablet Take 2.5 mg by mouth once daily. 90 tablet 1    hydrocortisone 1 % cream Apply as needed to the injection site 30 g 1    insulin lispro (HumaLOG KwikPen Insulin) 100 unit/mL pen Use with sliding scale 3 times a day, up to 30 units daily 15 mL 2    lancets (Accu-Chek Softclix Lancets) misc Use to check blood sugar twice daily 200 each 1    latanoprost (Xalatan) 0.005 % ophthalmic solution Administer 1 drop into both eyes once daily in the morning. 2.5 mL 11    lidocaine (Lidoderm) 5 % patch Place 1 patch over 12 hours on the skin once daily. Remove & discard patch within 12 hours or as directed by MD. 15 patch 0    lidocaine (Lidoderm) 5 % patch Place 2 patches over 12 hours on the skin once daily. Remove & discard patch within 12  "hours or as directed by MD. 14 patch 0    losartan (Cozaar) 25 mg tablet Take 1 tablet (25 mg) by mouth once daily. 90 tablet 1    methocarbamol (Robaxin) 500 mg tablet Take 1 tablet (500 mg) by mouth every 8 hours if needed for muscle spasms (Back pain) for up to 10 days. 30 tablet 0    methocarbamol (Robaxin) 500 mg tablet Take 1 tablet (500 mg) by mouth 2 times a day as needed for muscle spasms for up to 10 days. 20 tablet 0    metoprolol succinate XL (Toprol-XL) 200 mg 24 hr tablet Take 1 tablet (200 mg) by mouth once daily. 90 tablet 3    mometasone (Nasonex 24hr Allergy) 50 mcg/actuation nasal spray       omeprazole (PriLOSEC) 40 mg DR capsule Take 1 capsule (40 mg) by mouth once daily. 30 capsule 0    pen needle, diabetic 31 gauge x 5/16\" needle Use to inject 1-4 times daily as directed. 100 each 11    polyethylene glycol (Glycolax, Miralax) 17 gram/dose powder Take 17 g by mouth 2 times a day. 3060 g 3    prednisoLONE acetate (Pred-Forte) 1 % ophthalmic suspension Administer 1 drop into the right eye 4 times a day for 4 days. 5 mL 0    predniSONE (Deltasone) 50 mg tablet Take 1 tablet (50 mg) by mouth see administration instructions. Take 50 mg at  13 hours , 7 hours and 1 hour before your scan 3 tablet 0    predniSONE (Deltasone) 50 mg tablet Take 1 tablet at 13 hrs, 7 hrs and 1 hr prior to contrast administration 3 tablet 0    rosuvastatin (Crestor) 20 mg tablet Take 1 tablet (20 mg) by mouth once daily. 90 tablet 3    tiotropium (Spiriva Respimat) 2.5 mcg/actuation inhaler Inhale 2 puffs once daily. 1 each 3    tiotropium-olodateroL (Stiolto Respimat) 2.5-2.5 mcg/actuation mist inhaler Inhale 2 Inhalations once daily. 4 g 3     Current Facility-Administered Medications   Medication Dose Route Frequency Provider Last Rate Last Admin    bupivacaine PF (Marcaine) injection 0.75 %  5 mL injection Once Raoul Fung MD        nitroglycerin (Nitrostat) SL tablet 0.4 mg  0.4 mg sublingual Once Vianney Marrufo, " MD         Allergies   Allergen Reactions    Adhesive Itching    Amoxicillin Hives and Itching    Bee Sting Kit Swelling    Cephalexin Itching and Nausea Only    Gadolinium-Containing Contrast Media Hives    Iodine Unknown    Latex Other    Pioglitazone Swelling    Rubella Virus Live Vaccine Unknown    Salicylates Other    Shellfish Derived Swelling    Sulfa (Sulfonamide Antibiotics) Unknown    Sulfamethoxazole-Trimethoprim Hives and Itching    Iodinated Contrast Media Itching and Rash     Past medical, surgical, family and social history in the chart was reviewed and is accurate including any additions to what is in this HPI.    REVIEW OF SYSTEMS (ROS):   Constitutional: denies any unintentional weight loss or change in strength.  Integumentary: denies any rashes or pruritus.  Eyes: denies any double vision or eye pain.  Ear/Nose/Mouth/Throat: denies any nosebleeds or gum bleeds.  Cardiovascular: denies any chest pain or syncope.  Respiratory: denies hemoptysis.  Gastrointestinal: denies nausea or vomiting.  Musculoskeletal: denies muscle cramping or weakness.  Neurologic: denies convulsions or seizures.  Hematologic/Lymphatic: denies bleeding tendencies.  Endocrine: denies heat/cold intolerance.  All other systems have been reviewed and are negative unless otherwise noted in the HPI.     OBJECTIVE:  There were no vitals taken for this visit.  PHYSICAL EXAM:  Constitutional: No obvious distress.  Eyes: Non-injected conjunctiva, sclera clear, EOMI.  Ears/Nose/Mouth/Throat: No obvious drainage per ears or nose.  Cardiovascular: Extremities are warm and well perfused. No edema, cyanosis or pallor.  Respiratory: No audible wheezing/stridor; respirations do not appear labored.  Gastrointestinal: Abdomen soft, not distended.  Musculoskeletal: Normal ROM of extremities.  Skin: No obvious rashes or open sores.  Neurologic: Alert and oriented, CN 2-12 grossly intact.  Psychiatric: Answers questions appropriately with normal  affect.  Hematologic/Lymphatic/Immunologic: No obvious bruises or sites of spontaneous bleeding.  Genitourinary: No CVA tenderness, bladder not palpable.     LABS & IMAGING:  Basic Labs:  Lab Results   Component Value Date    WBC 9.7 03/24/2025    HGB 8.4 (L) 03/24/2025    HCT 25.8 (L) 03/24/2025     03/24/2025     03/24/2025    K 4.6 03/24/2025     03/24/2025    ALT 34 03/23/2025    AST 22 03/23/2025    CREATININE 1.18 (H) 03/24/2025    BUN 20 03/24/2025    CO2 23 03/24/2025    TSH 2.16 09/06/2024    INR 1.2 (H) 03/01/2025       Scribed for Dr. Perez Zacarias by Vern Owens.  By signing my name below, I, Omer Murdock attest that this documentation has been prepared under the direction and in the presence of Perez Zacarias MD. All medical record entries made by the Scribe were at my direction or personally dictated by me. I have reviewed the chart and agree that the record accurately reflects my personal performance of the history, physical exam, discussion and plan.

## 2025-04-02 ENCOUNTER — SPECIALTY PHARMACY (OUTPATIENT)
Dept: PHARMACY | Facility: CLINIC | Age: 70
End: 2025-04-02

## 2025-04-02 PROCEDURE — RXMED WILLOW AMBULATORY MEDICATION CHARGE

## 2025-04-04 ENCOUNTER — PHARMACY VISIT (OUTPATIENT)
Dept: PHARMACY | Facility: CLINIC | Age: 70
End: 2025-04-04
Payer: MEDICARE

## 2025-04-04 LAB
MGC ASCENT PFT - FEV1 - POST: 1.2
MGC ASCENT PFT - FEV1 - POST: 1.2
MGC ASCENT PFT - FEV1 - PRE: 1.22
MGC ASCENT PFT - FEV1 - PRE: 1.22
MGC ASCENT PFT - FEV1 - PREDICTED: 1.8
MGC ASCENT PFT - FEV1 - PREDICTED: 1.8
MGC ASCENT PFT - FVC - POST: 1.65
MGC ASCENT PFT - FVC - POST: 1.65
MGC ASCENT PFT - FVC - PRE: 1.67
MGC ASCENT PFT - FVC - PRE: 1.67
MGC ASCENT PFT - FVC - PREDICTED: 2.26
MGC ASCENT PFT - FVC - PREDICTED: 2.26
PTH-INTACT SERPL-MCNC: 385 PG/ML (ref 16–77)

## 2025-04-04 PROCEDURE — RXMED WILLOW AMBULATORY MEDICATION CHARGE

## 2025-04-07 DIAGNOSIS — R79.89 ELEVATED PARATHYROID HORMONE: Primary | ICD-10-CM

## 2025-04-07 DIAGNOSIS — E55.9 VITAMIN D DEFICIENCY: ICD-10-CM

## 2025-04-08 ENCOUNTER — TELEPHONE (OUTPATIENT)
Dept: PULMONOLOGY | Facility: HOSPITAL | Age: 70
End: 2025-04-08
Payer: MEDICARE

## 2025-04-08 ENCOUNTER — HOSPITAL ENCOUNTER (OUTPATIENT)
Dept: RADIOLOGY | Facility: HOSPITAL | Age: 70
Discharge: HOME | End: 2025-04-08
Payer: MEDICARE

## 2025-04-08 ENCOUNTER — DOCUMENTATION (OUTPATIENT)
Dept: PRIMARY CARE | Facility: CLINIC | Age: 70
End: 2025-04-08
Payer: MEDICARE

## 2025-04-08 ENCOUNTER — PATIENT OUTREACH (OUTPATIENT)
Dept: PRIMARY CARE | Facility: CLINIC | Age: 70
End: 2025-04-08
Payer: MEDICARE

## 2025-04-08 DIAGNOSIS — R05.3 CHRONIC COUGH: ICD-10-CM

## 2025-04-08 DIAGNOSIS — M54.2 NECK PAIN: ICD-10-CM

## 2025-04-08 DIAGNOSIS — E11.51 TYPE 2 DIABETES MELLITUS WITH DIABETIC PERIPHERAL ANGIOPATHY WITHOUT GANGRENE, WITHOUT LONG-TERM CURRENT USE OF INSULIN (MULTI): ICD-10-CM

## 2025-04-08 DIAGNOSIS — I10 PRIMARY HYPERTENSION: ICD-10-CM

## 2025-04-08 DIAGNOSIS — M54.50 ACUTE MIDLINE LOW BACK PAIN, UNSPECIFIED WHETHER SCIATICA PRESENT: ICD-10-CM

## 2025-04-08 DIAGNOSIS — N18.30 STAGE 3 CHRONIC KIDNEY DISEASE, UNSPECIFIED WHETHER STAGE 3A OR 3B CKD (MULTI): ICD-10-CM

## 2025-04-08 PROCEDURE — 72050 X-RAY EXAM NECK SPINE 4/5VWS: CPT

## 2025-04-08 PROCEDURE — 72050 X-RAY EXAM NECK SPINE 4/5VWS: CPT | Performed by: RADIOLOGY

## 2025-04-08 NOTE — PROGRESS NOTES
Chronic care management outreach complete. Returned Ms. Andrews's call for ccm. She says that be is not feeling the best. She says that she keeps coughing, and her chest hurts because of the coughing. She keeps coughing and wheezing and producing mucus that is clear. She says that she is using her stiolto respimat daily,  in which she started 3/31. She says she has also had to use her albuterol. She has placed a call to pulmonary and is waiting for a call back.   We reviewed her previous appointments since our last outreach. She was worried about her renal cysts and was wondering why her follow up CT is six months away. I explained to her that according to Dr. Zacarias's note, her cysts appear to be benign and a repeat CT in 6 months is to follow up to see if there are any changes. She was relived and expressed understanding. She utilized [employer], but was not sure of how to read the doctors notes. I spent time going over how to view the doctors notes and her after visit summary in [employer]. She expressed understanding and says that she ill review the notes from her doctors later.   She also says that she tried to schedule with neurosurgery as she was told by Dr. Wall, but she was told that she had no referral. Will message Dr. Wall for referral.   She does not monitor her bp. But she takes her medications.   She says that her blood sugars are in the 200 range. Followed by Arm[facility]. Armando eve 4/11.  We reviewed all of her up coming appointments.   Medications reviewed.   Questions and concerns addressed.

## 2025-04-08 NOTE — TELEPHONE ENCOUNTER
Pt returned the call regarding her PFT results. Per Dr. Magana, PFT results are consistent with emphysema/COPD. Pt was updated on these results and verbalized understanding. She was very emotional and teary with this news. She stated that she was also told today that something is wrong with her thyroid and kidney. During the conversation, she mentioned that she has ORTIZ, fatigue, occasional wheeze, and cough with clear mucous. She also states that her chest hurts when she coughs. She has been taking the Stiolto for about a week and doesn't feel any different on it. Dr. Magana was notified.

## 2025-04-09 ENCOUNTER — TELEPHONE (OUTPATIENT)
Dept: PULMONOLOGY | Facility: HOSPITAL | Age: 70
End: 2025-04-09
Payer: MEDICARE

## 2025-04-09 DIAGNOSIS — J44.1 COPD EXACERBATION (MULTI): ICD-10-CM

## 2025-04-09 LAB
25(OH)D3+25(OH)D2 SERPL-MCNC: 23 NG/ML (ref 30–100)
ALBUMIN SERPL-MCNC: 3.7 G/DL (ref 3.6–5.1)
BUN SERPL-MCNC: 8 MG/DL (ref 7–25)
BUN/CREAT SERPL: ABNORMAL (CALC) (ref 6–22)
CA-I SERPL-MCNC: 5.4 MG/DL (ref 4.7–5.5)
CALCIUM SERPL-MCNC: 9.1 MG/DL (ref 8.6–10.4)
CHLORIDE SERPL-SCNC: 113 MMOL/L (ref 98–110)
CO2 SERPL-SCNC: 22 MMOL/L (ref 20–32)
CREAT SERPL-MCNC: 0.99 MG/DL (ref 0.5–1.05)
EGFRCR SERPLBLD CKD-EPI 2021: 62 ML/MIN/1.73M2
GLUCOSE SERPL-MCNC: 138 MG/DL (ref 65–99)
PHOSPHATE SERPL-MCNC: 3 MG/DL (ref 2.1–4.3)
POTASSIUM SERPL-SCNC: 4.3 MMOL/L (ref 3.5–5.3)
PTH-INTACT SERPL-MCNC: 177 PG/ML (ref 16–77)
SODIUM SERPL-SCNC: 143 MMOL/L (ref 135–146)

## 2025-04-09 RX ORDER — PREDNISONE 10 MG/1
TABLET ORAL
Qty: 30 TABLET | Refills: 0 | Status: SHIPPED | OUTPATIENT
Start: 2025-04-09

## 2025-04-09 RX ORDER — AZITHROMYCIN 250 MG/1
250 TABLET, FILM COATED ORAL DAILY
Qty: 6 TABLET | Refills: 0 | Status: SHIPPED | OUTPATIENT
Start: 2025-04-09 | End: 2025-04-10 | Stop reason: WASHOUT

## 2025-04-09 NOTE — TELEPHONE ENCOUNTER
Per Dr. Magana, will treat exacerbation with prednisone and z-malika. Orders placed and routed to Dr. Magana to sign. Tried to call pt at [phone] to update her on this plan of care, but was unsuccessful. Voicemail left with instructions to return the call at [phone].

## 2025-04-10 ENCOUNTER — OFFICE VISIT (OUTPATIENT)
Dept: GASTROENTEROLOGY | Facility: HOSPITAL | Age: 70
End: 2025-04-10
Payer: MEDICARE

## 2025-04-10 ENCOUNTER — OFFICE VISIT (OUTPATIENT)
Dept: ORTHOPEDIC SURGERY | Facility: HOSPITAL | Age: 70
End: 2025-04-10
Payer: MEDICARE

## 2025-04-10 VITALS
SYSTOLIC BLOOD PRESSURE: 141 MMHG | TEMPERATURE: 97.3 F | WEIGHT: 193 LBS | BODY MASS INDEX: 40.34 KG/M2 | OXYGEN SATURATION: 100 % | RESPIRATION RATE: 18 BRPM | HEART RATE: 66 BPM | DIASTOLIC BLOOD PRESSURE: 75 MMHG

## 2025-04-10 DIAGNOSIS — K21.9 GASTROESOPHAGEAL REFLUX DISEASE WITHOUT ESOPHAGITIS: ICD-10-CM

## 2025-04-10 DIAGNOSIS — M16.11 ARTHRITIS OF RIGHT HIP: ICD-10-CM

## 2025-04-10 DIAGNOSIS — M54.2 NECK PAIN: ICD-10-CM

## 2025-04-10 DIAGNOSIS — M47.22 OSTEOARTHRITIS OF SPINE WITH RADICULOPATHY, CERVICAL REGION: ICD-10-CM

## 2025-04-10 DIAGNOSIS — E66.813 CLASS 3 SEVERE OBESITY DUE TO EXCESS CALORIES WITHOUT SERIOUS COMORBIDITY WITH BODY MASS INDEX (BMI) OF 40.0 TO 44.9 IN ADULT: ICD-10-CM

## 2025-04-10 DIAGNOSIS — M48.061 SPINAL STENOSIS OF LUMBAR REGION WITH RADICULOPATHY: Primary | ICD-10-CM

## 2025-04-10 DIAGNOSIS — E66.01 CLASS 3 SEVERE OBESITY DUE TO EXCESS CALORIES WITHOUT SERIOUS COMORBIDITY WITH BODY MASS INDEX (BMI) OF 40.0 TO 44.9 IN ADULT: ICD-10-CM

## 2025-04-10 DIAGNOSIS — D50.0 IRON DEFICIENCY ANEMIA DUE TO CHRONIC BLOOD LOSS: ICD-10-CM

## 2025-04-10 DIAGNOSIS — K44.9 HIATAL HERNIA: Primary | ICD-10-CM

## 2025-04-10 DIAGNOSIS — M50.30 DEGENERATIVE CERVICAL DISC: ICD-10-CM

## 2025-04-10 DIAGNOSIS — M54.16 SPINAL STENOSIS OF LUMBAR REGION WITH RADICULOPATHY: Primary | ICD-10-CM

## 2025-04-10 PROCEDURE — 99214 OFFICE O/P EST MOD 30 MIN: CPT | Performed by: ORTHOPAEDIC SURGERY

## 2025-04-10 PROCEDURE — 4010F ACE/ARB THERAPY RXD/TAKEN: CPT | Performed by: ORTHOPAEDIC SURGERY

## 2025-04-10 PROCEDURE — RXMED WILLOW AMBULATORY MEDICATION CHARGE

## 2025-04-10 PROCEDURE — 1036F TOBACCO NON-USER: CPT | Performed by: ORTHOPAEDIC SURGERY

## 2025-04-10 PROCEDURE — 1159F MED LIST DOCD IN RCRD: CPT | Performed by: ORTHOPAEDIC SURGERY

## 2025-04-10 RX ORDER — OMEPRAZOLE 40 MG/1
40 CAPSULE, DELAYED RELEASE ORAL DAILY
Qty: 30 CAPSULE | Refills: 0 | Status: SHIPPED | OUTPATIENT
Start: 2025-04-10

## 2025-04-10 RX ORDER — FAMOTIDINE 20 MG/1
20 TABLET, FILM COATED ORAL NIGHTLY
Qty: 90 TABLET | Refills: 3 | Status: SHIPPED | OUTPATIENT
Start: 2025-04-10 | End: 2026-04-10

## 2025-04-10 ASSESSMENT — PAIN SCALES - GENERAL: PAINLEVEL_OUTOF10: 8

## 2025-04-10 ASSESSMENT — ENCOUNTER SYMPTOMS
VOMITING: 0
BLOOD IN STOOL: 0
ARTHRALGIAS: 0
SORE THROAT: 0
NAUSEA: 0
ABDOMINAL PAIN: 1
DIARRHEA: 0
SLEEP DISTURBANCE: 0
HEMATURIA: 0
FEVER: 0
CONSTIPATION: 1
SHORTNESS OF BREATH: 1
HEADACHES: 0
PALPITATIONS: 0
DYSURIA: 0
CHILLS: 0
MYALGIAS: 0
COUGH: 1

## 2025-04-10 NOTE — PROGRESS NOTES
Chief Complaint: Neck and low back pain    HPI: Vania Andrews is a 70 y.o. year old female patient with past medical history of renal cell carcinoma status post resection, right hip arthritis, COPD, hiatal hernia who was seen in our clinic today with complaints of chronic neck and low back pain.  In terms of her neck started around January 2024.  No associated trauma or injury that she can recall.  Complains of pain from the base of her neck along the side of her neck to her trapezius on both sides right worse than left.  She also complains of occasional pain that radiates down her right arm.  She has right arm and hand pain.  She does have a history of right forearm fracture and carpal tunnel that has caused chronic pain in that extremity.  No myelopathic symptoms.  No history of spinal surgery.  She has not had any conservative treatments for her neck pain.    In terms of her lower back complaints of lumbar pain as well as diffuse right leg pain.  Does not seem to follow any specific dermatomal distribution.  She states that she has pain circumferentially.  She also has significant right groin pain.  She did see Dr. Summers who had recommended a hip replacement once she is able to lose weight.  She tried a hip injection which did not help much.  No history of spinal injections.  She did water therapy for her back in the past but has not continue any exercises on her own.    She was also told that she needs surgery for her hiatal hernia.  She also was recently found to have elevated parathyroid hormones and is scheduled to see endocrinology.            Review of Systems    All other systems have been reviewed and are negative for complaint. All pertinent positive and negative as listed in history of present illness.    Past Medical History:   Diagnosis Date    Abdominal distension (gaseous) 07/31/2019    Abdominal bloating    Asthma     Cancer (Multi)     Cataract     Coronary artery disease     Diabetes  mellitus (Multi)     Diverticulosis of intestine, part unspecified, without perforation or abscess without bleeding 06/09/2013    Diverticulosis    Elevation of levels of liver transaminase levels 11/13/2020    Transaminitis    Encounter for follow-up examination after completed treatment for conditions other than malignant neoplasm 01/26/2019    Hospital discharge follow-up    Glaucoma     Hypertension     Long term (current) use of antibiotics 10/07/2016    Need for prophylactic antibiotic    Other amnesia 10/28/2014    Memory loss    Other conditions influencing health status 02/06/2019    History of cough    Other specified health status 02/07/2019    Hepatitis C antibody test negative    Other specified soft tissue disorders 06/14/2017    Leg swelling    Pain in left toe(s) 05/15/2017    Pain of toe of left foot    Pain in right foot 10/12/2016    Pain of right heel    Pain in right shoulder 06/22/2016    Acute pain of right shoulder    Personal history of diseases of the blood and blood-forming organs and certain disorders involving the immune mechanism 04/09/2018    History of anemia    Personal history of other diseases of the respiratory system 04/02/2018    History of sinusitis    Personal history of other diseases of the respiratory system 03/19/2014    History of upper respiratory infection    Personal history of other malignant neoplasm of kidney 01/14/2021    Personal history of malignant neoplasm of kidney    Personal history of other medical treatment 08/08/2017    History of screening mammography    Personal history of other specified conditions 11/17/2014    History of abdominal pain    Personal history of other specified conditions 06/14/2017    History of urinary frequency    Personal history of other specified conditions 06/09/2021    History of dysuria    Personal history of other specified conditions 11/28/2014    History of breast lump    Unspecified abdominal hernia without obstruction or  gangrene 2014    Hernia        Past Surgical History:   Procedure Laterality Date    BREAST BIOPSY Right 2014    Biopsy Breast Percutaneous Needle Core    BREAST BIOPSY Right 2018    CARDIAC CATHETERIZATION N/A 2024    Procedure: Left Heart Cath;  Surgeon: Donato Cespedes MD;  Location: [place] [address] [facility];  Service: Cardiovascular;  Laterality: N/A;     SECTION, CLASSIC  2014     Section    CHOLECYSTECTOMY  2017    Cholecystectomy Laparoscopic    HAND SURGERY  2020    Hand Surgery                                                                                                                                                          HERNIA REPAIR  2014    Hernia Repair    MR HEAD ANGIO WO IV CONTRAST  2014    MR HEAD ANGIO WO IV CONTRAST 2014 [facility] [place] [facility]    OTHER SURGICAL HISTORY  2021    Nephrectomy    TOTAL ABDOMINAL HYSTERECTOMY W/ BILATERAL SALPINGOOPHORECTOMY  2014    Total Abdominal Hysterectomy With Removal Of Both Ovaries        Allergies   Allergen Reactions    Adhesive Itching    Amoxicillin Hives and Itching    Bee Sting Kit Swelling    Cephalexin Itching and Nausea Only    Gadolinium-Containing Contrast Media Hives    Iodine Unknown    Latex Other    Pioglitazone Swelling    Rubella Virus Live Vaccine Unknown    Salicylates Other    Shellfish Derived Swelling    Sulfa (Sulfonamide Antibiotics) Unknown    Sulfamethoxazole-Trimethoprim Hives and Itching    Iodinated Contrast Media Itching and Rash        Current Outpatient Medications on File Prior to Visit   Medication Sig Dispense Refill    Accu-Chek Guide test strips strip Use 1 test strip to test blood sugar twice daily. 200 strip 1    acetaminophen (Tylenol 8 HOUR) 650 mg ER tablet Take 1-2 tablets (650-1,300 mg) by mouth every 8 hours if needed for mild pain (1 - 3). Do not crush, chew, or split.      albuterol 90 mcg/actuation inhaler Inhale 2  puffs every 4 hours if needed for wheezing or shortness of breath (You may also use this 10-15 minutes prior to exertional activity as needed). 18 g 5    amLODIPine (Norvasc) 10 mg tablet Take 1 tablet (10 mg) by mouth once daily. 90 tablet 1    apixaban (Eliquis) 2.5 mg tablet Take 1 tablet (2.5 mg) by mouth every 12 hours. 180 tablet 1    colchicine 0.6 mg tablet Take 1 tablet (0.6 mg) by mouth once daily. 30 tablet 5    Combigan 0.2-0.5 % ophthalmic solution Administer 1 drop into both eyes 2 times a day. 30 mL 11    dextromethorphan-guaifenesin (Robitussin DM)  mg/5 mL oral liquid Take 5 mL by mouth every 4 hours if needed for cough. 118 mL 0    diclofenac sodium 1 % kit Apply 1 Application topically if needed.  APPLY TO LOWER EXTREMITIES, 4 GM OF GEL TO AFFECTED AREA 4 TIMES DAILY. DO NOT APPLY MORE THAN 16 GM DAILY TO ANY ONE AFFECTED JOINT.      diphenhydrAMINE (BENADryl) 50 mg tablet Take 1 tablet 1 hr prior to contrast administration 1 tablet 0    dulaglutide (Trulicity) 0.75 mg/0.5 mL pen injector Inject 0.75 mg under the skin 1 (one) time per week. 12 each 3    famotidine (Pepcid) 20 mg tablet Take 1 tablet (20 mg) by mouth once daily at bedtime.      fluticasone (Flonase) 50 mcg/actuation nasal spray Administer 2 sprays into each nostril once daily. 16 g 3    furosemide (Lasix) 20 mg tablet Take 1 tablet (20 mg) by mouth once daily as needed (For Swelling). 30 tablet 0    gabapentin (Neurontin) 300 mg capsule Take 1 capsule (300 mg) by mouth once daily at bedtime. 30 capsule 5    glipiZIDE 2.5 mg tablet Take 2.5 mg by mouth once daily. 90 tablet 1    hydrocortisone 1 % cream Apply as needed to the injection site 30 g 1    insulin lispro (HumaLOG KwikPen Insulin) 100 unit/mL pen Use with sliding scale 3 times a day, up to 30 units daily 15 mL 2    lancets (Accu-Chek Softclix Lancets) misc Use to check blood sugar twice daily 200 each 1    latanoprost (Xalatan) 0.005 % ophthalmic solution Administer  "1 drop into both eyes once daily in the morning. 2.5 mL 11    lidocaine (Lidoderm) 5 % patch Place 1 patch over 12 hours on the skin once daily. Remove & discard patch within 12 hours or as directed by MD. 15 patch 0    lidocaine (Lidoderm) 5 % patch Place 2 patches over 12 hours on the skin once daily. Remove & discard patch within 12 hours or as directed by MD. 14 patch 0    losartan (Cozaar) 25 mg tablet Take 1 tablet (25 mg) by mouth once daily. 90 tablet 1    metoprolol succinate XL (Toprol-XL) 200 mg 24 hr tablet Take 1 tablet (200 mg) by mouth once daily. 90 tablet 3    mometasone (Nasonex 24hr Allergy) 50 mcg/actuation nasal spray       omeprazole (PriLOSEC) 40 mg DR capsule Take 1 capsule (40 mg) by mouth once daily. 30 capsule 0    pen needle, diabetic 31 gauge x 5/16\" needle Use to inject 1-4 times daily as directed. 100 each 11    polyethylene glycol (Glycolax, Miralax) 17 gram/dose powder Take 17 g by mouth 2 times a day. 3060 g 3    prednisoLONE acetate (Pred-Forte) 1 % ophthalmic suspension Administer 1 drop into the right eye 4 times a day for 4 days. 5 mL 0    predniSONE (Deltasone) 10 mg tablet Take 4 tabs (40mg) daily for 3 days, then 3 tabs (30mg) daily for 3 days,  2 tabs (20mg) daily for 3 days,  1 tab (10 mg) daily for 3 days. 30 tablet 0    predniSONE (Deltasone) 50 mg tablet Take 1 tablet at 13 hrs, 7 hrs and 1 hr prior to contrast administration 3 tablet 0    predniSONE (Deltasone) 50 mg tablet Take 1 tablet (50 mg) by mouth see administration instructions. Take 50 mg at  13 hours , 7 hours and 1 hour before your scan 3 tablet 0    rosuvastatin (Crestor) 20 mg tablet Take 1 tablet (20 mg) by mouth once daily. 90 tablet 3    tiotropium (Spiriva Respimat) 2.5 mcg/actuation inhaler Inhale 2 puffs once daily. 1 each 3    tiotropium-olodateroL (Stiolto Respimat) 2.5-2.5 mcg/actuation mist inhaler Inhale 2 Inhalations once daily. 4 g 3    [DISCONTINUED] azithromycin (Zithromax) 250 mg tablet Take " 1 tablet (250 mg) by mouth once daily for 5 days. Take 2 pills (500 mg) on day 1. Take 1 pill (250 mg) on days 2-5. 6 tablet 0    [DISCONTINUED] benzocaine-menthol (Cepastat Sore Throat) lozenge Dissolve 1 lozenge in the mouth every 2 hours if needed for sore throat. 30 lozenge 0    diphenhydrAMINE (Benadryl Allergy) 50 mg tablet Take 1 tablet (50 mg) by mouth 1 time for 1 dose. Take 1 hour before your scan 1 tablet 0    methocarbamol (Robaxin) 500 mg tablet Take 1 tablet (500 mg) by mouth every 8 hours if needed for muscle spasms (Back pain) for up to 10 days. 30 tablet 0    methocarbamol (Robaxin) 500 mg tablet Take 1 tablet (500 mg) by mouth 2 times a day as needed for muscle spasms for up to 10 days. 20 tablet 0     Current Facility-Administered Medications on File Prior to Visit   Medication Dose Route Frequency Provider Last Rate Last Admin    bupivacaine PF (Marcaine) injection 0.75 %  5 mL injection Once Raoul Fung MD        nitroglycerin (Nitrostat) SL tablet 0.4 mg  0.4 mg sublingual Once Vianney Marrufo MD              PE:   General: Patient appears well-nourished and well-developed in no acute distress, Alert and Oriented x3  Psych: Pleasant mood and affect  HEENT: Extraocular muscles intact, pupils equal and round. Sclerae anicteric   Cardio: extremities warm and well perfused  Resp: unlabored symmetric breathing  Skin: no open wounds or rash  Musculoskeletal/Neuro Exam: Normal gait.  Diffuse tenderness to palpation along the paraspinal muscle along the cervical and lumbar spine..  Negative Spurling negative Lhermitte sign.  Negative straight leg raise bilaterally. Tight hamstrings bilaterally.  She has right groin pain with ROM of the hip joints with IR and ER.         Upper extremity:    Motor: Right upper extremity was 5 out of 5 strength with shoulder abduction, elbow flexion and extension, wrist flexion and extension and  against resistance  Left upper extremity with 5 out of 5 strength  with shoulder abduction, elbow flexion and extension, wrist flexion and extension and  against resistance    Sensation to light touch intact along C5-T1 distribution bilaterally        Lower extremity    Motor: Right leg with 5 out of 5 motor strength with hip flexion, knee extension, ankle dorsiflexion plantarflexion and EHL against resistance.  Left leg with 5 out of 5 motor strength with hip flexion, knee extension, ankle dorsiflexion plantarflexion EHL against resistance  Sensation to light touch intact along L2 to S1 distribution bilaterally        Imaging:    I personally reviewed xray of the cervical spine obtained in clinic today. AP, Lateral, flexion and extension xrays were reviewed. No evidence of acute fracture or dislocation.  No spondylolisthesis.  Canal dynamic instability with flexion and extension view.  She has multilevel degenerative changes with bridging anterior osteophytes and facet arthrosis.    I reviewed the MRI of the lumbar spine from March 24, 2025 which shows moderate central canal stenosis at L4-L5 and moderate central canal stenosis at L3-L4 and L5-S1 secondary to hypertrophic facet and ligamentum flavum.  There is also bilateral foraminal narrowing multiple levels    I reviewed the x-ray of the right hip from January 8, 2025 which shows severe right hip osteoarthritis       A/P: Vania Andrews is a 70 y.o. year old female patient with complaints of axial neck pain along the paraspinal and trapezius.  X-rays with multiple levels of degenerative changes.  I would recommend a course of physical therapy to work on stretching strengthening and range of motion.    In regards to her lower back she has canal stenosis and foraminal stenosis at multiple levels.  She has right leg pain however her right groin pain seems to bother her the most she does have right hip arthritis.  I recommend physical therapy for her lower back for core back and lower extremity stretching strengthening  exercises.    I also gave her prescription for nutrition consult to help with weight management I think that would help offload pressure on her lower back as well as her hips.    Patient can follow-up as needed if her pain does not improve or worsens.    After our discussion, the patient articulated understanding of the plan and felt that all questions had been answered satisfactorily. The patient was pleased with the visit and very appreciative for the care rendered.    **Please excuse any errors in grammar or translation related to this dictation. Voice recognition software was utilized to prepare this document. **              Aura Jack MD    Department of Orthopaedic Surgery  [facility]  [email or web address] [email or web address]

## 2025-04-10 NOTE — LETTER
April 10, 2025     Laura Mata MD  [address]  [address]  [facility] [address]    Patient: Vania Andrews   YOB: 1955   Date of Visit: 4/10/2025       Dear Dr. Laura Mata MD:    Thank you for referring Vania Andrews to me for evaluation. Below are my notes for this consultation.  If you have questions, please do not hesitate to call me. I look forward to following your patient along with you.       Sincerely,     Aura Jack MD      CC: Price Lara MD  ______________________________________________________________________________________    Chief Complaint: Neck and low back pain    HPI: Vania Andrews is a 70 y.o. year old female patient with past medical history of renal cell carcinoma status post resection, right hip arthritis, COPD, hiatal hernia who was seen in our clinic today with complaints of chronic neck and low back pain.  In terms of her neck started around January 2024.  No associated trauma or injury that she can recall.  Complains of pain from the base of her neck along the side of her neck to her trapezius on both sides right worse than left.  She also complains of occasional pain that radiates down her right arm.  She has right arm and hand pain.  She does have a history of right forearm fracture and carpal tunnel that has caused chronic pain in that extremity.  No myelopathic symptoms.  No history of spinal surgery.  She has not had any conservative treatments for her neck pain.    In terms of her lower back complaints of lumbar pain as well as diffuse right leg pain.  Does not seem to follow any specific dermatomal distribution.  She states that she has pain circumferentially.  She also has significant right groin pain.  She did see Dr. Summers who had recommended a hip replacement once she is able to lose weight.  She tried a hip injection which did not help much.  No history of spinal injections.  She did water therapy for her back in the  past but has not continue any exercises on her own.    She was also told that she needs surgery for her hiatal hernia.  She also was recently found to have elevated parathyroid hormones and is scheduled to see endocrinology.            Review of Systems    All other systems have been reviewed and are negative for complaint. All pertinent positive and negative as listed in history of present illness.    Past Medical History:   Diagnosis Date   • Abdominal distension (gaseous) 07/31/2019    Abdominal bloating   • Asthma    • Cancer (Multi)    • Cataract    • Coronary artery disease    • Diabetes mellitus (Multi)    • Diverticulosis of intestine, part unspecified, without perforation or abscess without bleeding 06/09/2013    Diverticulosis   • Elevation of levels of liver transaminase levels 11/13/2020    Transaminitis   • Encounter for follow-up examination after completed treatment for conditions other than malignant neoplasm 01/26/2019    Hospital discharge follow-up   • Glaucoma    • Hypertension    • Long term (current) use of antibiotics 10/07/2016    Need for prophylactic antibiotic   • Other amnesia 10/28/2014    Memory loss   • Other conditions influencing health status 02/06/2019    History of cough   • Other specified health status 02/07/2019    Hepatitis C antibody test negative   • Other specified soft tissue disorders 06/14/2017    Leg swelling   • Pain in left toe(s) 05/15/2017    Pain of toe of left foot   • Pain in right foot 10/12/2016    Pain of right heel   • Pain in right shoulder 06/22/2016    Acute pain of right shoulder   • Personal history of diseases of the blood and blood-forming organs and certain disorders involving the immune mechanism 04/09/2018    History of anemia   • Personal history of other diseases of the respiratory system 04/02/2018    History of sinusitis   • Personal history of other diseases of the respiratory system 03/19/2014    History of upper respiratory infection   •  Personal history of other malignant neoplasm of kidney 2021    Personal history of malignant neoplasm of kidney   • Personal history of other medical treatment 2017    History of screening mammography   • Personal history of other specified conditions 2014    History of abdominal pain   • Personal history of other specified conditions 2017    History of urinary frequency   • Personal history of other specified conditions 2021    History of dysuria   • Personal history of other specified conditions 2014    History of breast lump   • Unspecified abdominal hernia without obstruction or gangrene 2014    Hernia        Past Surgical History:   Procedure Laterality Date   • BREAST BIOPSY Right 2014    Biopsy Breast Percutaneous Needle Core   • BREAST BIOPSY Right 2018   • CARDIAC CATHETERIZATION N/A 2024    Procedure: Left Heart Cath;  Surgeon: Donato Cespedes MD;  Location: [place] [address] [facility];  Service: Cardiovascular;  Laterality: N/A;   •  SECTION, CLASSIC  2014     Section   • CHOLECYSTECTOMY  2017    Cholecystectomy Laparoscopic   • HAND SURGERY  2020    Hand Surgery                                                                                                                                                         • HERNIA REPAIR  2014    Hernia Repair   • MR HEAD ANGIO WO IV CONTRAST  2014    MR HEAD ANGIO WO IV CONTRAST 2014 [facility] ANCILLARY LEGACY   • OTHER SURGICAL HISTORY  2021    Nephrectomy   • TOTAL ABDOMINAL HYSTERECTOMY W/ BILATERAL SALPINGOOPHORECTOMY  2014    Total Abdominal Hysterectomy With Removal Of Both Ovaries        Allergies   Allergen Reactions   • Adhesive Itching   • Amoxicillin Hives and Itching   • Bee Sting Kit Swelling   • Cephalexin Itching and Nausea Only   • Gadolinium-Containing Contrast Media Hives   • Iodine Unknown   • Latex Other   • Pioglitazone  Swelling   • Rubella Virus Live Vaccine Unknown   • Salicylates Other   • Shellfish Derived Swelling   • Sulfa (Sulfonamide Antibiotics) Unknown   • Sulfamethoxazole-Trimethoprim Hives and Itching   • Iodinated Contrast Media Itching and Rash        Current Outpatient Medications on File Prior to Visit   Medication Sig Dispense Refill   • Accu-Chek Guide test strips strip Use 1 test strip to test blood sugar twice daily. 200 strip 1   • acetaminophen (Tylenol 8 HOUR) 650 mg ER tablet Take 1-2 tablets (650-1,300 mg) by mouth every 8 hours if needed for mild pain (1 - 3). Do not crush, chew, or split.     • albuterol 90 mcg/actuation inhaler Inhale 2 puffs every 4 hours if needed for wheezing or shortness of breath (You may also use this 10-15 minutes prior to exertional activity as needed). 18 g 5   • amLODIPine (Norvasc) 10 mg tablet Take 1 tablet (10 mg) by mouth once daily. 90 tablet 1   • apixaban (Eliquis) 2.5 mg tablet Take 1 tablet (2.5 mg) by mouth every 12 hours. 180 tablet 1   • colchicine 0.6 mg tablet Take 1 tablet (0.6 mg) by mouth once daily. 30 tablet 5   • Combigan 0.2-0.5 % ophthalmic solution Administer 1 drop into both eyes 2 times a day. 30 mL 11   • dextromethorphan-guaifenesin (Robitussin DM)  mg/5 mL oral liquid Take 5 mL by mouth every 4 hours if needed for cough. 118 mL 0   • diclofenac sodium 1 % kit Apply 1 Application topically if needed.  APPLY TO LOWER EXTREMITIES, 4 GM OF GEL TO AFFECTED AREA 4 TIMES DAILY. DO NOT APPLY MORE THAN 16 GM DAILY TO ANY ONE AFFECTED JOINT.     • diphenhydrAMINE (BENADryl) 50 mg tablet Take 1 tablet 1 hr prior to contrast administration 1 tablet 0   • dulaglutide (Trulicity) 0.75 mg/0.5 mL pen injector Inject 0.75 mg under the skin 1 (one) time per week. 12 each 3   • famotidine (Pepcid) 20 mg tablet Take 1 tablet (20 mg) by mouth once daily at bedtime.     • fluticasone (Flonase) 50 mcg/actuation nasal spray Administer 2 sprays into each nostril once  "daily. 16 g 3   • furosemide (Lasix) 20 mg tablet Take 1 tablet (20 mg) by mouth once daily as needed (For Swelling). 30 tablet 0   • gabapentin (Neurontin) 300 mg capsule Take 1 capsule (300 mg) by mouth once daily at bedtime. 30 capsule 5   • glipiZIDE 2.5 mg tablet Take 2.5 mg by mouth once daily. 90 tablet 1   • hydrocortisone 1 % cream Apply as needed to the injection site 30 g 1   • insulin lispro (HumaLOG KwikPen Insulin) 100 unit/mL pen Use with sliding scale 3 times a day, up to 30 units daily 15 mL 2   • lancets (Accu-Chek Softclix Lancets) misc Use to check blood sugar twice daily 200 each 1   • latanoprost (Xalatan) 0.005 % ophthalmic solution Administer 1 drop into both eyes once daily in the morning. 2.5 mL 11   • lidocaine (Lidoderm) 5 % patch Place 1 patch over 12 hours on the skin once daily. Remove & discard patch within 12 hours or as directed by MD. 15 patch 0   • lidocaine (Lidoderm) 5 % patch Place 2 patches over 12 hours on the skin once daily. Remove & discard patch within 12 hours or as directed by MD. 14 patch 0   • losartan (Cozaar) 25 mg tablet Take 1 tablet (25 mg) by mouth once daily. 90 tablet 1   • metoprolol succinate XL (Toprol-XL) 200 mg 24 hr tablet Take 1 tablet (200 mg) by mouth once daily. 90 tablet 3   • mometasone (Nasonex 24hr Allergy) 50 mcg/actuation nasal spray      • omeprazole (PriLOSEC) 40 mg DR capsule Take 1 capsule (40 mg) by mouth once daily. 30 capsule 0   • pen needle, diabetic 31 gauge x 5/16\" needle Use to inject 1-4 times daily as directed. 100 each 11   • polyethylene glycol (Glycolax, Miralax) 17 gram/dose powder Take 17 g by mouth 2 times a day. 3060 g 3   • prednisoLONE acetate (Pred-Forte) 1 % ophthalmic suspension Administer 1 drop into the right eye 4 times a day for 4 days. 5 mL 0   • predniSONE (Deltasone) 10 mg tablet Take 4 tabs (40mg) daily for 3 days, then 3 tabs (30mg) daily for 3 days,  2 tabs (20mg) daily for 3 days,  1 tab (10 mg) daily for 3 " days. 30 tablet 0   • predniSONE (Deltasone) 50 mg tablet Take 1 tablet at 13 hrs, 7 hrs and 1 hr prior to contrast administration 3 tablet 0   • predniSONE (Deltasone) 50 mg tablet Take 1 tablet (50 mg) by mouth see administration instructions. Take 50 mg at  13 hours , 7 hours and 1 hour before your scan 3 tablet 0   • rosuvastatin (Crestor) 20 mg tablet Take 1 tablet (20 mg) by mouth once daily. 90 tablet 3   • tiotropium (Spiriva Respimat) 2.5 mcg/actuation inhaler Inhale 2 puffs once daily. 1 each 3   • tiotropium-olodateroL (Stiolto Respimat) 2.5-2.5 mcg/actuation mist inhaler Inhale 2 Inhalations once daily. 4 g 3   • [DISCONTINUED] azithromycin (Zithromax) 250 mg tablet Take 1 tablet (250 mg) by mouth once daily for 5 days. Take 2 pills (500 mg) on day 1. Take 1 pill (250 mg) on days 2-5. 6 tablet 0   • [DISCONTINUED] benzocaine-menthol (Cepastat Sore Throat) lozenge Dissolve 1 lozenge in the mouth every 2 hours if needed for sore throat. 30 lozenge 0   • diphenhydrAMINE (Benadryl Allergy) 50 mg tablet Take 1 tablet (50 mg) by mouth 1 time for 1 dose. Take 1 hour before your scan 1 tablet 0   • methocarbamol (Robaxin) 500 mg tablet Take 1 tablet (500 mg) by mouth every 8 hours if needed for muscle spasms (Back pain) for up to 10 days. 30 tablet 0   • methocarbamol (Robaxin) 500 mg tablet Take 1 tablet (500 mg) by mouth 2 times a day as needed for muscle spasms for up to 10 days. 20 tablet 0     Current Facility-Administered Medications on File Prior to Visit   Medication Dose Route Frequency Provider Last Rate Last Admin   • bupivacaine PF (Marcaine) injection 0.75 %  5 mL injection Once Raoul Fung MD       • nitroglycerin (Nitrostat) SL tablet 0.4 mg  0.4 mg sublingual Once Vianney Marrufo MD              PE:   General: Patient appears well-nourished and well-developed in no acute distress, Alert and Oriented x3  Psych: Pleasant mood and affect  HEENT: Extraocular muscles intact, pupils equal and  round. Sclerae anicteric   Cardio: extremities warm and well perfused  Resp: unlabored symmetric breathing  Skin: no open wounds or rash  Musculoskeletal/Neuro Exam: Normal gait.  Diffuse tenderness to palpation along the paraspinal muscle along the cervical and lumbar spine..  Negative Spurling negative Lhermitte sign.  Negative straight leg raise bilaterally. Tight hamstrings bilaterally.  She has right groin pain with ROM of the hip joints with IR and ER.         Upper extremity:    Motor: Right upper extremity was 5 out of 5 strength with shoulder abduction, elbow flexion and extension, wrist flexion and extension and  against resistance  Left upper extremity with 5 out of 5 strength with shoulder abduction, elbow flexion and extension, wrist flexion and extension and  against resistance    Sensation to light touch intact along C5-T1 distribution bilaterally        Lower extremity    Motor: Right leg with 5 out of 5 motor strength with hip flexion, knee extension, ankle dorsiflexion plantarflexion and EHL against resistance.  Left leg with 5 out of 5 motor strength with hip flexion, knee extension, ankle dorsiflexion plantarflexion EHL against resistance  Sensation to light touch intact along L2 to S1 distribution bilaterally        Imaging:    I personally reviewed xray of the cervical spine obtained in clinic today. AP, Lateral, flexion and extension xrays were reviewed. No evidence of acute fracture or dislocation.  No spondylolisthesis.  Canal dynamic instability with flexion and extension view.  She has multilevel degenerative changes with bridging anterior osteophytes and facet arthrosis.    I reviewed the MRI of the lumbar spine from March 24, 2025 which shows moderate central canal stenosis at L4-L5 and moderate central canal stenosis at L3-L4 and L5-S1 secondary to hypertrophic facet and ligamentum flavum.  There is also bilateral foraminal narrowing multiple levels    I reviewed the x-ray of  the right hip from January 8, 2025 which shows severe right hip osteoarthritis       A/P: Vania Andrews is a 70 y.o. year old female patient with complaints of axial neck pain along the paraspinal and trapezius.  X-rays with multiple levels of degenerative changes.  I would recommend a course of physical therapy to work on stretching strengthening and range of motion.    In regards to her lower back she has canal stenosis and foraminal stenosis at multiple levels.  She has right leg pain however her right groin pain seems to bother her the most she does have right hip arthritis.  I recommend physical therapy for her lower back for core back and lower extremity stretching strengthening exercises.    I also gave her prescription for nutrition consult to help with weight management I think that would help offload pressure on her lower back as well as her hips.    Patient can follow-up as needed if her pain does not improve or worsens.    After our discussion, the patient articulated understanding of the plan and felt that all questions had been answered satisfactorily. The patient was pleased with the visit and very appreciative for the care rendered.    **Please excuse any errors in grammar or translation related to this dictation. Voice recognition software was utilized to prepare this document. **              Aura Jack MD    Department of Orthopaedic Surgery  [facility]  [email or web address]

## 2025-04-10 NOTE — PROGRESS NOTES
Gastroenterology Clinic Note    History Of Present Illness  Vania Andrews is a 70 y.o. female presenting with PMHx of left RCC w/partial nephrectomy and adrenenalectomy, CKD3,DLD, T2DM, MARYLU on CPAP, CAD, VEDA on iron transfusions, Provoked DVT and PE 2/2024 on apixaban chronic VEDA presenting for follow up of her VEDA.     Initially seen in clinic 3/7/24 after 1/2024 hospitalization where found to have acute on chronic microcytic anemia planned for outpatient endoscopy then represented 2/2024 found to have saddle PE started on heparin developed melena and worsening anemia PT underwent EGD/Colonoscopy 2/2024. EGD showed no signs of bleeding but had a 11cm hiatal hernia and gastritis. Colonoscopy  with extensive diverticula but no bleeding. Hb on discharge was 10.2 patient discharged on eliquis. Patient then had capsule endoscopy 7/2024 with excellent prep and no source of anemia. Patient following with Tamika Spears for her VEDA which he suspects given negative heme work up is 2/2 chronic GI losses.     Since last visit 1/9/25 patient was admitted 2/28-3/4/25 for symptomatic anemia c/f GIB Hgb on admission 8.4 from prior month 11.0. EGD on 3/3/25, which showed large Hill grade III hiatal hernia with linda lesions present which is suspected to be source of ongoing VEDA with use of anticoagulation. She was recommended to follow up with thoracic surgery for consideration of hiatal hernia repair. Was seen by Thoracic Surgery Dr. Frey on 3/19/25 who recommended patients other comorbidities including c/f recurrent RCC and BMI >40 be worked on prior to any consideration of repair, she since was seen by urology who has low concern for any RCC and findings on imaging consistent with simple cyst.     Discharge Hgb 8.2 on 3/3/25 and most recent Hgb 9.7 3/24/25.     Denies any blood in stools, no melena or hematochezia, but does report easily fatigable. Reports a lot of gas, reports it is embarrassing.     Heart burn/GERD is  under control with her omeprazole. Reports symptoms are about once a month where she has to take MAALOX to help with her symptoms. No dysphagia, no odonophagia.       2/28/25 1/9/24  10/7/24  7/16/24   3/2024  Iron 43  44  19  28 L   29   TIBC 343  407   338  406   80  % Sat 13  11    6  7   8   Ferritin  --  25    26  127   75         PREVIOUS ENDOSCOPIES:   3/3/25  Impression  Tortuous esophagus  Large type III hiatal hernia with Burke lesions present  The body of the stomach, antrum and prepyloric region appeared normal.  The duodenal bulb and 2nd part of the duodenum appeared normal.        Findings  Tortuous esophagus  Large herniation of both GE junction and stomach (type III hiatal hernia) with Burke lesions present - GE junction 32 cm from the incisors, diaphragmatic impression 40 cm from the incisors, confirmed by retroflexion. Several burke lesions were present on examination, no evidence of active bleeding, no target for intervention.. Hill grade IV hiatal hernia  The body of the stomach, antrum and prepyloric region appeared normal.  The duodenal bulb and 2nd part of the duodenum appeared normal.    7/8/24Capsule endoscopy   Quality of preparation was excellent   The visualized stomach was normal   The visualized small bowel was normal. No blood, angioectasia, erosions, ulcerations, strictures or masses were seen  Green stool was noted in the colon     2/15/24 Colonoscopy   Findings  Normal terminal ileum, ileocecal valve, and appendiceal orifice (photodocumented).  There was significant amount of thick liquid stool and solid stool which obscured much of the underlying mucosa requiring extensive water lavage.  Multiple small, medium and large, extensive diverticula with no inflammation in the ascending colon, transverse colon, descending colon and sigmoid colon; no bleeding was identified  External medium hemorrhoids observed during retroflexion; no bleeding was identified  No polyps were  visualized on this procedure.  No blood was seen in the terminal ileum or in the colon on this procedure.  2/9/24 EGD     Findings  Tortuous esophagus  Regular Z-line 29 cm from the incisors  Large 11 cm hiatal hernia  Multiple sessile polyps measuring smaller than 5 mm in the hiatal hernia; no bleeding was identified. Appearances consistent with fundic gland polyps as noted on recent EGD.  Linear erosion in the body of the stomach; no bleeding was identified;  Scattered patchy erythema seen in body of stomach. No evidence of bleeding  The duodenal bulb and 2nd part of the duodenum appeared normal.    Pt had EGD/Colonoscopy/capsule endoscopy in 2019 negative.       Review of Systems  Review of Systems   Constitutional:  Negative for chills and fever.   HENT:  Negative for congestion and sore throat.    Eyes:  Negative for visual disturbance.   Respiratory:  Positive for cough and shortness of breath.    Cardiovascular:  Negative for palpitations and leg swelling.   Gastrointestinal:  Positive for abdominal pain and constipation. Negative for blood in stool, diarrhea, nausea and vomiting.   Genitourinary:  Negative for dysuria and hematuria.   Musculoskeletal:  Negative for arthralgias and myalgias.   Skin:  Negative for rash.   Neurological:  Negative for headaches.   Psychiatric/Behavioral:  Negative for sleep disturbance.         Medications:    Current Outpatient Medications:     Accu-Chek Guide test strips strip, Use 1 test strip to test blood sugar twice daily., Disp: 200 strip, Rfl: 1    acetaminophen (Tylenol 8 HOUR) 650 mg ER tablet, Take 1-2 tablets (650-1,300 mg) by mouth every 8 hours if needed for mild pain (1 - 3). Do not crush, chew, or split., Disp: , Rfl:     albuterol 90 mcg/actuation inhaler, Inhale 2 puffs every 4 hours if needed for wheezing or shortness of breath (You may also use this 10-15 minutes prior to exertional activity as needed)., Disp: 18 g, Rfl: 5    amLODIPine (Norvasc) 10 mg  tablet, Take 1 tablet (10 mg) by mouth once daily., Disp: 90 tablet, Rfl: 1    apixaban (Eliquis) 2.5 mg tablet, Take 1 tablet (2.5 mg) by mouth every 12 hours., Disp: 180 tablet, Rfl: 1    azithromycin (Zithromax) 250 mg tablet, Take 1 tablet (250 mg) by mouth once daily for 5 days. Take 2 pills (500 mg) on day 1. Take 1 pill (250 mg) on days 2-5., Disp: 6 tablet, Rfl: 0    benzocaine-menthol (Cepastat Sore Throat) lozenge, Dissolve 1 lozenge in the mouth every 2 hours if needed for sore throat., Disp: 30 lozenge, Rfl: 0    colchicine 0.6 mg tablet, Take 1 tablet (0.6 mg) by mouth once daily., Disp: 30 tablet, Rfl: 5    Combigan 0.2-0.5 % ophthalmic solution, Administer 1 drop into both eyes 2 times a day., Disp: 30 mL, Rfl: 11    dextromethorphan-guaifenesin (Robitussin DM)  mg/5 mL oral liquid, Take 5 mL by mouth every 4 hours if needed for cough., Disp: 118 mL, Rfl: 0    diclofenac sodium 1 % kit, Apply 1 Application topically if needed.  APPLY TO LOWER EXTREMITIES, 4 GM OF GEL TO AFFECTED AREA 4 TIMES DAILY. DO NOT APPLY MORE THAN 16 GM DAILY TO ANY ONE AFFECTED JOINT., Disp: , Rfl:     diphenhydrAMINE (Benadryl Allergy) 50 mg tablet, Take 1 tablet (50 mg) by mouth 1 time for 1 dose. Take 1 hour before your scan, Disp: 1 tablet, Rfl: 0    diphenhydrAMINE (BENADryl) 50 mg tablet, Take 1 tablet 1 hr prior to contrast administration, Disp: 1 tablet, Rfl: 0    dulaglutide (Trulicity) 0.75 mg/0.5 mL pen injector, Inject 0.75 mg under the skin 1 (one) time per week., Disp: 12 each, Rfl: 3    famotidine (Pepcid) 20 mg tablet, Take 1 tablet (20 mg) by mouth once daily at bedtime., Disp: , Rfl:     fluticasone (Flonase) 50 mcg/actuation nasal spray, Administer 2 sprays into each nostril once daily., Disp: 16 g, Rfl: 3    furosemide (Lasix) 20 mg tablet, Take 1 tablet (20 mg) by mouth once daily as needed (For Swelling)., Disp: 30 tablet, Rfl: 0    gabapentin (Neurontin) 300 mg capsule, Take 1 capsule (300 mg) by  "mouth once daily at bedtime., Disp: 30 capsule, Rfl: 5    glipiZIDE 2.5 mg tablet, Take 2.5 mg by mouth once daily., Disp: 90 tablet, Rfl: 1    hydrocortisone 1 % cream, Apply as needed to the injection site, Disp: 30 g, Rfl: 1    insulin lispro (HumaLOG KwikPen Insulin) 100 unit/mL pen, Use with sliding scale 3 times a day, up to 30 units daily, Disp: 15 mL, Rfl: 2    lancets (Accu-Chek Softclix Lancets) misc, Use to check blood sugar twice daily, Disp: 200 each, Rfl: 1    latanoprost (Xalatan) 0.005 % ophthalmic solution, Administer 1 drop into both eyes once daily in the morning., Disp: 2.5 mL, Rfl: 11    lidocaine (Lidoderm) 5 % patch, Place 1 patch over 12 hours on the skin once daily. Remove & discard patch within 12 hours or as directed by MD., Disp: 15 patch, Rfl: 0    lidocaine (Lidoderm) 5 % patch, Place 2 patches over 12 hours on the skin once daily. Remove & discard patch within 12 hours or as directed by MD., Disp: 14 patch, Rfl: 0    losartan (Cozaar) 25 mg tablet, Take 1 tablet (25 mg) by mouth once daily., Disp: 90 tablet, Rfl: 1    methocarbamol (Robaxin) 500 mg tablet, Take 1 tablet (500 mg) by mouth every 8 hours if needed for muscle spasms (Back pain) for up to 10 days., Disp: 30 tablet, Rfl: 0    methocarbamol (Robaxin) 500 mg tablet, Take 1 tablet (500 mg) by mouth 2 times a day as needed for muscle spasms for up to 10 days., Disp: 20 tablet, Rfl: 0    metoprolol succinate XL (Toprol-XL) 200 mg 24 hr tablet, Take 1 tablet (200 mg) by mouth once daily., Disp: 90 tablet, Rfl: 3    mometasone (Nasonex 24hr Allergy) 50 mcg/actuation nasal spray, , Disp: , Rfl:     omeprazole (PriLOSEC) 40 mg DR capsule, Take 1 capsule (40 mg) by mouth once daily., Disp: 30 capsule, Rfl: 0    pen needle, diabetic 31 gauge x 5/16\" needle, Use to inject 1-4 times daily as directed., Disp: 100 each, Rfl: 11    polyethylene glycol (Glycolax, Miralax) 17 gram/dose powder, Take 17 g by mouth 2 times a day., Disp: 3060 g, " Rfl: 3    prednisoLONE acetate (Pred-Forte) 1 % ophthalmic suspension, Administer 1 drop into the right eye 4 times a day for 4 days., Disp: 5 mL, Rfl: 0    predniSONE (Deltasone) 10 mg tablet, Take 4 tabs (40mg) daily for 3 days, then 3 tabs (30mg) daily for 3 days,  2 tabs (20mg) daily for 3 days,  1 tab (10 mg) daily for 3 days., Disp: 30 tablet, Rfl: 0    predniSONE (Deltasone) 50 mg tablet, Take 1 tablet at 13 hrs, 7 hrs and 1 hr prior to contrast administration, Disp: 3 tablet, Rfl: 0    predniSONE (Deltasone) 50 mg tablet, Take 1 tablet (50 mg) by mouth see administration instructions. Take 50 mg at  13 hours , 7 hours and 1 hour before your scan, Disp: 3 tablet, Rfl: 0    rosuvastatin (Crestor) 20 mg tablet, Take 1 tablet (20 mg) by mouth once daily., Disp: 90 tablet, Rfl: 3    tiotropium (Spiriva Respimat) 2.5 mcg/actuation inhaler, Inhale 2 puffs once daily., Disp: 1 each, Rfl: 3    tiotropium-olodateroL (Stiolto Respimat) 2.5-2.5 mcg/actuation mist inhaler, Inhale 2 Inhalations once daily., Disp: 4 g, Rfl: 3    Current Facility-Administered Medications:     bupivacaine PF (Marcaine) injection 0.75 %, 5 mL, injection, Once, Raoul Fung MD    nitroglycerin (Nitrostat) SL tablet 0.4 mg, 0.4 mg, sublingual, Once, Vianney Marrufo MD    Physical Exam  General: well-nourished, no acute distress  HEENT: PERRLA, EOM intact, no scleral icterus, moist MM  Respiratory: CTA bilaterally, normal work of breathing  Cardiovascular: RRR, no murmurs/rubs/gallops  Abdomen: Soft, nontender, nondistended  Extremities: no edema, no asterixis  Neuro: alert and oriented, CNII-XII grossly intact, moves all 4 extremities with no focal deficits      Last Recorded Vitals  There were no vitals taken for this visit.    Relevant Results    Lab Results   Component Value Date    WBC 9.7 03/24/2025    HGB 8.4 (L) 03/24/2025    HCT 25.8 (L) 03/24/2025    MCV 78 (L) 03/24/2025     03/24/2025     Lab Results   Component Value Date     GLUCOSE 138 (H) 04/08/2025    CALCIUM 9.1 04/08/2025     04/08/2025    K 4.3 04/08/2025    CO2 22 04/08/2025     (H) 04/08/2025    BUN 8 04/08/2025    CREATININE 0.99 04/08/2025     Lab Results   Component Value Date    ALT 34 03/23/2025    AST 22 03/23/2025    GGT 33 12/23/2020    ALKPHOS 141 (H) 03/23/2025    BILITOT 0.3 03/23/2025       ASSESSMENT/PLAN:  Vania Andrews is a 69 y.o. female presenting with PMHx of left RCC w/partial nephrectomy and adrenenalectomy, CKD3,DLD, T2DM, MARYLU on CPAP, CAD, VEDA on iron transfusions, Provoked DVT and PE 2/2024 on apixaban chronic VEDA presenting for follow up of her VEDA.     EGD/Colonoscopy/capsule endoscopy in 2019 negative. Pt again developed microcytic anemia which was exacerbated by the anticoagulation for PE. Repeat EGD/Colonoscopy 2/2024 which were grossly negative and capsule endoscopy 7/8/24 with no abnormal source of VEDA found. Admitted again and had EGD 3/3/25 which did show linda erosions.     Suspect that patients ongoing VEDA is 2/2 to her the linda erosions 2/2 to her large hiatal hernia ultimately to prevent further blood loss and ongoing symptomatic VEDA she will need her hiatal hernia repaired. Strongly recommended she discuss and proceed with surgery when sees Dr. Merchant again.     VEDA   -Follow up with Dr. Merchant thoracic surgery strongly recommend hiatal hernia repair given ongoing VEDA and symptomatic anemia   -Will reach out to Dr. Merchant's office in regards to surgical plan  -Will reach out to Dr. Spears's office and recommend standing iron transfusions     2. Constipation   -Continue Miralax as needed titrate to 2-3 Bms a day or to feeling of complete evacuation     RTC 6 months     Venus Mandel MD

## 2025-04-11 ENCOUNTER — SPECIALTY PHARMACY (OUTPATIENT)
Dept: PHARMACY | Facility: CLINIC | Age: 70
End: 2025-04-11

## 2025-04-11 ENCOUNTER — OFFICE VISIT (OUTPATIENT)
Dept: ENDOCRINOLOGY | Facility: CLINIC | Age: 70
End: 2025-04-11
Payer: MEDICARE

## 2025-04-11 VITALS
HEIGHT: 58 IN | WEIGHT: 196 LBS | SYSTOLIC BLOOD PRESSURE: 116 MMHG | BODY MASS INDEX: 41.14 KG/M2 | HEART RATE: 71 BPM | DIASTOLIC BLOOD PRESSURE: 78 MMHG

## 2025-04-11 DIAGNOSIS — E11.9 TYPE 2 DIABETES MELLITUS WITHOUT COMPLICATION, UNSPECIFIED WHETHER LONG TERM INSULIN USE: ICD-10-CM

## 2025-04-11 DIAGNOSIS — E11.22 TYPE 2 DIABETES MELLITUS WITH STAGE 3A CHRONIC KIDNEY DISEASE, WITHOUT LONG-TERM CURRENT USE OF INSULIN (MULTI): ICD-10-CM

## 2025-04-11 DIAGNOSIS — N18.31 TYPE 2 DIABETES MELLITUS WITH STAGE 3A CHRONIC KIDNEY DISEASE, WITHOUT LONG-TERM CURRENT USE OF INSULIN (MULTI): ICD-10-CM

## 2025-04-11 DIAGNOSIS — E11.65 TYPE 2 DIABETES MELLITUS WITH HYPERGLYCEMIA, WITHOUT LONG-TERM CURRENT USE OF INSULIN: Primary | ICD-10-CM

## 2025-04-11 LAB — POC HEMOGLOBIN A1C: 7.4 % (ref 4.2–6.5)

## 2025-04-11 PROCEDURE — 83036 HEMOGLOBIN GLYCOSYLATED A1C: CPT | Performed by: STUDENT IN AN ORGANIZED HEALTH CARE EDUCATION/TRAINING PROGRAM

## 2025-04-11 PROCEDURE — 3051F HG A1C>EQUAL 7.0%<8.0%: CPT | Performed by: STUDENT IN AN ORGANIZED HEALTH CARE EDUCATION/TRAINING PROGRAM

## 2025-04-11 PROCEDURE — 3008F BODY MASS INDEX DOCD: CPT | Performed by: STUDENT IN AN ORGANIZED HEALTH CARE EDUCATION/TRAINING PROGRAM

## 2025-04-11 PROCEDURE — G2211 COMPLEX E/M VISIT ADD ON: HCPCS | Performed by: STUDENT IN AN ORGANIZED HEALTH CARE EDUCATION/TRAINING PROGRAM

## 2025-04-11 PROCEDURE — 1159F MED LIST DOCD IN RCRD: CPT | Performed by: STUDENT IN AN ORGANIZED HEALTH CARE EDUCATION/TRAINING PROGRAM

## 2025-04-11 PROCEDURE — RXMED WILLOW AMBULATORY MEDICATION CHARGE

## 2025-04-11 PROCEDURE — 3078F DIAST BP <80 MM HG: CPT | Performed by: STUDENT IN AN ORGANIZED HEALTH CARE EDUCATION/TRAINING PROGRAM

## 2025-04-11 PROCEDURE — 3074F SYST BP LT 130 MM HG: CPT | Performed by: STUDENT IN AN ORGANIZED HEALTH CARE EDUCATION/TRAINING PROGRAM

## 2025-04-11 PROCEDURE — 4010F ACE/ARB THERAPY RXD/TAKEN: CPT | Performed by: STUDENT IN AN ORGANIZED HEALTH CARE EDUCATION/TRAINING PROGRAM

## 2025-04-11 PROCEDURE — 99214 OFFICE O/P EST MOD 30 MIN: CPT | Performed by: STUDENT IN AN ORGANIZED HEALTH CARE EDUCATION/TRAINING PROGRAM

## 2025-04-11 RX ORDER — GLIPIZIDE 5 MG/1
TABLET ORAL
Qty: 90 TABLET | Refills: 1 | Status: SHIPPED | OUTPATIENT
Start: 2025-04-11

## 2025-04-11 RX ORDER — INSULIN LISPRO 100 [IU]/ML
INJECTION, SOLUTION INTRAVENOUS; SUBCUTANEOUS
Qty: 15 ML | Refills: 2 | Status: SHIPPED | OUTPATIENT
Start: 2025-04-11

## 2025-04-11 RX ORDER — PEN NEEDLE, DIABETIC 30 GX3/16"
NEEDLE, DISPOSABLE MISCELLANEOUS
Qty: 100 EACH | Refills: 11 | Status: SHIPPED | OUTPATIENT
Start: 2025-04-11

## 2025-04-11 RX ORDER — BLOOD SUGAR DIAGNOSTIC
STRIP MISCELLANEOUS
Qty: 100 STRIP | Refills: 2 | Status: SHIPPED | OUTPATIENT
Start: 2025-04-11

## 2025-04-11 NOTE — PROGRESS NOTES
"68 F PMH: HTN, HLD< NSTEMI, DVT, Obesity, RA, GERD, MARYLU, RCC s/p nephrectomy, asthma, PE, COPD    Following up for DM2    Diabetes History     DM diagnosed > 10 years ago,     Complications Micro and Macro-CKD, neuropathy  A1c:   Lab Results   Component Value Date    HGBA1C 7.4 (A) 04/11/2025   IO A1c 7.1%       Regimen   Trulicity 0.75mg weekly  Glipizide 2.5mg daily  Insulin sliding scale as needed    Previously:  Basaglar   Ozempic-GI intolerant  Pioglitazone -dced due to leg swelling   Tradjenta-previously  SGLT2-recurrent UTI      SMBG   Fingerstick 100-140s     Hypoglycemia none known    Comorbidities and Screening  Eye Exam: sees ophtha, glaucoma and cataract  Foot exam: 2/2024    Lipid  Lab Results   Component Value Date    CHOL 195 09/06/2024    CHOL 171 10/31/2023    CHOL 176 08/03/2022     Lab Results   Component Value Date    HDL 58.8 09/06/2024    HDL 61.2 10/31/2023    HDL 55.5 08/03/2022     Lab Results   Component Value Date    LDLCALC 107 (H) 09/06/2024    LDLCALC 96 10/31/2023     Lab Results   Component Value Date    TRIG 145 09/06/2024    TRIG 71 10/31/2023    TRIG 137 08/03/2022     No components found for: \"CHOLHDL\"      Statin- rosuvastatin 20mg   Cr and albuminuria-  Lab Results   Component Value Date    CREATININE 0.99 04/08/2025    EGFR 62 04/08/2025    ALBUMINUR 7.2 09/06/2024      ACE/ARB- losartan 25mg     Past Medical History:   Diagnosis Date    Abdominal distension (gaseous) 07/31/2019    Abdominal bloating    Asthma     Cancer (Multi)     Cataract     Coronary artery disease     Diabetes mellitus (Multi)     Diverticulosis of intestine, part unspecified, without perforation or abscess without bleeding 06/09/2013    Diverticulosis    Elevation of levels of liver transaminase levels 11/13/2020    Transaminitis    Encounter for follow-up examination after completed treatment for conditions other than malignant neoplasm 01/26/2019    Hospital discharge follow-up    Glaucoma     " Hypertension     Long term (current) use of antibiotics 10/07/2016    Need for prophylactic antibiotic    Other amnesia 10/28/2014    Memory loss    Other conditions influencing health status 02/06/2019    History of cough    Other specified health status 02/07/2019    Hepatitis C antibody test negative    Other specified soft tissue disorders 06/14/2017    Leg swelling    Pain in left toe(s) 05/15/2017    Pain of toe of left foot    Pain in right foot 10/12/2016    Pain of right heel    Pain in right shoulder 06/22/2016    Acute pain of right shoulder    Personal history of diseases of the blood and blood-forming organs and certain disorders involving the immune mechanism 04/09/2018    History of anemia    Personal history of other diseases of the respiratory system 04/02/2018    History of sinusitis    Personal history of other diseases of the respiratory system 03/19/2014    History of upper respiratory infection    Personal history of other malignant neoplasm of kidney 01/14/2021    Personal history of malignant neoplasm of kidney    Personal history of other medical treatment 08/08/2017    History of screening mammography    Personal history of other specified conditions 11/17/2014    History of abdominal pain    Personal history of other specified conditions 06/14/2017    History of urinary frequency    Personal history of other specified conditions 06/09/2021    History of dysuria    Personal history of other specified conditions 11/28/2014    History of breast lump    Unspecified abdominal hernia without obstruction or gangrene 04/21/2014    Hernia     Family History   Problem Relation Name Age of Onset    Aneurysm Mother      Hypertension Mother      Pancreatic cancer Mother      Diabetes Mother      Other (laryngeal Cancer) Mother      Heart disease Father      Pulmonary embolism Brother      Breast cancer Father's Sister      Breast cancer Maternal Cousin        Social History     Socioeconomic History     Marital status:      Spouse name: Not on file    Number of children: Not on file    Years of education: Not on file    Highest education level: Not on file   Occupational History    Not on file   Tobacco Use    Smoking status: Former     Types: Cigarettes     Passive exposure: Never    Smokeless tobacco: Never   Vaping Use    Vaping status: Never Used   Substance and Sexual Activity    Alcohol use: Yes     Comment: 1-2 x per month    Drug use: Never    Sexual activity: Not on file   Other Topics Concern    Not on file   Social History Narrative    Not on file     Social Drivers of Health     Financial Resource Strain: Low Risk  (3/1/2025)    Overall Financial Resource Strain (CARDIA)     Difficulty of Paying Living Expenses: Not very hard   Food Insecurity: No Food Insecurity (2/28/2025)    Hunger Vital Sign     Worried About Running Out of Food in the Last Year: Never true     Ran Out of Food in the Last Year: Never true   Transportation Needs: No Transportation Needs (3/1/2025)    PRAPARE - Transportation     Lack of Transportation (Medical): No     Lack of Transportation (Non-Medical): No   Physical Activity: Not on file   Stress: Not on file   Social Connections: Not on file   Intimate Partner Violence: Not At Risk (2/28/2025)    Humiliation, Afraid, Rape, and Kick questionnaire     Fear of Current or Ex-Partner: No     Emotionally Abused: No     Physically Abused: No     Sexually Abused: No   Housing Stability: Low Risk  (3/1/2025)    Housing Stability Vital Sign     Unable to Pay for Housing in the Last Year: No     Number of Times Moved in the Last Year: 0     Homeless in the Last Year: No     Social: retired visiting nurse     ROS:  GERD  Abdominal pain   Negative except those noted in current and interim history    Physical Exam  Constitutional:       Appearance: Normal appearance.      Comments: In wheelchair   Cardiovascular:      Rate and Rhythm: Normal rate and regular rhythm.   Skin:      "General: Skin is warm.   Neurological:      Mental Status: She is alert and oriented to person, place, and time.          labs and imaging reviewed, pertinent findings listed on HPI and Impression      Problem List Items Addressed This Visit       Diabetes mellitus (Multi)    Relevant Medications    insulin lispro (HumaLOG KwikPen Insulin) 100 unit/mL pen    pen needle, diabetic 31 gauge x 5/16\" needle    glipiZIDE (Glucotrol) 5 mg tablet    Accu-Chek Guide test strips    Other Relevant Orders    POCT glycosylated hemoglobin (Hb A1C) manually resulted (Completed)       1) DM2 with vascular complications including CAD, CKD     Stop trulicity-due to recent GI erosions/bleed and anemia, also having GI symptoms   Insulin sliding scale as needed when on prednisone 2:50 >200  Increase glipizide 5mg daily    Rechallenge with SGLT2? in the future due to CKD, but has history of recurrent UTI    2) secondary hyperparathyroidism   Vitamin D 2000 units     Follow up next available          "

## 2025-04-11 NOTE — PATIENT INSTRUCTIONS
Vitamin D 2000 units daily     Hold trulicity  Glipizide 5mg daily with breakfast   Sliding scale as needed with meals    Follow up next available     Monique Gross MD  Divison of Endocrinology   [facility]   Phone: [phone]    option 4, then option 1  Fax: [phone]

## 2025-04-14 ENCOUNTER — PHARMACY VISIT (OUTPATIENT)
Dept: PHARMACY | Facility: CLINIC | Age: 70
End: 2025-04-14
Payer: MEDICARE

## 2025-04-14 ENCOUNTER — NURSE TRIAGE (OUTPATIENT)
Dept: HEMATOLOGY/ONCOLOGY | Facility: HOSPITAL | Age: 70
End: 2025-04-14
Payer: MEDICARE

## 2025-04-14 DIAGNOSIS — I82.519 CHRONIC DEEP VEIN THROMBOSIS (DVT) OF FEMORAL VEIN, UNSPECIFIED LATERALITY (MULTI): Primary | ICD-10-CM

## 2025-04-14 DIAGNOSIS — Z79.01 ON CONTINUOUS ORAL ANTICOAGULATION: ICD-10-CM

## 2025-04-14 DIAGNOSIS — D50.0 IRON DEFICIENCY ANEMIA DUE TO CHRONIC BLOOD LOSS: Primary | ICD-10-CM

## 2025-04-14 PROCEDURE — RXMED WILLOW AMBULATORY MEDICATION CHARGE

## 2025-04-14 NOTE — TELEPHONE ENCOUNTER
Additional Information   Commented on: Where is your swelling?     Right leg pain (calf) where clot has been previously noted   Commented on: Does anything seem to cause this problem?     Pain is worse when walking   Commented on: When did these problems start?     End of March    Protocols used: Swelling/Deep Venous Thrombosis (DVT)

## 2025-04-14 NOTE — TELEPHONE ENCOUNTER
Per MD Jenkins, patient should get a D-Dimer drawn tomorrow to evaluate to see if she could possible have a new clot. From there he can decide if she will need imaging. RN left this information on a detailed voicemail message and also advised patient to report to the ED if symptoms worsen.

## 2025-04-14 NOTE — TELEPHONE ENCOUNTER
RN called the patient per MD Jenkins and informed her that Dr. Mandel reached out to the Dr. Jenkins and asked if he could help get her set up for IV iron. Dr. Jenkins would like for her to get iron levels and cbc drawn first though. The patient verbalized understanding and said she will go get labs drawn this week. She mentioned that her right calf where her blood clot was started bothering her again at the end of March. She rates it a 4/10 but denies redness at the site. She said she is not sure if the leg is swollen more than usual or not. The pain does get worse when she walks or gets up from a chair. She denies chest pain but has had a cough and some shortness of breath that has been ongoing for the past few months. She has been seeing pulmonology for this and has been prescribed inhalers. RN advised her to let office know/ go to the ED if she has increase SOB or develops chest pain. She verbalized understanding. RN sent message to MD Jenkins to update on symptoms.

## 2025-04-15 ENCOUNTER — LAB (OUTPATIENT)
Dept: LAB | Facility: HOSPITAL | Age: 70
End: 2025-04-15
Payer: MEDICARE

## 2025-04-15 ENCOUNTER — TELEPHONE (OUTPATIENT)
Dept: HEMATOLOGY/ONCOLOGY | Facility: HOSPITAL | Age: 70
End: 2025-04-15
Payer: MEDICARE

## 2025-04-15 ENCOUNTER — PHARMACY VISIT (OUTPATIENT)
Dept: PHARMACY | Facility: CLINIC | Age: 70
End: 2025-04-15
Payer: MEDICARE

## 2025-04-15 DIAGNOSIS — Z79.01 ON CONTINUOUS ORAL ANTICOAGULATION: ICD-10-CM

## 2025-04-15 DIAGNOSIS — M54.2 NECK PAIN: ICD-10-CM

## 2025-04-15 DIAGNOSIS — D50.0 IRON DEFICIENCY ANEMIA DUE TO CHRONIC BLOOD LOSS: ICD-10-CM

## 2025-04-15 DIAGNOSIS — I82.519 CHRONIC DEEP VEIN THROMBOSIS (DVT) OF FEMORAL VEIN, UNSPECIFIED LATERALITY (MULTI): ICD-10-CM

## 2025-04-15 DIAGNOSIS — K25.3 CAMERON LESION, ACUTE: Primary | ICD-10-CM

## 2025-04-15 DIAGNOSIS — D62 ACUTE BLOOD LOSS ANEMIA: Primary | ICD-10-CM

## 2025-04-15 DIAGNOSIS — D62 ACUTE BLOOD LOSS ANEMIA: ICD-10-CM

## 2025-04-15 DIAGNOSIS — R93.89 IMAGING ABNORMALITY: ICD-10-CM

## 2025-04-15 DIAGNOSIS — N28.1 BILATERAL RENAL CYSTS: ICD-10-CM

## 2025-04-15 LAB
ABO GROUP (TYPE) IN BLOOD: NORMAL
ALBUMIN SERPL BCP-MCNC: 3.9 G/DL (ref 3.4–5)
ALP SERPL-CCNC: 147 U/L (ref 33–136)
ALT SERPL W P-5'-P-CCNC: 11 U/L (ref 7–45)
ANION GAP SERPL CALC-SCNC: 11 MMOL/L (ref 10–20)
ANTIBODY SCREEN: NORMAL
AST SERPL W P-5'-P-CCNC: 11 U/L (ref 9–39)
BASOPHILS # BLD AUTO: 0.03 X10*3/UL (ref 0–0.1)
BASOPHILS # BLD AUTO: 0.04 X10*3/UL (ref 0–0.1)
BASOPHILS NFR BLD AUTO: 0.3 %
BASOPHILS NFR BLD AUTO: 0.3 %
BILIRUB SERPL-MCNC: 0.3 MG/DL (ref 0–1.2)
BUN SERPL-MCNC: 15 MG/DL (ref 6–23)
CALCIUM SERPL-MCNC: 9.2 MG/DL (ref 8.6–10.3)
CCP IGG SERPL-ACNC: <1 U/ML
CHLORIDE SERPL-SCNC: 107 MMOL/L (ref 98–107)
CO2 SERPL-SCNC: 25 MMOL/L (ref 21–32)
CREAT SERPL-MCNC: 1.22 MG/DL (ref 0.5–1.05)
CRP SERPL-MCNC: 0.42 MG/DL
D DIMER PPP FEU-MCNC: 336 NG/ML FEU
EGFRCR SERPLBLD CKD-EPI 2021: 48 ML/MIN/1.73M*2
EOSINOPHIL # BLD AUTO: 0 X10*3/UL (ref 0–0.7)
EOSINOPHIL # BLD AUTO: 0.03 X10*3/UL (ref 0–0.7)
EOSINOPHIL NFR BLD AUTO: 0 %
EOSINOPHIL NFR BLD AUTO: 0.3 %
ERYTHROCYTE [DISTWIDTH] IN BLOOD BY AUTOMATED COUNT: 18.3 % (ref 11.5–14.5)
ERYTHROCYTE [DISTWIDTH] IN BLOOD BY AUTOMATED COUNT: 18.4 % (ref 11.5–14.5)
ERYTHROCYTE [SEDIMENTATION RATE] IN BLOOD BY WESTERGREN METHOD: 13 MM/H (ref 0–30)
EST. AVERAGE GLUCOSE BLD GHB EST-MCNC: 171 MG/DL
FERRITIN SERPL-MCNC: 12 NG/ML (ref 8–150)
FERRITIN SERPL-MCNC: 13 NG/ML (ref 8–150)
GLUCOSE SERPL-MCNC: 246 MG/DL (ref 74–99)
HBA1C MFR BLD: 7.6 %
HCT VFR BLD AUTO: 24.3 % (ref 36–46)
HCT VFR BLD AUTO: 24.4 % (ref 36–46)
HGB BLD-MCNC: 7 G/DL (ref 12–16)
HGB BLD-MCNC: 7.1 G/DL (ref 12–16)
IMM GRANULOCYTES # BLD AUTO: 0.06 X10*3/UL (ref 0–0.7)
IMM GRANULOCYTES # BLD AUTO: 0.07 X10*3/UL (ref 0–0.7)
IMM GRANULOCYTES NFR BLD AUTO: 0.5 % (ref 0–0.9)
IMM GRANULOCYTES NFR BLD AUTO: 0.7 % (ref 0–0.9)
IRON SATN MFR SERPL: 3 % (ref 25–45)
IRON SATN MFR SERPL: 3 % (ref 25–45)
IRON SERPL-MCNC: 13 UG/DL (ref 35–150)
IRON SERPL-MCNC: 13 UG/DL (ref 35–150)
LYMPHOCYTES # BLD AUTO: 0.63 X10*3/UL (ref 1.2–4.8)
LYMPHOCYTES # BLD AUTO: 1.57 X10*3/UL (ref 1.2–4.8)
LYMPHOCYTES NFR BLD AUTO: 13.1 %
LYMPHOCYTES NFR BLD AUTO: 6.4 %
MCH RBC QN AUTO: 22.7 PG (ref 26–34)
MCH RBC QN AUTO: 23.1 PG (ref 26–34)
MCHC RBC AUTO-ENTMCNC: 28.7 G/DL (ref 32–36)
MCHC RBC AUTO-ENTMCNC: 29.2 G/DL (ref 32–36)
MCV RBC AUTO: 79 FL (ref 80–100)
MCV RBC AUTO: 79 FL (ref 80–100)
MONOCYTES # BLD AUTO: 0.29 X10*3/UL (ref 0.1–1)
MONOCYTES # BLD AUTO: 0.85 X10*3/UL (ref 0.1–1)
MONOCYTES NFR BLD AUTO: 3 %
MONOCYTES NFR BLD AUTO: 7.1 %
NEUTROPHILS # BLD AUTO: 8.75 X10*3/UL (ref 1.2–7.7)
NEUTROPHILS # BLD AUTO: 9.4 X10*3/UL (ref 1.2–7.7)
NEUTROPHILS NFR BLD AUTO: 78.7 %
NEUTROPHILS NFR BLD AUTO: 89.6 %
NRBC BLD-RTO: 0.2 /100 WBCS (ref 0–0)
NRBC BLD-RTO: 0.3 /100 WBCS (ref 0–0)
PLATELET # BLD AUTO: 498 X10*3/UL (ref 150–450)
PLATELET # BLD AUTO: 509 X10*3/UL (ref 150–450)
POTASSIUM SERPL-SCNC: 4.3 MMOL/L (ref 3.5–5.3)
PROT SERPL-MCNC: 6.6 G/DL (ref 6.4–8.2)
RBC # BLD AUTO: 3.08 X10*6/UL (ref 4–5.2)
RBC # BLD AUTO: 3.09 X10*6/UL (ref 4–5.2)
RH FACTOR (ANTIGEN D): NORMAL
RHEUMATOID FACT SER NEPH-ACNC: <10 IU/ML (ref 0–15)
SODIUM SERPL-SCNC: 139 MMOL/L (ref 136–145)
TIBC SERPL-MCNC: 428 UG/DL (ref 240–445)
TIBC SERPL-MCNC: 432 UG/DL (ref 240–445)
UIBC SERPL-MCNC: 415 UG/DL (ref 110–370)
UIBC SERPL-MCNC: 419 UG/DL (ref 110–370)
WBC # BLD AUTO: 12 X10*3/UL (ref 4.4–11.3)
WBC # BLD AUTO: 9.8 X10*3/UL (ref 4.4–11.3)

## 2025-04-15 PROCEDURE — 82728 ASSAY OF FERRITIN: CPT

## 2025-04-15 PROCEDURE — 85025 COMPLETE CBC W/AUTO DIFF WBC: CPT

## 2025-04-15 PROCEDURE — 83036 HEMOGLOBIN GLYCOSYLATED A1C: CPT | Performed by: STUDENT IN AN ORGANIZED HEALTH CARE EDUCATION/TRAINING PROGRAM

## 2025-04-15 PROCEDURE — 86140 C-REACTIVE PROTEIN: CPT

## 2025-04-15 PROCEDURE — 86431 RHEUMATOID FACTOR QUANT: CPT

## 2025-04-15 PROCEDURE — 83970 ASSAY OF PARATHORMONE: CPT

## 2025-04-15 PROCEDURE — 36415 COLL VENOUS BLD VENIPUNCTURE: CPT

## 2025-04-15 PROCEDURE — 86200 CCP ANTIBODY: CPT

## 2025-04-15 PROCEDURE — 86901 BLOOD TYPING SEROLOGIC RH(D): CPT

## 2025-04-15 PROCEDURE — 84075 ASSAY ALKALINE PHOSPHATASE: CPT

## 2025-04-15 PROCEDURE — 86923 COMPATIBILITY TEST ELECTRIC: CPT

## 2025-04-15 PROCEDURE — 85379 FIBRIN DEGRADATION QUANT: CPT

## 2025-04-15 PROCEDURE — 83540 ASSAY OF IRON: CPT

## 2025-04-15 PROCEDURE — 85652 RBC SED RATE AUTOMATED: CPT

## 2025-04-15 RX ORDER — ACETAMINOPHEN 325 MG/1
650 TABLET ORAL ONCE
Status: CANCELLED | OUTPATIENT
Start: 2025-04-18

## 2025-04-15 RX ORDER — EPINEPHRINE 0.3 MG/.3ML
0.3 INJECTION SUBCUTANEOUS EVERY 5 MIN PRN
Status: CANCELLED | OUTPATIENT
Start: 2025-04-18

## 2025-04-15 RX ORDER — FAMOTIDINE 10 MG/ML
20 INJECTION, SOLUTION INTRAVENOUS ONCE AS NEEDED
OUTPATIENT
Start: 2025-04-15

## 2025-04-15 RX ORDER — HEPARIN SODIUM,PORCINE/PF 10 UNIT/ML
50 SYRINGE (ML) INTRAVENOUS AS NEEDED
OUTPATIENT
Start: 2025-04-21

## 2025-04-15 RX ORDER — ACETAMINOPHEN 325 MG/1
650 TABLET ORAL ONCE
OUTPATIENT
Start: 2025-04-15

## 2025-04-15 RX ORDER — FAMOTIDINE 10 MG/ML
20 INJECTION, SOLUTION INTRAVENOUS ONCE AS NEEDED
OUTPATIENT
Start: 2025-04-21

## 2025-04-15 RX ORDER — ALBUTEROL SULFATE 0.83 MG/ML
3 SOLUTION RESPIRATORY (INHALATION) AS NEEDED
OUTPATIENT
Start: 2025-04-15

## 2025-04-15 RX ORDER — FAMOTIDINE 10 MG/ML
20 INJECTION, SOLUTION INTRAVENOUS ONCE AS NEEDED
Status: CANCELLED | OUTPATIENT
Start: 2025-04-18

## 2025-04-15 RX ORDER — DIPHENHYDRAMINE HYDROCHLORIDE 50 MG/ML
50 INJECTION, SOLUTION INTRAMUSCULAR; INTRAVENOUS AS NEEDED
Status: CANCELLED | OUTPATIENT
Start: 2025-04-18

## 2025-04-15 RX ORDER — HEPARIN 100 UNIT/ML
500 SYRINGE INTRAVENOUS AS NEEDED
OUTPATIENT
Start: 2025-04-21

## 2025-04-15 RX ORDER — ALBUTEROL SULFATE 0.83 MG/ML
3 SOLUTION RESPIRATORY (INHALATION) AS NEEDED
OUTPATIENT
Start: 2025-04-21

## 2025-04-15 RX ORDER — EPINEPHRINE 0.3 MG/.3ML
0.3 INJECTION SUBCUTANEOUS EVERY 5 MIN PRN
OUTPATIENT
Start: 2025-04-15

## 2025-04-15 RX ORDER — DIPHENHYDRAMINE HYDROCHLORIDE 50 MG/ML
50 INJECTION, SOLUTION INTRAMUSCULAR; INTRAVENOUS AS NEEDED
OUTPATIENT
Start: 2025-04-15

## 2025-04-15 RX ORDER — EPINEPHRINE 0.3 MG/.3ML
0.3 INJECTION SUBCUTANEOUS EVERY 5 MIN PRN
OUTPATIENT
Start: 2025-04-21

## 2025-04-15 RX ORDER — ALBUTEROL SULFATE 0.83 MG/ML
3 SOLUTION RESPIRATORY (INHALATION) AS NEEDED
Status: CANCELLED | OUTPATIENT
Start: 2025-04-18

## 2025-04-15 RX ORDER — DIPHENHYDRAMINE HYDROCHLORIDE 50 MG/ML
50 INJECTION, SOLUTION INTRAMUSCULAR; INTRAVENOUS AS NEEDED
OUTPATIENT
Start: 2025-04-21

## 2025-04-15 NOTE — TELEPHONE ENCOUNTER
RN informed patient that  will meet her in infusion tomorrow and will get her a ride home. RN also advised patient to go to the ED if she has chest pain, dizziness or increase SOB. She verbalized understanding.

## 2025-04-15 NOTE — TELEPHONE ENCOUNTER
----- Message from Garland Spears sent at 4/15/2025  1:31 PM EDT -----  Iron ordered 3 doses, her H/H is borderline, if she is symptomatic, will need to get transfused. Alternatively, she can bring forward the date of the infusion if Elena

## 2025-04-15 NOTE — TELEPHONE ENCOUNTER
RN called the patient and informed her that D-Dimer was WNL. RN also informed her hgb 7.1 and labs came back indicating iron deficiency. MD Jenkins placed orders for IV iron but wants to know if patient is symptomatic and might need blood this week while waiting for iron PA. Patient said she has been more fatigued and thinks it would be a good idea to get the blood this week. She said she can either stop by the lab this afternoon or tomorrow morning to get a type and screen.

## 2025-04-15 NOTE — TELEPHONE ENCOUNTER
RN called the patient and informed her infusion had tomorrow at 12 or Friday at 1215 available. She said she prefers tomorrow but will need a ride home. RN sent message to social work. Patient said she is going back to the lab shortly to get the type and screen.

## 2025-04-16 ENCOUNTER — INFUSION (OUTPATIENT)
Dept: HEMATOLOGY/ONCOLOGY | Facility: HOSPITAL | Age: 70
End: 2025-04-16
Payer: MEDICARE

## 2025-04-16 ENCOUNTER — APPOINTMENT (OUTPATIENT)
Dept: UROLOGY | Facility: HOSPITAL | Age: 70
End: 2025-04-16
Payer: MEDICARE

## 2025-04-16 VITALS
TEMPERATURE: 97.9 F | WEIGHT: 195.7 LBS | OXYGEN SATURATION: 100 % | BODY MASS INDEX: 40.9 KG/M2 | RESPIRATION RATE: 20 BRPM | DIASTOLIC BLOOD PRESSURE: 66 MMHG | SYSTOLIC BLOOD PRESSURE: 135 MMHG | HEART RATE: 80 BPM

## 2025-04-16 DIAGNOSIS — D62 ACUTE BLOOD LOSS ANEMIA: ICD-10-CM

## 2025-04-16 DIAGNOSIS — D50.0 IRON DEFICIENCY ANEMIA DUE TO CHRONIC BLOOD LOSS: ICD-10-CM

## 2025-04-16 LAB
BLOOD EXPIRATION DATE: NORMAL
DISPENSE STATUS: NORMAL
PRODUCT BLOOD TYPE: 7300
PRODUCT CODE: NORMAL
PTH-INTACT SERPL-MCNC: 280.8 PG/ML (ref 18.5–88)
UNIT ABO: NORMAL
UNIT NUMBER: NORMAL
UNIT RH: NORMAL
UNIT VOLUME: 350
XM INTEP: NORMAL

## 2025-04-16 PROCEDURE — P9040 RBC LEUKOREDUCED IRRADIATED: HCPCS

## 2025-04-16 PROCEDURE — 36430 TRANSFUSION BLD/BLD COMPNT: CPT

## 2025-04-16 PROCEDURE — 2500000001 HC RX 250 WO HCPCS SELF ADMINISTERED DRUGS (ALT 637 FOR MEDICARE OP): Performed by: STUDENT IN AN ORGANIZED HEALTH CARE EDUCATION/TRAINING PROGRAM

## 2025-04-16 RX ORDER — ACETAMINOPHEN 325 MG/1
650 TABLET ORAL ONCE
Status: COMPLETED | OUTPATIENT
Start: 2025-04-16 | End: 2025-04-16

## 2025-04-16 RX ADMIN — ACETAMINOPHEN 650 MG: 325 TABLET ORAL at 12:25

## 2025-04-16 ASSESSMENT — PAIN SCALES - GENERAL: PAINLEVEL_OUTOF10: 0-NO PAIN

## 2025-04-16 NOTE — PROGRESS NOTES
Patient arrived to infusion with c/o fatigue. She rec'd 1 unit PRBC with VSS and no adverse reactions.  She was discharged in stable condition.

## 2025-04-17 ENCOUNTER — SPECIALTY PHARMACY (OUTPATIENT)
Dept: PHARMACY | Facility: CLINIC | Age: 70
End: 2025-04-17

## 2025-04-17 ENCOUNTER — TELEPHONE (OUTPATIENT)
Dept: PULMONOLOGY | Facility: HOSPITAL | Age: 70
End: 2025-04-17
Payer: MEDICARE

## 2025-04-17 ENCOUNTER — DOCUMENTATION (OUTPATIENT)
Dept: PRIMARY CARE | Facility: CLINIC | Age: 70
End: 2025-04-17
Payer: MEDICARE

## 2025-04-17 DIAGNOSIS — J44.1 COPD EXACERBATION (MULTI): ICD-10-CM

## 2025-04-17 NOTE — PROGRESS NOTES
Returned Ms. Andrews's call. She is short of breath and can hardly complete a sentence. She says that she has been using her Respimat daily and she even has been using her albuterol inhaler and nothing is working.   She has a call placed to the pulmonary nurse. She says that she is waitng for a call back. I expressed to her that she really should go to the ED being that she is so short of breath and is unsuccessful with her inhaler. She refused the idea of going  to the ED and says that she does not want to go to the ED and she will wait for the pulmonary nurse.

## 2025-04-17 NOTE — TELEPHONE ENCOUNTER
"Returned pt's call regarding still not feeling well. During the conversation, pt mentioned that she started taking one tablet of prednisone for 3 days and then increased it to 2 tablets. It was explained to the pt that she has been taking her prednisone taper incorrectly. Pt is not at home to count how many pills she has left. Pt also mentioned that she did not receive her z-malika because it was discontinued. Upon record review, it was discontinued by a different provider office for \"med list cleanup\". Pt states she can't remember if the RN asked her about the z-malika, but it is possible she asked. Pt stated that once she gets home she will call the office with how many tablets of prednisone she has left. Dr. Magana was notified.   "

## 2025-04-18 RX ORDER — AZITHROMYCIN 250 MG/1
250 TABLET, FILM COATED ORAL DAILY
Qty: 6 TABLET | Refills: 0 | Status: SHIPPED | OUTPATIENT
Start: 2025-04-18 | End: 2025-04-23

## 2025-04-18 RX ORDER — PREDNISONE 10 MG/1
TABLET ORAL
Qty: 30 TABLET | Refills: 0 | Status: SHIPPED | OUTPATIENT
Start: 2025-04-18

## 2025-04-18 NOTE — TELEPHONE ENCOUNTER
Per Dr. Magana, will restart the prednisone taper and prescribe z-malika. Orders placed and routed to Dr. Magana to sign. Spoke with pt to update her on plan of care. She verbalized understanding. Thorough directions on how to take both prednisone and z-malika were provided to the pt and she verbalized understanding.

## 2025-04-21 ENCOUNTER — INFUSION (OUTPATIENT)
Dept: HEMATOLOGY/ONCOLOGY | Facility: HOSPITAL | Age: 70
End: 2025-04-21
Payer: MEDICARE

## 2025-04-21 VITALS
HEIGHT: 58 IN | TEMPERATURE: 98.2 F | OXYGEN SATURATION: 99 % | BODY MASS INDEX: 42.16 KG/M2 | WEIGHT: 200.84 LBS | HEART RATE: 63 BPM | RESPIRATION RATE: 18 BRPM | DIASTOLIC BLOOD PRESSURE: 72 MMHG | SYSTOLIC BLOOD PRESSURE: 145 MMHG

## 2025-04-21 DIAGNOSIS — Z79.01 ON CONTINUOUS ORAL ANTICOAGULATION: ICD-10-CM

## 2025-04-21 DIAGNOSIS — D50.0 IRON DEFICIENCY ANEMIA DUE TO CHRONIC BLOOD LOSS: ICD-10-CM

## 2025-04-21 DIAGNOSIS — K25.3 CAMERON LESION, ACUTE: ICD-10-CM

## 2025-04-21 PROCEDURE — 96365 THER/PROPH/DIAG IV INF INIT: CPT | Mod: INF

## 2025-04-21 PROCEDURE — 2500000004 HC RX 250 GENERAL PHARMACY W/ HCPCS (ALT 636 FOR OP/ED): Performed by: STUDENT IN AN ORGANIZED HEALTH CARE EDUCATION/TRAINING PROGRAM

## 2025-04-21 RX ORDER — FAMOTIDINE 10 MG/ML
20 INJECTION, SOLUTION INTRAVENOUS ONCE AS NEEDED
OUTPATIENT
Start: 2025-04-28

## 2025-04-21 RX ORDER — ALBUTEROL SULFATE 0.83 MG/ML
3 SOLUTION RESPIRATORY (INHALATION) AS NEEDED
OUTPATIENT
Start: 2025-04-28

## 2025-04-21 RX ORDER — DIPHENHYDRAMINE HYDROCHLORIDE 50 MG/ML
50 INJECTION, SOLUTION INTRAMUSCULAR; INTRAVENOUS AS NEEDED
OUTPATIENT
Start: 2025-04-28

## 2025-04-21 RX ORDER — EPINEPHRINE 0.3 MG/.3ML
0.3 INJECTION SUBCUTANEOUS EVERY 5 MIN PRN
OUTPATIENT
Start: 2025-04-28

## 2025-04-21 RX ADMIN — FERUMOXYTOL 510 MG: 510 INJECTION INTRAVENOUS at 09:44

## 2025-04-21 NOTE — PROGRESS NOTES
Patient arrived to infusion with c/o fatigue and cough. Patient states she is picking up prescription medications from provider today. She rec'd her ferumoxytol with no adverse reactions and VSS pre /post and post observation period.  She was discharged in stable condition.

## 2025-04-22 ENCOUNTER — APPOINTMENT (OUTPATIENT)
Dept: PRIMARY CARE | Facility: CLINIC | Age: 70
End: 2025-04-22
Payer: MEDICARE

## 2025-04-22 VITALS
SYSTOLIC BLOOD PRESSURE: 161 MMHG | WEIGHT: 199 LBS | HEART RATE: 83 BPM | HEIGHT: 58 IN | BODY MASS INDEX: 41.77 KG/M2 | DIASTOLIC BLOOD PRESSURE: 84 MMHG | OXYGEN SATURATION: 94 %

## 2025-04-22 DIAGNOSIS — J20.9 ACUTE BRONCHITIS, UNSPECIFIED ORGANISM: ICD-10-CM

## 2025-04-22 DIAGNOSIS — Z09 HOSPITAL DISCHARGE FOLLOW-UP: Primary | ICD-10-CM

## 2025-04-22 PROCEDURE — 3079F DIAST BP 80-89 MM HG: CPT | Performed by: STUDENT IN AN ORGANIZED HEALTH CARE EDUCATION/TRAINING PROGRAM

## 2025-04-22 PROCEDURE — 3008F BODY MASS INDEX DOCD: CPT | Performed by: STUDENT IN AN ORGANIZED HEALTH CARE EDUCATION/TRAINING PROGRAM

## 2025-04-22 PROCEDURE — 3051F HG A1C>EQUAL 7.0%<8.0%: CPT | Performed by: STUDENT IN AN ORGANIZED HEALTH CARE EDUCATION/TRAINING PROGRAM

## 2025-04-22 PROCEDURE — 3077F SYST BP >= 140 MM HG: CPT | Performed by: STUDENT IN AN ORGANIZED HEALTH CARE EDUCATION/TRAINING PROGRAM

## 2025-04-22 PROCEDURE — 1159F MED LIST DOCD IN RCRD: CPT | Performed by: STUDENT IN AN ORGANIZED HEALTH CARE EDUCATION/TRAINING PROGRAM

## 2025-04-22 PROCEDURE — 1160F RVW MEDS BY RX/DR IN RCRD: CPT | Performed by: STUDENT IN AN ORGANIZED HEALTH CARE EDUCATION/TRAINING PROGRAM

## 2025-04-22 PROCEDURE — 1036F TOBACCO NON-USER: CPT | Performed by: STUDENT IN AN ORGANIZED HEALTH CARE EDUCATION/TRAINING PROGRAM

## 2025-04-22 PROCEDURE — 4010F ACE/ARB THERAPY RXD/TAKEN: CPT | Performed by: STUDENT IN AN ORGANIZED HEALTH CARE EDUCATION/TRAINING PROGRAM

## 2025-04-22 PROCEDURE — 99213 OFFICE O/P EST LOW 20 MIN: CPT | Performed by: STUDENT IN AN ORGANIZED HEALTH CARE EDUCATION/TRAINING PROGRAM

## 2025-04-22 NOTE — PROGRESS NOTES
"Subjective   Patient ID: Vania Andrews is a 70 y.o. 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 s/p L partial nephrectomy and L adrenalectomy (2007), CKD stage 3 d/t loss of nephron mass and HTN, asthma, HLD, T2DM (est with endo ), MARYLU not using CPAP, s/p hysterectomy and b/l oophorectomy, inferior NSTEMI (1/2019) complicated by chronic Fe deficiency anemia  with h/o iron infusions, partially obstructed Ross's hernia with sx ( Feb 2022 with repair in June 2022  ), H/o provoked DVT in 2020 , with cessation of eliquis after 6m  , now resumed after saddle PE In Feb 2024 with recent upper GI bleed  on 2/28/25       [facility] fu   3/22-3/24 for back pain    Was treated with  azithromycin on 3/12   She is on a prednisone taper now and again on Z pack    Visit Vitals  /84   Pulse 83   Ht 1.464 m (4' 9.64\")   Wt 90.3 kg (199 lb)   LMP  (LMP Unknown)   SpO2 94%   BMI 42.11 kg/m²   OB Status Postmenopausal   Smoking Status Former   BSA 1.92 m²      No LMP recorded (lmp unknown). Patient is postmenopausal.   Current Outpatient Medications   Medication Instructions    Accu-Chek Guide test strips Use 1 test strip to test blood sugar twice daily.    acetaminophen (Tylenol 8 HOUR) 650 mg ER tablet 1-2 tablets, Every 8 hours PRN    albuterol 90 mcg/actuation inhaler 2 puffs, inhalation, Every 4 hours PRN    amLODIPine (NORVASC) 10 mg, oral, Daily    azithromycin (Zithromax) 250 mg tablet Take 2 tablets (500 mg) by mouth on day 1, then take 1 tablet (250 mg) on days 2 through 5.    Benadryl Allergy 50 mg, oral, Once, Take 1 hour before your scan    colchicine 0.6 mg, oral, Daily    Combigan 0.2-0.5 % ophthalmic solution 1 drop, Both Eyes, 2 times daily    dextromethorphan-guaifenesin (Robitussin DM)  mg/5 mL oral liquid 5 mL, oral, Every 4 hours PRN    diclofenac sodium 1 % kit 1 Application, As needed    diphenhydrAMINE " "(BENADryl) 50 mg tablet Take 1 tablet 1 hr prior to contrast administration    Eliquis 2.5 mg, oral, Every 12 hours    famotidine (PEPCID) 20 mg, oral, Nightly    fluticasone (Flonase) 50 mcg/actuation nasal spray 2 sprays, Each Nostril, Daily    furosemide (LASIX) 20 mg, oral, Daily PRN    gabapentin (NEURONTIN) 300 mg, oral, Nightly    glipiZIDE (Glucotrol) 5 mg tablet Take 1 tablet (5mg) by mouth daily with breakfast    hydrocortisone 1 % cream Apply as needed to the injection site    insulin lispro (HumaLOG KwikPen Insulin) 100 unit/mL pen Use with sliding scale 3 times a day, up to 30 units daily    lancets (Accu-Chek Softclix Lancets) misc Use to check blood sugar twice daily    latanoprost (Xalatan) 0.005 % ophthalmic solution 1 drop, Both Eyes, Every morning    lidocaine (Lidoderm) 5 % patch 1 patch, transdermal, Daily, Remove & discard patch within 12 hours or as directed by MD.    lidocaine (Lidoderm) 5 % patch 2 patches, transdermal, Daily, Remove & discard patch within 12 hours or as directed by MD.    losartan (COZAAR) 25 mg, oral, Daily    methocarbamol (ROBAXIN) 500 mg, oral, Every 8 hours PRN    methocarbamol (ROBAXIN) 500 mg, oral, 2 times daily PRN    metoprolol succinate XL (TOPROL-XL) 200 mg, oral, Daily    mometasone (Nasonex 24hr Allergy) 50 mcg/actuation nasal spray     omeprazole (PRILOSEC) 40 mg, oral, Daily    pen needle, diabetic 31 gauge x 5/16\" needle Use to inject 1-4 times daily as directed.    polyethylene glycol (GLYCOLAX, MIRALAX) 17 g, oral, 2 times daily    prednisoLONE acetate (Pred-Forte) 1 % ophthalmic suspension 1 drop, Right Eye, 4 times daily    predniSONE (Deltasone) 10 mg tablet Take 4 tablets (40mg) by mouth daily for 3 days, then 3 tabs (30mg) daily for 3 days,  2 tabs (20mg) daily for 3 days,  1 tab (10 mg) daily for 3 days.    predniSONE (Deltasone) 50 mg tablet Take 1 tablet at 13 hrs, 7 hrs and 1 hr prior to contrast administration    predniSONE (DELTASONE) 50 mg, " oral, See admin instructions, Take 50 mg at  13 hours , 7 hours and 1 hour before your scan    rosuvastatin (CRESTOR) 20 mg, oral, Daily    tiotropium (Spiriva Respimat) 2.5 mcg/actuation inhaler 2 puffs, inhalation, Daily    tiotropium-olodateroL (Stiolto Respimat) 2.5-2.5 mcg/actuation mist inhaler 2 Inhalations, inhalation, Daily      Social History     Tobacco Use    Smoking status: Former     Types: Cigarettes     Passive exposure: Never    Smokeless tobacco: Never   Substance Use Topics    Alcohol use: Yes     Comment: 1-2 x per month        Review of Systems    Constitutional : No feeling poorly / fevers/ chills / night sweats/ fatigue   . Cough +    All other systems have been reviewed and are negative for complaint       Physical Exam    Constitutional : Vitals reviewed. Alert and in no distress  Cardiovascular : RRR, Normal S1, S2, no peripheral edema   Pulmonary: No respiratory distress, CTAB   .  Psych : A,Ox3, normal mood and affect      Assessment/Plan   Diagnoses and all orders for this visit:  Hospital discharge follow-up  Acute bronchitis, unspecified organism  -     Aerochamber Spacer Device    Hospital course, labs and imaging reports reviewed . Med reconciliation done . F/u labs/Imaging, if needed were ordered .     Currently on prolonged prednisone Rx and rpt abx for bronchitis      BP high likely from coughing           MRI T and L spine reports from 3/24 reviewed         Conditions addressed and mgmt as noted above.  Pertinent labs, images/ imaging reports , chart review was done .   Age appropriate labs / labs for mgmt of chronic medical conditions ordered, further mgmt pending the results.         RTO in  m or sooner if needed . Labs to be done few days prior to the next visit.        This note is intended for the physician writing it, as well as to communicate findings to other healthcare professionals. These notes use medical lexicon that may be misunderstood by non medical persons.  Therefore, interpretations of medical notes and terminology should be approached with caution.

## 2025-04-28 ENCOUNTER — APPOINTMENT (OUTPATIENT)
Dept: HEMATOLOGY/ONCOLOGY | Facility: CLINIC | Age: 70
End: 2025-04-28
Payer: MEDICARE

## 2025-04-28 ENCOUNTER — INFUSION (OUTPATIENT)
Dept: HEMATOLOGY/ONCOLOGY | Facility: HOSPITAL | Age: 70
End: 2025-04-28
Payer: MEDICARE

## 2025-04-28 VITALS
BODY MASS INDEX: 41.56 KG/M2 | DIASTOLIC BLOOD PRESSURE: 80 MMHG | WEIGHT: 197.97 LBS | SYSTOLIC BLOOD PRESSURE: 149 MMHG | TEMPERATURE: 98.1 F | HEIGHT: 58 IN | OXYGEN SATURATION: 100 % | RESPIRATION RATE: 18 BRPM | HEART RATE: 77 BPM

## 2025-04-28 DIAGNOSIS — D50.0 IRON DEFICIENCY ANEMIA DUE TO CHRONIC BLOOD LOSS: ICD-10-CM

## 2025-04-28 DIAGNOSIS — Z79.01 ON CONTINUOUS ORAL ANTICOAGULATION: ICD-10-CM

## 2025-04-28 DIAGNOSIS — K25.3 CAMERON LESION, ACUTE: ICD-10-CM

## 2025-04-28 PROCEDURE — 2500000004 HC RX 250 GENERAL PHARMACY W/ HCPCS (ALT 636 FOR OP/ED): Mod: JZ,TB | Performed by: STUDENT IN AN ORGANIZED HEALTH CARE EDUCATION/TRAINING PROGRAM

## 2025-04-28 PROCEDURE — 96365 THER/PROPH/DIAG IV INF INIT: CPT | Mod: INF

## 2025-04-28 RX ORDER — FAMOTIDINE 10 MG/ML
20 INJECTION, SOLUTION INTRAVENOUS ONCE AS NEEDED
OUTPATIENT
Start: 2025-05-05

## 2025-04-28 RX ORDER — ALBUTEROL SULFATE 0.83 MG/ML
3 SOLUTION RESPIRATORY (INHALATION) AS NEEDED
OUTPATIENT
Start: 2025-05-05

## 2025-04-28 RX ORDER — DIPHENHYDRAMINE HYDROCHLORIDE 50 MG/ML
50 INJECTION, SOLUTION INTRAMUSCULAR; INTRAVENOUS AS NEEDED
OUTPATIENT
Start: 2025-05-05

## 2025-04-28 RX ORDER — EPINEPHRINE 0.3 MG/.3ML
0.3 INJECTION SUBCUTANEOUS EVERY 5 MIN PRN
OUTPATIENT
Start: 2025-05-05

## 2025-04-28 RX ADMIN — FERUMOXYTOL 510 MG: 510 INJECTION INTRAVENOUS at 15:50

## 2025-04-28 NOTE — PROGRESS NOTES
Patient presented to infusion with no complaints.  She rec'd her ferumoxytol with no issues and VSS pre/post/post observation period.  She was discharged in stable condition.

## 2025-04-30 PROCEDURE — RXMED WILLOW AMBULATORY MEDICATION CHARGE

## 2025-05-01 ENCOUNTER — PHARMACY VISIT (OUTPATIENT)
Dept: PHARMACY | Facility: CLINIC | Age: 70
End: 2025-05-01
Payer: MEDICARE

## 2025-05-02 ENCOUNTER — HOSPITAL ENCOUNTER (OUTPATIENT)
Dept: OPERATING ROOM | Facility: CLINIC | Age: 70
Setting detail: OUTPATIENT SURGERY
Discharge: HOME | End: 2025-05-02
Payer: MEDICARE

## 2025-05-02 VITALS
DIASTOLIC BLOOD PRESSURE: 88 MMHG | HEART RATE: 75 BPM | RESPIRATION RATE: 18 BRPM | OXYGEN SATURATION: 100 % | SYSTOLIC BLOOD PRESSURE: 170 MMHG | TEMPERATURE: 97.7 F

## 2025-05-02 DIAGNOSIS — M25.551 PAIN OF RIGHT HIP: ICD-10-CM

## 2025-05-02 LAB — GLUCOSE BLD MANUAL STRIP-MCNC: 199 MG/DL (ref 74–99)

## 2025-05-02 PROCEDURE — 7100000009 HC PHASE TWO TIME - INITIAL BASE CHARGE

## 2025-05-02 PROCEDURE — 82947 ASSAY GLUCOSE BLOOD QUANT: CPT

## 2025-05-02 PROCEDURE — 3600000006 HC OR TIME - EACH INCREMENTAL 1 MINUTE - PROCEDURE LEVEL ONE

## 2025-05-02 PROCEDURE — 2500000004 HC RX 250 GENERAL PHARMACY W/ HCPCS (ALT 636 FOR OP/ED): Performed by: PAIN MEDICINE

## 2025-05-02 PROCEDURE — 3600000001 HC OR TIME - INITIAL BASE CHARGE - PROCEDURE LEVEL ONE

## 2025-05-02 PROCEDURE — 7100000010 HC PHASE TWO TIME - EACH INCREMENTAL 1 MINUTE

## 2025-05-02 RX ORDER — ROPIVACAINE HYDROCHLORIDE 5 MG/ML
INJECTION, SOLUTION EPIDURAL; INFILTRATION; PERINEURAL AS NEEDED
Status: COMPLETED | OUTPATIENT
Start: 2025-05-02 | End: 2025-05-02

## 2025-05-02 RX ORDER — METHYLPREDNISOLONE ACETATE 40 MG/ML
INJECTION, SUSPENSION INTRA-ARTICULAR; INTRALESIONAL; INTRAMUSCULAR; SOFT TISSUE AS NEEDED
Status: COMPLETED | OUTPATIENT
Start: 2025-05-02 | End: 2025-05-02

## 2025-05-02 RX ADMIN — LIDOCAINE HYDROCHLORIDE: 5 INJECTION, SOLUTION INFILTRATION at 11:09

## 2025-05-02 RX ADMIN — METHYLPREDNISOLONE ACETATE 40 MG: 40 INJECTION, SUSPENSION INTRA-ARTICULAR; INTRALESIONAL; INTRAMUSCULAR; SOFT TISSUE at 11:10

## 2025-05-02 RX ADMIN — ROPIVACAINE HYDROCHLORIDE 4 ML: 5 INJECTION, SOLUTION EPIDURAL; INFILTRATION; PERINEURAL at 11:10

## 2025-05-02 ASSESSMENT — PAIN SCALES - GENERAL
PAINLEVEL_OUTOF10: 5 - MODERATE PAIN

## 2025-05-02 ASSESSMENT — PAIN - FUNCTIONAL ASSESSMENT
PAIN_FUNCTIONAL_ASSESSMENT: 0-10

## 2025-05-02 NOTE — BRIEF OP NOTE
Date: 2025  OR Location: [facility] NON-OR PROCEDURES    Name: Vania Andrews : 1955, Age: 70 y.o., MRN: [id number], Sex: female    Diagnosis  OA rt hip * No Diagnosis Codes entered *     Procedures  Intra-articular steroid injection of the hip under fluor    Korey Fung MD    Resident/Fellow/Other Assistant:  MD Edward    Staff:   Scrub Person: Thais  Circulator: Nicole    Anesthesia Staff: No anesthesia staff entered.    Procedure Summary  Anesthesia: Anesthesia type not filed in the log.  ASA: 3  Estimated Blood Loss: 0mL  Intra-op Medications: * Intraprocedure medication information is unavailable because the case start and end events have not been set *      Intraprocedure I/O Totals       None           Specimen: No specimens collected               Findings:   Vania Andrews is a 70 y.o. female  with right hip arthritis here for right hip injection     Procedure performed:  right hip intraarticular injection under fluoroscopic guidance     Performed by: Dr. Fung  Assistant: Edward SANTOS     The patient was identified in the preoperative holding area and marked according to protocol.  Informed consent was obtained and they were brought to the operating room on a gurney. A timeout was performed and consent was verified.  ASA Standard monitors were applied.  The patient was placed supine on the operating room table and x-ray was used to identify the target area.  Skin was prepped in the usual fashion with ChloraPrep and draped using sterile towels.  The skin was anesthetized with 0.5% lidocaine with bicarbonate using a 27-gauge hypodermic needle.  A 3.5 inch Quincke needle was used to access the joint space under biplanar fluoroscopic guidance; after negative aspiration, 3 mL of 0.5% ropivicaine and 20 mg methylprednisolone were injected into the joint space.         The needle(s) was(were) removed. Hemostasis was ensured.  A bandage was applied to the area.  The patient was brought to the recovery  area in stable condition and there were no apparent complications.     All injected medications used were preservative-free and not .     Anesthesia:  Local  and no conscious sedation     MD annette        Complications:  None; patient tolerated the procedure well.     Disposition: PACU - hemodynamically stable.  Condition: stable  Specimens Collected: No specimens collected  Attending Attestation: I was present and scrubbed for the entire procedure.    MD Annette

## 2025-05-02 NOTE — DISCHARGE INSTRUCTIONS
[facility]     OUTPATIENT SURGERY CENTER DISCHARGE INSTRUCTIONS FOR ANESTHESIOLOGY PAIN SERVICE PATIENTS     Your Doctor's Name is: Dr. Alejandro  [place] Scheduling Number:  [phone]    [facility] Discharge Instructions Following:                        Sympathetic Block                        Epidural Injection                       Selective Root Injection                        Blood Patch                       Joint Injection    PLEASE CAREFULLY FOLLOW THE INSTRUCTIONS BELOW    Observe the needle site for excessive bleeding (slow general oozing that completely soaks the dressing or fresh bright red bleeding). In either case, apply pressure to the area, elevate it if possible and can your doctor at once.    Observe/monitor for the following signs and symptoms:     Needle site:  Change in color   Numbness or tingling   Coldness to touch  Swelling or Drainage     Temperature Of 101.5 or higher  Increased or uncontrollable pain  If you notice any of the above signs and symptoms, please call your doctor at once.    Your pain may not be gone immediately after this procedure; it generally takes 3 to 5 days for the steroid to work.     Please follow up with the Pain Management Nurse in seven days by calling   [phone]      Keep the needle site Clean and dry for 24 hours.            Take medicines as directed.            If you were given sedation: NO driving or operating machinery for 24 hours     If any problems occur, Or if you have any further questions, please call your doctor as soon as possible. If you find that you cannot reach your doctor, but feel that your condition needs a doctor's attention, go to the emergency room nearest your home and take this paper with you.

## 2025-05-05 ENCOUNTER — INFUSION (OUTPATIENT)
Dept: HEMATOLOGY/ONCOLOGY | Facility: HOSPITAL | Age: 70
End: 2025-05-05
Payer: MEDICARE

## 2025-05-05 VITALS
TEMPERATURE: 97.2 F | RESPIRATION RATE: 18 BRPM | SYSTOLIC BLOOD PRESSURE: 140 MMHG | BODY MASS INDEX: 41.46 KG/M2 | OXYGEN SATURATION: 99 % | WEIGHT: 195.9 LBS | DIASTOLIC BLOOD PRESSURE: 65 MMHG | HEART RATE: 70 BPM

## 2025-05-05 DIAGNOSIS — D50.0 IRON DEFICIENCY ANEMIA DUE TO CHRONIC BLOOD LOSS: ICD-10-CM

## 2025-05-05 DIAGNOSIS — Z79.01 ON CONTINUOUS ORAL ANTICOAGULATION: ICD-10-CM

## 2025-05-05 DIAGNOSIS — K25.3 CAMERON LESION, ACUTE: ICD-10-CM

## 2025-05-05 LAB
BASOPHILS # BLD AUTO: 0.08 X10*3/UL (ref 0–0.1)
BASOPHILS NFR BLD AUTO: 0.8 %
BURR CELLS BLD QL SMEAR: NORMAL
DACRYOCYTES BLD QL SMEAR: NORMAL
EOSINOPHIL # BLD AUTO: 0.24 X10*3/UL (ref 0–0.7)
EOSINOPHIL NFR BLD AUTO: 2.3 %
ERYTHROCYTE [DISTWIDTH] IN BLOOD BY AUTOMATED COUNT: 27.2 % (ref 11.5–14.5)
FERRITIN SERPL-MCNC: 909 NG/ML (ref 8–150)
HCT VFR BLD AUTO: 38.5 % (ref 36–46)
HGB BLD-MCNC: 11.4 G/DL (ref 12–16)
HYPOCHROMIA BLD QL SMEAR: NORMAL
IMM GRANULOCYTES # BLD AUTO: 0.07 X10*3/UL (ref 0–0.7)
IMM GRANULOCYTES NFR BLD AUTO: 0.7 % (ref 0–0.9)
IRON SATN MFR SERPL: 40 % (ref 25–45)
IRON SERPL-MCNC: 113 UG/DL (ref 35–150)
LYMPHOCYTES # BLD AUTO: 0.97 X10*3/UL (ref 1.2–4.8)
LYMPHOCYTES NFR BLD AUTO: 9.1 %
MCH RBC QN AUTO: 25.3 PG (ref 26–34)
MCHC RBC AUTO-ENTMCNC: 29.6 G/DL (ref 32–36)
MCV RBC AUTO: 85 FL (ref 80–100)
MONOCYTES # BLD AUTO: 0.65 X10*3/UL (ref 0.1–1)
MONOCYTES NFR BLD AUTO: 6.1 %
NEUTROPHILS # BLD AUTO: 8.63 X10*3/UL (ref 1.2–7.7)
NEUTROPHILS NFR BLD AUTO: 81 %
NRBC BLD-RTO: 0 /100 WBCS (ref 0–0)
OVALOCYTES BLD QL SMEAR: NORMAL
PLATELET # BLD AUTO: 381 X10*3/UL (ref 150–450)
PLATELET CLUMP BLD QL SMEAR: PRESENT
RBC # BLD AUTO: 4.51 X10*6/UL (ref 4–5.2)
RBC MORPH BLD: NORMAL
SCHISTOCYTES BLD QL SMEAR: NORMAL
TIBC SERPL-MCNC: 281 UG/DL (ref 240–445)
UIBC SERPL-MCNC: 168 UG/DL (ref 110–370)
WBC # BLD AUTO: 10.6 X10*3/UL (ref 4.4–11.3)

## 2025-05-05 PROCEDURE — 83550 IRON BINDING TEST: CPT

## 2025-05-05 PROCEDURE — 82728 ASSAY OF FERRITIN: CPT

## 2025-05-05 PROCEDURE — 85025 COMPLETE CBC W/AUTO DIFF WBC: CPT

## 2025-05-05 PROCEDURE — 2500000004 HC RX 250 GENERAL PHARMACY W/ HCPCS (ALT 636 FOR OP/ED): Mod: JZ | Performed by: STUDENT IN AN ORGANIZED HEALTH CARE EDUCATION/TRAINING PROGRAM

## 2025-05-05 PROCEDURE — 96365 THER/PROPH/DIAG IV INF INIT: CPT | Mod: INF

## 2025-05-05 RX ORDER — FAMOTIDINE 10 MG/ML
20 INJECTION, SOLUTION INTRAVENOUS ONCE AS NEEDED
OUTPATIENT
Start: 2025-05-12

## 2025-05-05 RX ORDER — ALBUTEROL SULFATE 0.83 MG/ML
3 SOLUTION RESPIRATORY (INHALATION) AS NEEDED
OUTPATIENT
Start: 2025-05-12

## 2025-05-05 RX ORDER — DIPHENHYDRAMINE HYDROCHLORIDE 50 MG/ML
50 INJECTION, SOLUTION INTRAMUSCULAR; INTRAVENOUS AS NEEDED
OUTPATIENT
Start: 2025-05-12

## 2025-05-05 RX ORDER — EPINEPHRINE 0.3 MG/.3ML
0.3 INJECTION SUBCUTANEOUS EVERY 5 MIN PRN
OUTPATIENT
Start: 2025-05-12

## 2025-05-05 RX ADMIN — FERUMOXYTOL 510 MG: 510 INJECTION INTRAVENOUS at 09:47

## 2025-05-05 NOTE — PROGRESS NOTES
Patient presented to infusion with no complaints.  She rec'd her Iron with no adverse reactions and VSS pre/post administration and post observation period.  She was discharged instable condition.

## 2025-05-07 ENCOUNTER — SPECIALTY PHARMACY (OUTPATIENT)
Dept: PHARMACY | Facility: CLINIC | Age: 70
End: 2025-05-07

## 2025-05-08 ENCOUNTER — PATIENT OUTREACH (OUTPATIENT)
Dept: PRIMARY CARE | Facility: CLINIC | Age: 70
End: 2025-05-08
Payer: MEDICARE

## 2025-05-08 DIAGNOSIS — I10 PRIMARY HYPERTENSION: ICD-10-CM

## 2025-05-08 DIAGNOSIS — E11.51 TYPE 2 DIABETES MELLITUS WITH DIABETIC PERIPHERAL ANGIOPATHY WITHOUT GANGRENE, WITHOUT LONG-TERM CURRENT USE OF INSULIN (MULTI): ICD-10-CM

## 2025-05-08 NOTE — PROGRESS NOTES
Attempted to complete ccm outreach.. Ms. Andrews is driving at the moment and would like a call back tomorrow. Chart reviewed prior to call.

## 2025-05-09 ENCOUNTER — DOCUMENTATION (OUTPATIENT)
Dept: PRIMARY CARE | Facility: CLINIC | Age: 70
End: 2025-05-09
Payer: MEDICARE

## 2025-05-14 PROCEDURE — RXMED WILLOW AMBULATORY MEDICATION CHARGE

## 2025-05-21 ENCOUNTER — SPECIALTY PHARMACY (OUTPATIENT)
Dept: PHARMACY | Facility: CLINIC | Age: 70
End: 2025-05-21

## 2025-05-22 ENCOUNTER — PHARMACY VISIT (OUTPATIENT)
Dept: PHARMACY | Facility: CLINIC | Age: 70
End: 2025-05-22
Payer: MEDICARE

## 2025-05-29 ENCOUNTER — APPOINTMENT (OUTPATIENT)
Dept: OPHTHALMOLOGY | Facility: CLINIC | Age: 70
End: 2025-05-29
Payer: MEDICARE

## 2025-05-29 DIAGNOSIS — H40.1133 PRIMARY OPEN ANGLE GLAUCOMA (POAG) OF BOTH EYES, SEVERE STAGE: ICD-10-CM

## 2025-05-29 DIAGNOSIS — H40.10X2 OPEN-ANGLE GLAUCOMA OF BOTH EYES, MODERATE STAGE, UNSPECIFIED OPEN-ANGLE GLAUCOMA TYPE: Primary | ICD-10-CM

## 2025-05-29 PROCEDURE — 92133 CPTRZD OPH DX IMG PST SGM ON: CPT | Performed by: OPHTHALMOLOGY

## 2025-05-29 PROCEDURE — 99214 OFFICE O/P EST MOD 30 MIN: CPT | Performed by: OPHTHALMOLOGY

## 2025-05-29 PROCEDURE — 92083 EXTENDED VISUAL FIELD XM: CPT | Performed by: OPHTHALMOLOGY

## 2025-05-29 RX ORDER — LATANOPROST 50 UG/ML
1 SOLUTION/ DROPS OPHTHALMIC EVERY MORNING
Qty: 2.5 ML | Refills: 11 | Status: SHIPPED | OUTPATIENT
Start: 2025-05-29

## 2025-05-29 ASSESSMENT — VISUAL ACUITY
OS_SC: 20/40
OD_SC+: -2
METHOD: SNELLEN - LINEAR
OD_SC: 20/50

## 2025-05-29 ASSESSMENT — EXTERNAL EXAM - LEFT EYE: OS_EXAM: NORMAL

## 2025-05-29 ASSESSMENT — CUP TO DISC RATIO
OS_RATIO: 0.85
OD_RATIO: 0.75

## 2025-05-29 ASSESSMENT — PACHYMETRY
OS_CT(UM): 573
OD_CT(UM): 564

## 2025-05-29 ASSESSMENT — ENCOUNTER SYMPTOMS
ALLERGIC/IMMUNOLOGIC NEGATIVE: 0
RESPIRATORY NEGATIVE: 0
CONSTITUTIONAL NEGATIVE: 0
MUSCULOSKELETAL NEGATIVE: 0
HEMATOLOGIC/LYMPHATIC NEGATIVE: 0
EYES NEGATIVE: 0
PSYCHIATRIC NEGATIVE: 0
GASTROINTESTINAL NEGATIVE: 0
ENDOCRINE NEGATIVE: 0
CARDIOVASCULAR NEGATIVE: 0
NEUROLOGICAL NEGATIVE: 0

## 2025-05-29 ASSESSMENT — SLIT LAMP EXAM - LIDS
COMMENTS: NORMAL
COMMENTS: NORMAL

## 2025-05-29 ASSESSMENT — TONOMETRY
IOP_METHOD: GOLDMANN APPLANATION
OS_IOP_MMHG: 15
OD_IOP_MMHG: 15

## 2025-05-29 ASSESSMENT — EXTERNAL EXAM - RIGHT EYE: OD_EXAM: NORMAL

## 2025-05-29 NOTE — PROGRESS NOTES
1-oag stable but resp trouble, stop combigan and start simbrimza, continue latanoprost    2-ns ou    3= litzy tear arnold    4-dm no retinopathy

## 2025-05-30 PROCEDURE — RXMED WILLOW AMBULATORY MEDICATION CHARGE

## 2025-06-02 ENCOUNTER — OFFICE VISIT (OUTPATIENT)
Dept: PRIMARY CARE | Facility: HOSPITAL | Age: 70
End: 2025-06-02
Payer: MEDICARE

## 2025-06-02 VITALS
HEART RATE: 72 BPM | OXYGEN SATURATION: 98 % | BODY MASS INDEX: 41.85 KG/M2 | TEMPERATURE: 98 F | DIASTOLIC BLOOD PRESSURE: 73 MMHG | SYSTOLIC BLOOD PRESSURE: 138 MMHG | WEIGHT: 194 LBS | HEIGHT: 57 IN

## 2025-06-02 DIAGNOSIS — Z79.01 ON CONTINUOUS ORAL ANTICOAGULATION: ICD-10-CM

## 2025-06-02 DIAGNOSIS — Z86.711 HISTORY OF PULMONARY EMBOLISM: ICD-10-CM

## 2025-06-02 DIAGNOSIS — I82.5Y3 CHRONIC DEEP VEIN THROMBOSIS (DVT) OF PROXIMAL VEIN OF BOTH LOWER EXTREMITIES: ICD-10-CM

## 2025-06-02 DIAGNOSIS — I82.519 CHRONIC DEEP VEIN THROMBOSIS (DVT) OF FEMORAL VEIN, UNSPECIFIED LATERALITY (MULTI): ICD-10-CM

## 2025-06-02 DIAGNOSIS — Z90.5 STATUS POST NEPHRECTOMY: ICD-10-CM

## 2025-06-02 DIAGNOSIS — I26.92 ACUTE SADDLE PULMONARY EMBOLISM WITHOUT ACUTE COR PULMONALE (MULTI): ICD-10-CM

## 2025-06-02 DIAGNOSIS — Z00.00 HEALTHCARE MAINTENANCE: Primary | ICD-10-CM

## 2025-06-02 DIAGNOSIS — D50.0 IRON DEFICIENCY ANEMIA DUE TO CHRONIC BLOOD LOSS: ICD-10-CM

## 2025-06-02 PROCEDURE — 3051F HG A1C>EQUAL 7.0%<8.0%: CPT | Performed by: INTERNAL MEDICINE

## 2025-06-02 PROCEDURE — 3008F BODY MASS INDEX DOCD: CPT | Performed by: INTERNAL MEDICINE

## 2025-06-02 PROCEDURE — 99215 OFFICE O/P EST HI 40 MIN: CPT | Performed by: INTERNAL MEDICINE

## 2025-06-02 PROCEDURE — 1160F RVW MEDS BY RX/DR IN RCRD: CPT | Performed by: INTERNAL MEDICINE

## 2025-06-02 PROCEDURE — G0439 PPPS, SUBSEQ VISIT: HCPCS | Performed by: INTERNAL MEDICINE

## 2025-06-02 PROCEDURE — 4010F ACE/ARB THERAPY RXD/TAKEN: CPT | Performed by: INTERNAL MEDICINE

## 2025-06-02 PROCEDURE — 1170F FXNL STATUS ASSESSED: CPT | Performed by: INTERNAL MEDICINE

## 2025-06-02 PROCEDURE — 1036F TOBACCO NON-USER: CPT | Performed by: INTERNAL MEDICINE

## 2025-06-02 PROCEDURE — 3078F DIAST BP <80 MM HG: CPT | Performed by: INTERNAL MEDICINE

## 2025-06-02 PROCEDURE — 1125F AMNT PAIN NOTED PAIN PRSNT: CPT | Performed by: INTERNAL MEDICINE

## 2025-06-02 PROCEDURE — 1159F MED LIST DOCD IN RCRD: CPT | Performed by: INTERNAL MEDICINE

## 2025-06-02 PROCEDURE — 3075F SYST BP GE 130 - 139MM HG: CPT | Performed by: INTERNAL MEDICINE

## 2025-06-02 ASSESSMENT — ACTIVITIES OF DAILY LIVING (ADL)
BATHING: INDEPENDENT
GROCERY_SHOPPING: INDEPENDENT
MANAGING_FINANCES: INDEPENDENT
TAKING_MEDICATION: INDEPENDENT
DRESSING: INDEPENDENT
DOING_HOUSEWORK: INDEPENDENT

## 2025-06-02 ASSESSMENT — ENCOUNTER SYMPTOMS
SINUS PRESSURE: 0
LOSS OF SENSATION IN FEET: 0
DEPRESSION: 0
SORE THROAT: 0
CHEST TIGHTNESS: 0
OCCASIONAL FEELINGS OF UNSTEADINESS: 0
ACTIVITY CHANGE: 0
CHILLS: 0
MYALGIAS: 0
WEAKNESS: 0
SHORTNESS OF BREATH: 0
DIARRHEA: 1
PALPITATIONS: 0
CONSTIPATION: 1
AGITATION: 0
NAUSEA: 0

## 2025-06-02 ASSESSMENT — PATIENT HEALTH QUESTIONNAIRE - PHQ9
1. LITTLE INTEREST OR PLEASURE IN DOING THINGS: NOT AT ALL
SUM OF ALL RESPONSES TO PHQ9 QUESTIONS 1 AND 2: 0
2. FEELING DOWN, DEPRESSED OR HOPELESS: NOT AT ALL

## 2025-06-02 ASSESSMENT — PAIN SCALES - GENERAL: PAINLEVEL_OUTOF10: 8

## 2025-06-02 NOTE — ASSESSMENT & PLAN NOTE
The patient is doing fairly well overall, but has a number of chronic conditions that need to be addressed.

## 2025-06-02 NOTE — ASSESSMENT & PLAN NOTE
She will continue to follow with urology next month and also is very interested in having a repeat study to see the status of the cyst.  Orders:    Referral to Urology; Future

## 2025-06-02 NOTE — PROGRESS NOTES
"Subjective   Patient ID: Vania Andrews is a 70 y.o. female who presents for [facility] and Medicare Annual Wellness Visit Subsequent. Multiple medical issues to address.  The patient was a visiting nurse for many years.  She has some knowledge of medical problems.  Her main issue today was the kidney cyst that had shown an increase in size since the last MRI.  She is concerned about it being a cancer recurrence.  She did not have a full understanding of how cysts differ from masses, or tumors.  We talked about the differences.  She wondered why it was not biopsied, and she believed that the biopsy was the only way to determine that it was not cancer.  I explained the rationale behind that at the specialist for fairly certain of its benign nature.  She also has symptoms related to a hiatal hernia that she is considering having repaired.  She takes her medications as prescribed.  She presents here today with her .    HPI     Review of Systems   Constitutional:  Negative for activity change and chills.   HENT:  Negative for congestion, sinus pressure and sore throat.    Respiratory:  Negative for chest tightness and shortness of breath.    Cardiovascular:  Negative for chest pain and palpitations.   Gastrointestinal:  Positive for constipation and diarrhea. Negative for nausea.   Musculoskeletal:  Negative for myalgias.   Neurological:  Negative for weakness.   Psychiatric/Behavioral:  Negative for agitation and behavioral problems.        Objective   /73 (BP Location: Left arm, Patient Position: Sitting, BP Cuff Size: Large adult)   Pulse 72   Temp 36.7 °C (98 °F) (Temporal)   Ht 1.448 m (4' 9\")   Wt 88 kg (194 lb)   LMP  (LMP Unknown)   SpO2 98%   BMI 41.98 kg/m²     Physical Exam  Constitutional:       Appearance: Normal appearance.   HENT:      Head: Normocephalic and atraumatic.      Nose: Nose normal.      Mouth/Throat:      Mouth: Mucous membranes are moist.   Eyes:      Extraocular " Movements: Extraocular movements intact.   Cardiovascular:      Rate and Rhythm: Normal rate and regular rhythm.   Pulmonary:      Effort: No respiratory distress.      Breath sounds: No wheezing.   Abdominal:      General: Bowel sounds are normal.      Palpations: Abdomen is soft.   Musculoskeletal:      Right lower leg: Edema present.      Left lower leg: Edema present.   Neurological:      General: No focal deficit present.   Psychiatric:         Mood and Affect: Mood normal.         Behavior: Behavior normal.         Thought Content: Thought content normal.         Assessment/Plan   Assessment & Plan  Healthcare maintenance  The patient is doing fairly well overall, but has a number of chronic conditions that need to be addressed.       Iron deficiency anemia due to chronic blood loss  We will monitor her blood count.  Orders:    Referral to Primary Care    Chronic deep vein thrombosis (DVT) of femoral vein, unspecified laterality (Multi)  She continues on anticoagulation.  Orders:    Referral to Primary Care    History of pulmonary embolism    Orders:    Referral to Primary Care    On continuous oral anticoagulation  On eliquis  Orders:    Referral to Primary Care    Acute saddle pulmonary embolism without acute cor pulmonale (Multi)  She continues on anticoagulation.  Orders:    Referral to Primary Care    Chronic deep vein thrombosis (DVT) of proximal vein of both lower extremities    Orders:    Referral to Primary Care    Status post nephrectomy  She will continue to follow with urology next month and also is very interested in having a repeat study to see the status of the cyst.  Orders:    Referral to Urology; Future

## 2025-06-03 ENCOUNTER — PHARMACY VISIT (OUTPATIENT)
Dept: PHARMACY | Facility: CLINIC | Age: 70
End: 2025-06-03
Payer: MEDICARE

## 2025-06-09 ENCOUNTER — TELEPHONE (OUTPATIENT)
Dept: ADMISSION | Facility: HOSPITAL | Age: 70
End: 2025-06-09
Payer: MEDICARE

## 2025-06-09 NOTE — TELEPHONE ENCOUNTER
Vania called to confirm her upcoming appointment and lab orders. Advised lab orders are placed as standing orders and she will go to the lab this week prior to her FUV on 6/12/25.  No further needs.

## 2025-06-10 ENCOUNTER — LAB (OUTPATIENT)
Dept: LAB | Facility: HOSPITAL | Age: 70
End: 2025-06-10
Payer: MEDICARE

## 2025-06-10 DIAGNOSIS — D62 ACUTE BLOOD LOSS ANEMIA: ICD-10-CM

## 2025-06-10 DIAGNOSIS — D50.0 IRON DEFICIENCY ANEMIA DUE TO CHRONIC BLOOD LOSS: ICD-10-CM

## 2025-06-10 LAB
ABO GROUP (TYPE) IN BLOOD: NORMAL
ANTIBODY SCREEN: NORMAL
BASOPHILS # BLD AUTO: 0.05 X10*3/UL (ref 0–0.1)
BASOPHILS NFR BLD AUTO: 0.7 %
EOSINOPHIL # BLD AUTO: 0.19 X10*3/UL (ref 0–0.7)
EOSINOPHIL NFR BLD AUTO: 2.6 %
ERYTHROCYTE [DISTWIDTH] IN BLOOD BY AUTOMATED COUNT: 23.8 % (ref 11.5–14.5)
FERRITIN SERPL-MCNC: 822 NG/ML (ref 8–150)
HCT VFR BLD AUTO: 42.3 % (ref 36–46)
HGB BLD-MCNC: 13.2 G/DL (ref 12–16)
IMM GRANULOCYTES # BLD AUTO: 0.02 X10*3/UL (ref 0–0.7)
IMM GRANULOCYTES NFR BLD AUTO: 0.3 % (ref 0–0.9)
IRON SATN MFR SERPL: 33 % (ref 25–45)
IRON SERPL-MCNC: 100 UG/DL (ref 35–150)
LYMPHOCYTES # BLD AUTO: 1.16 X10*3/UL (ref 1.2–4.8)
LYMPHOCYTES NFR BLD AUTO: 16 %
MCH RBC QN AUTO: 26.9 PG (ref 26–34)
MCHC RBC AUTO-ENTMCNC: 31.2 G/DL (ref 32–36)
MCV RBC AUTO: 86 FL (ref 80–100)
MONOCYTES # BLD AUTO: 0.45 X10*3/UL (ref 0.1–1)
MONOCYTES NFR BLD AUTO: 6.2 %
NEUTROPHILS # BLD AUTO: 5.36 X10*3/UL (ref 1.2–7.7)
NEUTROPHILS NFR BLD AUTO: 74.2 %
NRBC BLD-RTO: 0 /100 WBCS (ref 0–0)
PLATELET # BLD AUTO: 275 X10*3/UL (ref 150–450)
RBC # BLD AUTO: 4.9 X10*6/UL (ref 4–5.2)
RH FACTOR (ANTIGEN D): NORMAL
TIBC SERPL-MCNC: 305 UG/DL (ref 240–445)
UIBC SERPL-MCNC: 205 UG/DL (ref 110–370)
WBC # BLD AUTO: 7.2 X10*3/UL (ref 4.4–11.3)

## 2025-06-10 PROCEDURE — 82728 ASSAY OF FERRITIN: CPT

## 2025-06-10 PROCEDURE — 36415 COLL VENOUS BLD VENIPUNCTURE: CPT

## 2025-06-10 PROCEDURE — RXMED WILLOW AMBULATORY MEDICATION CHARGE

## 2025-06-10 PROCEDURE — 86901 BLOOD TYPING SEROLOGIC RH(D): CPT

## 2025-06-10 PROCEDURE — 85025 COMPLETE CBC W/AUTO DIFF WBC: CPT

## 2025-06-10 PROCEDURE — 83540 ASSAY OF IRON: CPT

## 2025-06-11 LAB — HOLD SPECIMEN: NORMAL

## 2025-06-12 ENCOUNTER — OFFICE VISIT (OUTPATIENT)
Dept: HEMATOLOGY/ONCOLOGY | Facility: HOSPITAL | Age: 70
End: 2025-06-12
Payer: MEDICARE

## 2025-06-12 VITALS
SYSTOLIC BLOOD PRESSURE: 118 MMHG | RESPIRATION RATE: 16 BRPM | HEART RATE: 69 BPM | OXYGEN SATURATION: 100 % | DIASTOLIC BLOOD PRESSURE: 62 MMHG | BODY MASS INDEX: 38.84 KG/M2 | WEIGHT: 179.5 LBS | TEMPERATURE: 97.5 F

## 2025-06-12 DIAGNOSIS — I26.92 ACUTE SADDLE PULMONARY EMBOLISM WITHOUT ACUTE COR PULMONALE (MULTI): ICD-10-CM

## 2025-06-12 DIAGNOSIS — Z79.01 ON CONTINUOUS ORAL ANTICOAGULATION: ICD-10-CM

## 2025-06-12 DIAGNOSIS — I82.5Y3 CHRONIC DEEP VEIN THROMBOSIS (DVT) OF PROXIMAL VEIN OF BOTH LOWER EXTREMITIES: ICD-10-CM

## 2025-06-12 DIAGNOSIS — I26.99 RECURRENT PULMONARY EMBOLI (MULTI): ICD-10-CM

## 2025-06-12 DIAGNOSIS — D50.0 IRON DEFICIENCY ANEMIA DUE TO CHRONIC BLOOD LOSS: Primary | ICD-10-CM

## 2025-06-12 PROCEDURE — 1125F AMNT PAIN NOTED PAIN PRSNT: CPT | Performed by: STUDENT IN AN ORGANIZED HEALTH CARE EDUCATION/TRAINING PROGRAM

## 2025-06-12 PROCEDURE — 3074F SYST BP LT 130 MM HG: CPT | Performed by: STUDENT IN AN ORGANIZED HEALTH CARE EDUCATION/TRAINING PROGRAM

## 2025-06-12 PROCEDURE — 99214 OFFICE O/P EST MOD 30 MIN: CPT | Performed by: STUDENT IN AN ORGANIZED HEALTH CARE EDUCATION/TRAINING PROGRAM

## 2025-06-12 PROCEDURE — 3078F DIAST BP <80 MM HG: CPT | Performed by: STUDENT IN AN ORGANIZED HEALTH CARE EDUCATION/TRAINING PROGRAM

## 2025-06-12 PROCEDURE — G2211 COMPLEX E/M VISIT ADD ON: HCPCS | Performed by: STUDENT IN AN ORGANIZED HEALTH CARE EDUCATION/TRAINING PROGRAM

## 2025-06-12 PROCEDURE — 4010F ACE/ARB THERAPY RXD/TAKEN: CPT | Performed by: STUDENT IN AN ORGANIZED HEALTH CARE EDUCATION/TRAINING PROGRAM

## 2025-06-12 PROCEDURE — 3051F HG A1C>EQUAL 7.0%<8.0%: CPT | Performed by: STUDENT IN AN ORGANIZED HEALTH CARE EDUCATION/TRAINING PROGRAM

## 2025-06-12 ASSESSMENT — PAIN SCALES - GENERAL: PAINLEVEL_OUTOF10: 9

## 2025-06-12 NOTE — PROGRESS NOTES
Patient ID: Vania Andrews is a 70 y.o. female.  Referring Physician: No referring provider defined for this encounter.  Primary Care Provider: Carter Ayers MD  Visit Type: Follow Up  Diagnosis: Anemia, recurrent thrombosis       Subjective    HPI  70 y.o. 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   Has previously seen  at  back in 2021 for similar concerns.     Re anemia:   Recently admitted at  for VEDA with Hb ~5, ferritin 21, TIBC >450, hypoproliferative retic, relatively normal coags, admission notes allude to GI bleeding, but pt does not account for this. Folate B12 not checked. Hapto WNL, LDH mildly elevated, bili indirectly elevated also during admission. Was treated with 1 dose of iron sucrose during admission 300mg x1 and transfusions.   Had GIB in 2019, needing transfusions. Were not able to identify where the bleed was from. Was seen at [facility] for VEDA source of bleeding unclear, imaging noted below. Received (IV femuroxytol) on 3/23 and 3/30/2021. Since then at recent admission has had IV iron sucrose.   Currently on ASA 81mg daily. Not on PO iron, has tried it in the past but has issues with constipation despite colace.   Since her last visit the pt has developed a linda lesion and needs to lose weight before she can have hiatal hernia surgery.   Age appropriate cancer screening: last colo in '24, last mammo oct '23 has axillary adenopathy   FMH: mother had throat and pancreatic cancer   Social  "history: former smoker, quit >20yrs, social drinker, no RDA. No CT/RT, previously worked at [employer] as a nurse  but has stopped working since her right wrist fracture in 8/2020    Re thrombosis history:   11/13/24: LLE swelling DVT scan showed \"acute occlusive deep vein thrombosis visualized in the distal femoral, popliteal, peroneal and gastrocnemius veins. There is acute non-occlusive deep vein thrombosis visualized in the posterior tibial vein.\" Was given apixaban for 1yr, but asked to stop after 3 months by her cardiologist  for presumably a provoked VTE event with concern for GI bleeding.   Jan-Feb/2024: At initial visit with me admitted due to concern for VTE had a submassive saddle PE (acute saddle type pulmonary embolus extending to bilateral upper and right lower lobar and segmental branches. Calculated RV to LV ratio of 1.0) and DVT (occlusive thrombus in the right distal femoral and popliteal veins.)was off AC at presentation. Was started on heparin IV but noted to have recurrent GI bleeding early in the year and also suspected during admission, accordingly underwent EGD and colo after briefly holding her AC. Both were negative for a source of bleeding. Eventually transitioned to apixaban 5mg Q12H.     03/11/24: at follow up today pt has an antalgic gait needing a wheelchair eventually. Reports pain in the right thigh started at the time of discharge from hospital (where she was admitted for DVT/PE) not at admission and has remained unchanged since. Worried about clot progression. Has not missed any doses of the AC. Swelling in  RLE is improving but this has not changed the pain.   06/20/24: presents alone for visit, her leg pain is \"half of what it was\" when she last met me. Worked up by GI for cardiac issues. Apparently a/w sciatica. Continues on her apixaban 5mg Q12H, no obvious GI bleeding. Is worried about her PEs. Since we last met informs me she has cousins with VTE events. Has not " been taking her PO iron since she was told by GI to hold until pill enteroscopy, is waiting to schedule.     2024: Patient present with her spouse. Capsule endoscopy done in 2024, did not show any lesions, bleed or masses. Patient saw GI in July who suspected patient may have an AVM that intermittently bleeds. Patient is taking iron tabs daily and still on apixaban 5 mg Q 12 hrs as prescribed, has not missed any dose. Reports ongoing B/L LE swelling, seeing cardio who has her on furosemide 20 mg as needed for swelling.   25: reports she has pain in right thigh and gluteal region, chronically and appears to have had various work ups and consulations for the same, but has not followed up or followed through with them.  She also had questions about her weight as relates to potentially going ahead with a hip replacement, as she was advised.  The patient was unaware of her BMI and I advised her her BMI calculated at today's visit was 44, and advised that she address this with her orthopedic surgeon and her PCP.  25: remains on AC. No obvious bleeding. Was taken off trulicity, I dont know why, as it is not documented, have previously documented the many reasons the pt needs to lose weight. Completed her ferumoxytol. H/H are WNL.       Review of Systems - Oncology  10 point review of systems negative except as stated in HPI    Objective   Past Surgical History:   Procedure Laterality Date    BREAST BIOPSY Right 2014    Biopsy Breast Percutaneous Needle Core    BREAST BIOPSY Right 2018    CARDIAC CATHETERIZATION N/A 2024    Procedure: Left Heart Cath;  Surgeon: Donato Cespedes MD;  Location: [place] [address] Cardiac Cath Lab;  Service: Cardiovascular;  Laterality: N/A;     SECTION, CLASSIC  2014     Section    CHOLECYSTECTOMY  2017    Cholecystectomy Laparoscopic    HAND SURGERY  2020    Hand Surgery                                                                                                                                                           HERNIA REPAIR  11/17/2014    Hernia Repair    MR HEAD ANGIO WO IV CONTRAST  11/17/2014    MR HEAD ANGIO WO IV CONTRAST 11/17/2014 [facility] ANCILLARY LEGACY    OTHER SURGICAL HISTORY  01/14/2021    Nephrectomy    TOTAL ABDOMINAL HYSTERECTOMY W/ BILATERAL SALPINGOOPHORECTOMY  04/21/2014    Total Abdominal Hysterectomy With Removal Of Both Ovaries     /62 (BP Location: Right arm, Patient Position: Sitting, BP Cuff Size: Large adult)   Pulse 69   Temp 36.4 °C (97.5 °F) (Temporal)   Resp 16   Wt 81.4 kg (179 lb 8 oz)   LMP  (LMP Unknown)   SpO2 100%   BMI 38.84 kg/m²   Physical Exam  Vitals reviewed.   Constitutional:       General: She is not in acute distress.     Appearance: She is not toxic-appearing.   HENT:      Head: Normocephalic and atraumatic.   Cardiovascular:      Rate and Rhythm: Normal rate.   Pulmonary:      Effort: Pulmonary effort is normal.   Neurological:      General: No focal deficit present.      Mental Status: She is oriented to person, place, and time.   Psychiatric:         Mood and Affect: Mood normal.       Assessment/Plan    70 y.o. 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   Has previously seen  at  back in 2021 for similar concerns.      # Iron deficiency anemia: Hb last WNL in '22, since then progressive decline with obvious GI bleeding in '23, microcytic received IV ferumoxytol and  sucrose intermittently, labs from this visit showing Hb ~11. Work up showed iron deficiency anemia with adequate absorption but poor tolerance, drug interactions and inability to keep up due to losses. Additionally negative myeloma labs, B12 and folate are WNL. Has been treated in the past with IV iron with adequate improvement.   Plan:  - has received ferumoxytol. Given her Burke lesions and bleeding, will need to be followed by Mirta Sparks NP, ordered   - follow up next: I have ordered standing labs for her in the interim Q3 months and PRN   - labs prior to follow up: CBC/diff, retic, iron panel including ferritin     # Hypercoagulability/submassive PE/DVT recurrent: no clear etiology for her thrombosis events as noted above has had age appropriate cancer screening.   Plan:   - will dose reduced to 2.5mg Q12H due to bleeding   - have documented before that her weight is likely contributing to the hypercoagulability, hiatal hernia and joint issues leading to the pt needing a wheelchair, have strongly encouraged the pt to discuss this with her endocrinologist. Have written to her as well.   - ref to vascular medicine urgent so she can be seen and managed perioperatively as needed.     Garland Jenkins MD

## 2025-06-16 ENCOUNTER — HOSPITAL ENCOUNTER (EMERGENCY)
Facility: HOSPITAL | Age: 70
Discharge: HOME | End: 2025-06-16
Payer: MEDICARE

## 2025-06-16 ENCOUNTER — APPOINTMENT (OUTPATIENT)
Dept: RADIOLOGY | Facility: HOSPITAL | Age: 70
End: 2025-06-16
Payer: MEDICARE

## 2025-06-16 ENCOUNTER — DOCUMENTATION (OUTPATIENT)
Dept: PRIMARY CARE | Facility: CLINIC | Age: 70
End: 2025-06-16
Payer: MEDICARE

## 2025-06-16 VITALS
BODY MASS INDEX: 35.34 KG/M2 | OXYGEN SATURATION: 94 % | WEIGHT: 180 LBS | SYSTOLIC BLOOD PRESSURE: 142 MMHG | DIASTOLIC BLOOD PRESSURE: 79 MMHG | TEMPERATURE: 97.2 F | HEART RATE: 91 BPM | HEIGHT: 60 IN | RESPIRATION RATE: 16 BRPM

## 2025-06-16 DIAGNOSIS — M25.562 ACUTE PAIN OF LEFT KNEE: Primary | ICD-10-CM
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
## 2025-06-16 PROCEDURE — 73564 X-RAY EXAM KNEE 4 OR MORE: CPT | Mod: LT

## 2025-06-16 PROCEDURE — 73590 X-RAY EXAM OF LOWER LEG: CPT | Mod: LEFT SIDE | Performed by: RADIOLOGY

## 2025-06-16 PROCEDURE — 2500000001 HC RX 250 WO HCPCS SELF ADMINISTERED DRUGS (ALT 637 FOR MEDICARE OP): Performed by: NURSE PRACTITIONER

## 2025-06-16 PROCEDURE — 93971 EXTREMITY STUDY: CPT

## 2025-06-16 PROCEDURE — 73564 X-RAY EXAM KNEE 4 OR MORE: CPT | Mod: LEFT SIDE | Performed by: RADIOLOGY

## 2025-06-16 PROCEDURE — 73552 X-RAY EXAM OF FEMUR 2/>: CPT | Mod: LEFT SIDE | Performed by: RADIOLOGY

## 2025-06-16 PROCEDURE — 99284 EMERGENCY DEPT VISIT MOD MDM: CPT | Performed by: NURSE PRACTITIONER

## 2025-06-16 PROCEDURE — 73552 X-RAY EXAM OF FEMUR 2/>: CPT | Mod: LT

## 2025-06-16 PROCEDURE — 73590 X-RAY EXAM OF LOWER LEG: CPT | Mod: LT

## 2025-06-16 PROCEDURE — 93971 EXTREMITY STUDY: CPT | Performed by: RADIOLOGY

## 2025-06-16 PROCEDURE — 99284 EMERGENCY DEPT VISIT MOD MDM: CPT | Mod: 25

## 2025-06-16 RX ORDER — ACETAMINOPHEN 500 MG
500 TABLET ORAL EVERY 6 HOURS PRN
Qty: 28 TABLET | Refills: 0 | Status: SHIPPED | OUTPATIENT
Start: 2025-06-16 | End: 2025-06-26

## 2025-06-16 RX ORDER — METHOCARBAMOL 500 MG/1
500 TABLET, FILM COATED ORAL 2 TIMES DAILY PRN
Qty: 20 TABLET | Refills: 0 | Status: SHIPPED | OUTPATIENT
Start: 2025-06-16 | End: 2025-06-29

## 2025-06-16 RX ORDER — ACETAMINOPHEN 325 MG/1
975 TABLET ORAL ONCE
Status: COMPLETED | OUTPATIENT
Start: 2025-06-16 | End: 2025-06-16

## 2025-06-16 RX ORDER — LIDOCAINE 50 MG/G
1 PATCH TOPICAL DAILY
Qty: 5 PATCH | Refills: 0 | Status: SHIPPED | OUTPATIENT
Start: 2025-06-16 | End: 2025-06-24

## 2025-06-16 RX ORDER — DICLOFENAC SODIUM 10 MG/G
4 GEL TOPICAL 4 TIMES DAILY PRN
Qty: 400 G | Refills: 0 | Status: SHIPPED | OUTPATIENT
Start: 2025-06-16 | End: 2025-07-16

## 2025-06-16 RX ADMIN — ACETAMINOPHEN 975 MG: 325 TABLET ORAL at 14:39

## 2025-06-16 NOTE — ED PROVIDER NOTES
Chief Complaint   Patient presents with    Knee Pain       HPI       70 year old female presents to the Emergency Department today complaining of a 3 week history of left knee pain status post injury. Notes that she tripped and fell landing on her left knee. Reports that the pain is throbbing in nature, constant, gradually worsening, radiates to her left calf, and varies in intensity Denies any associated fever, chills, headache, neck pain, chest pain, shortness of breath, abdominal pain, nausea, vomiting, diarrhea, constipation, or urinary symptoms.       History provided by:  Patient             Patient History   Medical History[1]  Surgical History[2]  Family History[3]  Social History[4]        Physical Exam  Constitutional:       General: She is awake.      Appearance: Normal appearance.   Cardiovascular:      Rate and Rhythm: Normal rate and regular rhythm.      Pulses:           Radial pulses are 3+ on the right side and 3+ on the left side.      Heart sounds: Normal heart sounds. No murmur heard.     No friction rub. No gallop.   Pulmonary:      Effort: Pulmonary effort is normal.      Breath sounds: Normal breath sounds and air entry.   Musculoskeletal:      Comments: No obvious deformity or ecchymosis noted to the left knee. There is a small effusion with diffuse bony tenderness present. No redness, warmth, erythema, discharge, drainage, or any other signs of an infection. Compartments are soft. Full ROM. Left dorsalis pedis pulse is strong and regular. Capillary refill was within normal limits. Sensation is intact distally.    Neurological:      Mental Status: She is alert.   Psychiatric:         Behavior: Behavior is cooperative.         Labs Reviewed - No data to display    XR knee left 4+ views   Final Result   No acute osseous abnormality seen.   Radiopaque foreign object over the soft tissues of the heel pad noted.             MACRO:   None        Signed by: Zeke De La Cruz 6/16/2025 1:27 PM  "  Dictation workstation:   [id number]      XR tibia fibula left 2 views   Final Result   No acute osseous abnormality seen.   Radiopaque foreign object over the soft tissues of the heel pad noted.             MACRO:   None        Signed by: Zeke De La Cruz 6/16/2025 1:27 PM   Dictation workstation:   [id number]      XR femur left 2+ views   Final Result   No acute osseous abnormality seen.   Radiopaque foreign object over the soft tissues of the heel pad noted.             MACRO:   None        Signed by: Zeke De La Cruz 6/16/2025 1:27 PM   Dictation workstation:   [id number]      Lower extremity venous duplex left    (Results Pending)            ED Course & MDM            Medical Decision Making  Patient was seen and evaluated by myself. Left femur, knee, and tibia/fibula x-rays show no acute osseous abnormality seen with radiopaque foreign object over the soft tissues of the heel pad noted. There are no signs of compartment syndrome, cellulitis, septic joint, or bony abnormality. US of the left lower extremity was ordered to evaluate for DVT. Care was signed out to oncoming staff for follow up on such.              Your medication list        CONTINUE taking these medications        Instructions Last Dose Given Next Dose Due   BD Ultra-Fine Short Pen Needle 31 gauge x 5/16\" needle  Generic drug: pen needle, diabetic      Use to inject 1-4 times daily as directed.       lancets misc  Commonly known as: Accu-Chek Softclix Lancets      Use to check blood sugar twice daily              ASK your doctor about these medications        Instructions Last Dose Given Next Dose Due   Accu-Chek Guide test strips  Generic drug: blood sugar diagnostic      Use 1 test strip to test blood sugar twice daily.       acetaminophen 650 mg ER tablet  Commonly known as: Tylenol 8 HOUR           albuterol 90 mcg/actuation inhaler      Inhale 2 puffs every 4 hours if needed for wheezing or shortness of breath (You may also use this " 10-15 minutes prior to exertional activity as needed).       amLODIPine 10 mg tablet  Commonly known as: Norvasc      Take 1 tablet (10 mg) by mouth once daily.       apixaban 2.5 mg tablet  Commonly known as: Eliquis      Take 1 tablet (2.5 mg) by mouth every 12 hours.       brinzolamide-brimonidine 1-0.2 % drops,suspension ophthalmic suspension  Commonly known as: Simbrinza      Administer 1 drop into both eyes early in the morning..       colchicine 0.6 mg tablet      Take 1 tablet (0.6 mg) by mouth once daily.       diclofenac sodium 1 % kit           diphenhydrAMINE 50 mg tablet  Commonly known as: BENADryl      Take 1 tablet 1 hr prior to contrast administration       Benadryl Allergy 50 mg tablet  Generic drug: diphenhydrAMINE      Take 1 tablet (50 mg) by mouth 1 time for 1 dose. Take 1 hour before your scan       famotidine 20 mg tablet  Commonly known as: Pepcid      Take 1 tablet (20 mg) by mouth once daily at bedtime.       fluticasone 50 mcg/actuation nasal spray  Commonly known as: Flonase      Administer 2 sprays into each nostril once daily.       furosemide 20 mg tablet  Commonly known as: Lasix      Take 1 tablet (20 mg) by mouth once daily as needed (For Swelling).       gabapentin 300 mg capsule  Commonly known as: Neurontin      Take 1 capsule (300 mg) by mouth once daily at bedtime.       Tish-Tussin DM  mg/5 mL oral liquid  Generic drug: dextromethorphan-guaifenesin      Take 5 mL by mouth every 4 hours if needed for cough.       glipiZIDE 5 mg tablet  Commonly known as: Glucotrol      Take 1 tablet (5mg) by mouth daily with breakfast       hydrocortisone 1 % cream      Apply as needed to the injection site       insulin lispro 100 unit/mL pen  Commonly known as: HumaLOG KwikPen Insulin      Use with sliding scale 3 times a day, up to 30 units daily       latanoprost 0.005 % ophthalmic solution  Commonly known as: Xalatan      Administer 1 drop into both eyes once daily in the morning.        lidocaine 5 % patch  Commonly known as: Lidoderm      Place 1 patch over 12 hours on the skin once daily. Remove & discard patch within 12 hours or as directed by MD.       lidocaine 5 % patch  Commonly known as: Lidoderm      Place 2 patches over 12 hours on the skin once daily. Remove & discard patch within 12 hours or as directed by MD.       losartan 25 mg tablet  Commonly known as: Cozaar      Take 1 tablet (25 mg) by mouth once daily.       methocarbamol 500 mg tablet  Commonly known as: Robaxin      Take 1 tablet (500 mg) by mouth every 8 hours if needed for muscle spasms (Back pain) for up to 10 days.       methocarbamol 500 mg tablet  Commonly known as: Robaxin      Take 1 tablet (500 mg) by mouth 2 times a day as needed for muscle spasms for up to 10 days.       metoprolol succinate  mg 24 hr tablet  Commonly known as: Toprol-XL      Take 1 tablet (200 mg) by mouth once daily.       Nasonex 24hr Allergy 50 mcg/actuation nasal spray  Generic drug: mometasone           omeprazole 40 mg DR capsule  Commonly known as: PriLOSEC      Take 1 capsule (40 mg) by mouth once daily.       polyethylene glycol 17 gram/dose powder  Commonly known as: Glycolax, Miralax      Take 17 g by mouth 2 times a day.       predniSONE 50 mg tablet  Commonly known as: Deltasone      Take 1 tablet at 13 hrs, 7 hrs and 1 hr prior to contrast administration       predniSONE 50 mg tablet  Commonly known as: Deltasone      Take 1 tablet (50 mg) by mouth see administration instructions. Take 50 mg at  13 hours , 7 hours and 1 hour before your scan       predniSONE 10 mg tablet  Commonly known as: Deltasone      Take 4 tablets (40mg) by mouth daily for 3 days, then 3 tabs (30mg) daily for 3 days,  2 tabs (20mg) daily for 3 days,  1 tab (10 mg) daily for 3 days.       rosuvastatin 20 mg tablet  Commonly known as: Crestor      Take 1 tablet (20 mg) by mouth once daily.       Spiriva Respimat 2.5 mcg/actuation inhaler  Generic drug:  tiotropium      Inhale 2 puffs once daily.       Stiolto Respimat 2.5-2.5 mcg/actuation mist inhaler  Generic drug: tiotropium-olodateroL      Inhale 2 Inhalations once daily.                  Procedure  Procedures         [1]   Past Medical History:  Diagnosis Date    Abdominal distension (gaseous) 07/31/2019    Abdominal bloating    Asthma     Cancer (Multi)     Cataract     Coronary artery disease     Diabetes mellitus (Multi)     Diverticulosis of intestine, part unspecified, without perforation or abscess without bleeding 06/09/2013    Diverticulosis    Elevation of levels of liver transaminase levels 11/13/2020    Transaminitis    Encounter for follow-up examination after completed treatment for conditions other than malignant neoplasm 01/26/2019    Hospital discharge follow-up    Glaucoma     Hypertension     Long term (current) use of antibiotics 10/07/2016    Need for prophylactic antibiotic    Other amnesia 10/28/2014    Memory loss    Other conditions influencing health status 02/06/2019    History of cough    Other specified health status 02/07/2019    Hepatitis C antibody test negative    Other specified soft tissue disorders 06/14/2017    Leg swelling    Pain in left toe(s) 05/15/2017    Pain of toe of left foot    Pain in right foot 10/12/2016    Pain of right heel    Pain in right shoulder 06/22/2016    Acute pain of right shoulder    Personal history of diseases of the blood and blood-forming organs and certain disorders involving the immune mechanism 04/09/2018    History of anemia    Personal history of other diseases of the respiratory system 04/02/2018    History of sinusitis    Personal history of other diseases of the respiratory system 03/19/2014    History of upper respiratory infection    Personal history of other malignant neoplasm of kidney 01/14/2021    Personal history of malignant neoplasm of kidney    Personal history of other medical treatment 08/08/2017    History of screening  mammography    Personal history of other specified conditions 2014    History of abdominal pain    Personal history of other specified conditions 2017    History of urinary frequency    Personal history of other specified conditions 2021    History of dysuria    Personal history of other specified conditions 2014    History of breast lump    Unspecified abdominal hernia without obstruction or gangrene 2014    Hernia   [2]   Past Surgical History:  Procedure Laterality Date    BREAST BIOPSY Right 2014    Biopsy Breast Percutaneous Needle Core    BREAST BIOPSY Right 2018    CARDIAC CATHETERIZATION N/A 2024    Procedure: Left Heart Cath;  Surgeon: Donato Cespedes MD;  Location: [place] [address] Cardiac Cath Lab;  Service: Cardiovascular;  Laterality: N/A;     SECTION, CLASSIC  2014     Section    CHOLECYSTECTOMY  2017    Cholecystectomy Laparoscopic    HAND SURGERY  2020    Hand Surgery                                                                                                                                                          HERNIA REPAIR  2014    Hernia Repair    MR HEAD ANGIO WO IV CONTRAST  2014    MR HEAD ANGIO WO IV CONTRAST 2014 [facility] [place] ANCILLARY LEGACY    OTHER SURGICAL HISTORY  2021    Nephrectomy    TOTAL ABDOMINAL HYSTERECTOMY W/ BILATERAL SALPINGOOPHORECTOMY  2014    Total Abdominal Hysterectomy With Removal Of Both Ovaries   [3]   Family History  Problem Relation Name Age of Onset    Aneurysm Mother      Hypertension Mother      Pancreatic cancer Mother      Diabetes Mother      Other (laryngeal Cancer) Mother      Heart disease Father      Pulmonary embolism Brother      Breast cancer Father's Sister      Breast cancer Maternal Cousin     [4]   Social History  Tobacco Use    Smoking status: Former     Types: Cigarettes     Passive exposure: Never    Smokeless tobacco: Never   Vaping Use     Vaping status: Never Used   Substance Use Topics    Alcohol use: Yes     Comment: 1-2 x per month    Drug use: Never        Joshua Morrison, MOR-CNP  06/16/25 [id number]

## 2025-06-16 NOTE — ED TRIAGE NOTES
Pt to ed with complaints of L knee pain that started 2 weeks ago. Pt states she fell on it. Pt also has arthritis . Took nsaids for pain no relief

## 2025-06-17 PROCEDURE — RXMED WILLOW AMBULATORY MEDICATION CHARGE

## 2025-06-18 ENCOUNTER — TELEPHONE (OUTPATIENT)
Dept: CARDIOLOGY | Facility: HOSPITAL | Age: 70
End: 2025-06-18
Payer: MEDICARE

## 2025-06-18 ENCOUNTER — TELEPHONE (OUTPATIENT)
Dept: CARDIOLOGY | Facility: HOSPITAL | Age: 70
End: 2025-06-18

## 2025-06-18 DIAGNOSIS — I10 PRIMARY HYPERTENSION: ICD-10-CM

## 2025-06-18 PROCEDURE — RXMED WILLOW AMBULATORY MEDICATION CHARGE

## 2025-06-19 ENCOUNTER — PHARMACY VISIT (OUTPATIENT)
Dept: PHARMACY | Facility: CLINIC | Age: 70
End: 2025-06-19
Payer: COMMERCIAL

## 2025-06-20 ENCOUNTER — SPECIALTY PHARMACY (OUTPATIENT)
Dept: PHARMACY | Facility: CLINIC | Age: 70
End: 2025-06-20

## 2025-06-23 ENCOUNTER — PHARMACY VISIT (OUTPATIENT)
Dept: PHARMACY | Facility: CLINIC | Age: 70
End: 2025-06-23
Payer: MEDICARE

## 2025-06-23 PROCEDURE — RXMED WILLOW AMBULATORY MEDICATION CHARGE

## 2025-06-23 RX ORDER — METOPROLOL SUCCINATE 200 MG/1
200 TABLET, EXTENDED RELEASE ORAL DAILY
Qty: 90 TABLET | Refills: 3 | Status: SHIPPED | OUTPATIENT
Start: 2025-06-23

## 2025-06-24 ENCOUNTER — SPECIALTY PHARMACY (OUTPATIENT)
Dept: PHARMACY | Facility: CLINIC | Age: 70
End: 2025-06-24

## 2025-06-25 ENCOUNTER — PHARMACY VISIT (OUTPATIENT)
Dept: PHARMACY | Facility: CLINIC | Age: 70
End: 2025-06-25
Payer: MEDICARE

## 2025-06-26 ENCOUNTER — SPECIALTY PHARMACY (OUTPATIENT)
Dept: PHARMACY | Facility: CLINIC | Age: 70
End: 2025-06-26

## 2025-06-27 ENCOUNTER — PHARMACY VISIT (OUTPATIENT)
Dept: PHARMACY | Facility: CLINIC | Age: 70
End: 2025-06-27
Payer: MEDICARE

## 2025-06-27 DIAGNOSIS — K21.9 GASTROESOPHAGEAL REFLUX DISEASE WITHOUT ESOPHAGITIS: ICD-10-CM

## 2025-06-27 PROCEDURE — RXMED WILLOW AMBULATORY MEDICATION CHARGE

## 2025-06-27 RX ORDER — OMEPRAZOLE 40 MG/1
40 CAPSULE, DELAYED RELEASE ORAL DAILY
Qty: 90 CAPSULE | Refills: 3 | Status: SHIPPED | OUTPATIENT
Start: 2025-06-27 | End: 2026-06-27

## 2025-06-30 ENCOUNTER — APPOINTMENT (OUTPATIENT)
Dept: RHEUMATOLOGY | Facility: CLINIC | Age: 70
End: 2025-06-30
Payer: MEDICARE

## 2025-07-03 ENCOUNTER — APPOINTMENT (OUTPATIENT)
Dept: PULMONOLOGY | Facility: CLINIC | Age: 70
End: 2025-07-03
Payer: MEDICARE

## 2025-07-05 NOTE — PROGRESS NOTES
Alerted by nursing of patient in distress.    Patient referred yesterday for dyspnea and cough. Patient reports significant cough, dyspnea for weeks that is worsening. She was barely able to walk to the  before becoming breathless, tachypneic, in distress with coughing. BP slightly elevated 140's/90's, HR 80's, SpO2 95% on RA.    Discussed with patient, safest approach is to go to the [facility] ED for evaluation to rule-out PNA, PTX, ACS, etc. She agreed and was escorted to the ED.    Will make follow-up appointment for next week assuming patient's status is improved and emergencies are ruled-out.   This note was copied from a baby's chart.  Baby nursing well today,  deep latch obtained, mother is comfortable, baby feeding vigorously with rhythmic suck, swallow, breathe pattern, audible swallowing, and evident milk transfer, both breasts offered, baby is asleep following feeding.     Mom said she is experiencing some discomfort with latching. Baby was at the breast when I went in to see her. Baby appeared to have a shallow latch. We took baby off the breast and then I gave some tips on positioning and getting a deep latch. Mom said the latch was better with the position change.     Mom will continue to work on getting baby latched deeply at each feeding. She will not limit the time the baby at the breast and will offer both breasts at each feeding.

## 2025-07-07 ENCOUNTER — APPOINTMENT (OUTPATIENT)
Dept: UROLOGY | Facility: CLINIC | Age: 70
End: 2025-07-07
Payer: MEDICARE

## 2025-07-07 DIAGNOSIS — Z91.041 ALLERGIC TO IV CONTRAST: ICD-10-CM

## 2025-07-07 DIAGNOSIS — N28.1 RENAL CYST: ICD-10-CM

## 2025-07-08 ENCOUNTER — SPECIALTY PHARMACY (OUTPATIENT)
Dept: PHARMACY | Facility: CLINIC | Age: 70
End: 2025-07-08

## 2025-07-08 PROCEDURE — RXMED WILLOW AMBULATORY MEDICATION CHARGE

## 2025-07-09 ENCOUNTER — PHARMACY VISIT (OUTPATIENT)
Dept: PHARMACY | Facility: CLINIC | Age: 70
End: 2025-07-09
Payer: MEDICARE

## 2025-07-10 ENCOUNTER — PATIENT OUTREACH (OUTPATIENT)
Dept: CARE COORDINATION | Facility: CLINIC | Age: 70
End: 2025-07-10
Payer: MEDICARE

## 2025-07-10 ENCOUNTER — APPOINTMENT (OUTPATIENT)
Dept: HEMATOLOGY/ONCOLOGY | Facility: HOSPITAL | Age: 70
End: 2025-07-10
Payer: MEDICARE

## 2025-07-10 NOTE — PROGRESS NOTES
Outreach call to Vania regarding nutrition referral. PANDA with contact number [phone] if she would like to call back and schedule an appointment.

## 2025-07-15 ENCOUNTER — OFFICE VISIT (OUTPATIENT)
Dept: UROLOGY | Facility: CLINIC | Age: 70
End: 2025-07-15
Payer: MEDICARE

## 2025-07-15 DIAGNOSIS — Z90.5 STATUS POST NEPHRECTOMY: ICD-10-CM

## 2025-07-15 DIAGNOSIS — N28.1 RENAL CYST: Primary | ICD-10-CM

## 2025-07-15 PROCEDURE — G2211 COMPLEX E/M VISIT ADD ON: HCPCS | Performed by: STUDENT IN AN ORGANIZED HEALTH CARE EDUCATION/TRAINING PROGRAM

## 2025-07-15 PROCEDURE — 3051F HG A1C>EQUAL 7.0%<8.0%: CPT | Performed by: STUDENT IN AN ORGANIZED HEALTH CARE EDUCATION/TRAINING PROGRAM

## 2025-07-15 PROCEDURE — 4010F ACE/ARB THERAPY RXD/TAKEN: CPT | Performed by: STUDENT IN AN ORGANIZED HEALTH CARE EDUCATION/TRAINING PROGRAM

## 2025-07-15 PROCEDURE — 99213 OFFICE O/P EST LOW 20 MIN: CPT | Performed by: STUDENT IN AN ORGANIZED HEALTH CARE EDUCATION/TRAINING PROGRAM

## 2025-07-17 ENCOUNTER — APPOINTMENT (OUTPATIENT)
Dept: PULMONOLOGY | Facility: CLINIC | Age: 70
End: 2025-07-17
Payer: MEDICARE

## 2025-07-17 PROCEDURE — RXMED WILLOW AMBULATORY MEDICATION CHARGE

## 2025-07-17 NOTE — PROGRESS NOTES
UROLOGIC FOLLOW-UP VISIT     PROBLEM LIST:  1. Renal cyst        2. Status post nephrectomy            HISTORY OF PRESENT ILLNESS:   Vania Andrews is a 70 y.o. female who's seen today for evaluation of right renal lesion. 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 She now has a right parapelvic cystic lesion with some thin septations and no enhancement. Patient denies any hematuria or dysuria. She denies any right flank pain. Denies any f/c/n/v.     PAST MEDICAL HISTORY:  Medical History[1]    PAST SURGICAL HISTORY:  Surgical History[2]     ALLERGIES:   Allergies[3]     MEDICATIONS:   Current Medications[4]      SOCIAL HISTORY:  Patient  reports that she has quit smoking. Her smoking use included cigarettes. She has never been exposed to tobacco smoke. She has never used smokeless tobacco. She reports current alcohol use. She reports that she does not use drugs.   Social History     Socioeconomic History    Marital status:      Spouse name: Not on file    Number of children: Not on file    Years of education: Not on file    Highest education level: Not on file   Occupational History    Not on file   Tobacco Use    Smoking status: Former     Types: Cigarettes     Passive exposure: Never    Smokeless tobacco: Never   Vaping Use    Vaping status: Never Used   Substance and Sexual Activity    Alcohol use: Yes     Comment: 1-2 x per month    Drug use: Never    Sexual activity: Not on file   Other Topics Concern    Not on file   Social History Narrative    Not on file     Social Drivers of Health     Financial Resource Strain: Low Risk  (3/1/2025)    Overall Financial Resource Strain (CARDIA)     Difficulty of Paying Living Expenses: Not very hard   Food Insecurity: No Food Insecurity (2/28/2025)    Hunger Vital Sign     Worried About Running Out of Food in the Last  Year: Never true     Ran Out of Food in the Last Year: Never true   Transportation Needs: No Transportation Needs (3/1/2025)    PRAPARE - Transportation     Lack of Transportation (Medical): No     Lack of Transportation (Non-Medical): No   Physical Activity: Not on file   Stress: Not on file   Social Connections: Not on file   Intimate Partner Violence: Not At Risk (2/28/2025)    Humiliation, Afraid, Rape, and Kick questionnaire     Fear of Current or Ex-Partner: No     Emotionally Abused: No     Physically Abused: No     Sexually Abused: No   Housing Stability: Low Risk  (3/1/2025)    Housing Stability Vital Sign     Unable to Pay for Housing in the Last Year: No     Number of Times Moved in the Last Year: 0     Homeless in the Last Year: No       FAMILY HISTORY:  Family History[5]    REVIEW OF SYSTEMS:   Constitutional: Negative for fever and chills. Denies anorexia, weight loss.  Eyes: Negative for visual disturbance.   Respiratory: Negative for shortness of breath.    Cardiovascular: Negative for chest pain.   Gastrointestinal: Negative for nausea and vomiting.   Genitourinary: See interval history above.  Skin: Negative for rash.   Neurological: Negative for dizziness and numbness.   Psychiatric/Behavioral: Negative for confusion and decreased concentration.     PHYSICAL EXAM:  There were no vitals taken for this visit.  Constitutional: Patient appears well-developed and well-nourished. No distress.    Head: Normocephalic and atraumatic.    Neck: Normal range of motion.    Cardiovascular: Normal rate.    Pulmonary/Chest: Effort normal. No respiratory distress.   Abdominal: soft NTND  Musculoskeletal: Normal range of motion.    Neurological: Alert and oriented to person, place, and time.  Psychiatric: Normal mood and affect. Behavior is normal. Thought content normal.        LABORATORY REVIEW:     Lab Results   Component Value Date    BUN 15 04/15/2025    CREATININE 1.22 (H) 04/15/2025    EGFR 48 (L) 04/15/2025      04/15/2025    K 4.3 04/15/2025     04/15/2025    CO2 25 04/15/2025    CALCIUM 9.2 04/15/2025      Lab Results   Component Value Date    WBC 7.2 06/10/2025    RBC 4.90 06/10/2025    HGB 13.2 06/10/2025    HCT 42.3 06/10/2025    MCV 86 06/10/2025    MCH 26.9 06/10/2025    MCHC 31.2 (L) 06/10/2025    RDW 23.8 (H) 06/10/2025     06/10/2025          Assessment:     1. Renal cyst        2. Status post nephrectomy          Plan:    Reviewed and interpreted patients most recent CT scan   Counseled patient on the bosniak scoring system   Discussed with patient that I think surveillance with serial imaging is a good route to proceed with at this time    Will rtc in 10/2025 with CT urogram prior    23 minutes total spent on patient's care today; >50% time spent on counseling/coordination of care               [1]   Past Medical History:  Diagnosis Date    Abdominal distension (gaseous) 07/31/2019    Abdominal bloating    Asthma     Cancer (Multi)     Cataract     Coronary artery disease     Diabetes mellitus (Multi)     Diverticulosis of intestine, part unspecified, without perforation or abscess without bleeding 06/09/2013    Diverticulosis    Elevation of levels of liver transaminase levels 11/13/2020    Transaminitis    Encounter for follow-up examination after completed treatment for conditions other than malignant neoplasm 01/26/2019    Hospital discharge follow-up    Glaucoma     Hypertension     Long term (current) use of antibiotics 10/07/2016    Need for prophylactic antibiotic    Other amnesia 10/28/2014    Memory loss    Other conditions influencing health status 02/06/2019    History of cough    Other specified health status 02/07/2019    Hepatitis C antibody test negative    Other specified soft tissue disorders 06/14/2017    Leg swelling    Pain in left toe(s) 05/15/2017    Pain of toe of left foot    Pain in right foot 10/12/2016    Pain of right heel    Pain in right shoulder 06/22/2016     Acute pain of right shoulder    Personal history of diseases of the blood and blood-forming organs and certain disorders involving the immune mechanism 2018    History of anemia    Personal history of other diseases of the respiratory system 2018    History of sinusitis    Personal history of other diseases of the respiratory system 2014    History of upper respiratory infection    Personal history of other malignant neoplasm of kidney 2021    Personal history of malignant neoplasm of kidney    Personal history of other medical treatment 2017    History of screening mammography    Personal history of other specified conditions 2014    History of abdominal pain    Personal history of other specified conditions 2017    History of urinary frequency    Personal history of other specified conditions 2021    History of dysuria    Personal history of other specified conditions 2014    History of breast lump    Unspecified abdominal hernia without obstruction or gangrene 2014    Hernia   [2]   Past Surgical History:  Procedure Laterality Date    BREAST BIOPSY Right 2014    Biopsy Breast Percutaneous Needle Core    BREAST BIOPSY Right 2018    CARDIAC CATHETERIZATION N/A 2024    Procedure: Left Heart Cath;  Surgeon: Donato Cespedes MD;  Location: [place] [address] [facility];  Service: Cardiovascular;  Laterality: N/A;     SECTION, CLASSIC  2014     Section    CHOLECYSTECTOMY  2017    Cholecystectomy Laparoscopic    HAND SURGERY  2020    Hand Surgery                                                                                                                                                          HERNIA REPAIR  2014    Hernia Repair    MR HEAD ANGIO WO IV CONTRAST  2014    MR HEAD ANGIO WO IV CONTRAST [place] [facility] [place] [place]    OTHER SURGICAL HISTORY  2021    Nephrectomy    TOTAL  ABDOMINAL HYSTERECTOMY W/ BILATERAL SALPINGOOPHORECTOMY  04/21/2014    Total Abdominal Hysterectomy With Removal Of Both Ovaries   [3]   Allergies  Allergen Reactions    Adhesive Itching    Amoxicillin Hives and Itching    Bee Sting Kit Swelling    Cephalexin Itching and Nausea Only    Gadolinium-Containing Contrast Media Hives    Iodine Unknown    Latex Other    Pioglitazone Swelling    Rubella Virus Live Vaccine Unknown    Salicylates Other    Shellfish Derived Swelling    Sulfa (Sulfonamide Antibiotics) Unknown    Sulfamethoxazole-Trimethoprim Hives and Itching    Iodinated Contrast Media Itching and Rash   [4]   Current Outpatient Medications:     Accu-Chek Guide test strips, Use 1 test strip to test blood sugar twice daily., Disp: 100 strip, Rfl: 2    albuterol 90 mcg/actuation inhaler, Inhale 2 puffs every 4 hours if needed for wheezing or shortness of breath (You may also use this 10-15 minutes prior to exertional activity as needed)., Disp: 18 g, Rfl: 5    amLODIPine (Norvasc) 10 mg tablet, Take 1 tablet (10 mg) by mouth once daily., Disp: 90 tablet, Rfl: 1    apixaban (Eliquis) 2.5 mg tablet, Take 1 tablet (2.5 mg) by mouth every 12 hours., Disp: 180 tablet, Rfl: 1    brinzolamide-brimonidine (Simbrinza) 1-0.2 % drops,suspension ophthalmic suspension, Administer 1 drop into both eyes early in the morning.., Disp: 10 mL, Rfl: 11    colchicine 0.6 mg tablet, Take 1 tablet (0.6 mg) by mouth once daily., Disp: 30 tablet, Rfl: 5    dextromethorphan-guaifenesin (Robitussin DM)  mg/5 mL oral liquid, Take 5 mL by mouth every 4 hours if needed for cough., Disp: 118 mL, Rfl: 0    diphenhydrAMINE (Benadryl Allergy) 50 mg tablet, Take 1 tablet (50 mg) by mouth 1 time for 1 dose. Take 1 hour before your scan, Disp: 1 tablet, Rfl: 0    famotidine (Pepcid) 20 mg tablet, Take 1 tablet (20 mg) by mouth once daily at bedtime., Disp: 90 tablet, Rfl: 3    fluticasone (Flonase) 50 mcg/actuation nasal spray, Administer 2  "sprays into each nostril once daily., Disp: 16 g, Rfl: 3    furosemide (Lasix) 20 mg tablet, Take 1 tablet (20 mg) by mouth once daily as needed (For Swelling)., Disp: 30 tablet, Rfl: 0    gabapentin (Neurontin) 300 mg capsule, Take 1 capsule (300 mg) by mouth once daily at bedtime., Disp: 30 capsule, Rfl: 5    glipiZIDE (Glucotrol) 5 mg tablet, Take 1 tablet (5mg) by mouth daily with breakfast, Disp: 90 tablet, Rfl: 1    hydrocortisone 1 % cream, Apply as needed to the injection site, Disp: 30 g, Rfl: 1    insulin lispro (HumaLOG KwikPen Insulin) 100 unit/mL pen, Use with sliding scale 3 times a day, up to 30 units daily, Disp: 15 mL, Rfl: 2    lancets (Accu-Chek Softclix Lancets) misc, Use to check blood sugar twice daily, Disp: 200 each, Rfl: 1    latanoprost (Xalatan) 0.005 % ophthalmic solution, Administer 1 drop into both eyes once daily in the morning., Disp: 2.5 mL, Rfl: 11    losartan (Cozaar) 25 mg tablet, Take 1 tablet (25 mg) by mouth once daily., Disp: 90 tablet, Rfl: 1    methocarbamol (Robaxin) 500 mg tablet, Take 1 tablet (500 mg) by mouth 2 times a day as needed for muscle spasms for up to 10 days., Disp: 20 tablet, Rfl: 0    metoprolol succinate XL (Toprol-XL) 200 mg 24 hr tablet, Take 1 tablet (200 mg) by mouth once daily., Disp: 90 tablet, Rfl: 3    mometasone (Nasonex 24hr Allergy) 50 mcg/actuation nasal spray, , Disp: , Rfl:     omeprazole (PriLOSEC) 40 mg DR capsule, Take 1 capsule (40 mg) by mouth once daily., Disp: 90 capsule, Rfl: 3    pen needle, diabetic 31 gauge x 5/16\" needle, Use to inject 1-4 times daily as directed., Disp: 100 each, Rfl: 11    polyethylene glycol (Glycolax, Miralax) 17 gram/dose powder, Take 17 g by mouth 2 times a day., Disp: 3060 g, Rfl: 3    predniSONE (Deltasone) 10 mg tablet, Take 4 tablets (40mg) by mouth daily for 3 days, then 3 tabs (30mg) daily for 3 days,  2 tabs (20mg) daily for 3 days,  1 tab (10 mg) daily for 3 days., Disp: 30 tablet, Rfl: 0    " predniSONE (Deltasone) 50 mg tablet, Take 1 tablet at 13 hrs, 7 hrs and 1 hr prior to contrast administration, Disp: 3 tablet, Rfl: 0    predniSONE (Deltasone) 50 mg tablet, Take 1 tablet (50 mg) by mouth see administration instructions. Take 50 mg at  13 hours , 7 hours and 1 hour before your scan, Disp: 3 tablet, Rfl: 0    rosuvastatin (Crestor) 20 mg tablet, Take 1 tablet (20 mg) by mouth once daily., Disp: 90 tablet, Rfl: 3    tiotropium (Spiriva Respimat) 2.5 mcg/actuation inhaler, Inhale 2 puffs once daily., Disp: 1 each, Rfl: 3    tiotropium-olodateroL (Stiolto Respimat) 2.5-2.5 mcg/actuation mist inhaler, Inhale 2 Inhalations once daily., Disp: 4 g, Rfl: 3    Current Facility-Administered Medications:     bupivacaine PF (Marcaine) injection 0.75 %, 5 mL, injection, Once, Raoul Fung MD    nitroglycerin (Nitrostat) SL tablet 0.4 mg, 0.4 mg, sublingual, Once, Vianney Marrufo MD  [5]   Family History  Problem Relation Name Age of Onset    Aneurysm Mother      Hypertension Mother      Pancreatic cancer Mother      Diabetes Mother      Other (laryngeal Cancer) Mother      Heart disease Father      Pulmonary embolism Brother      Breast cancer Father's Sister      Breast cancer Maternal Cousin

## 2025-07-24 ENCOUNTER — SPECIALTY PHARMACY (OUTPATIENT)
Dept: PHARMACY | Facility: CLINIC | Age: 70
End: 2025-07-24

## 2025-07-24 ENCOUNTER — PATIENT MESSAGE (OUTPATIENT)
Dept: CARE COORDINATION | Facility: CLINIC | Age: 70
End: 2025-07-24
Payer: MEDICARE

## 2025-07-24 PROCEDURE — RXMED WILLOW AMBULATORY MEDICATION CHARGE

## 2025-07-25 RX ORDER — METOPROLOL SUCCINATE 200 MG/1
200 TABLET, EXTENDED RELEASE ORAL DAILY
Qty: 90 TABLET | Refills: 2 | Status: CANCELLED | OUTPATIENT
Start: 2025-07-25

## 2025-07-26 ENCOUNTER — PHARMACY VISIT (OUTPATIENT)
Dept: PHARMACY | Facility: CLINIC | Age: 70
End: 2025-07-26
Payer: MEDICARE

## 2025-07-28 ENCOUNTER — PHARMACY VISIT (OUTPATIENT)
Dept: PHARMACY | Facility: CLINIC | Age: 70
End: 2025-07-28
Payer: MEDICARE

## 2025-07-28 ENCOUNTER — SPECIALTY PHARMACY (OUTPATIENT)
Dept: PHARMACY | Facility: CLINIC | Age: 70
End: 2025-07-28

## 2025-07-29 DIAGNOSIS — J45.991 COUGH VARIANT ASTHMA (HHS-HCC): ICD-10-CM

## 2025-08-04 RX ORDER — TIOTROPIUM BROMIDE AND OLODATEROL 3.124; 2.736 UG/1; UG/1
SPRAY, METERED RESPIRATORY (INHALATION)
Qty: 4 G | Refills: 5 | Status: SHIPPED | OUTPATIENT
Start: 2025-08-04

## 2025-08-18 ENCOUNTER — OFFICE VISIT (OUTPATIENT)
Dept: PRIMARY CARE | Facility: HOSPITAL | Age: 70
End: 2025-08-18
Payer: MEDICARE

## 2025-08-18 VITALS
HEIGHT: 60 IN | RESPIRATION RATE: 16 BRPM | HEART RATE: 62 BPM | SYSTOLIC BLOOD PRESSURE: 133 MMHG | TEMPERATURE: 96.1 F | WEIGHT: 199 LBS | BODY MASS INDEX: 39.07 KG/M2 | OXYGEN SATURATION: 99 % | DIASTOLIC BLOOD PRESSURE: 78 MMHG

## 2025-08-18 DIAGNOSIS — E66.01 MORBID OBESITY (MULTI): Primary | ICD-10-CM

## 2025-08-18 DIAGNOSIS — G89.29 CHRONIC PAIN OF BOTH KNEES: ICD-10-CM

## 2025-08-18 DIAGNOSIS — D50.0 IRON DEFICIENCY ANEMIA DUE TO CHRONIC BLOOD LOSS: ICD-10-CM

## 2025-08-18 DIAGNOSIS — M25.561 CHRONIC PAIN OF BOTH KNEES: ICD-10-CM

## 2025-08-18 DIAGNOSIS — M25.562 CHRONIC PAIN OF BOTH KNEES: ICD-10-CM

## 2025-08-18 DIAGNOSIS — E11.51 TYPE 2 DIABETES MELLITUS WITH DIABETIC PERIPHERAL ANGIOPATHY WITHOUT GANGRENE, WITHOUT LONG-TERM CURRENT USE OF INSULIN (MULTI): ICD-10-CM

## 2025-08-18 DIAGNOSIS — Z86.718 H/O DEEP VENOUS THROMBOSIS: ICD-10-CM

## 2025-08-18 DIAGNOSIS — M54.50 LUMBAR BACK PAIN: ICD-10-CM

## 2025-08-18 PROCEDURE — 4010F ACE/ARB THERAPY RXD/TAKEN: CPT | Performed by: INTERNAL MEDICINE

## 2025-08-18 PROCEDURE — 1160F RVW MEDS BY RX/DR IN RCRD: CPT | Performed by: INTERNAL MEDICINE

## 2025-08-18 PROCEDURE — 1126F AMNT PAIN NOTED NONE PRSNT: CPT | Performed by: INTERNAL MEDICINE

## 2025-08-18 PROCEDURE — 99214 OFFICE O/P EST MOD 30 MIN: CPT | Performed by: INTERNAL MEDICINE

## 2025-08-18 PROCEDURE — 3075F SYST BP GE 130 - 139MM HG: CPT | Performed by: INTERNAL MEDICINE

## 2025-08-18 PROCEDURE — G2211 COMPLEX E/M VISIT ADD ON: HCPCS | Performed by: INTERNAL MEDICINE

## 2025-08-18 PROCEDURE — 3008F BODY MASS INDEX DOCD: CPT | Performed by: INTERNAL MEDICINE

## 2025-08-18 PROCEDURE — 3078F DIAST BP <80 MM HG: CPT | Performed by: INTERNAL MEDICINE

## 2025-08-18 PROCEDURE — 3051F HG A1C>EQUAL 7.0%<8.0%: CPT | Performed by: INTERNAL MEDICINE

## 2025-08-18 PROCEDURE — 1159F MED LIST DOCD IN RCRD: CPT | Performed by: INTERNAL MEDICINE

## 2025-08-18 ASSESSMENT — ENCOUNTER SYMPTOMS
ARTHRALGIAS: 1
NAUSEA: 0
MYALGIAS: 0
SINUS PRESSURE: 0
WEAKNESS: 0
CHILLS: 0
SORE THROAT: 0
DIARRHEA: 0
CHEST TIGHTNESS: 0
AGITATION: 0
PALPITATIONS: 0
ACTIVITY CHANGE: 0
DEPRESSION: 0
SHORTNESS OF BREATH: 0

## 2025-08-18 ASSESSMENT — PAIN SCALES - GENERAL: PAINLEVEL_OUTOF10: 0-NO PAIN

## 2025-08-19 LAB
ALBUMIN SERPL-MCNC: 3.8 G/DL (ref 3.6–5.1)
ALBUMIN/CREAT UR: 1 MG/G CREAT
ALP SERPL-CCNC: 128 U/L (ref 37–153)
ALT SERPL-CCNC: 15 U/L (ref 6–29)
ANION GAP SERPL CALCULATED.4IONS-SCNC: 9 MMOL/L (CALC) (ref 7–17)
AST SERPL-CCNC: 15 U/L (ref 10–35)
BILIRUB SERPL-MCNC: 0.4 MG/DL (ref 0.2–1.2)
BUN SERPL-MCNC: 14 MG/DL (ref 7–25)
CALCIUM SERPL-MCNC: 9.1 MG/DL (ref 8.6–10.4)
CHLORIDE SERPL-SCNC: 107 MMOL/L (ref 98–110)
CO2 SERPL-SCNC: 24 MMOL/L (ref 20–32)
CREAT SERPL-MCNC: 1.18 MG/DL (ref 0.5–1.05)
CREAT UR-MCNC: 169 MG/DL (ref 20–275)
EGFRCR SERPLBLD CKD-EPI 2021: 50 ML/MIN/1.73M2
ERYTHROCYTE [DISTWIDTH] IN BLOOD BY AUTOMATED COUNT: 11.9 % (ref 11–15)
EST. AVERAGE GLUCOSE BLD GHB EST-MCNC: 189 MG/DL
EST. AVERAGE GLUCOSE BLD GHB EST-SCNC: 10.4 MMOL/L
GLUCOSE SERPL-MCNC: 152 MG/DL (ref 65–99)
HBA1C MFR BLD: 8.2 %
HCT VFR BLD AUTO: 39.5 % (ref 35–45)
HGB BLD-MCNC: 13 G/DL (ref 11.7–15.5)
MCH RBC QN AUTO: 30.5 PG (ref 27–33)
MCHC RBC AUTO-ENTMCNC: 32.9 G/DL (ref 32–36)
MCV RBC AUTO: 92.7 FL (ref 80–100)
MICROALBUMIN UR-MCNC: 0.2 MG/DL
PLATELET # BLD AUTO: 290 THOUSAND/UL (ref 140–400)
PMV BLD REES-ECKER: 10.7 FL (ref 7.5–12.5)
POTASSIUM SERPL-SCNC: 4.7 MMOL/L (ref 3.5–5.3)
PROT SERPL-MCNC: 6.6 G/DL (ref 6.1–8.1)
RBC # BLD AUTO: 4.26 MILLION/UL (ref 3.8–5.1)
SODIUM SERPL-SCNC: 140 MMOL/L (ref 135–146)
WBC # BLD AUTO: 7.2 THOUSAND/UL (ref 3.8–10.8)

## 2025-08-20 ENCOUNTER — APPOINTMENT (OUTPATIENT)
Dept: ENDOCRINOLOGY | Facility: CLINIC | Age: 70
End: 2025-08-20
Payer: MEDICARE

## 2025-08-23 PROCEDURE — RXMED WILLOW AMBULATORY MEDICATION CHARGE

## 2025-08-25 ENCOUNTER — SPECIALTY PHARMACY (OUTPATIENT)
Dept: PHARMACY | Facility: CLINIC | Age: 70
End: 2025-08-25

## 2025-08-25 DIAGNOSIS — E11.65 TYPE 2 DIABETES MELLITUS WITH HYPERGLYCEMIA, WITHOUT LONG-TERM CURRENT USE OF INSULIN: ICD-10-CM

## 2025-08-25 PROCEDURE — RXMED WILLOW AMBULATORY MEDICATION CHARGE

## 2025-08-25 RX ORDER — GLIPIZIDE 5 MG/1
TABLET ORAL
Qty: 90 TABLET | Refills: 1 | Status: SHIPPED | OUTPATIENT
Start: 2025-08-25

## 2025-08-26 ENCOUNTER — PHARMACY VISIT (OUTPATIENT)
Dept: PHARMACY | Facility: CLINIC | Age: 70
End: 2025-08-26
Payer: MEDICARE

## 2025-08-27 ENCOUNTER — PHARMACY VISIT (OUTPATIENT)
Dept: PHARMACY | Facility: CLINIC | Age: 70
End: 2025-08-27
Payer: MEDICARE

## 2025-08-27 DIAGNOSIS — I10 PRIMARY HYPERTENSION: ICD-10-CM

## 2025-08-27 RX ORDER — METOPROLOL SUCCINATE 200 MG/1
200 TABLET, EXTENDED RELEASE ORAL DAILY
Qty: 90 TABLET | Refills: 3 | Status: SHIPPED | OUTPATIENT
Start: 2025-08-27

## 2025-08-28 ENCOUNTER — PHARMACY VISIT (OUTPATIENT)
Dept: PHARMACY | Facility: CLINIC | Age: 70
End: 2025-08-28

## 2025-08-29 ENCOUNTER — CLINICAL SUPPORT (OUTPATIENT)
Facility: HOSPITAL | Age: 70
End: 2025-08-29
Payer: MEDICARE

## 2025-08-29 DIAGNOSIS — R00.0 TACHYCARDIA: ICD-10-CM

## 2025-08-29 DIAGNOSIS — E66.812 CLASS 2 OBESITY DUE TO EXCESS CALORIES WITHOUT SERIOUS COMORBIDITY WITH BODY MASS INDEX (BMI) OF 38.0 TO 38.9 IN ADULT: ICD-10-CM

## 2025-08-29 DIAGNOSIS — E78.2 MIXED HYPERLIPIDEMIA: ICD-10-CM

## 2025-08-29 DIAGNOSIS — I10 HYPERTENSION, UNSPECIFIED TYPE: ICD-10-CM

## 2025-08-29 DIAGNOSIS — Z72.4 PROBLEMS RELATED TO INAPPROPRIATE DIET AND EATING HABITS: ICD-10-CM

## 2025-08-29 DIAGNOSIS — E66.09 CLASS 2 OBESITY DUE TO EXCESS CALORIES WITHOUT SERIOUS COMORBIDITY WITH BODY MASS INDEX (BMI) OF 38.0 TO 38.9 IN ADULT: ICD-10-CM

## 2025-08-29 PROCEDURE — 90791 PSYCH DIAGNOSTIC EVALUATION: CPT | Performed by: PSYCHOLOGIST

## 2025-09-02 ENCOUNTER — SPECIALTY PHARMACY (OUTPATIENT)
Dept: PHARMACY | Facility: CLINIC | Age: 70
End: 2025-09-02

## 2025-09-03 ENCOUNTER — APPOINTMENT (OUTPATIENT)
Dept: OPHTHALMOLOGY | Facility: CLINIC | Age: 70
End: 2025-09-03
Payer: MEDICARE

## 2025-09-03 ASSESSMENT — CUP TO DISC RATIO
OD_RATIO: 0.75
OS_RATIO: 0.85

## 2025-09-03 ASSESSMENT — TONOMETRY
OD_IOP_MMHG: 17
IOP_METHOD: GOLDMANN APPLANATION
OS_IOP_MMHG: 15

## 2025-09-03 ASSESSMENT — VISUAL ACUITY
CORRECTION_TYPE: GLASSES
OD_SC: 20/60
OS_SC: 20/40
OD_SC+: +2
METHOD: SNELLEN - LINEAR

## 2025-09-03 ASSESSMENT — ENCOUNTER SYMPTOMS
NEUROLOGICAL NEGATIVE: 0
EYES NEGATIVE: 0
GASTROINTESTINAL NEGATIVE: 0
RESPIRATORY NEGATIVE: 0
MUSCULOSKELETAL NEGATIVE: 0
ALLERGIC/IMMUNOLOGIC NEGATIVE: 0
CONSTITUTIONAL NEGATIVE: 0
CARDIOVASCULAR NEGATIVE: 0
HEMATOLOGIC/LYMPHATIC NEGATIVE: 0
ENDOCRINE NEGATIVE: 0
PSYCHIATRIC NEGATIVE: 0

## 2025-09-03 ASSESSMENT — SLIT LAMP EXAM - LIDS
COMMENTS: NORMAL
COMMENTS: NORMAL

## 2025-09-03 ASSESSMENT — EXTERNAL EXAM - LEFT EYE: OS_EXAM: NORMAL

## 2025-09-03 ASSESSMENT — EXTERNAL EXAM - RIGHT EYE: OD_EXAM: NORMAL

## 2025-09-03 ASSESSMENT — PACHYMETRY
OS_CT(UM): 573
OD_CT(UM): 564

## 2025-09-04 ENCOUNTER — SPECIALTY PHARMACY (OUTPATIENT)
Dept: PHARMACY | Facility: CLINIC | Age: 70
End: 2025-09-04

## 2025-09-04 ENCOUNTER — APPOINTMENT (OUTPATIENT)
Dept: BEHAVIORAL HEALTH | Facility: CLINIC | Age: 70
End: 2025-09-04
Payer: MEDICARE

## 2025-09-11 ENCOUNTER — APPOINTMENT (OUTPATIENT)
Dept: OPHTHALMOLOGY | Facility: CLINIC | Age: 70
End: 2025-09-11
Payer: MEDICARE

## 2025-09-16 ENCOUNTER — APPOINTMENT (OUTPATIENT)
Dept: CARDIOLOGY | Facility: HOSPITAL | Age: 70
End: 2025-09-16
Payer: MEDICARE

## 2025-10-03 ENCOUNTER — APPOINTMENT (OUTPATIENT)
Dept: ENDOCRINOLOGY | Facility: CLINIC | Age: 70
End: 2025-10-03
Payer: MEDICARE

## 2025-10-06 ENCOUNTER — APPOINTMENT (OUTPATIENT)
Dept: UROLOGY | Facility: CLINIC | Age: 70
End: 2025-10-06
Payer: MEDICARE

## 2025-10-13 ENCOUNTER — APPOINTMENT (OUTPATIENT)
Dept: UROLOGY | Facility: CLINIC | Age: 70
End: 2025-10-13
Payer: MEDICARE

## 2025-10-27 ENCOUNTER — APPOINTMENT (OUTPATIENT)
Dept: ENDOCRINOLOGY | Facility: CLINIC | Age: 70
End: 2025-10-27
Payer: MEDICARE

## 2025-11-05 ENCOUNTER — APPOINTMENT (OUTPATIENT)
Dept: OPHTHALMOLOGY | Facility: CLINIC | Age: 70
End: 2025-11-05
Payer: MEDICARE

## 2025-11-06 ENCOUNTER — APPOINTMENT (OUTPATIENT)
Dept: BEHAVIORAL HEALTH | Facility: CLINIC | Age: 70
End: 2025-11-06
Payer: MEDICARE

## 2025-11-19 ENCOUNTER — APPOINTMENT (OUTPATIENT)
Dept: OPHTHALMOLOGY | Facility: CLINIC | Age: 70
End: 2025-11-19
Payer: MEDICARE

## 2025-12-22 ENCOUNTER — APPOINTMENT (OUTPATIENT)
Dept: CARDIOLOGY | Facility: HOSPITAL | Age: 70
End: 2025-12-22
Payer: MEDICARE

## (undated) DEVICE — SHEATH, PINNACLE, 10 CM,  5FR INTRODUCER, 5FR DIA, 2.5 CM DIALATOR

## (undated) DEVICE — ACCESS KIT, S-MAK MINI, 4FR 10CM 0.018IN 40CM, NT/PT, ECHO ENHANCE NEEDLE

## (undated) DEVICE — CATHETER, ANGIO, IMPULSE, FR4, 5 FR X 125 CM

## (undated) DEVICE — CLOSURE SYSTEM, VASCULAR, VASCADE, 5 F

## (undated) DEVICE — Device

## (undated) DEVICE — CATHETER, ANGIO, IMPULSE, FL4, 5 FR X 100 CM